# Patient Record
Sex: MALE | Race: WHITE | NOT HISPANIC OR LATINO | Employment: OTHER | URBAN - METROPOLITAN AREA
[De-identification: names, ages, dates, MRNs, and addresses within clinical notes are randomized per-mention and may not be internally consistent; named-entity substitution may affect disease eponyms.]

---

## 2017-10-06 PROBLEM — N18.30 STAGE 3 CHRONIC KIDNEY DISEASE: Status: ACTIVE | Noted: 2017-10-06

## 2017-10-06 PROBLEM — E87.5 HYPERKALEMIA: Status: ACTIVE | Noted: 2017-10-06

## 2017-10-06 PROBLEM — D50.9 IRON DEFICIENCY ANEMIA: Status: ACTIVE | Noted: 2017-10-06

## 2017-10-06 PROBLEM — D64.9 SYMPTOMATIC ANEMIA: Status: ACTIVE | Noted: 2017-10-06

## 2020-07-09 ENCOUNTER — CLINICAL SUPPORT (OUTPATIENT)
Dept: FAMILY MEDICINE | Facility: CLINIC | Age: 80
End: 2020-07-09
Payer: MEDICARE

## 2020-07-09 DIAGNOSIS — D50.9 IRON DEFICIENCY ANEMIA, UNSPECIFIED IRON DEFICIENCY ANEMIA TYPE: ICD-10-CM

## 2020-07-09 DIAGNOSIS — I10 ESSENTIAL HYPERTENSION: ICD-10-CM

## 2020-07-09 DIAGNOSIS — E78.00 HYPERCHOLESTEREMIA: Primary | ICD-10-CM

## 2020-07-09 DIAGNOSIS — R73.01 ELEVATED FASTING GLUCOSE: ICD-10-CM

## 2020-07-09 DIAGNOSIS — E78.00 HYPERCHOLESTEREMIA: ICD-10-CM

## 2020-07-09 LAB
ALBUMIN SERPL BCP-MCNC: 3.9 G/DL (ref 3.5–5.2)
ALP SERPL-CCNC: 51 U/L (ref 55–135)
ALT SERPL W/O P-5'-P-CCNC: 21 U/L (ref 10–44)
ANION GAP SERPL CALC-SCNC: 8 MMOL/L (ref 8–16)
AST SERPL-CCNC: 21 U/L (ref 10–40)
BASOPHILS # BLD AUTO: 0.04 K/UL (ref 0–0.2)
BASOPHILS NFR BLD: 0.8 % (ref 0–1.9)
BILIRUB SERPL-MCNC: 0.4 MG/DL (ref 0.1–1)
BUN SERPL-MCNC: 23 MG/DL (ref 8–23)
CALCIUM SERPL-MCNC: 9.2 MG/DL (ref 8.7–10.5)
CHLORIDE SERPL-SCNC: 105 MMOL/L (ref 95–110)
CHOLEST SERPL-MCNC: 137 MG/DL (ref 120–199)
CHOLEST/HDLC SERPL: 3.6 {RATIO} (ref 2–5)
CO2 SERPL-SCNC: 26 MMOL/L (ref 23–29)
CREAT SERPL-MCNC: 1.4 MG/DL (ref 0.5–1.4)
DIFFERENTIAL METHOD: ABNORMAL
EOSINOPHIL # BLD AUTO: 0.2 K/UL (ref 0–0.5)
EOSINOPHIL NFR BLD: 3.1 % (ref 0–8)
ERYTHROCYTE [DISTWIDTH] IN BLOOD BY AUTOMATED COUNT: 20.5 % (ref 11.5–14.5)
EST. GFR  (AFRICAN AMERICAN): 54.5 ML/MIN/1.73 M^2
EST. GFR  (NON AFRICAN AMERICAN): 47.1 ML/MIN/1.73 M^2
HCT VFR BLD AUTO: 38.3 % (ref 40–54)
HDLC SERPL-MCNC: 38 MG/DL (ref 40–75)
HDLC SERPL: 27.7 % (ref 20–50)
HGB BLD-MCNC: 11.1 G/DL (ref 14–18)
IMM GRANULOCYTES # BLD AUTO: 0.01 K/UL (ref 0–0.04)
IMM GRANULOCYTES NFR BLD AUTO: 0.2 % (ref 0–0.5)
LDLC SERPL CALC-MCNC: 68 MG/DL (ref 63–159)
LYMPHOCYTES # BLD AUTO: 0.9 K/UL (ref 1–4.8)
LYMPHOCYTES NFR BLD: 16.7 % (ref 18–48)
MCH RBC QN AUTO: 24.7 PG (ref 27–31)
MCHC RBC AUTO-ENTMCNC: 29 G/DL (ref 32–36)
MCV RBC AUTO: 85 FL (ref 82–98)
MONOCYTES # BLD AUTO: 0.7 K/UL (ref 0.3–1)
MONOCYTES NFR BLD: 14.2 % (ref 4–15)
NEUTROPHILS # BLD AUTO: 3.4 K/UL (ref 1.8–7.7)
NEUTROPHILS NFR BLD: 65 % (ref 38–73)
NONHDLC SERPL-MCNC: 99 MG/DL
NRBC BLD-RTO: 0 /100 WBC
PLATELET # BLD AUTO: 140 K/UL (ref 150–350)
PMV BLD AUTO: 12.9 FL (ref 9.2–12.9)
POTASSIUM SERPL-SCNC: 4.8 MMOL/L (ref 3.5–5.1)
PROT SERPL-MCNC: 7.3 G/DL (ref 6–8.4)
RBC # BLD AUTO: 4.5 M/UL (ref 4.6–6.2)
SODIUM SERPL-SCNC: 139 MMOL/L (ref 136–145)
TRIGL SERPL-MCNC: 155 MG/DL (ref 30–150)
WBC # BLD AUTO: 5.2 K/UL (ref 3.9–12.7)

## 2020-07-09 PROCEDURE — 83036 HEMOGLOBIN GLYCOSYLATED A1C: CPT

## 2020-07-09 PROCEDURE — 80061 LIPID PANEL: CPT

## 2020-07-09 PROCEDURE — 85025 COMPLETE CBC W/AUTO DIFF WBC: CPT

## 2020-07-09 PROCEDURE — 80053 COMPREHEN METABOLIC PANEL: CPT

## 2020-07-09 NOTE — PROGRESS NOTES
Venipuncture performed with 21 gauge butterfly, x's 1 attempt,  to R Antecubital vein.  Specimens collected per orders.      Pressure dressing applied to site, instructed patient to remove dressing in 10-15 minutes, OK to re-adjust dressing if pressure causing any discomfort, to observe closely for numbness and/or discoloration to hand or fingers, and to notify provider if bleeding persists after applying constant pressure lasting 30 minutes.

## 2020-07-10 LAB
ESTIMATED AVG GLUCOSE: 140 MG/DL (ref 68–131)
HBA1C MFR BLD HPLC: 6.5 % (ref 4–5.6)

## 2020-12-21 ENCOUNTER — PATIENT MESSAGE (OUTPATIENT)
Dept: OTHER | Facility: OTHER | Age: 80
End: 2020-12-21

## 2021-05-10 ENCOUNTER — PATIENT MESSAGE (OUTPATIENT)
Dept: RESEARCH | Facility: HOSPITAL | Age: 81
End: 2021-05-10

## 2021-05-29 PROBLEM — K92.2 UGIB (UPPER GASTROINTESTINAL BLEED): Status: ACTIVE | Noted: 2021-05-29

## 2021-05-29 PROBLEM — D64.9 SEVERE ANEMIA: Status: ACTIVE | Noted: 2021-05-29

## 2021-05-30 PROBLEM — K92.2 UGIB (UPPER GASTROINTESTINAL BLEED): Status: RESOLVED | Noted: 2021-05-29 | Resolved: 2021-05-30

## 2021-10-22 ENCOUNTER — HOSPITAL ENCOUNTER (OUTPATIENT)
Facility: HOSPITAL | Age: 81
Discharge: HOME | End: 2021-10-25
Attending: EMERGENCY MEDICINE | Admitting: INTERNAL MEDICINE
Payer: MEDICARE

## 2021-10-22 DIAGNOSIS — N18.31 STAGE 3A CHRONIC KIDNEY DISEASE: ICD-10-CM

## 2021-10-22 DIAGNOSIS — D50.9 IRON DEFICIENCY ANEMIA, UNSPECIFIED IRON DEFICIENCY ANEMIA TYPE: ICD-10-CM

## 2021-10-22 DIAGNOSIS — Z79.01 CHRONIC ANTICOAGULATION: ICD-10-CM

## 2021-10-22 DIAGNOSIS — R06.09 DOE (DYSPNEA ON EXERTION): ICD-10-CM

## 2021-10-22 DIAGNOSIS — D64.9 ACUTE ON CHRONIC ANEMIA: ICD-10-CM

## 2021-10-22 DIAGNOSIS — D64.9 ANEMIA: ICD-10-CM

## 2021-10-22 DIAGNOSIS — D61.818 PANCYTOPENIA: ICD-10-CM

## 2021-10-22 DIAGNOSIS — D64.9 SEVERE ANEMIA: ICD-10-CM

## 2021-10-22 DIAGNOSIS — D64.9 SYMPTOMATIC ANEMIA: Primary | ICD-10-CM

## 2021-10-22 LAB
ABO + RH BLD: NORMAL
ANISOCYTOSIS BLD QL SMEAR: SLIGHT
BASOPHILS # BLD AUTO: 0.01 K/UL (ref 0–0.2)
BASOPHILS NFR BLD: 0.3 % (ref 0–1.9)
BLD GP AB SCN CELLS X3 SERPL QL: NORMAL
DACRYOCYTES BLD QL SMEAR: ABNORMAL
DIFFERENTIAL METHOD: ABNORMAL
EOSINOPHIL # BLD AUTO: 0 K/UL (ref 0–0.5)
EOSINOPHIL NFR BLD: 0.3 % (ref 0–8)
ERYTHROCYTE [DISTWIDTH] IN BLOOD BY AUTOMATED COUNT: 17.8 % (ref 11.5–14.5)
HCT VFR BLD AUTO: 17.1 % (ref 40–54)
HGB BLD-MCNC: 4.2 G/DL (ref 14–18)
HYPOCHROMIA BLD QL SMEAR: ABNORMAL
IMM GRANULOCYTES # BLD AUTO: 0.06 K/UL (ref 0–0.04)
IMM GRANULOCYTES NFR BLD AUTO: 1.6 % (ref 0–0.5)
LYMPHOCYTES # BLD AUTO: 0.7 K/UL (ref 1–4.8)
LYMPHOCYTES NFR BLD: 18 % (ref 18–48)
MCH RBC QN AUTO: 20.2 PG (ref 27–31)
MCHC RBC AUTO-ENTMCNC: 24.6 G/DL (ref 32–36)
MCV RBC AUTO: 82 FL (ref 82–98)
MONOCYTES # BLD AUTO: 0.5 K/UL (ref 0.3–1)
MONOCYTES NFR BLD: 12.2 % (ref 4–15)
NEUTROPHILS # BLD AUTO: 2.6 K/UL (ref 1.8–7.7)
NEUTROPHILS NFR BLD: 67.6 % (ref 38–73)
NRBC BLD-RTO: 0 /100 WBC
OVALOCYTES BLD QL SMEAR: ABNORMAL
PLATELET # BLD AUTO: 125 K/UL (ref 150–450)
PLATELET BLD QL SMEAR: ABNORMAL
PMV BLD AUTO: 11.5 FL (ref 9.2–12.9)
POIKILOCYTOSIS BLD QL SMEAR: SLIGHT
RBC # BLD AUTO: 2.08 M/UL (ref 4.6–6.2)
TARGETS BLD QL SMEAR: ABNORMAL
WBC # BLD AUTO: 3.84 K/UL (ref 3.9–12.7)

## 2021-10-22 PROCEDURE — 99291 CRITICAL CARE FIRST HOUR: CPT | Mod: 25

## 2021-10-22 PROCEDURE — 93010 EKG 12-LEAD: ICD-10-PCS | Mod: ,,, | Performed by: INTERNAL MEDICINE

## 2021-10-22 PROCEDURE — 85025 COMPLETE CBC W/AUTO DIFF WBC: CPT | Performed by: EMERGENCY MEDICINE

## 2021-10-22 PROCEDURE — G0378 HOSPITAL OBSERVATION PER HR: HCPCS

## 2021-10-22 PROCEDURE — 93010 ELECTROCARDIOGRAM REPORT: CPT | Mod: ,,, | Performed by: INTERNAL MEDICINE

## 2021-10-22 PROCEDURE — 93005 ELECTROCARDIOGRAM TRACING: CPT

## 2021-10-22 PROCEDURE — A4216 STERILE WATER/SALINE, 10 ML: HCPCS | Performed by: INTERNAL MEDICINE

## 2021-10-22 PROCEDURE — 86920 COMPATIBILITY TEST SPIN: CPT | Mod: 59 | Performed by: EMERGENCY MEDICINE

## 2021-10-22 PROCEDURE — P9016 RBC LEUKOCYTES REDUCED: HCPCS | Performed by: EMERGENCY MEDICINE

## 2021-10-22 PROCEDURE — 86900 BLOOD TYPING SEROLOGIC ABO: CPT | Performed by: EMERGENCY MEDICINE

## 2021-10-22 PROCEDURE — 25000003 PHARM REV CODE 250: Performed by: INTERNAL MEDICINE

## 2021-10-22 PROCEDURE — 36430 TRANSFUSION BLD/BLD COMPNT: CPT

## 2021-10-22 RX ORDER — POLYETHYLENE GLYCOL 3350 17 G/17G
17 POWDER, FOR SOLUTION ORAL DAILY PRN
Status: DISCONTINUED | OUTPATIENT
Start: 2021-10-22 | End: 2021-10-25 | Stop reason: HOSPADM

## 2021-10-22 RX ORDER — HYDROCODONE BITARTRATE AND ACETAMINOPHEN 500; 5 MG/1; MG/1
TABLET ORAL
Status: DISCONTINUED | OUTPATIENT
Start: 2021-10-22 | End: 2021-10-25 | Stop reason: HOSPADM

## 2021-10-22 RX ORDER — AMIODARONE HYDROCHLORIDE 200 MG/1
200 TABLET ORAL DAILY
Status: DISCONTINUED | OUTPATIENT
Start: 2021-10-23 | End: 2021-10-25 | Stop reason: HOSPADM

## 2021-10-22 RX ORDER — SODIUM,POTASSIUM PHOSPHATES 280-250MG
2 POWDER IN PACKET (EA) ORAL
Status: DISCONTINUED | OUTPATIENT
Start: 2021-10-22 | End: 2021-10-25 | Stop reason: HOSPADM

## 2021-10-22 RX ORDER — HYDROCODONE BITARTRATE AND ACETAMINOPHEN 5; 325 MG/1; MG/1
1 TABLET ORAL EVERY 6 HOURS PRN
Status: DISCONTINUED | OUTPATIENT
Start: 2021-10-22 | End: 2021-10-25 | Stop reason: HOSPADM

## 2021-10-22 RX ORDER — TALC
6 POWDER (GRAM) TOPICAL NIGHTLY PRN
Status: DISCONTINUED | OUTPATIENT
Start: 2021-10-22 | End: 2021-10-25 | Stop reason: HOSPADM

## 2021-10-22 RX ORDER — IBUPROFEN 200 MG
24 TABLET ORAL
Status: DISCONTINUED | OUTPATIENT
Start: 2021-10-22 | End: 2021-10-25 | Stop reason: HOSPADM

## 2021-10-22 RX ORDER — GLUCAGON 1 MG
1 KIT INJECTION
Status: DISCONTINUED | OUTPATIENT
Start: 2021-10-22 | End: 2021-10-25 | Stop reason: HOSPADM

## 2021-10-22 RX ORDER — LANOLIN ALCOHOL/MO/W.PET/CERES
800 CREAM (GRAM) TOPICAL
Status: DISCONTINUED | OUTPATIENT
Start: 2021-10-22 | End: 2021-10-25 | Stop reason: HOSPADM

## 2021-10-22 RX ORDER — HYDRALAZINE HYDROCHLORIDE 20 MG/ML
10 INJECTION INTRAMUSCULAR; INTRAVENOUS EVERY 8 HOURS PRN
Status: DISCONTINUED | OUTPATIENT
Start: 2021-10-22 | End: 2021-10-25 | Stop reason: HOSPADM

## 2021-10-22 RX ORDER — IBUPROFEN 200 MG
16 TABLET ORAL
Status: DISCONTINUED | OUTPATIENT
Start: 2021-10-22 | End: 2021-10-25 | Stop reason: HOSPADM

## 2021-10-22 RX ORDER — NALOXONE HCL 0.4 MG/ML
0.02 VIAL (ML) INJECTION
Status: DISCONTINUED | OUTPATIENT
Start: 2021-10-22 | End: 2021-10-25 | Stop reason: HOSPADM

## 2021-10-22 RX ORDER — METOPROLOL SUCCINATE 50 MG/1
50 TABLET, EXTENDED RELEASE ORAL DAILY
Status: DISCONTINUED | OUTPATIENT
Start: 2021-10-23 | End: 2021-10-25 | Stop reason: HOSPADM

## 2021-10-22 RX ORDER — ACETAMINOPHEN 325 MG/1
650 TABLET ORAL EVERY 4 HOURS PRN
Status: DISCONTINUED | OUTPATIENT
Start: 2021-10-22 | End: 2021-10-25 | Stop reason: HOSPADM

## 2021-10-22 RX ORDER — SODIUM CHLORIDE 0.9 % (FLUSH) 0.9 %
10 SYRINGE (ML) INJECTION EVERY 8 HOURS
Status: DISCONTINUED | OUTPATIENT
Start: 2021-10-22 | End: 2021-10-25 | Stop reason: HOSPADM

## 2021-10-22 RX ADMIN — Medication 10 ML: at 10:10

## 2021-10-23 PROBLEM — D61.818 PANCYTOPENIA: Status: ACTIVE | Noted: 2021-10-23

## 2021-10-23 LAB
ABO + RH BLD: NORMAL
BACTERIA #/AREA URNS HPF: ABNORMAL /HPF
BASOPHILS # BLD AUTO: 0.04 K/UL (ref 0–0.2)
BASOPHILS NFR BLD: 0.8 % (ref 0–1.9)
BILIRUB UR QL STRIP: NEGATIVE
BLD GP AB SCN CELLS X3 SERPL QL: NORMAL
BLD PROD TYP BPU: NORMAL
BLOOD UNIT EXPIRATION DATE: NORMAL
BLOOD UNIT TYPE CODE: 9500
BLOOD UNIT TYPE: NORMAL
CLARITY UR: CLEAR
CODING SYSTEM: NORMAL
COLOR UR: YELLOW
CREAT SERPL-MCNC: 1.2 MG/DL (ref 0.5–1.4)
DAT IGG-SP REAG RBC-IMP: NORMAL
DIFFERENTIAL METHOD: ABNORMAL
DISPENSE STATUS: NORMAL
EOSINOPHIL # BLD AUTO: 0 K/UL (ref 0–0.5)
EOSINOPHIL NFR BLD: 0.2 % (ref 0–8)
ERYTHROCYTE [DISTWIDTH] IN BLOOD BY AUTOMATED COUNT: 17 % (ref 11.5–14.5)
EST. GFR  (AFRICAN AMERICAN): >60 ML/MIN/1.73 M^2
EST. GFR  (NON AFRICAN AMERICAN): 56 ML/MIN/1.73 M^2
FERRITIN SERPL-MCNC: 9 NG/ML (ref 20–300)
GLUCOSE UR QL STRIP: NEGATIVE
HCT VFR BLD AUTO: 24.5 % (ref 40–54)
HCT VFR BLD AUTO: 28.6 % (ref 40–54)
HCT VFR BLD AUTO: 30 % (ref 40–54)
HGB BLD-MCNC: 6.5 G/DL (ref 14–18)
HGB BLD-MCNC: 7.9 G/DL (ref 14–18)
HGB BLD-MCNC: 8.8 G/DL (ref 14–18)
HGB UR QL STRIP: ABNORMAL
IMM GRANULOCYTES # BLD AUTO: 0.06 K/UL (ref 0–0.04)
IMM GRANULOCYTES NFR BLD AUTO: 1.2 % (ref 0–0.5)
KETONES UR QL STRIP: NEGATIVE
LDH SERPL L TO P-CCNC: 174 U/L (ref 110–260)
LEUKOCYTE ESTERASE UR QL STRIP: NEGATIVE
LYMPHOCYTES # BLD AUTO: 0.6 K/UL (ref 1–4.8)
LYMPHOCYTES NFR BLD: 12.5 % (ref 18–48)
MCH RBC QN AUTO: 24.5 PG (ref 27–31)
MCHC RBC AUTO-ENTMCNC: 29.3 G/DL (ref 32–36)
MCV RBC AUTO: 84 FL (ref 82–98)
MICROSCOPIC COMMENT: ABNORMAL
MONOCYTES # BLD AUTO: 0.7 K/UL (ref 0.3–1)
MONOCYTES NFR BLD: 13.7 % (ref 4–15)
NEUTROPHILS # BLD AUTO: 3.6 K/UL (ref 1.8–7.7)
NEUTROPHILS NFR BLD: 71.6 % (ref 38–73)
NITRITE UR QL STRIP: NEGATIVE
NRBC BLD-RTO: 0 /100 WBC
NUM UNITS TRANS PACKED RBC: NORMAL
PATH REV BLD -IMP: NORMAL
PH UR STRIP: 6 [PH] (ref 5–8)
PLATELET # BLD AUTO: 159 K/UL (ref 150–450)
PMV BLD AUTO: 12 FL (ref 9.2–12.9)
PROT UR QL STRIP: NEGATIVE
RBC # BLD AUTO: 3.59 M/UL (ref 4.6–6.2)
RBC #/AREA URNS HPF: 30 /HPF (ref 0–4)
RETICS/RBC NFR AUTO: 4.1 % (ref 0.4–2)
SP GR UR STRIP: 1.02 (ref 1–1.03)
TSH SERPL DL<=0.005 MIU/L-ACNC: 3.59 UIU/ML (ref 0.4–4)
URN SPEC COLLECT METH UR: ABNORMAL
UROBILINOGEN UR STRIP-ACNC: NEGATIVE EU/DL
WBC # BLD AUTO: 5.02 K/UL (ref 3.9–12.7)
WBC #/AREA URNS HPF: 1 /HPF (ref 0–5)

## 2021-10-23 PROCEDURE — 99204 OFFICE O/P NEW MOD 45 MIN: CPT | Mod: ,,, | Performed by: INTERNAL MEDICINE

## 2021-10-23 PROCEDURE — P9016 RBC LEUKOCYTES REDUCED: HCPCS | Performed by: EMERGENCY MEDICINE

## 2021-10-23 PROCEDURE — 83615 LACTATE (LD) (LDH) ENZYME: CPT | Performed by: INTERNAL MEDICINE

## 2021-10-23 PROCEDURE — 25500020 PHARM REV CODE 255: Performed by: FAMILY MEDICINE

## 2021-10-23 PROCEDURE — 36415 COLL VENOUS BLD VENIPUNCTURE: CPT | Performed by: FAMILY MEDICINE

## 2021-10-23 PROCEDURE — 25000003 PHARM REV CODE 250: Performed by: INTERNAL MEDICINE

## 2021-10-23 PROCEDURE — 99204 PR OFFICE/OUTPT VISIT, NEW, LEVL IV, 45-59 MIN: ICD-10-PCS | Mod: ,,, | Performed by: INTERNAL MEDICINE

## 2021-10-23 PROCEDURE — 96375 TX/PRO/DX INJ NEW DRUG ADDON: CPT | Mod: 59

## 2021-10-23 PROCEDURE — 86920 COMPATIBILITY TEST SPIN: CPT | Mod: 59 | Performed by: FAMILY MEDICINE

## 2021-10-23 PROCEDURE — 86747 PARVOVIRUS ANTIBODY: CPT | Mod: 91 | Performed by: INTERNAL MEDICINE

## 2021-10-23 PROCEDURE — 85025 COMPLETE CBC W/AUTO DIFF WBC: CPT | Performed by: INTERNAL MEDICINE

## 2021-10-23 PROCEDURE — 86880 COOMBS TEST DIRECT: CPT | Performed by: INTERNAL MEDICINE

## 2021-10-23 PROCEDURE — 82565 ASSAY OF CREATININE: CPT | Performed by: FAMILY MEDICINE

## 2021-10-23 PROCEDURE — 82728 ASSAY OF FERRITIN: CPT | Performed by: INTERNAL MEDICINE

## 2021-10-23 PROCEDURE — 88271 CYTOGENETICS DNA PROBE: CPT | Mod: 59 | Performed by: INTERNAL MEDICINE

## 2021-10-23 PROCEDURE — 85014 HEMATOCRIT: CPT | Mod: 91 | Performed by: INTERNAL MEDICINE

## 2021-10-23 PROCEDURE — 85018 HEMOGLOBIN: CPT | Mod: 91 | Performed by: INTERNAL MEDICINE

## 2021-10-23 PROCEDURE — 99900038 HC OT GENERIC THERAPY SCREENING (STAT)

## 2021-10-23 PROCEDURE — G0378 HOSPITAL OBSERVATION PER HR: HCPCS

## 2021-10-23 PROCEDURE — 86900 BLOOD TYPING SEROLOGIC ABO: CPT | Performed by: FAMILY MEDICINE

## 2021-10-23 PROCEDURE — 63600175 PHARM REV CODE 636 W HCPCS: Performed by: INTERNAL MEDICINE

## 2021-10-23 PROCEDURE — 85045 AUTOMATED RETICULOCYTE COUNT: CPT | Performed by: INTERNAL MEDICINE

## 2021-10-23 PROCEDURE — 84443 ASSAY THYROID STIM HORMONE: CPT | Performed by: INTERNAL MEDICINE

## 2021-10-23 PROCEDURE — A4216 STERILE WATER/SALINE, 10 ML: HCPCS | Performed by: INTERNAL MEDICINE

## 2021-10-23 PROCEDURE — 96374 THER/PROPH/DIAG INJ IV PUSH: CPT

## 2021-10-23 PROCEDURE — 85060 BLOOD SMEAR INTERPRETATION: CPT | Mod: ,,, | Performed by: PATHOLOGY

## 2021-10-23 PROCEDURE — 84466 ASSAY OF TRANSFERRIN: CPT | Performed by: INTERNAL MEDICINE

## 2021-10-23 PROCEDURE — 82607 VITAMIN B-12: CPT | Performed by: INTERNAL MEDICINE

## 2021-10-23 PROCEDURE — 36415 COLL VENOUS BLD VENIPUNCTURE: CPT | Performed by: INTERNAL MEDICINE

## 2021-10-23 PROCEDURE — 88275 CYTOGENETICS 100-300: CPT | Performed by: INTERNAL MEDICINE

## 2021-10-23 PROCEDURE — 85060 PATHOLOGIST REVIEW: ICD-10-PCS | Mod: ,,, | Performed by: PATHOLOGY

## 2021-10-23 PROCEDURE — 82746 ASSAY OF FOLIC ACID SERUM: CPT | Performed by: INTERNAL MEDICINE

## 2021-10-23 PROCEDURE — P9016 RBC LEUKOCYTES REDUCED: HCPCS | Performed by: FAMILY MEDICINE

## 2021-10-23 PROCEDURE — 63600175 PHARM REV CODE 636 W HCPCS: Performed by: FAMILY MEDICINE

## 2021-10-23 PROCEDURE — 36430 TRANSFUSION BLD/BLD COMPNT: CPT

## 2021-10-23 PROCEDURE — 88271 CYTOGENETICS DNA PROBE: CPT | Performed by: INTERNAL MEDICINE

## 2021-10-23 PROCEDURE — 85018 HEMOGLOBIN: CPT | Performed by: INTERNAL MEDICINE

## 2021-10-23 PROCEDURE — 81000 URINALYSIS NONAUTO W/SCOPE: CPT | Performed by: INTERNAL MEDICINE

## 2021-10-23 RX ORDER — HYDROCODONE BITARTRATE AND ACETAMINOPHEN 500; 5 MG/1; MG/1
TABLET ORAL
Status: DISCONTINUED | OUTPATIENT
Start: 2021-10-23 | End: 2021-10-25 | Stop reason: HOSPADM

## 2021-10-23 RX ORDER — FUROSEMIDE 10 MG/ML
20 INJECTION INTRAMUSCULAR; INTRAVENOUS ONCE
Status: COMPLETED | OUTPATIENT
Start: 2021-10-23 | End: 2021-10-23

## 2021-10-23 RX ADMIN — HYDRALAZINE HYDROCHLORIDE 10 MG: 20 INJECTION, SOLUTION INTRAMUSCULAR; INTRAVENOUS at 09:10

## 2021-10-23 RX ADMIN — Medication 10 ML: at 02:10

## 2021-10-23 RX ADMIN — Medication 10 ML: at 06:10

## 2021-10-23 RX ADMIN — IOHEXOL 75 ML: 350 INJECTION, SOLUTION INTRAVENOUS at 06:10

## 2021-10-23 RX ADMIN — Medication 10 ML: at 10:10

## 2021-10-23 RX ADMIN — AMIODARONE HYDROCHLORIDE 200 MG: 200 TABLET ORAL at 10:10

## 2021-10-23 RX ADMIN — METOPROLOL SUCCINATE 50 MG: 50 TABLET, EXTENDED RELEASE ORAL at 10:10

## 2021-10-23 RX ADMIN — FUROSEMIDE 20 MG: 20 INJECTION, SOLUTION INTRAMUSCULAR; INTRAVENOUS at 03:10

## 2021-10-24 LAB
ANION GAP SERPL CALC-SCNC: 10 MMOL/L (ref 8–16)
BASOPHILS # BLD AUTO: 0.04 K/UL (ref 0–0.2)
BASOPHILS NFR BLD: 0.7 % (ref 0–1.9)
BUN SERPL-MCNC: 19 MG/DL (ref 8–23)
CALCIUM SERPL-MCNC: 9.4 MG/DL (ref 8.7–10.5)
CHLORIDE SERPL-SCNC: 108 MMOL/L (ref 95–110)
CO2 SERPL-SCNC: 24 MMOL/L (ref 23–29)
CREAT SERPL-MCNC: 1.2 MG/DL (ref 0.5–1.4)
DIFFERENTIAL METHOD: ABNORMAL
EOSINOPHIL # BLD AUTO: 0 K/UL (ref 0–0.5)
EOSINOPHIL NFR BLD: 0.7 % (ref 0–8)
ERYTHROCYTE [DISTWIDTH] IN BLOOD BY AUTOMATED COUNT: 17.3 % (ref 11.5–14.5)
EST. GFR  (AFRICAN AMERICAN): >60 ML/MIN/1.73 M^2
EST. GFR  (NON AFRICAN AMERICAN): 56 ML/MIN/1.73 M^2
FOLATE SERPL-MCNC: 16.8 NG/ML (ref 4–24)
GLUCOSE SERPL-MCNC: 99 MG/DL (ref 70–110)
HCT VFR BLD AUTO: 33.8 % (ref 40–54)
HGB BLD-MCNC: 9.5 G/DL (ref 14–18)
IMM GRANULOCYTES # BLD AUTO: 0.05 K/UL (ref 0–0.04)
IMM GRANULOCYTES NFR BLD AUTO: 0.9 % (ref 0–0.5)
IRON SERPL-MCNC: 11 UG/DL (ref 45–160)
LYMPHOCYTES # BLD AUTO: 0.5 K/UL (ref 1–4.8)
LYMPHOCYTES NFR BLD: 8.9 % (ref 18–48)
MCH RBC QN AUTO: 23.5 PG (ref 27–31)
MCHC RBC AUTO-ENTMCNC: 28.1 G/DL (ref 32–36)
MCV RBC AUTO: 84 FL (ref 82–98)
MONOCYTES # BLD AUTO: 0.9 K/UL (ref 0.3–1)
MONOCYTES NFR BLD: 15.9 % (ref 4–15)
NEUTROPHILS # BLD AUTO: 4.1 K/UL (ref 1.8–7.7)
NEUTROPHILS NFR BLD: 72.9 % (ref 38–73)
NRBC BLD-RTO: 0 /100 WBC
PLATELET # BLD AUTO: 146 K/UL (ref 150–450)
PMV BLD AUTO: 12 FL (ref 9.2–12.9)
POTASSIUM SERPL-SCNC: 3.9 MMOL/L (ref 3.5–5.1)
RBC # BLD AUTO: 4.04 M/UL (ref 4.6–6.2)
SATURATED IRON: 2 % (ref 20–50)
SODIUM SERPL-SCNC: 142 MMOL/L (ref 136–145)
TOTAL IRON BINDING CAPACITY: 530 UG/DL (ref 250–450)
TRANSFERRIN SERPL-MCNC: 358 MG/DL (ref 200–375)
VIT B12 SERPL-MCNC: 271 PG/ML (ref 210–950)
WBC # BLD AUTO: 5.59 K/UL (ref 3.9–12.7)

## 2021-10-24 PROCEDURE — 97161 PT EVAL LOW COMPLEX 20 MIN: CPT

## 2021-10-24 PROCEDURE — 99214 OFFICE O/P EST MOD 30 MIN: CPT | Mod: ,,, | Performed by: INTERNAL MEDICINE

## 2021-10-24 PROCEDURE — 97165 OT EVAL LOW COMPLEX 30 MIN: CPT

## 2021-10-24 PROCEDURE — A4216 STERILE WATER/SALINE, 10 ML: HCPCS | Performed by: INTERNAL MEDICINE

## 2021-10-24 PROCEDURE — 63600175 PHARM REV CODE 636 W HCPCS: Performed by: INTERNAL MEDICINE

## 2021-10-24 PROCEDURE — 96376 TX/PRO/DX INJ SAME DRUG ADON: CPT

## 2021-10-24 PROCEDURE — G0378 HOSPITAL OBSERVATION PER HR: HCPCS

## 2021-10-24 PROCEDURE — 63600175 PHARM REV CODE 636 W HCPCS: Performed by: FAMILY MEDICINE

## 2021-10-24 PROCEDURE — 99214 PR OFFICE/OUTPT VISIT, EST, LEVL IV, 30-39 MIN: ICD-10-PCS | Mod: ,,, | Performed by: INTERNAL MEDICINE

## 2021-10-24 PROCEDURE — 96375 TX/PRO/DX INJ NEW DRUG ADDON: CPT

## 2021-10-24 PROCEDURE — 85025 COMPLETE CBC W/AUTO DIFF WBC: CPT | Performed by: NURSE PRACTITIONER

## 2021-10-24 PROCEDURE — 36415 COLL VENOUS BLD VENIPUNCTURE: CPT | Performed by: NURSE PRACTITIONER

## 2021-10-24 PROCEDURE — 80048 BASIC METABOLIC PNL TOTAL CA: CPT | Performed by: NURSE PRACTITIONER

## 2021-10-24 PROCEDURE — 25000003 PHARM REV CODE 250: Performed by: INTERNAL MEDICINE

## 2021-10-24 RX ADMIN — AMIODARONE HYDROCHLORIDE 200 MG: 200 TABLET ORAL at 08:10

## 2021-10-24 RX ADMIN — Medication 10 ML: at 09:10

## 2021-10-24 RX ADMIN — Medication 10 ML: at 06:10

## 2021-10-24 RX ADMIN — Medication 10 ML: at 02:10

## 2021-10-24 RX ADMIN — METOPROLOL SUCCINATE 50 MG: 50 TABLET, EXTENDED RELEASE ORAL at 08:10

## 2021-10-24 RX ADMIN — HYDRALAZINE HYDROCHLORIDE 10 MG: 20 INJECTION, SOLUTION INTRAMUSCULAR; INTRAVENOUS at 08:10

## 2021-10-24 RX ADMIN — IRON SUCROSE 200 MG: 20 INJECTION, SOLUTION INTRAVENOUS at 12:10

## 2021-10-25 ENCOUNTER — ANESTHESIA EVENT (OUTPATIENT)
Dept: ENDOSCOPY | Facility: HOSPITAL | Age: 81
End: 2021-10-25
Payer: MEDICARE

## 2021-10-25 ENCOUNTER — ANESTHESIA (OUTPATIENT)
Dept: ENDOSCOPY | Facility: HOSPITAL | Age: 81
End: 2021-10-25
Payer: MEDICARE

## 2021-10-25 DIAGNOSIS — D50.8 OTHER IRON DEFICIENCY ANEMIA: Primary | ICD-10-CM

## 2021-10-25 DIAGNOSIS — D64.9 ANEMIA, UNSPECIFIED TYPE: ICD-10-CM

## 2021-10-25 DIAGNOSIS — D50.9 IRON DEFICIENCY ANEMIA, UNSPECIFIED IRON DEFICIENCY ANEMIA TYPE: Primary | ICD-10-CM

## 2021-10-25 PROBLEM — D61.818 PANCYTOPENIA: Status: RESOLVED | Noted: 2021-10-23 | Resolved: 2021-10-25

## 2021-10-25 LAB
ANION GAP SERPL CALC-SCNC: 8 MMOL/L (ref 8–16)
BASOPHILS # BLD AUTO: 0.04 K/UL (ref 0–0.2)
BASOPHILS NFR BLD: 1 % (ref 0–1.9)
BUN SERPL-MCNC: 17 MG/DL (ref 8–23)
CALCIUM SERPL-MCNC: 9 MG/DL (ref 8.7–10.5)
CHLORIDE SERPL-SCNC: 108 MMOL/L (ref 95–110)
CO2 SERPL-SCNC: 25 MMOL/L (ref 23–29)
CREAT SERPL-MCNC: 1.2 MG/DL (ref 0.5–1.4)
DIFFERENTIAL METHOD: ABNORMAL
EOSINOPHIL # BLD AUTO: 0.1 K/UL (ref 0–0.5)
EOSINOPHIL NFR BLD: 1.5 % (ref 0–8)
ERYTHROCYTE [DISTWIDTH] IN BLOOD BY AUTOMATED COUNT: 17.8 % (ref 11.5–14.5)
EST. GFR  (AFRICAN AMERICAN): >60 ML/MIN/1.73 M^2
EST. GFR  (NON AFRICAN AMERICAN): 56 ML/MIN/1.73 M^2
GLUCOSE SERPL-MCNC: 105 MG/DL (ref 70–110)
HCT VFR BLD AUTO: 32.2 % (ref 40–54)
HGB BLD-MCNC: 8.9 G/DL (ref 14–18)
IMM GRANULOCYTES # BLD AUTO: 0.03 K/UL (ref 0–0.04)
IMM GRANULOCYTES NFR BLD AUTO: 0.7 % (ref 0–0.5)
LYMPHOCYTES # BLD AUTO: 0.5 K/UL (ref 1–4.8)
LYMPHOCYTES NFR BLD: 11.4 % (ref 18–48)
MCH RBC QN AUTO: 23.5 PG (ref 27–31)
MCHC RBC AUTO-ENTMCNC: 27.6 G/DL (ref 32–36)
MCV RBC AUTO: 85 FL (ref 82–98)
MONOCYTES # BLD AUTO: 0.5 K/UL (ref 0.3–1)
MONOCYTES NFR BLD: 12.4 % (ref 4–15)
NEUTROPHILS # BLD AUTO: 3 K/UL (ref 1.8–7.7)
NEUTROPHILS NFR BLD: 73 % (ref 38–73)
NRBC BLD-RTO: 0 /100 WBC
PATH REV BLD -IMP: NORMAL
PLATELET # BLD AUTO: 117 K/UL (ref 150–450)
PMV BLD AUTO: 10.9 FL (ref 9.2–12.9)
POTASSIUM SERPL-SCNC: 4.1 MMOL/L (ref 3.5–5.1)
RBC # BLD AUTO: 3.79 M/UL (ref 4.6–6.2)
SODIUM SERPL-SCNC: 141 MMOL/L (ref 136–145)
WBC # BLD AUTO: 4.11 K/UL (ref 3.9–12.7)

## 2021-10-25 PROCEDURE — 99214 OFFICE O/P EST MOD 30 MIN: CPT | Mod: ,,, | Performed by: NURSE PRACTITIONER

## 2021-10-25 PROCEDURE — 25000003 PHARM REV CODE 250: Performed by: NURSE ANESTHETIST, CERTIFIED REGISTERED

## 2021-10-25 PROCEDURE — 37000009 HC ANESTHESIA EA ADD 15 MINS: Performed by: INTERNAL MEDICINE

## 2021-10-25 PROCEDURE — 43236 UPPR GI SCOPE W/SUBMUC INJ: CPT | Mod: ,,, | Performed by: INTERNAL MEDICINE

## 2021-10-25 PROCEDURE — 85025 COMPLETE CBC W/AUTO DIFF WBC: CPT | Performed by: NURSE PRACTITIONER

## 2021-10-25 PROCEDURE — 63600175 PHARM REV CODE 636 W HCPCS: Performed by: FAMILY MEDICINE

## 2021-10-25 PROCEDURE — A4216 STERILE WATER/SALINE, 10 ML: HCPCS | Performed by: INTERNAL MEDICINE

## 2021-10-25 PROCEDURE — 63600175 PHARM REV CODE 636 W HCPCS: Performed by: NURSE ANESTHETIST, CERTIFIED REGISTERED

## 2021-10-25 PROCEDURE — 25000003 PHARM REV CODE 250: Performed by: INTERNAL MEDICINE

## 2021-10-25 PROCEDURE — 99214 PR OFFICE/OUTPT VISIT, EST, LEVL IV, 30-39 MIN: ICD-10-PCS | Mod: ,,, | Performed by: NURSE PRACTITIONER

## 2021-10-25 PROCEDURE — 43236 UPPR GI SCOPE W/SUBMUC INJ: CPT | Performed by: INTERNAL MEDICINE

## 2021-10-25 PROCEDURE — 27201028 HC NEEDLE, SCLERO: Performed by: INTERNAL MEDICINE

## 2021-10-25 PROCEDURE — 80048 BASIC METABOLIC PNL TOTAL CA: CPT | Performed by: NURSE PRACTITIONER

## 2021-10-25 PROCEDURE — 25000003 PHARM REV CODE 250: Performed by: NURSE PRACTITIONER

## 2021-10-25 PROCEDURE — 43236 PR EGD, FLEX, W/SUBMUC INJECTION(S), ANY SUBSTANCE: ICD-10-PCS | Mod: ,,, | Performed by: INTERNAL MEDICINE

## 2021-10-25 PROCEDURE — 37000008 HC ANESTHESIA 1ST 15 MINUTES: Performed by: INTERNAL MEDICINE

## 2021-10-25 PROCEDURE — 27202363 HC INJECTION AGENT, SUBMUCOSAL, ANY: Performed by: INTERNAL MEDICINE

## 2021-10-25 PROCEDURE — 36415 COLL VENOUS BLD VENIPUNCTURE: CPT | Performed by: NURSE PRACTITIONER

## 2021-10-25 PROCEDURE — G0378 HOSPITAL OBSERVATION PER HR: HCPCS

## 2021-10-25 PROCEDURE — 96376 TX/PRO/DX INJ SAME DRUG ADON: CPT

## 2021-10-25 RX ORDER — SODIUM CHLORIDE, SODIUM LACTATE, POTASSIUM CHLORIDE, CALCIUM CHLORIDE 600; 310; 30; 20 MG/100ML; MG/100ML; MG/100ML; MG/100ML
INJECTION, SOLUTION INTRAVENOUS CONTINUOUS PRN
Status: DISCONTINUED | OUTPATIENT
Start: 2021-10-25 | End: 2021-10-25

## 2021-10-25 RX ORDER — LIDOCAINE HYDROCHLORIDE 10 MG/ML
INJECTION, SOLUTION EPIDURAL; INFILTRATION; INTRACAUDAL; PERINEURAL
Status: DISCONTINUED | OUTPATIENT
Start: 2021-10-25 | End: 2021-10-25

## 2021-10-25 RX ORDER — PROPOFOL 10 MG/ML
VIAL (ML) INTRAVENOUS
Status: DISCONTINUED | OUTPATIENT
Start: 2021-10-25 | End: 2021-10-25

## 2021-10-25 RX ORDER — SODIUM CHLORIDE 9 MG/ML
INJECTION, SOLUTION INTRAVENOUS CONTINUOUS
Status: DISCONTINUED | OUTPATIENT
Start: 2021-10-25 | End: 2021-10-25

## 2021-10-25 RX ADMIN — RIVAROXABAN 20 MG: 20 TABLET, FILM COATED ORAL at 03:10

## 2021-10-25 RX ADMIN — Medication 10 ML: at 03:10

## 2021-10-25 RX ADMIN — SODIUM CHLORIDE, SODIUM LACTATE, POTASSIUM CHLORIDE, AND CALCIUM CHLORIDE: 600; 310; 30; 20 INJECTION, SOLUTION INTRAVENOUS at 02:10

## 2021-10-25 RX ADMIN — PROPOFOL 70 MG: 10 INJECTION, EMULSION INTRAVENOUS at 02:10

## 2021-10-25 RX ADMIN — IRON SUCROSE 200 MG: 20 INJECTION, SOLUTION INTRAVENOUS at 09:10

## 2021-10-25 RX ADMIN — PROPOFOL 30 MG: 10 INJECTION, EMULSION INTRAVENOUS at 02:10

## 2021-10-25 RX ADMIN — Medication 10 ML: at 06:10

## 2021-10-25 RX ADMIN — METOPROLOL SUCCINATE 50 MG: 50 TABLET, EXTENDED RELEASE ORAL at 09:10

## 2021-10-25 RX ADMIN — AMIODARONE HYDROCHLORIDE 200 MG: 200 TABLET ORAL at 09:10

## 2021-10-25 RX ADMIN — LIDOCAINE HYDROCHLORIDE 150 MG: 10 INJECTION, SOLUTION EPIDURAL; INFILTRATION; INTRACAUDAL; PERINEURAL at 02:10

## 2021-10-26 ENCOUNTER — TELEPHONE (OUTPATIENT)
Dept: GASTROENTEROLOGY | Facility: CLINIC | Age: 81
End: 2021-10-26
Payer: OTHER GOVERNMENT

## 2021-10-26 VITALS
DIASTOLIC BLOOD PRESSURE: 70 MMHG | WEIGHT: 226.63 LBS | SYSTOLIC BLOOD PRESSURE: 150 MMHG | RESPIRATION RATE: 18 BRPM | HEIGHT: 72 IN | TEMPERATURE: 98 F | BODY MASS INDEX: 30.7 KG/M2 | HEART RATE: 55 BPM | OXYGEN SATURATION: 99 %

## 2021-10-27 ENCOUNTER — TELEPHONE (OUTPATIENT)
Dept: GASTROENTEROLOGY | Facility: CLINIC | Age: 81
End: 2021-10-27
Payer: OTHER GOVERNMENT

## 2021-10-27 LAB
BLD PROD TYP BPU: NORMAL
BLD PROD TYP BPU: NORMAL
BLOOD UNIT EXPIRATION DATE: NORMAL
BLOOD UNIT EXPIRATION DATE: NORMAL
BLOOD UNIT TYPE CODE: 9500
BLOOD UNIT TYPE CODE: 9500
BLOOD UNIT TYPE: NORMAL
BLOOD UNIT TYPE: NORMAL
CODING SYSTEM: NORMAL
CODING SYSTEM: NORMAL
DISPENSE STATUS: NORMAL
DISPENSE STATUS: NORMAL
NUM UNITS TRANS PACKED RBC: NORMAL
NUM UNITS TRANS PACKED RBC: NORMAL
PARVOVIRUS B19 ABS IGG & IGM: ABNORMAL
PARVOVIRUS B19 IGG ANTIBODY: POSITIVE
PARVOVIRUS B19 IGM ANTIBODY: NEGATIVE

## 2021-10-28 LAB
FMDSB INTERPRETATION: NORMAL
FMDSB METHOD: NORMAL
FMDSB SPECIMEN: NORMAL
MDS FISH ADDITIONAL INFORMATION (BL): NORMAL
MDS FISH DISCLAIMER (BL): NORMAL
MDS FISH REASON FOR REFERRAL (BLOOD): NORMAL
MDS FISH RELEASED BY (BL): NORMAL
MDS FISH RESULT (BLOOD): NORMAL
MDS FISH RESULT SUMMARY (BL): NORMAL
MDS FISH RESULT TABLE (BL): NORMAL
SPECIMEN SOURCE: NORMAL

## 2021-11-02 ENCOUNTER — OFFICE VISIT (OUTPATIENT)
Dept: GASTROENTEROLOGY | Facility: CLINIC | Age: 81
End: 2021-11-02
Payer: MEDICARE

## 2021-11-02 ENCOUNTER — LAB VISIT (OUTPATIENT)
Dept: LAB | Facility: HOSPITAL | Age: 81
End: 2021-11-02
Attending: PHYSICIAN ASSISTANT
Payer: MEDICARE

## 2021-11-02 VITALS
HEART RATE: 59 BPM | OXYGEN SATURATION: 99 % | DIASTOLIC BLOOD PRESSURE: 76 MMHG | SYSTOLIC BLOOD PRESSURE: 160 MMHG | WEIGHT: 236 LBS | BODY MASS INDEX: 31.97 KG/M2 | HEIGHT: 72 IN

## 2021-11-02 DIAGNOSIS — Z87.74 HISTORY OF ARTERIOVENOUS MALFORMATION (AVM): ICD-10-CM

## 2021-11-02 DIAGNOSIS — R31.9 HEMATURIA, UNSPECIFIED TYPE: Primary | ICD-10-CM

## 2021-11-02 DIAGNOSIS — D50.9 IRON DEFICIENCY ANEMIA, UNSPECIFIED IRON DEFICIENCY ANEMIA TYPE: ICD-10-CM

## 2021-11-02 DIAGNOSIS — Z85.51 HISTORY OF BLADDER CANCER: ICD-10-CM

## 2021-11-02 PROCEDURE — 99214 OFFICE O/P EST MOD 30 MIN: CPT | Mod: PBBFAC | Performed by: PHYSICIAN ASSISTANT

## 2021-11-02 PROCEDURE — 99999 PR PBB SHADOW E&M-EST. PATIENT-LVL IV: CPT | Mod: PBBFAC,,, | Performed by: PHYSICIAN ASSISTANT

## 2021-11-02 PROCEDURE — 85025 COMPLETE CBC W/AUTO DIFF WBC: CPT | Performed by: PHYSICIAN ASSISTANT

## 2021-11-02 PROCEDURE — 36415 COLL VENOUS BLD VENIPUNCTURE: CPT | Performed by: PHYSICIAN ASSISTANT

## 2021-11-02 PROCEDURE — 99999 PR PBB SHADOW E&M-EST. PATIENT-LVL IV: ICD-10-PCS | Mod: PBBFAC,,, | Performed by: PHYSICIAN ASSISTANT

## 2021-11-02 PROCEDURE — 99213 PR OFFICE/OUTPT VISIT, EST, LEVL III, 20-29 MIN: ICD-10-PCS | Mod: S$PBB,,, | Performed by: PHYSICIAN ASSISTANT

## 2021-11-02 PROCEDURE — 99213 OFFICE O/P EST LOW 20 MIN: CPT | Mod: S$PBB,,, | Performed by: PHYSICIAN ASSISTANT

## 2021-11-02 RX ORDER — NAPROXEN SODIUM 220 MG/1
TABLET, FILM COATED ORAL
COMMUNITY
End: 2021-11-10

## 2021-11-02 RX ORDER — FERROUS SULFATE 325(65) MG
325 TABLET ORAL
Status: ON HOLD | COMMUNITY

## 2021-11-02 RX ORDER — FAMOTIDINE 20 MG/1
20 TABLET, FILM COATED ORAL DAILY
Status: ON HOLD | COMMUNITY

## 2021-11-03 ENCOUNTER — TELEPHONE (OUTPATIENT)
Dept: FAMILY MEDICINE | Facility: CLINIC | Age: 81
End: 2021-11-03
Payer: OTHER GOVERNMENT

## 2021-11-03 LAB
BASOPHILS # BLD AUTO: 0.05 K/UL (ref 0–0.2)
BASOPHILS NFR BLD: 1.2 % (ref 0–1.9)
DIFFERENTIAL METHOD: ABNORMAL
EOSINOPHIL # BLD AUTO: 0 K/UL (ref 0–0.5)
EOSINOPHIL NFR BLD: 1 % (ref 0–8)
ERYTHROCYTE [DISTWIDTH] IN BLOOD BY AUTOMATED COUNT: 18 % (ref 11.5–14.5)
HCT VFR BLD AUTO: 34.8 % (ref 40–54)
HGB BLD-MCNC: 9.8 G/DL (ref 14–18)
IMM GRANULOCYTES # BLD AUTO: 0.01 K/UL (ref 0–0.04)
IMM GRANULOCYTES NFR BLD AUTO: 0.2 % (ref 0–0.5)
LYMPHOCYTES # BLD AUTO: 0.6 K/UL (ref 1–4.8)
LYMPHOCYTES NFR BLD: 14.7 % (ref 18–48)
MCH RBC QN AUTO: 24.9 PG (ref 27–31)
MCHC RBC AUTO-ENTMCNC: 28.2 G/DL (ref 32–36)
MCV RBC AUTO: 89 FL (ref 82–98)
MONOCYTES # BLD AUTO: 0.5 K/UL (ref 0.3–1)
MONOCYTES NFR BLD: 13.2 % (ref 4–15)
NEUTROPHILS # BLD AUTO: 2.9 K/UL (ref 1.8–7.7)
NEUTROPHILS NFR BLD: 69.7 % (ref 38–73)
NRBC BLD-RTO: 0 /100 WBC
PLATELET # BLD AUTO: 104 K/UL (ref 150–450)
PMV BLD AUTO: ABNORMAL FL (ref 9.2–12.9)
RBC # BLD AUTO: 3.93 M/UL (ref 4.6–6.2)
WBC # BLD AUTO: 4.09 K/UL (ref 3.9–12.7)

## 2021-11-05 ENCOUNTER — LAB VISIT (OUTPATIENT)
Dept: LAB | Facility: HOSPITAL | Age: 81
End: 2021-11-05
Attending: INTERNAL MEDICINE
Payer: MEDICARE

## 2021-11-05 DIAGNOSIS — D50.8 OTHER IRON DEFICIENCY ANEMIA: ICD-10-CM

## 2021-11-05 LAB
ALBUMIN SERPL BCP-MCNC: 3.5 G/DL (ref 3.5–5.2)
ALP SERPL-CCNC: 63 U/L (ref 55–135)
ALT SERPL W/O P-5'-P-CCNC: 28 U/L (ref 10–44)
ANION GAP SERPL CALC-SCNC: 10 MMOL/L (ref 8–16)
AST SERPL-CCNC: 27 U/L (ref 10–40)
BASOPHILS # BLD AUTO: 0.04 K/UL (ref 0–0.2)
BASOPHILS NFR BLD: 1.2 % (ref 0–1.9)
BILIRUB SERPL-MCNC: 0.4 MG/DL (ref 0.1–1)
BUN SERPL-MCNC: 19 MG/DL (ref 8–23)
CALCIUM SERPL-MCNC: 9.5 MG/DL (ref 8.7–10.5)
CHLORIDE SERPL-SCNC: 108 MMOL/L (ref 95–110)
CO2 SERPL-SCNC: 25 MMOL/L (ref 23–29)
CREAT SERPL-MCNC: 1.2 MG/DL (ref 0.5–1.4)
DIFFERENTIAL METHOD: ABNORMAL
EOSINOPHIL # BLD AUTO: 0 K/UL (ref 0–0.5)
EOSINOPHIL NFR BLD: 1.2 % (ref 0–8)
ERYTHROCYTE [DISTWIDTH] IN BLOOD BY AUTOMATED COUNT: 17.6 % (ref 11.5–14.5)
EST. GFR  (AFRICAN AMERICAN): >60 ML/MIN/1.73 M^2
EST. GFR  (NON AFRICAN AMERICAN): 56 ML/MIN/1.73 M^2
FERRITIN SERPL-MCNC: 57 NG/ML (ref 20–300)
GLUCOSE SERPL-MCNC: 138 MG/DL (ref 70–110)
HCT VFR BLD AUTO: 31 % (ref 40–54)
HGB BLD-MCNC: 8.7 G/DL (ref 14–18)
IMM GRANULOCYTES # BLD AUTO: 0.01 K/UL (ref 0–0.04)
IMM GRANULOCYTES NFR BLD AUTO: 0.3 % (ref 0–0.5)
IRON SERPL-MCNC: 74 UG/DL (ref 45–160)
LYMPHOCYTES # BLD AUTO: 0.7 K/UL (ref 1–4.8)
LYMPHOCYTES NFR BLD: 19.1 % (ref 18–48)
MCH RBC QN AUTO: 24.4 PG (ref 27–31)
MCHC RBC AUTO-ENTMCNC: 28.1 G/DL (ref 32–36)
MCV RBC AUTO: 87 FL (ref 82–98)
MONOCYTES # BLD AUTO: 0.5 K/UL (ref 0.3–1)
MONOCYTES NFR BLD: 13 % (ref 4–15)
NEUTROPHILS # BLD AUTO: 2.3 K/UL (ref 1.8–7.7)
NEUTROPHILS NFR BLD: 65.2 % (ref 38–73)
NRBC BLD-RTO: 0 /100 WBC
PLATELET # BLD AUTO: 109 K/UL (ref 150–450)
PMV BLD AUTO: 11.4 FL (ref 9.2–12.9)
POTASSIUM SERPL-SCNC: 4.6 MMOL/L (ref 3.5–5.1)
PROT SERPL-MCNC: 6.4 G/DL (ref 6–8.4)
RBC # BLD AUTO: 3.56 M/UL (ref 4.6–6.2)
SATURATED IRON: 16 % (ref 20–50)
SODIUM SERPL-SCNC: 143 MMOL/L (ref 136–145)
TOTAL IRON BINDING CAPACITY: 466 UG/DL (ref 250–450)
TRANSFERRIN SERPL-MCNC: 315 MG/DL (ref 200–375)
WBC # BLD AUTO: 3.46 K/UL (ref 3.9–12.7)

## 2021-11-05 PROCEDURE — 36415 COLL VENOUS BLD VENIPUNCTURE: CPT | Performed by: NURSE PRACTITIONER

## 2021-11-05 PROCEDURE — 82728 ASSAY OF FERRITIN: CPT | Performed by: NURSE PRACTITIONER

## 2021-11-05 PROCEDURE — 80053 COMPREHEN METABOLIC PANEL: CPT | Performed by: NURSE PRACTITIONER

## 2021-11-05 PROCEDURE — 85025 COMPLETE CBC W/AUTO DIFF WBC: CPT | Performed by: NURSE PRACTITIONER

## 2021-11-05 PROCEDURE — 84466 ASSAY OF TRANSFERRIN: CPT | Performed by: NURSE PRACTITIONER

## 2021-11-08 ENCOUNTER — OFFICE VISIT (OUTPATIENT)
Dept: FAMILY MEDICINE | Facility: CLINIC | Age: 81
End: 2021-11-08
Attending: FAMILY MEDICINE
Payer: MEDICARE

## 2021-11-08 VITALS
BODY MASS INDEX: 32.01 KG/M2 | OXYGEN SATURATION: 98 % | WEIGHT: 236.31 LBS | HEIGHT: 72 IN | SYSTOLIC BLOOD PRESSURE: 130 MMHG | TEMPERATURE: 98 F | HEART RATE: 54 BPM | DIASTOLIC BLOOD PRESSURE: 70 MMHG

## 2021-11-08 DIAGNOSIS — E11.69 DM TYPE 2 WITH DIABETIC DYSLIPIDEMIA: ICD-10-CM

## 2021-11-08 DIAGNOSIS — Z85.51 HISTORY OF BLADDER CANCER: ICD-10-CM

## 2021-11-08 DIAGNOSIS — N18.30 STAGE 3 CHRONIC KIDNEY DISEASE, UNSPECIFIED WHETHER STAGE 3A OR 3B CKD: ICD-10-CM

## 2021-11-08 DIAGNOSIS — I48.91 ATRIAL FIBRILLATION, UNSPECIFIED TYPE: Primary | ICD-10-CM

## 2021-11-08 DIAGNOSIS — E78.5 DM TYPE 2 WITH DIABETIC DYSLIPIDEMIA: ICD-10-CM

## 2021-11-08 PROBLEM — E87.5 HYPERKALEMIA: Status: RESOLVED | Noted: 2017-10-06 | Resolved: 2021-11-08

## 2021-11-08 PROCEDURE — 99215 PR OFFICE/OUTPT VISIT, EST, LEVL V, 40-54 MIN: ICD-10-PCS | Mod: S$PBB,,, | Performed by: FAMILY MEDICINE

## 2021-11-08 PROCEDURE — 99999 PR PBB SHADOW E&M-EST. PATIENT-LVL IV: ICD-10-PCS | Mod: PBBFAC,,, | Performed by: FAMILY MEDICINE

## 2021-11-08 PROCEDURE — 90694 VACC AIIV4 NO PRSRV 0.5ML IM: CPT | Mod: PBBFAC,PO

## 2021-11-08 PROCEDURE — 99999 PR PBB SHADOW E&M-EST. PATIENT-LVL IV: CPT | Mod: PBBFAC,,, | Performed by: FAMILY MEDICINE

## 2021-11-08 PROCEDURE — G0008 ADMIN INFLUENZA VIRUS VAC: HCPCS | Mod: PBBFAC,PO

## 2021-11-08 PROCEDURE — 99214 OFFICE O/P EST MOD 30 MIN: CPT | Mod: PBBFAC,PO | Performed by: FAMILY MEDICINE

## 2021-11-08 PROCEDURE — 99215 OFFICE O/P EST HI 40 MIN: CPT | Mod: S$PBB,,, | Performed by: FAMILY MEDICINE

## 2021-11-09 ENCOUNTER — OFFICE VISIT (OUTPATIENT)
Dept: HEMATOLOGY/ONCOLOGY | Facility: CLINIC | Age: 81
End: 2021-11-09
Payer: MEDICARE

## 2021-11-09 VITALS
HEART RATE: 95 BPM | WEIGHT: 235.88 LBS | TEMPERATURE: 98 F | HEIGHT: 72 IN | SYSTOLIC BLOOD PRESSURE: 173 MMHG | BODY MASS INDEX: 31.95 KG/M2 | DIASTOLIC BLOOD PRESSURE: 66 MMHG | OXYGEN SATURATION: 95 %

## 2021-11-09 DIAGNOSIS — D64.9 ANEMIA, UNSPECIFIED TYPE: ICD-10-CM

## 2021-11-09 DIAGNOSIS — C67.9 MALIGNANT NEOPLASM OF URINARY BLADDER, UNSPECIFIED SITE: ICD-10-CM

## 2021-11-09 DIAGNOSIS — I48.0 PAROXYSMAL ATRIAL FIBRILLATION: ICD-10-CM

## 2021-11-09 PROCEDURE — 99215 OFFICE O/P EST HI 40 MIN: CPT | Mod: 25,S$PBB,, | Performed by: INTERNAL MEDICINE

## 2021-11-09 PROCEDURE — 99999 PR PBB SHADOW E&M-EST. PATIENT-LVL III: ICD-10-PCS | Mod: PBBFAC,,, | Performed by: INTERNAL MEDICINE

## 2021-11-09 PROCEDURE — 99215 PR OFFICE/OUTPT VISIT, EST, LEVL V, 40-54 MIN: ICD-10-PCS | Mod: 25,S$PBB,, | Performed by: INTERNAL MEDICINE

## 2021-11-09 PROCEDURE — 99213 OFFICE O/P EST LOW 20 MIN: CPT | Mod: PBBFAC | Performed by: INTERNAL MEDICINE

## 2021-11-09 PROCEDURE — 99999 PR PBB SHADOW E&M-EST. PATIENT-LVL III: CPT | Mod: PBBFAC,,, | Performed by: INTERNAL MEDICINE

## 2021-11-09 RX ORDER — SODIUM CHLORIDE 0.9 % (FLUSH) 0.9 %
10 SYRINGE (ML) INJECTION
Status: CANCELLED | OUTPATIENT
Start: 2021-12-02

## 2021-11-09 RX ORDER — HEPARIN 100 UNIT/ML
500 SYRINGE INTRAVENOUS
Status: CANCELLED | OUTPATIENT
Start: 2021-11-16

## 2021-11-09 RX ORDER — HEPARIN 100 UNIT/ML
500 SYRINGE INTRAVENOUS
Status: CANCELLED | OUTPATIENT
Start: 2021-12-02

## 2021-11-09 RX ORDER — SODIUM CHLORIDE 0.9 % (FLUSH) 0.9 %
10 SYRINGE (ML) INJECTION
Status: CANCELLED | OUTPATIENT
Start: 2021-11-16

## 2021-11-10 ENCOUNTER — OFFICE VISIT (OUTPATIENT)
Dept: CARDIOLOGY | Facility: CLINIC | Age: 81
End: 2021-11-10
Attending: FAMILY MEDICINE
Payer: MEDICARE

## 2021-11-10 VITALS
BODY MASS INDEX: 31.75 KG/M2 | DIASTOLIC BLOOD PRESSURE: 70 MMHG | HEART RATE: 64 BPM | WEIGHT: 234.13 LBS | OXYGEN SATURATION: 98 % | SYSTOLIC BLOOD PRESSURE: 120 MMHG

## 2021-11-10 DIAGNOSIS — D64.9 ANEMIA, UNSPECIFIED TYPE: ICD-10-CM

## 2021-11-10 DIAGNOSIS — E78.00 HYPERCHOLESTEREMIA: ICD-10-CM

## 2021-11-10 DIAGNOSIS — D49.4 BLADDER TUMOR: ICD-10-CM

## 2021-11-10 DIAGNOSIS — I48.0 PAROXYSMAL ATRIAL FIBRILLATION: Primary | ICD-10-CM

## 2021-11-10 DIAGNOSIS — J44.9 CHRONIC OBSTRUCTIVE PULMONARY DISEASE, UNSPECIFIED COPD TYPE: ICD-10-CM

## 2021-11-10 DIAGNOSIS — I48.91 ATRIAL FIBRILLATION, UNSPECIFIED TYPE: ICD-10-CM

## 2021-11-10 DIAGNOSIS — I10 PRIMARY HYPERTENSION: ICD-10-CM

## 2021-11-10 PROCEDURE — 99213 OFFICE O/P EST LOW 20 MIN: CPT | Mod: PBBFAC,PO | Performed by: INTERNAL MEDICINE

## 2021-11-10 PROCEDURE — 99204 PR OFFICE/OUTPT VISIT, NEW, LEVL IV, 45-59 MIN: ICD-10-PCS | Mod: S$PBB,,, | Performed by: INTERNAL MEDICINE

## 2021-11-10 PROCEDURE — 99999 PR PBB SHADOW E&M-EST. PATIENT-LVL III: ICD-10-PCS | Mod: PBBFAC,,, | Performed by: INTERNAL MEDICINE

## 2021-11-10 PROCEDURE — 99999 PR PBB SHADOW E&M-EST. PATIENT-LVL III: CPT | Mod: PBBFAC,,, | Performed by: INTERNAL MEDICINE

## 2021-11-10 PROCEDURE — 99204 OFFICE O/P NEW MOD 45 MIN: CPT | Mod: S$PBB,,, | Performed by: INTERNAL MEDICINE

## 2021-12-01 ENCOUNTER — INFUSION (OUTPATIENT)
Dept: INFUSION THERAPY | Facility: HOSPITAL | Age: 81
End: 2021-12-01
Attending: INTERNAL MEDICINE
Payer: MEDICARE

## 2021-12-01 VITALS
OXYGEN SATURATION: 98 % | RESPIRATION RATE: 16 BRPM | SYSTOLIC BLOOD PRESSURE: 152 MMHG | HEART RATE: 60 BPM | DIASTOLIC BLOOD PRESSURE: 70 MMHG | TEMPERATURE: 98 F

## 2021-12-01 DIAGNOSIS — D50.8 OTHER IRON DEFICIENCY ANEMIA: Primary | ICD-10-CM

## 2021-12-01 PROCEDURE — 96365 THER/PROPH/DIAG IV INF INIT: CPT

## 2021-12-01 PROCEDURE — 25000003 PHARM REV CODE 250: Performed by: INTERNAL MEDICINE

## 2021-12-01 PROCEDURE — 63600175 PHARM REV CODE 636 W HCPCS: Mod: JG | Performed by: INTERNAL MEDICINE

## 2021-12-01 RX ADMIN — FERRIC CARBOXYMALTOSE INJECTION 750 MG: 50 INJECTION, SOLUTION INTRAVENOUS at 09:12

## 2021-12-01 RX ADMIN — SODIUM CHLORIDE: 9 INJECTION, SOLUTION INTRAVENOUS at 09:12

## 2021-12-06 ENCOUNTER — HOSPITAL ENCOUNTER (OUTPATIENT)
Facility: HOSPITAL | Age: 81
Discharge: HOME | End: 2021-12-06
Attending: INTERNAL MEDICINE | Admitting: INTERNAL MEDICINE
Payer: MEDICARE

## 2021-12-06 PROCEDURE — 91110 PR GI TRACT CAPSULE ENDOSCOPY: ICD-10-PCS | Mod: 26,,, | Performed by: INTERNAL MEDICINE

## 2021-12-06 PROCEDURE — 91110 GI TRC IMG INTRAL ESOPH-ILE: CPT | Mod: 26,,, | Performed by: INTERNAL MEDICINE

## 2021-12-06 PROCEDURE — 91110 GI TRC IMG INTRAL ESOPH-ILE: CPT

## 2021-12-07 DIAGNOSIS — K92.2 UGIB (UPPER GASTROINTESTINAL BLEED): Primary | ICD-10-CM

## 2021-12-08 ENCOUNTER — INFUSION (OUTPATIENT)
Dept: INFUSION THERAPY | Facility: HOSPITAL | Age: 81
End: 2021-12-08
Attending: INTERNAL MEDICINE
Payer: MEDICARE

## 2021-12-08 VITALS
TEMPERATURE: 98 F | HEART RATE: 53 BPM | SYSTOLIC BLOOD PRESSURE: 181 MMHG | DIASTOLIC BLOOD PRESSURE: 80 MMHG | RESPIRATION RATE: 16 BRPM | OXYGEN SATURATION: 98 %

## 2021-12-08 DIAGNOSIS — D50.8 OTHER IRON DEFICIENCY ANEMIA: Primary | ICD-10-CM

## 2021-12-08 DIAGNOSIS — K92.2 UGIB (UPPER GASTROINTESTINAL BLEED): Primary | ICD-10-CM

## 2021-12-08 PROCEDURE — 63600175 PHARM REV CODE 636 W HCPCS: Mod: JG | Performed by: INTERNAL MEDICINE

## 2021-12-08 PROCEDURE — 25000003 PHARM REV CODE 250: Performed by: INTERNAL MEDICINE

## 2021-12-08 PROCEDURE — 96365 THER/PROPH/DIAG IV INF INIT: CPT

## 2021-12-08 RX ORDER — SODIUM CHLORIDE 0.9 % (FLUSH) 0.9 %
10 SYRINGE (ML) INJECTION
Status: DISCONTINUED | OUTPATIENT
Start: 2021-12-08 | End: 2021-12-08 | Stop reason: HOSPADM

## 2021-12-08 RX ORDER — HYDRALAZINE HYDROCHLORIDE 25 MG/1
25 TABLET, FILM COATED ORAL
Status: COMPLETED | OUTPATIENT
Start: 2021-12-08 | End: 2021-12-08

## 2021-12-08 RX ADMIN — HYDRALAZINE HYDROCHLORIDE 25 MG: 25 TABLET, FILM COATED ORAL at 09:12

## 2021-12-08 RX ADMIN — FERRIC CARBOXYMALTOSE INJECTION 750 MG: 50 INJECTION, SOLUTION INTRAVENOUS at 10:12

## 2021-12-08 RX ADMIN — SODIUM CHLORIDE: 9 INJECTION, SOLUTION INTRAVENOUS at 10:12

## 2021-12-09 ENCOUNTER — TELEPHONE (OUTPATIENT)
Dept: GASTROENTEROLOGY | Facility: CLINIC | Age: 81
End: 2021-12-09
Payer: OTHER GOVERNMENT

## 2021-12-10 ENCOUNTER — TELEPHONE (OUTPATIENT)
Dept: GASTROENTEROLOGY | Facility: CLINIC | Age: 81
End: 2021-12-10
Payer: OTHER GOVERNMENT

## 2021-12-13 ENCOUNTER — HOSPITAL ENCOUNTER (OUTPATIENT)
Facility: HOSPITAL | Age: 81
Discharge: HOME | End: 2021-12-13
Attending: INTERNAL MEDICINE | Admitting: INTERNAL MEDICINE
Payer: MEDICARE

## 2021-12-13 ENCOUNTER — ANESTHESIA (OUTPATIENT)
Dept: ENDOSCOPY | Facility: HOSPITAL | Age: 81
End: 2021-12-13
Payer: MEDICARE

## 2021-12-13 ENCOUNTER — ANESTHESIA EVENT (OUTPATIENT)
Dept: ENDOSCOPY | Facility: HOSPITAL | Age: 81
End: 2021-12-13
Payer: MEDICARE

## 2021-12-13 DIAGNOSIS — K92.2 GASTROINTESTINAL HEMORRHAGE, UNSPECIFIED GASTROINTESTINAL HEMORRHAGE TYPE: Primary | ICD-10-CM

## 2021-12-13 DIAGNOSIS — K92.2 GI BLEED: ICD-10-CM

## 2021-12-13 DIAGNOSIS — D64.9 ANEMIA, UNSPECIFIED TYPE: ICD-10-CM

## 2021-12-13 PROCEDURE — 88305 TISSUE EXAM BY PATHOLOGIST: ICD-10-PCS | Mod: 26,,, | Performed by: PATHOLOGY

## 2021-12-13 PROCEDURE — 88305 TISSUE EXAM BY PATHOLOGIST: CPT | Performed by: PATHOLOGY

## 2021-12-13 PROCEDURE — 88305 TISSUE EXAM BY PATHOLOGIST: CPT | Mod: 26,,, | Performed by: PATHOLOGY

## 2021-12-13 PROCEDURE — 43251 PR EGD, FLEX, W/REMOVAL, TUMOR/POLYP/LESION(S), SNARE: ICD-10-PCS | Mod: ,,, | Performed by: INTERNAL MEDICINE

## 2021-12-13 PROCEDURE — 25000003 PHARM REV CODE 250: Performed by: NURSE ANESTHETIST, CERTIFIED REGISTERED

## 2021-12-13 PROCEDURE — 63600175 PHARM REV CODE 636 W HCPCS: Performed by: NURSE ANESTHETIST, CERTIFIED REGISTERED

## 2021-12-13 PROCEDURE — 43251 EGD REMOVE LESION SNARE: CPT | Performed by: INTERNAL MEDICINE

## 2021-12-13 PROCEDURE — 37000008 HC ANESTHESIA 1ST 15 MINUTES: Performed by: INTERNAL MEDICINE

## 2021-12-13 PROCEDURE — 43251 EGD REMOVE LESION SNARE: CPT | Mod: ,,, | Performed by: INTERNAL MEDICINE

## 2021-12-13 PROCEDURE — 37000009 HC ANESTHESIA EA ADD 15 MINS: Performed by: INTERNAL MEDICINE

## 2021-12-13 PROCEDURE — 27201089 HC SNARE, DISP (ANY): Performed by: INTERNAL MEDICINE

## 2021-12-13 RX ORDER — LIDOCAINE HYDROCHLORIDE 10 MG/ML
INJECTION, SOLUTION EPIDURAL; INFILTRATION; INTRACAUDAL; PERINEURAL
Status: DISCONTINUED | OUTPATIENT
Start: 2021-12-13 | End: 2021-12-13

## 2021-12-13 RX ORDER — SODIUM CHLORIDE 9 MG/ML
INJECTION, SOLUTION INTRAVENOUS CONTINUOUS
Status: DISCONTINUED | OUTPATIENT
Start: 2021-12-13 | End: 2021-12-13 | Stop reason: HOSPADM

## 2021-12-13 RX ORDER — PROPOFOL 10 MG/ML
VIAL (ML) INTRAVENOUS
Status: DISCONTINUED | OUTPATIENT
Start: 2021-12-13 | End: 2021-12-13

## 2021-12-13 RX ADMIN — SODIUM CHLORIDE, SODIUM LACTATE, POTASSIUM CHLORIDE, AND CALCIUM CHLORIDE: .6; .31; .03; .02 INJECTION, SOLUTION INTRAVENOUS at 12:12

## 2021-12-13 RX ADMIN — PROPOFOL 30 MG: 10 INJECTION, EMULSION INTRAVENOUS at 12:12

## 2021-12-13 RX ADMIN — PROPOFOL 80 MG: 10 INJECTION, EMULSION INTRAVENOUS at 12:12

## 2021-12-13 RX ADMIN — LIDOCAINE HYDROCHLORIDE 50 MG: 10 INJECTION, SOLUTION EPIDURAL; INFILTRATION; INTRACAUDAL; PERINEURAL at 12:12

## 2021-12-14 ENCOUNTER — HOSPITAL ENCOUNTER (OUTPATIENT)
Dept: RADIOLOGY | Facility: HOSPITAL | Age: 81
Discharge: HOME | End: 2021-12-14
Attending: NURSE PRACTITIONER
Payer: MEDICARE

## 2021-12-14 ENCOUNTER — OFFICE VISIT (OUTPATIENT)
Dept: UROLOGY | Facility: CLINIC | Age: 81
End: 2021-12-14
Payer: MEDICARE

## 2021-12-14 VITALS
BODY MASS INDEX: 31.19 KG/M2 | DIASTOLIC BLOOD PRESSURE: 68 MMHG | SYSTOLIC BLOOD PRESSURE: 128 MMHG | TEMPERATURE: 97 F | HEART RATE: 51 BPM | WEIGHT: 230 LBS | RESPIRATION RATE: 17 BRPM | OXYGEN SATURATION: 97 %

## 2021-12-14 VITALS — WEIGHT: 230 LBS | BODY MASS INDEX: 31.19 KG/M2

## 2021-12-14 DIAGNOSIS — Z85.51 HISTORY OF BLADDER CANCER: ICD-10-CM

## 2021-12-14 DIAGNOSIS — R31.9 HEMATURIA, UNSPECIFIED TYPE: ICD-10-CM

## 2021-12-14 LAB — POC RESIDUAL URINE VOLUME: 308 ML (ref 0–100)

## 2021-12-14 PROCEDURE — 99213 OFFICE O/P EST LOW 20 MIN: CPT | Mod: PBBFAC,25 | Performed by: NURSE PRACTITIONER

## 2021-12-14 PROCEDURE — 87086 URINE CULTURE/COLONY COUNT: CPT | Performed by: NURSE PRACTITIONER

## 2021-12-14 PROCEDURE — 25500020 PHARM REV CODE 255: Performed by: NURSE PRACTITIONER

## 2021-12-14 PROCEDURE — 87088 URINE BACTERIA CULTURE: CPT | Performed by: NURSE PRACTITIONER

## 2021-12-14 PROCEDURE — 99999 PR PBB SHADOW E&M-EST. PATIENT-LVL III: CPT | Mod: PBBFAC,,, | Performed by: NURSE PRACTITIONER

## 2021-12-14 PROCEDURE — 74178 CT ABD&PLV WO CNTR FLWD CNTR: CPT | Mod: TC

## 2021-12-14 PROCEDURE — 99999 PR PBB SHADOW E&M-EST. PATIENT-LVL III: ICD-10-PCS | Mod: PBBFAC,,, | Performed by: NURSE PRACTITIONER

## 2021-12-14 PROCEDURE — 51798 US URINE CAPACITY MEASURE: CPT | Mod: PBBFAC | Performed by: NURSE PRACTITIONER

## 2021-12-14 PROCEDURE — 87186 SC STD MICRODIL/AGAR DIL: CPT | Performed by: NURSE PRACTITIONER

## 2021-12-14 PROCEDURE — 99214 OFFICE O/P EST MOD 30 MIN: CPT | Mod: S$PBB,ICN,, | Performed by: NURSE PRACTITIONER

## 2021-12-14 PROCEDURE — 87077 CULTURE AEROBIC IDENTIFY: CPT | Performed by: NURSE PRACTITIONER

## 2021-12-14 PROCEDURE — 99214 PR OFFICE/OUTPT VISIT, EST, LEVL IV, 30-39 MIN: ICD-10-PCS | Mod: S$PBB,ICN,, | Performed by: NURSE PRACTITIONER

## 2021-12-14 RX ADMIN — IOHEXOL 100 ML: 350 INJECTION, SOLUTION INTRAVENOUS at 04:12

## 2021-12-15 ENCOUNTER — TELEPHONE (OUTPATIENT)
Dept: UROLOGY | Facility: CLINIC | Age: 81
End: 2021-12-15
Payer: OTHER GOVERNMENT

## 2021-12-15 RX ORDER — TAMSULOSIN HYDROCHLORIDE 0.4 MG/1
0.4 CAPSULE ORAL DAILY
Qty: 30 CAPSULE | Refills: 11 | Status: SHIPPED | OUTPATIENT
Start: 2021-12-15 | End: 2022-02-06

## 2021-12-16 ENCOUNTER — TELEPHONE (OUTPATIENT)
Dept: UROLOGY | Facility: CLINIC | Age: 81
End: 2021-12-16
Payer: OTHER GOVERNMENT

## 2021-12-16 RX ORDER — CEFDINIR 300 MG/1
300 CAPSULE ORAL 2 TIMES DAILY
Qty: 20 CAPSULE | Refills: 0 | Status: SHIPPED | OUTPATIENT
Start: 2021-12-16 | End: 2021-12-17

## 2021-12-17 LAB — BACTERIA UR CULT: ABNORMAL

## 2021-12-17 RX ORDER — NITROFURANTOIN 25; 75 MG/1; MG/1
100 CAPSULE ORAL 2 TIMES DAILY
Qty: 20 CAPSULE | Refills: 0 | Status: SHIPPED | OUTPATIENT
Start: 2021-12-17 | End: 2022-05-10

## 2021-12-20 ENCOUNTER — TELEPHONE (OUTPATIENT)
Dept: UROLOGY | Facility: CLINIC | Age: 81
End: 2021-12-20
Payer: OTHER GOVERNMENT

## 2021-12-20 LAB
FINAL PATHOLOGIC DIAGNOSIS: NORMAL
GROSS: NORMAL
Lab: NORMAL

## 2021-12-22 ENCOUNTER — OFFICE VISIT (OUTPATIENT)
Dept: UROLOGY | Facility: CLINIC | Age: 81
End: 2021-12-22
Payer: MEDICARE

## 2021-12-22 VITALS
DIASTOLIC BLOOD PRESSURE: 104 MMHG | SYSTOLIC BLOOD PRESSURE: 168 MMHG | BODY MASS INDEX: 32.04 KG/M2 | WEIGHT: 236.25 LBS

## 2021-12-22 DIAGNOSIS — Z85.51 HISTORY OF BLADDER CANCER: Primary | ICD-10-CM

## 2021-12-22 DIAGNOSIS — R35.0 BENIGN PROSTATIC HYPERPLASIA WITH URINARY FREQUENCY: ICD-10-CM

## 2021-12-22 DIAGNOSIS — R31.0 GROSS HEMATURIA: ICD-10-CM

## 2021-12-22 DIAGNOSIS — N40.1 BENIGN PROSTATIC HYPERPLASIA WITH URINARY FREQUENCY: ICD-10-CM

## 2021-12-22 DIAGNOSIS — N20.0 NEPHROLITHIASIS: ICD-10-CM

## 2021-12-22 PROCEDURE — 99999 PR PBB SHADOW E&M-EST. PATIENT-LVL III: CPT | Mod: PBBFAC,,, | Performed by: UROLOGY

## 2021-12-22 PROCEDURE — 99213 OFFICE O/P EST LOW 20 MIN: CPT | Mod: PBBFAC | Performed by: UROLOGY

## 2021-12-22 PROCEDURE — 99214 PR OFFICE/OUTPT VISIT, EST, LEVL IV, 30-39 MIN: ICD-10-PCS | Mod: S$PBB,,, | Performed by: UROLOGY

## 2021-12-22 PROCEDURE — 99999 PR PBB SHADOW E&M-EST. PATIENT-LVL III: ICD-10-PCS | Mod: PBBFAC,,, | Performed by: UROLOGY

## 2021-12-22 PROCEDURE — 99214 OFFICE O/P EST MOD 30 MIN: CPT | Mod: S$PBB,,, | Performed by: UROLOGY

## 2021-12-22 NOTE — H&P (VIEW-ONLY)
Chief Complaint:   Encounter Diagnoses   Name Primary?    History of bladder cancer Yes    Benign prostatic hyperplasia with urinary frequency     Nephrolithiasis     Gross hematuria          HPI:   12/22/21- patient comes in today to establish care my clinic, recent gross hematuria and reported to see Alexandra.  CT urogram demonstrates bladder stones, urinary retention and a distal right ureteral stone.  Patient has had a Pandey catheter would like this removed today, no more gross hematuria, he is also on antibiotics and tamsulosin.  Patient otherwise was voiding well with no complaints prior to this incident, history of TURBT with subsequent induction BCG, no recurrences.  12/14/21- LR- Patient is a 81-year-old male that is presenting with an acute onset of painless, gross hematuria.  Patient states that he has had gross hematuria for the last 24 hours.  Has a history of bladder cancer, was diagnosed 5 years ago.No abd/pelvic pain and no exac/rel factors. Has a remote h/o renal stones. Patient currently on no medication for BPH.    Allergies:  Patient has no known allergies.    Medications:  has a current medication list which includes the following prescription(s): amiodarone, atorvastatin, donepezil, ergocalciferol, famotidine, ferrous sulfate, metoprolol succinate, multivitamin, nitrofurantoin (macrocrystal-monohydrate), pantoprazole, rivaroxaban, and tamsulosin, and the following Facility-Administered Medications: lactated ringers.    Review of Systems:  General: No fever, chills, fatigability, or weight loss.  Skin: No rashes, itching, or changes in color or texture of skin.  Chest: Denies RECINOS, cyanosis, wheezing, cough, and sputum production.  Abdomen: Appetite fine. No weight loss. Denies diarrhea, abdominal pain, hematemesis, or blood in stool.  Musculoskeletal: No joint stiffness or swelling. Denies back pain.  : As above.  All other review of systems negative.    PMH:   has a past medical history of  Anemia, Anticoagulant long-term use, Atrial fibrillation, Bladder cancer, Bladder tumor, CKD (chronic kidney disease), stage III, COPD (chronic obstructive pulmonary disease), Encounter for blood transfusion, Hematuria, History of 2019 novel coronavirus disease (COVID-19), Hypercholesteremia, and Hypertension.    PSH:   has a past surgical history that includes Cholecystectomy; Cystoscopy; Cardioversion; bladder tumor removal; Eye surgery; cataract removal with IOL implants (Bilateral); Cardiac electrophysiology mapping and ablation; Transurethral resection of bladder tumor by bipolar cautery (N/A, 7/23/2019); Esophagogastroduodenoscopy (N/A, 10/25/2021); Intraluminal gastrointestinal tract imaging via capsule (N/A, 12/6/2021); and Esophagogastroduodenoscopy (N/A, 12/13/2021).    FamHx: family history includes Heart disease in his brother and father.    SocHx:  reports that he quit smoking about 34 years ago. His smoking use included cigarettes. He has a 50.00 pack-year smoking history. He has never used smokeless tobacco. He reports current alcohol use of about 8.0 standard drinks of alcohol per week. He reports that he does not use drugs.      Physical Exam:  General: A&Ox3, no apparent distress, no deformities  Neck: No masses, normal thyroid  Lungs: normal inspiration, no use of accessory muscles  Heart: normal pulse, no arrhythmias  Abdomen: Soft, NT, ND, no masses, no hernias, no hepatosplenomegaly  Lymphatic: Neck and groin nodes negative      Labs/Studies:   PVR is 310 mL  CT urogram 0.5 cm distal right ureteral stone, bladder stones, BPH 12/21  Creatinine 1.2 12/21    Impression/Plan:      1. History of bladder cancer- TURBT 5-6 years ago at the outside facility with induction BCG, no recurrence noted.    Will perform cystoscopy intraoperatively, CT urogram was otherwise unremarkable.    2. Nephrolithiasis- bladder stones and distal right ureteral stone, will take the patient back for cystoscopy, possible  bladder biopsy, right ureteroscopy laser lithotripsy, retrograde and stent.    Patient understands the risks, benefits and alternatives to the above-stated procedure.  These include but are not limited to damage to the surrounding structures including the urethra, ureter, bladder and kidney.  Risk of perforation requiring open procedure.  Risk of recurrent stones, need for stents, need for secondary or more definitive procedures for these stones.  Risk of infection, hematuria, persistent pain or stent discomfort.  Risk of heart attack, stroke, death, DVT and PE.  Patient understanding of all the above has elected to proceed.    3. BPH- tamsulosin was initiated when he was found to have an elevated PVR, stated that he was otherwise voiding well prior to this.  Will go ahead and remove Pandey today per his wishes, follow-up closely as an outpatient.  PSA once stable postoperatively.    4. Gross hematuria- please see above, will complete structural evaluation.

## 2021-12-22 NOTE — PRE ADMISSION SCREENING
Pre op instructions reviewed with pt per phone: Spoke about pre op process and surgery instructions, verbalized understanding.    To confirm, Your surgeon has instructed you:  Surgery is scheduled on 12/28/21      Please report to the main hospital (1st Floor) at [facility] off of [facility] (2nd building on the left, in front of the flag pole).  Please arrive at 9am. We will call you the afternoon prior to surgery to confirm arrival time, as it is subject to change due to cancellations & emergencies.        INSTRUCTIONS IMPORTANT!!!  Do Not Eat, Drink, or Smoke after 12 midnight! NO WATER after midnight! OK to brush teeth, no gum, candy or mints!      *Take only these medicines with a small swallow of water-morning of surgery.  Amiodarone, metoprolol, protonix      ____  Do Not wear makeup, mascara or nail polish.   ____  NO Acrylic/fake nails worn day of surgery (hand/arm surgeries)  ____  NO powder, lotions, deodorants or creams to surgical area.  ____  Please remove all jewelry, including piercings and leave at home.  ____  Dentures, Hearing Aids and Contact Lens will need to be removed prior to the start of surgery.  ____  Please bring photo ID and insurance information to hospital (Leave Valuables at Home)  ____  If going home the same day, arrange for a ride home. You will not be able to             drive if Anesthesia was used.   ____  Wear clean, loose fitting clothing. Allow for dressings, bandages.  ____  Stop Aspirin, Ibuprofen, Motrin and Aleve at least 5-7 days before surgery, unless otherwise instructed by your doctor, or the nurse. You MAY use Tylenol/acetaminophen until day of                surgery.  ____  If you take diabetic medication, do NOT take morning of surgery unless instructed by             Doctor. Metformin to be stopped 24 hrs prior to surgery time. DO NOT take long-acting insulin the evening before surgery. Blood sugars will be checked in pre-op morning of.  ____ Stop taking any Fish  Oil supplements or Vitamins at least 5 days prior to surgery, unless instructed otherwise by your Doctor.          Bathing Instructions: The night before surgery and the morning prior to coming to the hospital:   -Do not shave your face.  -Do not shave pubic hair 7 days prior to surgery (gyn pt's).  -Do not shave legs/underarms 3 days prior to surgery.   -Shower & Rinse your body as usual with anti-bacterial Soap (Dial, Lever 2000, or Hibiclens)   -Do not use hibiclens on your head, face, or genitals.   -Do not wash with anti-bacterial soap after you use the hibiclens.   -Rinse your body thoroughly.      [facility] Visitor/Ride Policy:  Only 1 adult allowed (over the age of 18) to accompany you into Pre-op/Recovery Surgery Dept. All other visitors will need to wait in waiting room. Must have a ride home from a responsible adult that you know and trust. Medical Transport, Uber or Lyft can only be used if patient has a responsible adult to accompany them during ride home.      Surgical Site Infection:    Prevention of surgical site infections:     -Keep incisions clean and dry.   -Do not soak/submerge incisions in water until completely healed.   -Do not apply lotions, powders, creams, or deodorants to site.   -Always make sure hands are cleaned with antibacterial soap/ alcohol-based   prior to touching the surgical site.      Signs and symptoms:   -Redness and pain around the area where you had surgery   -Drainage of cloudy fluid from your surgical wound   -Fever over 100.4 or chills  >>>Call Surgeon office/on-call Surgeon if you experience any of these signs & symptoms post-surgery.      *Please Call [facility] Pre-Admissions Department with surgery instruction questions at [phone] or [phone].    *Insurance Questions, please call [phone].

## 2021-12-23 ENCOUNTER — TELEPHONE (OUTPATIENT)
Dept: CARDIOLOGY | Facility: HOSPITAL | Age: 81
End: 2021-12-23
Payer: OTHER GOVERNMENT

## 2021-12-27 ENCOUNTER — TELEPHONE (OUTPATIENT)
Dept: UROLOGY | Facility: CLINIC | Age: 81
End: 2021-12-27
Payer: OTHER GOVERNMENT

## 2021-12-28 ENCOUNTER — TELEPHONE (OUTPATIENT)
Dept: UROLOGY | Facility: CLINIC | Age: 81
End: 2021-12-28
Payer: OTHER GOVERNMENT

## 2021-12-28 ENCOUNTER — HOSPITAL ENCOUNTER (OUTPATIENT)
Dept: RADIOLOGY | Facility: HOSPITAL | Age: 81
Discharge: HOME | End: 2021-12-28
Attending: UROLOGY
Payer: MEDICARE

## 2021-12-28 ENCOUNTER — ANESTHESIA (OUTPATIENT)
Dept: SURGERY | Facility: HOSPITAL | Age: 81
End: 2021-12-28
Payer: MEDICARE

## 2021-12-28 ENCOUNTER — ANESTHESIA EVENT (OUTPATIENT)
Dept: SURGERY | Facility: HOSPITAL | Age: 81
End: 2021-12-28
Payer: MEDICARE

## 2021-12-28 ENCOUNTER — HOSPITAL ENCOUNTER (OUTPATIENT)
Facility: HOSPITAL | Age: 81
Discharge: HOME | End: 2021-12-28
Attending: UROLOGY | Admitting: UROLOGY
Payer: MEDICARE

## 2021-12-28 DIAGNOSIS — R31.0 GROSS HEMATURIA: Primary | ICD-10-CM

## 2021-12-28 DIAGNOSIS — N20.0 NEPHROLITHIASIS: ICD-10-CM

## 2021-12-28 DIAGNOSIS — D49.4 BLADDER TUMOR: ICD-10-CM

## 2021-12-28 DIAGNOSIS — Z85.51 HISTORY OF BLADDER CANCER: ICD-10-CM

## 2021-12-28 PROCEDURE — 88305 TISSUE EXAM BY PATHOLOGIST: CPT | Mod: 26,,, | Performed by: PATHOLOGY

## 2021-12-28 PROCEDURE — 71000033 HC RECOVERY, INTIAL HOUR: Performed by: UROLOGY

## 2021-12-28 PROCEDURE — 52204 PR CYSTOURETHROSCOPY,BIOPSY: ICD-10-PCS | Mod: ,,, | Performed by: UROLOGY

## 2021-12-28 PROCEDURE — 37000008 HC ANESTHESIA 1ST 15 MINUTES: Performed by: UROLOGY

## 2021-12-28 PROCEDURE — 37000009 HC ANESTHESIA EA ADD 15 MINS: Performed by: UROLOGY

## 2021-12-28 PROCEDURE — 27201423 OPTIME MED/SURG SUP & DEVICES STERILE SUPPLY: Performed by: UROLOGY

## 2021-12-28 PROCEDURE — 63600175 PHARM REV CODE 636 W HCPCS: Performed by: ANESTHESIOLOGY

## 2021-12-28 PROCEDURE — C1769 GUIDE WIRE: HCPCS | Performed by: UROLOGY

## 2021-12-28 PROCEDURE — 52204 CYSTOSCOPY W/BIOPSY(S): CPT | Mod: ,,, | Performed by: UROLOGY

## 2021-12-28 PROCEDURE — 71000039 HC RECOVERY, EACH ADD'L HOUR: Performed by: UROLOGY

## 2021-12-28 PROCEDURE — 36000706: Performed by: UROLOGY

## 2021-12-28 PROCEDURE — 63600175 PHARM REV CODE 636 W HCPCS: Performed by: UROLOGY

## 2021-12-28 PROCEDURE — 25000003 PHARM REV CODE 250: Performed by: NURSE ANESTHETIST, CERTIFIED REGISTERED

## 2021-12-28 PROCEDURE — C1758 CATHETER, URETERAL: HCPCS | Performed by: UROLOGY

## 2021-12-28 PROCEDURE — 36000707: Performed by: UROLOGY

## 2021-12-28 PROCEDURE — 88305 TISSUE EXAM BY PATHOLOGIST: ICD-10-PCS | Mod: 26,,, | Performed by: PATHOLOGY

## 2021-12-28 PROCEDURE — 71000015 HC POSTOP RECOV 1ST HR: Performed by: UROLOGY

## 2021-12-28 PROCEDURE — 76000 FLUOROSCOPY <1 HR PHYS/QHP: CPT | Mod: TC

## 2021-12-28 PROCEDURE — 25500020 PHARM REV CODE 255: Performed by: UROLOGY

## 2021-12-28 PROCEDURE — 88305 TISSUE EXAM BY PATHOLOGIST: CPT | Performed by: PATHOLOGY

## 2021-12-28 PROCEDURE — 63600175 PHARM REV CODE 636 W HCPCS: Performed by: NURSE ANESTHETIST, CERTIFIED REGISTERED

## 2021-12-28 RX ORDER — CEFDINIR 300 MG/1
300 CAPSULE ORAL 2 TIMES DAILY
Qty: 10 CAPSULE | Refills: 0 | Status: SHIPPED | OUTPATIENT
Start: 2021-12-28 | End: 2022-01-02

## 2021-12-28 RX ORDER — OXYCODONE AND ACETAMINOPHEN 5; 325 MG/1; MG/1
1 TABLET ORAL
Status: DISCONTINUED | OUTPATIENT
Start: 2021-12-28 | End: 2021-12-28 | Stop reason: HOSPADM

## 2021-12-28 RX ORDER — CEFAZOLIN SODIUM 2 G/50ML
2 SOLUTION INTRAVENOUS
Status: COMPLETED | OUTPATIENT
Start: 2021-12-28 | End: 2021-12-28

## 2021-12-28 RX ORDER — PROPOFOL 10 MG/ML
VIAL (ML) INTRAVENOUS CONTINUOUS PRN
Status: DISCONTINUED | OUTPATIENT
Start: 2021-12-28 | End: 2021-12-28

## 2021-12-28 RX ORDER — HYDROMORPHONE HYDROCHLORIDE 2 MG/ML
0.2 INJECTION, SOLUTION INTRAMUSCULAR; INTRAVENOUS; SUBCUTANEOUS EVERY 5 MIN PRN
Status: DISCONTINUED | OUTPATIENT
Start: 2021-12-28 | End: 2021-12-28 | Stop reason: HOSPADM

## 2021-12-28 RX ORDER — MEPERIDINE HYDROCHLORIDE 25 MG/ML
12.5 INJECTION INTRAMUSCULAR; INTRAVENOUS; SUBCUTANEOUS 2 TIMES DAILY PRN
Status: DISCONTINUED | OUTPATIENT
Start: 2021-12-28 | End: 2021-12-28 | Stop reason: HOSPADM

## 2021-12-28 RX ORDER — DIPHENHYDRAMINE HYDROCHLORIDE 50 MG/ML
25 INJECTION INTRAMUSCULAR; INTRAVENOUS EVERY 6 HOURS PRN
Status: DISCONTINUED | OUTPATIENT
Start: 2021-12-28 | End: 2021-12-28 | Stop reason: HOSPADM

## 2021-12-28 RX ORDER — ONDANSETRON 2 MG/ML
INJECTION INTRAMUSCULAR; INTRAVENOUS
Status: DISCONTINUED | OUTPATIENT
Start: 2021-12-28 | End: 2021-12-28

## 2021-12-28 RX ORDER — FENTANYL CITRATE 50 UG/ML
25 INJECTION, SOLUTION INTRAMUSCULAR; INTRAVENOUS EVERY 5 MIN PRN
Status: DISCONTINUED | OUTPATIENT
Start: 2021-12-28 | End: 2021-12-28 | Stop reason: HOSPADM

## 2021-12-28 RX ORDER — FENTANYL CITRATE 50 UG/ML
INJECTION, SOLUTION INTRAMUSCULAR; INTRAVENOUS
Status: DISCONTINUED | OUTPATIENT
Start: 2021-12-28 | End: 2021-12-28

## 2021-12-28 RX ORDER — LIDOCAINE HYDROCHLORIDE 10 MG/ML
INJECTION, SOLUTION EPIDURAL; INFILTRATION; INTRACAUDAL; PERINEURAL
Status: DISCONTINUED | OUTPATIENT
Start: 2021-12-28 | End: 2021-12-28

## 2021-12-28 RX ORDER — PHENAZOPYRIDINE HYDROCHLORIDE 200 MG/1
200 TABLET, FILM COATED ORAL 3 TIMES DAILY PRN
Qty: 15 TABLET | Refills: 0 | Status: SHIPPED | OUTPATIENT
Start: 2021-12-28 | End: 2022-05-10

## 2021-12-28 RX ORDER — OXYCODONE AND ACETAMINOPHEN 5; 325 MG/1; MG/1
1 TABLET ORAL EVERY 4 HOURS PRN
Qty: 15 TABLET | Refills: 0 | Status: SHIPPED | OUTPATIENT
Start: 2021-12-28 | End: 2022-05-10

## 2021-12-28 RX ORDER — SODIUM CHLORIDE 0.9 % (FLUSH) 0.9 %
10 SYRINGE (ML) INJECTION
Status: DISCONTINUED | OUTPATIENT
Start: 2021-12-28 | End: 2021-12-28 | Stop reason: HOSPADM

## 2021-12-28 RX ADMIN — PROPOFOL 125 MCG/KG/MIN: 10 INJECTION, EMULSION INTRAVENOUS at 11:12

## 2021-12-28 RX ADMIN — ONDANSETRON 4 MG: 2 INJECTION, SOLUTION INTRAMUSCULAR; INTRAVENOUS at 11:12

## 2021-12-28 RX ADMIN — FENTANYL CITRATE 25 MCG: 50 INJECTION, SOLUTION INTRAMUSCULAR; INTRAVENOUS at 11:12

## 2021-12-28 RX ADMIN — SODIUM CHLORIDE, POTASSIUM CHLORIDE, SODIUM LACTATE AND CALCIUM CHLORIDE: 600; 310; 30; 20 INJECTION, SOLUTION INTRAVENOUS at 11:12

## 2021-12-28 RX ADMIN — CEFAZOLIN SODIUM 2 G: 2 SOLUTION INTRAVENOUS at 11:12

## 2021-12-28 RX ADMIN — LIDOCAINE HYDROCHLORIDE 30 MG: 10 INJECTION, SOLUTION EPIDURAL; INFILTRATION; INTRACAUDAL; PERINEURAL at 11:12

## 2021-12-28 NOTE — OP NOTE
Date of Procedure: 12/28/2021    PREOP DIAGNOSIS:  Gross hematuria, right ureteral stone, bladder stones.    POSTOP DIAGNOSIS:  Possible bladder cancer, bladder stones, right ureteral stone.    PROCEDURES:      1. Cystoscopy with bladder biopsy    2. Vesical litholapaxy    3. Tumor fulguration    SURGEON:  Medardo Garcia M.D.    Assistants: None    Specimen: Bladder tumor    ANESTHESIA:  Mac anesthesia    BLOOD LOSS:  None.    FINDINGS:  Suspicious mucosa lateral to the right ureteral orifice, biopsied and fulgurated to completion, multiple bladder stones, inability to access the right ureter.      PROCEDURE IN DETAIL:  Patient was brought to the operative suite and placed under general anesthesia and positioned into the dorsal lithotomy position.  After being sterilely prepped and draped a 21 Setswana sheath cystoscope was inserted into a normal urethra.  Prostatic urethra demonstrated lateral lobe coaptation with a small median lobe.  Immediately lateral to the right ureteral orifice we identify some suspicious mucosa possibly consistent with recurrent bladder cancer.  We then biopsy this area sufficiently.  Using the resectoscope after the cystoscope was removed we then fulgurated the entire area to completion.  The remainder the bladder was otherwise unremarkable, no evidence of residual tumors.  Were multiple small bladder stones at the bottom of the bladder which were irrigated clear and removed to completion.  We then reinserted the cystoscope and attempted to pass a wire up the right ureteral orifice, we could not gain position due to severe J hooking.  We even attempted with a rigid ureteral scope with inability to access.  This point the patient is asymptomatic in regards to the right ureteral stone, it may have passed.  Therefore we will bring him back in 2-3 weeks with CT scan, percutaneous nephrostomy may need to be placed or access the side due to the anatomical positioning of the right ureteral  orifice.  The scope was removed 18 English Pandey catheter was placed with 10 mL of sterile water within the balloon.  Sat nicely upon the bladder neck, draining clear urine.  Once he wakes up in the PACU if he is doing well we will do a voiding trial.  See him as stated above.    COMPLICATIONS: None

## 2022-01-03 LAB
FINAL PATHOLOGIC DIAGNOSIS: NORMAL
GROSS: NORMAL
Lab: NORMAL

## 2022-01-10 VITALS
WEIGHT: 230.38 LBS | HEART RATE: 49 BPM | OXYGEN SATURATION: 98 % | BODY MASS INDEX: 31.2 KG/M2 | TEMPERATURE: 98 F | SYSTOLIC BLOOD PRESSURE: 180 MMHG | DIASTOLIC BLOOD PRESSURE: 81 MMHG | HEIGHT: 72 IN | RESPIRATION RATE: 21 BRPM

## 2022-01-13 ENCOUNTER — HOSPITAL ENCOUNTER (OUTPATIENT)
Dept: RADIOLOGY | Facility: HOSPITAL | Age: 82
Discharge: HOME | End: 2022-01-13
Attending: UROLOGY
Payer: MEDICARE

## 2022-01-13 DIAGNOSIS — D49.4 BLADDER TUMOR: ICD-10-CM

## 2022-01-13 PROCEDURE — 74176 CT ABD & PELVIS W/O CONTRAST: CPT | Mod: TC

## 2022-01-19 ENCOUNTER — OFFICE VISIT (OUTPATIENT)
Dept: UROLOGY | Facility: CLINIC | Age: 82
End: 2022-01-19
Payer: MEDICARE

## 2022-01-19 VITALS
HEART RATE: 64 BPM | SYSTOLIC BLOOD PRESSURE: 164 MMHG | HEIGHT: 72 IN | WEIGHT: 232.81 LBS | DIASTOLIC BLOOD PRESSURE: 84 MMHG | BODY MASS INDEX: 31.53 KG/M2

## 2022-01-19 DIAGNOSIS — R35.0 BENIGN PROSTATIC HYPERPLASIA WITH URINARY FREQUENCY: ICD-10-CM

## 2022-01-19 DIAGNOSIS — R31.0 GROSS HEMATURIA: ICD-10-CM

## 2022-01-19 DIAGNOSIS — N40.1 BENIGN PROSTATIC HYPERPLASIA WITH URINARY FREQUENCY: ICD-10-CM

## 2022-01-19 DIAGNOSIS — Z85.51 HISTORY OF BLADDER CANCER: Primary | ICD-10-CM

## 2022-01-19 DIAGNOSIS — N20.0 NEPHROLITHIASIS: ICD-10-CM

## 2022-01-19 PROCEDURE — 99024 PR POST-OP FOLLOW-UP VISIT: ICD-10-PCS | Mod: POP,,, | Performed by: UROLOGY

## 2022-01-19 PROCEDURE — 99999 PR PBB SHADOW E&M-EST. PATIENT-LVL III: CPT | Mod: PBBFAC,,, | Performed by: UROLOGY

## 2022-01-19 PROCEDURE — 99213 OFFICE O/P EST LOW 20 MIN: CPT | Mod: PBBFAC | Performed by: UROLOGY

## 2022-01-19 PROCEDURE — 99024 POSTOP FOLLOW-UP VISIT: CPT | Mod: POP,,, | Performed by: UROLOGY

## 2022-01-19 PROCEDURE — 99999 PR PBB SHADOW E&M-EST. PATIENT-LVL III: ICD-10-PCS | Mod: PBBFAC,,, | Performed by: UROLOGY

## 2022-01-19 NOTE — PROGRESS NOTES
Chief Complaint:   Encounter Diagnoses   Name Primary?    History of bladder cancer Yes    Benign prostatic hyperplasia with urinary frequency     Nephrolithiasis     Gross hematuria          HPI:   1/19/22- patient is here today to discuss pathology, voiding well with no more hematuria and emptying his bladder on tamsulosin.  Stones have cleared per CT scan.  12/22/21- patient comes in today to establish care my clinic, recent gross hematuria and reported to see Alexandra.  CT urogram demonstrates bladder stones, urinary retention and a distal right ureteral stone.  Patient has had a Pandey catheter would like this removed today, no more gross hematuria, he is also on antibiotics and tamsulosin.  Patient otherwise was voiding well with no complaints prior to this incident, history of TURBT with subsequent induction BCG, no recurrences.  12/14/21- LR- Patient is a 81-year-old male that is presenting with an acute onset of painless, gross hematuria.  Patient states that he has had gross hematuria for the last 24 hours.  Has a history of bladder cancer, was diagnosed 5 years ago.No abd/pelvic pain and no exac/rel factors. Has a remote h/o renal stones. Patient currently on no medication for BPH.    Allergies:  Patient has no known allergies.    Medications:  has a current medication list which includes the following prescription(s): amiodarone, atorvastatin, donepezil, ergocalciferol, famotidine, ferrous sulfate, metoprolol succinate, multivitamin, nitrofurantoin (macrocrystal-monohydrate), oxycodone-acetaminophen, pantoprazole, phenazopyridine, rivaroxaban, and tamsulosin, and the following Facility-Administered Medications: lactated ringers.    Review of Systems:  General: No fever, chills, fatigability, or weight loss.  Skin: No rashes, itching, or changes in color or texture of skin.  Chest: Denies RECINOS, cyanosis, wheezing, cough, and sputum production.  Abdomen: Appetite fine. No weight loss. Denies diarrhea,  abdominal pain, hematemesis, or blood in stool.  Musculoskeletal: No joint stiffness or swelling. Denies back pain.  : As above.  All other review of systems negative.    PMH:   has a past medical history of Anemia, Anticoagulant long-term use, Atrial fibrillation, Bladder cancer, Bladder tumor, CKD (chronic kidney disease), stage III, COPD (chronic obstructive pulmonary disease), Encounter for blood transfusion, Hematuria, History of 2019 novel coronavirus disease (COVID-19), Hypercholesteremia, and Hypertension.    PSH:   has a past surgical history that includes Cholecystectomy; Cystoscopy; Cardioversion; bladder tumor removal; Eye surgery; cataract removal with IOL implants (Bilateral); Cardiac electrophysiology mapping and ablation; Transurethral resection of bladder tumor by bipolar cautery (N/A, 7/23/2019); Esophagogastroduodenoscopy (N/A, 10/25/2021); Intraluminal gastrointestinal tract imaging via capsule (N/A, 12/6/2021); Esophagogastroduodenoscopy (N/A, 12/13/2021); Cystoscopy with biopsy of bladder (Right, 12/28/2021); and Cystoscopic litholapaxy (N/A, 12/28/2021).    FamHx: family history includes Heart disease in his brother and father.    SocHx:  reports that he quit smoking about 35 years ago. His smoking use included cigarettes. He has a 50.00 pack-year smoking history. He has never used smokeless tobacco. He reports current alcohol use of about 8.0 standard drinks of alcohol per week. He reports that he does not use drugs.      Physical Exam:  General: A&Ox3, no apparent distress, no deformities  Neck: No masses, normal thyroid  Lungs: normal inspiration, no use of accessory muscles  Heart: normal pulse, no arrhythmias  Abdomen: Soft, NT, ND, no masses, no hernias, no hepatosplenomegaly  Lymphatic: Neck and groin nodes negative      Labs/Studies:   PVR is 310 mL  CT stone protocol no nephrolithiasis 1/22  CT urogram 0.5 cm distal right ureteral stone, bladder stones, BPH 12/21  Creatinine 1.2  12/21    Impression/Plan:      1. History of bladder cancer-  High pTa, but suspicious for T1 cystoscopy with bladder biopsy, vesical litholapaxy, could not assess the right ureter  12/28/21,  TURBT 5-6 years ago at the outside facility with induction BCG, no recurrence noted.    Recurrent cancer identified and treated intraoperatively.  Instead of re-biopsying he would like to go ahead and proceed with intravesical therapy.  It has been 5-6 years since he had BCG, will reinstitute induction BCG.  We discussed risks today in the office.  I will see him back in 3 months with cystoscopy and cytology per surveillance protocol.    2. Nephrolithiasis- stones extracted, could not get up the right ureter, but the patient has passed the stones per CT scan.    3. BPH- patient is voiding much better on tamsulosin, no complaints.  He will continue and proceed with PSA prior to the next appointment.    4. Gross hematuria- this has cleared, most likely from his cancer and stone passage.       None Pacemaker

## 2022-01-26 ENCOUNTER — CLINICAL SUPPORT (OUTPATIENT)
Dept: UROLOGY | Facility: CLINIC | Age: 82
End: 2022-01-26
Payer: MEDICARE

## 2022-01-26 VITALS — BODY MASS INDEX: 31.46 KG/M2 | WEIGHT: 232 LBS

## 2022-01-26 DIAGNOSIS — N30.01 ACUTE CYSTITIS WITH HEMATURIA: Primary | ICD-10-CM

## 2022-01-26 LAB
BILIRUB SERPL-MCNC: NORMAL MG/DL
BLOOD URINE, POC: 250
CLARITY, POC UA: NORMAL
COLOR, POC UA: YELLOW
GLUCOSE UR QL STRIP: 250
KETONES UR QL STRIP: NORMAL
LEUKOCYTE ESTERASE URINE, POC: NORMAL
NITRITE, POC UA: NORMAL
PH, POC UA: 5
PROTEIN, POC: NORMAL
SPECIFIC GRAVITY, POC UA: 1.02
UROBILINOGEN, POC UA: NORMAL

## 2022-01-26 PROCEDURE — 87086 URINE CULTURE/COLONY COUNT: CPT | Performed by: UROLOGY

## 2022-01-26 PROCEDURE — 87088 URINE BACTERIA CULTURE: CPT | Performed by: UROLOGY

## 2022-01-26 PROCEDURE — 87186 SC STD MICRODIL/AGAR DIL: CPT | Performed by: UROLOGY

## 2022-01-26 PROCEDURE — 99999 PR PBB SHADOW E&M-EST. PATIENT-LVL III: CPT | Mod: PBBFAC,,,

## 2022-01-26 PROCEDURE — 87077 CULTURE AEROBIC IDENTIFY: CPT | Performed by: UROLOGY

## 2022-01-26 PROCEDURE — 81002 URINALYSIS NONAUTO W/O SCOPE: CPT | Mod: PBBFAC

## 2022-01-26 PROCEDURE — 99213 OFFICE O/P EST LOW 20 MIN: CPT | Mod: PBBFAC

## 2022-01-26 PROCEDURE — 99999 PR PBB SHADOW E&M-EST. PATIENT-LVL III: ICD-10-PCS | Mod: PBBFAC,,,

## 2022-01-26 RX ORDER — CEFDINIR 300 MG/1
300 CAPSULE ORAL 2 TIMES DAILY
Qty: 20 CAPSULE | Refills: 0 | Status: SHIPPED | OUTPATIENT
Start: 2022-01-26 | End: 2022-02-05

## 2022-01-26 NOTE — PROGRESS NOTES
Pt into for BCG #1/6.  Urine dipstick positive for leukocytes, nitrites and 250 blood.  I spoke to Dr. Garcia and was told to skip today's treatment and try again next week.  Urine to be sent off for culture.

## 2022-01-28 LAB — BACTERIA UR CULT: ABNORMAL

## 2022-02-02 ENCOUNTER — CLINICAL SUPPORT (OUTPATIENT)
Dept: UROLOGY | Facility: CLINIC | Age: 82
End: 2022-02-02
Payer: MEDICARE

## 2022-02-02 VITALS — BODY MASS INDEX: 31.46 KG/M2 | WEIGHT: 232 LBS

## 2022-02-02 DIAGNOSIS — Z85.51 HISTORY OF BLADDER CANCER: Primary | ICD-10-CM

## 2022-02-02 DIAGNOSIS — N40.1 BENIGN PROSTATIC HYPERPLASIA WITH URINARY FREQUENCY: ICD-10-CM

## 2022-02-02 DIAGNOSIS — R35.0 BENIGN PROSTATIC HYPERPLASIA WITH URINARY FREQUENCY: ICD-10-CM

## 2022-02-02 DIAGNOSIS — N30.01 ACUTE CYSTITIS WITH HEMATURIA: ICD-10-CM

## 2022-02-02 DIAGNOSIS — R31.9 HEMATURIA, UNSPECIFIED TYPE: ICD-10-CM

## 2022-02-02 DIAGNOSIS — R31.0 GROSS HEMATURIA: ICD-10-CM

## 2022-02-02 LAB
BILIRUB SERPL-MCNC: NORMAL MG/DL
BLOOD URINE, POC: 250
CLARITY, POC UA: CLEAR
COLOR, POC UA: YELLOW
GLUCOSE UR QL STRIP: NORMAL
KETONES UR QL STRIP: NORMAL
LEUKOCYTE ESTERASE URINE, POC: NORMAL
NITRITE, POC UA: NORMAL
PH, POC UA: 6
PROTEIN, POC: NORMAL
SPECIFIC GRAVITY, POC UA: 1.02
UROBILINOGEN, POC UA: NORMAL

## 2022-02-02 PROCEDURE — 99999 PR PBB SHADOW E&M-EST. PATIENT-LVL II: CPT | Mod: PBBFAC,,,

## 2022-02-02 PROCEDURE — 81002 URINALYSIS NONAUTO W/O SCOPE: CPT | Mod: PBBFAC

## 2022-02-02 PROCEDURE — 99212 OFFICE O/P EST SF 10 MIN: CPT | Mod: PBBFAC

## 2022-02-02 PROCEDURE — 99999 PR PBB SHADOW E&M-EST. PATIENT-LVL II: ICD-10-PCS | Mod: PBBFAC,,,

## 2022-02-09 ENCOUNTER — NURSE TRIAGE (OUTPATIENT)
Dept: ADMINISTRATIVE | Facility: CLINIC | Age: 82
End: 2022-02-09
Payer: OTHER GOVERNMENT

## 2022-02-09 ENCOUNTER — TELEPHONE (OUTPATIENT)
Dept: UROLOGY | Facility: CLINIC | Age: 82
End: 2022-02-09
Payer: MEDICARE

## 2022-02-09 ENCOUNTER — CLINICAL SUPPORT (OUTPATIENT)
Dept: UROLOGY | Facility: CLINIC | Age: 82
End: 2022-02-09
Payer: MEDICARE

## 2022-02-09 VITALS — BODY MASS INDEX: 31.42 KG/M2 | TEMPERATURE: 98 F | HEART RATE: 72 BPM | WEIGHT: 232 LBS | HEIGHT: 72 IN

## 2022-02-09 DIAGNOSIS — Z85.51 HISTORY OF BLADDER CANCER: Primary | ICD-10-CM

## 2022-02-09 LAB
BILIRUB SERPL-MCNC: NORMAL MG/DL
BLOOD URINE, POC: NORMAL
CLARITY, POC UA: CLEAR
COLOR, POC UA: YELLOW
GLUCOSE UR QL STRIP: NORMAL
KETONES UR QL STRIP: NORMAL
LEUKOCYTE ESTERASE URINE, POC: NORMAL
NITRITE, POC UA: NORMAL
PH, POC UA: 6
PROTEIN, POC: NORMAL
SPECIFIC GRAVITY, POC UA: 1.01
UROBILINOGEN, POC UA: NORMAL

## 2022-02-09 PROCEDURE — 99213 OFFICE O/P EST LOW 20 MIN: CPT | Mod: PBBFAC

## 2022-02-09 PROCEDURE — 99999 PR PBB SHADOW E&M-EST. PATIENT-LVL III: ICD-10-PCS | Mod: PBBFAC,,,

## 2022-02-09 PROCEDURE — 99999 PR PBB SHADOW E&M-EST. PATIENT-LVL III: CPT | Mod: PBBFAC,,,

## 2022-02-09 PROCEDURE — 81002 URINALYSIS NONAUTO W/O SCOPE: CPT | Mod: PBBFAC

## 2022-02-09 RX ADMIN — BACILLUS CALMETTE-GUERIN 50 MG: 50 POWDER, FOR SUSPENSION INTRAVESICAL at 10:02

## 2022-02-09 NOTE — TELEPHONE ENCOUNTER
Pt calling with noticeable swelling to R side of face s/p BCG infusion at 9:30a today. Reports he has had this infision about 5 years ago but this is the first dose of this round. Denies any difficulty swallowing. States that the swelling extends all the way up to his ear and under the jaw. Per protocol advised of ED NOW. Verbalized understanding. Informed pt of call back instructions and care advice. Verbalized understanding and states he will go straight to ER. Will route message to provider.     Reason for Disposition   SEVERE swelling of entire face and < 2 hours since exposure to high-risk allergen (e.g., peanuts, tree nuts, fish, shellfish or 1st dose of drug) and no serious symptoms AND [4] no serious allergic reaction in the past    Additional Information   Negative: Life-threatening reaction in the past to similar substance (e.g., food, insect bite/sting, medication, etc.) and < 2 hours since exposure   Negative: Wheezing, stridor, hoarseness, or difficulty breathing   Negative: Tightness in the chest or throat and begins within 2 hours of exposure to allergic substance   Negative: Difficulty swallowing, drooling, or slurred speech   Negative: Difficult to awaken or acting confused (e.g., disoriented, slurred speech)   Negative: Unresponsive, passed out, or very weak   Negative: Other symptom of severe allergic reaction (Exception: Hives or facial swelling alone)   Negative: Sounds like a life-threatening emergency to the triager   Negative: Widespread hives, itching or facial swelling and onset < 2 hours of exposure to high-risk allergen (e.g., sting, nuts, 1st dose of antibiotic)   Negative: Vomiting or abdominal cramps and onset < 2 hours of exposure to high-risk allergen (e.g., sting, nuts, 1st dose of antibiotic)   Negative: Gave epinephrine shot and no symptoms now   Negative: Gave asthma inhaler or neb and no symptoms now   Negative: Life-threatening reaction (anaphylaxis) in the past  to similar substance (e.g., food, insect bite/sting, chemical, etc.) and < 2 hours since exposure   Negative: Unresponsive, passed out or very weak   Negative: Swollen tongue   Negative: Difficulty breathing or wheezing   Negative: Sounds like a life-threatening emergency to the triager    Protocols used: FACE SWELLING-A-OH, [id number]-[id number]-XB

## 2022-02-09 NOTE — PROGRESS NOTES
......Patient in today for BCG treatment.  #1/6. POCT revealed positive leukocytes, positive nitrates and 250 blood.  I spoke to Dr. Garcia and was told to proceed with treatment as pt was non symptomatic.  One vial of BCG and 50ml perservative free sodium chloride 0.9% mixed as per instructions.  Using aseptic technique, a sterile fenestrated drape was placed over perineum.  Pt was catheterized using a #14Fr red rubber catheter to allow bladder emptying.  Using closed system, one vial of BCG instilled via gravity directly into bladder.  Pt tolerated well.  Instructed pt to keep BCG solution in bladder for 2 hours.  Pt was given instructions to follow for the next 6 hours, including sitting on toilet at home and using 2 cups of undiluted chlorine bleach and closing the lid.  Pt was told to allow bleach to stand for 15 minutes before flushing toilet.  Patient was also told to drink plenty of water to flush any remaining BCG bacteria.  Patient verbalized understanding.

## 2022-02-10 NOTE — TELEPHONE ENCOUNTER
Spoke to pt. He states that he is better this morning. He does not have the swelling anymore but stated before he had the BCG infusion, his ear and tooth were kind of sore feeling. He wasn't sure if the swelling was from that or the infusion. He will talk to Dr. Garcia next week.

## 2022-02-16 ENCOUNTER — CLINICAL SUPPORT (OUTPATIENT)
Dept: UROLOGY | Facility: CLINIC | Age: 82
End: 2022-02-16
Payer: MEDICARE

## 2022-02-16 VITALS — WEIGHT: 232 LBS | TEMPERATURE: 99 F | HEART RATE: 68 BPM | BODY MASS INDEX: 31.42 KG/M2 | HEIGHT: 72 IN

## 2022-02-16 DIAGNOSIS — Z85.51 HISTORY OF BLADDER CANCER: Primary | ICD-10-CM

## 2022-02-16 LAB
BILIRUB SERPL-MCNC: NORMAL MG/DL
BLOOD URINE, POC: 250
CLARITY, POC UA: CLEAR
COLOR, POC UA: YELLOW
GLUCOSE UR QL STRIP: NORMAL
KETONES UR QL STRIP: NORMAL
LEUKOCYTE ESTERASE URINE, POC: NORMAL
NITRITE, POC UA: NORMAL
PH, POC UA: 5
PROTEIN, POC: NORMAL
SPECIFIC GRAVITY, POC UA: 1.02
UROBILINOGEN, POC UA: NORMAL

## 2022-02-16 PROCEDURE — 99999 PR PBB SHADOW E&M-EST. PATIENT-LVL III: CPT | Mod: PBBFAC,,,

## 2022-02-16 PROCEDURE — 99999 PR PBB SHADOW E&M-EST. PATIENT-LVL III: ICD-10-PCS | Mod: PBBFAC,,,

## 2022-02-16 PROCEDURE — 99213 OFFICE O/P EST LOW 20 MIN: CPT | Mod: PBBFAC

## 2022-02-16 PROCEDURE — 81002 URINALYSIS NONAUTO W/O SCOPE: CPT | Mod: PBBFAC

## 2022-02-16 RX ADMIN — BACILLUS CALMETTE-GUERIN 50 MG: 50 POWDER, FOR SUSPENSION INTRAVESICAL at 10:02

## 2022-02-16 NOTE — PROGRESS NOTES
......Patient in today for BCG treatment.  #2/6.  One vial of BCG and 50ml perservative free sodium chloride 0.9% mixed as per instructions.  Using aseptic technique, a sterile fenestrated drape was placed over perineum.  Pt was catheterized using a #14Fr red rubber catheter to allow bladder emptying.  Using closed system, one vial of BCG instilled via gravity directly into bladder.  Pt tolerated well.  Instructed pt to keep BCG solution in bladder for 2 hours.  Pt was given instructions to follow for the next 6 hours, including sitting on toilet at home and using 2 cups of undiluted chlorine bleach and closing the lid.  Pt was told to allow bleach to stand for 15 minutes before flushing toilet.  Patient was also told to drink plenty of water to flush any remaining BCG bacteria.  Patient verbalized understanding.

## 2022-02-23 ENCOUNTER — CLINICAL SUPPORT (OUTPATIENT)
Dept: UROLOGY | Facility: CLINIC | Age: 82
End: 2022-02-23
Payer: MEDICARE

## 2022-02-23 VITALS
SYSTOLIC BLOOD PRESSURE: 132 MMHG | HEART RATE: 72 BPM | BODY MASS INDEX: 31.46 KG/M2 | TEMPERATURE: 98 F | WEIGHT: 232 LBS | DIASTOLIC BLOOD PRESSURE: 72 MMHG

## 2022-02-23 DIAGNOSIS — Z85.51 HISTORY OF BLADDER CANCER: Primary | ICD-10-CM

## 2022-02-23 LAB
BILIRUB SERPL-MCNC: NORMAL MG/DL
BLOOD URINE, POC: 50
CLARITY, POC UA: NORMAL
COLOR, POC UA: YELLOW
KETONES UR QL STRIP: NORMAL
LEUKOCYTE ESTERASE URINE, POC: POSITIVE
NITRITE, POC UA: POSITIVE
PH, POC UA: 5
PROTEIN, POC: NORMAL
SPECIFIC GRAVITY, POC UA: 1.02
UROBILINOGEN, POC UA: NORMAL

## 2022-02-23 PROCEDURE — 81002 URINALYSIS NONAUTO W/O SCOPE: CPT | Mod: PBBFAC

## 2022-02-23 PROCEDURE — 99999 PR PBB SHADOW E&M-EST. PATIENT-LVL III: ICD-10-PCS | Mod: PBBFAC,,,

## 2022-02-23 PROCEDURE — 99213 OFFICE O/P EST LOW 20 MIN: CPT | Mod: PBBFAC

## 2022-02-23 PROCEDURE — 99999 PR PBB SHADOW E&M-EST. PATIENT-LVL III: CPT | Mod: PBBFAC,,,

## 2022-02-23 RX ADMIN — BACILLUS CALMETTE-GUERIN 50 MG: 50 POWDER, FOR SUSPENSION INTRAVESICAL at 09:02

## 2022-02-23 NOTE — PROGRESS NOTES
......Patient in today for BCG treatment.  #3/6. POCT indicated positive leukocytes, nitrites and blood.  I presented this to Dr. Garcia and was instructed to proceed with treatment.  One vial of BCG and 50ml perservative free sodium chloride 0.9% mixed as per instructions.  Using aseptic technique, a sterile fenestrated drape was placed over perineum.  Pt was catheterized using a #14Fr red rubber catheter to allow bladder emptying.  Using closed system, one vial of BCG instilled via gravity directly into bladder.  Pt tolerated well.  Instructed pt to keep BCG solution in bladder for 2 hours.  Pt was given instructions to follow for the next 6 hours, including sitting on toilet at home and using 2 cups of undiluted chlorine bleach and closing the lid.  Pt was told to allow bleach to stand for 15 minutes before flushing toilet.  Patient was also told to drink plenty of water to flush any remaining BCG bacteria.  Patient verbalized understanding.

## 2022-03-02 ENCOUNTER — CLINICAL SUPPORT (OUTPATIENT)
Dept: UROLOGY | Facility: CLINIC | Age: 82
End: 2022-03-02
Payer: MEDICARE

## 2022-03-02 VITALS
HEART RATE: 72 BPM | BODY MASS INDEX: 31.42 KG/M2 | WEIGHT: 232 LBS | SYSTOLIC BLOOD PRESSURE: 132 MMHG | HEIGHT: 72 IN | DIASTOLIC BLOOD PRESSURE: 76 MMHG | TEMPERATURE: 98 F

## 2022-03-02 DIAGNOSIS — Z85.51 HISTORY OF BLADDER CANCER: Primary | ICD-10-CM

## 2022-03-02 PROCEDURE — 99213 OFFICE O/P EST LOW 20 MIN: CPT | Mod: PBBFAC

## 2022-03-02 PROCEDURE — 99999 PR PBB SHADOW E&M-EST. PATIENT-LVL III: ICD-10-PCS | Mod: PBBFAC,,,

## 2022-03-02 PROCEDURE — 99999 PR PBB SHADOW E&M-EST. PATIENT-LVL III: CPT | Mod: PBBFAC,,,

## 2022-03-02 RX ADMIN — BACILLUS CALMETTE-GUERIN 50 MG: 50 POWDER, FOR SUSPENSION INTRAVESICAL at 09:03

## 2022-03-02 NOTE — PROGRESS NOTES
......Patient in today for BCG treatment.  #4/5.  One vial of BCG and 50ml perservative free sodium chloride 0.9% mixed as per instructions.  Using aseptic technique, a sterile fenestrated drape was placed over perineum.  Pt was catheterized using a #14Fr red rubber catheter to allow bladder emptying.  Using closed system, one vial of BCG instilled via gravity directly into bladder.  Pt tolerated well.  Instructed pt to keep BCG solution in bladder for 2 hours.  Pt was given instructions to follow for the next 6 hours, including sitting on toilet at home and using 2 cups of undiluted chlorine bleach and closing the lid.  Pt was told to allow bleach to stand for 15 minutes before flushing toilet.  Patient was also told to drink plenty of water to flush any remaining BCG bacteria.  Patient verbalized understanding.

## 2022-03-04 ENCOUNTER — PES CALL (OUTPATIENT)
Dept: ADMINISTRATIVE | Facility: CLINIC | Age: 82
End: 2022-03-04
Payer: MEDICARE

## 2022-03-10 ENCOUNTER — TELEPHONE (OUTPATIENT)
Dept: UROLOGY | Facility: CLINIC | Age: 82
End: 2022-03-10
Payer: MEDICARE

## 2022-03-10 NOTE — TELEPHONE ENCOUNTER
Contacted patient to see if he was available to come in to get his BCG treatment on tomorrow. Patient VU new appointment date and time

## 2022-03-11 ENCOUNTER — CLINICAL SUPPORT (OUTPATIENT)
Dept: UROLOGY | Facility: CLINIC | Age: 82
End: 2022-03-11
Payer: MEDICARE

## 2022-03-11 VITALS — BODY MASS INDEX: 30.75 KG/M2 | WEIGHT: 232 LBS | HEIGHT: 73 IN

## 2022-03-11 DIAGNOSIS — Z85.51 HISTORY OF BLADDER CANCER: Primary | ICD-10-CM

## 2022-03-11 LAB
BILIRUB SERPL-MCNC: NORMAL MG/DL
BLOOD URINE, POC: 50
CLARITY, POC UA: CLEAR
COLOR, POC UA: YELLOW
GLUCOSE UR QL STRIP: NORMAL
KETONES UR QL STRIP: NORMAL
LEUKOCYTE ESTERASE URINE, POC: NORMAL
NITRITE, POC UA: NORMAL
PH, POC UA: 6
PROTEIN, POC: NORMAL
SPECIFIC GRAVITY, POC UA: 1.01
UROBILINOGEN, POC UA: NORMAL

## 2022-03-11 PROCEDURE — 99213 OFFICE O/P EST LOW 20 MIN: CPT | Mod: PBBFAC

## 2022-03-11 PROCEDURE — 81002 URINALYSIS NONAUTO W/O SCOPE: CPT | Mod: PBBFAC

## 2022-03-11 PROCEDURE — 99999 PR PBB SHADOW E&M-EST. PATIENT-LVL III: CPT | Mod: PBBFAC,,,

## 2022-03-11 PROCEDURE — 99999 PR PBB SHADOW E&M-EST. PATIENT-LVL III: ICD-10-PCS | Mod: PBBFAC,,,

## 2022-03-11 RX ADMIN — BACILLUS CALMETTE-GUERIN 50 MG: 50 POWDER, FOR SUSPENSION INTRAVESICAL at 10:03

## 2022-03-11 NOTE — PROGRESS NOTES
......Patient in today for BCG treatment.  #5/6.  One vial of BCG and 50ml perservative free sodium chloride 0.9% mixed as per instructions.  Using aseptic technique, a sterile fenestrated drape was placed over perineum.  Pt was catheterized using a #14Fr red rubber catheter to allow bladder emptying.  Using closed system, one vial of BCG instilled via gravity directly into bladder.  Pt tolerated well.  Instructed pt to keep BCG solution in bladder for 2 hours.  Pt was given instructions to follow for the next 6 hours, including sitting on toilet at home and using 2 cups of undiluted chlorine bleach and closing the lid.  Pt was told to allow bleach to stand for 15 minutes before flushing toilet.  Patient was also told to drink plenty of water to flush any remaining BCG bacteria.  Patient verbalized understanding.

## 2022-03-15 ENCOUNTER — PES CALL (OUTPATIENT)
Dept: ADMINISTRATIVE | Facility: CLINIC | Age: 82
End: 2022-03-15
Payer: MEDICARE

## 2022-03-16 ENCOUNTER — OFFICE VISIT (OUTPATIENT)
Dept: CARDIOLOGY | Facility: CLINIC | Age: 82
End: 2022-03-16
Payer: MEDICARE

## 2022-03-16 VITALS
WEIGHT: 233.69 LBS | BODY MASS INDEX: 30.83 KG/M2 | SYSTOLIC BLOOD PRESSURE: 166 MMHG | OXYGEN SATURATION: 98 % | DIASTOLIC BLOOD PRESSURE: 90 MMHG | HEART RATE: 64 BPM

## 2022-03-16 DIAGNOSIS — I48.0 PAROXYSMAL ATRIAL FIBRILLATION: Primary | ICD-10-CM

## 2022-03-16 DIAGNOSIS — N18.30 STAGE 3 CHRONIC KIDNEY DISEASE, UNSPECIFIED WHETHER STAGE 3A OR 3B CKD: ICD-10-CM

## 2022-03-16 DIAGNOSIS — D49.4 BLADDER TUMOR: ICD-10-CM

## 2022-03-16 DIAGNOSIS — E78.00 HYPERCHOLESTEREMIA: ICD-10-CM

## 2022-03-16 DIAGNOSIS — I10 PRIMARY HYPERTENSION: ICD-10-CM

## 2022-03-16 PROCEDURE — 99214 OFFICE O/P EST MOD 30 MIN: CPT | Mod: PBBFAC,PO | Performed by: INTERNAL MEDICINE

## 2022-03-16 PROCEDURE — 99214 PR OFFICE/OUTPT VISIT, EST, LEVL IV, 30-39 MIN: ICD-10-PCS | Mod: S$PBB,,, | Performed by: INTERNAL MEDICINE

## 2022-03-16 PROCEDURE — 99999 PR PBB SHADOW E&M-EST. PATIENT-LVL IV: CPT | Mod: PBBFAC,,, | Performed by: INTERNAL MEDICINE

## 2022-03-16 PROCEDURE — 99999 PR PBB SHADOW E&M-EST. PATIENT-LVL IV: ICD-10-PCS | Mod: PBBFAC,,, | Performed by: INTERNAL MEDICINE

## 2022-03-16 PROCEDURE — 99214 OFFICE O/P EST MOD 30 MIN: CPT | Mod: S$PBB,,, | Performed by: INTERNAL MEDICINE

## 2022-03-16 RX ORDER — HYDROCHLOROTHIAZIDE 25 MG/1
25 TABLET ORAL DAILY
Qty: 30 TABLET | Refills: 11 | Status: SHIPPED | OUTPATIENT
Start: 2022-03-16 | End: 2022-04-26

## 2022-03-16 RX ORDER — AMLODIPINE BESYLATE 5 MG/1
5 TABLET ORAL DAILY
Qty: 30 TABLET | Refills: 11 | Status: SHIPPED | OUTPATIENT
Start: 2022-03-16 | End: 2022-04-26

## 2022-03-16 NOTE — PROGRESS NOTES
Subjective:   Patient ID:  Jose Miguel Alvarez Jr. is a 81 y.o. male who presents for follow up of No chief complaint on file.      82 yo male 4 months f/u  Prior cardiologist Dr. Tanesha Coffman at [facility], h/o COVID 19 in . Hospitalization over a month, anemia, h/o bladder cancer on chemo and XRT, no h./o DM and stroke  PMH PAF s/p DCCV few yrs ago and RFA at [facility]. HTN HLD COPD quit smoking  10/2021 admitted to severe anemia and HGB dropped to 4.2 and had 4 units of PRBC. Felt better  This was 3rd episode of severe anemia in the past two yrs  No chest pain, RECINOS dizziness palpitation  No active bleeding    Interval history  S/p cystoscopy in  bladder cancer clear  No BP check at home. Mod compliance. No active bleeding  No chest pain dyspnea headache, palpitation dizziness faint and leg swelling   CMP TSH and chest CT reviewed                    Past Medical History:   Diagnosis Date    Anemia     Anticoagulant long-term use     Atrial fibrillation     Bladder cancer     Bladder tumor     CKD (chronic kidney disease), stage III     COPD (chronic obstructive pulmonary disease)     pt denies    Encounter for blood transfusion     Hematuria     History of 2019 novel coronavirus disease (COVID-19)     Hypercholesteremia     Hypertension        Past Surgical History:   Procedure Laterality Date    bladder tumor removal      CARDIAC ELECTROPHYSIOLOGY MAPPING AND ABLATION      CARDIOVERSION      several    cataract removal with IOL implants Bilateral     CHOLECYSTECTOMY      CYSTOSCOPIC LITHOLAPAXY N/A 12/28/2021    Procedure: CYSTOLITHOLAPAXY;  Surgeon: Medardo Garcia MD;  Location: [facility] OR;  Service: Urology;  Laterality: N/A;    CYSTOSCOPY      CYSTOSCOPY WITH BIOPSY OF BLADDER Right 12/28/2021    Procedure: CYSTOSCOPY, WITH BLADDER BIOPSY, WITH FULGURATION IF INDICATED;  Surgeon: Medardo Garcia MD;  Location: [facility] OR;  Service: Urology;  Laterality: Right;     ESOPHAGOGASTRODUODENOSCOPY N/A 10/25/2021    Procedure: Small Jason Enteroscopy (SBE);  Surgeon: Isabelle Griffin MD;  Location: [facility];  Service: Endoscopy;  Laterality: N/A;    ESOPHAGOGASTRODUODENOSCOPY N/A 2021    Procedure: EGD (ESOPHAGOGASTRODUODENOSCOPY);  Surgeon: Troy Polanco MD;  Location: [facility];  Service: Endoscopy;  Laterality: N/A;    EYE SURGERY      INTRALUMINAL GASTROINTESTINAL TRACT IMAGING VIA CAPSULE N/A 2021    Procedure: IMAGING PROCEDURE, GI TRACT, INTRALUMINAL, VIA CAPSULE;  Surgeon: Larry Jones RN;  Location: [facility] ENDO;  Service: Endoscopy;  Laterality: N/A;    TRANSURETHRAL RESECTION OF BLADDER TUMOR BY BIPOLAR CAUTERY N/A 2019    Procedure: TURBT, USING BIPOLAR CAUTERY;  Surgeon: Ritesh Marques MD;  Location: [facility];  Service: Urology;  Laterality: N/A;       Social History     Tobacco Use    Smoking status: Former Smoker     Packs/day: 2.50     Years: 20.00     Pack years: 50.00     Types: Cigarettes     Quit date:      Years since quittin.2    Smokeless tobacco: Never Used   Substance Use Topics    Alcohol use: Yes     Alcohol/week: 8.0 standard drinks     Types: 1 Glasses of wine, 7 Cans of beer per week    Drug use: No       Family History   Problem Relation Age of Onset    Heart disease Father     Heart disease Brother          Review of Systems   Constitutional: Negative for decreased appetite, diaphoresis, fever, malaise/fatigue and night sweats.   HENT: Negative for nosebleeds.    Eyes: Negative for blurred vision and double vision.   Cardiovascular: Negative for chest pain, claudication, dyspnea on exertion, irregular heartbeat, leg swelling, near-syncope, orthopnea, palpitations, paroxysmal nocturnal dyspnea and syncope.   Respiratory: Negative for cough, shortness of breath, sleep disturbances due to breathing, snoring, sputum production and wheezing.    Endocrine: Negative for cold intolerance and polyuria.    Hematologic/Lymphatic: Does not bruise/bleed easily.   Skin: Negative for rash.   Musculoskeletal: Negative for back pain, falls, joint pain, joint swelling and neck pain.   Gastrointestinal: Negative for abdominal pain, heartburn, nausea and vomiting.   Genitourinary: Negative for dysuria, frequency and hematuria.   Neurological: Negative for difficulty with concentration, dizziness, focal weakness, headaches, light-headedness, numbness, seizures and weakness.   Psychiatric/Behavioral: Negative for depression, memory loss and substance abuse. The patient does not have insomnia.    Allergic/Immunologic: Negative for HIV exposure and hives.       Objective:   Physical Exam  HENT:      Head: Normocephalic.   Eyes:      Pupils: Pupils are equal, round, and reactive to light.   Neck:      Thyroid: No thyromegaly.      Vascular: Normal carotid pulses. No carotid bruit or JVD.   Cardiovascular:      Rate and Rhythm: Normal rate and regular rhythm.  No extrasystoles are present.     Chest Wall: PMI is not displaced.      Pulses: Normal pulses.      Heart sounds: Normal heart sounds. No murmur heard.    No gallop. No S3 sounds.   Pulmonary:      Effort: No respiratory distress.      Breath sounds: Normal breath sounds. No stridor.   Abdominal:      General: Bowel sounds are normal.      Palpations: Abdomen is soft.      Tenderness: There is no abdominal tenderness. There is no rebound.   Musculoskeletal:         General: Normal range of motion.   Skin:     Findings: No rash.   Neurological:      Mental Status: He is alert and oriented to person, place, and time.   Psychiatric:         Behavior: Behavior normal.         Lab Results   Component Value Date    CHOL 73 (L) 05/28/2021    CHOL 116 (L) 11/06/2020    CHOL 137 07/09/2020     Lab Results   Component Value Date    HDL 39 (L) 05/28/2021    HDL 58 11/06/2020    HDL 38 (L) 07/09/2020     Lab Results   Component Value Date    LDLCALC 22.4 (L) 05/28/2021    LDLCALC 45.6  (L) 11/06/2020    LDLCALC 68.0 07/09/2020     Lab Results   Component Value Date    TRIG 58 05/28/2021    TRIG 62 11/06/2020    TRIG 155 (H) 07/09/2020     Lab Results   Component Value Date    CHOLHDL 53.4 (H) 05/28/2021    CHOLHDL 50.0 11/06/2020    CHOLHDL 27.7 07/09/2020       Chemistry        Component Value Date/Time     12/08/2021 2007    K 4.8 12/08/2021 2007     12/08/2021 2007    CO2 24 12/08/2021 2007    BUN 15 12/08/2021 2007    CREATININE 1.2 12/08/2021 2007     (H) 12/08/2021 2007        Component Value Date/Time    CALCIUM 9.1 12/08/2021 2007    ALKPHOS 63 12/08/2021 2007    AST 50 (H) 12/08/2021 2007    AST 29 04/24/2016 1145    ALT 36 12/08/2021 2007    BILITOT 0.3 12/08/2021 2007    ESTGFRAFRICA >60 12/08/2021 2007    EGFRNONAA 56 (A) 12/08/2021 2007          Lab Results   Component Value Date    HGBA1C 6.5 (H) 07/09/2020     Lab Results   Component Value Date    TSH 3.588 10/23/2021     Lab Results   Component Value Date    INR 1.0 07/23/2019    INR 1.1 06/21/2016    INR 1.1 04/26/2016     Lab Results   Component Value Date    WBC 4.11 12/08/2021    HGB 10.2 (L) 12/08/2021    HCT 34.3 (L) 12/08/2021    MCV 85 12/08/2021     (L) 12/08/2021     BMP  Sodium   Date Value Ref Range Status   12/08/2021 141 136 - 145 mmol/L Final     Potassium   Date Value Ref Range Status   12/08/2021 4.8 3.5 - 5.1 mmol/L Final     Chloride   Date Value Ref Range Status   12/08/2021 108 95 - 110 mmol/L Final     CO2   Date Value Ref Range Status   12/08/2021 24 23 - 29 mmol/L Final     BUN   Date Value Ref Range Status   12/08/2021 15 8 - 23 mg/dL Final     Creatinine   Date Value Ref Range Status   12/08/2021 1.2 0.5 - 1.4 mg/dL Final     Calcium   Date Value Ref Range Status   12/08/2021 9.1 8.7 - 10.5 mg/dL Final     Anion Gap   Date Value Ref Range Status   12/08/2021 9 8 - 16 mmol/L Final     eGFR if    Date Value Ref Range Status   12/08/2021 >60 >60 mL/min/1.73 m^2 Final      eGFR if non    Date Value Ref Range Status   12/08/2021 56 (A) >60 mL/min/1.73 m^2 Final     Comment:     Calculation used to obtain the estimated glomerular filtration  rate (eGFR) is the CKD-EPI equation.        BNP  @LABRCNTIP(BNP,BNPTRIAGEBLO)@  @LABRCNTIP(troponini)@  CrCl cannot be calculated (Patient's most recent lab result is older than the maximum 7 days allowed.).  No results found in the last 24 hours.  No results found in the last 24 hours.  No results found in the last 24 hours.    Assessment:      1. Paroxysmal atrial fibrillation    2. Primary hypertension    3. Hypercholesteremia    4. Stage 3 chronic kidney disease, unspecified whether stage 3a or 3b CKD    5. Bladder tumor      uncontrolled BP  PAF on Sinus     Plan:   Add amloidpine 5 mg and HCTZ 25 mg daily for HTN  Continue metoprolol xarelto and Amiodarone    Counseled DASH  Check Lipid profile in 6 months  Recommend heart-healthy diet, weight control and regular exercise.  Fadia. Risk modification.   I have reviewed all pertinent labs and cardiac studies independently. Plans and recommendations have been formulated under my direct supervision. All questions answered and patient voiced understanding.   If symptoms persist go to the ED  RTC in 6 months

## 2022-03-17 ENCOUNTER — CLINICAL SUPPORT (OUTPATIENT)
Dept: UROLOGY | Facility: CLINIC | Age: 82
End: 2022-03-17
Payer: MEDICARE

## 2022-03-17 VITALS — WEIGHT: 233 LBS | HEIGHT: 73 IN | BODY MASS INDEX: 30.88 KG/M2

## 2022-03-17 DIAGNOSIS — Z85.51 HISTORY OF BLADDER CANCER: Primary | ICD-10-CM

## 2022-03-17 PROCEDURE — 99213 OFFICE O/P EST LOW 20 MIN: CPT | Mod: PBBFAC

## 2022-03-17 PROCEDURE — 99999 PR PBB SHADOW E&M-EST. PATIENT-LVL III: CPT | Mod: PBBFAC,,,

## 2022-03-17 PROCEDURE — 99999 PR PBB SHADOW E&M-EST. PATIENT-LVL III: ICD-10-PCS | Mod: PBBFAC,,,

## 2022-03-17 RX ADMIN — BACILLUS CALMETTE-GUERIN 50 MG: 50 POWDER, FOR SUSPENSION INTRAVESICAL at 02:03

## 2022-03-17 NOTE — PROGRESS NOTES
......Patient in today for BCG treatment.  #6/6.  One vial of BCG and 50ml perservative free sodium chloride 0.9% mixed as per instructions.  Using aseptic technique, a sterile fenestrated drape was placed over perineum.  Pt was catheterized using a #14Fr red rubber catheter to allow bladder emptying.  Using closed system, one vial of BCG instilled via gravity directly into bladder.  Pt tolerated well.  Instructed pt to keep BCG solution in bladder for 2 hours.  Pt was given instructions to follow for the next 6 hours, including sitting on toilet at home and using 2 cups of undiluted chlorine bleach and closing the lid.  Pt was told to allow bleach to stand for 15 minutes before flushing toilet.  Patient was also told to drink plenty of water to flush any remaining BCG bacteria.  Patient verbalized understanding.

## 2022-04-01 ENCOUNTER — PES CALL (OUTPATIENT)
Dept: ADMINISTRATIVE | Facility: CLINIC | Age: 82
End: 2022-04-01
Payer: MEDICARE

## 2022-04-19 ENCOUNTER — TELEPHONE (OUTPATIENT)
Dept: UROLOGY | Facility: CLINIC | Age: 82
End: 2022-04-19
Payer: MEDICARE

## 2022-04-19 NOTE — TELEPHONE ENCOUNTER
Called pt to inform him of what type of procedure he is having and that he did not need anyone to drive him for a Cysto.    ----- Message from Ayala Alford sent at 4/19/2022  9:14 AM CDT -----  Contact: Patient  Type: Patient Call Back         Who called: Patient         What is the request in detail: calling regarding in office procedure; wants to know if he needs someone to drive him; please advise          Can the clinic reply by [facility]? Call back         Would the patient rather a call back or a response via [facility]? Call back         Best call back number: [phone] - prefers a call back this morning         Additional Information:            Thank You

## 2022-04-19 NOTE — TELEPHONE ENCOUNTER
Called patient back to inform him of the procedure he is having done tomorrow and that he did not need anyone to drive him for a Cysto.    ----- Message from Ayala Alford sent at 4/19/2022  9:14 AM CDT -----  Contact: Patient  Type: Patient Call Back         Who called: Patient         What is the request in detail: calling regarding in office procedure; wants to know if he needs someone to drive him; please advise          Can the clinic reply by [facility]? Call back         Would the patient rather a call back or a response via [facility]? Call back         Best call back number: [phone] - prefers a call back this morning         Additional Information:            Thank You

## 2022-04-20 ENCOUNTER — PROCEDURE VISIT (OUTPATIENT)
Dept: UROLOGY | Facility: CLINIC | Age: 82
End: 2022-04-20
Payer: MEDICARE

## 2022-04-20 VITALS — BODY MASS INDEX: 30.74 KG/M2 | WEIGHT: 233 LBS

## 2022-04-20 DIAGNOSIS — N30.01 ACUTE CYSTITIS WITH HEMATURIA: ICD-10-CM

## 2022-04-20 DIAGNOSIS — N20.0 NEPHROLITHIASIS: ICD-10-CM

## 2022-04-20 DIAGNOSIS — R31.0 GROSS HEMATURIA: ICD-10-CM

## 2022-04-20 DIAGNOSIS — Z85.51 HISTORY OF BLADDER CANCER: Primary | ICD-10-CM

## 2022-04-20 DIAGNOSIS — R35.0 BENIGN PROSTATIC HYPERPLASIA WITH URINARY FREQUENCY: ICD-10-CM

## 2022-04-20 DIAGNOSIS — N40.1 BENIGN PROSTATIC HYPERPLASIA WITH URINARY FREQUENCY: ICD-10-CM

## 2022-04-20 PROCEDURE — 88112 CYTOPATH CELL ENHANCE TECH: CPT | Performed by: PATHOLOGY

## 2022-04-20 PROCEDURE — 52000 CYSTOURETHROSCOPY: CPT | Mod: PBBFAC | Performed by: UROLOGY

## 2022-04-20 PROCEDURE — 88112 CYTOPATH CELL ENHANCE TECH: CPT | Mod: 26,,, | Performed by: PATHOLOGY

## 2022-04-20 PROCEDURE — 52000 PR CYSTOURETHROSCOPY: ICD-10-PCS | Mod: S$PBB,,, | Performed by: UROLOGY

## 2022-04-20 PROCEDURE — 52000 CYSTOURETHROSCOPY: CPT | Mod: S$PBB,,, | Performed by: UROLOGY

## 2022-04-20 PROCEDURE — 88112 PR  CYTOPATH, CELL ENHANCE TECH: ICD-10-PCS | Mod: 26,,, | Performed by: PATHOLOGY

## 2022-04-20 RX ORDER — LIDOCAINE HYDROCHLORIDE 20 MG/ML
JELLY TOPICAL
Status: COMPLETED | OUTPATIENT
Start: 2022-04-20 | End: 2022-04-20

## 2022-04-20 RX ORDER — SILDENAFIL 100 MG/1
100 TABLET, FILM COATED ORAL DAILY PRN
Qty: 20 TABLET | Refills: 10 | Status: ON HOLD | OUTPATIENT
Start: 2022-04-20

## 2022-04-20 RX ORDER — CIPROFLOXACIN 500 MG/1
500 TABLET ORAL
Status: COMPLETED | OUTPATIENT
Start: 2022-04-20 | End: 2022-04-20

## 2022-04-20 RX ADMIN — CIPROFLOXACIN 500 MG: 500 TABLET ORAL at 11:04

## 2022-04-20 RX ADMIN — LIDOCAINE HYDROCHLORIDE: 20 JELLY TOPICAL at 11:04

## 2022-04-20 NOTE — PROCEDURES
Procedures   Chief Complaint:   Encounter Diagnoses   Name Primary?    History of bladder cancer Yes    Gross hematuria     Benign prostatic hyperplasia with urinary frequency     Nephrolithiasis     Acute cystitis with hematuria          HPI:   4/20/22- here today for surveillance cystoscopy.  Requesting some viagra, no cardiac issues at this point.  No blood in the urine and otherwise doing well.  12/22/21- patient comes in today to establish care my clinic, recent gross hematuria and reported to see Alexandra.  CT urogram demonstrates bladder stones, urinary retention and a distal right ureteral stone.  Patient has had a Pandey catheter would like this removed today, no more gross hematuria, he is also on antibiotics and tamsulosin.  Patient otherwise was voiding well with no complaints prior to this incident, history of TURBT with subsequent induction BCG, no recurrences.  .    Allergies:  Patient has no known allergies.    Medications:  has a current medication list which includes the following prescription(s): amiodarone, amlodipine, atorvastatin, donepezil, ergocalciferol, famotidine, ferrous sulfate, hydrochlorothiazide, metoprolol succinate, multivitamin, nitrofurantoin (macrocrystal-monohydrate), oxycodone-acetaminophen, pantoprazole, phenazopyridine, rivaroxaban, and tamsulosin, and the following Facility-Administered Medications: lactated ringers.    Review of Systems:  General: No fever, chills, fatigability, or weight loss.  Skin: No rashes, itching, or changes in color or texture of skin.  Chest: Denies RECINOS, cyanosis, wheezing, cough, and sputum production.  Abdomen: Appetite fine. No weight loss. Denies diarrhea, abdominal pain, hematemesis, or blood in stool.  Musculoskeletal: No joint stiffness or swelling. Denies back pain.  : As above.  All other review of systems negative.    PMH:   has a past medical history of Anemia, Anticoagulant long-term use, Atrial fibrillation, Bladder cancer, Bladder  tumor, CKD (chronic kidney disease), stage III, COPD (chronic obstructive pulmonary disease), Encounter for blood transfusion, Hematuria, History of 2019 novel coronavirus disease (COVID-19), Hypercholesteremia, and Hypertension.    PSH:   has a past surgical history that includes Cholecystectomy; Cystoscopy; Cardioversion; bladder tumor removal; Eye surgery; cataract removal with IOL implants (Bilateral); Cardiac electrophysiology mapping and ablation; Transurethral resection of bladder tumor by bipolar cautery (N/A, 7/23/2019); Esophagogastroduodenoscopy (N/A, 10/25/2021); Intraluminal gastrointestinal tract imaging via capsule (N/A, 12/6/2021); Esophagogastroduodenoscopy (N/A, 12/13/2021); Cystoscopy with biopsy of bladder (Right, 12/28/2021); and Cystoscopic litholapaxy (N/A, 12/28/2021).    FamHx: family history includes Heart disease in his brother and father.    SocHx:  reports that he quit smoking about 35 years ago. His smoking use included cigarettes. He has a 50.00 pack-year smoking history. He has never used smokeless tobacco. He reports current alcohol use of about 8.0 standard drinks of alcohol per week. He reports that he does not use drugs.      Physical Exam:  General: A&Ox3, no apparent distress, no deformities  Neck: No masses, normal thyroid  Lungs: normal inspiration, no use of accessory muscles  Heart: normal pulse, no arrhythmias  Abdomen: Soft, NT, ND, no masses, no hernias, no hepatosplenomegaly  Lymphatic: Neck and groin nodes negative  : 4/22- Test desc filipe, no abnormalities of epididymus. Normal penile and scrotal skin. Meatus normal.     Labs/Studies:   CT stone protocol no nephrolithiasis 1/22  CT urogram 0.5 cm distal right ureteral stone, bladder stones, BPH 12/21  Creatinine 1.2 12/21    Procedure: Diagnostic Cystoscopy    Procedure in Detail: After proper consents were obtained, the patient was prepped and draped in normal sterile fashion for diagnostic cystoscopy. 5 ml of  lidocaine jelly was instilled in the urethra. The flexible cystoscope was then introduced into the urethra, and advanced into the bladder under direct vision. The urethral mucosa appeared normal, and no strictures were noted. The sphincter appeared to be normal, and the veru montanum was unremarkable. The prostatic mucosa demonstrated lateral lobe coaptation. The bladder neck was normal. Inspection of the interior of the bladder was then carried out. The trigone was unremarkable, with no mucosal lesions. The ureteral orifices were normal in position and configuration. Systematic inspection of the mucosa of the bladder it was then carried out, rotating the cystoscope so that all areas of the left and right lateral walls, the dome of the bladder, and the posterior wall were all visualized. The cystoscope was then advanced further into the bladder, and maximum deflection of the scope was performed so that the bladder neck could be inspected. No mucosal lesions were noted there. The cystoscope was then removed, and the procedure terminated.     Findings:  Debris, otherwise stable, status post biopsy and BCG      Impression/Plan:      1. History of bladder cancer-  BCG 3/22, High pTa, but suspicious for T1 cystoscopy with bladder biopsy, vesical litholapaxy, could not assess the right ureter  12/28/21   TURBT 5-6 years ago at the outside facility with induction BCG    Cystoscopy clear, follow-up on cytology.  Patient has now completed induction BCG and will hold.  See him back in 3 months with cystoscopy and cytology if the cytology is clear and continue this course of action for now.    2. Nephrolithiasis- stones extracted, could not get up the right ureter, but the patient has passed the stones per CT scan.    3. BPH- patient is voiding much better on tamsulosin, no complaints.  He will continue and proceed with PSA prior to the next appointment.    4. Gross hematuria- this has cleared, call with any further  issues.    5. Erectile dysfunction- will go ahead and initiate Viagra 100 mg, he may take half a dose, but can medically titrate to 100 if he needs to.  No current cardiac issues, recently saw cardiology.  Patient will contact me with any complaints, please see below.    He knows that this may cause blue-green vision changes, reflux, flushing, headaches, and to not take this with nitro pills.  He understands this may cause chest pain and if so to report to the emergency department immediately.  Patient understands that this medication can cause death of taken with nitro pills for his heart.  He is understanding of all the side effects, and would still like to proceed.

## 2022-04-25 LAB
FINAL PATHOLOGIC DIAGNOSIS: NORMAL
Lab: NORMAL

## 2022-05-10 ENCOUNTER — OFFICE VISIT (OUTPATIENT)
Dept: HOME HEALTH SERVICES | Facility: CLINIC | Age: 82
End: 2022-05-10
Payer: MEDICARE

## 2022-05-10 VITALS
DIASTOLIC BLOOD PRESSURE: 83 MMHG | OXYGEN SATURATION: 95 % | SYSTOLIC BLOOD PRESSURE: 138 MMHG | WEIGHT: 233 LBS | HEIGHT: 73 IN | BODY MASS INDEX: 30.88 KG/M2 | TEMPERATURE: 98 F | HEART RATE: 55 BPM

## 2022-05-10 DIAGNOSIS — N40.1 BENIGN PROSTATIC HYPERPLASIA WITH URINARY FREQUENCY: ICD-10-CM

## 2022-05-10 DIAGNOSIS — H53.8 BLURRED VISION: ICD-10-CM

## 2022-05-10 DIAGNOSIS — I10 PRIMARY HYPERTENSION: ICD-10-CM

## 2022-05-10 DIAGNOSIS — J44.9 CHRONIC OBSTRUCTIVE PULMONARY DISEASE, UNSPECIFIED COPD TYPE: ICD-10-CM

## 2022-05-10 DIAGNOSIS — R35.0 BENIGN PROSTATIC HYPERPLASIA WITH URINARY FREQUENCY: ICD-10-CM

## 2022-05-10 DIAGNOSIS — E78.5 DM TYPE 2 WITH DIABETIC DYSLIPIDEMIA: ICD-10-CM

## 2022-05-10 DIAGNOSIS — N18.30 STAGE 3 CHRONIC KIDNEY DISEASE, UNSPECIFIED WHETHER STAGE 3A OR 3B CKD: ICD-10-CM

## 2022-05-10 DIAGNOSIS — E11.69 DM TYPE 2 WITH DIABETIC DYSLIPIDEMIA: ICD-10-CM

## 2022-05-10 DIAGNOSIS — Z00.00 ENCOUNTER FOR PREVENTIVE HEALTH EXAMINATION: Primary | ICD-10-CM

## 2022-05-10 DIAGNOSIS — D50.8 OTHER IRON DEFICIENCY ANEMIA: ICD-10-CM

## 2022-05-10 DIAGNOSIS — I48.0 PAROXYSMAL ATRIAL FIBRILLATION: ICD-10-CM

## 2022-05-10 DIAGNOSIS — E78.00 HYPERCHOLESTEREMIA: ICD-10-CM

## 2022-05-10 DIAGNOSIS — Z85.51 HISTORY OF BLADDER CANCER: ICD-10-CM

## 2022-05-10 PROBLEM — R31.0 GROSS HEMATURIA: Status: RESOLVED | Noted: 2021-12-22 | Resolved: 2022-05-10

## 2022-05-10 PROBLEM — N30.01 ACUTE CYSTITIS WITH HEMATURIA: Status: RESOLVED | Noted: 2022-04-20 | Resolved: 2022-05-10

## 2022-05-10 PROCEDURE — G0439 PPPS, SUBSEQ VISIT: HCPCS | Mod: S$GLB,,, | Performed by: NURSE PRACTITIONER

## 2022-05-10 PROCEDURE — G0439 PR MEDICARE ANNUAL WELLNESS SUBSEQUENT VISIT: ICD-10-PCS | Mod: S$GLB,,, | Performed by: NURSE PRACTITIONER

## 2022-05-10 NOTE — PROGRESS NOTES
"Jose Miguel Alvarez presented for Medicare AW today. The following components were reviewed and updated:    · Medical history  · Family History  · Social history  · Allergies and Current Medications  · Health Risk Assessment  · Health Maintenance  · Care Team    **See Completed Assessments for Annual Wellness visit with in the encounter summary    The following assessments were completed:  · Depression Screening  · Cognitive function Screening        · Timed Get Up Test  · Whisper Test    Vitals:    05/10/22 1235   BP: 138/83   Pulse: (!) 55   Temp: 97.5 °F (36.4 °C)   TempSrc: Temporal   SpO2: 95%   Weight: 105.7 kg (233 lb)   Height: 6' 1" (1.854 m)     Body mass index is 30.74 kg/m².   ]    Physical Exam  Vitals reviewed.   Constitutional:       Appearance: Normal appearance. He is obese.   HENT:      Head: Normocephalic and atraumatic.   Cardiovascular:      Rate and Rhythm: Normal rate and regular rhythm.      Pulses: Normal pulses.      Heart sounds: Normal heart sounds.   Pulmonary:      Effort: Pulmonary effort is normal.      Breath sounds: Normal breath sounds.   Abdominal:      Comments: rounded   Musculoskeletal:         General: Normal range of motion.      Cervical back: Normal range of motion and neck supple.      Right lower leg: Edema (2+) present.      Left lower leg: Edema (2+) present.   Skin:     General: Skin is warm and dry.   Neurological:      Mental Status: He is alert and oriented to person, place, and time.   Psychiatric:         Mood and Affect: Mood normal.         Behavior: Behavior normal.          Outpatient Medications Marked as Taking for the 5/10/22 encounter (Office Visit) with OZIEL Sultana   Medication Sig Dispense Refill    amiodarone (PACERONE) 200 MG Tab Take 200 mg by mouth once daily.       amLODIPine (NORVASC) 5 MG tablet Take 1 tablet (5 mg total) by mouth once daily. 90 tablet 2    atorvastatin (LIPITOR) 20 MG tablet Take 20 mg by mouth every evening.       " donepeziL (ARICEPT) 5 MG tablet Take 5 mg by mouth every evening.       ergocalciferol (ERGOCALCIFEROL) 50,000 unit Cap Take 50,000 Units by mouth every 7 days.      famotidine (PEPCID) 20 MG tablet Take 20 mg by mouth once daily.      ferrous sulfate (FEOSOL) 325 mg (65 mg iron) Tab tablet Take 325 mg by mouth.      hydroCHLOROthiazide (HYDRODIURIL) 25 MG tablet Take 1 tablet (25 mg total) by mouth once daily. 90 tablet 3    metoprolol succinate (TOPROL-XL) 50 MG 24 hr tablet Take 50 mg by mouth once daily.      multivitamin (THERAGRAN) per tablet Take 1 tablet by mouth once daily.      pantoprazole (PROTONIX) 40 MG tablet Take 1 tablet (40 mg total) by mouth 2 (two) times daily. 60 tablet 11    rivaroxaban (XARELTO) 15 mg Tab Take 1 tablet (15 mg total) by mouth 2 (two) times daily with meals. 60 tablet 9    tamsulosin (FLOMAX) 0.4 mg Cap Take 1 capsule (0.4 mg total) by mouth once daily. 90 capsule 4        Diagnoses and health risks identified today and associated recommendations/orders:  1. Encounter for preventive health examination  Medicare awv complete. Health maintenance:  Tetanus, shingles, pneumococcal, and 3rd covid 19 vaccine due-encouraged patient to obtain. HgbA1C due and previously ordered in nov 2021-f/u with pcp.     2. Paroxysmal atrial fibrillation  Chronic and stable. No acute issues. Continue current management. On amiodarone and xarelto. See med list above. Follow up with cardiology.      3. DM type 2 with diabetic dyslipidemia   Latest Reference Range & Units 07/09/20 08:55   Hemoglobin A1C External 4.0 - 5.6 % 6.5 (H) [1]   Estimated Avg Glucose 68 - 131 mg/dL 140 (H)   Controlled. Continue current management. See #1. Hgba1c due and ordered last nov. Not done yet. F/u with pcp. See med list.  Reviewed diabetic diet, diabetic foot care, preferred BS, and HgbA1C levels. Follow up with your PCP as instructed and ophthalmology yearly.  Follow up with PCP.      4. Stage 3 chronic kidney  disease, unspecified whether stage 3a or 3b CKD   Latest Reference Range & Units 10/25/21 09:25 11/05/21 09:59 12/08/21 20:07   EGFR if non African American >60 mL/min/1.73 m^2 56 ! [1] 56 ! [2] 56 ! [3]   !: Data is abnormal  Chronic and stable. No acute issues. Continue current management. Recommend avoid nsaids and other nephrotoxic medications. Follow up with PCP/nephrologist.      5. Chronic obstructive pulmonary disease, unspecified COPD type  Chronic and stable on current management. See med list above. Follow up with pulmonology.      6. Primary hypertension  Chronic and stable on current management. See med list above. Recommend low sodium diet. Follow up with PCP.       7. Hypercholesteremia  Lab Results   Component Value Date    CHOL 73 (L) 05/28/2021    CHOL 116 (L) 11/06/2020    CHOL 137 07/09/2020     Lab Results   Component Value Date    HDL 39 (L) 05/28/2021    HDL 58 11/06/2020    HDL 38 (L) 07/09/2020     Lab Results   Component Value Date    LDLCALC 22.4 (L) 05/28/2021    LDLCALC 45.6 (L) 11/06/2020    LDLCALC 68.0 07/09/2020     Lab Results   Component Value Date    TRIG 58 05/28/2021    TRIG 62 11/06/2020    TRIG 155 (H) 07/09/2020     Lab Results   Component Value Date    CHOLHDL 53.4 (H) 05/28/2021    CHOLHDL 50.0 11/06/2020    CHOLHDL 27.7 07/09/2020    Chronic and stable on current management. On lipitor,  See med list above. Follow up with PCP.      8. Benign prostatic hyperplasia with urinary frequency  Chronic and stable on current management. On flomax. See med list above. Follow up with PCP.      9. History of bladder cancer  S/p treatment. 12/28/21   TURBT 5-6 years ago at the outside facility with induction BCG. F/u with urology.      10. Other iron deficiency anemia   Latest Reference Range & Units 11/02/21 13:39 11/05/21 09:59 12/08/21 20:07   Hemoglobin 14.0 - 18.0 g/dL 9.8 (L) 8.7 (L) 10.2 (L)   Hematocrit 40.0 - 54.0 % 34.8 (L) 31.0 (L) 34.3 (L)   (L): Data is abnormally  low  Chronic and stable on current management. See med list above. Follow up with PCP.      11. Blurred vision  - Ambulatory referral/consult to Ophthalmology; Future        Provided Jose Miguel with a 5-10 year written screening schedule and personal prevention plan. Recommendations were developed using the USPSTF age appropriate recommendations. Education, counseling, and referrals were provided as needed.  After Visit Summary printed and given to patient which includes a list of additional screenings\tests needed.    Follow up in about 1 year (around 5/10/2023) for annual wellness visit.      OZIEL Sultana    I offered to discuss advanced care planning, including how to pick a person who would make decisions for you if you were unable to make them for yourself, called a health care power of , and what kind of decisions you might make such as use of life sustaining treatments such as ventilators and tube feeding when faced with a life limiting illness recorded on a living will that they will need to know. (How you want to be cared for as you near the end of your natural life)     X  Patient has advanced directives on file, which we reviewed, and they do not wish to make changes.

## 2022-05-10 NOTE — Clinical Note
Medicare awv complete. Health maintenance:  Tetanus, shingles, pneumococcal, and 3rd covid 19 vaccine due-encouraged patient to obtain. HgbA1C due and previously ordered in nov 2021-f/u with pcp.

## 2022-05-10 NOTE — PATIENT INSTRUCTIONS
Counseling and Referral of Other Preventative  (Italic type indicates deductible and co-insurance are waived)    Patient Name: Jose Miguel Alvarez  Today's Date: 5/10/2022    Health Maintenance       Date Due Completion Date    TETANUS VACCINE Never done ---    Shingles Vaccine (1 of 2) 11/26/2016 10/1/2016    Pneumococcal Vaccines (Age 65+) (2 - PPSV23 or PCV20) 11/05/2019 11/5/2018    COVID-19 Vaccine (3 - Booster for Moderna series) 09/18/2021 4/18/2021    Lipid Panel 05/28/2026 5/28/2021        No orders of the defined types were placed in this encounter.    The following information is provided to all patients.  This information is to help you find resources for any of the problems found today that may be affecting your health:                Living healthy guide: www.[facility].[place].gov      Understanding Diabetes: www.diabetes.org      Eating healthy: www.cdc.gov/healthyweight      [facility] home safety checklist: www.cdc.gov/steadi/patient.html      Agency on Aging: www.goea.[place].gov      Alcoholics anonymous (AA): www.aa.org      Physical Activity: www.ning.nih.gov/ys8qddr      Tobacco use: www.quitwithusla.org

## 2022-05-11 ENCOUNTER — PATIENT MESSAGE (OUTPATIENT)
Dept: ADMINISTRATIVE | Facility: HOSPITAL | Age: 82
End: 2022-05-11
Payer: MEDICARE

## 2022-07-07 ENCOUNTER — TELEPHONE (OUTPATIENT)
Dept: UROLOGY | Facility: CLINIC | Age: 82
End: 2022-07-07
Payer: MEDICARE

## 2022-07-07 NOTE — TELEPHONE ENCOUNTER
Appt cancelled per pt. He will call when he is ready to have cysto        ----- Message from Leslie Bob sent at 7/7/2022  3:03 PM CDT -----  Contact: FRANCINE HERNÁNDEZ JR. [[id number]]@ [phone]  Patient want to cancel his procedure on 7/20 and will call back to rescheduled another day.

## 2022-07-12 ENCOUNTER — TELEPHONE (OUTPATIENT)
Dept: UROLOGY | Facility: CLINIC | Age: 82
End: 2022-07-12
Payer: MEDICARE

## 2022-07-12 ENCOUNTER — OFFICE VISIT (OUTPATIENT)
Dept: OPHTHALMOLOGY | Facility: CLINIC | Age: 82
End: 2022-07-12
Payer: MEDICARE

## 2022-07-12 ENCOUNTER — PATIENT MESSAGE (OUTPATIENT)
Dept: OPHTHALMOLOGY | Facility: CLINIC | Age: 82
End: 2022-07-12

## 2022-07-12 DIAGNOSIS — Z96.1 PSEUDOPHAKIA OF BOTH EYES: Primary | ICD-10-CM

## 2022-07-12 DIAGNOSIS — H52.7 REFRACTIVE ERRORS: ICD-10-CM

## 2022-07-12 DIAGNOSIS — E78.5 DM TYPE 2 WITH DIABETIC DYSLIPIDEMIA: ICD-10-CM

## 2022-07-12 DIAGNOSIS — H04.123 DRY EYES, BILATERAL: ICD-10-CM

## 2022-07-12 DIAGNOSIS — H53.8 BLURRED VISION: ICD-10-CM

## 2022-07-12 DIAGNOSIS — H35.3131 NONEXUDATIVE AGE-RELATED MACULAR DEGENERATION, BILATERAL, EARLY DRY STAGE: ICD-10-CM

## 2022-07-12 DIAGNOSIS — E11.69 DM TYPE 2 WITH DIABETIC DYSLIPIDEMIA: ICD-10-CM

## 2022-07-12 PROCEDURE — 92015 PR REFRACTION: ICD-10-PCS | Mod: ,,, | Performed by: OPTOMETRIST

## 2022-07-12 PROCEDURE — 99999 PR PBB SHADOW E&M-EST. PATIENT-LVL III: CPT | Mod: PBBFAC,,, | Performed by: OPTOMETRIST

## 2022-07-12 PROCEDURE — 99999 PR PBB SHADOW E&M-EST. PATIENT-LVL III: ICD-10-PCS | Mod: PBBFAC,,, | Performed by: OPTOMETRIST

## 2022-07-12 PROCEDURE — 99213 OFFICE O/P EST LOW 20 MIN: CPT | Mod: PBBFAC | Performed by: OPTOMETRIST

## 2022-07-12 PROCEDURE — 92004 PR EYE EXAM, NEW PATIENT,COMPREHESV: ICD-10-PCS | Mod: S$PBB,,, | Performed by: OPTOMETRIST

## 2022-07-12 PROCEDURE — 92015 DETERMINE REFRACTIVE STATE: CPT | Mod: ,,, | Performed by: OPTOMETRIST

## 2022-07-12 PROCEDURE — 92004 COMPRE OPH EXAM NEW PT 1/>: CPT | Mod: S$PBB,,, | Performed by: OPTOMETRIST

## 2022-07-12 NOTE — PROGRESS NOTES
HPI     NIDDM exam  Decrease vision left eye at near and distance visual acuity.  Vision seems off when driving.  Seem like a film over vision since Covid 1 1/2 year.  New patient last eye exam 06/2021  Update glasses RX.  Lab Results       Component                Value               Date                       HGBA1C                   6.5 (H)             07/09/2020               Last edited by Noreen Paul MA on 7/12/2022  1:51 PM. (History)            Assessment /Plan     For exam results, see Encounter Report.    Pseudophakia of both eyes    Blurred vision  -     Ambulatory referral/consult to Ophthalmology    DM type 2 with diabetic dyslipidemia  -     Ambulatory referral/consult to Ophthalmology    Nonexudative age-related macular degeneration, bilateral, early dry stage    Dry eyes, bilateral    Refractive errors      Stable IOL OU.    No Background Diabetic Retinopathy    Mild AMD, discussed multivits    AT prn OU    Dispense Final Rx for glasses.  RTC 1 year  Discussed above and answered questions.

## 2022-07-12 NOTE — TELEPHONE ENCOUNTER
Called pt and reschedule appt.. pt understand.    ----- Message from Marilou Gomez sent at 7/12/2022  9:00 AM CDT -----  Contact: FRANCINE HERNÁNDEZ JR. [[id number]]  ..Type:  Needs Medical Advice    Who Called:FRANCINE HERNÁNDEZ JR. [[id number]]  Would the patient rather a call back or a response via [facility]? Call   Best Call Back Number: .[phone] (home) [phone] (work)   Additional Information: Pt is req a call back in regards to having his procedure rescheduled.. Thanks AW

## 2022-08-03 ENCOUNTER — PROCEDURE VISIT (OUTPATIENT)
Dept: UROLOGY | Facility: CLINIC | Age: 82
End: 2022-08-03
Payer: MEDICARE

## 2022-08-03 VITALS
TEMPERATURE: 98 F | WEIGHT: 233 LBS | DIASTOLIC BLOOD PRESSURE: 87 MMHG | SYSTOLIC BLOOD PRESSURE: 161 MMHG | HEART RATE: 55 BPM | BODY MASS INDEX: 30.74 KG/M2

## 2022-08-03 DIAGNOSIS — R31.0 GROSS HEMATURIA: ICD-10-CM

## 2022-08-03 DIAGNOSIS — Z85.51 HISTORY OF BLADDER CANCER: Primary | ICD-10-CM

## 2022-08-03 DIAGNOSIS — N30.01 ACUTE CYSTITIS WITH HEMATURIA: ICD-10-CM

## 2022-08-03 DIAGNOSIS — N40.1 BENIGN PROSTATIC HYPERPLASIA WITH URINARY FREQUENCY: ICD-10-CM

## 2022-08-03 DIAGNOSIS — R35.0 BENIGN PROSTATIC HYPERPLASIA WITH URINARY FREQUENCY: ICD-10-CM

## 2022-08-03 DIAGNOSIS — N20.0 NEPHROLITHIASIS: ICD-10-CM

## 2022-08-03 PROCEDURE — 88112 CYTOPATH CELL ENHANCE TECH: CPT | Performed by: PATHOLOGY

## 2022-08-03 PROCEDURE — 52000 CYSTOURETHROSCOPY: CPT | Mod: S$PBB,,, | Performed by: UROLOGY

## 2022-08-03 PROCEDURE — 52000 CYSTOURETHROSCOPY: CPT | Mod: PBBFAC | Performed by: UROLOGY

## 2022-08-03 PROCEDURE — 88112 CYTOPATH CELL ENHANCE TECH: CPT | Mod: 26,,, | Performed by: PATHOLOGY

## 2022-08-03 PROCEDURE — 88112 PR  CYTOPATH, CELL ENHANCE TECH: ICD-10-PCS | Mod: 26,,, | Performed by: PATHOLOGY

## 2022-08-03 PROCEDURE — 52000 PR CYSTOURETHROSCOPY: ICD-10-PCS | Mod: S$PBB,,, | Performed by: UROLOGY

## 2022-08-03 RX ORDER — CIPROFLOXACIN 500 MG/1
500 TABLET ORAL
Status: COMPLETED | OUTPATIENT
Start: 2022-08-03 | End: 2022-08-03

## 2022-08-03 RX ORDER — LIDOCAINE HYDROCHLORIDE 20 MG/ML
JELLY TOPICAL
Status: COMPLETED | OUTPATIENT
Start: 2022-08-03 | End: 2022-08-03

## 2022-08-03 RX ADMIN — LIDOCAINE HYDROCHLORIDE: 20 JELLY TOPICAL at 01:08

## 2022-08-03 RX ADMIN — CIPROFLOXACIN 500 MG: 500 TABLET ORAL at 01:08

## 2022-08-03 NOTE — PROCEDURES
Procedures   Chief Complaint:   Encounter Diagnoses   Name Primary?    History of bladder cancer Yes    Gross hematuria     Benign prostatic hyperplasia with urinary frequency     Nephrolithiasis     Acute cystitis with hematuria        HPI:   8/3/22- here today for surveillance cystoscopy.  Still voiding well with no issues, no gross hematuria, erections are not an issue.  12/22/21- patient comes in today to establish care my clinic, recent gross hematuria and reported to see Alexandra.  CT urogram demonstrates bladder stones, urinary retention and a distal right ureteral stone.  Patient has had a Pandey catheter would like this removed today, no more gross hematuria, he is also on antibiotics and tamsulosin.  Patient otherwise was voiding well with no complaints prior to this incident, history of TURBT with subsequent induction BCG, no recurrences.  .    Allergies:  Patient has no known allergies.    Medications:  has a current medication list which includes the following prescription(s): amiodarone, amlodipine, atorvastatin, donepezil, ergocalciferol, famotidine, ferrous sulfate, hydrochlorothiazide, metoprolol succinate, multivitamin, nitrofurantoin (macrocrystal-monohydrate), oxycodone-acetaminophen, pantoprazole, phenazopyridine, rivaroxaban, and tamsulosin, and the following Facility-Administered Medications: lactated ringers.    Review of Systems:  General: No fever, chills, fatigability, or weight loss.  Skin: No rashes, itching, or changes in color or texture of skin.  Chest: Denies RECINOS, cyanosis, wheezing, cough, and sputum production.  Abdomen: Appetite fine. No weight loss. Denies diarrhea, abdominal pain, hematemesis, or blood in stool.  Musculoskeletal: No joint stiffness or swelling. Denies back pain.  : As above.  All other review of systems negative.    PMH:   has a past medical history of Anemia, Anticoagulant long-term use, Atrial fibrillation, Bladder cancer, Bladder tumor, CKD (chronic kidney  disease), stage III, COPD (chronic obstructive pulmonary disease), Encounter for blood transfusion, Hematuria, History of 2019 novel coronavirus disease (COVID-19), Hypercholesteremia, and Hypertension.    PSH:   has a past surgical history that includes Cholecystectomy; Cystoscopy; Cardioversion; bladder tumor removal; Eye surgery; cataract removal with IOL implants (Bilateral); Cardiac electrophysiology mapping and ablation; Transurethral resection of bladder tumor by bipolar cautery (N/A, 7/23/2019); Esophagogastroduodenoscopy (N/A, 10/25/2021); Intraluminal gastrointestinal tract imaging via capsule (N/A, 12/6/2021); Esophagogastroduodenoscopy (N/A, 12/13/2021); Cystoscopy with biopsy of bladder (Right, 12/28/2021); and Cystoscopic litholapaxy (N/A, 12/28/2021).    FamHx: family history includes Heart disease in his brother and father.    SocHx:  reports that he quit smoking about 35 years ago. His smoking use included cigarettes. He has a 50.00 pack-year smoking history. He has never used smokeless tobacco. He reports current alcohol use of about 8.0 standard drinks of alcohol per week. He reports that he does not use drugs.      Physical Exam:  General: A&Ox3, no apparent distress, no deformities  Neck: No masses, normal thyroid  Lungs: normal inspiration, no use of accessory muscles  Heart: normal pulse, no arrhythmias  Abdomen: Soft, NT, ND, no masses, no hernias, no hepatosplenomegaly  Lymphatic: Neck and groin nodes negative  : 8/22- Test desc filipe, no abnormalities of epididymus. Normal penile and scrotal skin. Meatus normal.     Labs/Studies:   Cytology negative 4/22  CT stone protocol no nephrolithiasis 1/22  CT urogram 0.5 cm distal right ureteral stone, bladder stones, BPH 12/21  Creatinine 1.5 7/22    Procedure: Diagnostic Cystoscopy    Procedure in Detail: After proper consents were obtained, the patient was prepped and draped in normal sterile fashion for diagnostic cystoscopy. 5 ml of lidocaine  jelly was instilled in the urethra. The flexible cystoscope was then introduced into the urethra, and advanced into the bladder under direct vision. The urethral mucosa appeared normal, and no strictures were noted. The sphincter appeared to be normal, and the veru montanum was unremarkable. The prostatic mucosa demonstrated lateral lobe coaptation. The bladder neck was normal. Inspection of the interior of the bladder was then carried out. The trigone was unremarkable, with no mucosal lesions. The ureteral orifices were normal in position and configuration. Systematic inspection of the mucosa of the bladder it was then carried out, rotating the cystoscope so that all areas of the left and right lateral walls, the dome of the bladder, and the posterior wall were all visualized. The cystoscope was then advanced further into the bladder, and maximum deflection of the scope was performed so that the bladder neck could be inspected. No mucosal lesions were noted there.  Of note there was a lot of debris as before, making this a little difficult.  The cystoscope was then removed, and the procedure terminated.     Findings:  Debris, otherwise stable, status post biopsy and BCG      Impression/Plan:      1. History of bladder cancer-  BCG 3/22, High pTa, but suspicious for T1 cystoscopy with bladder biopsy, vesical litholapaxy, could not assess the right ureter  12/28/21   TURBT 5-6 years ago at the outside facility with induction BCG    Cystoscopy clear, follow-up on cytology.  Patient has now completed induction BCG and will hold.  See him back in 3 months with cystoscopy and cytology if the cytology is clear and continue this course of action for now.    2. Nephrolithiasis- no recurrent stones, of note we could not get up the right ureter, but the patient had passed the stones per CT scan.    3. BPH- voiding well on tamsulosin, does not want to pursue repeat PSA.    4. Gross hematuria- this has cleared, call with any  further issues.    5. Erectile dysfunction- started Viagra 100 mg at the last visit, but he states this is currently not an issue.

## 2022-08-04 LAB
FINAL PATHOLOGIC DIAGNOSIS: NORMAL
Lab: NORMAL

## 2022-08-10 ENCOUNTER — PATIENT MESSAGE (OUTPATIENT)
Dept: ADMINISTRATIVE | Facility: HOSPITAL | Age: 82
End: 2022-08-10
Payer: MEDICARE

## 2022-09-16 DIAGNOSIS — I48.0 PAROXYSMAL ATRIAL FIBRILLATION: Primary | ICD-10-CM

## 2022-10-03 DIAGNOSIS — Z71.89 COMPLEX CARE COORDINATION: ICD-10-CM

## 2022-10-12 ENCOUNTER — OFFICE VISIT (OUTPATIENT)
Dept: CARDIOLOGY | Facility: CLINIC | Age: 82
End: 2022-10-12
Payer: MEDICARE

## 2022-10-12 ENCOUNTER — CLINICAL SUPPORT (OUTPATIENT)
Dept: CARDIOLOGY | Facility: CLINIC | Age: 82
End: 2022-10-12
Payer: MEDICARE

## 2022-10-12 VITALS
HEART RATE: 61 BPM | BODY MASS INDEX: 30.21 KG/M2 | OXYGEN SATURATION: 96 % | DIASTOLIC BLOOD PRESSURE: 80 MMHG | WEIGHT: 228.94 LBS | SYSTOLIC BLOOD PRESSURE: 134 MMHG

## 2022-10-12 DIAGNOSIS — R94.31 EKG ABNORMALITIES: ICD-10-CM

## 2022-10-12 DIAGNOSIS — E78.00 HYPERCHOLESTEREMIA: ICD-10-CM

## 2022-10-12 DIAGNOSIS — E11.69 DM TYPE 2 WITH DIABETIC DYSLIPIDEMIA: ICD-10-CM

## 2022-10-12 DIAGNOSIS — I10 PRIMARY HYPERTENSION: ICD-10-CM

## 2022-10-12 DIAGNOSIS — R06.02 SOB (SHORTNESS OF BREATH): ICD-10-CM

## 2022-10-12 DIAGNOSIS — D49.4 BLADDER TUMOR: ICD-10-CM

## 2022-10-12 DIAGNOSIS — E78.5 DM TYPE 2 WITH DIABETIC DYSLIPIDEMIA: ICD-10-CM

## 2022-10-12 DIAGNOSIS — I48.0 PAROXYSMAL ATRIAL FIBRILLATION: ICD-10-CM

## 2022-10-12 DIAGNOSIS — I48.0 PAROXYSMAL ATRIAL FIBRILLATION: Primary | ICD-10-CM

## 2022-10-12 PROCEDURE — 99999 PR PBB SHADOW E&M-EST. PATIENT-LVL III: ICD-10-PCS | Mod: PBBFAC,,, | Performed by: INTERNAL MEDICINE

## 2022-10-12 PROCEDURE — 99215 OFFICE O/P EST HI 40 MIN: CPT | Mod: S$PBB,,, | Performed by: INTERNAL MEDICINE

## 2022-10-12 PROCEDURE — 99215 PR OFFICE/OUTPT VISIT, EST, LEVL V, 40-54 MIN: ICD-10-PCS | Mod: S$PBB,,, | Performed by: INTERNAL MEDICINE

## 2022-10-12 PROCEDURE — 99999 PR PBB SHADOW E&M-EST. PATIENT-LVL III: CPT | Mod: PBBFAC,,, | Performed by: INTERNAL MEDICINE

## 2022-10-12 PROCEDURE — 93010 ELECTROCARDIOGRAM REPORT: CPT | Mod: S$PBB,,, | Performed by: INTERNAL MEDICINE

## 2022-10-12 PROCEDURE — 93005 ELECTROCARDIOGRAM TRACING: CPT | Mod: PBBFAC,PO | Performed by: INTERNAL MEDICINE

## 2022-10-12 PROCEDURE — 99213 OFFICE O/P EST LOW 20 MIN: CPT | Mod: PBBFAC,PO | Performed by: INTERNAL MEDICINE

## 2022-10-12 PROCEDURE — 93010 EKG 12-LEAD: ICD-10-PCS | Mod: S$PBB,,, | Performed by: INTERNAL MEDICINE

## 2022-10-12 NOTE — PROGRESS NOTES
Subjective:   Patient ID:  Jose Miguel Alvarez Jr. is a 81 y.o. male who presents for follow up of No chief complaint on file.      82 yo male 6 months f/u  Prior cardiologist Dr. Tanesha Coffman at [facility], h/o COVID 19 in . Hospitalization over a month, anemia, h/o bladder cancer on chemo and XRT, no h./o DM and stroke  PMH PAF s/p DCCV few yrs ago and RFA at [facility] in 2018 HTN HLD COPD quit smoking  10/2021 admitted to severe anemia and HGB dropped to 4.2 and had 4 units of PRBC. Felt better  This was 3rd episode of severe anemia in the past two yrs  No chest pain, RECINOS dizziness palpitation  No active bleeding     visit  S/p cystoscopy in  bladder cancer clear  No BP check at home. Mod compliance. No active bleeding  No chest pain dyspnea headache, palpitation dizziness faint and leg swelling   CMP TSH and chest CT reviewed    Interval history  No h/o MI DM and CVA chronic SOB.   No recurrent palpitation. No active bleeding. No chest pain dizziness and faint  Plays golf daily. No smoking and drinking  EKG today NSR  CMP TSH and chest done wi one yr. Chest ct showed emphysema  PFT pending                  Past Medical History:   Diagnosis Date    Anemia     Anticoagulant long-term use     Atrial fibrillation     Bladder cancer     Bladder tumor     CKD (chronic kidney disease), stage III     COPD (chronic obstructive pulmonary disease)     pt denies    Encounter for blood transfusion     Hematuria     History of 2019 novel coronavirus disease (COVID-19)     Hypercholesteremia     Hypertension        Past Surgical History:   Procedure Laterality Date    bladder tumor removal      CARDIAC ELECTROPHYSIOLOGY MAPPING AND ABLATION      CARDIOVERSION      several    CATARACT EXTRACTION Bilateral     cataract removal with IOL implants Bilateral     CHOLECYSTECTOMY      CYSTOSCOPIC LITHOLAPAXY N/A 12/28/2021    Procedure: CYSTOLITHOLAPAXY;  Surgeon: Medardo Garcia MD;  Location: [facility]  OR;  Service: Urology;  Laterality: N/A;    CYSTOSCOPY      CYSTOSCOPY WITH BIOPSY OF BLADDER Right 2021    Procedure: CYSTOSCOPY, WITH BLADDER BIOPSY, WITH FULGURATION IF INDICATED;  Surgeon: Medardo Garcia MD;  Location: [facility] OR;  Service: Urology;  Laterality: Right;    ESOPHAGOGASTRODUODENOSCOPY N/A 10/25/2021    Procedure: Small Jason Enteroscopy (SBE);  Surgeon: Isabelle Griffin MD;  Location: [facility] ENDO;  Service: Endoscopy;  Laterality: N/A;    ESOPHAGOGASTRODUODENOSCOPY N/A 2021    Procedure: EGD (ESOPHAGOGASTRODUODENOSCOPY);  Surgeon: Troy Polanco MD;  Location: [facility] ENDO;  Service: Endoscopy;  Laterality: N/A;    EYE SURGERY      INTRALUMINAL GASTROINTESTINAL TRACT IMAGING VIA CAPSULE N/A 2021    Procedure: IMAGING PROCEDURE, GI TRACT, INTRALUMINAL, VIA CAPSULE;  Surgeon: Larry Jones RN;  Location: [facility] ENDO;  Service: Endoscopy;  Laterality: N/A;    TRANSURETHRAL RESECTION OF BLADDER TUMOR BY BIPOLAR CAUTERY N/A 2019    Procedure: TURBT, USING BIPOLAR CAUTERY;  Surgeon: Ritesh Marques MD;  Location: [facility] OR;  Service: Urology;  Laterality: N/A;       Social History     Tobacco Use    Smoking status: Former     Packs/day: 2.50     Years: 20.00     Pack years: 50.00     Types: Cigarettes     Quit date:      Years since quittin.8    Smokeless tobacco: Never   Substance Use Topics    Alcohol use: Yes     Alcohol/week: 7.0 standard drinks     Types: 7 Cans of beer per week    Drug use: No       Family History   Problem Relation Age of Onset    Heart disease Father     Hypertension Father     Heart disease Brother          Review of Systems   Constitutional: Negative for decreased appetite, diaphoresis, fever, malaise/fatigue and night sweats.   HENT:  Negative for nosebleeds.    Eyes:  Negative for blurred vision and double vision.   Cardiovascular:  Negative for chest pain, claudication, dyspnea on exertion, irregular heartbeat, leg swelling, near-syncope,  orthopnea, palpitations, paroxysmal nocturnal dyspnea and syncope.   Respiratory:  Negative for cough, shortness of breath, sleep disturbances due to breathing, snoring, sputum production and wheezing.    Endocrine: Negative for cold intolerance and polyuria.   Hematologic/Lymphatic: Does not bruise/bleed easily.   Skin:  Negative for rash.   Musculoskeletal:  Negative for back pain, falls, joint pain, joint swelling and neck pain.   Gastrointestinal:  Negative for abdominal pain, heartburn, nausea and vomiting.   Genitourinary:  Negative for dysuria, frequency and hematuria.   Neurological:  Negative for difficulty with concentration, dizziness, focal weakness, headaches, light-headedness, numbness, seizures and weakness.   Psychiatric/Behavioral:  Negative for depression, memory loss and substance abuse. The patient does not have insomnia.    Allergic/Immunologic: Negative for HIV exposure and hives.     Objective:   Physical Exam  HENT:      Head: Normocephalic.   Eyes:      Pupils: Pupils are equal, round, and reactive to light.   Neck:      Thyroid: No thyromegaly.      Vascular: Normal carotid pulses. No carotid bruit or JVD.   Cardiovascular:      Rate and Rhythm: Normal rate and regular rhythm. No extrasystoles are present.     Chest Wall: PMI is not displaced.      Pulses: Normal pulses.      Heart sounds: Normal heart sounds. No murmur heard.    No gallop. No S3 sounds.   Pulmonary:      Effort: No respiratory distress.      Breath sounds: Normal breath sounds. No stridor.   Abdominal:      General: Bowel sounds are normal.      Palpations: Abdomen is soft.      Tenderness: There is no abdominal tenderness. There is no rebound.   Musculoskeletal:         General: Normal range of motion.   Skin:     Findings: No rash.   Neurological:      Mental Status: He is alert and oriented to person, place, and time.   Psychiatric:         Behavior: Behavior normal.       Lab Results   Component Value Date    CHOL 73  (L) 05/28/2021    CHOL 116 (L) 11/06/2020    CHOL 137 07/09/2020     Lab Results   Component Value Date    HDL 39 (L) 05/28/2021    HDL 58 11/06/2020    HDL 38 (L) 07/09/2020     Lab Results   Component Value Date    LDLCALC 22.4 (L) 05/28/2021    LDLCALC 45.6 (L) 11/06/2020    LDLCALC 68.0 07/09/2020     Lab Results   Component Value Date    TRIG 58 05/28/2021    TRIG 62 11/06/2020    TRIG 155 (H) 07/09/2020     Lab Results   Component Value Date    CHOLHDL 53.4 (H) 05/28/2021    CHOLHDL 50.0 11/06/2020    CHOLHDL 27.7 07/09/2020       Chemistry        Component Value Date/Time     12/08/2021 2007    K 4.8 12/08/2021 2007     12/08/2021 2007    CO2 24 12/08/2021 2007    BUN 15 12/08/2021 2007    CREATININE 1.2 12/08/2021 2007     (H) 12/08/2021 2007        Component Value Date/Time    CALCIUM 9.1 12/08/2021 2007    ALKPHOS 63 12/08/2021 2007    AST 50 (H) 12/08/2021 2007    AST 29 04/24/2016 1145    ALT 36 12/08/2021 2007    BILITOT 0.3 12/08/2021 2007    ESTGFRAFRICA >60 12/08/2021 2007    EGFRNONAA 56 (A) 12/08/2021 2007          Lab Results   Component Value Date    HGBA1C 6.5 (H) 07/09/2020     Lab Results   Component Value Date    TSH 3.588 10/23/2021     Lab Results   Component Value Date    INR 1.0 07/23/2019    INR 1.1 06/21/2016    INR 1.1 04/26/2016     Lab Results   Component Value Date    WBC 4.11 12/08/2021    HGB 10.2 (L) 12/08/2021    HCT 34.3 (L) 12/08/2021    MCV 85 12/08/2021     (L) 12/08/2021     BMP  Sodium   Date Value Ref Range Status   12/08/2021 141 136 - 145 mmol/L Final     Potassium   Date Value Ref Range Status   12/08/2021 4.8 3.5 - 5.1 mmol/L Final     Chloride   Date Value Ref Range Status   12/08/2021 108 95 - 110 mmol/L Final     CO2   Date Value Ref Range Status   12/08/2021 24 23 - 29 mmol/L Final     BUN   Date Value Ref Range Status   12/08/2021 15 8 - 23 mg/dL Final     Creatinine   Date Value Ref Range Status   12/08/2021 1.2 0.5 - 1.4 mg/dL Final      Calcium   Date Value Ref Range Status   12/08/2021 9.1 8.7 - 10.5 mg/dL Final     Anion Gap   Date Value Ref Range Status   12/08/2021 9 8 - 16 mmol/L Final     eGFR if    Date Value Ref Range Status   12/08/2021 >60 >60 mL/min/1.73 m^2 Final     eGFR if non    Date Value Ref Range Status   12/08/2021 56 (A) >60 mL/min/1.73 m^2 Final     Comment:     Calculation used to obtain the estimated glomerular filtration  rate (eGFR) is the CKD-EPI equation.        BNP  @LABRCNTIP(BNP,BNPTRIAGEBLO)@  @LABRCNTIP(troponini)@  CrCl cannot be calculated (Patient's most recent lab result is older than the maximum 7 days allowed.).  No results found in the last 24 hours.  No results found in the last 24 hours.  No results found in the last 24 hours.    Assessment:      1. Paroxysmal atrial fibrillation    2. Primary hypertension    3. Hypercholesteremia    4. DM type 2 with diabetic dyslipidemia    5. Bladder tumor    6. EKG abnormalities    7. SOB (shortness of breath)        Plan:   PFT keene to amiodarone   Echo and Phar MPI for SOB and abnomral EKG  Continue amloidpine 5 mg and HCTZ 25 mg daily for HTN  Continue metoprolol xarelto and Amiodarone  DM Rx per PCP  Counseled DASH  Check Lipid profile in 6 months  Recommend heart-healthy diet, weight control and regular exercise.  Fadia. Risk modification.   I have reviewed all pertinent labs and cardiac studies independently. Plans and recommendations have been formulated under my direct supervision. All questions answered and patient voiced understanding.   If symptoms persist go to the ED  RTC in 6 months

## 2022-10-20 ENCOUNTER — PATIENT MESSAGE (OUTPATIENT)
Dept: ADMINISTRATIVE | Facility: HOSPITAL | Age: 82
End: 2022-10-20
Payer: MEDICARE

## 2022-11-04 ENCOUNTER — PROCEDURE VISIT (OUTPATIENT)
Dept: UROLOGY | Facility: CLINIC | Age: 82
End: 2022-11-04
Payer: MEDICARE

## 2022-11-04 VITALS
SYSTOLIC BLOOD PRESSURE: 155 MMHG | HEIGHT: 73 IN | HEART RATE: 60 BPM | RESPIRATION RATE: 18 BRPM | DIASTOLIC BLOOD PRESSURE: 76 MMHG | TEMPERATURE: 98 F | WEIGHT: 228 LBS | BODY MASS INDEX: 30.22 KG/M2

## 2022-11-04 DIAGNOSIS — R31.0 GROSS HEMATURIA: Primary | ICD-10-CM

## 2022-11-04 DIAGNOSIS — Z85.51 HISTORY OF BLADDER CANCER: ICD-10-CM

## 2022-11-04 DIAGNOSIS — N20.0 NEPHROLITHIASIS: ICD-10-CM

## 2022-11-04 DIAGNOSIS — N40.1 BENIGN PROSTATIC HYPERPLASIA WITH URINARY FREQUENCY: ICD-10-CM

## 2022-11-04 DIAGNOSIS — N30.01 ACUTE CYSTITIS WITH HEMATURIA: ICD-10-CM

## 2022-11-04 DIAGNOSIS — R35.0 BENIGN PROSTATIC HYPERPLASIA WITH URINARY FREQUENCY: ICD-10-CM

## 2022-11-04 PROCEDURE — 52000 PR CYSTOURETHROSCOPY: ICD-10-PCS | Mod: S$PBB,,, | Performed by: UROLOGY

## 2022-11-04 PROCEDURE — 52000 CYSTOURETHROSCOPY: CPT | Mod: S$PBB,,, | Performed by: UROLOGY

## 2022-11-04 PROCEDURE — 52000 CYSTOURETHROSCOPY: CPT | Mod: PBBFAC | Performed by: UROLOGY

## 2022-11-04 PROCEDURE — 88112 CYTOPATH CELL ENHANCE TECH: CPT | Performed by: PATHOLOGY

## 2022-11-04 PROCEDURE — 88112 CYTOPATH CELL ENHANCE TECH: CPT | Mod: 26,,, | Performed by: PATHOLOGY

## 2022-11-04 PROCEDURE — 88112 PR  CYTOPATH, CELL ENHANCE TECH: ICD-10-PCS | Mod: 26,,, | Performed by: PATHOLOGY

## 2022-11-04 RX ORDER — LIDOCAINE HYDROCHLORIDE 20 MG/ML
JELLY TOPICAL
Status: COMPLETED | OUTPATIENT
Start: 2022-11-04 | End: 2022-11-04

## 2022-11-04 RX ORDER — CIPROFLOXACIN 500 MG/1
500 TABLET ORAL
Status: COMPLETED | OUTPATIENT
Start: 2022-11-04 | End: 2022-11-04

## 2022-11-04 RX ADMIN — CIPROFLOXACIN 500 MG: 500 TABLET ORAL at 11:11

## 2022-11-04 RX ADMIN — LIDOCAINE HYDROCHLORIDE: 20 JELLY TOPICAL at 11:11

## 2022-11-04 NOTE — PROCEDURES
Procedures  Chief Complaint:   Encounter Diagnoses   Name Primary?    History of bladder cancer Yes    Gross hematuria     Benign prostatic hyperplasia with urinary frequency     Nephrolithiasis     Acute cystitis with hematuria        HPI:   11/4/22- here today for surveillance cystoscopy.  Still voiding well with no issues, no gross hematuria.  12/22/21- patient comes in today to establish care my clinic, recent gross hematuria and reported to see Alexandra.  CT urogram demonstrates bladder stones, urinary retention and a distal right ureteral stone.  Patient has had a Pandey catheter would like this removed today, no more gross hematuria, he is also on antibiotics and tamsulosin.  Patient otherwise was voiding well with no complaints prior to this incident, history of TURBT with subsequent induction BCG, no recurrences.      Allergies:  Patient has no known allergies.    Medications:  has a current medication list which includes the following prescription(s): amiodarone, amlodipine, atorvastatin, donepezil, ergocalciferol, famotidine, ferrous sulfate, hydrochlorothiazide, metoprolol succinate, multivitamin, nitrofurantoin (macrocrystal-monohydrate), oxycodone-acetaminophen, pantoprazole, phenazopyridine, rivaroxaban, and tamsulosin, and the following Facility-Administered Medications: lactated ringers.    Review of Systems:  General: No fever, chills, fatigability, or weight loss.  Skin: No rashes, itching, or changes in color or texture of skin.  Chest: Denies RECINOS, cyanosis, wheezing, cough, and sputum production.  Abdomen: Appetite fine. No weight loss. Denies diarrhea, abdominal pain, hematemesis, or blood in stool.  Musculoskeletal: No joint stiffness or swelling. Denies back pain.  : As above.  All other review of systems negative.    PMH:   has a past medical history of Anemia, Anticoagulant long-term use, Atrial fibrillation, Bladder cancer, Bladder tumor, CKD (chronic kidney disease), stage III, COPD (chronic  obstructive pulmonary disease), Encounter for blood transfusion, Hematuria, History of 2019 novel coronavirus disease (COVID-19), Hypercholesteremia, and Hypertension.    PSH:   has a past surgical history that includes Cholecystectomy; Cystoscopy; Cardioversion; bladder tumor removal; Eye surgery; cataract removal with IOL implants (Bilateral); Cardiac electrophysiology mapping and ablation; Transurethral resection of bladder tumor by bipolar cautery (N/A, 7/23/2019); Esophagogastroduodenoscopy (N/A, 10/25/2021); Intraluminal gastrointestinal tract imaging via capsule (N/A, 12/6/2021); Esophagogastroduodenoscopy (N/A, 12/13/2021); Cystoscopy with biopsy of bladder (Right, 12/28/2021); and Cystoscopic litholapaxy (N/A, 12/28/2021).    FamHx: family history includes Heart disease in his brother and father.    SocHx:  reports that he quit smoking about 35 years ago. His smoking use included cigarettes. He has a 50.00 pack-year smoking history. He has never used smokeless tobacco. He reports current alcohol use of about 8.0 standard drinks of alcohol per week. He reports that he does not use drugs.      Physical Exam:  General: A&Ox3, no apparent distress, no deformities  Neck: No masses, normal thyroid  Lungs: normal inspiration, no use of accessory muscles  Heart: normal pulse, no arrhythmias  Abdomen: Soft, NT, ND, no masses, no hernias, no hepatosplenomegaly  Lymphatic: Neck and groin nodes negative  : 11/22- Test desc filipe, no abnormalities of epididymus. Normal penile and scrotal skin. Meatus normal.     Labs/Studies:   Cytology negative 8/22  CT stone protocol no nephrolithiasis 1/22  CT urogram 0.5 cm distal right ureteral stone, bladder stones, BPH 12/21  Creatinine 1.5 7/22    Procedure: Diagnostic Cystoscopy    Procedure in Detail: After proper consents were obtained, the patient was prepped and draped in normal sterile fashion for diagnostic cystoscopy. 5 ml of lidocaine jelly was instilled in the urethra.  The flexible cystoscope was then introduced into the urethra, and advanced into the bladder under direct vision. The urethral mucosa appeared normal, and no strictures were noted. The sphincter appeared to be normal, and the veru montanum was unremarkable. The prostatic mucosa demonstrated lateral lobe coaptation. The bladder neck was normal. Inspection of the interior of the bladder was then carried out. The trigone was unremarkable, with no mucosal lesions. The ureteral orifices were normal in position and configuration. Systematic inspection of the mucosa of the bladder it was then carried out, rotating the cystoscope so that all areas of the left and right lateral walls, the dome of the bladder, and the posterior wall were all visualized. The cystoscope was then advanced further into the bladder, and maximum deflection of the scope was performed so that the bladder neck could be inspected. No mucosal lesions were noted there.  Of note there was a lot of debris as before, making this a little difficult.  The cystoscope was then removed, and the procedure terminated.     Findings:  Debris, otherwise stable, status post biopsy and BCG      Impression/Plan:      1. History of bladder cancer-  BCG 3/22, High pTa, but suspicious for T1 cystoscopy with bladder biopsy, vesical litholapaxy, could not assess the right ureter  12/28/21.  TURBT 5-6 years ago at the outside facility with induction BCG.    Cystoscopy clear, follow-up on cytology.  Patient has now completed induction BCG and will hold.  See him back in 3 months with cystoscopy and cytology if the cytology is clear and continue this course of action till at least 1 year from his last positive biopsy.    2. Nephrolithiasis- no recurrent stones, of note we could not get up the right ureter, but the patient had passed the stones per CT scan.    3. BPH- voiding well on tamsulosin, did not want to pursue repeat PSA at the last appointment.    4. Gross hematuria-  this has cleared, call with any further issues.    5. Erectile dysfunction- given Viagra 100 mg but currently not an issue.

## 2022-11-09 LAB
FINAL PATHOLOGIC DIAGNOSIS: NORMAL
Lab: NORMAL

## 2023-01-26 ENCOUNTER — LAB VISIT (OUTPATIENT)
Dept: LAB | Facility: HOSPITAL | Age: 83
End: 2023-01-26
Attending: FAMILY MEDICINE
Payer: MEDICARE

## 2023-01-26 ENCOUNTER — OFFICE VISIT (OUTPATIENT)
Dept: FAMILY MEDICINE | Facility: CLINIC | Age: 83
End: 2023-01-26
Attending: FAMILY MEDICINE
Payer: MEDICARE

## 2023-01-26 VITALS
HEIGHT: 73 IN | DIASTOLIC BLOOD PRESSURE: 70 MMHG | BODY MASS INDEX: 31.54 KG/M2 | HEART RATE: 54 BPM | WEIGHT: 238 LBS | TEMPERATURE: 97 F | SYSTOLIC BLOOD PRESSURE: 126 MMHG | OXYGEN SATURATION: 97 %

## 2023-01-26 DIAGNOSIS — L97.511 NON-PRESSURE CHRONIC ULCER OF OTHER PART OF RIGHT FOOT LIMITED TO BREAKDOWN OF SKIN: ICD-10-CM

## 2023-01-26 DIAGNOSIS — D69.2 OTHER NONTHROMBOCYTOPENIC PURPURA: ICD-10-CM

## 2023-01-26 DIAGNOSIS — C67.3 MALIGNANT NEOPLASM OF ANTERIOR WALL OF URINARY BLADDER: ICD-10-CM

## 2023-01-26 DIAGNOSIS — E78.5 DM TYPE 2 WITH DIABETIC DYSLIPIDEMIA: ICD-10-CM

## 2023-01-26 DIAGNOSIS — N18.30 STAGE 3 CHRONIC KIDNEY DISEASE, UNSPECIFIED WHETHER STAGE 3A OR 3B CKD: ICD-10-CM

## 2023-01-26 DIAGNOSIS — I70.543: ICD-10-CM

## 2023-01-26 DIAGNOSIS — E11.69 DM TYPE 2 WITH DIABETIC DYSLIPIDEMIA: ICD-10-CM

## 2023-01-26 DIAGNOSIS — E44.0 MODERATE PROTEIN-CALORIE MALNUTRITION: ICD-10-CM

## 2023-01-26 DIAGNOSIS — F02.80 DEMENTIA ASSOCIATED WITH OTHER UNDERLYING DISEASE, WITHOUT BEHAVIORAL DISTURBANCE, PSYCHOTIC DISTURBANCE, MOOD DISTURBANCE, OR ANXIETY, UNSPECIFIED DEMENTIA SEVERITY: ICD-10-CM

## 2023-01-26 DIAGNOSIS — I50.42 CHRONIC COMBINED SYSTOLIC (CONGESTIVE) AND DIASTOLIC (CONGESTIVE) HEART FAILURE: ICD-10-CM

## 2023-01-26 DIAGNOSIS — J44.0 CHRONIC OBSTRUCTIVE PULMONARY DISEASE WITH ACUTE LOWER RESPIRATORY INFECTION: Primary | ICD-10-CM

## 2023-01-26 DIAGNOSIS — I48.0 PAROXYSMAL ATRIAL FIBRILLATION: ICD-10-CM

## 2023-01-26 DIAGNOSIS — I70.533: ICD-10-CM

## 2023-01-26 LAB
ALBUMIN SERPL BCP-MCNC: 3.8 G/DL (ref 3.5–5.2)
ALP SERPL-CCNC: 62 U/L (ref 55–135)
ALT SERPL W/O P-5'-P-CCNC: 34 U/L (ref 10–44)
ANION GAP SERPL CALC-SCNC: 10 MMOL/L (ref 8–16)
AST SERPL-CCNC: 27 U/L (ref 10–40)
BASOPHILS # BLD AUTO: 0.04 K/UL (ref 0–0.2)
BASOPHILS NFR BLD: 0.8 % (ref 0–1.9)
BILIRUB SERPL-MCNC: 0.5 MG/DL (ref 0.1–1)
BUN SERPL-MCNC: 20 MG/DL (ref 8–23)
CALCIUM SERPL-MCNC: 9.4 MG/DL (ref 8.7–10.5)
CHLORIDE SERPL-SCNC: 106 MMOL/L (ref 95–110)
CHOLEST SERPL-MCNC: 120 MG/DL (ref 120–199)
CHOLEST/HDLC SERPL: 2.9 {RATIO} (ref 2–5)
CO2 SERPL-SCNC: 25 MMOL/L (ref 23–29)
CREAT SERPL-MCNC: 1.2 MG/DL (ref 0.5–1.4)
DIFFERENTIAL METHOD: ABNORMAL
EOSINOPHIL # BLD AUTO: 0.1 K/UL (ref 0–0.5)
EOSINOPHIL NFR BLD: 1.1 % (ref 0–8)
ERYTHROCYTE [DISTWIDTH] IN BLOOD BY AUTOMATED COUNT: 15.9 % (ref 11.5–14.5)
EST. GFR  (NO RACE VARIABLE): >60 ML/MIN/1.73 M^2
ESTIMATED AVG GLUCOSE: 131 MG/DL (ref 68–131)
GLUCOSE SERPL-MCNC: 98 MG/DL (ref 70–110)
HBA1C MFR BLD: 6.2 % (ref 4–5.6)
HCT VFR BLD AUTO: 37.7 % (ref 40–54)
HDLC SERPL-MCNC: 42 MG/DL (ref 40–75)
HDLC SERPL: 35 % (ref 20–50)
HGB BLD-MCNC: 11.2 G/DL (ref 14–18)
IMM GRANULOCYTES # BLD AUTO: 0.01 K/UL (ref 0–0.04)
IMM GRANULOCYTES NFR BLD AUTO: 0.2 % (ref 0–0.5)
LDLC SERPL CALC-MCNC: 44.4 MG/DL (ref 63–159)
LYMPHOCYTES # BLD AUTO: 0.8 K/UL (ref 1–4.8)
LYMPHOCYTES NFR BLD: 16.8 % (ref 18–48)
MCH RBC QN AUTO: 25.3 PG (ref 27–31)
MCHC RBC AUTO-ENTMCNC: 29.7 G/DL (ref 32–36)
MCV RBC AUTO: 85 FL (ref 82–98)
MONOCYTES # BLD AUTO: 0.7 K/UL (ref 0.3–1)
MONOCYTES NFR BLD: 14.2 % (ref 4–15)
NEUTROPHILS # BLD AUTO: 3.2 K/UL (ref 1.8–7.7)
NEUTROPHILS NFR BLD: 66.9 % (ref 38–73)
NONHDLC SERPL-MCNC: 78 MG/DL
NRBC BLD-RTO: 0 /100 WBC
PLATELET # BLD AUTO: 125 K/UL (ref 150–450)
PMV BLD AUTO: 12.8 FL (ref 9.2–12.9)
POTASSIUM SERPL-SCNC: 4 MMOL/L (ref 3.5–5.1)
PROT SERPL-MCNC: 6.9 G/DL (ref 6–8.4)
RBC # BLD AUTO: 4.43 M/UL (ref 4.6–6.2)
SODIUM SERPL-SCNC: 141 MMOL/L (ref 136–145)
TRIGL SERPL-MCNC: 168 MG/DL (ref 30–150)
WBC # BLD AUTO: 4.71 K/UL (ref 3.9–12.7)

## 2023-01-26 PROCEDURE — 36415 COLL VENOUS BLD VENIPUNCTURE: CPT | Mod: PO | Performed by: FAMILY MEDICINE

## 2023-01-26 PROCEDURE — 99214 PR OFFICE/OUTPT VISIT, EST, LEVL IV, 30-39 MIN: ICD-10-PCS | Mod: S$PBB,,, | Performed by: FAMILY MEDICINE

## 2023-01-26 PROCEDURE — 99215 OFFICE O/P EST HI 40 MIN: CPT | Mod: PBBFAC,PO | Performed by: FAMILY MEDICINE

## 2023-01-26 PROCEDURE — 99999 PR PBB SHADOW E&M-EST. PATIENT-LVL V: CPT | Mod: PBBFAC,,, | Performed by: FAMILY MEDICINE

## 2023-01-26 PROCEDURE — 80053 COMPREHEN METABOLIC PANEL: CPT | Performed by: FAMILY MEDICINE

## 2023-01-26 PROCEDURE — 85025 COMPLETE CBC W/AUTO DIFF WBC: CPT | Performed by: FAMILY MEDICINE

## 2023-01-26 PROCEDURE — 83036 HEMOGLOBIN GLYCOSYLATED A1C: CPT | Performed by: FAMILY MEDICINE

## 2023-01-26 PROCEDURE — 80061 LIPID PANEL: CPT | Performed by: FAMILY MEDICINE

## 2023-01-26 PROCEDURE — 99999 PR PBB SHADOW E&M-EST. PATIENT-LVL V: ICD-10-PCS | Mod: PBBFAC,,, | Performed by: FAMILY MEDICINE

## 2023-01-26 PROCEDURE — 99214 OFFICE O/P EST MOD 30 MIN: CPT | Mod: S$PBB,,, | Performed by: FAMILY MEDICINE

## 2023-01-26 NOTE — PROGRESS NOTES
Subjective:       Patient ID: Jose Miguel Alvarez Jr. is a 82 y.o. male.    Chief Complaint: Annual Exam    82 y old male here for f.u . Doing well. Not monitoring Glucose. Playing golf 3 times per week . No memory issues. No recent falls . Will like to see pulm . He quit smoking 35 years ago . No sob , cp . F.u with Dr Fletcher     Review of Systems   Constitutional: Negative.    HENT: Negative.     Eyes: Negative.    Respiratory: Negative.     Cardiovascular: Negative.    Gastrointestinal: Negative.    Genitourinary: Negative.    Musculoskeletal: Negative.    Skin: Negative.    Hematological: Negative.      Objective:      Physical Exam  Constitutional:       General: He is not in acute distress.     Appearance: He is well-developed. He is not diaphoretic.   HENT:      Head: Normocephalic and atraumatic.      Right Ear: External ear normal.      Left Ear: External ear normal.      Nose: Nose normal.      Mouth/Throat:      Pharynx: No oropharyngeal exudate.   Eyes:      General: No scleral icterus.        Right eye: No discharge.         Left eye: No discharge.      Conjunctiva/sclera: Conjunctivae normal.      Pupils: Pupils are equal, round, and reactive to light.   Neck:      Thyroid: No thyromegaly.      Vascular: No JVD.      Trachea: No tracheal deviation.   Cardiovascular:      Rate and Rhythm: Normal rate and regular rhythm.      Heart sounds: Normal heart sounds. No murmur heard.    No friction rub. No gallop.   Pulmonary:      Effort: Pulmonary effort is normal. No respiratory distress.      Breath sounds: Normal breath sounds. No stridor. No wheezing or rales.   Chest:      Chest wall: No tenderness.   Abdominal:      General: Bowel sounds are normal. There is no distension.      Palpations: Abdomen is soft.      Tenderness: There is no abdominal tenderness. There is no guarding or rebound.   Musculoskeletal:         General: No tenderness. Normal range of motion.      Cervical back: Normal range of  motion and neck supple.   Lymphadenopathy:      Cervical: No cervical adenopathy.   Skin:     General: Skin is warm and dry.      Coloration: Skin is not pale.      Findings: No erythema or rash.   Neurological:      Mental Status: He is alert and oriented to person, place, and time.      Cranial Nerves: No cranial nerve deficit.      Motor: No abnormal muscle tone.      Coordination: Coordination normal.      Deep Tendon Reflexes: Reflexes are normal and symmetric. Reflexes normal.   Psychiatric:         Behavior: Behavior normal.         Thought Content: Thought content normal.         Judgment: Judgment normal.       Assessment:           Jose Miguel was seen today for annual exam.    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    Diagnoses and all orders for this visit:    Chronic obstructive pulmonary disease with acute lower respiratory infection  -      Ambulatory referral/consult to Pulmonology; Future    DM type 2 with diabetic dyslipidemia  -     CBC Auto Differential; Future  -     Comprehensive Metabolic Panel; Future  -     Hemoglobin A1C; Future  -     Lipid Panel; Future     Pulm   Diet and exercise   Resolved   F,u with card   Resolved   Stable   F.u with card

## 2023-01-30 ENCOUNTER — TELEPHONE (OUTPATIENT)
Dept: CARDIOLOGY | Facility: CLINIC | Age: 83
End: 2023-01-30
Payer: MEDICARE

## 2023-01-30 RX ORDER — AMLODIPINE BESYLATE 5 MG/1
5 TABLET ORAL DAILY
Qty: 30 TABLET | Refills: 11 | Status: SHIPPED | OUTPATIENT
Start: 2023-01-30 | End: 2024-01-24

## 2023-01-30 NOTE — TELEPHONE ENCOUNTER
Pt contacted [facility] for pt to call back.              ----- Message from Juvenal Campbell sent at 1/30/2023  2:48 PM CST -----  Contact: [phone]  Patient would like to consult with a nurse in regards to a prescription request. Please call back at [phone]. Thanks navarro

## 2023-01-30 NOTE — TELEPHONE ENCOUNTER
----- Message from Nando Bai sent at 1/30/2023  9:47 AM CST -----  Contact: Patient  Jose Miguel Ziegler Antonio Santos. Would like a call back at [phone], in regards to his  (XARELTO) medication. Pt states he was notified by [facility] that the prescription was cancelled. Pt would like to verify if that's true or not.

## 2023-01-30 NOTE — TELEPHONE ENCOUNTER
----- Message from Juvenal Campbell sent at 1/30/2023  2:48 PM CST -----  Contact: [phone]  Patient would like to consult with a nurse in regards to a prescription request. Please call back at [phone]. Thanks navarro

## 2023-02-28 ENCOUNTER — OFFICE VISIT (OUTPATIENT)
Dept: PULMONOLOGY | Facility: CLINIC | Age: 83
End: 2023-02-28
Attending: FAMILY MEDICINE
Payer: MEDICARE

## 2023-02-28 ENCOUNTER — LAB VISIT (OUTPATIENT)
Dept: LAB | Facility: HOSPITAL | Age: 83
End: 2023-02-28
Attending: PHYSICIAN ASSISTANT
Payer: MEDICARE

## 2023-02-28 VITALS
SYSTOLIC BLOOD PRESSURE: 138 MMHG | DIASTOLIC BLOOD PRESSURE: 70 MMHG | RESPIRATION RATE: 18 BRPM | OXYGEN SATURATION: 97 % | HEART RATE: 59 BPM | HEIGHT: 72 IN | BODY MASS INDEX: 31.97 KG/M2 | WEIGHT: 236 LBS

## 2023-02-28 DIAGNOSIS — R93.89 ABNORMAL CHEST CT: ICD-10-CM

## 2023-02-28 DIAGNOSIS — C67.9 MALIGNANT NEOPLASM OF URINARY BLADDER, UNSPECIFIED SITE: ICD-10-CM

## 2023-02-28 DIAGNOSIS — I10 PRIMARY HYPERTENSION: ICD-10-CM

## 2023-02-28 DIAGNOSIS — J44.0 CHRONIC OBSTRUCTIVE PULMONARY DISEASE WITH ACUTE LOWER RESPIRATORY INFECTION: ICD-10-CM

## 2023-02-28 DIAGNOSIS — I48.0 PAROXYSMAL ATRIAL FIBRILLATION: ICD-10-CM

## 2023-02-28 DIAGNOSIS — J44.0 CHRONIC OBSTRUCTIVE PULMONARY DISEASE WITH ACUTE LOWER RESPIRATORY INFECTION: Primary | ICD-10-CM

## 2023-02-28 PROCEDURE — 82104 ALPHA-1-ANTITRYPSIN PHENO: CPT | Performed by: PHYSICIAN ASSISTANT

## 2023-02-28 PROCEDURE — 99215 OFFICE O/P EST HI 40 MIN: CPT | Mod: PBBFAC | Performed by: PHYSICIAN ASSISTANT

## 2023-02-28 PROCEDURE — 82103 ALPHA-1-ANTITRYPSIN TOTAL: CPT | Mod: 91 | Performed by: PHYSICIAN ASSISTANT

## 2023-02-28 PROCEDURE — 99999 PR PBB SHADOW E&M-EST. PATIENT-LVL V: ICD-10-PCS | Mod: PBBFAC,,, | Performed by: PHYSICIAN ASSISTANT

## 2023-02-28 PROCEDURE — 99214 PR OFFICE/OUTPT VISIT, EST, LEVL IV, 30-39 MIN: ICD-10-PCS | Mod: S$PBB,ICN,, | Performed by: PHYSICIAN ASSISTANT

## 2023-02-28 PROCEDURE — 82103 ALPHA-1-ANTITRYPSIN TOTAL: CPT | Performed by: PHYSICIAN ASSISTANT

## 2023-02-28 PROCEDURE — 99214 OFFICE O/P EST MOD 30 MIN: CPT | Mod: S$PBB,ICN,, | Performed by: PHYSICIAN ASSISTANT

## 2023-02-28 PROCEDURE — 99999 PR PBB SHADOW E&M-EST. PATIENT-LVL V: CPT | Mod: PBBFAC,,, | Performed by: PHYSICIAN ASSISTANT

## 2023-02-28 NOTE — ASSESSMENT & PLAN NOTE
Chest CT 2021 showed emphysema,Focal thickening of the right obliques fissure along the posterior pleural margin measuring up to 8 x 15 mm, and granulotomatous disease  Will get follow up Chest CT per radiology recommendations

## 2023-02-28 NOTE — PROGRESS NOTES
Subjective:       Patient ID: Jose Miguel Alvarez Jr. is a 82 y.o. male.    Chief Complaint: COPD    83yo male referred by Eduardo Valladares* for COPD  h/o COVID 19 in . Hospitalization over a month, anemia, h/o bladder cancer on chemo and XRT, no h./o DM and stroke  PMH PAF s/p DCCV few yrs ago and RFA at [facility] in 2018 HTN HLD COPD quit smoking  10/2021 admitted to severe anemia and HGB dropped to 4.2 and had 4 units of PRBC. Moran better  Chest CT 2021 showed emphysema,Focal thickening of the right obliques fissure along the posterior pleural margin measuring up to 8 x 15 mm, and granulotomatous disease  He quit smoking 34 years ago, smoked 2-3 packs a day since age 14  He denies SOB cough or chest pain  Other than COVID he has had no other hospitalizations for pulmonary concern  No other PNA or bronchitis  Feeling well today  No inhalers    COPD Questionnaire  How often do you cough?: Never  How often do you have phlegm (mucus) in your chest?: Never  How often does your chest feel tight?: Never  When you walk up a hill or one flight of stairs, how often are you breathless?: Never  How often are you limited doing any activities at home?: Never  How often are you confident leaving the house despite your lung condition?: All of the time  How often do you sleep soundly?: All of the time  How often do you have energy?: All of the time  Total score: 0    Immunization History   Administered Date(s) Administered    COVID-19, MRNA, LN-S, PF (MODERNA FULL 0.5 ML DOSE) 03/21/2021, 04/18/2021    Influenza 08/31/2016    Influenza (FLUAD) - Quadrivalent - Adjuvanted - PF *Preferred* (65+) 11/06/2020, 11/08/2021    Influenza (FLUAD) - Trivalent - Adjuvanted - PF (65+) 09/27/2018    Influenza - High Dose - PF (65 years and older) 09/21/2017    Influenza - Quadrivalent - High Dose - PF (65 years and older) 08/23/2022    Pneumococcal Conjugate - 13 Valent 11/05/2018    Zoster 10/01/2016      Tobacco Use: Medium Risk     Smoking Tobacco Use: Former    Smokeless Tobacco Use: Never    Passive Exposure: Not on file      Past Medical History:   Diagnosis Date    Anemia     Anticoagulant long-term use     Atrial fibrillation     Bladder cancer     Bladder tumor     CKD (chronic kidney disease), stage III     COPD (chronic obstructive pulmonary disease)     pt denies    Encounter for blood transfusion     Hematuria     History of 2019 novel coronavirus disease (COVID-19)     Hypercholesteremia     Hypertension       Current Outpatient Medications on File Prior to Visit   Medication Sig Dispense Refill    amiodarone (PACERONE) 200 MG Tab Take 200 mg by mouth once daily.       amLODIPine (NORVASC) 5 MG tablet Take 1 tablet (5 mg total) by mouth once daily. 30 tablet 11    atorvastatin (LIPITOR) 20 MG tablet Take 20 mg by mouth every evening.       donepeziL (ARICEPT) 5 MG tablet Take 5 mg by mouth every evening.       ergocalciferol (ERGOCALCIFEROL) 50,000 unit Cap Take 50,000 Units by mouth every 7 days.      famotidine (PEPCID) 20 MG tablet Take 20 mg by mouth once daily.      ferrous sulfate (FEOSOL) 325 mg (65 mg iron) Tab tablet Take 325 mg by mouth.      hydroCHLOROthiazide (HYDRODIURIL) 25 MG tablet Take 1 tablet (25 mg total) by mouth once daily. 90 tablet 3    metoprolol succinate (TOPROL-XL) 50 MG 24 hr tablet Take 50 mg by mouth once daily.      multivitamin (THERAGRAN) per tablet Take 1 tablet by mouth once daily.      rivaroxaban (XARELTO) 15 mg Tab Take 1 tablet (15 mg total) by mouth daily with dinner or evening meal. 90 tablet 2    sildenafiL (VIAGRA) 100 MG tablet Take 1 tablet (100 mg total) by mouth daily as needed for Erectile Dysfunction. (Patient not taking: Reported on 2/28/2023) 20 tablet 10    tamsulosin (FLOMAX) 0.4 mg Cap Take 1 capsule (0.4 mg total) by mouth once daily. 90 capsule 4     Current Facility-Administered Medications on File Prior to Visit   Medication Dose Route Frequency Provider Last Rate Last  Admin    lactated ringers infusion   Intravenous Continuous Denisse Conteh MD   Stopped at 12/28/21 1241        Review of Systems   Constitutional:  Negative for fever, weight loss, appetite change, fatigue and weakness.   HENT:  Negative for postnasal drip, rhinorrhea, sinus pressure, trouble swallowing and congestion.    Respiratory:  Negative for cough, sputum production, choking, chest tightness, shortness of breath, wheezing and dyspnea on extertion.    Cardiovascular:  Negative for chest pain and leg swelling.   Musculoskeletal:  Negative for joint swelling.   Gastrointestinal:  Negative for nausea, vomiting and abdominal pain.   Neurological:  Negative for dizziness, weakness and headaches.   All other systems reviewed and are negative.    Objective:       Vitals:    02/28/23 1325   BP: 138/70   Pulse: (!) 59   Resp: 18   SpO2: 97%   Weight: 107.1 kg (236 lb)   Height: 6' (1.829 m)       Physical Exam   Constitutional: He is oriented to person, place, and time. He appears well-developed and well-nourished. No distress. He is obese.   HENT:   Head: Normocephalic.   Nose: Nose normal.   Mouth/Throat: Oropharynx is clear and moist.   Cardiovascular: Normal rate and regular rhythm.   Pulmonary/Chest: Effort normal. No respiratory distress. He has no wheezes. He has no rhonchi. He has no rales.   Musculoskeletal:         General: No edema.      Cervical back: Normal range of motion and neck supple.   Lymphadenopathy: No supraclavicular adenopathy is present.     He has no cervical adenopathy.   Neurological: He is alert and oriented to person, place, and time. Gait normal.   Skin: Skin is warm and dry.   Psychiatric: He has a normal mood and affect.   Vitals reviewed.  Personal Diagnostic Review    CT Abdomen Pelvis  Without Contrast  Narrative: EXAMINATION:  CT ABDOMEN PELVIS WITHOUT CONTRAST    CLINICAL HISTORY:  Flank pain, kidney stone suspected; Neoplasm of unspecified behavior of  bladder    TECHNIQUE:  Low dose axial images, sagittal and coronal reformations were obtained from the lung bases to the pubic symphysis.  Oral contrast was not administered. All CT scans at this location are performed using dose modulation techniques as appropriate to a performed exam including the following: Automated exposure control; adjustment of the mA and/or kV according to patient size (this includes techniques or standardized protocols for targeted exams where dose is matched to indication/reason for exam; i. e. extremities or head); use of iterative reconstruction technique.    COMPARISON:  CT urogram from 12/14/2021.    FINDINGS:  Stable postoperative change at the hepatic dome.  Liver is otherwise unremarkable. Cholecystectomy status. Pancreas, spleen, and adrenal glands demonstrate no significant abnormality.  Noncontrast appearance of the kidneys is unremarkable. No hydronephrosis or nephrolithiasis.  Ureters demonstrate no significant abnormality.  Noncontrast appearance of the bladder is unremarkable. The prostate is mildly enlarged.    No bowel obstruction or inflammation is appreciated. No evidence of acute appendicitis. Diverticulosis coli in the sigmoid colon without evidence of acute/active diverticulitis. No evidence of pathologic adenopathy. Nonaneurysmal aorta with scattered atherosclerotic calcification.  Osseous structures demonstrate no acute fracture or suspicious osseous lesion.  Degenerative disc disease throughout the lumbar spine.  Impression: No acute abnormality in the abdomen or pelvis. Specifically, no nephrolithiasis or evidence of obstructive uropathy on this examination.  Additional stable findings, as above.    Electronically signed by: Guido Mena  Date:    01/13/2022  Time:    11:17          No flowsheet data found.      Assessment/Plan:       Problem List Items Addressed This Visit          Pulmonary    COPD (chronic obstructive pulmonary disease) - Primary     Will get  baseline eladia and walk         Relevant Orders    Spirometry with/without bronchodilator    Stress test, pulmonary    ALPHA 1 ANTITRYPSIN PHENOTYPE    Alpha-1-Antitrypsin       Cardiac/Vascular    Paroxysmal atrial fibrillation     F/u regularly with cardiology  xarelto         Hypertension     Controlled, F/u regularly with PCP              Oncology    Malignant neoplasm of urinary bladder       Other    Abnormal chest CT     Chest CT 2021 showed emphysema,Focal thickening of the right obliques fissure along the posterior pleural margin measuring up to 8 x 15 mm, and granulotomatous disease  Will get follow up Chest CT per radiology recommendations         Relevant Orders    CT Chest Without Contrast     Follow up in about 6 weeks (around 4/11/2023) for labs today; chest ct, eladia, walk next.    Discussed diagnosis, its evaluation, treatment and usual course. All questions answered.    Patient verbalized understanding of plan and left in no acute distress    Thank you for the courtesy of participating in the care of this patient    Ksenia Sampson PA-C  [facility]

## 2023-03-01 LAB — A1AT SERPL-MCNC: 104 MG/DL (ref 100–190)

## 2023-03-02 LAB
A1AT PHENOTYP SERPL-IMP: NORMAL BANDS
A1AT SERPL NEPH-MCNC: 138 MG/DL (ref 100–190)

## 2023-03-03 ENCOUNTER — OFFICE VISIT (OUTPATIENT)
Dept: UROLOGY | Facility: CLINIC | Age: 83
End: 2023-03-03
Payer: MEDICARE

## 2023-03-03 VITALS
TEMPERATURE: 97 F | HEART RATE: 55 BPM | HEIGHT: 72 IN | SYSTOLIC BLOOD PRESSURE: 154 MMHG | BODY MASS INDEX: 31.77 KG/M2 | WEIGHT: 234.56 LBS | RESPIRATION RATE: 18 BRPM | DIASTOLIC BLOOD PRESSURE: 65 MMHG

## 2023-03-03 DIAGNOSIS — N30.01 ACUTE CYSTITIS WITH HEMATURIA: ICD-10-CM

## 2023-03-03 DIAGNOSIS — N20.0 NEPHROLITHIASIS: ICD-10-CM

## 2023-03-03 DIAGNOSIS — Z85.51 HISTORY OF BLADDER CANCER: Primary | ICD-10-CM

## 2023-03-03 DIAGNOSIS — R35.0 BENIGN PROSTATIC HYPERPLASIA WITH URINARY FREQUENCY: ICD-10-CM

## 2023-03-03 DIAGNOSIS — N40.1 BENIGN PROSTATIC HYPERPLASIA WITH URINARY FREQUENCY: ICD-10-CM

## 2023-03-03 DIAGNOSIS — R31.0 GROSS HEMATURIA: ICD-10-CM

## 2023-03-03 PROCEDURE — 99499 NO LOS: ICD-10-PCS | Mod: S$PBB,,, | Performed by: UROLOGY

## 2023-03-03 PROCEDURE — 52000 PR CYSTOURETHROSCOPY: ICD-10-PCS | Mod: S$PBB,,, | Performed by: UROLOGY

## 2023-03-03 PROCEDURE — 88112 PR  CYTOPATH, CELL ENHANCE TECH: ICD-10-PCS | Mod: 26,,, | Performed by: PATHOLOGY

## 2023-03-03 PROCEDURE — 52000 CYSTOURETHROSCOPY: CPT | Mod: PBBFAC | Performed by: UROLOGY

## 2023-03-03 PROCEDURE — 99999 PR PBB SHADOW E&M-EST. PATIENT-LVL IV: ICD-10-PCS | Mod: PBBFAC,,, | Performed by: UROLOGY

## 2023-03-03 PROCEDURE — 88112 CYTOPATH CELL ENHANCE TECH: CPT | Performed by: PATHOLOGY

## 2023-03-03 PROCEDURE — 99214 OFFICE O/P EST MOD 30 MIN: CPT | Mod: PBBFAC | Performed by: UROLOGY

## 2023-03-03 PROCEDURE — 99999 PR PBB SHADOW E&M-EST. PATIENT-LVL IV: CPT | Mod: PBBFAC,,, | Performed by: UROLOGY

## 2023-03-03 PROCEDURE — 52000 CYSTOURETHROSCOPY: CPT | Mod: S$PBB,,, | Performed by: UROLOGY

## 2023-03-03 PROCEDURE — 88112 CYTOPATH CELL ENHANCE TECH: CPT | Mod: 26,,, | Performed by: PATHOLOGY

## 2023-03-03 PROCEDURE — 99499 UNLISTED E&M SERVICE: CPT | Mod: S$PBB,,, | Performed by: UROLOGY

## 2023-03-03 RX ORDER — CIPROFLOXACIN 500 MG/1
500 TABLET ORAL
Status: COMPLETED | OUTPATIENT
Start: 2023-03-03 | End: 2023-03-03

## 2023-03-03 RX ORDER — LIDOCAINE HYDROCHLORIDE 20 MG/ML
JELLY TOPICAL
Status: COMPLETED | OUTPATIENT
Start: 2023-03-03 | End: 2023-03-03

## 2023-03-03 RX ADMIN — CIPROFLOXACIN 500 MG: 500 TABLET ORAL at 09:03

## 2023-03-03 RX ADMIN — LIDOCAINE HYDROCHLORIDE: 20 JELLY TOPICAL at 09:03

## 2023-03-03 NOTE — PROGRESS NOTES
Chief Complaint:   Encounter Diagnoses   Name Primary?    History of bladder cancer Yes    Gross hematuria     Benign prostatic hyperplasia with urinary frequency     Nephrolithiasis     Acute cystitis with hematuria        HPI:   3/3/23- patient has been voiding well with no hematuria or issues, here today for surveillance screening cystoscopy.  12/22/21- patient comes in today to establish care my clinic, recent gross hematuria and reported to see Alexandra.  CT urogram demonstrates bladder stones, urinary retention and a distal right ureteral stone.  Patient has had a Pandey catheter would like this removed today, no more gross hematuria, he is also on antibiotics and tamsulosin.  Patient otherwise was voiding well with no complaints prior to this incident, history of TURBT with subsequent induction BCG, no recurrences.      Allergies:  Patient has no known allergies.    Medications:  has a current medication list which includes the following prescription(s): amiodarone, amlodipine, atorvastatin, donepezil, ergocalciferol, famotidine, ferrous sulfate, hydrochlorothiazide, metoprolol succinate, multivitamin, nitrofurantoin (macrocrystal-monohydrate), oxycodone-acetaminophen, pantoprazole, phenazopyridine, rivaroxaban, and tamsulosin, and the following Facility-Administered Medications: lactated ringers.    Review of Systems:  General: No fever, chills, fatigability, or weight loss.  Skin: No rashes, itching, or changes in color or texture of skin.  Chest: Denies RECINOS, cyanosis, wheezing, cough, and sputum production.  Abdomen: Appetite fine. No weight loss. Denies diarrhea, abdominal pain, hematemesis, or blood in stool.  Musculoskeletal: No joint stiffness or swelling. Denies back pain.  : As above.  All other review of systems negative.    PMH:   has a past medical history of Anemia, Anticoagulant long-term use, Atrial fibrillation, Bladder cancer, Bladder tumor, CKD (chronic kidney disease), stage III, COPD (chronic  obstructive pulmonary disease), Encounter for blood transfusion, Hematuria, History of 2019 novel coronavirus disease (COVID-19), Hypercholesteremia, and Hypertension.    PSH:   has a past surgical history that includes Cholecystectomy; Cystoscopy; Cardioversion; bladder tumor removal; Eye surgery; cataract removal with IOL implants (Bilateral); Cardiac electrophysiology mapping and ablation; Transurethral resection of bladder tumor by bipolar cautery (N/A, 7/23/2019); Esophagogastroduodenoscopy (N/A, 10/25/2021); Intraluminal gastrointestinal tract imaging via capsule (N/A, 12/6/2021); Esophagogastroduodenoscopy (N/A, 12/13/2021); Cystoscopy with biopsy of bladder (Right, 12/28/2021); and Cystoscopic litholapaxy (N/A, 12/28/2021).    FamHx: family history includes Heart disease in his brother and father.    SocHx:  reports that he quit smoking about 35 years ago. His smoking use included cigarettes. He has a 50.00 pack-year smoking history. He has never used smokeless tobacco. He reports current alcohol use of about 8.0 standard drinks of alcohol per week. He reports that he does not use drugs.      Physical Exam:  General: A&Ox3, no apparent distress, no deformities  Neck: No masses, normal thyroid  Lungs: normal inspiration, no use of accessory muscles  Heart: normal pulse, no arrhythmias  Abdomen: Soft, NT, ND, no masses, no hernias, no hepatosplenomegaly  Lymphatic: Neck and groin nodes negative  : 3/23- Test desc filipe, no abnormalities of epididymus. Normal penile and scrotal skin. Meatus normal.     Labs/Studies:   Cytology negative 8/22  CT stone protocol no nephrolithiasis 1/22  CT urogram 0.5 cm distal right ureteral stone, bladder stones, BPH 12/21  Creatinine 1.5 7/22    Procedure: Diagnostic Cystoscopy    Procedure in Detail: After proper consents were obtained, the patient was prepped and draped in normal sterile fashion for diagnostic cystoscopy. 5 ml of lidocaine jelly was instilled in the urethra.  The flexible cystoscope was then introduced into the urethra, and advanced into the bladder under direct vision. The urethral mucosa appeared normal, and no strictures were noted. The sphincter appeared to be normal, and the veru montanum was unremarkable. The prostatic mucosa demonstrated lateral lobe coaptation. The bladder neck was normal. Inspection of the interior of the bladder was then carried out. The trigone was unremarkable, with no mucosal lesions. The ureteral orifices were normal in position and configuration. Systematic inspection of the mucosa of the bladder it was then carried out, rotating the cystoscope so that all areas of the left and right lateral walls, the dome of the bladder, and the posterior wall were all visualized. The cystoscope was then advanced further into the bladder, and maximum deflection of the scope was performed so that the bladder neck could be inspected. No mucosal lesions were noted there.  Of note there was a lot of debris as before, making this a little difficult.  The cystoscope was then removed, and the procedure terminated.     Findings:  Debris, otherwise stable, status post biopsy and BCG      Impression/Plan:      1. History of bladder cancer-  BCG 3/22, High pTa, but suspicious for T1 cystoscopy with bladder biopsy, vesical litholapaxy, could not assess the right ureter  12/28/21.  TURBT 5-6 years ago at the outside facility with induction BCG.    Cystoscopy clear, except for significant debris.  Follow-up on cytology.  Patient completed induction BCG and will hold.  See him back in 6 months with cystoscopy and cytology, as he is now over a year out, as long as the cytology is clear and continue this course of action till at least 5 years from his last positive biopsy.    2. Nephrolithiasis- no recurrent stones, of note we could not get up the right ureter, but the patient had passed the stones per CT scan.    3. BPH- voiding well on tamsulosin, did not want to  pursue repeat PSA at the last appointment.    4. Gross hematuria- this has cleared, call with any further issues.    5. Erectile dysfunction- given Viagra 100 mg but currently not an issue.

## 2023-03-03 NOTE — PROGRESS NOTES
Reviewed allergies with patient, administered Ciprofloxacin orally. Pt tolerated administration well. Patient agreed to wait 15 minutes after administration in the clinic and report any adverse reactions.

## 2023-03-08 LAB
COMMENT: NORMAL
FINAL PATHOLOGIC DIAGNOSIS: NORMAL
Lab: NORMAL

## 2023-03-24 ENCOUNTER — OFFICE VISIT (OUTPATIENT)
Dept: OPHTHALMOLOGY | Facility: CLINIC | Age: 83
End: 2023-03-24
Payer: MEDICARE

## 2023-03-24 ENCOUNTER — PATIENT MESSAGE (OUTPATIENT)
Dept: OPHTHALMOLOGY | Facility: CLINIC | Age: 83
End: 2023-03-24

## 2023-03-24 DIAGNOSIS — E11.69 DM TYPE 2 WITH DIABETIC DYSLIPIDEMIA: Primary | ICD-10-CM

## 2023-03-24 DIAGNOSIS — H35.3131 NONEXUDATIVE AGE-RELATED MACULAR DEGENERATION, BILATERAL, EARLY DRY STAGE: ICD-10-CM

## 2023-03-24 DIAGNOSIS — Z96.1 PSEUDOPHAKIA OF BOTH EYES: ICD-10-CM

## 2023-03-24 DIAGNOSIS — H52.7 REFRACTIVE ERRORS: ICD-10-CM

## 2023-03-24 DIAGNOSIS — H18.003 CORNEA VERTICILLATA OF BOTH EYES: ICD-10-CM

## 2023-03-24 DIAGNOSIS — E78.5 DM TYPE 2 WITH DIABETIC DYSLIPIDEMIA: Primary | ICD-10-CM

## 2023-03-24 PROCEDURE — 99999 PR PBB SHADOW E&M-EST. PATIENT-LVL III: ICD-10-PCS | Mod: PBBFAC,,, | Performed by: OPTOMETRIST

## 2023-03-24 PROCEDURE — 92014 COMPRE OPH EXAM EST PT 1/>: CPT | Mod: S$PBB,,, | Performed by: OPTOMETRIST

## 2023-03-24 PROCEDURE — 92015 PR REFRACTION: ICD-10-PCS | Mod: ,,, | Performed by: OPTOMETRIST

## 2023-03-24 PROCEDURE — 92014 PR EYE EXAM, EST PATIENT,COMPREHESV: ICD-10-PCS | Mod: S$PBB,,, | Performed by: OPTOMETRIST

## 2023-03-24 PROCEDURE — 99999 PR PBB SHADOW E&M-EST. PATIENT-LVL III: CPT | Mod: PBBFAC,,, | Performed by: OPTOMETRIST

## 2023-03-24 PROCEDURE — 99213 OFFICE O/P EST LOW 20 MIN: CPT | Mod: PBBFAC | Performed by: OPTOMETRIST

## 2023-03-24 PROCEDURE — 92015 DETERMINE REFRACTIVE STATE: CPT | Mod: ,,, | Performed by: OPTOMETRIST

## 2023-04-04 RX ORDER — DONEPEZIL HYDROCHLORIDE 5 MG/1
5 TABLET, FILM COATED ORAL NIGHTLY
Qty: 90 TABLET | Refills: 2 | Status: SHIPPED | OUTPATIENT
Start: 2023-04-04 | End: 2023-12-18

## 2023-04-04 NOTE — TELEPHONE ENCOUNTER
----- Message from Angie Dillon sent at 4/4/2023  1:25 PM CDT -----  Regarding: pt call back  Name of Who is Calling: FRANCINE HERNÁNDEZ JR. [[id number]]      What is the request in detail: Pt is currently out of his donepeziL (ARICEPT) 5 MG tablet, previous prescribing dr is not refilling. Would like to know if Dr Barrera can refill it for him or give him a call.       [facility] [place] [address]  Phone: [phone] Fax: [phone]          Can the clinic reply by [facility]: no       What Number to [facility] Back [facility]: [phone]

## 2023-04-12 DIAGNOSIS — I48.0 PAROXYSMAL ATRIAL FIBRILLATION: Primary | ICD-10-CM

## 2023-04-12 DIAGNOSIS — I10 PRIMARY HYPERTENSION: ICD-10-CM

## 2023-04-19 ENCOUNTER — CLINICAL SUPPORT (OUTPATIENT)
Dept: CARDIOLOGY | Facility: CLINIC | Age: 83
End: 2023-04-19
Payer: MEDICARE

## 2023-04-19 ENCOUNTER — OFFICE VISIT (OUTPATIENT)
Dept: CARDIOLOGY | Facility: CLINIC | Age: 83
End: 2023-04-19
Payer: MEDICARE

## 2023-04-19 VITALS
DIASTOLIC BLOOD PRESSURE: 68 MMHG | HEART RATE: 63 BPM | BODY MASS INDEX: 31.81 KG/M2 | HEIGHT: 72 IN | SYSTOLIC BLOOD PRESSURE: 128 MMHG | OXYGEN SATURATION: 98 %

## 2023-04-19 DIAGNOSIS — I48.0 PAROXYSMAL ATRIAL FIBRILLATION: Primary | ICD-10-CM

## 2023-04-19 DIAGNOSIS — E78.5 DM TYPE 2 WITH DIABETIC DYSLIPIDEMIA: ICD-10-CM

## 2023-04-19 DIAGNOSIS — I77.89 OTHER SPECIFIED DISORDERS OF ARTERIES AND ARTERIOLES: ICD-10-CM

## 2023-04-19 DIAGNOSIS — E11.69 DM TYPE 2 WITH DIABETIC DYSLIPIDEMIA: ICD-10-CM

## 2023-04-19 DIAGNOSIS — I48.0 PAROXYSMAL ATRIAL FIBRILLATION: ICD-10-CM

## 2023-04-19 DIAGNOSIS — I10 PRIMARY HYPERTENSION: ICD-10-CM

## 2023-04-19 DIAGNOSIS — E78.00 HYPERCHOLESTEREMIA: ICD-10-CM

## 2023-04-19 PROCEDURE — 99214 PR OFFICE/OUTPT VISIT, EST, LEVL IV, 30-39 MIN: ICD-10-PCS | Mod: S$PBB,,, | Performed by: INTERNAL MEDICINE

## 2023-04-19 PROCEDURE — 99999 PR PBB SHADOW E&M-EST. PATIENT-LVL III: ICD-10-PCS | Mod: PBBFAC,,, | Performed by: INTERNAL MEDICINE

## 2023-04-19 PROCEDURE — 99999 PR PBB SHADOW E&M-EST. PATIENT-LVL III: CPT | Mod: PBBFAC,,, | Performed by: INTERNAL MEDICINE

## 2023-04-19 PROCEDURE — 93005 ELECTROCARDIOGRAM TRACING: CPT | Mod: PBBFAC,PO | Performed by: INTERNAL MEDICINE

## 2023-04-19 PROCEDURE — 93010 ELECTROCARDIOGRAM REPORT: CPT | Mod: S$PBB,,, | Performed by: INTERNAL MEDICINE

## 2023-04-19 PROCEDURE — 99214 OFFICE O/P EST MOD 30 MIN: CPT | Mod: S$PBB,,, | Performed by: INTERNAL MEDICINE

## 2023-04-19 PROCEDURE — 99213 OFFICE O/P EST LOW 20 MIN: CPT | Mod: PBBFAC,PO | Performed by: INTERNAL MEDICINE

## 2023-04-19 PROCEDURE — 93010 EKG 12-LEAD: ICD-10-PCS | Mod: S$PBB,,, | Performed by: INTERNAL MEDICINE

## 2023-04-19 NOTE — PROGRESS NOTES
Subjective:   Patient ID:  Jose Miguel Alvarez Jr. is a 82 y.o. male who presents for follow up of No chief complaint on file.      81 yo male 6 months f/u  PMH PAF s/p DCCV few yrs ago and RFA at [facility] in 2018 HTN HLD COPD quit smoking  Prior cardiologist Dr. Tanesha Coffman at [facility], h/o COVID 19 in . Hospitalization over a month, anemia, h/o bladder cancer on chemo and XRT, no h./o DM and stroke    10/2021 admitted to severe anemia and HGB dropped to 4.2 and had 4 units of PRBC. Felt better  This was 3rd episode of severe anemia in the past two yrs  No chest pain, RECINOS dizziness palpitation  No active bleeding     visit  S/p cystoscopy in  bladder cancer clear  No BP check at home. Mod compliance. No active bleeding  No chest pain dyspnea headache, palpitation dizziness faint and leg swelling   CMP TSH and chest CT reviewed     visit  No h/o MI DM and CVA chronic SOB.   No recurrent palpitation. No active bleeding. No chest pain dizziness and faint  Plays golf daily. No smoking and drinking  EKG today NSR  CMP TSH and chest done wi one yr. Chest ct showed emphysema  PFT pending    Interval history  Ekg NSR LVH   Mo chest pain palpitation dizziness  No active bleeding   Bladder cancer stable                        Past Medical History:   Diagnosis Date    Anemia     Anticoagulant long-term use     Atrial fibrillation     Bladder cancer     Bladder tumor     CKD (chronic kidney disease), stage III     COPD (chronic obstructive pulmonary disease)     pt denies    Encounter for blood transfusion     Hematuria     History of 2019 novel coronavirus disease (COVID-19)     Hypercholesteremia     Hypertension        Past Surgical History:   Procedure Laterality Date    bladder tumor removal      CARDIAC ELECTROPHYSIOLOGY MAPPING AND ABLATION      CARDIOVERSION      several    CATARACT EXTRACTION Bilateral     cataract removal with IOL implants Bilateral     CHOLECYSTECTOMY       CYSTOSCOPIC LITHOLAPAXY N/A 2021    Procedure: CYSTOLITHOLAPAXY;  Surgeon: Medardo Garcia MD;  Location: [facility] OR;  Service: Urology;  Laterality: N/A;    CYSTOSCOPY      CYSTOSCOPY WITH BIOPSY OF BLADDER Right 2021    Procedure: CYSTOSCOPY, WITH BLADDER BIOPSY, WITH FULGURATION IF INDICATED;  Surgeon: Medardo Garcia MD;  Location: [facility] OR;  Service: Urology;  Laterality: Right;    ESOPHAGOGASTRODUODENOSCOPY N/A 10/25/2021    Procedure: Small Jason Enteroscopy (SBE);  Surgeon: Isabelle Griffin MD;  Location: [facility] ENDO;  Service: Endoscopy;  Laterality: N/A;    ESOPHAGOGASTRODUODENOSCOPY N/A 2021    Procedure: EGD (ESOPHAGOGASTRODUODENOSCOPY);  Surgeon: Troy Polanco MD;  Location: [facility];  Service: Endoscopy;  Laterality: N/A;    EYE SURGERY      INTRALUMINAL GASTROINTESTINAL TRACT IMAGING VIA CAPSULE N/A 2021    Procedure: IMAGING PROCEDURE, GI TRACT, INTRALUMINAL, VIA CAPSULE;  Surgeon: Larry Jonse RN;  Location: [facility] ENDO;  Service: Endoscopy;  Laterality: N/A;    TRANSURETHRAL RESECTION OF BLADDER TUMOR BY BIPOLAR CAUTERY N/A 2019    Procedure: TURBT, USING BIPOLAR CAUTERY;  Surgeon: Ritesh Marques MD;  Location: [facility];  Service: Urology;  Laterality: N/A;       Social History     Tobacco Use    Smoking status: Former     Packs/day: 2.50     Years: 20.00     Pack years: 50.00     Types: Cigarettes     Quit date:      Years since quittin.3    Smokeless tobacco: Never   Substance Use Topics    Alcohol use: Yes     Alcohol/week: 7.0 standard drinks     Types: 7 Cans of beer per week    Drug use: No       Family History   Problem Relation Age of Onset    Heart disease Father     Hypertension Father     Heart disease Brother          Review of Systems   Constitutional: Negative for decreased appetite, diaphoresis, fever, malaise/fatigue and night sweats.   HENT:  Negative for nosebleeds.    Eyes:  Negative for blurred vision and double vision.    Cardiovascular:  Negative for chest pain, claudication, dyspnea on exertion, irregular heartbeat, leg swelling, near-syncope, orthopnea, palpitations, paroxysmal nocturnal dyspnea and syncope.   Respiratory:  Negative for cough, shortness of breath, sleep disturbances due to breathing, snoring, sputum production and wheezing.    Endocrine: Negative for cold intolerance and polyuria.   Hematologic/Lymphatic: Does not bruise/bleed easily.   Skin:  Negative for rash.   Musculoskeletal:  Positive for arthritis and back pain. Negative for falls, joint pain, joint swelling and neck pain.   Gastrointestinal:  Negative for abdominal pain, heartburn, nausea and vomiting.   Genitourinary:  Negative for dysuria, frequency and hematuria.   Neurological:  Negative for difficulty with concentration, dizziness, focal weakness, headaches, light-headedness, numbness, seizures and weakness.   Psychiatric/Behavioral:  Negative for depression, memory loss and substance abuse. The patient does not have insomnia.    Allergic/Immunologic: Negative for HIV exposure and hives.     Objective:   Physical Exam  HENT:      Head: Normocephalic.   Eyes:      Pupils: Pupils are equal, round, and reactive to light.   Neck:      Thyroid: No thyromegaly.      Vascular: Normal carotid pulses. No carotid bruit or JVD.   Cardiovascular:      Rate and Rhythm: Normal rate and regular rhythm. No extrasystoles are present.     Chest Wall: PMI is not displaced.      Pulses: Normal pulses.      Heart sounds: Normal heart sounds. No murmur heard.    No gallop. No S3 sounds.   Pulmonary:      Effort: No respiratory distress.      Breath sounds: Normal breath sounds. No stridor.   Abdominal:      General: Bowel sounds are normal.      Palpations: Abdomen is soft.      Tenderness: There is no abdominal tenderness. There is no rebound.   Musculoskeletal:         General: Normal range of motion.   Skin:     Findings: No rash.   Neurological:      Mental Status:  He is alert and oriented to person, place, and time.   Psychiatric:         Behavior: Behavior normal.       Lab Results   Component Value Date    CHOL 120 01/26/2023    CHOL 73 (L) 05/28/2021    CHOL 116 (L) 11/06/2020     Lab Results   Component Value Date    HDL 42 01/26/2023    HDL 39 (L) 05/28/2021    HDL 58 11/06/2020     Lab Results   Component Value Date    LDLCALC 44.4 (L) 01/26/2023    LDLCALC 22.4 (L) 05/28/2021    LDLCALC 45.6 (L) 11/06/2020     Lab Results   Component Value Date    TRIG 168 (H) 01/26/2023    TRIG 58 05/28/2021    TRIG 62 11/06/2020     Lab Results   Component Value Date    CHOLHDL 35.0 01/26/2023    CHOLHDL 53.4 (H) 05/28/2021    CHOLHDL 50.0 11/06/2020       Chemistry        Component Value Date/Time     01/26/2023 1026    K 4.0 01/26/2023 1026     01/26/2023 1026    CO2 25 01/26/2023 1026    BUN 20 01/26/2023 1026    CREATININE 1.2 01/26/2023 1026    GLU 98 01/26/2023 1026        Component Value Date/Time    CALCIUM 9.4 01/26/2023 1026    ALKPHOS 62 01/26/2023 1026    AST 27 01/26/2023 1026    AST 29 04/24/2016 1145    ALT 34 01/26/2023 1026    BILITOT 0.5 01/26/2023 1026    ESTGFRAFRICA >60 12/08/2021 2007    EGFRNONAA 56 (A) 12/08/2021 2007          Lab Results   Component Value Date    HGBA1C 6.2 (H) 01/26/2023     Lab Results   Component Value Date    TSH 3.588 10/23/2021     Lab Results   Component Value Date    INR 1.0 07/23/2019    INR 1.1 06/21/2016    INR 1.1 04/26/2016     Lab Results   Component Value Date    WBC 4.71 01/26/2023    HGB 11.2 (L) 01/26/2023    HCT 37.7 (L) 01/26/2023    MCV 85 01/26/2023     (L) 01/26/2023     BMP  Sodium   Date Value Ref Range Status   01/26/2023 141 136 - 145 mmol/L Final     Potassium   Date Value Ref Range Status   01/26/2023 4.0 3.5 - 5.1 mmol/L Final     Chloride   Date Value Ref Range Status   01/26/2023 106 95 - 110 mmol/L Final     CO2   Date Value Ref Range Status   01/26/2023 25 23 - 29 mmol/L Final     BUN    Date Value Ref Range Status   01/26/2023 20 8 - 23 mg/dL Final     Creatinine   Date Value Ref Range Status   01/26/2023 1.2 0.5 - 1.4 mg/dL Final     Calcium   Date Value Ref Range Status   01/26/2023 9.4 8.7 - 10.5 mg/dL Final     Anion Gap   Date Value Ref Range Status   01/26/2023 10 8 - 16 mmol/L Final     eGFR if    Date Value Ref Range Status   12/08/2021 >60 >60 mL/min/1.73 m^2 Final     eGFR if non    Date Value Ref Range Status   12/08/2021 56 (A) >60 mL/min/1.73 m^2 Final     Comment:     Calculation used to obtain the estimated glomerular filtration  rate (eGFR) is the CKD-EPI equation.        BNP  @LABRCNTIP(BNP,BNPTRIAGEBLO)@  @LABRCNTIP(troponini)@  CrCl cannot be calculated (Patient's most recent lab result is older than the maximum 7 days allowed.).  No results found in the last 24 hours.  No results found in the last 24 hours.  No results found in the last 24 hours.    Assessment:      1. Paroxysmal atrial fibrillation    2. Primary hypertension    3. Hypercholesteremia    4. DM type 2 with diabetic dyslipidemia    5. Other specified disorders of arteries and arterioles        Plan:   Carotid US ordered  Continue amloidpine 5 mg and HCTZ 25 mg daily for HTN  Continue metoprolol xarelto and Amiodarone  DM Rx per PCP  Counseled DASH  Check Lipid profile with PCP in 6 months  Recommend heart-healthy diet, weight control and regular exercise.  Fadia. Risk modification.   I have reviewed all pertinent labs and cardiac studies independently. Plans and recommendations have been formulated under my direct supervision. All questions answered and patient voiced understanding.   If symptoms persist go to the ED  RTC in 6 months

## 2023-04-20 ENCOUNTER — PES CALL (OUTPATIENT)
Dept: ADMINISTRATIVE | Facility: CLINIC | Age: 83
End: 2023-04-20
Payer: MEDICARE

## 2023-04-25 ENCOUNTER — HOSPITAL ENCOUNTER (OUTPATIENT)
Dept: CARDIOLOGY | Facility: HOSPITAL | Age: 83
Discharge: HOME | End: 2023-04-25
Attending: INTERNAL MEDICINE
Payer: MEDICARE

## 2023-04-25 VITALS
SYSTOLIC BLOOD PRESSURE: 158 MMHG | DIASTOLIC BLOOD PRESSURE: 71 MMHG | WEIGHT: 234 LBS | BODY MASS INDEX: 31.69 KG/M2 | HEIGHT: 72 IN

## 2023-04-25 DIAGNOSIS — I10 PRIMARY HYPERTENSION: ICD-10-CM

## 2023-04-25 DIAGNOSIS — E78.00 HYPERCHOLESTEREMIA: ICD-10-CM

## 2023-04-25 DIAGNOSIS — I77.89 OTHER SPECIFIED DISORDERS OF ARTERIES AND ARTERIOLES: ICD-10-CM

## 2023-04-25 LAB
LEFT ARM DIASTOLIC BLOOD PRESSURE: 77 MMHG
LEFT ARM SYSTOLIC BLOOD PRESSURE: 170 MMHG
LEFT CBA DIAS: 25 CM/S
LEFT CBA SYS: 96 CM/S
LEFT CCA DIST DIAS: 32 CM/S
LEFT CCA DIST SYS: 112 CM/S
LEFT CCA MID DIAS: 24 CM/S
LEFT CCA MID SYS: 128 CM/S
LEFT CCA PROX DIAS: 30 CM/S
LEFT CCA PROX SYS: 161 CM/S
LEFT ECA DIAS: 23 CM/S
LEFT ECA SYS: 124 CM/S
LEFT ICA DIST DIAS: 38 CM/S
LEFT ICA DIST SYS: 94 CM/S
LEFT ICA MID DIAS: 30 CM/S
LEFT ICA MID SYS: 85 CM/S
LEFT ICA PROX DIAS: 21 CM/S
LEFT ICA PROX SYS: 91 CM/S
LEFT VERTEBRAL DIAS: 19 CM/S
LEFT VERTEBRAL SYS: 66 CM/S
OHS CV CAROTID RIGHT ICA EDV HIGHEST: 41
OHS CV CAROTID ULTRASOUND LEFT ICA/CCA RATIO: 0.84
OHS CV CAROTID ULTRASOUND RIGHT ICA/CCA RATIO: 1.47
OHS CV PV CAROTID LEFT HIGHEST CCA: 161
OHS CV PV CAROTID LEFT HIGHEST ICA: 94
OHS CV PV CAROTID RIGHT HIGHEST CCA: 102
OHS CV PV CAROTID RIGHT HIGHEST ICA: 116
OHS CV US CAROTID LEFT HIGHEST EDV: 38
RIGHT ARM DIASTOLIC BLOOD PRESSURE: 71 MMHG
RIGHT ARM SYSTOLIC BLOOD PRESSURE: 158 MMHG
RIGHT CBA DIAS: 19 CM/S
RIGHT CBA SYS: 75 CM/S
RIGHT CCA DIST DIAS: 18 CM/S
RIGHT CCA DIST SYS: 79 CM/S
RIGHT CCA MID DIAS: 18 CM/S
RIGHT CCA MID SYS: 86 CM/S
RIGHT CCA PROX DIAS: 19 CM/S
RIGHT CCA PROX SYS: 102 CM/S
RIGHT ECA DIAS: 20 CM/S
RIGHT ECA SYS: 103 CM/S
RIGHT ICA DIST DIAS: 34 CM/S
RIGHT ICA DIST SYS: 116 CM/S
RIGHT ICA MID DIAS: 41 CM/S
RIGHT ICA MID SYS: 111 CM/S
RIGHT ICA PROX DIAS: 23 CM/S
RIGHT ICA PROX SYS: 84 CM/S
RIGHT VERTEBRAL DIAS: 9 CM/S
RIGHT VERTEBRAL SYS: 48 CM/S

## 2023-04-25 PROCEDURE — 93880 CV US DOPPLER CAROTID (CUPID ONLY): ICD-10-PCS | Mod: 26,,, | Performed by: INTERNAL MEDICINE

## 2023-04-25 PROCEDURE — 93880 EXTRACRANIAL BILAT STUDY: CPT

## 2023-04-25 PROCEDURE — 93880 EXTRACRANIAL BILAT STUDY: CPT | Mod: 26,,, | Performed by: INTERNAL MEDICINE

## 2023-04-26 ENCOUNTER — TELEPHONE (OUTPATIENT)
Dept: CARDIOLOGY | Facility: CLINIC | Age: 83
End: 2023-04-26
Payer: MEDICARE

## 2023-04-26 NOTE — TELEPHONE ENCOUNTER
Spoke with pt  in regards to US results. Pt verbalized he understood information.             ----- Message from Carlos Flower MD sent at 4/26/2023  1:05 PM CDT -----  Carotid US showed mild Dz  Continue current Rx  F/U as scheduled

## 2023-04-28 RX ORDER — TAMSULOSIN HYDROCHLORIDE 0.4 MG/1
CAPSULE ORAL
Qty: 90 CAPSULE | Refills: 3 | Status: ON HOLD | OUTPATIENT
Start: 2023-04-28

## 2023-05-03 DIAGNOSIS — Z71.89 COMPLEX CARE COORDINATION: ICD-10-CM

## 2023-05-08 NOTE — PROGRESS NOTES
HPI    Cloudy vision.decrease vision left eye.  Last eye exam 07/12/2022 TRF.  Patient left glasses in the car  Update glasses RX.  Lab Results       Component                Value               Date                       HGBA1C                   6.2 (H)             01/26/2023              Last edited by Noreen Paul MA on 3/24/2023  9:34 AM.            Assessment /Plan     For exam results, see Encounter Report.    DM type 2 with diabetic dyslipidemia    Nonexudative age-related macular degeneration, bilateral, early dry stage    Pseudophakia of both eyes    Cornea verticillata of both eyes    Refractive errors      No Background Diabetic Retinopathy  Strict BG control, f/u w/ PCP, and annual DFE  Stressed importance of DM control to preserve vision    Stable mottling of mac OU    Stable IOL OU.    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    Dispense Final Rx for glasses.  RTC 1 year  Discussed above and answered questions.                      syncope

## 2023-10-24 ENCOUNTER — PATIENT MESSAGE (OUTPATIENT)
Dept: ADMINISTRATIVE | Facility: HOSPITAL | Age: 83
End: 2023-10-24
Payer: MEDICARE

## 2023-10-24 DIAGNOSIS — I10 PRIMARY HYPERTENSION: ICD-10-CM

## 2023-10-24 DIAGNOSIS — I48.0 PAROXYSMAL ATRIAL FIBRILLATION: Primary | ICD-10-CM

## 2023-10-25 ENCOUNTER — OFFICE VISIT (OUTPATIENT)
Dept: CARDIOLOGY | Facility: CLINIC | Age: 83
End: 2023-10-25
Payer: MEDICARE

## 2023-10-25 ENCOUNTER — CLINICAL SUPPORT (OUTPATIENT)
Dept: CARDIOLOGY | Facility: CLINIC | Age: 83
End: 2023-10-25
Payer: MEDICARE

## 2023-10-25 VITALS
DIASTOLIC BLOOD PRESSURE: 68 MMHG | WEIGHT: 240.94 LBS | HEIGHT: 72 IN | HEART RATE: 62 BPM | BODY MASS INDEX: 32.63 KG/M2 | OXYGEN SATURATION: 97 % | SYSTOLIC BLOOD PRESSURE: 167 MMHG

## 2023-10-25 DIAGNOSIS — I70.533: ICD-10-CM

## 2023-10-25 DIAGNOSIS — I48.0 PAROXYSMAL ATRIAL FIBRILLATION: ICD-10-CM

## 2023-10-25 DIAGNOSIS — E78.5 DM TYPE 2 WITH DIABETIC DYSLIPIDEMIA: ICD-10-CM

## 2023-10-25 DIAGNOSIS — E78.00 HYPERCHOLESTEREMIA: ICD-10-CM

## 2023-10-25 DIAGNOSIS — I73.9 PVD (PERIPHERAL VASCULAR DISEASE): ICD-10-CM

## 2023-10-25 DIAGNOSIS — I50.42 CHRONIC COMBINED SYSTOLIC (CONGESTIVE) AND DIASTOLIC (CONGESTIVE) HEART FAILURE: ICD-10-CM

## 2023-10-25 DIAGNOSIS — I10 PRIMARY HYPERTENSION: ICD-10-CM

## 2023-10-25 DIAGNOSIS — I70.543: ICD-10-CM

## 2023-10-25 DIAGNOSIS — I48.0 PAROXYSMAL ATRIAL FIBRILLATION: Primary | ICD-10-CM

## 2023-10-25 DIAGNOSIS — E11.69 DM TYPE 2 WITH DIABETIC DYSLIPIDEMIA: ICD-10-CM

## 2023-10-25 PROCEDURE — 99999 PR PBB SHADOW E&M-EST. PATIENT-LVL IV: CPT | Mod: PBBFAC,,, | Performed by: INTERNAL MEDICINE

## 2023-10-25 PROCEDURE — 1157F ADVNC CARE PLAN IN RCRD: CPT | Mod: CPTII,S$GLB,, | Performed by: INTERNAL MEDICINE

## 2023-10-25 PROCEDURE — 1157F PR ADVANCE CARE PLAN OR EQUIV PRESENT IN MEDICAL RECORD: ICD-10-PCS | Mod: CPTII,S$GLB,, | Performed by: INTERNAL MEDICINE

## 2023-10-25 PROCEDURE — 1159F PR MEDICATION LIST DOCUMENTED IN MEDICAL RECORD: ICD-10-PCS | Mod: CPTII,S$GLB,, | Performed by: INTERNAL MEDICINE

## 2023-10-25 PROCEDURE — 93010 EKG 12-LEAD: ICD-10-PCS | Mod: S$GLB,,, | Performed by: INTERNAL MEDICINE

## 2023-10-25 PROCEDURE — 3078F PR MOST RECENT DIASTOLIC BLOOD PRESSURE < 80 MM HG: ICD-10-PCS | Mod: CPTII,S$GLB,, | Performed by: INTERNAL MEDICINE

## 2023-10-25 PROCEDURE — 1160F PR REVIEW ALL MEDS BY PRESCRIBER/CLIN PHARMACIST DOCUMENTED: ICD-10-PCS | Mod: CPTII,S$GLB,, | Performed by: INTERNAL MEDICINE

## 2023-10-25 PROCEDURE — 93005 EKG 12-LEAD: ICD-10-PCS | Mod: S$GLB,,, | Performed by: INTERNAL MEDICINE

## 2023-10-25 PROCEDURE — 3077F PR MOST RECENT SYSTOLIC BLOOD PRESSURE >= 140 MM HG: ICD-10-PCS | Mod: CPTII,S$GLB,, | Performed by: INTERNAL MEDICINE

## 2023-10-25 PROCEDURE — 1101F PR PT FALLS ASSESS DOC 0-1 FALLS W/OUT INJ PAST YR: ICD-10-PCS | Mod: CPTII,S$GLB,, | Performed by: INTERNAL MEDICINE

## 2023-10-25 PROCEDURE — 99214 PR OFFICE/OUTPT VISIT, EST, LEVL IV, 30-39 MIN: ICD-10-PCS | Mod: S$GLB,,, | Performed by: INTERNAL MEDICINE

## 2023-10-25 PROCEDURE — 99999 PR PBB SHADOW E&M-EST. PATIENT-LVL IV: ICD-10-PCS | Mod: PBBFAC,,, | Performed by: INTERNAL MEDICINE

## 2023-10-25 PROCEDURE — 1126F AMNT PAIN NOTED NONE PRSNT: CPT | Mod: CPTII,S$GLB,, | Performed by: INTERNAL MEDICINE

## 2023-10-25 PROCEDURE — 3288F FALL RISK ASSESSMENT DOCD: CPT | Mod: CPTII,S$GLB,, | Performed by: INTERNAL MEDICINE

## 2023-10-25 PROCEDURE — 93005 ELECTROCARDIOGRAM TRACING: CPT | Mod: PBBFAC,PO | Performed by: INTERNAL MEDICINE

## 2023-10-25 PROCEDURE — 1159F MED LIST DOCD IN RCRD: CPT | Mod: CPTII,S$GLB,, | Performed by: INTERNAL MEDICINE

## 2023-10-25 PROCEDURE — 3078F DIAST BP <80 MM HG: CPT | Mod: CPTII,S$GLB,, | Performed by: INTERNAL MEDICINE

## 2023-10-25 PROCEDURE — 1101F PT FALLS ASSESS-DOCD LE1/YR: CPT | Mod: CPTII,S$GLB,, | Performed by: INTERNAL MEDICINE

## 2023-10-25 PROCEDURE — 93010 ELECTROCARDIOGRAM REPORT: CPT | Mod: S$GLB,,, | Performed by: INTERNAL MEDICINE

## 2023-10-25 PROCEDURE — 1126F PR PAIN SEVERITY QUANTIFIED, NO PAIN PRESENT: ICD-10-PCS | Mod: CPTII,S$GLB,, | Performed by: INTERNAL MEDICINE

## 2023-10-25 PROCEDURE — 1160F RVW MEDS BY RX/DR IN RCRD: CPT | Mod: CPTII,S$GLB,, | Performed by: INTERNAL MEDICINE

## 2023-10-25 PROCEDURE — 99214 OFFICE O/P EST MOD 30 MIN: CPT | Mod: S$GLB,,, | Performed by: INTERNAL MEDICINE

## 2023-10-25 PROCEDURE — 3288F PR FALLS RISK ASSESSMENT DOCUMENTED: ICD-10-PCS | Mod: CPTII,S$GLB,, | Performed by: INTERNAL MEDICINE

## 2023-10-25 PROCEDURE — 93005 ELECTROCARDIOGRAM TRACING: CPT | Mod: S$GLB,,, | Performed by: INTERNAL MEDICINE

## 2023-10-25 PROCEDURE — 3077F SYST BP >= 140 MM HG: CPT | Mod: CPTII,S$GLB,, | Performed by: INTERNAL MEDICINE

## 2023-10-25 RX ORDER — FUROSEMIDE 40 MG/1
40 TABLET ORAL
Qty: 30 TABLET | Refills: 11 | Status: SHIPPED | OUTPATIENT
Start: 2023-10-26 | End: 2023-10-27

## 2023-10-25 RX ORDER — CARVEDILOL 6.25 MG/1
6.25 TABLET ORAL 2 TIMES DAILY WITH MEALS
Qty: 60 TABLET | Refills: 11 | Status: SHIPPED | OUTPATIENT
Start: 2023-10-25 | End: 2023-10-27

## 2023-10-25 NOTE — PROGRESS NOTES
Subjective:   Patient ID:  Jose Miguel Alvarez Jr. is a 82 y.o. male who presents for follow up of No chief complaint on file.      81 yo male 6 months f/u  PMH PAF s/p DCCV few yrs ago and RFA at [facility] in 2018 HTN HLD COPD quit smoking  Prior cardiologist Dr. Tanesha Coffman at [facility], h/o COVID 19 in . Hospitalization over a month, anemia, h/o bladder cancer on chemo and XRT, no h./o DM and stroke    10/2021 admitted to severe anemia and HGB dropped to 4.2 and had 4 units of PRBC. Felt better  This was 3rd episode of severe anemia in the past two yrs  No chest pain, RECINOS dizziness palpitation  No active bleeding     visit  S/p cystoscopy in  bladder cancer clear  No BP check at home. Mod compliance. No active bleeding  No chest pain dyspnea headache, palpitation dizziness faint and leg swelling   CMP TSH and chest CT reviewed     visit  No h/o MI DM and CVA chronic SOB.   No recurrent palpitation. No active bleeding. No chest pain dizziness and faint  Plays golf daily. No smoking and drinking  EKG today NSR  CMP TSH and chest done wi one yr. Chest ct showed emphysema  PFT pending    04/23 visit  Ekg NSR LVH   Mo chest pain palpitation dizziness  No active bleeding   Bladder cancer stable    Interval history  BP high today. No dizziness faint chest pain.  Plays golf twice a week.   Ekg NSR  LVH                          Past Medical History:   Diagnosis Date    Anemia     Anticoagulant long-term use     Atrial fibrillation     Bladder cancer     Bladder tumor     CKD (chronic kidney disease), stage III     COPD (chronic obstructive pulmonary disease)     pt denies    Encounter for blood transfusion     Hematuria     History of 2019 novel coronavirus disease (COVID-19)     Hypercholesteremia     Hypertension        Past Surgical History:   Procedure Laterality Date    bladder tumor removal      CARDIAC ELECTROPHYSIOLOGY MAPPING AND ABLATION      CARDIOVERSION      several     CATARACT EXTRACTION Bilateral     cataract removal with IOL implants Bilateral     CHOLECYSTECTOMY      CYSTOSCOPIC LITHOLAPAXY N/A 2021    Procedure: CYSTOLITHOLAPAXY;  Surgeon: Medardo Garcia MD;  Location: [facility];  Service: Urology;  Laterality: N/A;    CYSTOSCOPY      CYSTOSCOPY WITH BIOPSY OF BLADDER Right 2021    Procedure: CYSTOSCOPY, WITH BLADDER BIOPSY, WITH FULGURATION IF INDICATED;  Surgeon: Medardo Garcia MD;  Location: [facility];  Service: Urology;  Laterality: Right;    ESOPHAGOGASTRODUODENOSCOPY N/A 10/25/2021    Procedure: Small Jason Enteroscopy (SBE);  Surgeon: Isabelle Griffin MD;  Location: [facility];  Service: Endoscopy;  Laterality: N/A;    ESOPHAGOGASTRODUODENOSCOPY N/A 2021    Procedure: EGD (ESOPHAGOGASTRODUODENOSCOPY);  Surgeon: Troy Polanco MD;  Location: [facility];  Service: Endoscopy;  Laterality: N/A;    EYE SURGERY      INTRALUMINAL GASTROINTESTINAL TRACT IMAGING VIA CAPSULE N/A 2021    Procedure: IMAGING PROCEDURE, GI TRACT, INTRALUMINAL, VIA CAPSULE;  Surgeon: Larry Jones RN;  Location: [facility];  Service: Endoscopy;  Laterality: N/A;    TRANSURETHRAL RESECTION OF BLADDER TUMOR BY BIPOLAR CAUTERY N/A 2019    Procedure: TURBT, USING BIPOLAR CAUTERY;  Surgeon: Ritesh Marques MD;  Location: [facility];  Service: Urology;  Laterality: N/A;       Social History     Tobacco Use    Smoking status: Former     Current packs/day: 0.00     Average packs/day: 2.5 packs/day for 20.0 years (50.0 ttl pk-yrs)     Types: Cigarettes     Start date:      Quit date:      Years since quittin.8    Smokeless tobacco: Never   Substance Use Topics    Alcohol use: Yes     Alcohol/week: 7.0 standard drinks of alcohol     Types: 7 Cans of beer per week    Drug use: No       Family History   Problem Relation Age of Onset    Heart disease Father     Hypertension Father     Heart disease Brother          ROS    Objective:   Physical Exam  HENT:       Head: Normocephalic.   Eyes:      Pupils: Pupils are equal, round, and reactive to light.   Neck:      Thyroid: No thyromegaly.      Vascular: Normal carotid pulses. No carotid bruit or JVD.   Cardiovascular:      Rate and Rhythm: Normal rate and regular rhythm. No extrasystoles are present.     Chest Wall: PMI is not displaced.      Pulses: Normal pulses.      Heart sounds: Normal heart sounds. No murmur heard.     No gallop. No S3 sounds.   Pulmonary:      Effort: No respiratory distress.      Breath sounds: Normal breath sounds. No stridor.   Abdominal:      General: Bowel sounds are normal.      Palpations: Abdomen is soft.      Tenderness: There is no abdominal tenderness. There is no rebound.   Skin:     Findings: No rash.   Neurological:      Mental Status: He is alert and oriented to person, place, and time.   Psychiatric:         Behavior: Behavior normal.         Lab Results   Component Value Date    CHOL 120 01/26/2023    CHOL 73 (L) 05/28/2021    CHOL 116 (L) 11/06/2020     Lab Results   Component Value Date    HDL 42 01/26/2023    HDL 39 (L) 05/28/2021    HDL 58 11/06/2020     Lab Results   Component Value Date    LDLCALC 44.4 (L) 01/26/2023    LDLCALC 22.4 (L) 05/28/2021    LDLCALC 45.6 (L) 11/06/2020     Lab Results   Component Value Date    TRIG 168 (H) 01/26/2023    TRIG 58 05/28/2021    TRIG 62 11/06/2020     Lab Results   Component Value Date    CHOLHDL 35.0 01/26/2023    CHOLHDL 53.4 (H) 05/28/2021    CHOLHDL 50.0 11/06/2020       Chemistry        Component Value Date/Time     01/26/2023 1026    K 4.0 01/26/2023 1026     01/26/2023 1026    CO2 25 01/26/2023 1026    BUN 20 01/26/2023 1026    CREATININE 1.2 01/26/2023 1026    GLU 98 01/26/2023 1026        Component Value Date/Time    CALCIUM 9.4 01/26/2023 1026    ALKPHOS 62 01/26/2023 1026    AST 27 01/26/2023 1026    AST 29 04/24/2016 1145    ALT 34 01/26/2023 1026    BILITOT 0.5 01/26/2023 1026    ESTGFRAFRICA >60 12/08/2021 2007     EGFRNONAA 56 (A) 12/08/2021 2007          Lab Results   Component Value Date    HGBA1C 6.2 (H) 01/26/2023     Lab Results   Component Value Date    TSH 3.588 10/23/2021     Lab Results   Component Value Date    INR 1.9 10/22/2021    INR 1.0 07/23/2019    INR 1.1 06/21/2016     Lab Results   Component Value Date    WBC 4.71 01/26/2023    HGB 11.2 (L) 01/26/2023    HCT 37.7 (L) 01/26/2023    MCV 85 01/26/2023     (L) 01/26/2023     BMP  Sodium   Date Value Ref Range Status   01/26/2023 141 136 - 145 mmol/L Final     Potassium   Date Value Ref Range Status   01/26/2023 4.0 3.5 - 5.1 mmol/L Final     Chloride   Date Value Ref Range Status   01/26/2023 106 95 - 110 mmol/L Final     CO2   Date Value Ref Range Status   01/26/2023 25 23 - 29 mmol/L Final     BUN   Date Value Ref Range Status   01/26/2023 20 8 - 23 mg/dL Final     Creatinine   Date Value Ref Range Status   01/26/2023 1.2 0.5 - 1.4 mg/dL Final     Calcium   Date Value Ref Range Status   01/26/2023 9.4 8.7 - 10.5 mg/dL Final     Anion Gap   Date Value Ref Range Status   01/26/2023 10 8 - 16 mmol/L Final     eGFR if    Date Value Ref Range Status   12/08/2021 >60 >60 mL/min/1.73 m^2 Final     eGFR if non    Date Value Ref Range Status   12/08/2021 56 (A) >60 mL/min/1.73 m^2 Final     Comment:     Calculation used to obtain the estimated glomerular filtration  rate (eGFR) is the CKD-EPI equation.        BNP  @LABRCNTIP(BNP,BNPTRIAGEBLO)@  @LABRCNTIP(troponini)@  CrCl cannot be calculated (Patient's most recent lab result is older than the maximum 7 days allowed.).  No results found in the last 24 hours.  No results found in the last 24 hours.  No results found in the last 24 hours.    Assessment:      1. Paroxysmal atrial fibrillation    2. Primary hypertension    3. Hypercholesteremia    4. DM type 2 with diabetic dyslipidemia    5. Chronic combined systolic (congestive) and diastolic (congestive) heart failure    6.  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 PVD (peripheral vascular disease)      PAF on SR s/p RFA  High BP   Plan:   D/c amiodaone  D/c metoprolol and add Coreg 6.25 mg bid for HTN  Add Lasix 40 mg 4 times a week for pVD  BMP in 2 weeks   DASH    Continue xeralto amlodipine hctz and statin  RTC in 6M

## 2023-10-27 RX ORDER — CARVEDILOL 6.25 MG/1
6.25 TABLET ORAL 2 TIMES DAILY WITH MEALS
Qty: 180 TABLET | Refills: 3 | Status: ON HOLD | OUTPATIENT
Start: 2023-10-27

## 2023-10-27 RX ORDER — FUROSEMIDE 40 MG/1
40 TABLET ORAL DAILY
Qty: 90 TABLET | Refills: 3 | Status: SHIPPED | OUTPATIENT
Start: 2023-10-27 | End: 2023-11-13 | Stop reason: SDUPTHER

## 2023-11-08 ENCOUNTER — LAB VISIT (OUTPATIENT)
Dept: LAB | Facility: HOSPITAL | Age: 83
End: 2023-11-08
Attending: INTERNAL MEDICINE
Payer: MEDICARE

## 2023-11-08 DIAGNOSIS — I73.9 PVD (PERIPHERAL VASCULAR DISEASE): ICD-10-CM

## 2023-11-08 LAB
ANION GAP SERPL CALC-SCNC: 8 MMOL/L (ref 8–16)
BUN SERPL-MCNC: 26 MG/DL (ref 8–23)
CALCIUM SERPL-MCNC: 9.2 MG/DL (ref 8.7–10.5)
CHLORIDE SERPL-SCNC: 107 MMOL/L (ref 95–110)
CO2 SERPL-SCNC: 27 MMOL/L (ref 23–29)
CREAT SERPL-MCNC: 1.5 MG/DL (ref 0.5–1.4)
EST. GFR  (NO RACE VARIABLE): 45.9 ML/MIN/1.73 M^2
GLUCOSE SERPL-MCNC: 241 MG/DL (ref 70–110)
POTASSIUM SERPL-SCNC: 4.5 MMOL/L (ref 3.5–5.1)
SODIUM SERPL-SCNC: 142 MMOL/L (ref 136–145)

## 2023-11-08 PROCEDURE — 80048 BASIC METABOLIC PNL TOTAL CA: CPT | Performed by: INTERNAL MEDICINE

## 2023-11-08 PROCEDURE — 36415 COLL VENOUS BLD VENIPUNCTURE: CPT | Mod: PO | Performed by: INTERNAL MEDICINE

## 2023-11-13 ENCOUNTER — TELEPHONE (OUTPATIENT)
Dept: CARDIOLOGY | Facility: CLINIC | Age: 83
End: 2023-11-13
Payer: MEDICARE

## 2023-11-13 DIAGNOSIS — I50.42 CHRONIC COMBINED SYSTOLIC (CONGESTIVE) AND DIASTOLIC (CONGESTIVE) HEART FAILURE: Primary | ICD-10-CM

## 2023-11-13 RX ORDER — FUROSEMIDE 20 MG/1
20 TABLET ORAL
Qty: 60 TABLET | Refills: 3 | Status: ON HOLD | OUTPATIENT
Start: 2023-11-13

## 2023-11-14 ENCOUNTER — TELEPHONE (OUTPATIENT)
Dept: CARDIOLOGY | Facility: CLINIC | Age: 83
End: 2023-11-14
Payer: MEDICARE

## 2023-11-14 NOTE — TELEPHONE ENCOUNTER
Patient contacted and was advised that  The lab showed slight elevation of Cr level,  Will decrease lasix from 40 mg 4 times a week to 20 mg 4 times a week  Repeat BMP in 2 weeks.  Patient stated understanding, labs set up at the [facility].

## 2023-11-20 ENCOUNTER — PATIENT MESSAGE (OUTPATIENT)
Dept: FAMILY MEDICINE | Facility: CLINIC | Age: 83
End: 2023-11-20
Payer: MEDICARE

## 2023-12-03 DIAGNOSIS — Z71.89 COMPLEX CARE COORDINATION: ICD-10-CM

## 2023-12-18 RX ORDER — DONEPEZIL HYDROCHLORIDE 5 MG/1
5 TABLET, FILM COATED ORAL NIGHTLY
Qty: 90 TABLET | Refills: 3 | Status: ON HOLD | OUTPATIENT
Start: 2023-12-18

## 2024-01-09 ENCOUNTER — TELEPHONE (OUTPATIENT)
Dept: CARDIOLOGY | Facility: CLINIC | Age: 84
End: 2024-01-09
Payer: MEDICARE

## 2024-01-09 NOTE — TELEPHONE ENCOUNTER
Spoke with pt in regards pre op clearance. Informed pt office had fax paperwork to wrong number. Correct fax number was problem        ----- Message from Lyle Serra sent at 1/9/2024  3:36 PM CST -----  Contact: FRANCINE  Patient is requesting an urgent call back, reports dental office has been requesting approval for pt to get off of blood thinners to complete dental work but hasn't heard back from office. Please call the pt back at [phone]              [facility]   Address: [id number] [place] [id number]  Phone: [phone]

## 2024-01-11 DIAGNOSIS — Z00.00 ENCOUNTER FOR MEDICARE ANNUAL WELLNESS EXAM: ICD-10-CM

## 2024-01-23 ENCOUNTER — PATIENT MESSAGE (OUTPATIENT)
Dept: ADMINISTRATIVE | Facility: HOSPITAL | Age: 84
End: 2024-01-23
Payer: MEDICARE

## 2024-01-24 RX ORDER — AMLODIPINE BESYLATE 5 MG/1
5 TABLET ORAL
Qty: 30 TABLET | Refills: 11 | Status: ON HOLD | OUTPATIENT
Start: 2024-01-24 | End: 2024-04-06 | Stop reason: HOSPADM

## 2024-04-01 ENCOUNTER — HOSPITAL ENCOUNTER (INPATIENT)
Facility: HOSPITAL | Age: 84
LOS: 5 days | Discharge: HOME | DRG: 308 | End: 2024-04-07
Attending: EMERGENCY MEDICINE | Admitting: FAMILY MEDICINE
Payer: MEDICARE

## 2024-04-01 DIAGNOSIS — I48.91 A-FIB: ICD-10-CM

## 2024-04-01 DIAGNOSIS — D64.9 SYMPTOMATIC ANEMIA: ICD-10-CM

## 2024-04-01 DIAGNOSIS — R07.9 CHEST PAIN: ICD-10-CM

## 2024-04-01 DIAGNOSIS — R06.02 SOB (SHORTNESS OF BREATH): ICD-10-CM

## 2024-04-01 DIAGNOSIS — I48.91 ATRIAL FIBRILLATION WITH RVR: Primary | ICD-10-CM

## 2024-04-01 DIAGNOSIS — D50.0 IRON DEFICIENCY ANEMIA DUE TO CHRONIC BLOOD LOSS: ICD-10-CM

## 2024-04-01 PROBLEM — R79.89 ELEVATED TROPONIN: Status: ACTIVE | Noted: 2024-04-01

## 2024-04-01 PROBLEM — E11.9 TYPE 2 DIABETES MELLITUS: Status: ACTIVE | Noted: 2024-04-01

## 2024-04-01 LAB
ABO + RH BLD: NORMAL
ALBUMIN SERPL BCP-MCNC: 3.3 G/DL (ref 3.5–5.2)
ALP SERPL-CCNC: 68 U/L (ref 55–135)
ALT SERPL W/O P-5'-P-CCNC: 14 U/L (ref 10–44)
ANION GAP SERPL CALC-SCNC: 10 MMOL/L (ref 8–16)
AORTIC ROOT ANNULUS: 3.62 CM
ASCENDING AORTA: 3.57 CM
AST SERPL-CCNC: 16 U/L (ref 10–40)
AV INDEX (PROSTH): 0.85
AV MEAN GRADIENT: 3 MMHG
AV PEAK GRADIENT: 6 MMHG
AV VALVE AREA BY VELOCITY RATIO: 3.01 CM²
AV VALVE AREA: 3.19 CM²
AV VELOCITY RATIO: 0.8
BASOPHILS # BLD AUTO: 0.03 K/UL (ref 0–0.2)
BASOPHILS NFR BLD: 0.8 % (ref 0–1.9)
BILIRUB SERPL-MCNC: 0.4 MG/DL (ref 0.1–1)
BLD GP AB SCN CELLS X3 SERPL QL: NORMAL
BLD PROD TYP BPU: NORMAL
BLOOD UNIT EXPIRATION DATE: NORMAL
BLOOD UNIT TYPE CODE: 9500
BLOOD UNIT TYPE: NORMAL
BNP SERPL-MCNC: 70 PG/ML (ref 0–99)
BSA FOR ECHO PROCEDURE: 2.36 M2
BUN SERPL-MCNC: 19 MG/DL (ref 8–23)
CALCIUM SERPL-MCNC: 8.6 MG/DL (ref 8.7–10.5)
CHLORIDE SERPL-SCNC: 109 MMOL/L (ref 95–110)
CO2 SERPL-SCNC: 23 MMOL/L (ref 23–29)
CODING SYSTEM: NORMAL
CREAT SERPL-MCNC: 1.1 MG/DL (ref 0.5–1.4)
CROSSMATCH INTERPRETATION: NORMAL
CV ECHO LV RWT: 0.44 CM
DIFFERENTIAL METHOD BLD: ABNORMAL
DISPENSE STATUS: NORMAL
DOP CALC AO PEAK VEL: 1.2 M/S
DOP CALC AO VTI: 20.3 CM
DOP CALC LVOT AREA: 3.8 CM2
DOP CALC LVOT DIAMETER: 2.19 CM
DOP CALC LVOT PEAK VEL: 0.96 M/S
DOP CALC LVOT STROKE VOLUME: 64.76 CM3
DOP CALC RVOT PEAK VEL: 0.75 M/S
DOP CALC RVOT VTI: 19.1 CM
DOP CALCLVOT PEAK VEL VTI: 17.2 CM
ECHO LV POSTERIOR WALL: 1.09 CM (ref 0.6–1.1)
EOSINOPHIL # BLD AUTO: 0 K/UL (ref 0–0.5)
EOSINOPHIL NFR BLD: 1 % (ref 0–8)
ERYTHROCYTE [DISTWIDTH] IN BLOOD BY AUTOMATED COUNT: 18.4 % (ref 11.5–14.5)
EST. GFR  (NO RACE VARIABLE): >60 ML/MIN/1.73 M^2
FERRITIN SERPL-MCNC: 7 NG/ML (ref 20–300)
FOLATE SERPL-MCNC: 11.9 NG/ML (ref 4–24)
FRACTIONAL SHORTENING: 34 % (ref 28–44)
GLUCOSE SERPL-MCNC: 201 MG/DL (ref 70–110)
HCT VFR BLD AUTO: 26.2 % (ref 40–54)
HCT VFR BLD AUTO: 26.4 % (ref 40–54)
HCV AB SERPL QL IA: NEGATIVE
HEP C VIRUS HOLD SPECIMEN: NORMAL
HGB BLD-MCNC: 7.1 G/DL (ref 14–18)
HGB BLD-MCNC: 7.1 G/DL (ref 14–18)
HIV 1+2 AB+HIV1 P24 AG SERPL QL IA: NEGATIVE
IMM GRANULOCYTES # BLD AUTO: 0.02 K/UL (ref 0–0.04)
IMM GRANULOCYTES NFR BLD AUTO: 0.5 % (ref 0–0.5)
INTERVENTRICULAR SEPTUM: 1.32 CM (ref 0.6–1.1)
IRON SERPL-MCNC: 11 UG/DL (ref 45–160)
IVC DIAMETER: 1.79 CM
IVRT: 72.31 MSEC
LA MAJOR: 5.61 CM
LA MINOR: 6.2 CM
LA WIDTH: 4.6 CM
LEFT ATRIUM SIZE: 4.62 CM
LEFT ATRIUM VOLUME INDEX: 46.1 ML/M2
LEFT ATRIUM VOLUME: 106.4 CM3
LEFT INTERNAL DIMENSION IN SYSTOLE: 3.24 CM (ref 2.1–4)
LEFT VENTRICLE DIASTOLIC VOLUME INDEX: 48.97 ML/M2
LEFT VENTRICLE DIASTOLIC VOLUME: 113.12 ML
LEFT VENTRICLE MASS INDEX: 99 G/M2
LEFT VENTRICLE SYSTOLIC VOLUME INDEX: 18.3 ML/M2
LEFT VENTRICLE SYSTOLIC VOLUME: 42.24 ML
LEFT VENTRICULAR INTERNAL DIMENSION IN DIASTOLE: 4.91 CM (ref 3.5–6)
LEFT VENTRICULAR MASS: 228.45 G
LVOT MG: 1.78 MMHG
LVOT MV: 0.64 CM/S
LYMPHOCYTES # BLD AUTO: 0.7 K/UL (ref 1–4.8)
LYMPHOCYTES NFR BLD: 17.7 % (ref 18–48)
MCH RBC QN AUTO: 19.7 PG (ref 27–31)
MCHC RBC AUTO-ENTMCNC: 27.1 G/DL (ref 32–36)
MCV RBC AUTO: 73 FL (ref 82–98)
MONOCYTES # BLD AUTO: 0.6 K/UL (ref 0.3–1)
MONOCYTES NFR BLD: 14.8 % (ref 4–15)
NEUTROPHILS # BLD AUTO: 2.5 K/UL (ref 1.8–7.7)
NEUTROPHILS NFR BLD: 65.2 % (ref 38–73)
NRBC BLD-RTO: 0 /100 WBC
NUM UNITS TRANS PACKED RBC: NORMAL
OHS QRS DURATION: 74 MS
OHS QTC CALCULATION: 459 MS
PISA TR MAX VEL: 3.13 M/S
PLATELET # BLD AUTO: 130 K/UL (ref 150–450)
PMV BLD AUTO: 11.5 FL (ref 9.2–12.9)
POCT GLUCOSE: 153 MG/DL (ref 70–110)
POTASSIUM SERPL-SCNC: 3.7 MMOL/L (ref 3.5–5.1)
PROT SERPL-MCNC: 6.3 G/DL (ref 6–8.4)
PV MEAN GRADIENT: 1 MMHG
PV PEAK GRADIENT: 2 MMHG
PV PEAK VELOCITY: 0.79 M/S
RA MAJOR: 5.21 CM
RA PRESSURE ESTIMATED: 8 MMHG
RA WIDTH: 4.3 CM
RBC # BLD AUTO: 3.6 M/UL (ref 4.6–6.2)
RIGHT VENTRICULAR END-DIASTOLIC DIMENSION: 3.77 CM
RV TB RVSP: 11 MMHG
SATURATED IRON: 3 % (ref 20–50)
SODIUM SERPL-SCNC: 142 MMOL/L (ref 136–145)
SPECIMEN OUTDATE: NORMAL
STJ: 2.87 CM
TOTAL IRON BINDING CAPACITY: 416 UG/DL (ref 250–450)
TR MAX PG: 39 MMHG
TRANSFERRIN SERPL-MCNC: 281 MG/DL (ref 200–375)
TRICUSPID ANNULAR PLANE SYSTOLIC EXCURSION: 2.67 CM
TROPONIN I SERPL DL<=0.01 NG/ML-MCNC: 0.01 NG/ML (ref 0–0.03)
TROPONIN I SERPL DL<=0.01 NG/ML-MCNC: 0.04 NG/ML (ref 0–0.03)
TROPONIN I SERPL DL<=0.01 NG/ML-MCNC: 0.05 NG/ML (ref 0–0.03)
TROPONIN I SERPL DL<=0.01 NG/ML-MCNC: 0.05 NG/ML (ref 0–0.03)
TSH SERPL DL<=0.005 MIU/L-ACNC: 3.94 UIU/ML (ref 0.4–4)
TV REST PULMONARY ARTERY PRESSURE: 47 MMHG
VIT B12 SERPL-MCNC: 313 PG/ML (ref 210–950)
WBC # BLD AUTO: 3.85 K/UL (ref 3.9–12.7)
Z-SCORE OF LEFT VENTRICULAR DIMENSION IN END DIASTOLE: -6.15
Z-SCORE OF LEFT VENTRICULAR DIMENSION IN END SYSTOLE: -4.15

## 2024-04-01 PROCEDURE — 85014 HEMATOCRIT: CPT | Performed by: NURSE PRACTITIONER

## 2024-04-01 PROCEDURE — 96366 THER/PROPH/DIAG IV INF ADDON: CPT

## 2024-04-01 PROCEDURE — 82728 ASSAY OF FERRITIN: CPT | Performed by: NURSE PRACTITIONER

## 2024-04-01 PROCEDURE — 85018 HEMOGLOBIN: CPT | Performed by: NURSE PRACTITIONER

## 2024-04-01 PROCEDURE — G0378 HOSPITAL OBSERVATION PER HR: HCPCS

## 2024-04-01 PROCEDURE — 86920 COMPATIBILITY TEST SPIN: CPT | Performed by: EMERGENCY MEDICINE

## 2024-04-01 PROCEDURE — 82746 ASSAY OF FOLIC ACID SERUM: CPT | Performed by: NURSE PRACTITIONER

## 2024-04-01 PROCEDURE — 93010 ELECTROCARDIOGRAM REPORT: CPT | Mod: ,,, | Performed by: INTERNAL MEDICINE

## 2024-04-01 PROCEDURE — 36430 TRANSFUSION BLD/BLD COMPNT: CPT

## 2024-04-01 PROCEDURE — 85025 COMPLETE CBC W/AUTO DIFF WBC: CPT | Performed by: EMERGENCY MEDICINE

## 2024-04-01 PROCEDURE — 84484 ASSAY OF TROPONIN QUANT: CPT | Mod: 91 | Performed by: EMERGENCY MEDICINE

## 2024-04-01 PROCEDURE — 83540 ASSAY OF IRON: CPT | Performed by: NURSE PRACTITIONER

## 2024-04-01 PROCEDURE — 87389 HIV-1 AG W/HIV-1&-2 AB AG IA: CPT | Performed by: EMERGENCY MEDICINE

## 2024-04-01 PROCEDURE — 99291 CRITICAL CARE FIRST HOUR: CPT | Mod: 25

## 2024-04-01 PROCEDURE — 80053 COMPREHEN METABOLIC PANEL: CPT | Performed by: EMERGENCY MEDICINE

## 2024-04-01 PROCEDURE — 93005 ELECTROCARDIOGRAM TRACING: CPT

## 2024-04-01 PROCEDURE — 63600175 PHARM REV CODE 636 W HCPCS: Performed by: NURSE PRACTITIONER

## 2024-04-01 PROCEDURE — 25000003 PHARM REV CODE 250: Performed by: NURSE PRACTITIONER

## 2024-04-01 PROCEDURE — 30233N1 TRANSFUSION OF NONAUTOLOGOUS RED BLOOD CELLS INTO PERIPHERAL VEIN, PERCUTANEOUS APPROACH: ICD-10-PCS | Performed by: STUDENT IN AN ORGANIZED HEALTH CARE EDUCATION/TRAINING PROGRAM

## 2024-04-01 PROCEDURE — P9016 RBC LEUKOCYTES REDUCED: HCPCS | Performed by: EMERGENCY MEDICINE

## 2024-04-01 PROCEDURE — 63600175 PHARM REV CODE 636 W HCPCS: Performed by: EMERGENCY MEDICINE

## 2024-04-01 PROCEDURE — 84443 ASSAY THYROID STIM HORMONE: CPT | Performed by: NURSE PRACTITIONER

## 2024-04-01 PROCEDURE — 96375 TX/PRO/DX INJ NEW DRUG ADDON: CPT

## 2024-04-01 PROCEDURE — 86803 HEPATITIS C AB TEST: CPT | Performed by: EMERGENCY MEDICINE

## 2024-04-01 PROCEDURE — 86901 BLOOD TYPING SEROLOGIC RH(D): CPT | Performed by: EMERGENCY MEDICINE

## 2024-04-01 PROCEDURE — 84484 ASSAY OF TROPONIN QUANT: CPT | Mod: 91 | Performed by: NURSE PRACTITIONER

## 2024-04-01 PROCEDURE — 83880 ASSAY OF NATRIURETIC PEPTIDE: CPT | Performed by: EMERGENCY MEDICINE

## 2024-04-01 PROCEDURE — 96376 TX/PRO/DX INJ SAME DRUG ADON: CPT

## 2024-04-01 PROCEDURE — 25000003 PHARM REV CODE 250: Performed by: INTERNAL MEDICINE

## 2024-04-01 PROCEDURE — 99223 1ST HOSP IP/OBS HIGH 75: CPT | Mod: 25,,, | Performed by: INTERNAL MEDICINE

## 2024-04-01 PROCEDURE — 36415 COLL VENOUS BLD VENIPUNCTURE: CPT | Performed by: NURSE PRACTITIONER

## 2024-04-01 PROCEDURE — 96365 THER/PROPH/DIAG IV INF INIT: CPT

## 2024-04-01 PROCEDURE — 82962 GLUCOSE BLOOD TEST: CPT

## 2024-04-01 PROCEDURE — 96367 TX/PROPH/DG ADDL SEQ IV INF: CPT

## 2024-04-01 PROCEDURE — 25000003 PHARM REV CODE 250: Performed by: EMERGENCY MEDICINE

## 2024-04-01 PROCEDURE — 82607 VITAMIN B-12: CPT | Performed by: NURSE PRACTITIONER

## 2024-04-01 RX ORDER — SODIUM CHLORIDE 0.9 % (FLUSH) 0.9 %
10 SYRINGE (ML) INJECTION EVERY 12 HOURS PRN
Status: DISCONTINUED | OUTPATIENT
Start: 2024-04-01 | End: 2024-04-07 | Stop reason: HOSPADM

## 2024-04-01 RX ORDER — MORPHINE SULFATE 4 MG/ML
2 INJECTION, SOLUTION INTRAMUSCULAR; INTRAVENOUS EVERY 4 HOURS PRN
Status: DISCONTINUED | OUTPATIENT
Start: 2024-04-01 | End: 2024-04-07 | Stop reason: HOSPADM

## 2024-04-01 RX ORDER — GLUCAGON 1 MG
1 KIT INJECTION
Status: DISCONTINUED | OUTPATIENT
Start: 2024-04-01 | End: 2024-04-07 | Stop reason: HOSPADM

## 2024-04-01 RX ORDER — INSULIN ASPART 100 [IU]/ML
0-5 INJECTION, SOLUTION INTRAVENOUS; SUBCUTANEOUS
Status: DISCONTINUED | OUTPATIENT
Start: 2024-04-01 | End: 2024-04-07 | Stop reason: HOSPADM

## 2024-04-01 RX ORDER — ACETAMINOPHEN 325 MG/1
650 TABLET ORAL EVERY 4 HOURS PRN
Status: DISCONTINUED | OUTPATIENT
Start: 2024-04-01 | End: 2024-04-07 | Stop reason: HOSPADM

## 2024-04-01 RX ORDER — HYDROCODONE BITARTRATE AND ACETAMINOPHEN 500; 5 MG/1; MG/1
TABLET ORAL
Status: DISCONTINUED | OUTPATIENT
Start: 2024-04-01 | End: 2024-04-05

## 2024-04-01 RX ORDER — DILTIAZEM HYDROCHLORIDE 5 MG/ML
10 INJECTION INTRAVENOUS
Status: COMPLETED | OUTPATIENT
Start: 2024-04-01 | End: 2024-04-01

## 2024-04-01 RX ORDER — ONDANSETRON HYDROCHLORIDE 2 MG/ML
4 INJECTION, SOLUTION INTRAVENOUS EVERY 8 HOURS PRN
Status: DISCONTINUED | OUTPATIENT
Start: 2024-04-01 | End: 2024-04-07 | Stop reason: HOSPADM

## 2024-04-01 RX ORDER — BISACODYL 10 MG/1
10 SUPPOSITORY RECTAL DAILY PRN
Status: DISCONTINUED | OUTPATIENT
Start: 2024-04-01 | End: 2024-04-05

## 2024-04-01 RX ORDER — ALUMINUM HYDROXIDE, MAGNESIUM HYDROXIDE, AND SIMETHICONE 1200; 120; 1200 MG/30ML; MG/30ML; MG/30ML
30 SUSPENSION ORAL 4 TIMES DAILY PRN
Status: DISCONTINUED | OUTPATIENT
Start: 2024-04-01 | End: 2024-04-07 | Stop reason: HOSPADM

## 2024-04-01 RX ORDER — DILTIAZEM HYDROCHLORIDE 240 MG/1
240 CAPSULE, COATED, EXTENDED RELEASE ORAL DAILY
Status: DISCONTINUED | OUTPATIENT
Start: 2024-04-01 | End: 2024-04-05

## 2024-04-01 RX ORDER — MEPERIDINE HYDROCHLORIDE 25 MG/ML
12.5 INJECTION INTRAMUSCULAR; INTRAVENOUS; SUBCUTANEOUS
Status: COMPLETED | OUTPATIENT
Start: 2024-04-01 | End: 2024-04-01

## 2024-04-01 RX ORDER — DILTIAZEM HCL/D5W 125 MG/125
10 PLASTIC BAG, INJECTION (ML) INTRAVENOUS CONTINUOUS
Status: DISCONTINUED | OUTPATIENT
Start: 2024-04-01 | End: 2024-04-01

## 2024-04-01 RX ORDER — IBUPROFEN 200 MG
24 TABLET ORAL
Status: DISCONTINUED | OUTPATIENT
Start: 2024-04-01 | End: 2024-04-07 | Stop reason: HOSPADM

## 2024-04-01 RX ORDER — METOPROLOL TARTRATE 1 MG/ML
5 INJECTION, SOLUTION INTRAVENOUS ONCE
Status: COMPLETED | OUTPATIENT
Start: 2024-04-01 | End: 2024-04-01

## 2024-04-01 RX ORDER — IBUPROFEN 200 MG
16 TABLET ORAL
Status: DISCONTINUED | OUTPATIENT
Start: 2024-04-01 | End: 2024-04-07 | Stop reason: HOSPADM

## 2024-04-01 RX ORDER — NALOXONE HCL 0.4 MG/ML
0.02 VIAL (ML) INJECTION
Status: DISCONTINUED | OUTPATIENT
Start: 2024-04-01 | End: 2024-04-07 | Stop reason: HOSPADM

## 2024-04-01 RX ORDER — TALC
6 POWDER (GRAM) TOPICAL NIGHTLY PRN
Status: DISCONTINUED | OUTPATIENT
Start: 2024-04-01 | End: 2024-04-07 | Stop reason: HOSPADM

## 2024-04-01 RX ORDER — NAPROXEN SODIUM 220 MG/1
162 TABLET, FILM COATED ORAL
Status: COMPLETED | OUTPATIENT
Start: 2024-04-01 | End: 2024-04-01

## 2024-04-01 RX ADMIN — Medication 10 MG/HR: at 12:04

## 2024-04-01 RX ADMIN — IRON SUCROSE 200 MG: 20 INJECTION, SOLUTION INTRAVENOUS at 04:04

## 2024-04-01 RX ADMIN — METOROPROLOL TARTRATE 5 MG: 5 INJECTION, SOLUTION INTRAVENOUS at 04:04

## 2024-04-01 RX ADMIN — ASPIRIN 81 MG CHEWABLE TABLET 162 MG: 81 TABLET CHEWABLE at 08:04

## 2024-04-01 RX ADMIN — DILTIAZEM HYDROCHLORIDE 10 MG: 5 INJECTION INTRAVENOUS at 08:04

## 2024-04-01 RX ADMIN — MEPERIDINE HYDROCHLORIDE 12.5 MG: 25 INJECTION INTRAMUSCULAR; INTRAVENOUS; SUBCUTANEOUS at 04:04

## 2024-04-01 RX ADMIN — AMIODARONE HYDROCHLORIDE 1 MG/MIN: 1.8 INJECTION, SOLUTION INTRAVENOUS at 02:04

## 2024-04-01 RX ADMIN — DILTIAZEM HYDROCHLORIDE 240 MG: 240 CAPSULE, COATED, EXTENDED RELEASE ORAL at 01:04

## 2024-04-01 RX ADMIN — DILTIAZEM HYDROCHLORIDE 10 MG: 5 INJECTION INTRAVENOUS at 12:04

## 2024-04-01 RX ADMIN — AMIODARONE HYDROCHLORIDE 0.5 MG/MIN: 1.8 INJECTION, SOLUTION INTRAVENOUS at 09:04

## 2024-04-01 RX ADMIN — AMIODARONE HYDROCHLORIDE 150 MG: 1.5 INJECTION, SOLUTION INTRAVENOUS at 02:04

## 2024-04-01 NOTE — ASSESSMENT & PLAN NOTE
Remains in AFIB/RVR on exam today  Add Amiodarone infusion  Change Diltiazem IV gtt to Cardizem 240 mg daily  Continue Cardiac Monitoring  Hold Xarelto given anemia for now.   Last dose of Xarelto on 3/31/2024

## 2024-04-01 NOTE — ADMISSIONCARE
AdmissionCare    Guideline: Atrial Fibrillation - OBS, Observation    Based on the indications selected for the patient, the bed status of Observation was determined to be MET    The following indications were selected as present at the time of evaluation of the patient:      - Pharmacologic rate control needed (eg, symptomatic Tachycardia)    - Tachycardia, as indicated by 1 or more of the following:     - Heart rate greater than 100 beats per minute in adult or child age 6 years or older    AdmissionCare documentation entered by: Christina De Leon    [facility], 27th edition, Copyright © 2023 [facility], [facility] All Rights Reserved.  [id number]

## 2024-04-01 NOTE — ED PROVIDER NOTES
SCRIBE #1 NOTE: I, Juan Michaud, am scribing for, and in the presence of, Danny Daniels Jr., MD. I have scribed the entire note.      History      Chief Complaint   Patient presents with    Shortness of Breath     With mid-sternal, non radiating chest pain. Hx of Afib,  per [facility]. Received approx 1L NS PTA.        Review of patient's allergies indicates:  No Known Allergies     HPI   HPI    4/1/2024, 8:09 AM   History obtained from the patient      History of Present Illness: Jose Miguel Alvarez Jr. is a 83 y.o. male patient with a PMHx of COPD, CKD, HTN, Afib s/p DCCV who presents to the Emergency Department for chest pain, onset this morning. Symptoms are intermittent and moderate in severity. No mitigating or exacerbating factors reported. Associated sxs include intermittent SOB and intermittent palpitations; pt states that his current sxs are similar to when he was last in Afib. Patient denies any fever, chills, n/v/d, weakness, numbness, dizziness, headache, and all other sxs at this time. Pt is currently on Xarelto and Carvedilol. No further complaints or concerns at this time.     Arrival mode: [facility]    PCP: Toshia Valladares MD       Past Medical History:  Past Medical History:   Diagnosis Date    Anemia     Anticoagulant long-term use     Atrial fibrillation     Bladder cancer     Bladder tumor     CKD (chronic kidney disease), stage III     COPD (chronic obstructive pulmonary disease)     pt denies    Encounter for blood transfusion     Hematuria     History of 2019 novel coronavirus disease (COVID-19)     Hypercholesteremia     Hypertension        Past Surgical History:  Past Surgical History:   Procedure Laterality Date    bladder tumor removal      CARDIAC ELECTROPHYSIOLOGY MAPPING AND ABLATION      CARDIOVERSION      several    CATARACT EXTRACTION Bilateral     cataract removal with IOL implants Bilateral     CHOLECYSTECTOMY      CYSTOSCOPIC LITHOLAPAXY N/A 12/28/2021    Procedure:  CYSTOLITHOLAPAXY;  Surgeon: Medardo Garcia MD;  Location: [facility] OR;  Service: Urology;  Laterality: N/A;    CYSTOSCOPY      CYSTOSCOPY WITH BIOPSY OF BLADDER Right 2021    Procedure: CYSTOSCOPY, WITH BLADDER BIOPSY, WITH FULGURATION IF INDICATED;  Surgeon: Medardo Garcia MD;  Location: [facility] OR;  Service: Urology;  Laterality: Right;    ESOPHAGOGASTRODUODENOSCOPY N/A 10/25/2021    Procedure: Small Jason Enteroscopy (SBE);  Surgeon: Isabelle Griffin MD;  Location: [facility];  Service: Endoscopy;  Laterality: N/A;    ESOPHAGOGASTRODUODENOSCOPY N/A 2021    Procedure: EGD (ESOPHAGOGASTRODUODENOSCOPY);  Surgeon: Troy Polanco MD;  Location: [facility] ENDO;  Service: Endoscopy;  Laterality: N/A;    EYE SURGERY      INTRALUMINAL GASTROINTESTINAL TRACT IMAGING VIA CAPSULE N/A 2021    Procedure: IMAGING PROCEDURE, GI TRACT, INTRALUMINAL, VIA CAPSULE;  Surgeon: Larry Jones RN;  Location: [facility];  Service: Endoscopy;  Laterality: N/A;    TRANSURETHRAL RESECTION OF BLADDER TUMOR BY BIPOLAR CAUTERY N/A 2019    Procedure: TURBT, USING BIPOLAR CAUTERY;  Surgeon: Ritesh Marques MD;  Location: [facility];  Service: Urology;  Laterality: N/A;         Family History:  Family History   Problem Relation Age of Onset    Heart disease Father     Hypertension Father     Heart disease Brother        Social History:  Social History     Tobacco Use    Smoking status: Former     Current packs/day: 0.00     Average packs/day: 2.5 packs/day for 20.0 years (50.0 ttl pk-yrs)     Types: Cigarettes     Start date:      Quit date:      Years since quittin.2    Smokeless tobacco: Never   Substance and Sexual Activity    Alcohol use: Yes     Alcohol/week: 7.0 standard drinks of alcohol     Types: 7 Cans of beer per week    Drug use: No    Sexual activity: Not Currently       ROS   Review of Systems   Constitutional:  Negative for chills and fever.   HENT:  Negative for sore throat.    Respiratory:   Positive for shortness of breath (intermittent).    Cardiovascular:  Positive for chest pain (intermittent) and palpitations (intermittent).   Gastrointestinal:  Negative for diarrhea, nausea and vomiting.   Genitourinary:  Negative for dysuria.   Musculoskeletal:  Negative for back pain.   Skin:  Negative for rash.   Neurological:  Negative for dizziness, weakness, numbness and headaches.   Hematological:  Does not bruise/bleed easily.   All other systems reviewed and are negative.    Physical Exam      Initial Vitals [04/01/24 0726]   BP Pulse Resp Temp SpO2   (!) 146/75 (!) 120 (!) 22 97.8 °F (36.6 °C) 99 %      MAP       --          Physical Exam  Nursing Notes and Vital Signs Reviewed.  Constitutional: Patient is in no acute distress. Well-developed and well-nourished.  Head: Atraumatic. Normocephalic.  Eyes: PERRL. EOM intact. Conjunctivae are not pale. No scleral icterus.  ENT: Mucous membranes are moist. Oropharynx is clear and symmetric.    Neck: Supple. Full ROM. No lymphadenopathy.  Cardiovascular: Tachycardic. Irregularly irregular rhythm. No murmurs, rubs, or gallops. Distal pulses are 2+ and symmetric.  Pulmonary/Chest: No respiratory distress. Clear to auscultation bilaterally. No wheezing or rales.  Abdominal: Soft and non-distended.  There is no tenderness.  No rebound, guarding, or rigidity.   Musculoskeletal: Moves all extremities. No obvious deformities. No edema.  Skin: Warm and dry.  Neurological:  Alert, awake, and appropriate.  Normal speech. GCS 15. No acute focal neurological deficits are appreciated.  Psychiatric: Normal affect. Good eye contact. Appropriate in content.    ED Course    Critical Care    Date/Time: 4/1/2024 11:51 AM    Performed by: Danny Daniels Jr., MD  Authorized by: Danny Daniels Jr., MD  Direct patient critical care time: 45 minutes  Additional history critical care time: 15 minutes  Ordering / reviewing critical care time: 5 minutes  Documentation critical care time:  5 minutes  Consulting other physicians critical care time: 5 minutes  Total critical care time (exclusive of procedural time) : 75 minutes  Critical care time was exclusive of separately billable procedures and treating other patients and teaching time.  Critical care was necessary to treat or prevent imminent or life-threatening deterioration of the following conditions: A fib with RVR.  Critical care was time spent personally by me on the following activities: blood draw for specimens, development of treatment plan with patient or surrogate, discussions with consultants, interpretation of cardiac output measurements, evaluation of patient's response to treatment, examination of patient, obtaining history from patient or surrogate, ordering and performing treatments and interventions, ordering and review of laboratory studies, ordering and review of radiographic studies, pulse oximetry, re-evaluation of patient's condition and review of old charts.        ED Vital Signs:  Vitals:    04/01/24 1622 04/01/24 1629 04/01/24 1647 04/01/24 1652   BP:  (!) 90/53 (!) 127/96    Pulse: (!) 141 (!) 128 85 88   Resp:  (!) 22 18    Temp:  98.2 °F (36.8 °C) 98.4 °F (36.9 °C)    TempSrc:  Oral     SpO2:  98% (!) 94%    Weight:       Height:        04/01/24 1702 04/01/24 1730 04/01/24 1752 04/01/24 1800   BP: 128/64 139/60  (!) 123/59   Pulse: 85 90 89 89   Resp: 18      Temp: 98.4 °F (36.9 °C)      TempSrc: Oral      SpO2: (!) 93% (!) 93% (!) 92% (!) 93%   Weight:       Height:        04/01/24 1851 04/01/24 1913 04/01/24 1915 04/01/24 2000   BP: 122/60 (!) 112/56 (!) 112/56    Pulse: 88 88 86 76   Resp: 16 18 18    Temp: 99 °F (37.2 °C) 99.3 °F (37.4 °C) 99.3 °F (37.4 °C)    TempSrc: Oral Oral     SpO2: (!) 92% (!) 92% 95%    Weight:       Height:        04/02/24 0013 04/02/24 0400 04/02/24 0424   BP: (!) 122/58  (!) 100/50   Pulse: 84 76 79   Resp: 18  19   Temp: (!) 100.7 °F (38.2 °C)  98.6 °F (37 °C)   TempSrc: Oral  Oral    SpO2: 95%  (!) 94%   Weight: 105.2 kg (231 lb 14.8 oz)     Height:          Abnormal Lab Results:  Labs Reviewed   CBC W/ AUTO DIFFERENTIAL - Abnormal; Notable for the following components:       Result Value    WBC 3.85 (*)     RBC 3.60 (*)     Hemoglobin 7.1 (*)     Hematocrit 26.2 (*)     MCV 73 (*)     MCH 19.7 (*)     MCHC 27.1 (*)     RDW 18.4 (*)     Platelets 130 (*)     Lymph # 0.7 (*)     Lymph % 17.7 (*)     All other components within normal limits    Narrative:     Release to patient->Immediate   COMPREHENSIVE METABOLIC PANEL - Abnormal; Notable for the following components:    Glucose 201 (*)     Calcium 8.6 (*)     Albumin 3.3 (*)     All other components within normal limits    Narrative:     Release to patient->Immediate   TROPONIN I - Abnormal; Notable for the following components:    Troponin I 0.050 (*)     All other components within normal limits   HEMOGLOBIN - Abnormal; Notable for the following components:    Hemoglobin 7.1 (*)     All other components within normal limits   HEMATOCRIT - Abnormal; Notable for the following components:    Hematocrit 26.4 (*)     All other components within normal limits   FERRITIN - Abnormal; Notable for the following components:    Ferritin 7 (*)     All other components within normal limits   TROPONIN I - Abnormal; Notable for the following components:    Troponin I 0.037 (*)     All other components within normal limits    Narrative:     STAT, if not done in ED, then at 2nd and 6th hour from  initial draw.   TROPONIN I - Abnormal; Notable for the following components:    Troponin I 0.046 (*)     All other components within normal limits    Narrative:     STAT, if not done in ED, then at 2nd and 6th hour from  initial draw.   POCT GLUCOSE - Abnormal; Notable for the following components:    POCT Glucose 153 (*)     All other components within normal limits   HIV 1 / 2 ANTIBODY    Narrative:     Release to patient->Immediate   HEPATITIS C ANTIBODY     Narrative:     Release to patient->Immediate   HEP C VIRUS HOLD SPECIMEN    Narrative:     Release to patient->Immediate   TROPONIN I    Narrative:     Release to patient->Immediate   B-TYPE NATRIURETIC PEPTIDE    Narrative:     Release to patient->Immediate   TSH   TSH   POCT GLUCOSE, HAND-HELD DEVICE   TYPE & SCREEN   PREPARE RBC SOFT        All Lab Results:  Results for orders placed or performed during the hospital encounter of 04/01/24   HIV 1/2 Ag/Ab (4th Gen)   Result Value Ref Range    HIV 1/2 Ag/Ab Negative Negative   Hepatitis C Antibody   Result Value Ref Range    Hepatitis C Ab Negative Negative   HCV Virus Hold Specimen   Result Value Ref Range    HEP C Virus Hold Specimen Hold for HCV sendout    CBC auto differential   Result Value Ref Range    WBC 3.85 (L) 3.90 - 12.70 K/uL    RBC 3.60 (L) 4.60 - 6.20 M/uL    Hemoglobin 7.1 (L) 14.0 - 18.0 g/dL    Hematocrit 26.2 (L) 40.0 - 54.0 %    MCV 73 (L) 82 - 98 fL    MCH 19.7 (L) 27.0 - 31.0 pg    MCHC 27.1 (L) 32.0 - 36.0 g/dL    RDW 18.4 (H) 11.5 - 14.5 %    Platelets 130 (L) 150 - 450 K/uL    MPV 11.5 9.2 - 12.9 fL    Immature Granulocytes 0.5 0.0 - 0.5 %    Gran # (ANC) 2.5 1.8 - 7.7 K/uL    Immature Grans (Abs) 0.02 0.00 - 0.04 K/uL    Lymph # 0.7 (L) 1.0 - 4.8 K/uL    Mono # 0.6 0.3 - 1.0 K/uL    Eos # 0.0 0.0 - 0.5 K/uL    Baso # 0.03 0.00 - 0.20 K/uL    nRBC 0 0 /100 WBC    Gran % 65.2 38.0 - 73.0 %    Lymph % 17.7 (L) 18.0 - 48.0 %    Mono % 14.8 4.0 - 15.0 %    Eosinophil % 1.0 0.0 - 8.0 %    Basophil % 0.8 0.0 - 1.9 %    Differential Method Automated    Comprehensive metabolic panel   Result Value Ref Range    Sodium 142 136 - 145 mmol/L    Potassium 3.7 3.5 - 5.1 mmol/L    Chloride 109 95 - 110 mmol/L    CO2 23 23 - 29 mmol/L    Glucose 201 (H) 70 - 110 mg/dL    BUN 19 8 - 23 mg/dL    Creatinine 1.1 0.5 - 1.4 mg/dL    Calcium 8.6 (L) 8.7 - 10.5 mg/dL    Total Protein 6.3 6.0 - 8.4 g/dL    Albumin 3.3 (L) 3.5 - 5.2 g/dL    Total Bilirubin 0.4 0.1 -  1.0 mg/dL    Alkaline Phosphatase 68 55 - 135 U/L    AST 16 10 - 40 U/L    ALT 14 10 - 44 U/L    eGFR >60 >60 mL/min/1.73 m^2    Anion Gap 10 8 - 16 mmol/L   Troponin I #1   Result Value Ref Range    Troponin I 0.015 0.000 - 0.026 ng/mL   BNP   Result Value Ref Range    BNP 70 0 - 99 pg/mL   Troponin I #2   Result Value Ref Range    Troponin I 0.050 (H) 0.000 - 0.026 ng/mL   Hemoglobin   Result Value Ref Range    Hemoglobin 7.1 (L) 14.0 - 18.0 g/dL   Hematocrit   Result Value Ref Range    Hematocrit 26.4 (L) 40.0 - 54.0 %   Iron and TIBC   Result Value Ref Range    Iron 11 (L) 45 - 160 ug/dL    Transferrin 281 200 - 375 mg/dL    TIBC 416 250 - 450 ug/dL    Saturated Iron 3 (L) 20 - 50 %   Ferritin   Result Value Ref Range    Ferritin 7 (L) 20.0 - 300.0 ng/mL   Folate   Result Value Ref Range    Folate 11.9 4.0 - 24.0 ng/mL   Vitamin B12   Result Value Ref Range    Vitamin B-12 313 210 - 950 pg/mL   Troponin I   Result Value Ref Range    Troponin I 0.037 (H) 0.000 - 0.026 ng/mL   TSH   Result Value Ref Range    TSH 3.937 0.400 - 4.000 uIU/mL   Troponin I   Result Value Ref Range    Troponin I 0.046 (H) 0.000 - 0.026 ng/mL   CBC with Automated Differential   Result Value Ref Range    WBC 25.69 (H) 3.90 - 12.70 K/uL    RBC 3.63 (L) 4.60 - 6.20 M/uL    Hemoglobin 7.5 (L) 14.0 - 18.0 g/dL    Hematocrit 26.8 (L) 40.0 - 54.0 %    MCV 74 (L) 82 - 98 fL    MCH 20.7 (L) 27.0 - 31.0 pg    MCHC 28.0 (L) 32.0 - 36.0 g/dL    RDW 18.6 (H) 11.5 - 14.5 %    Platelets 114 (L) 150 - 450 K/uL    MPV SEE COMMENT 9.2 - 12.9 fL   EKG 12-lead   Result Value Ref Range    QRS Duration 74 ms    [facility] QTC Calculation 459 ms   Echo Saline Bubble? No   Result Value Ref Range    Left Atrium Major Axis 5.61 cm    Left Atrium Minor Axis 6.20 cm    RA Major Axis 5.21 cm    LV Diastolic Volume 113.12 mL    LV Systolic Volume 42.24 mL    TR Max Dave 3.13 m/s    PV mean gradient 1 mmHg    RVOT peak VTI 19.1 cm    RVOT peak dave 0.75 m/s    Ao VTI 20.30 cm     Ao peak ruth 1.20 m/s    LVOT peak VTI 17.20 cm    LVOT peak ruth 0.96 m/s    LVOT diameter 2.19 cm    IVRT 72.31 msec    PV peak gradient 2 mmHg    AV mean gradient 3 mmHg    TAPSE 2.67 cm    RVDD 3.77 cm    LA size 4.62 cm    Ascending aorta 3.57 cm    STJ 2.87 cm    LVIDs 3.24 2.1 - 4.0 cm    Ao root annulus 3.62 cm    Posterior Wall 1.09 0.6 - 1.1 cm    IVS 1.32 (A) 0.6 - 1.1 cm    LVIDd 4.91 3.5 - 6.0 cm    PV PEAK VELOCITY 0.79 m/s    Left Ventricular Outflow Tract Mean Gradient 1.78 mmHg    Left Ventricular Outflow Tract Mean Velocity 0.64 cm/s    IVC diameter 1.79 cm    FS 34 28 - 44 %    LV mass 228.45 g    Left Ventricle Relative Wall Thickness 0.44 cm    AV valve area 3.19 cm²    AV Velocity Ratio 0.80     AV index (prosthetic) 0.85     LVOT area 3.8 cm2    LVOT stroke volume 64.76 cm3    AV peak gradient 6 mmHg    Triscuspid Valve Regurgitation Peak Gradient 39 mmHg    FABIANA by Velocity Ratio 3.01 cm²    BSA 2.36 m2    LV Systolic Volume Index 18.3 mL/m2    LV Diastolic Volume Index 48.97 mL/m2    LV Mass Index 99 g/m2    ZLVIDS -4.15     ZLVIDD -6.15     LA Volume Index 46.1 mL/m2    LA volume 106.40 cm3    LA WIDTH 4.6 cm    RA Width 4.3 cm    TV resting pulmonary artery pressure 47 mmHg    RV TB RVSP 11 mmHg    Est. RA pres 8 mmHg   Type & Screen   Result Value Ref Range    Group & Rh O NEG     Indirect Marciano NEG     Specimen Outdate 04/04/2024 23:59    POCT glucose   Result Value Ref Range    POCT Glucose 153 (H) 70 - 110 mg/dL   POCT glucose   Result Value Ref Range    POCT Glucose 156 (H) 70 - 110 mg/dL   Prepare RBC   Result Value Ref Range    UNIT NUMBER [id number]     Product Code V6868X49     DISPENSE STATUS TRANSFUSED     CODING SYSTEM [id number]     Unit Blood Type Code 9500     Unit Blood Type O NEG     Unit Expiration [id number]     CROSSMATCH INTERPRETATION Compatible          Imaging Results:  Imaging Results              X-Ray Chest AP Portable (Final result)  Result time 04/01/24  08:45:18      Final result by Hector Arriaga MD (04/01/24 08:45:18)                   Impression:      No acute process seen.      Electronically signed by: Hector Arriaga MD  Date:    04/01/2024  Time:    08:45               Narrative:    EXAMINATION:  XR CHEST AP PORTABLE    CLINICAL HISTORY:  sob;    FINDINGS:  Single view of the chest.  Comparison 12/4/21    Cardiac silhouette is normal.  The lungs demonstrate no evidence of active disease.  No evidence of pleural effusion or pneumothorax.  Bones appear intact.  Moderate degenerative changes and moderate atherosclerotic disease.                                     The EKG was ordered, reviewed, and independently interpreted by the ED provider.  Interpretation time: 07:40  Rate: 145 BPM  Rhythm: Atrial fibrillation with rapid ventricular response  Interpretation: Nonspecific ST abnormality. No STEMI.         The Emergency Provider reviewed the vital signs and test results, which are outlined above.    ED Discussion     11:49 AM: Discussed pt's case with Dr. Grijalva (Cardiology) who recommends starting cardizem drip.    11:52 AM: Discussed case with Dr. Hancock (Hospital Medicine). Dr. Hancock agrees with current care and management of pt and accepts admission.   Admitting Service: Hospital medicine  Admitting Physician: Dr. Hancock  Admit to: Obs Tele    11:58 AM: Re-evaluated pt. I have discussed test results, shared treatment plan, and the need for admission with patient and family at bedside. Pt and family express understanding at this time and agree with all information. All questions answered. Pt and family have no further questions or concerns at this time. Pt is ready for admit.          ED Course as of 04/02/24 0618   Mon Apr 01, 2024   0746 Rhythm strip reviewed. Afib c rvr, no stemi.  145bpm [LV]      ED Course User Index  [LV] Danny Daniels Jr., MD       ED Medication(s):  Medications   sodium chloride 0.9% flush 10 mL (has no administration in  time range)   melatonin tablet 6 mg (has no administration in time range)   ondansetron injection 4 mg (has no administration in time range)   bisacodyL suppository 10 mg (has no administration in time range)   aluminum-magnesium hydroxide-simethicone 200-200-20 mg/5 mL suspension 30 mL (has no administration in time range)   acetaminophen tablet 650 mg (has no administration in time range)   morphine injection 2 mg (has no administration in time range)   naloxone 0.4 mg/mL injection 0.02 mg (has no administration in time range)   glucose chewable tablet 16 g (has no administration in time range)   glucose chewable tablet 24 g (has no administration in time range)   glucagon (human recombinant) injection 1 mg (has no administration in time range)   insulin aspart U-100 pen 0-5 Units (has no administration in time range)   dextrose 10% bolus 125 mL 125 mL (has no administration in time range)   dextrose 10% bolus 250 mL 250 mL (has no administration in time range)   0.9%  NaCl infusion (for blood administration) (has no administration in time range)   diltiaZEM 24 hr capsule 240 mg (240 mg Oral Given 4/1/24 1351)   amiodarone 360 mg/200 mL (1.8 mg/mL) infusion (1 mg/min Intravenous New Bag 4/1/24 1412)   amiodarone 360 mg/200 mL (1.8 mg/mL) infusion (0.5 mg/min Intravenous New Bag 4/1/24 2114)   iron sucrose injection 200 mg (200 mg Intravenous Given 4/1/24 1636)   diltiaZEM injection 10 mg (10 mg Intravenous Given 4/1/24 0810)   aspirin chewable tablet 162 mg (162 mg Oral Given 4/1/24 0809)   diltiaZEM injection 10 mg (10 mg Intravenous Given 4/1/24 1213)   amiodarone in dextrose 150 mg/100 mL (1.5 mg/mL) loading dose 150 mg (0 mg Intravenous Stopped 4/1/24 1410)   metoprolol injection 5 mg (5 mg Intravenous Given 4/1/24 1615)   meperidine (PF) injection 12.5 mg (12.5 mg Intravenous Given 4/1/24 1615)     Current Discharge Medication List        Medical Decision Making    Medical Decision Making  Amount and/or  Complexity of Data Reviewed  Labs: ordered. Decision-making details documented in ED Course.  Radiology: ordered and independent interpretation performed. Decision-making details documented in ED Course.  ECG/medicine tests: ordered and independent interpretation performed. Decision-making details documented in ED Course.    Risk  OTC drugs.  Prescription drug management.  Parenteral controlled substances.  Decision regarding hospitalization.  Risk Details: OTC drugs, prescription drugs and controlled substances considered.  Due to patient's symptoms improving and pain controlled pain medications ordered appropriately.  Differential diagnoses:  Pneumonia, Congestive heart failure, Acute Myocardial infarction, Pleural effusion, Pulmonary edema, Pulmonary embolism, Electrolyte imbalance, Infectious etiology, COPD exacerbation, Asthma exacerbation, Pneumothorax,  Cardiac tamponade, Anemia, Deconditioning, bronchospasm, upper airway obstruction such: Aspiration, Foreign body                  Scribe Attestation:   Scribe #1: I performed the above scribed service and the documentation accurately describes the services I performed. I attest to the accuracy of the note.    Attending:   Physician Attestation Statement for Scribe #1: I, Danny Daniels Jr., MD, personally performed the services described in this documentation, as scribed by Juan Michaud, in my presence, and it is both accurate and complete.          Clinical Impression       ICD-10-CM ICD-9-CM   1. Atrial fibrillation with RVR  I48.91 427.31   2. SOB (shortness of breath)  R06.02 786.05   3. Symptomatic anemia  D64.9 285.9   4. Chest pain  R07.9 786.50   5. A-fib  I48.91 427.31       Disposition:   Disposition: Placed in Observation  Condition: Danny Alfaro Jr., MD  04/02/24 0618

## 2024-04-01 NOTE — ASSESSMENT & PLAN NOTE
Patient with Persistent (7 days or more) atrial fibrillation which is uncontrolled currently with Calcium Channel Blocker. Patient is currently in atrial fibrillation.[id number] Score: 3. Anticoagulation indicated. Anticoagulation done with Xarelto .    Treated with IVP Dilt x2, rate improvement not sustained.     --Cardiology consulted  --Diltiazem IV continuous 10mg/hr initiated per recommendation, rate did not improve, changed to IV amiodarone  --Tele  --Vitals Q4H

## 2024-04-01 NOTE — ASSESSMENT & PLAN NOTE
"Patient's FSGs are controlled on current medication regimen.  Last A1c reviewed-   Lab Results   Component Value Date    HGBA1C 6.2 (H) 01/26/2023     Most recent fingerstick glucose reviewed- No results for input(s): "POCTGLUCOSE" in the last 24 hours.  Current correctional scale  Low  Maintain anti-hyperglycemic dose as follows-   Antihyperglycemics (From admission, onward)      Start     Stop Route Frequency Ordered    04/01/24 1340  insulin aspart U-100 pen 0-5 Units         -- SubQ Before meals & nightly PRN 04/01/24 1241          Hold Oral hypoglycemics while patient is in the hospital.  "

## 2024-04-01 NOTE — SUBJECTIVE & OBJECTIVE
Past Medical History:   Diagnosis Date    Anemia     Anticoagulant long-term use     Atrial fibrillation     Bladder cancer     Bladder tumor     CKD (chronic kidney disease), stage III     COPD (chronic obstructive pulmonary disease)     pt denies    Encounter for blood transfusion     Hematuria     History of 2019 novel coronavirus disease (COVID-19)     Hypercholesteremia     Hypertension        Past Surgical History:   Procedure Laterality Date    bladder tumor removal      CARDIAC ELECTROPHYSIOLOGY MAPPING AND ABLATION      CARDIOVERSION      several    CATARACT EXTRACTION Bilateral     cataract removal with IOL implants Bilateral     CHOLECYSTECTOMY      CYSTOSCOPIC LITHOLAPAXY N/A 12/28/2021    Procedure: CYSTOLITHOLAPAXY;  Surgeon: Medardo Garcia MD;  Location: [facility];  Service: Urology;  Laterality: N/A;    CYSTOSCOPY      CYSTOSCOPY WITH BIOPSY OF BLADDER Right 12/28/2021    Procedure: CYSTOSCOPY, WITH BLADDER BIOPSY, WITH FULGURATION IF INDICATED;  Surgeon: Medardo Garcia MD;  Location: [facility];  Service: Urology;  Laterality: Right;    ESOPHAGOGASTRODUODENOSCOPY N/A 10/25/2021    Procedure: Small Jason Enteroscopy (SBE);  Surgeon: Isabelle Griffin MD;  Location: [facility];  Service: Endoscopy;  Laterality: N/A;    ESOPHAGOGASTRODUODENOSCOPY N/A 12/13/2021    Procedure: EGD (ESOPHAGOGASTRODUODENOSCOPY);  Surgeon: Troy Polanco MD;  Location: [facility];  Service: Endoscopy;  Laterality: N/A;    EYE SURGERY      INTRALUMINAL GASTROINTESTINAL TRACT IMAGING VIA CAPSULE N/A 12/6/2021    Procedure: IMAGING PROCEDURE, GI TRACT, INTRALUMINAL, VIA CAPSULE;  Surgeon: Larry Jones RN;  Location: [facility];  Service: Endoscopy;  Laterality: N/A;    TRANSURETHRAL RESECTION OF BLADDER TUMOR BY BIPOLAR CAUTERY N/A 7/23/2019    Procedure: TURBT, USING BIPOLAR CAUTERY;  Surgeon: Ritesh Marques MD;  Location: [facility];  Service: Urology;  Laterality: N/A;       Review of patient's allergies  indicates:  No Known Allergies    Current Facility-Administered Medications on File Prior to Encounter   Medication    lactated ringers infusion     Current Outpatient Medications on File Prior to Encounter   Medication Sig    amLODIPine (NORVASC) 5 MG tablet TAKE 1 TABLET DAILY    atorvastatin (LIPITOR) 20 MG tablet Take 20 mg by mouth every evening.     carvediloL (COREG) 6.25 MG tablet Take 1 tablet (6.25 mg total) by mouth 2 (two) times daily with meals.    donepeziL (ARICEPT) 5 MG tablet TAKE 1 TABLET EVERY EVENING    ergocalciferol (ERGOCALCIFEROL) 50,000 unit Cap Take 50,000 Units by mouth every 7 days. ()    famotidine (PEPCID) 20 MG tablet Take 20 mg by mouth once daily.    ferrous sulfate (FEOSOL) 325 mg (65 mg iron) Tab tablet Take 325 mg by mouth 3 (three) times a week.    hydroCHLOROthiazide (HYDRODIURIL) 25 MG tablet TAKE 1 TABLET DAILY    multivitamin (THERAGRAN) per tablet Take 1 tablet by mouth once daily.    tamsulosin (FLOMAX) 0.4 mg Cap TAKE 1 CAPSULE DAILY    XARELTO 15 mg Tab TAKE 1 TABLET DAILY WITH DINNER OR EVENING MEAL    furosemide (LASIX) 20 MG tablet Take 1 tablet (20 mg total) by mouth every Mon, Wed, Fri, Sun. (Patient not taking: Reported on 2024)    sildenafiL (VIAGRA) 100 MG tablet Take 1 tablet (100 mg total) by mouth daily as needed for Erectile Dysfunction.     Family History       Problem Relation (Age of Onset)    Heart disease Father, Brother    Hypertension Father          Tobacco Use    Smoking status: Former     Current packs/day: 0.00     Average packs/day: 2.5 packs/day for 20.0 years (50.0 ttl pk-yrs)     Types: Cigarettes     Start date:      Quit date:      Years since quittin.2    Smokeless tobacco: Never   Substance and Sexual Activity    Alcohol use: Yes     Alcohol/week: 7.0 standard drinks of alcohol     Types: 7 Cans of beer per week    Drug use: No    Sexual activity: Not Currently     Review of Systems   Respiratory:  Positive for  chest tightness and shortness of breath.    Cardiovascular:  Positive for chest pain and palpitations.   Musculoskeletal:  Positive for arthralgias.   Skin:  Positive for color change.   Neurological:  Positive for weakness and headaches.   All other systems reviewed and are negative.    Objective:     Vital Signs (Most Recent):  Temp: 97.8 °F (36.6 °C) (04/01/24 0726)  Pulse: (!) 129 (04/01/24 1445)  Resp: 18 (04/01/24 1400)  BP: (!) 144/94 (04/01/24 1445)  SpO2: (!) 92 % (04/01/24 1445) Vital Signs (24h Range):  Temp:  [97.8 °F (36.6 °C)] 97.8 °F (36.6 °C)  Pulse:  [] 129  Resp:  [18-22] 18  SpO2:  [92 %-100 %] 92 %  BP: (113-161)/(65-94) 144/94     Weight: 109.8 kg (242 lb)  Body mass index is 32.82 kg/m².     Physical Exam  Vitals and nursing note reviewed.   Constitutional:       General: He is not in acute distress.     Appearance: He is well-developed. He is ill-appearing. He is not diaphoretic.   HENT:      Head: Normocephalic and atraumatic.      Right Ear: Hearing and external ear normal.      Left Ear: Hearing and external ear normal.      Nose: Nose normal. No mucosal edema or rhinorrhea.      Mouth/Throat:      Pharynx: Uvula midline.   Eyes:      General:         Right eye: No discharge.         Left eye: No discharge.      Conjunctiva/sclera:      Right eye: No chemosis.     Left eye: No chemosis.     Pupils: Pupils are equal, round, and reactive to light.      Comments: Pale conjunctiva   Neck:      Thyroid: No thyroid mass or thyromegaly.      Trachea: Trachea normal.   Cardiovascular:      Rate and Rhythm: Normal rate and regular rhythm.      Pulses:           Dorsalis pedis pulses are 2+ on the right side and 2+ on the left side.      Heart sounds: Normal heart sounds. No murmur heard.  Pulmonary:      Effort: Pulmonary effort is normal. No respiratory distress.      Breath sounds: Normal breath sounds. No decreased breath sounds or wheezing.   Abdominal:      General: Bowel sounds are  normal. There is no distension.      Palpations: Abdomen is soft.      Tenderness: There is no abdominal tenderness.   Musculoskeletal:         General: Normal range of motion.      Cervical back: Normal range of motion and neck supple.   Lymphadenopathy:      Cervical: No cervical adenopathy.      Upper Body:      Right upper body: No supraclavicular adenopathy.      Left upper body: No supraclavicular adenopathy.   Skin:     General: Skin is warm and dry.      Capillary Refill: Capillary refill takes less than 2 seconds.      Coloration: Skin is pale.      Findings: No rash.   Neurological:      Mental Status: He is alert and oriented to person, place, and time.   Psychiatric:         Mood and Affect: Mood is not anxious.         Speech: Speech normal.         Behavior: Behavior normal.         Thought Content: Thought content normal.         Judgment: Judgment normal.              CRANIAL NERVES     CN III, IV, VI   Pupils are equal, round, and reactive to light.       Significant Labs: All pertinent labs within the past 24 hours have been reviewed.  CBC:   Recent Labs   Lab 04/01/24  0804 04/01/24  1221   WBC 3.85*  --    HGB 7.1* 7.1*   HCT 26.2* 26.4*   *  --      CMP:   Recent Labs   Lab 04/01/24  0804      K 3.7      CO2 23   *   BUN 19   CREATININE 1.1   CALCIUM 8.6*   PROT 6.3   ALBUMIN 3.3*   BILITOT 0.4   ALKPHOS 68   AST 16   ALT 14   ANIONGAP 10     Cardiac Markers:   Recent Labs   Lab 04/01/24  0805   BNP 70     Troponin:   Recent Labs   Lab 04/01/24  0804 04/01/24  1043   TROPONINI 0.015 0.050*       Significant Imaging: I have reviewed all pertinent imaging results/findings within the past 24 hours.

## 2024-04-01 NOTE — ASSESSMENT & PLAN NOTE
Patient's anemia is currently uncontrolled. Has received 1 units of PRBCs on 4/1/24 . Etiology likely d/t Iron deficiency  Current CBC reviewed-   Lab Results   Component Value Date    HGB 7.1 (L) 04/01/2024    HCT 26.4 (L) 04/01/2024     Monitor serial CBC and transfuse if patient becomes hemodynamically unstable, symptomatic or H/H drops below 7/21 or hgb 8 if symptomatic

## 2024-04-01 NOTE — CONSULTS
[facility] - Emergency Dept.  Cardiology  Consult Note    Patient Name: Jose Miguel Alvarez Jr.  MRN: [id number]  Admission Date: 4/1/2024  Hospital Length of Stay: 0 days  Code Status: Full Code   Attending Provider: German Hancock MD   Consulting Provider: MOE Babb  Primary Care Physician: Toshia Valladares MD  Principal Problem:Paroxysmal atrial fibrillation    Patient information was obtained from patient, past medical records, and ER records.     Inpatient consult to Cardiology  Consult performed by: Vesta Mead FNP-C  Consult ordered by: Danny Daniels Jr., MD        Subjective:     Chief Complaint:  shortness of breath, chest pain     HPI:   83 y.o. male patient with a PMHx of COPD, CKD, HTN, Afib s/p DCCV who presents to the Emergency Department for chest pain, onset this morning. Symptoms are intermittent and moderate in severity. No mitigating or exacerbating factors reported. Associated sxs include intermittent SOB and intermittent palpitations; pt states that his current sxs are similar to when he was last in Afib. Patient denies any fever, chills, n/v/d, weakness, numbness, dizziness, headache, and all other sxs at this time. Pt is currently on Xarelto and Carvedilol. No further complaints or concerns at this time.     He has recurrent anemia issues previously in the past.  He had EGD, Video capsule and small bowel enteroscopy in 2021 with evidence of oozing in duodenum.   Plan for PRBC transfusion this AM. ECHO pending. Remains in AFIB/RVR at time of exam today.   Transition Cardizem to PO and add IV amiodarone infusion today. Further recs to follow.     Past Medical History:   Diagnosis Date    Anemia     Anticoagulant long-term use     Atrial fibrillation     Bladder cancer     Bladder tumor     CKD (chronic kidney disease), stage III     COPD (chronic obstructive pulmonary disease)     pt denies    Encounter for blood transfusion     Hematuria     History of 2019 novel  coronavirus disease (COVID-19)     Hypercholesteremia     Hypertension        Past Surgical History:   Procedure Laterality Date    bladder tumor removal      CARDIAC ELECTROPHYSIOLOGY MAPPING AND ABLATION      CARDIOVERSION      several    CATARACT EXTRACTION Bilateral     cataract removal with IOL implants Bilateral     CHOLECYSTECTOMY      CYSTOSCOPIC LITHOLAPAXY N/A 12/28/2021    Procedure: CYSTOLITHOLAPAXY;  Surgeon: Medardo Garcia MD;  Location: [facility];  Service: Urology;  Laterality: N/A;    CYSTOSCOPY      CYSTOSCOPY WITH BIOPSY OF BLADDER Right 12/28/2021    Procedure: CYSTOSCOPY, WITH BLADDER BIOPSY, WITH FULGURATION IF INDICATED;  Surgeon: Medardo Garcia MD;  Location: [facility];  Service: Urology;  Laterality: Right;    ESOPHAGOGASTRODUODENOSCOPY N/A 10/25/2021    Procedure: Small Jason Enteroscopy (SBE);  Surgeon: Isabelle Griffin MD;  Location: [facility];  Service: Endoscopy;  Laterality: N/A;    ESOPHAGOGASTRODUODENOSCOPY N/A 12/13/2021    Procedure: EGD (ESOPHAGOGASTRODUODENOSCOPY);  Surgeon: Troy Polanco MD;  Location: [facility];  Service: Endoscopy;  Laterality: N/A;    EYE SURGERY      INTRALUMINAL GASTROINTESTINAL TRACT IMAGING VIA CAPSULE N/A 12/6/2021    Procedure: IMAGING PROCEDURE, GI TRACT, INTRALUMINAL, VIA CAPSULE;  Surgeon: Larry Jones RN;  Location: [facility];  Service: Endoscopy;  Laterality: N/A;    TRANSURETHRAL RESECTION OF BLADDER TUMOR BY BIPOLAR CAUTERY N/A 7/23/2019    Procedure: TURBT, USING BIPOLAR CAUTERY;  Surgeon: Ritesh Marques MD;  Location: [facility];  Service: Urology;  Laterality: N/A;       Review of patient's allergies indicates:  No Known Allergies    Current Facility-Administered Medications on File Prior to Encounter   Medication    lactated ringers infusion     Current Outpatient Medications on File Prior to Encounter   Medication Sig    amLODIPine (NORVASC) 5 MG tablet TAKE 1 TABLET DAILY    atorvastatin (LIPITOR) 20 MG tablet Take 20 mg by  mouth every evening.     carvediloL (COREG) 6.25 MG tablet Take 1 tablet (6.25 mg total) by mouth 2 (two) times daily with meals.    donepeziL (ARICEPT) 5 MG tablet TAKE 1 TABLET EVERY EVENING    ergocalciferol (ERGOCALCIFEROL) 50,000 unit Cap Take 50,000 Units by mouth every 7 days. ()    famotidine (PEPCID) 20 MG tablet Take 20 mg by mouth once daily.    ferrous sulfate (FEOSOL) 325 mg (65 mg iron) Tab tablet Take 325 mg by mouth 3 (three) times a week.    hydroCHLOROthiazide (HYDRODIURIL) 25 MG tablet TAKE 1 TABLET DAILY    multivitamin (THERAGRAN) per tablet Take 1 tablet by mouth once daily.    tamsulosin (FLOMAX) 0.4 mg Cap TAKE 1 CAPSULE DAILY    XARELTO 15 mg Tab TAKE 1 TABLET DAILY WITH DINNER OR EVENING MEAL    furosemide (LASIX) 20 MG tablet Take 1 tablet (20 mg total) by mouth every Mon, Wed, Fri, Sun. (Patient not taking: Reported on 2024)    sildenafiL (VIAGRA) 100 MG tablet Take 1 tablet (100 mg total) by mouth daily as needed for Erectile Dysfunction.     Family History       Problem Relation (Age of Onset)    Heart disease Father, Brother    Hypertension Father          Tobacco Use    Smoking status: Former     Current packs/day: 0.00     Average packs/day: 2.5 packs/day for 20.0 years (50.0 ttl pk-yrs)     Types: Cigarettes     Start date:      Quit date:      Years since quittin.2    Smokeless tobacco: Never   Substance and Sexual Activity    Alcohol use: Yes     Alcohol/week: 7.0 standard drinks of alcohol     Types: 7 Cans of beer per week    Drug use: No    Sexual activity: Not Currently     Review of Systems   Constitutional: Positive for malaise/fatigue.   HENT:  Negative for hearing loss and hoarse voice.    Eyes:  Negative for blurred vision and visual disturbance.   Cardiovascular:  Positive for dyspnea on exertion, irregular heartbeat and palpitations. Negative for chest pain, claudication, leg swelling, near-syncope, orthopnea, paroxysmal nocturnal dyspnea and  syncope.   Respiratory:  Positive for shortness of breath. Negative for cough, hemoptysis, sleep disturbances due to breathing, snoring and wheezing.    Endocrine: Negative for cold intolerance and heat intolerance.   Hematologic/Lymphatic: Bruises/bleeds easily.   Skin:  Negative for color change, dry skin and nail changes.   Musculoskeletal:  Positive for arthritis and back pain. Negative for joint pain and myalgias.   Gastrointestinal:  Negative for bloating, abdominal pain, constipation, nausea and vomiting.   Genitourinary:  Negative for dysuria, flank pain, hematuria and hesitancy.   Neurological:  Positive for weakness. Negative for headaches, light-headedness, loss of balance, numbness and paresthesias.   Psychiatric/Behavioral:  Negative for altered mental status.    Allergic/Immunologic: Negative for environmental allergies.     Objective:     Vital Signs (Most Recent):  Temp: 97.8 °F (36.6 °C) (04/01/24 0726)  Pulse: (!) 140 (04/01/24 1345)  Resp: (!) 93 (04/01/24 1345)  BP: 113/78 (04/01/24 1200)  SpO2: 97 % (04/01/24 1345) Vital Signs (24h Range):  Temp:  [97.8 °F (36.6 °C)] 97.8 °F (36.6 °C)  Pulse:  [] 140  Resp:  [18-93] 93  SpO2:  [97 %-100 %] 97 %  BP: (113-161)/(65-86) 113/78     Weight: 109.8 kg (242 lb)  Body mass index is 32.82 kg/m².    SpO2: 97 %         Intake/Output Summary (Last 24 hours) at 4/1/2024 1346  Last data filed at 4/1/2024 0729  Gross per 24 hour   Intake 1000 ml   Output --   Net 1000 ml       Lines/Drains/Airways       Peripheral Intravenous Line  Duration                  Peripheral IV - Single Lumen 20 G Anterior;Distal;Right Upper Arm -- days         Peripheral IV - Single Lumen 04/01/24 1228 22 G Anterior;Left Forearm <1 day                     Physical Exam  Vitals and nursing note reviewed.   Constitutional:       General: He is not in acute distress.     Appearance: Normal appearance. He is well-developed. He is obese. He is not ill-appearing.   HENT:      Head:  Normocephalic and atraumatic.      Nose: Nose normal.      Mouth/Throat:      Mouth: Mucous membranes are moist.   Eyes:      Pupils: Pupils are equal, round, and reactive to light.   Neck:      Thyroid: No thyromegaly.      Vascular: No JVD.      Trachea: No tracheal deviation.   Cardiovascular:      Rate and Rhythm: Tachycardia present. Rhythm irregularly irregular.      Chest Wall: PMI is not displaced.      Pulses: Intact distal pulses.           Radial pulses are 2+ on the right side and 2+ on the left side.        Dorsalis pedis pulses are 2+ on the right side and 2+ on the left side.      Heart sounds: S1 normal and S2 normal. Heart sounds not distant. No murmur heard.     Comments: Remains in AFIB/RVR  Pulmonary:      Effort: Pulmonary effort is normal. No respiratory distress.      Breath sounds: Normal breath sounds. No wheezing.   Abdominal:      General: Bowel sounds are normal. There is no distension.      Palpations: Abdomen is soft.      Tenderness: There is no abdominal tenderness.   Musculoskeletal:         General: No swelling. Normal range of motion.      Cervical back: Full passive range of motion without pain, normal range of motion and neck supple.      Right lower leg: No edema.      Left lower leg: No edema.      Right ankle: No swelling.      Left ankle: No swelling.   Skin:     General: Skin is warm and dry.      Capillary Refill: Capillary refill takes less than 2 seconds.      Coloration: Skin is pale.      Nails: There is no clubbing.   Neurological:      General: No focal deficit present.      Mental Status: He is alert and oriented to person, place, and time.      Motor: Weakness present.   Psychiatric:         Speech: Speech normal.         Behavior: Behavior normal.         Thought Content: Thought content normal.         Judgment: Judgment normal.          Significant Labs: BMP:   Recent Labs   Lab 04/01/24  0804   *      K 3.7      CO2 23   BUN 19   CREATININE  1.1   CALCIUM 8.6*   , CBC   Recent Labs   Lab 04/01/24  0804 04/01/24  1221   WBC 3.85*  --    HGB 7.1* 7.1*   HCT 26.2* 26.4*   *  --    , Troponin   Recent Labs   Lab 04/01/24  0804 04/01/24  1043   TROPONINI 0.015 0.050*   , and All pertinent lab results from the last 24 hours have been reviewed.    Significant Imaging: Echocardiogram: Transthoracic echo (TTE) complete (Cupid Only):   Results for orders placed or performed during the hospital encounter of 04/01/24   Echo Saline Bubble? No   Result Value Ref Range    Left Atrium Major Axis 5.61 cm    Left Atrium Minor Axis 6.20 cm    RA Major Axis 5.21 cm    LV Diastolic Volume 113.12 mL    LV Systolic Volume 42.24 mL    TR Max Dave 3.13 m/s    PV mean gradient 1 mmHg    RVOT peak VTI 19.1 cm    RVOT peak dave 0.75 m/s    Ao VTI 20.30 cm    Ao peak dave 1.20 m/s    LVOT peak VTI 17.20 cm    LVOT peak dave 0.96 m/s    LVOT diameter 2.19 cm    IVRT 72.31 msec    PV peak gradient 2 mmHg    AV mean gradient 3 mmHg    TAPSE 2.67 cm    RVDD 3.77 cm    LA size 4.62 cm    Ascending aorta 3.57 cm    STJ 2.87 cm    LVIDs 3.24 2.1 - 4.0 cm    Ao root annulus 3.62 cm    Posterior Wall 1.09 0.6 - 1.1 cm    IVS 1.32 (A) 0.6 - 1.1 cm    LVIDd 4.91 3.5 - 6.0 cm    PV PEAK VELOCITY 0.79 m/s    Left Ventricular Outflow Tract Mean Gradient 1.78 mmHg    Left Ventricular Outflow Tract Mean Velocity 0.64 cm/s    IVC diameter 1.79 cm    FS 34 28 - 44 %    LV mass 228.45 g    Left Ventricle Relative Wall Thickness 0.44 cm    AV valve area 3.19 cm²    AV Velocity Ratio 0.80     AV index (prosthetic) 0.85     LVOT area 3.8 cm2    LVOT stroke volume 64.76 cm3    AV peak gradient 6 mmHg    Triscuspid Valve Regurgitation Peak Gradient 39 mmHg    FABIANA by Velocity Ratio 3.01 cm²    BSA 2.36 m2    LV Systolic Volume Index 18.3 mL/m2    LV Diastolic Volume Index 48.97 mL/m2    LV Mass Index 99 g/m2    ZLVIDS -4.15     ZLVIDD -6.15     LA Volume Index 46.1 mL/m2    LA volume 106.40 cm3    LA  WIDTH 4.6 cm    RA Width 4.3 cm     Assessment and Plan:     * Paroxysmal atrial fibrillation  Remains in AFIB/RVR on exam today  Add Amiodarone infusion  Change Diltiazem IV gtt to Cardizem 240 mg daily  Continue Cardiac Monitoring  Hold Xarelto given anemia for now.   Last dose of Xarelto on 3/31/2024    Type 2 diabetes mellitus  Mgmt per primary team    Elevated troponin  Troponin 0.015>>0.050  Elevation likely due to AFIB/RVR and symptomatic anemia  Chest pain free on exam  ECHO pending    Severe anemia  Plan for PRBC transfusion today.  Previously had EGD, Video capsule in 2021 without any definitive source of bleeding located.  Further labs to assess anemia pending.     Hypertension  Continue CCB   Monitor BP trend        VTE Risk Mitigation (From admission, onward)           Ordered     IP VTE HIGH RISK PATIENT  Once         04/01/24 1241     Place sequential compression device  Until discontinued         04/01/24 1241     Reason for No Pharmacological VTE Prophylaxis  Once        Question:  Reasons:  Answer:  Already adequately anticoagulated on oral Anticoagulants    04/01/24 1241                    Thank you for your consult. I will follow-up with patient. Please contact us if you have any additional questions.    Vesta Mead, EDITAP-C  Cardiology   [facility] - Emergency Dept.

## 2024-04-01 NOTE — HPI
83 y.o. male patient with a PMHx of COPD, CKD, HTN, Afib s/p DCCV who presents to the Emergency Department for chest pain, onset this morning. Symptoms are intermittent and moderate in severity. No mitigating or exacerbating factors reported. Associated sxs include intermittent SOB and intermittent palpitations; pt states that his current sxs are similar to when he was last in Afib. Patient denies any fever, chills, n/v/d, weakness, numbness, dizziness, headache, and all other sxs at this time. Pt is currently on Xarelto and Carvedilol. No further complaints or concerns at this time.     He has recurrent anemia issues previously in the past.  He had EGD, Video capsule and small bowel enteroscopy in 2021 with evidence of oozing in duodenum.   Plan for PRBC transfusion this AM. ECHO pending. Remains in AFIB/RVR at time of exam today.   Transition Cardizem to PO and add IV amiodarone infusion today. Further recs to follow.

## 2024-04-01 NOTE — ADMISSIONCARE
AdmissionCare    Guideline: Atrial Fibrillation - OBS, Observation    Based on the indications selected for the patient, the bed status of Observation was determined to be MET    The following indications were selected as present at the time of evaluation of the patient:      - Pharmacologic rate control needed (eg, symptomatic Tachycardia)    - Tachycardia, as indicated by 1 or more of the following:     - Heart rate greater than 100 beats per minute in adult or child age 6 years or older    AdmissionCare documentation entered by: Christina De Leon    [facility], 27th edition, Copyright © 2023 [facility], [facility] All Rights Reserved.  [id number]

## 2024-04-01 NOTE — H&P
[facility] - Emergency Dept.  [facility] Medicine  History & Physical    Patient Name: Jose Miguel Alvarez Jr.  MRN: [id number]  Patient Class: OP- Observation  Admission Date: 4/1/2024  Attending Physician: German Hancock MD   Primary Care Provider: Toshia Valladares MD         Patient information was obtained from patient, past medical records, and ER records.     Subjective:     Principal Problem:Paroxysmal atrial fibrillation    Chief Complaint:   Chief Complaint   Patient presents with    Shortness of Breath     With mid-sternal, non radiating chest pain. Hx of Afib,  per [facility]. Received approx 1L NS PTA.         HPI: 83 y.o. male patient with a PMHx of COPD, CKD, HTN, Afib s/p DCCV. Presented to the Emergency Department for chest pain, onset PTA at home. Symptoms are intermittent and moderate in severity. No mitigating or exacerbating factors reported. Associated sxs include intermittent SOB and intermittent palpitations; pt states that his current sxs are similar to when he was last in Afib. Patient denies any fever, chills, n/v/d, weakness, numbness, dizziness, headache, and all other sxs at this time. Pt is currently on Xarelto and Carvedilol. In the ED, vitals: 146/75, 120, 22, 97.8F, 99% RA. Significant Labs: WBC: 3.85, H/H: 7.1/26.2, Pl: 130K. Trop: 0.015, EKG: A. Fib RVR. CXR: no acute process. Treated with IVP diltiazem x2, no improvement in rate or rhythm. Placed on diltiazem drip. Cardiology consulted. Patient is a full code. Placed in observation under the care of Hospital Medicine for management of A. Fib RVR, Elevated Troponin, Pancytopenia.     Past Medical History:   Diagnosis Date    Anemia     Anticoagulant long-term use     Atrial fibrillation     Bladder cancer     Bladder tumor     CKD (chronic kidney disease), stage III     COPD (chronic obstructive pulmonary disease)     pt denies    Encounter for blood transfusion     Hematuria     History of 2019 novel coronavirus  disease (COVID-19)     Hypercholesteremia     Hypertension        Past Surgical History:   Procedure Laterality Date    bladder tumor removal      CARDIAC ELECTROPHYSIOLOGY MAPPING AND ABLATION      CARDIOVERSION      several    CATARACT EXTRACTION Bilateral     cataract removal with IOL implants Bilateral     CHOLECYSTECTOMY      CYSTOSCOPIC LITHOLAPAXY N/A 12/28/2021    Procedure: CYSTOLITHOLAPAXY;  Surgeon: Medardo Garcia MD;  Location: [facility];  Service: Urology;  Laterality: N/A;    CYSTOSCOPY      CYSTOSCOPY WITH BIOPSY OF BLADDER Right 12/28/2021    Procedure: CYSTOSCOPY, WITH BLADDER BIOPSY, WITH FULGURATION IF INDICATED;  Surgeon: Medardo Garcia MD;  Location: [facility] OR;  Service: Urology;  Laterality: Right;    ESOPHAGOGASTRODUODENOSCOPY N/A 10/25/2021    Procedure: Small Jason Enteroscopy (SBE);  Surgeon: Isabelle Griffin MD;  Location: [facility];  Service: Endoscopy;  Laterality: N/A;    ESOPHAGOGASTRODUODENOSCOPY N/A 12/13/2021    Procedure: EGD (ESOPHAGOGASTRODUODENOSCOPY);  Surgeon: Troy Polanco MD;  Location: [facility];  Service: Endoscopy;  Laterality: N/A;    EYE SURGERY      INTRALUMINAL GASTROINTESTINAL TRACT IMAGING VIA CAPSULE N/A 12/6/2021    Procedure: IMAGING PROCEDURE, GI TRACT, INTRALUMINAL, VIA CAPSULE;  Surgeon: Larry Jones RN;  Location: [facility];  Service: Endoscopy;  Laterality: N/A;    TRANSURETHRAL RESECTION OF BLADDER TUMOR BY BIPOLAR CAUTERY N/A 7/23/2019    Procedure: TURBT, USING BIPOLAR CAUTERY;  Surgeon: Ritesh Marques MD;  Location: [facility];  Service: Urology;  Laterality: N/A;       Review of patient's allergies indicates:  No Known Allergies    Current Facility-Administered Medications on File Prior to Encounter   Medication    lactated ringers infusion     Current Outpatient Medications on File Prior to Encounter   Medication Sig    amLODIPine (NORVASC) 5 MG tablet TAKE 1 TABLET DAILY    atorvastatin (LIPITOR) 20 MG tablet Take 20 mg by mouth every  evening.     carvediloL (COREG) 6.25 MG tablet Take 1 tablet (6.25 mg total) by mouth 2 (two) times daily with meals.    donepeziL (ARICEPT) 5 MG tablet TAKE 1 TABLET EVERY EVENING    ergocalciferol (ERGOCALCIFEROL) 50,000 unit Cap Take 50,000 Units by mouth every 7 days. ()    famotidine (PEPCID) 20 MG tablet Take 20 mg by mouth once daily.    ferrous sulfate (FEOSOL) 325 mg (65 mg iron) Tab tablet Take 325 mg by mouth 3 (three) times a week.    hydroCHLOROthiazide (HYDRODIURIL) 25 MG tablet TAKE 1 TABLET DAILY    multivitamin (THERAGRAN) per tablet Take 1 tablet by mouth once daily.    tamsulosin (FLOMAX) 0.4 mg Cap TAKE 1 CAPSULE DAILY    XARELTO 15 mg Tab TAKE 1 TABLET DAILY WITH DINNER OR EVENING MEAL    furosemide (LASIX) 20 MG tablet Take 1 tablet (20 mg total) by mouth every Mon, Wed, Fri, Sun. (Patient not taking: Reported on 2024)    sildenafiL (VIAGRA) 100 MG tablet Take 1 tablet (100 mg total) by mouth daily as needed for Erectile Dysfunction.     Family History       Problem Relation (Age of Onset)    Heart disease Father, Brother    Hypertension Father          Tobacco Use    Smoking status: Former     Current packs/day: 0.00     Average packs/day: 2.5 packs/day for 20.0 years (50.0 ttl pk-yrs)     Types: Cigarettes     Start date:      Quit date:      Years since quittin.2    Smokeless tobacco: Never   Substance and Sexual Activity    Alcohol use: Yes     Alcohol/week: 7.0 standard drinks of alcohol     Types: 7 Cans of beer per week    Drug use: No    Sexual activity: Not Currently     Review of Systems   Respiratory:  Positive for chest tightness and shortness of breath.    Cardiovascular:  Positive for chest pain and palpitations.   Musculoskeletal:  Positive for arthralgias.   Skin:  Positive for color change.   Neurological:  Positive for weakness and headaches.   All other systems reviewed and are negative.    Objective:     Vital Signs (Most Recent):  Temp: 97.8 °F  (36.6 °C) (04/01/24 0726)  Pulse: (!) 129 (04/01/24 1445)  Resp: 18 (04/01/24 1400)  BP: (!) 144/94 (04/01/24 1445)  SpO2: (!) 92 % (04/01/24 1445) Vital Signs (24h Range):  Temp:  [97.8 °F (36.6 °C)] 97.8 °F (36.6 °C)  Pulse:  [] 129  Resp:  [18-22] 18  SpO2:  [92 %-100 %] 92 %  BP: (113-161)/(65-94) 144/94     Weight: 109.8 kg (242 lb)  Body mass index is 32.82 kg/m².     Physical Exam  Vitals and nursing note reviewed.   Constitutional:       General: He is not in acute distress.     Appearance: He is well-developed. He is ill-appearing. He is not diaphoretic.   HENT:      Head: Normocephalic and atraumatic.      Right Ear: Hearing and external ear normal.      Left Ear: Hearing and external ear normal.      Nose: Nose normal. No mucosal edema or rhinorrhea.      Mouth/Throat:      Pharynx: Uvula midline.   Eyes:      General:         Right eye: No discharge.         Left eye: No discharge.      Conjunctiva/sclera:      Right eye: No chemosis.     Left eye: No chemosis.     Pupils: Pupils are equal, round, and reactive to light.      Comments: Pale conjunctiva   Neck:      Thyroid: No thyroid mass or thyromegaly.      Trachea: Trachea normal.   Cardiovascular:      Rate and Rhythm: Normal rate and regular rhythm.      Pulses:           Dorsalis pedis pulses are 2+ on the right side and 2+ on the left side.      Heart sounds: Normal heart sounds. No murmur heard.  Pulmonary:      Effort: Pulmonary effort is normal. No respiratory distress.      Breath sounds: Normal breath sounds. No decreased breath sounds or wheezing.   Abdominal:      General: Bowel sounds are normal. There is no distension.      Palpations: Abdomen is soft.      Tenderness: There is no abdominal tenderness.   Musculoskeletal:         General: Normal range of motion.      Cervical back: Normal range of motion and neck supple.   Lymphadenopathy:      Cervical: No cervical adenopathy.      Upper Body:      Right upper body: No  supraclavicular adenopathy.      Left upper body: No supraclavicular adenopathy.   Skin:     General: Skin is warm and dry.      Capillary Refill: Capillary refill takes less than 2 seconds.      Coloration: Skin is pale.      Findings: No rash.   Neurological:      Mental Status: He is alert and oriented to person, place, and time.   Psychiatric:         Mood and Affect: Mood is not anxious.         Speech: Speech normal.         Behavior: Behavior normal.         Thought Content: Thought content normal.         Judgment: Judgment normal.              CRANIAL NERVES     CN III, IV, VI   Pupils are equal, round, and reactive to light.       Significant Labs: All pertinent labs within the past 24 hours have been reviewed.  CBC:   Recent Labs   Lab 04/01/24  0804 04/01/24  1221   WBC 3.85*  --    HGB 7.1* 7.1*   HCT 26.2* 26.4*   *  --      CMP:   Recent Labs   Lab 04/01/24  0804      K 3.7      CO2 23   *   BUN 19   CREATININE 1.1   CALCIUM 8.6*   PROT 6.3   ALBUMIN 3.3*   BILITOT 0.4   ALKPHOS 68   AST 16   ALT 14   ANIONGAP 10     Cardiac Markers:   Recent Labs   Lab 04/01/24  0805   BNP 70     Troponin:   Recent Labs   Lab 04/01/24  0804 04/01/24  1043   TROPONINI 0.015 0.050*       Significant Imaging: I have reviewed all pertinent imaging results/findings within the past 24 hours.  Assessment/Plan:     * Paroxysmal atrial fibrillation  Patient with Persistent (7 days or more) atrial fibrillation which is uncontrolled currently with Calcium Channel Blocker. Patient is currently in atrial fibrillation.[id number] Score: 3. Anticoagulation indicated. Anticoagulation done with Xarelto .    Treated with IVP Dilt x2, rate improvement not sustained.     --Cardiology consulted  --Diltiazem IV continuous 10mg/hr initiated per recommendation, rate did not improve, changed to IV amiodarone  --Tele  --Vitals Q4H    Type 2 diabetes mellitus  Patient's FSGs are controlled on current medication  "regimen.  Last A1c reviewed-   Lab Results   Component Value Date    HGBA1C 6.2 (H) 01/26/2023     Most recent fingerstick glucose reviewed- No results for input(s): "POCTGLUCOSE" in the last 24 hours.  Current correctional scale  Low  Maintain anti-hyperglycemic dose as follows-   Antihyperglycemics (From admission, onward)      Start     Stop Route Frequency Ordered    04/01/24 1340  insulin aspart U-100 pen 0-5 Units         -- SubQ Before meals & nightly PRN 04/01/24 1241          Hold Oral hypoglycemics while patient is in the hospital.    Elevated troponin  Likely secondary to demand ischemia related to anemia, A. Fib RVR    --trend trop  --Tele  --Vitals Q4H  --Repeat labs in AM  --Transfuse with 1U PRBC        Pancytopenia  Chronic anemia, thrombocytopenia, intermittent neutropenia. Has been treated symptomatically, negative for hx of evaluation by Hem/Onc    This patient is found to have pancytopenia, the likely etiology is  Multifactorial , will monitor CBC Daily. Will transfuse red blood cells if the hemoglobin is <7g/dL (or <8 in the setting of ACS). Will transfuse platelets if platelet count is <50k (if undergoing surgical procedure or have active bleeding). Hold DVT prophylaxis if platelets are <50k. The patient's hemoglobin, white blood cell count, and platelet count results have been reviewed and are listed below.  Recent Labs   Lab 04/01/24  0804 04/01/24  1221   HGB 7.1* 7.1*   WBC 3.85*  --    *  --        --F/u with Hem/Onc as OP  --transfuse with 1U prbc now  --Venofer IV 200mg daily x3 doses      Severe anemia  Patient's anemia is currently uncontrolled. Has received 1 units of PRBCs on 4/1/24 . Etiology likely d/t Iron deficiency  Current CBC reviewed-   Lab Results   Component Value Date    HGB 7.1 (L) 04/01/2024    HCT 26.4 (L) 04/01/2024     Monitor serial CBC and transfuse if patient becomes hemodynamically unstable, symptomatic or H/H drops below 7/21 or hgb 8 if " symptomatic    Hypertension  Chronic, controlled. Latest blood pressure and vitals reviewed-     Temp:  [97.8 °F (36.6 °C)]   Pulse:  []   Resp:  [18-22]   BP: (113-161)/(65-86)   SpO2:  [98 %-100 %] .   Home meds for hypertension were reviewed and noted below.   Hypertension Medications               amLODIPine (NORVASC) 5 MG tablet TAKE 1 TABLET DAILY    carvediloL (COREG) 6.25 MG tablet Take 1 tablet (6.25 mg total) by mouth 2 (two) times daily with meals.    hydroCHLOROthiazide (HYDRODIURIL) 25 MG tablet TAKE 1 TABLET DAILY    furosemide (LASIX) 20 MG tablet Take 1 tablet (20 mg total) by mouth every Mon, Wed, Fri, Sun.            While in the hospital, will manage blood pressure as follows; Continue home antihypertensive regimen    Will utilize p.r.n. blood pressure medication only if patient's blood pressure greater than 160/100 and he develops symptoms such as worsening chest pain or shortness of breath.      VTE Risk Mitigation (From admission, onward)           Ordered     IP VTE HIGH RISK PATIENT  Once         04/01/24 1241     Place sequential compression device  Until discontinued         04/01/24 1241     Reason for No Pharmacological VTE Prophylaxis  Once        Question:  Reasons:  Answer:  Already adequately anticoagulated on oral Anticoagulants    04/01/24 1241                         On 04/01/2024, patient should be placed in hospital observation services under my care in collaboration with German Hancock MD.      AdmissionCare    Guideline: Atrial Fibrillation - OBS, Observation    Based on the indications selected for the patient, the bed status of Observation was determined to be MET    The following indications were selected as present at the time of evaluation of the patient:      - Pharmacologic rate control needed (eg, symptomatic Tachycardia)    - Tachycardia, as indicated by 1 or more of the following:     - Heart rate greater than 100 beats per minute in adult or child age 6 years  or older    AdmissionCare documentation entered by: Christina De Leon    [facility], 27th edition, Copyright © 2023 [facility] [place] All Rights Reserved.  [id number]     Christina De Leon NP  Department of Hospital Medicine  [facility] - Emergency Dept.

## 2024-04-01 NOTE — ASSESSMENT & PLAN NOTE
Troponin 0.015>>0.050  Elevation likely due to AFIB/RVR and symptomatic anemia  Chest pain free on exam  ECHO pending

## 2024-04-01 NOTE — ASSESSMENT & PLAN NOTE
Chronic, controlled. Latest blood pressure and vitals reviewed-     Temp:  [97.8 °F (36.6 °C)]   Pulse:  []   Resp:  [18-22]   BP: (113-161)/(65-86)   SpO2:  [98 %-100 %] .   Home meds for hypertension were reviewed and noted below.   Hypertension Medications               amLODIPine (NORVASC) 5 MG tablet TAKE 1 TABLET DAILY    carvediloL (COREG) 6.25 MG tablet Take 1 tablet (6.25 mg total) by mouth 2 (two) times daily with meals.    hydroCHLOROthiazide (HYDRODIURIL) 25 MG tablet TAKE 1 TABLET DAILY    furosemide (LASIX) 20 MG tablet Take 1 tablet (20 mg total) by mouth every Mon, Wed, Fri, Sun.            While in the hospital, will manage blood pressure as follows; Continue home antihypertensive regimen    Will utilize p.r.n. blood pressure medication only if patient's blood pressure greater than 160/100 and he develops symptoms such as worsening chest pain or shortness of breath.

## 2024-04-01 NOTE — ASSESSMENT & PLAN NOTE
Plan for PRBC transfusion today.  Previously had EGD, Video capsule in 2021 without any definitive source of bleeding located.  Further labs to assess anemia pending.

## 2024-04-01 NOTE — ASSESSMENT & PLAN NOTE
Likely secondary to demand ischemia related to anemia, A. Fib RVR    --trend trop  --Tele  --Vitals Q4H  --Repeat labs in AM  --Transfuse with 1U PRBC

## 2024-04-01 NOTE — SUBJECTIVE & OBJECTIVE
Past Medical History:   Diagnosis Date    Anemia     Anticoagulant long-term use     Atrial fibrillation     Bladder cancer     Bladder tumor     CKD (chronic kidney disease), stage III     COPD (chronic obstructive pulmonary disease)     pt denies    Encounter for blood transfusion     Hematuria     History of 2019 novel coronavirus disease (COVID-19)     Hypercholesteremia     Hypertension        Past Surgical History:   Procedure Laterality Date    bladder tumor removal      CARDIAC ELECTROPHYSIOLOGY MAPPING AND ABLATION      CARDIOVERSION      several    CATARACT EXTRACTION Bilateral     cataract removal with IOL implants Bilateral     CHOLECYSTECTOMY      CYSTOSCOPIC LITHOLAPAXY N/A 12/28/2021    Procedure: CYSTOLITHOLAPAXY;  Surgeon: Medardo Garcia MD;  Location: [facility];  Service: Urology;  Laterality: N/A;    CYSTOSCOPY      CYSTOSCOPY WITH BIOPSY OF BLADDER Right 12/28/2021    Procedure: CYSTOSCOPY, WITH BLADDER BIOPSY, WITH FULGURATION IF INDICATED;  Surgeon: Medardo Garcia MD;  Location: [facility];  Service: Urology;  Laterality: Right;    ESOPHAGOGASTRODUODENOSCOPY N/A 10/25/2021    Procedure: Small Jason Enteroscopy (SBE);  Surgeon: Isabelle Griffin MD;  Location: [facility];  Service: Endoscopy;  Laterality: N/A;    ESOPHAGOGASTRODUODENOSCOPY N/A 12/13/2021    Procedure: EGD (ESOPHAGOGASTRODUODENOSCOPY);  Surgeon: Troy Polanco MD;  Location: [facility];  Service: Endoscopy;  Laterality: N/A;    EYE SURGERY      INTRALUMINAL GASTROINTESTINAL TRACT IMAGING VIA CAPSULE N/A 12/6/2021    Procedure: IMAGING PROCEDURE, GI TRACT, INTRALUMINAL, VIA CAPSULE;  Surgeon: Larry Jones RN;  Location: [facility];  Service: Endoscopy;  Laterality: N/A;    TRANSURETHRAL RESECTION OF BLADDER TUMOR BY BIPOLAR CAUTERY N/A 7/23/2019    Procedure: TURBT, USING BIPOLAR CAUTERY;  Surgeon: Ritesh Marques MD;  Location: [facility];  Service: Urology;  Laterality: N/A;       Review of patient's allergies  indicates:  No Known Allergies    Current Facility-Administered Medications on File Prior to Encounter   Medication    lactated ringers infusion     Current Outpatient Medications on File Prior to Encounter   Medication Sig    amLODIPine (NORVASC) 5 MG tablet TAKE 1 TABLET DAILY    atorvastatin (LIPITOR) 20 MG tablet Take 20 mg by mouth every evening.     carvediloL (COREG) 6.25 MG tablet Take 1 tablet (6.25 mg total) by mouth 2 (two) times daily with meals.    donepeziL (ARICEPT) 5 MG tablet TAKE 1 TABLET EVERY EVENING    ergocalciferol (ERGOCALCIFEROL) 50,000 unit Cap Take 50,000 Units by mouth every 7 days. ()    famotidine (PEPCID) 20 MG tablet Take 20 mg by mouth once daily.    ferrous sulfate (FEOSOL) 325 mg (65 mg iron) Tab tablet Take 325 mg by mouth 3 (three) times a week.    hydroCHLOROthiazide (HYDRODIURIL) 25 MG tablet TAKE 1 TABLET DAILY    multivitamin (THERAGRAN) per tablet Take 1 tablet by mouth once daily.    tamsulosin (FLOMAX) 0.4 mg Cap TAKE 1 CAPSULE DAILY    XARELTO 15 mg Tab TAKE 1 TABLET DAILY WITH DINNER OR EVENING MEAL    furosemide (LASIX) 20 MG tablet Take 1 tablet (20 mg total) by mouth every Mon, Wed, Fri, Sun. (Patient not taking: Reported on 2024)    sildenafiL (VIAGRA) 100 MG tablet Take 1 tablet (100 mg total) by mouth daily as needed for Erectile Dysfunction.     Family History       Problem Relation (Age of Onset)    Heart disease Father, Brother    Hypertension Father          Tobacco Use    Smoking status: Former     Current packs/day: 0.00     Average packs/day: 2.5 packs/day for 20.0 years (50.0 ttl pk-yrs)     Types: Cigarettes     Start date:      Quit date:      Years since quittin.2    Smokeless tobacco: Never   Substance and Sexual Activity    Alcohol use: Yes     Alcohol/week: 7.0 standard drinks of alcohol     Types: 7 Cans of beer per week    Drug use: No    Sexual activity: Not Currently     Review of Systems   Constitutional: Positive for  malaise/fatigue.   HENT:  Negative for hearing loss and hoarse voice.    Eyes:  Negative for blurred vision and visual disturbance.   Cardiovascular:  Positive for dyspnea on exertion, irregular heartbeat and palpitations. Negative for chest pain, claudication, leg swelling, near-syncope, orthopnea, paroxysmal nocturnal dyspnea and syncope.   Respiratory:  Positive for shortness of breath. Negative for cough, hemoptysis, sleep disturbances due to breathing, snoring and wheezing.    Endocrine: Negative for cold intolerance and heat intolerance.   Hematologic/Lymphatic: Bruises/bleeds easily.   Skin:  Negative for color change, dry skin and nail changes.   Musculoskeletal:  Positive for arthritis and back pain. Negative for joint pain and myalgias.   Gastrointestinal:  Negative for bloating, abdominal pain, constipation, nausea and vomiting.   Genitourinary:  Negative for dysuria, flank pain, hematuria and hesitancy.   Neurological:  Positive for weakness. Negative for headaches, light-headedness, loss of balance, numbness and paresthesias.   Psychiatric/Behavioral:  Negative for altered mental status.    Allergic/Immunologic: Negative for environmental allergies.     Objective:     Vital Signs (Most Recent):  Temp: 97.8 °F (36.6 °C) (04/01/24 0726)  Pulse: (!) 140 (04/01/24 1345)  Resp: (!) 93 (04/01/24 1345)  BP: 113/78 (04/01/24 1200)  SpO2: 97 % (04/01/24 1345) Vital Signs (24h Range):  Temp:  [97.8 °F (36.6 °C)] 97.8 °F (36.6 °C)  Pulse:  [] 140  Resp:  [18-93] 93  SpO2:  [97 %-100 %] 97 %  BP: (113-161)/(65-86) 113/78     Weight: 109.8 kg (242 lb)  Body mass index is 32.82 kg/m².    SpO2: 97 %         Intake/Output Summary (Last 24 hours) at 4/1/2024 1346  Last data filed at 4/1/2024 0729  Gross per 24 hour   Intake 1000 ml   Output --   Net 1000 ml       Lines/Drains/Airways       Peripheral Intravenous Line  Duration                  Peripheral IV - Single Lumen 20 G Anterior;Distal;Right Upper Arm --  days         Peripheral IV - Single Lumen 04/01/24 1228 22 G Anterior;Left Forearm <1 day                     Physical Exam  Vitals and nursing note reviewed.   Constitutional:       General: He is not in acute distress.     Appearance: Normal appearance. He is well-developed. He is obese. He is not ill-appearing.   HENT:      Head: Normocephalic and atraumatic.      Nose: Nose normal.      Mouth/Throat:      Mouth: Mucous membranes are moist.   Eyes:      Pupils: Pupils are equal, round, and reactive to light.   Neck:      Thyroid: No thyromegaly.      Vascular: No JVD.      Trachea: No tracheal deviation.   Cardiovascular:      Rate and Rhythm: Tachycardia present. Rhythm irregularly irregular.      Chest Wall: PMI is not displaced.      Pulses: Intact distal pulses.           Radial pulses are 2+ on the right side and 2+ on the left side.        Dorsalis pedis pulses are 2+ on the right side and 2+ on the left side.      Heart sounds: S1 normal and S2 normal. Heart sounds not distant. No murmur heard.     Comments: Remains in AFIB/RVR  Pulmonary:      Effort: Pulmonary effort is normal. No respiratory distress.      Breath sounds: Normal breath sounds. No wheezing.   Abdominal:      General: Bowel sounds are normal. There is no distension.      Palpations: Abdomen is soft.      Tenderness: There is no abdominal tenderness.   Musculoskeletal:         General: No swelling. Normal range of motion.      Cervical back: Full passive range of motion without pain, normal range of motion and neck supple.      Right lower leg: No edema.      Left lower leg: No edema.      Right ankle: No swelling.      Left ankle: No swelling.   Skin:     General: Skin is warm and dry.      Capillary Refill: Capillary refill takes less than 2 seconds.      Coloration: Skin is pale.      Nails: There is no clubbing.   Neurological:      General: No focal deficit present.      Mental Status: He is alert and oriented to person, place, and  time.      Motor: Weakness present.   Psychiatric:         Speech: Speech normal.         Behavior: Behavior normal.         Thought Content: Thought content normal.         Judgment: Judgment normal.          Significant Labs: BMP:   Recent Labs   Lab 04/01/24  0804   *      K 3.7      CO2 23   BUN 19   CREATININE 1.1   CALCIUM 8.6*   , CBC   Recent Labs   Lab 04/01/24  0804 04/01/24  1221   WBC 3.85*  --    HGB 7.1* 7.1*   HCT 26.2* 26.4*   *  --    , Troponin   Recent Labs   Lab 04/01/24  0804 04/01/24  1043   TROPONINI 0.015 0.050*   , and All pertinent lab results from the last 24 hours have been reviewed.    Significant Imaging: Echocardiogram: Transthoracic echo (TTE) complete (Cupid Only):   Results for orders placed or performed during the hospital encounter of 04/01/24   Echo Saline Bubble? No   Result Value Ref Range    Left Atrium Major Axis 5.61 cm    Left Atrium Minor Axis 6.20 cm    RA Major Axis 5.21 cm    LV Diastolic Volume 113.12 mL    LV Systolic Volume 42.24 mL    TR Max Dave 3.13 m/s    PV mean gradient 1 mmHg    RVOT peak VTI 19.1 cm    RVOT peak dave 0.75 m/s    Ao VTI 20.30 cm    Ao peak dave 1.20 m/s    LVOT peak VTI 17.20 cm    LVOT peak dave 0.96 m/s    LVOT diameter 2.19 cm    IVRT 72.31 msec    PV peak gradient 2 mmHg    AV mean gradient 3 mmHg    TAPSE 2.67 cm    RVDD 3.77 cm    LA size 4.62 cm    Ascending aorta 3.57 cm    STJ 2.87 cm    LVIDs 3.24 2.1 - 4.0 cm    Ao root annulus 3.62 cm    Posterior Wall 1.09 0.6 - 1.1 cm    IVS 1.32 (A) 0.6 - 1.1 cm    LVIDd 4.91 3.5 - 6.0 cm    PV PEAK VELOCITY 0.79 m/s    Left Ventricular Outflow Tract Mean Gradient 1.78 mmHg    Left Ventricular Outflow Tract Mean Velocity 0.64 cm/s    IVC diameter 1.79 cm    FS 34 28 - 44 %    LV mass 228.45 g    Left Ventricle Relative Wall Thickness 0.44 cm    AV valve area 3.19 cm²    AV Velocity Ratio 0.80     AV index (prosthetic) 0.85     LVOT area 3.8 cm2    LVOT stroke volume 64.76  cm3    AV peak gradient 6 mmHg    Triscuspid Valve Regurgitation Peak Gradient 39 mmHg    FABIANA by Velocity Ratio 3.01 cm²    BSA 2.36 m2    LV Systolic Volume Index 18.3 mL/m2    LV Diastolic Volume Index 48.97 mL/m2    LV Mass Index 99 g/m2    ZLVIDS -4.15     ZLVIDD -6.15     LA Volume Index 46.1 mL/m2    LA volume 106.40 cm3    LA WIDTH 4.6 cm    RA Width 4.3 cm

## 2024-04-01 NOTE — PHARMACY MED REC
"Admission Medication History     The home medication history was taken by Dheeraj Paul.    You may go to "Admission" then "Reconcile Home Medications" tabs to review and/or act upon these items.     The home medication list has been updated by the Pharmacy department.   Please read ALL comments highlighted in yellow.   Please address this information as you see fit.    Feel free to contact us if you have any questions or require assistance.      Medications listed below were obtained from: Patient/family and Analytic software- [employer]  (Not in a hospital admission)        Dheeraj Paul  [id number]    Current Outpatient Medications on File Prior to Encounter   Medication Sig Dispense Refill Last Dose    amLODIPine (NORVASC) 5 MG tablet TAKE 1 TABLET DAILY 30 tablet 11 3/31/2024    atorvastatin (LIPITOR) 20 MG tablet Take 20 mg by mouth every evening.    3/31/2024    carvediloL (COREG) 6.25 MG tablet Take 1 tablet (6.25 mg total) by mouth 2 (two) times daily with meals. 180 tablet 3 3/31/2024    donepeziL (ARICEPT) 5 MG tablet TAKE 1 TABLET EVERY EVENING 90 tablet 3 3/31/2024    ergocalciferol (ERGOCALCIFEROL) 50,000 unit Cap Take 50,000 Units by mouth every 7 days. (Thursdays)   Past Week    famotidine (PEPCID) 20 MG tablet Take 20 mg by mouth once daily.   3/31/2024    ferrous sulfate (FEOSOL) 325 mg (65 mg iron) Tab tablet Take 325 mg by mouth 3 (three) times a week.   3/31/2024    hydroCHLOROthiazide (HYDRODIURIL) 25 MG tablet TAKE 1 TABLET DAILY 90 tablet 3 3/31/2024    multivitamin (THERAGRAN) per tablet Take 1 tablet by mouth once daily.   3/31/2024    tamsulosin (FLOMAX) 0.4 mg Cap TAKE 1 CAPSULE DAILY 90 capsule 3 3/31/2024    XARELTO 15 mg Tab TAKE 1 TABLET DAILY WITH DINNER OR EVENING MEAL 90 tablet 3 3/31/2024    furosemide (LASIX) 20 MG tablet Take 1 tablet (20 mg total) by mouth every Mon, Wed, Fri, Sun. (Patient not taking: Reported on 4/1/2024) 60 tablet 3 Not Taking    sildenafiL (VIAGRA) " 100 MG tablet Take 1 tablet (100 mg total) by mouth daily as needed for Erectile Dysfunction. 20 tablet 10 Unknown                           .

## 2024-04-01 NOTE — HPI
83 y.o. male patient with a PMHx of COPD, CKD, HTN, Afib s/p DCCV. Presented to the Emergency Department for chest pain, onset PTA at home. Symptoms are intermittent and moderate in severity. No mitigating or exacerbating factors reported. Associated sxs include intermittent SOB and intermittent palpitations; pt states that his current sxs are similar to when he was last in Afib. Patient denies any fever, chills, n/v/d, weakness, numbness, dizziness, headache, and all other sxs at this time. Pt is currently on Xarelto and Carvedilol. In the ED, vitals: 146/75, 120, 22, 97.8F, 99% RA. Significant Labs: WBC: 3.85, H/H: 7.1/26.2, Pl: 130K. Trop: 0.015, EKG: A. Fib RVR. CXR: no acute process. Treated with IVP diltiazem x2, no improvement in rate or rhythm. Placed on diltiazem drip. Cardiology consulted. Patient is a full code. Placed in observation under the care of Hospital Medicine for management of A. Fib RVR, Elevated Troponin, Pancytopenia.

## 2024-04-01 NOTE — ASSESSMENT & PLAN NOTE
Chronic anemia, thrombocytopenia, intermittent neutropenia. Has been treated symptomatically, negative for hx of evaluation by Hem/Onc    This patient is found to have pancytopenia, the likely etiology is  Multifactorial , will monitor CBC Daily. Will transfuse red blood cells if the hemoglobin is <7g/dL (or <8 in the setting of ACS). Will transfuse platelets if platelet count is <50k (if undergoing surgical procedure or have active bleeding). Hold DVT prophylaxis if platelets are <50k. The patient's hemoglobin, white blood cell count, and platelet count results have been reviewed and are listed below.  Recent Labs   Lab 04/01/24  0804 04/01/24  1221   HGB 7.1* 7.1*   WBC 3.85*  --    *  --        --F/u with Hem/Onc as OP  --transfuse with 1U prbc now  --Venofer IV 200mg daily x3 doses

## 2024-04-02 PROBLEM — D72.829 LEUKOCYTOSIS: Status: ACTIVE | Noted: 2024-04-02

## 2024-04-02 LAB
ALBUMIN SERPL BCP-MCNC: 3 G/DL (ref 3.5–5.2)
ALP SERPL-CCNC: 72 U/L (ref 55–135)
ALT SERPL W/O P-5'-P-CCNC: 19 U/L (ref 10–44)
ANION GAP SERPL CALC-SCNC: 17 MMOL/L (ref 8–16)
ANISOCYTOSIS BLD QL SMEAR: SLIGHT
AST SERPL-CCNC: 30 U/L (ref 10–40)
BACTERIA #/AREA URNS HPF: ABNORMAL /HPF
BASOPHILS # BLD AUTO: 0.03 K/UL (ref 0–0.2)
BASOPHILS NFR BLD: 0 % (ref 0–1.9)
BASOPHILS NFR BLD: 0.2 % (ref 0–1.9)
BILIRUB SERPL-MCNC: 1 MG/DL (ref 0.1–1)
BILIRUB UR QL STRIP: NEGATIVE
BLD PROD TYP BPU: NORMAL
BLOOD UNIT EXPIRATION DATE: NORMAL
BLOOD UNIT TYPE CODE: 9500
BLOOD UNIT TYPE: NORMAL
BUN SERPL-MCNC: 22 MG/DL (ref 8–23)
CALCIUM SERPL-MCNC: 8.3 MG/DL (ref 8.7–10.5)
CHLORIDE SERPL-SCNC: 106 MMOL/L (ref 95–110)
CLARITY UR: ABNORMAL
CO2 SERPL-SCNC: 18 MMOL/L (ref 23–29)
CODING SYSTEM: NORMAL
COLOR UR: YELLOW
CREAT SERPL-MCNC: 1.4 MG/DL (ref 0.5–1.4)
CROSSMATCH INTERPRETATION: NORMAL
DIFFERENTIAL METHOD BLD: ABNORMAL
DIFFERENTIAL METHOD BLD: ABNORMAL
DISPENSE STATUS: NORMAL
EOSINOPHIL # BLD AUTO: 0 K/UL (ref 0–0.5)
EOSINOPHIL NFR BLD: 0 % (ref 0–8)
EOSINOPHIL NFR BLD: 0 % (ref 0–8)
ERYTHROCYTE [DISTWIDTH] IN BLOOD BY AUTOMATED COUNT: 18.6 % (ref 11.5–14.5)
ERYTHROCYTE [DISTWIDTH] IN BLOOD BY AUTOMATED COUNT: 18.8 % (ref 11.5–14.5)
EST. GFR  (NO RACE VARIABLE): 50 ML/MIN/1.73 M^2
GLUCOSE SERPL-MCNC: 158 MG/DL (ref 70–110)
GLUCOSE UR QL STRIP: NEGATIVE
HCT VFR BLD AUTO: 25.5 % (ref 40–54)
HCT VFR BLD AUTO: 26.8 % (ref 40–54)
HGB BLD-MCNC: 7.1 G/DL (ref 14–18)
HGB BLD-MCNC: 7.5 G/DL (ref 14–18)
HGB UR QL STRIP: ABNORMAL
HYALINE CASTS #/AREA URNS LPF: 0 /LPF
HYPOCHROMIA BLD QL SMEAR: ABNORMAL
IMM GRANULOCYTES # BLD AUTO: 0.13 K/UL (ref 0–0.04)
IMM GRANULOCYTES # BLD AUTO: ABNORMAL K/UL (ref 0–0.04)
IMM GRANULOCYTES NFR BLD AUTO: 0.8 % (ref 0–0.5)
IMM GRANULOCYTES NFR BLD AUTO: ABNORMAL % (ref 0–0.5)
KETONES UR QL STRIP: NEGATIVE
LEUKOCYTE ESTERASE UR QL STRIP: ABNORMAL
LYMPHOCYTES # BLD AUTO: 0.2 K/UL (ref 1–4.8)
LYMPHOCYTES NFR BLD: 1.1 % (ref 18–48)
LYMPHOCYTES NFR BLD: 4 % (ref 18–48)
MAGNESIUM SERPL-MCNC: 1.7 MG/DL (ref 1.6–2.6)
MCH RBC QN AUTO: 20.5 PG (ref 27–31)
MCH RBC QN AUTO: 20.7 PG (ref 27–31)
MCHC RBC AUTO-ENTMCNC: 27.8 G/DL (ref 32–36)
MCHC RBC AUTO-ENTMCNC: 28 G/DL (ref 32–36)
MCV RBC AUTO: 74 FL (ref 82–98)
MCV RBC AUTO: 74 FL (ref 82–98)
METAMYELOCYTES NFR BLD MANUAL: 1 %
MICROSCOPIC COMMENT: ABNORMAL
MONOCYTES # BLD AUTO: 0.5 K/UL (ref 0.3–1)
MONOCYTES NFR BLD: 3.1 % (ref 4–15)
MONOCYTES NFR BLD: 4 % (ref 4–15)
NEUTROPHILS # BLD AUTO: 15.5 K/UL (ref 1.8–7.7)
NEUTROPHILS NFR BLD: 73 % (ref 38–73)
NEUTROPHILS NFR BLD: 94.8 % (ref 38–73)
NEUTS BAND NFR BLD MANUAL: 18 %
NITRITE UR QL STRIP: POSITIVE
NRBC BLD-RTO: 0 /100 WBC
NRBC BLD-RTO: 0 /100 WBC
NUM UNITS TRANS PACKED RBC: NORMAL
OHS QRS DURATION: 82 MS
OHS QRS DURATION: 86 MS
OHS QTC CALCULATION: 327 MS
OHS QTC CALCULATION: 433 MS
PH UR STRIP: 6 [PH] (ref 5–8)
PHOSPHATE SERPL-MCNC: 3.1 MG/DL (ref 2.7–4.5)
PLATELET # BLD AUTO: 101 K/UL (ref 150–450)
PLATELET # BLD AUTO: 114 K/UL (ref 150–450)
PLATELET BLD QL SMEAR: ABNORMAL
PMV BLD AUTO: ABNORMAL FL (ref 9.2–12.9)
PMV BLD AUTO: ABNORMAL FL (ref 9.2–12.9)
POCT GLUCOSE: 111 MG/DL (ref 70–110)
POCT GLUCOSE: 113 MG/DL (ref 70–110)
POCT GLUCOSE: 136 MG/DL (ref 70–110)
POCT GLUCOSE: 156 MG/DL (ref 70–110)
POIKILOCYTOSIS BLD QL SMEAR: SLIGHT
POLYCHROMASIA BLD QL SMEAR: ABNORMAL
POTASSIUM SERPL-SCNC: 3.5 MMOL/L (ref 3.5–5.1)
PROT SERPL-MCNC: 5.4 G/DL (ref 6–8.4)
PROT UR QL STRIP: ABNORMAL
RBC # BLD AUTO: 3.46 M/UL (ref 4.6–6.2)
RBC # BLD AUTO: 3.63 M/UL (ref 4.6–6.2)
RBC #/AREA URNS HPF: >100 /HPF (ref 0–4)
SODIUM SERPL-SCNC: 141 MMOL/L (ref 136–145)
SP GR UR STRIP: 1.02 (ref 1–1.03)
STOMATOCYTES BLD QL SMEAR: PRESENT
UNIDENT CRYS URNS QL MICRO: ABNORMAL
URN SPEC COLLECT METH UR: ABNORMAL
UROBILINOGEN UR STRIP-ACNC: NEGATIVE EU/DL
WBC # BLD AUTO: 16.29 K/UL (ref 3.9–12.7)
WBC # BLD AUTO: 25.69 K/UL (ref 3.9–12.7)
WBC #/AREA URNS HPF: >100 /HPF (ref 0–5)
WBC CLUMPS URNS QL MICRO: ABNORMAL

## 2024-04-02 PROCEDURE — 81000 URINALYSIS NONAUTO W/SCOPE: CPT | Performed by: FAMILY MEDICINE

## 2024-04-02 PROCEDURE — 21400001 HC TELEMETRY ROOM

## 2024-04-02 PROCEDURE — 87077 CULTURE AEROBIC IDENTIFY: CPT | Performed by: FAMILY MEDICINE

## 2024-04-02 PROCEDURE — 63600175 PHARM REV CODE 636 W HCPCS: Performed by: NURSE PRACTITIONER

## 2024-04-02 PROCEDURE — 93010 ELECTROCARDIOGRAM REPORT: CPT | Mod: 76,,, | Performed by: INTERNAL MEDICINE

## 2024-04-02 PROCEDURE — 36415 COLL VENOUS BLD VENIPUNCTURE: CPT | Mod: XB | Performed by: FAMILY MEDICINE

## 2024-04-02 PROCEDURE — 87086 URINE CULTURE/COLONY COUNT: CPT | Performed by: FAMILY MEDICINE

## 2024-04-02 PROCEDURE — 25000003 PHARM REV CODE 250: Performed by: NURSE PRACTITIONER

## 2024-04-02 PROCEDURE — 85007 BL SMEAR W/DIFF WBC COUNT: CPT | Performed by: NURSE PRACTITIONER

## 2024-04-02 PROCEDURE — 99233 SBSQ HOSP IP/OBS HIGH 50: CPT | Mod: 25,,, | Performed by: INTERNAL MEDICINE

## 2024-04-02 PROCEDURE — 84100 ASSAY OF PHOSPHORUS: CPT | Performed by: NURSE PRACTITIONER

## 2024-04-02 PROCEDURE — 36430 TRANSFUSION BLD/BLD COMPNT: CPT

## 2024-04-02 PROCEDURE — 93005 ELECTROCARDIOGRAM TRACING: CPT

## 2024-04-02 PROCEDURE — 87186 SC STD MICRODIL/AGAR DIL: CPT | Performed by: FAMILY MEDICINE

## 2024-04-02 PROCEDURE — 83735 ASSAY OF MAGNESIUM: CPT | Performed by: NURSE PRACTITIONER

## 2024-04-02 PROCEDURE — 25000003 PHARM REV CODE 250: Performed by: FAMILY MEDICINE

## 2024-04-02 PROCEDURE — 85025 COMPLETE CBC W/AUTO DIFF WBC: CPT | Performed by: FAMILY MEDICINE

## 2024-04-02 PROCEDURE — 93010 ELECTROCARDIOGRAM REPORT: CPT | Mod: ,,, | Performed by: INTERNAL MEDICINE

## 2024-04-02 PROCEDURE — 85027 COMPLETE CBC AUTOMATED: CPT | Performed by: NURSE PRACTITIONER

## 2024-04-02 PROCEDURE — 87088 URINE BACTERIA CULTURE: CPT | Performed by: FAMILY MEDICINE

## 2024-04-02 PROCEDURE — 86920 COMPATIBILITY TEST SPIN: CPT | Performed by: FAMILY MEDICINE

## 2024-04-02 PROCEDURE — P9016 RBC LEUKOCYTES REDUCED: HCPCS | Performed by: FAMILY MEDICINE

## 2024-04-02 PROCEDURE — 63600175 PHARM REV CODE 636 W HCPCS: Performed by: FAMILY MEDICINE

## 2024-04-02 PROCEDURE — 36415 COLL VENOUS BLD VENIPUNCTURE: CPT | Performed by: NURSE PRACTITIONER

## 2024-04-02 PROCEDURE — 80053 COMPREHEN METABOLIC PANEL: CPT | Performed by: NURSE PRACTITIONER

## 2024-04-02 RX ORDER — AMIODARONE HYDROCHLORIDE 200 MG/1
200 TABLET ORAL 2 TIMES DAILY
Status: DISCONTINUED | OUTPATIENT
Start: 2024-04-02 | End: 2024-04-03

## 2024-04-02 RX ORDER — HYDROCODONE BITARTRATE AND ACETAMINOPHEN 500; 5 MG/1; MG/1
TABLET ORAL
Status: DISCONTINUED | OUTPATIENT
Start: 2024-04-02 | End: 2024-04-05

## 2024-04-02 RX ADMIN — AMIODARONE HYDROCHLORIDE 0.5 MG/MIN: 1.8 INJECTION, SOLUTION INTRAVENOUS at 09:04

## 2024-04-02 RX ADMIN — IRON SUCROSE 200 MG: 20 INJECTION, SOLUTION INTRAVENOUS at 09:04

## 2024-04-02 RX ADMIN — AMIODARONE HYDROCHLORIDE 200 MG: 200 TABLET ORAL at 09:04

## 2024-04-02 RX ADMIN — CEFTRIAXONE 1 G: 1 INJECTION, POWDER, FOR SOLUTION INTRAMUSCULAR; INTRAVENOUS at 05:04

## 2024-04-02 RX ADMIN — AMIODARONE HYDROCHLORIDE 200 MG: 200 TABLET ORAL at 01:04

## 2024-04-02 RX ADMIN — DILTIAZEM HYDROCHLORIDE 240 MG: 240 CAPSULE, COATED, EXTENDED RELEASE ORAL at 09:04

## 2024-04-02 NOTE — ASSESSMENT & PLAN NOTE
Chronic, controlled. Latest blood pressure and vitals reviewed-     Temp:  [98.2 °F (36.8 °C)-100.7 °F (38.2 °C)]   Pulse:  [76-90]   Resp:  [16-20]   BP: (100-139)/(50-64)   SpO2:  [92 %-100 %] .   Home meds for hypertension were reviewed and noted below.   Hypertension Medications               amLODIPine (NORVASC) 5 MG tablet TAKE 1 TABLET DAILY    carvediloL (COREG) 6.25 MG tablet Take 1 tablet (6.25 mg total) by mouth 2 (two) times daily with meals.    hydroCHLOROthiazide (HYDRODIURIL) 25 MG tablet TAKE 1 TABLET DAILY    furosemide (LASIX) 20 MG tablet Take 1 tablet (20 mg total) by mouth every Mon, Wed, Fri, Sun.            While in the hospital, will manage blood pressure as follows; Continue home antihypertensive regimen    Will utilize p.r.n. blood pressure medication only if patient's blood pressure greater than 160/100 and he develops symptoms such as worsening chest pain or shortness of breath.

## 2024-04-02 NOTE — ASSESSMENT & PLAN NOTE
-unclear etiology as patient is white count has gone from 3.85 to 25.69 to16.29 within 12 hours  -UA ordered, chest x-ray reviewed   -we will start empiric Rocephin  -repeat CBC in a.m.  -resolved

## 2024-04-02 NOTE — ASSESSMENT & PLAN NOTE
Patient with Persistent (7 days or more) atrial fibrillation which is uncontrolled currently with Calcium Channel Blocker. Patient is currently in atrial fibrillation.[id number] Score: 3. Anticoagulation indicated. Anticoagulation done with Xarelto .    Treated with IVP Dilt x2, rate improvement not sustained.     --Cardiology consulted  --Diltiazem IV continuous 10mg/hr initiated per recommendation, rate did not improve, changed to IV amiodarone.  Cardiology transitioned to p.o. amiodarone on 04/02/2024  --Tele  --Vitals Q4H  --anticoagulation has been held due to symptomatic anemia

## 2024-04-02 NOTE — ASSESSMENT & PLAN NOTE
Patient's FSGs are controlled on current medication regimen.  Last A1c reviewed-   Lab Results   Component Value Date    HGBA1C 6.2 (H) 01/26/2023     Most recent fingerstick glucose reviewed-   Recent Labs   Lab 04/02/24  0613 04/02/24  1056 04/02/24  1645   POCTGLUCOSE 156* 136* 111*     Current correctional scale  Low  Maintain anti-hyperglycemic dose as follows-   Antihyperglycemics (From admission, onward)    Start     Stop Route Frequency Ordered    04/01/24 1340  insulin aspart U-100 pen 0-5 Units         -- SubQ Before meals & nightly PRN 04/01/24 1241        Hold Oral hypoglycemics while patient is in the hospital.

## 2024-04-02 NOTE — PLAN OF CARE
[facility] - Telemetry (Hospital)  Initial Discharge Assessment       Primary Care Provider: Toshia Valladares MD    Admission Diagnosis: SOB (shortness of breath) [R06.02]  Chest pain [R07.9]  Atrial fibrillation with RVR [I48.91]  Symptomatic anemia [D64.9]    Admission Date: 4/1/2024  Expected Discharge Date:     Transition of Care Barriers: None    Payor: [employer] [facility] / Plan: [employer]V / Product Type: Medicare Advantage /     Extended Emergency Contact Information  Primary Emergency Contact: Hector Alvarez   [place]  Mobile Phone: [phone]  Relation: Son    Discharge Plan A: Home  Discharge Plan B: Home      [facility] [place] - [address] [place], [place] [id number]  Phone: [phone] Fax: [phone]    [id number] - [facility] [address] [address] AT [facility] [address] [address] [address] [address]  Phone: [phone] Fax: [phone]    [facility] - [address] [address]  Phone: [phone] Fax: [phone]      Initial Assessment (most recent)       Adult Discharge Assessment - 04/02/24 1553          Discharge Assessment    Assessment Type Discharge Planning Assessment     Confirmed/corrected address, phone number and insurance Yes     Confirmed Demographics Correct on Facesheet     Source of Information patient     Communicated JAYRO with patient/caregiver Date not available/Unable to determine     Reason For Admission A fib     People in Home spouse     Facility Arrived From: Home     Do you expect to return to your current living situation? Yes     Do you have help at home or someone to help you manage your care at home? Yes     Who are your caregiver(s) and their phone number(s)? Spouse     Prior to hospitilization cognitive status: Alert/Oriented      Current cognitive status: Alert/Oriented     Walking or Climbing Stairs Difficulty no     Dressing/Bathing Difficulty no     Equipment Currently Used at Home none     Readmission within 30 days? No     Patient currently being followed by outpatient case management? No     Do you currently have service(s) that help you manage your care at home? No     Do you take prescription medications? Yes     Do you have prescription coverage? Yes     Do you have any problems affording any of your prescribed medications? No     Is the patient taking medications as prescribed? yes     Who is going to help you get home at discharge? Pt will arrange     How do you get to doctors appointments? car, drives self     Are you on dialysis? No     Do you take coumadin? No     Discharge Plan A Home     Discharge Plan B Home     DME Needed Upon Discharge  none     Discharge Plan discussed with: Patient     Transition of Care Barriers None                   SW met with patient at bedside to complete discharge assessment. Pt reports living at home with spouse. Pt reports independence and home and does not use/own DME. Pt will arrange transportation for discharge. No CM needs expressed.  Pt's whiteboard updated with CM contact and anticipated discharge disposition. SW to remain available as needed.

## 2024-04-02 NOTE — PROGRESS NOTES
[facility] Medicine  Progress Note    Patient Name: Jose Miguel Alvarez Jr.  MRN: [id number]  Patient Class: IP- Inpatient   Admission Date: 4/1/2024  Length of Stay: 0 days  Attending Physician: Sherry Malhotra MD  Primary Care Provider: Toshia Valladares MD        Subjective:     Principal Problem:Paroxysmal atrial fibrillation        HPI:  83 y.o. male patient with a PMHx of COPD, CKD, HTN, Afib s/p DCCV. Presented to the Emergency Department for chest pain, onset PTA at home. Symptoms are intermittent and moderate in severity. No mitigating or exacerbating factors reported. Associated sxs include intermittent SOB and intermittent palpitations; pt states that his current sxs are similar to when he was last in Afib. Patient denies any fever, chills, n/v/d, weakness, numbness, dizziness, headache, and all other sxs at this time. Pt is currently on Xarelto and Carvedilol. In the ED, vitals: 146/75, 120, 22, 97.8F, 99% RA. Significant Labs: WBC: 3.85, H/H: 7.1/26.2, Pl: 130K. Trop: 0.015, EKG: A. Fib RVR. CXR: no acute process. Treated with IVP diltiazem x2, no improvement in rate or rhythm. Placed on diltiazem drip. Cardiology consulted. Patient is a full code. Placed in observation under the care of Hospital Medicine for management of A. Fib RVR, Elevated Troponin, Pancytopenia.     Overview/Hospital Course:  Patient admitted with paroxysmal AFib and anemia.  He was started on a Cardizem drip with no improvement therefore amiodarone drip was added.  Patient was also transfused 1 unit of blood for symptomatic anemia.  Cardiology was consulted and recommended transition from IV to p.o. Cardizem and amiodarone.  Plan for monitoring overnight with further recommendations from Cardiology tomorrow.    Interval History:  Follow up for AFib and symptomatic anemia.  Patient was continued on IV amiodarone and Cardizem at the time of my visit as he was still in AFib with RVR.  Per  Cardiology note plan for transition to p.o. amiodarone and Cardizem with monitoring overnight.  Patient also noted to have an increase in his white count but unclear if this is lab error as his WBC has gone from 3>>25>>16 in less than 12 hours.  Patient did have a low-grade fever 100.7 last night.  We will check UA.  Chest x-ray negative.  We will repeat CBC in a.m. patient's hemoglobin went from 7.1>> 7.5>> 7.1.  Patient denies any chest pain or shortness a breath at this time.  Patient will need to be on anticoagulation for his AFib, but due to the anemia it has not yet been started.  GI has been consulted for evaluation as patient does have a history of duodenal bleed and is now having symptomatic anemia.    Review of Systems  Objective:     Vital Signs (Most Recent):  Temp: 98.5 °F (36.9 °C) (04/02/24 1155)  Pulse: 77 (04/02/24 1532)  Resp: 18 (04/02/24 1155)  BP: (!) 121/58 (04/02/24 1155)  SpO2: 100 % (04/02/24 1155) Vital Signs (24h Range):  Temp:  [98.2 °F (36.8 °C)-100.7 °F (38.2 °C)] 98.5 °F (36.9 °C)  Pulse:  [76-90] 77  Resp:  [16-20] 18  SpO2:  [92 %-100 %] 100 %  BP: (100-139)/(50-96) 121/58     Weight: 105.2 kg (231 lb 14.8 oz)  Body mass index is 31.45 kg/m².    Intake/Output Summary (Last 24 hours) at 4/2/2024 1629  Last data filed at 4/1/2024 1913  Gross per 24 hour   Intake 291.67 ml   Output --   Net 291.67 ml         Physical Exam  Vitals and nursing note reviewed.   Constitutional:       Appearance: Normal appearance. He is ill-appearing.   HENT:      Head: Normocephalic and atraumatic.      Nose: Nose normal.      Mouth/Throat:      Mouth: Mucous membranes are moist.   Eyes:      Extraocular Movements: Extraocular movements intact.      Comments: Pale conjunctiva   Cardiovascular:      Rate and Rhythm: Normal rate.      Pulses: Normal pulses.      Heart sounds: Normal heart sounds.   Pulmonary:      Effort: Pulmonary effort is normal.      Breath sounds: Normal breath sounds.   Abdominal:       General: Abdomen is flat. Bowel sounds are normal.      Palpations: Abdomen is soft.   Musculoskeletal:         General: Normal range of motion.      Cervical back: Normal range of motion and neck supple.   Skin:     General: Skin is warm.      Capillary Refill: Capillary refill takes less than 2 seconds.      Coloration: Skin is pale.   Neurological:      Mental Status: He is alert and oriented to person, place, and time. Mental status is at baseline.   Psychiatric:         Mood and Affect: Mood normal.         Behavior: Behavior normal.             Significant Labs: All pertinent labs within the past 24 hours have been reviewed.  Recent Lab Results  (Last 5 results in the past 24 hours)        04/02/24  1645   04/02/24  1056   04/02/24  0912   04/02/24  0821   04/02/24  0613        Albumin               ALP               ALT               Anion Gap               Aniso               AST               Bands               Baso #     0.03           Basophil %     0.2           BILIRUBIN TOTAL               BUN               Calcium               Chloride               CO2               Creatinine               Differential Method     Automated           eGFR               Eos #     0.0           Eos %     0.0           Glucose               Gran # (ANC)     15.5           Gran %     94.8           Hematocrit     25.5           Hemoglobin     7.1           Hypo               Immature Grans (Abs)     0.13  Comment: Mild elevation in immature granulocytes is non specific and   can be seen in a variety of conditions including stress response,   acute inflammation, trauma and pregnancy. Correlation with other   laboratory and clinical findings is essential.             Immature Granulocytes     0.8           Lymph #     0.2           Lymph %     1.1           Magnesium                MCH     20.5           MCHC     27.8           MCV     74           Metamyelocytes               Mono #     0.5           Mono %     3.1            MPV     SEE COMMENT  Comment: Result not available.           nRBC     0           QRS Duration       86         O[facility] QTC Calculation       327         Phosphorus Level               Platelet Estimate               Platelet Count     101           POCT Glucose 111   136       156       Poikilocytosis               Poly               Potassium               PROTEIN TOTAL               RBC     3.46           RDW     18.8           Sodium               Stomatocytes               WBC     16.29                                  Significant Imaging:   X-Ray Chest AP Portable   Final Result      No acute process seen.         Electronically signed by: Hector Arriaga MD   Date:    04/01/2024   Time:    08:45           Assessment/Plan:      * Paroxysmal atrial fibrillation  Patient with Persistent (7 days or more) atrial fibrillation which is uncontrolled currently with Calcium Channel Blocker. Patient is currently in atrial fibrillation.[id number] Score: 3. Anticoagulation indicated. Anticoagulation done with Xarelto .    Treated with IVP Dilt x2, rate improvement not sustained.     --Cardiology consulted  --Diltiazem IV continuous 10mg/hr initiated per recommendation, rate did not improve, changed to IV amiodarone.  Cardiology transitioned to p.o. amiodarone on 04/02/2024  --Tele  --Vitals Q4H  --anticoagulation has been held due to symptomatic anemia    Leukocytosis  -unclear etiology as patient is white count has gone from 3.85 to 25.69 to16.29 within 12 hours  -UA ordered, chest x-ray reviewed   -we will start empiric Rocephin  -repeat CBC in a.m.      Type 2 diabetes mellitus  Patient's FSGs are controlled on current medication regimen.  Last A1c reviewed-   Lab Results   Component Value Date    HGBA1C 6.2 (H) 01/26/2023     Most recent fingerstick glucose reviewed-   Recent Labs   Lab 04/02/24  0613 04/02/24  1056 04/02/24  1645   POCTGLUCOSE 156* 136* 111*     Current correctional scale  Low  Maintain  anti-hyperglycemic dose as follows-   Antihyperglycemics (From admission, onward)      Start     Stop Route Frequency Ordered    04/01/24 1340  insulin aspart U-100 pen 0-5 Units         -- SubQ Before meals & nightly PRN 04/01/24 1241          Hold Oral hypoglycemics while patient is in the hospital.    Elevated troponin  Likely secondary to demand ischemia related to anemia, A. Fib RVR    --trend trop  --Tele  --Vitals Q4H  --Repeat labs in AM  --Transfuse with 1U PRBC        Pancytopenia  Chronic anemia, thrombocytopenia, intermittent neutropenia. Has been treated symptomatically, negative for hx of evaluation by Hem/Onc    This patient is found to have pancytopenia, the likely etiology is  Multifactorial , will monitor CBC Daily. Will transfuse red blood cells if the hemoglobin is <7g/dL (or <8 in the setting of ACS). Will transfuse platelets if platelet count is <50k (if undergoing surgical procedure or have active bleeding). Hold DVT prophylaxis if platelets are <50k. The patient's hemoglobin, white blood cell count, and platelet count results have been reviewed and are listed below.  Recent Labs   Lab 04/02/24  0912   HGB 7.1*   WBC 16.29*   *         --F/u with Hem/Onc as OP  --transfuse with 1U prbc now  --Venofer IV 200mg daily x3 doses      Severe anemia  Patient's anemia is currently uncontrolled. Has received 1 units of PRBCs on 4/1/24 . Etiology likely d/t Iron deficiency  Current CBC reviewed-   Lab Results   Component Value Date    HGB 7.1 (L) 04/02/2024    HCT 25.5 (L) 04/02/2024     Monitor serial CBC and transfuse if patient becomes hemodynamically unstable, symptomatic or H/H drops below 7/21 or hgb 8 if symptomatic    -Patient did not make an appropriate response to 1 unit of blood his hemoglobin went from 7.1 to 7.5 back to 7.1  -patient was given 1 unit of blood on 04/01/2024, additional unit of blood ordered on 04/02/2024   -GI was consulted for evaluation   -of note patient has  history of duodenal bleed.    Hypertension  Chronic, controlled. Latest blood pressure and vitals reviewed-     Temp:  [98.2 °F (36.8 °C)-100.7 °F (38.2 °C)]   Pulse:  [76-90]   Resp:  [16-20]   BP: (100-139)/(50-64)   SpO2:  [92 %-100 %] .   Home meds for hypertension were reviewed and noted below.   Hypertension Medications               amLODIPine (NORVASC) 5 MG tablet TAKE 1 TABLET DAILY    carvediloL (COREG) 6.25 MG tablet Take 1 tablet (6.25 mg total) by mouth 2 (two) times daily with meals.    hydroCHLOROthiazide (HYDRODIURIL) 25 MG tablet TAKE 1 TABLET DAILY    furosemide (LASIX) 20 MG tablet Take 1 tablet (20 mg total) by mouth every Mon, Wed, Fri, Sun.            While in the hospital, will manage blood pressure as follows; Continue home antihypertensive regimen    Will utilize p.r.n. blood pressure medication only if patient's blood pressure greater than 160/100 and he develops symptoms such as worsening chest pain or shortness of breath.      VTE Risk Mitigation (From admission, onward)           Ordered     IP VTE HIGH RISK PATIENT  Once         04/01/24 1241     Place sequential compression device  Until discontinued         04/01/24 1241     Reason for No Pharmacological VTE Prophylaxis  Once        Question:  Reasons:  Answer:  Already adequately anticoagulated on oral Anticoagulants    04/01/24 1241                    Discharge Planning   JAYRO:      Code Status: Full Code   Is the patient medically ready for discharge?:     Reason for patient still in hospital (select all that apply): Patient trending condition, Laboratory test, Treatment, and Consult recommendations  Discharge Plan A: Home                  Sherry Malhotra MD  Department of Hospital Medicine   '[place] - Telemetry ([facility])

## 2024-04-02 NOTE — PLAN OF CARE
Updated patient on plan of care. 1 unit of PRBC ordered to be infused. Urine sent to lab. Bed alarm on. Pt confused intermittently. Instructed patient to use call light for assistance, call light in reach. Hourly rounding performed. Vitals q4 hours. Education provided, questions answered/encouraged. Chart check complete. NSR    Problem: Adult Inpatient Plan of Care  Goal: Plan of Care Review  Outcome: Ongoing, Progressing

## 2024-04-02 NOTE — SUBJECTIVE & OBJECTIVE
Interval History: has converted to SR overnight. Transition to PO Amiodarone 200 mg BID. Hold Xarelto until anemia has improved. Recommend GI input to resume AC safely.     Review of Systems   Constitutional: Positive for malaise/fatigue.   HENT:  Negative for hearing loss and hoarse voice.    Eyes:  Negative for blurred vision and visual disturbance.   Cardiovascular:  Positive for dyspnea on exertion, irregular heartbeat and palpitations. Negative for chest pain, claudication, leg swelling, near-syncope, orthopnea, paroxysmal nocturnal dyspnea and syncope.   Respiratory:  Positive for shortness of breath. Negative for cough, hemoptysis, sleep disturbances due to breathing, snoring and wheezing.    Endocrine: Negative for cold intolerance and heat intolerance.   Hematologic/Lymphatic: Bruises/bleeds easily.   Skin:  Negative for color change, dry skin and nail changes.   Musculoskeletal:  Positive for arthritis and back pain. Negative for joint pain and myalgias.   Gastrointestinal:  Negative for bloating, abdominal pain, constipation, nausea and vomiting.   Genitourinary:  Negative for dysuria, flank pain, hematuria and hesitancy.   Neurological:  Positive for weakness. Negative for headaches, light-headedness, loss of balance, numbness and paresthesias.   Psychiatric/Behavioral:  Negative for altered mental status.    Allergic/Immunologic: Negative for environmental allergies.     Objective:     Vital Signs (Most Recent):  Temp: 98.5 °F (36.9 °C) (04/02/24 1155)  Pulse: 85 (04/02/24 1155)  Resp: 18 (04/02/24 1155)  BP: (!) 121/58 (04/02/24 1155)  SpO2: 100 % (04/02/24 1155) Vital Signs (24h Range):  Temp:  [98.2 °F (36.8 °C)-100.7 °F (38.2 °C)] 98.5 °F (36.9 °C)  Pulse:  [] 85  Resp:  [16-22] 18  SpO2:  [77 %-100 %] 100 %  BP: ()/() 121/58     Weight: 105.2 kg (231 lb 14.8 oz)  Body mass index is 31.45 kg/m².     SpO2: 100 %         Intake/Output Summary (Last 24 hours) at 4/2/2024 1231  Last  data filed at 4/1/2024 1913  Gross per 24 hour   Intake 405.24 ml   Output --   Net 405.24 ml       Lines/Drains/Airways       Peripheral Intravenous Line  Duration                  Peripheral IV - Single Lumen 04/02/24 0300 22 G Anterior;Right Wrist <1 day                       Physical Exam  Vitals and nursing note reviewed.   Constitutional:       General: He is not in acute distress.     Appearance: Normal appearance. He is well-developed. He is obese. He is not ill-appearing.   HENT:      Head: Normocephalic and atraumatic.      Nose: Nose normal.      Mouth/Throat:      Mouth: Mucous membranes are moist.   Eyes:      Pupils: Pupils are equal, round, and reactive to light.   Neck:      Thyroid: No thyromegaly.      Vascular: No JVD.      Trachea: No tracheal deviation.   Cardiovascular:      Rate and Rhythm: Normal rate and regular rhythm.      Chest Wall: PMI is not displaced.      Pulses: Intact distal pulses.           Radial pulses are 2+ on the right side and 2+ on the left side.        Dorsalis pedis pulses are 2+ on the right side and 2+ on the left side.      Heart sounds: S1 normal and S2 normal. Heart sounds not distant. No murmur heard.     Comments: Converted to SR  Pulmonary:      Effort: Pulmonary effort is normal. No respiratory distress.      Breath sounds: Normal breath sounds. No wheezing.   Abdominal:      General: Bowel sounds are normal. There is no distension.      Palpations: Abdomen is soft.      Tenderness: There is no abdominal tenderness.   Musculoskeletal:         General: No swelling. Normal range of motion.      Cervical back: Full passive range of motion without pain, normal range of motion and neck supple.      Right lower leg: No edema.      Left lower leg: No edema.      Right ankle: No swelling.      Left ankle: No swelling.   Skin:     General: Skin is warm and dry.      Capillary Refill: Capillary refill takes less than 2 seconds.      Coloration: Skin is pale.      Nails:  There is no clubbing.   Neurological:      General: No focal deficit present.      Mental Status: He is alert and oriented to person, place, and time.      Motor: Weakness present.   Psychiatric:         Speech: Speech normal.         Behavior: Behavior normal.         Thought Content: Thought content normal.         Judgment: Judgment normal.            Significant Labs: BMP:   Recent Labs   Lab 04/01/24  0804 04/02/24  0430   * 158*    141   K 3.7 3.5    106   CO2 23 18*   BUN 19 22   CREATININE 1.1 1.4   CALCIUM 8.6* 8.3*   MG  --  1.7   , CBC   Recent Labs   Lab 04/01/24  0804 04/01/24  1221 04/02/24  0430 04/02/24  0912   WBC 3.85*  --  25.69* 16.29*   HGB 7.1* 7.1* 7.5* 7.1*   HCT 26.2* 26.4* 26.8* 25.5*   *  --  114* 101*   , and All pertinent lab results from the last 24 hours have been reviewed.    Significant Imaging: Echocardiogram: Transthoracic echo (TTE) complete (Cupid Only):   Results for orders placed or performed during the hospital encounter of 04/01/24   Echo Saline Bubble? No   Result Value Ref Range    Left Atrium Major Axis 5.61 cm    Left Atrium Minor Axis 6.20 cm    RA Major Axis 5.21 cm    LV Diastolic Volume 113.12 mL    LV Systolic Volume 42.24 mL    TR Max Dave 3.13 m/s    PV mean gradient 1 mmHg    RVOT peak VTI 19.1 cm    RVOT peak dave 0.75 m/s    Ao VTI 20.30 cm    Ao peak dave 1.20 m/s    LVOT peak VTI 17.20 cm    LVOT peak dave 0.96 m/s    LVOT diameter 2.19 cm    IVRT 72.31 msec    PV peak gradient 2 mmHg    AV mean gradient 3 mmHg    TAPSE 2.67 cm    RVDD 3.77 cm    LA size 4.62 cm    Ascending aorta 3.57 cm    STJ 2.87 cm    LVIDs 3.24 2.1 - 4.0 cm    Ao root annulus 3.62 cm    Posterior Wall 1.09 0.6 - 1.1 cm    IVS 1.32 (A) 0.6 - 1.1 cm    LVIDd 4.91 3.5 - 6.0 cm    PV PEAK VELOCITY 0.79 m/s    Left Ventricular Outflow Tract Mean Gradient 1.78 mmHg    Left Ventricular Outflow Tract Mean Velocity 0.64 cm/s    IVC diameter 1.79 cm    FS 34 28 - 44 %    LV  mass 228.45 g    Left Ventricle Relative Wall Thickness 0.44 cm    AV valve area 3.19 cm²    AV Velocity Ratio 0.80     AV index (prosthetic) 0.85     LVOT area 3.8 cm2    LVOT stroke volume 64.76 cm3    AV peak gradient 6 mmHg    Triscuspid Valve Regurgitation Peak Gradient 39 mmHg    FABIANA by Velocity Ratio 3.01 cm²    BSA 2.36 m2    LV Systolic Volume Index 18.3 mL/m2    LV Diastolic Volume Index 48.97 mL/m2    LV Mass Index 99 g/m2    ZLVIDS -4.15     ZLVIDD -6.15     LA Volume Index 46.1 mL/m2    LA volume 106.40 cm3    LA WIDTH 4.6 cm    RA Width 4.3 cm    TV resting pulmonary artery pressure 47 mmHg    RV TB RVSP 11 mmHg    Est. RA pres 8 mmHg    Narrative      Left Ventricle: The left ventricle is normal in size. Mildly increased   wall thickness. There is concentric remodeling. There is normal systolic   function with a visually estimated ejection fraction of 60 - 65%. Unable   to assess diastolic function due to atrial fibrillation.    Right Ventricle: Normal right ventricular cavity size. Wall thickness   is normal. Right ventricle wall motion  is normal. Systolic function is   normal.    Left Atrium: Left atrium is moderately dilated.    Tricuspid Valve: There is mild regurgitation.    IVC/SVC: Intermediate venous pressure at 8 mmHg.

## 2024-04-02 NOTE — ASSESSMENT & PLAN NOTE
Chronic anemia, thrombocytopenia, intermittent neutropenia. Has been treated symptomatically, negative for hx of evaluation by Hem/Onc    This patient is found to have pancytopenia, the likely etiology is  Multifactorial , will monitor CBC Daily. Will transfuse red blood cells if the hemoglobin is <7g/dL (or <8 in the setting of ACS). Will transfuse platelets if platelet count is <50k (if undergoing surgical procedure or have active bleeding). Hold DVT prophylaxis if platelets are <50k. The patient's hemoglobin, white blood cell count, and platelet count results have been reviewed and are listed below.  Recent Labs   Lab 04/02/24  0912   HGB 7.1*   WBC 16.29*   *         --F/u with Hem/Onc as OP  --transfuse with 1U prbc now  --Venofer IV 200mg daily x3 doses

## 2024-04-02 NOTE — ASSESSMENT & PLAN NOTE
Remains in AFIB/RVR on exam today  Add Amiodarone infusion  Change Diltiazem IV gtt to Cardizem 240 mg daily  Continue Cardiac Monitoring  Hold Xarelto given anemia for now.   Last dose of Xarelto on 3/31/2024    4/2/2024  -Converted to SR overnight  -Transition to PO Amiodarone 200 mg BID  -Continue CCB  -Hold Xarelto until cleared per GI to resume given continued issues with anemia

## 2024-04-02 NOTE — SUBJECTIVE & OBJECTIVE
Interval History:  Follow up for AFib and symptomatic anemia.  Patient was continued on IV amiodarone and Cardizem at the time of my visit as he was still in AFib with RVR.  Per Cardiology note plan for transition to p.o. amiodarone and Cardizem with monitoring overnight.  Patient also noted to have an increase in his white count but unclear if this is lab error as his WBC has gone from 3>>25>>16 in less than 12 hours.  Patient did have a low-grade fever 100.7 last night.  We will check UA.  Chest x-ray negative.  We will repeat CBC in a.m. patient's hemoglobin went from 7.1>> 7.5>> 7.1.  Patient denies any chest pain or shortness a breath at this time.  Patient will need to be on anticoagulation for his AFib, but due to the anemia it has not yet been started.  GI has been consulted for evaluation as patient does have a history of duodenal bleed and is now having symptomatic anemia.    Review of Systems  Objective:     Vital Signs (Most Recent):  Temp: 98.5 °F (36.9 °C) (04/02/24 1155)  Pulse: 77 (04/02/24 1532)  Resp: 18 (04/02/24 1155)  BP: (!) 121/58 (04/02/24 1155)  SpO2: 100 % (04/02/24 1155) Vital Signs (24h Range):  Temp:  [98.2 °F (36.8 °C)-100.7 °F (38.2 °C)] 98.5 °F (36.9 °C)  Pulse:  [76-90] 77  Resp:  [16-20] 18  SpO2:  [92 %-100 %] 100 %  BP: (100-139)/(50-96) 121/58     Weight: 105.2 kg (231 lb 14.8 oz)  Body mass index is 31.45 kg/m².    Intake/Output Summary (Last 24 hours) at 4/2/2024 1629  Last data filed at 4/1/2024 1913  Gross per 24 hour   Intake 291.67 ml   Output --   Net 291.67 ml         Physical Exam  Vitals and nursing note reviewed.   Constitutional:       Appearance: Normal appearance. He is ill-appearing.   HENT:      Head: Normocephalic and atraumatic.      Nose: Nose normal.      Mouth/Throat:      Mouth: Mucous membranes are moist.   Eyes:      Extraocular Movements: Extraocular movements intact.      Comments: Pale conjunctiva   Cardiovascular:      Rate and Rhythm: Normal rate.       Pulses: Normal pulses.      Heart sounds: Normal heart sounds.   Pulmonary:      Effort: Pulmonary effort is normal.      Breath sounds: Normal breath sounds.   Abdominal:      General: Abdomen is flat. Bowel sounds are normal.      Palpations: Abdomen is soft.   Musculoskeletal:         General: Normal range of motion.      Cervical back: Normal range of motion and neck supple.   Skin:     General: Skin is warm.      Capillary Refill: Capillary refill takes less than 2 seconds.      Coloration: Skin is pale.   Neurological:      Mental Status: He is alert and oriented to person, place, and time. Mental status is at baseline.   Psychiatric:         Mood and Affect: Mood normal.         Behavior: Behavior normal.             Significant Labs: All pertinent labs within the past 24 hours have been reviewed.  Recent Lab Results  (Last 5 results in the past 24 hours)        04/02/24  1645   04/02/24  1056   04/02/24  0912   04/02/24  0821   04/02/24  0613        Albumin               ALP               ALT               Anion Gap               Aniso               AST               Bands               Baso #     0.03           Basophil %     0.2           BILIRUBIN TOTAL               BUN               Calcium               Chloride               CO2               Creatinine               Differential Method     Automated           eGFR               Eos #     0.0           Eos %     0.0           Glucose               Gran # (ANC)     15.5           Gran %     94.8           Hematocrit     25.5           Hemoglobin     7.1           Hypo               Immature Grans (Abs)     0.13  Comment: Mild elevation in immature granulocytes is non specific and   can be seen in a variety of conditions including stress response,   acute inflammation, trauma and pregnancy. Correlation with other   laboratory and clinical findings is essential.             Immature Granulocytes     0.8           Lymph #     0.2           Lymph %      1.1           Magnesium                MCH     20.5           MCHC     27.8           MCV     74           Metamyelocytes               Mono #     0.5           Mono %     3.1           MPV     SEE COMMENT  Comment: Result not available.           nRBC     0           QRS Duration       86         [facility] QTC Calculation       327         Phosphorus Level               Platelet Estimate               Platelet Count     101           POCT Glucose 111   136       156       Poikilocytosis               Poly               Potassium               PROTEIN TOTAL               RBC     3.46           RDW     18.8           Sodium               Stomatocytes               WBC     16.29                                  Significant Imaging:   X-Ray Chest AP Portable   Final Result      No acute process seen.         Electronically signed by: Hector Arriaga MD   Date:    04/01/2024   Time:    08:45

## 2024-04-02 NOTE — HOSPITAL COURSE
4/2/2024- Patient seen and examined in room, has converted to SR overnight. Transition to PO Amiodarone 200 mg BID and continue Diltiazem. Continues to have anemia on repeat labs post PRBC transfusion. Continue to hold Xarelto until anemia has been sorted out.     4/3/24-Patient seen and examined today, back in afib with variable rates. Amiodarone drip resumed. Feels ok. Does admit to some mild fatigue/SOB. No CP. Labs reviewed, H/H 8.5/29.6. GI on board, EGD in AM.    4/4/24-Patient seen and examined today, sitting up in bed. Feels ok overall. Admits to fatigue. No overt bleeding. Reviewed urology note, OP f/u. Still in afib, HR low 100's, meds adjusted. EGD deferred to tmw given elevated HR this AM. H/H 9.1/31.8.    4/5/24-Patient seen and examined today, lying in bed. Feels ok, tired. Remains in afib, HR controlled. Labs reviewed. Creatinine bumped to 2.5, K 3.3. H/H 8.8/29.7. GI on board, plans for EGD today.    4.6.2024  Status post EGD yesterday.  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  Treated.    No known Carafate.    Still in AFib.  Rates are controlled however.    Sitting up in his chair.  He does have lower extremity swelling today and rales on exam.      4.7.2024  RATE CONTROLLED.    EUVOLEMIC ON EXAM TODAY.    CREATININE WORSENING HOWEVER.    Received Lasix yesterday.

## 2024-04-02 NOTE — HOSPITAL COURSE
On April 2, 2024, the patient, having converted to sinus rhythm (SR) overnight, was transitioned to oral Amiodarone 200 mg twice daily (BID) and continued on Diltiazem. Despite a post-packed red blood cell (PRBC) transfusion, the patient continued to exhibit anemia in repeat labs, prompting a hold on Xarelto until resolution. The following day, due to symptomatic anemia and a history of duodenal bleeding, a gastrointestinal (GI) consult was recommended. The patient, who had previously received an ineffective blood unit in the emergency room, showed an improved response to an additional unit of blood, elevating their levels from 7.1 to 8.5. A GI scope was planned contingent on heart rate control, with the patient being made nil per os (NPO) in anticipation.    On April 4, the esophagogastroduodenoscopy (EGD) was delayed to the next day due to a sustained heart rate over 130 beats per minute (bpm), and a trial was conducted following the removal of an indwelling Pandey catheter by Urology. Preparation for the EGD continued with the patient remaining NPO from midnight on April 5.    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 A low-dose Protonix (20 mg daily) regimen was recommended alongside setting up outpatient intravenous iron infusions. The GI team also suggested an outpatient video capsule endoscopy and approved the resumption of Xarelto on April 7. Although discharge was initially considered, Cardiology recommended extended IV diuresis due to the patient's condition.    By April 7, the patient had responded well to diuresis and was cleared for discharge by Cardiology. However, a steady rise in creatinine levels since the Pandey catheter's removal prompted an ultrasound of the kidneys. The results indicated a mild worsening compared to a prior CT urogram, suggesting postobstructive kidney disease. Voiding trial yielded 100 cc but when  mccrary was inserted, yielded 1200 cc. Mccrary left indwelling. Reached out to Urology, agreed with leaving Mccrary catheter in place upon discharge and for patient to follow-up with Dr. Fletcher's office for further management and repeat lab tests. Communicated all of the above to patient. Patient acknowledged understanding and agreed to the plan.     BP (!) 105/58 (BP Location: Right arm, Patient Position: Sitting)   Pulse 81   Temp 97.4 °F (36.3 °C) (Oral)   Resp 18   Ht 6' (1.829 m)   Wt 107.6 kg (237 lb 3.4 oz)   SpO2 (!) 94%   BMI 32.17 kg/m²     PHYSICAL EXAM  Vitals Reviewed  GEN: No acute distress, pleasant, body habitus normal  HEENT: atraumatic and normocephalic  CARDS: regular rate and rhythm, no m/g, pulses palpable in LE  PULM: breathing comfortably on room air, chest symmetric, nonlabored, no abnormal breath sounds on auscultation  ABD: nontender, nondistended, soft, no organomegaly, BS+  : mccrary in place, draining clear yellow urine  Neuro: Alert and oriented x3, CN's I-IX grossly intact, sensation and motor intact; follows directions and answers questions appropriately

## 2024-04-02 NOTE — PROGRESS NOTES
[facility] - Telemetry ([facility])  Cardiology  Progress Note    Patient Name: Jose Miguel Alvarez Jr.  MRN: [id number]  Admission Date: 4/1/2024  Hospital Length of Stay: 0 days  Code Status: Full Code   Attending Physician: Sherry Malhotra MD   Primary Care Physician: Toshia Valladares MD  Expected Discharge Date:   Principal Problem:Paroxysmal atrial fibrillation    Subjective:   HPI    83 y.o. male patient with a PMHx of COPD, CKD, HTN, Afib s/p DCCV who presents to the Emergency Department for chest pain, onset this morning. Symptoms are intermittent and moderate in severity. No mitigating or exacerbating factors reported. Associated sxs include intermittent SOB and intermittent palpitations; pt states that his current sxs are similar to when he was last in Afib. Patient denies any fever, chills, n/v/d, weakness, numbness, dizziness, headache, and all other sxs at this time. Pt is currently on Xarelto and Carvedilol. No further complaints or concerns at this time.     He has recurrent anemia issues previously in the past.  He had EGD, Video capsule and small bowel enteroscopy in 2021 with evidence of oozing in duodenum.   Plan for PRBC transfusion this AM. ECHO pending. Remains in AFIB/RVR at time of exam today.   Transition Cardizem to PO and add IV amiodarone infusion today. Further recs to follow.       Hospital Course:   4/2/2024- Patient seen and examined in room, has converted to SR overnight. Transition to PO Amiodarone 200 mg BID and continue Diltiazem. Continues to have anemia on repeat labs post PRBC transfusion. Continue to hold Xarelto until anemia has been sorted out.     Interval History: has converted to SR overnight. Transition to PO Amiodarone 200 mg BID. Hold Xarelto until anemia has improved. Recommend GI input to resume AC safely.     Review of Systems   Constitutional: Positive for malaise/fatigue.   HENT:  Negative for hearing loss and hoarse voice.    Eyes:  Negative for blurred  vision and visual disturbance.   Cardiovascular:  Positive for dyspnea on exertion, irregular heartbeat and palpitations. Negative for chest pain, claudication, leg swelling, near-syncope, orthopnea, paroxysmal nocturnal dyspnea and syncope.   Respiratory:  Positive for shortness of breath. Negative for cough, hemoptysis, sleep disturbances due to breathing, snoring and wheezing.    Endocrine: Negative for cold intolerance and heat intolerance.   Hematologic/Lymphatic: Bruises/bleeds easily.   Skin:  Negative for color change, dry skin and nail changes.   Musculoskeletal:  Positive for arthritis and back pain. Negative for joint pain and myalgias.   Gastrointestinal:  Negative for bloating, abdominal pain, constipation, nausea and vomiting.   Genitourinary:  Negative for dysuria, flank pain, hematuria and hesitancy.   Neurological:  Positive for weakness. Negative for headaches, light-headedness, loss of balance, numbness and paresthesias.   Psychiatric/Behavioral:  Negative for altered mental status.    Allergic/Immunologic: Negative for environmental allergies.     Objective:     Vital Signs (Most Recent):  Temp: 98.5 °F (36.9 °C) (04/02/24 1155)  Pulse: 85 (04/02/24 1155)  Resp: 18 (04/02/24 1155)  BP: (!) 121/58 (04/02/24 1155)  SpO2: 100 % (04/02/24 1155) Vital Signs (24h Range):  Temp:  [98.2 °F (36.8 °C)-100.7 °F (38.2 °C)] 98.5 °F (36.9 °C)  Pulse:  [] 85  Resp:  [16-22] 18  SpO2:  [77 %-100 %] 100 %  BP: ()/() 121/58     Weight: 105.2 kg (231 lb 14.8 oz)  Body mass index is 31.45 kg/m².     SpO2: 100 %         Intake/Output Summary (Last 24 hours) at 4/2/2024 1231  Last data filed at 4/1/2024 1913  Gross per 24 hour   Intake 405.24 ml   Output --   Net 405.24 ml       Lines/Drains/Airways       Peripheral Intravenous Line  Duration                  Peripheral IV - Single Lumen 04/02/24 0300 22 G Anterior;Right Wrist <1 day                       Physical Exam  Vitals and nursing note  reviewed.   Constitutional:       General: He is not in acute distress.     Appearance: Normal appearance. He is well-developed. He is obese. He is not ill-appearing.   HENT:      Head: Normocephalic and atraumatic.      Nose: Nose normal.      Mouth/Throat:      Mouth: Mucous membranes are moist.   Eyes:      Pupils: Pupils are equal, round, and reactive to light.   Neck:      Thyroid: No thyromegaly.      Vascular: No JVD.      Trachea: No tracheal deviation.   Cardiovascular:      Rate and Rhythm: Normal rate and regular rhythm.      Chest Wall: PMI is not displaced.      Pulses: Intact distal pulses.           Radial pulses are 2+ on the right side and 2+ on the left side.        Dorsalis pedis pulses are 2+ on the right side and 2+ on the left side.      Heart sounds: S1 normal and S2 normal. Heart sounds not distant. No murmur heard.     Comments: Converted to SR  Pulmonary:      Effort: Pulmonary effort is normal. No respiratory distress.      Breath sounds: Normal breath sounds. No wheezing.   Abdominal:      General: Bowel sounds are normal. There is no distension.      Palpations: Abdomen is soft.      Tenderness: There is no abdominal tenderness.   Musculoskeletal:         General: No swelling. Normal range of motion.      Cervical back: Full passive range of motion without pain, normal range of motion and neck supple.      Right lower leg: No edema.      Left lower leg: No edema.      Right ankle: No swelling.      Left ankle: No swelling.   Skin:     General: Skin is warm and dry.      Capillary Refill: Capillary refill takes less than 2 seconds.      Coloration: Skin is pale.      Nails: There is no clubbing.   Neurological:      General: No focal deficit present.      Mental Status: He is alert and oriented to person, place, and time.      Motor: Weakness present.   Psychiatric:         Speech: Speech normal.         Behavior: Behavior normal.         Thought Content: Thought content normal.          Judgment: Judgment normal.            Significant Labs: BMP:   Recent Labs   Lab 04/01/24  0804 04/02/24  0430   * 158*    141   K 3.7 3.5    106   CO2 23 18*   BUN 19 22   CREATININE 1.1 1.4   CALCIUM 8.6* 8.3*   MG  --  1.7   , CBC   Recent Labs   Lab 04/01/24  0804 04/01/24  1221 04/02/24  0430 04/02/24  0912   WBC 3.85*  --  25.69* 16.29*   HGB 7.1* 7.1* 7.5* 7.1*   HCT 26.2* 26.4* 26.8* 25.5*   *  --  114* 101*   , and All pertinent lab results from the last 24 hours have been reviewed.    Significant Imaging: Echocardiogram: Transthoracic echo (TTE) complete (Cupid Only):   Results for orders placed or performed during the hospital encounter of 04/01/24   Echo Saline Bubble? No   Result Value Ref Range    Left Atrium Major Axis 5.61 cm    Left Atrium Minor Axis 6.20 cm    RA Major Axis 5.21 cm    LV Diastolic Volume 113.12 mL    LV Systolic Volume 42.24 mL    TR Max Dave 3.13 m/s    PV mean gradient 1 mmHg    RVOT peak VTI 19.1 cm    RVOT peak dave 0.75 m/s    Ao VTI 20.30 cm    Ao peak dave 1.20 m/s    LVOT peak VTI 17.20 cm    LVOT peak dave 0.96 m/s    LVOT diameter 2.19 cm    IVRT 72.31 msec    PV peak gradient 2 mmHg    AV mean gradient 3 mmHg    TAPSE 2.67 cm    RVDD 3.77 cm    LA size 4.62 cm    Ascending aorta 3.57 cm    STJ 2.87 cm    LVIDs 3.24 2.1 - 4.0 cm    Ao root annulus 3.62 cm    Posterior Wall 1.09 0.6 - 1.1 cm    IVS 1.32 (A) 0.6 - 1.1 cm    LVIDd 4.91 3.5 - 6.0 cm    PV PEAK VELOCITY 0.79 m/s    Left Ventricular Outflow Tract Mean Gradient 1.78 mmHg    Left Ventricular Outflow Tract Mean Velocity 0.64 cm/s    IVC diameter 1.79 cm    FS 34 28 - 44 %    LV mass 228.45 g    Left Ventricle Relative Wall Thickness 0.44 cm    AV valve area 3.19 cm²    AV Velocity Ratio 0.80     AV index (prosthetic) 0.85     LVOT area 3.8 cm2    LVOT stroke volume 64.76 cm3    AV peak gradient 6 mmHg    Triscuspid Valve Regurgitation Peak Gradient 39 mmHg    FABIANA by Velocity Ratio 3.01 cm²     BSA 2.36 m2    LV Systolic Volume Index 18.3 mL/m2    LV Diastolic Volume Index 48.97 mL/m2    LV Mass Index 99 g/m2    ZLVIDS -4.15     ZLVIDD -6.15     LA Volume Index 46.1 mL/m2    LA volume 106.40 cm3    LA WIDTH 4.6 cm    RA Width 4.3 cm    TV resting pulmonary artery pressure 47 mmHg    RV TB RVSP 11 mmHg    Est. RA pres 8 mmHg    Narrative      Left Ventricle: The left ventricle is normal in size. Mildly increased   wall thickness. There is concentric remodeling. There is normal systolic   function with a visually estimated ejection fraction of 60 - 65%. Unable   to assess diastolic function due to atrial fibrillation.    Right Ventricle: Normal right ventricular cavity size. Wall thickness   is normal. Right ventricle wall motion  is normal. Systolic function is   normal.    Left Atrium: Left atrium is moderately dilated.    Tricuspid Valve: There is mild regurgitation.    IVC/SVC: Intermediate venous pressure at 8 mmHg.       Assessment and Plan:       * Paroxysmal atrial fibrillation  Remains in AFIB/RVR on exam today  Add Amiodarone infusion  Change Diltiazem IV gtt to Cardizem 240 mg daily  Continue Cardiac Monitoring  Hold Xarelto given anemia for now.   Last dose of Xarelto on 3/31/2024    4/2/2024  -Converted to SR overnight  -Transition to PO Amiodarone 200 mg BID  -Continue CCB  -Hold Xarelto until cleared per GI to resume given continued issues with anemia    Type 2 diabetes mellitus  Mgmt per primary team    Elevated troponin  Troponin 0.015>>0.050  Elevation likely due to AFIB/RVR and symptomatic anemia  Chest pain free on exam  ECHO pending    Severe anemia  Plan for PRBC transfusion today.  Previously had EGD, Video capsule in 2021 without any definitive source of bleeding located.  Further labs to assess anemia pending.     Hypertension  Continue CCB   Monitor BP trend        VTE Risk Mitigation (From admission, onward)           Ordered     IP VTE HIGH RISK PATIENT  Once         04/01/24 1241      Place sequential compression device  Until discontinued         04/01/24 1241     Reason for No Pharmacological VTE Prophylaxis  Once        Question:  Reasons:  Answer:  Already adequately anticoagulated on oral Anticoagulants    04/01/24 1241                    MOE Babb  Cardiology  [facility] - Telemetry ([facility])

## 2024-04-02 NOTE — ASSESSMENT & PLAN NOTE
Patient's anemia is currently uncontrolled. Has received 1 units of PRBCs on 4/1/24 . Etiology likely d/t Iron deficiency  Current CBC reviewed-   Lab Results   Component Value Date    HGB 7.1 (L) 04/02/2024    HCT 25.5 (L) 04/02/2024     Monitor serial CBC and transfuse if patient becomes hemodynamically unstable, symptomatic or H/H drops below 7/21 or hgb 8 if symptomatic    -Patient did not make an appropriate response to 1 unit of blood his hemoglobin went from 7.1 to 7.5 back to 7.1  -patient was given 1 unit of blood on 04/01/2024, additional unit of blood ordered on 04/02/2024   -GI was consulted for evaluation   -of note patient has history of duodenal bleed.

## 2024-04-03 PROBLEM — D50.0 IRON DEFICIENCY ANEMIA SECONDARY TO BLOOD LOSS (CHRONIC): Status: ACTIVE | Noted: 2021-05-29

## 2024-04-03 PROBLEM — N39.0 UTI (URINARY TRACT INFECTION): Status: ACTIVE | Noted: 2024-04-03

## 2024-04-03 LAB
ALBUMIN SERPL BCP-MCNC: 3 G/DL (ref 3.5–5.2)
ALP SERPL-CCNC: 80 U/L (ref 55–135)
ALT SERPL W/O P-5'-P-CCNC: 31 U/L (ref 10–44)
ANION GAP SERPL CALC-SCNC: 11 MMOL/L (ref 8–16)
AST SERPL-CCNC: 55 U/L (ref 10–40)
BASOPHILS # BLD AUTO: 0.04 K/UL (ref 0–0.2)
BASOPHILS NFR BLD: 0.3 % (ref 0–1.9)
BILIRUB SERPL-MCNC: 1 MG/DL (ref 0.1–1)
BUN SERPL-MCNC: 18 MG/DL (ref 8–23)
CALCIUM SERPL-MCNC: 8.5 MG/DL (ref 8.7–10.5)
CHLORIDE SERPL-SCNC: 107 MMOL/L (ref 95–110)
CO2 SERPL-SCNC: 22 MMOL/L (ref 23–29)
CREAT SERPL-MCNC: 1.1 MG/DL (ref 0.5–1.4)
DIFFERENTIAL METHOD BLD: ABNORMAL
EOSINOPHIL # BLD AUTO: 0 K/UL (ref 0–0.5)
EOSINOPHIL NFR BLD: 0.2 % (ref 0–8)
ERYTHROCYTE [DISTWIDTH] IN BLOOD BY AUTOMATED COUNT: 19.3 % (ref 11.5–14.5)
EST. GFR  (NO RACE VARIABLE): >60 ML/MIN/1.73 M^2
GLUCOSE SERPL-MCNC: 127 MG/DL (ref 70–110)
HCT VFR BLD AUTO: 29.6 % (ref 40–54)
HGB BLD-MCNC: 8.5 G/DL (ref 14–18)
IMM GRANULOCYTES # BLD AUTO: 0.1 K/UL (ref 0–0.04)
IMM GRANULOCYTES NFR BLD AUTO: 0.9 % (ref 0–0.5)
LYMPHOCYTES # BLD AUTO: 0.3 K/UL (ref 1–4.8)
LYMPHOCYTES NFR BLD: 2.8 % (ref 18–48)
MAGNESIUM SERPL-MCNC: 1.9 MG/DL (ref 1.6–2.6)
MCH RBC QN AUTO: 21.4 PG (ref 27–31)
MCHC RBC AUTO-ENTMCNC: 28.7 G/DL (ref 32–36)
MCV RBC AUTO: 75 FL (ref 82–98)
MONOCYTES # BLD AUTO: 1.4 K/UL (ref 0.3–1)
MONOCYTES NFR BLD: 11.8 % (ref 4–15)
NEUTROPHILS # BLD AUTO: 9.7 K/UL (ref 1.8–7.7)
NEUTROPHILS NFR BLD: 84 % (ref 38–73)
NRBC BLD-RTO: 0 /100 WBC
OVALOCYTES BLD QL SMEAR: ABNORMAL
PHOSPHATE SERPL-MCNC: 2.1 MG/DL (ref 2.7–4.5)
PLATELET # BLD AUTO: 103 K/UL (ref 150–450)
PLATELET BLD QL SMEAR: ABNORMAL
PMV BLD AUTO: ABNORMAL FL (ref 9.2–12.9)
POCT GLUCOSE: 144 MG/DL (ref 70–110)
POCT GLUCOSE: 181 MG/DL (ref 70–110)
POCT GLUCOSE: 234 MG/DL (ref 70–110)
POLYCHROMASIA BLD QL SMEAR: ABNORMAL
POTASSIUM SERPL-SCNC: 3.5 MMOL/L (ref 3.5–5.1)
PROT SERPL-MCNC: 5.5 G/DL (ref 6–8.4)
RBC # BLD AUTO: 3.97 M/UL (ref 4.6–6.2)
SODIUM SERPL-SCNC: 140 MMOL/L (ref 136–145)
STOMATOCYTES BLD QL SMEAR: PRESENT
TARGETS BLD QL SMEAR: ABNORMAL
WBC # BLD AUTO: 11.48 K/UL (ref 3.9–12.7)

## 2024-04-03 PROCEDURE — 99223 1ST HOSP IP/OBS HIGH 75: CPT | Mod: ,,, | Performed by: INTERNAL MEDICINE

## 2024-04-03 PROCEDURE — 63600175 PHARM REV CODE 636 W HCPCS: Performed by: FAMILY MEDICINE

## 2024-04-03 PROCEDURE — 83735 ASSAY OF MAGNESIUM: CPT | Performed by: NURSE PRACTITIONER

## 2024-04-03 PROCEDURE — 36415 COLL VENOUS BLD VENIPUNCTURE: CPT | Performed by: NURSE PRACTITIONER

## 2024-04-03 PROCEDURE — 25000003 PHARM REV CODE 250: Performed by: NURSE PRACTITIONER

## 2024-04-03 PROCEDURE — 80053 COMPREHEN METABOLIC PANEL: CPT | Performed by: NURSE PRACTITIONER

## 2024-04-03 PROCEDURE — 25500020 PHARM REV CODE 255: Performed by: FAMILY MEDICINE

## 2024-04-03 PROCEDURE — 21400001 HC TELEMETRY ROOM

## 2024-04-03 PROCEDURE — 25000003 PHARM REV CODE 250: Performed by: INTERNAL MEDICINE

## 2024-04-03 PROCEDURE — 99233 SBSQ HOSP IP/OBS HIGH 50: CPT | Mod: ,,, | Performed by: INTERNAL MEDICINE

## 2024-04-03 PROCEDURE — 25000003 PHARM REV CODE 250: Performed by: FAMILY MEDICINE

## 2024-04-03 PROCEDURE — 99232 SBSQ HOSP IP/OBS MODERATE 35: CPT | Mod: ,,, | Performed by: UROLOGY

## 2024-04-03 PROCEDURE — 63600175 PHARM REV CODE 636 W HCPCS: Performed by: PHYSICIAN ASSISTANT

## 2024-04-03 PROCEDURE — 84100 ASSAY OF PHOSPHORUS: CPT | Performed by: NURSE PRACTITIONER

## 2024-04-03 PROCEDURE — 63600175 PHARM REV CODE 636 W HCPCS: Performed by: NURSE PRACTITIONER

## 2024-04-03 PROCEDURE — 85025 COMPLETE CBC W/AUTO DIFF WBC: CPT | Performed by: NURSE PRACTITIONER

## 2024-04-03 RX ORDER — METOPROLOL TARTRATE 25 MG/1
25 TABLET, FILM COATED ORAL 3 TIMES DAILY
Status: DISCONTINUED | OUTPATIENT
Start: 2024-04-03 | End: 2024-04-04

## 2024-04-03 RX ORDER — METOPROLOL TARTRATE 25 MG/1
25 TABLET, FILM COATED ORAL 2 TIMES DAILY
Status: DISCONTINUED | OUTPATIENT
Start: 2024-04-03 | End: 2024-04-03

## 2024-04-03 RX ADMIN — DILTIAZEM HYDROCHLORIDE 240 MG: 240 CAPSULE, COATED, EXTENDED RELEASE ORAL at 07:04

## 2024-04-03 RX ADMIN — IRON SUCROSE 200 MG: 20 INJECTION, SOLUTION INTRAVENOUS at 07:04

## 2024-04-03 RX ADMIN — AMIODARONE HYDROCHLORIDE 200 MG: 200 TABLET ORAL at 07:04

## 2024-04-03 RX ADMIN — INSULIN ASPART 2 UNITS: 100 INJECTION, SOLUTION INTRAVENOUS; SUBCUTANEOUS at 04:04

## 2024-04-03 RX ADMIN — METOPROLOL TARTRATE 25 MG: 25 TABLET, FILM COATED ORAL at 10:04

## 2024-04-03 RX ADMIN — AMIODARONE HYDROCHLORIDE 0.5 MG/MIN: 1.8 INJECTION, SOLUTION INTRAVENOUS at 07:04

## 2024-04-03 RX ADMIN — AMIODARONE HYDROCHLORIDE 150 MG: 1.5 INJECTION, SOLUTION INTRAVENOUS at 01:04

## 2024-04-03 RX ADMIN — AMIODARONE HYDROCHLORIDE 1 MG/MIN: 1.8 INJECTION, SOLUTION INTRAVENOUS at 01:04

## 2024-04-03 RX ADMIN — CEFTRIAXONE 1 G: 1 INJECTION, POWDER, FOR SOLUTION INTRAMUSCULAR; INTRAVENOUS at 05:04

## 2024-04-03 RX ADMIN — IOHEXOL 100 ML: 350 INJECTION, SOLUTION INTRAVENOUS at 09:04

## 2024-04-03 RX ADMIN — METOPROLOL TARTRATE 25 MG: 25 TABLET, FILM COATED ORAL at 05:04

## 2024-04-03 NOTE — ASSESSMENT & PLAN NOTE
-patient is followed by Dr. Fletcher as an outpatient.  -UA noted to have greater than 100 RBCs  -urology consulted for evaluation given his history of bladder cancer and blood in the urine over the last few days.

## 2024-04-03 NOTE — SUBJECTIVE & OBJECTIVE
Interval History:  Follow up for AFib and symptomatic anemia.  Cardiology on board and transitioned patient to p.o. Cardizem and amiodarone on 04/02/2024.  Due to symptomatic anemia cardiology recommended GI consult as he had a previous duodenal bleed which may be the cause for an inappropriate response to wanted a unit of blood that was given by the ER.  He was given an additional unit last night that had an appropriate response from 7.1-8.5.  Case discussed with GI who plans to scope patient tomorrow if heart rate is controlled.  Patient has been made NPO at this time    Review of Systems  Objective:     Vital Signs (Most Recent):  Temp: 97.8 °F (36.6 °C) (04/03/24 1600)  Pulse: (!) 133 (04/03/24 1600)  Resp: 20 (04/03/24 1600)  BP: (!) 139/93 (04/03/24 1600)  SpO2: (!) 94 % (04/03/24 1600) Vital Signs (24h Range):  Temp:  [97.1 °F (36.2 °C)-99 °F (37.2 °C)] 97.8 °F (36.6 °C)  Pulse:  [] 133  Resp:  [16-20] 20  SpO2:  [92 %-98 %] 94 %  BP: (110-139)/(57-93) 139/93     Weight: 105.7 kg (233 lb 0.4 oz)  Body mass index is 31.6 kg/m².    Intake/Output Summary (Last 24 hours) at 4/3/2024 1633  Last data filed at 4/3/2024 1200  Gross per 24 hour   Intake 310 ml   Output 3 ml   Net 307 ml         Physical Exam      Vitals and nursing note reviewed.   Constitutional:       Appearance: Normal appearance.   HENT:      Head: Normocephalic and atraumatic.      Nose: Nose normal.      Mouth/Throat:      Mouth: Mucous membranes are moist.   Eyes:      Extraocular Movements: Extraocular movements intact.      Comments: Pale conjunctiva   Cardiovascular:      Rate and Rhythm: Normal rate.      Pulses: Normal pulses.      Heart sounds: Normal heart sounds.   Pulmonary:      Effort: Pulmonary effort is normal.      Breath sounds: Normal breath sounds.   Abdominal:      General: Abdomen is flat. Bowel sounds are normal.      Palpations: Abdomen is soft.   Musculoskeletal:         General: Normal range of motion.      Cervical  back: Normal range of motion and neck supple.   Skin:     General: Skin is warm.      Capillary Refill: Capillary refill takes less than 2 seconds.      Coloration: Skin is pale.   Neurological:      Mental Status: He is alert and oriented to person, place, and time. Mental status is at baseline.   Psychiatric:         Mood and Affect: Mood normal.         Behavior: Behavior normal.     Significant Labs: All pertinent labs within the past 24 hours have been reviewed.  Recent Lab Results  (Last 5 results in the past 24 hours)        04/03/24  1605   04/03/24  1132   04/03/24  0636   04/03/24  0444   04/02/24  2103        Albumin       3.0         ALP       80         ALT       31         Anion Gap       11         AST       55         Baso #       0.04         Basophil %       0.3         BILIRUBIN TOTAL       1.0  Comment: For infants and newborns, interpretation of results should be based  on gestational age, weight and in agreement with clinical  observations.    Premature Infant recommended reference ranges:  Up to 24 hours.............<8.0 mg/dL  Up to 48 hours............<12.0 mg/dL  3-5 days..................<15.0 mg/dL  6-29 days.................<15.0 mg/dL           BUN       18         Calcium       8.5         Chloride       107         CO2       22         Creatinine       1.1         Differential Method       Automated         eGFR       >60         Eos #       0.0         Eos %       0.2         Glucose       127         Gran # (ANC)       9.7         Gran %       84.0         Hematocrit       29.6         Hemoglobin       8.5         Immature Grans (Abs)       0.10  Comment: Mild elevation in immature granulocytes is non specific and   can be seen in a variety of conditions including stress response,   acute inflammation, trauma and pregnancy. Correlation with other   laboratory and clinical findings is essential.           Immature Granulocytes       0.9         Lymph #       0.3         Lymph %        2.8         Magnesium        1.9         MCH       21.4         MCHC       28.7         MCV       75         Mono #       1.4         Mono %       11.8         MPV       SEE COMMENT  Comment: Result not available.         nRBC       0         Ovalocytes       Occasional         Phosphorus Level       2.1         Platelet Estimate       Decreased         Platelet Count       103         POCT Glucose 234   181   144     113       Poly       Occasional         Potassium       3.5         PROTEIN TOTAL       5.5         RBC       3.97         RDW       19.3         Sodium       140         Stomatocytes       Present         Target Cells       Occasional         WBC       11.48                                Significant Imaging:   X-Ray Chest AP Portable   Final Result      No acute process seen.         Electronically signed by: Hector Arriaga MD   Date:    04/01/2024   Time:    08:45

## 2024-04-03 NOTE — ASSESSMENT & PLAN NOTE
Patient's anemia is currently uncontrolled. Has received 1 units of PRBCs on 4/1/24 . Etiology likely d/t Iron deficiency  Current CBC reviewed-   Lab Results   Component Value Date    HGB 8.5 (L) 04/03/2024    HCT 29.6 (L) 04/03/2024     Monitor serial CBC and transfuse if patient becomes hemodynamically unstable, symptomatic or H/H drops below 7/21 or hgb 8 if symptomatic    -Patient did not make an appropriate response to 1 unit of blood his hemoglobin went from 7.1 to 7.5 back to 7.1  -patient was given 1 unit of blood on 04/01/2024, additional unit of blood ordered on 04/02/2024   -GI was consulted for evaluation.   was planning to scope on 04/04/2024 if patient's heart rate is controlled.  -of note patient has history of duodenal bleed.  -NPO after midnight

## 2024-04-03 NOTE — ASSESSMENT & PLAN NOTE
Patient's FSGs are controlled on current medication regimen.  Last A1c reviewed-   Lab Results   Component Value Date    HGBA1C 6.2 (H) 01/26/2023     Most recent fingerstick glucose reviewed-   Recent Labs   Lab 04/02/24  2103 04/03/24  0636 04/03/24  1132 04/03/24  1605   POCTGLUCOSE 113* 144* 181* 234*       Current correctional scale  Low  Maintain anti-hyperglycemic dose as follows-   Antihyperglycemics (From admission, onward)    Start     Stop Route Frequency Ordered    04/01/24 1340  insulin aspart U-100 pen 0-5 Units         -- SubQ Before meals & nightly PRN 04/01/24 1241        Hold Oral hypoglycemics while patient is in the hospital.

## 2024-04-03 NOTE — PLAN OF CARE
Updated patient on plan of care. NPO p MN for EGD. Bed alarm on. A/o x4. Instructed patient to use call light for assistance, call light in reach. Hourly rounding performed. Vitals q4 hours. Education provided, questions answered/encouraged. Chart check complete. Amiodarone drip infusing, afib, hr 110s-130s    Problem: Adult Inpatient Plan of Care  Goal: Plan of Care Review  Outcome: Ongoing, Progressing

## 2024-04-03 NOTE — HPI
The patient presented to the ER for SOB and chest pain which began hours prior. He said it felt like prior Afib episodes. He was noted to be in Afib and started on Cardizem drip. Cardiology consulted and patient converted to SR so meds were being switched to po. We have been consulted for anemia. He has a long history of iron deficiency anemia with full GI workup in 2017 (EGD/Colon) and in 2021 (EGD x2, colon, VCE). 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 This admit, he had a Hgb of 7.1. He received two units of PRBC with Hgb today of 8.5. , BUN 18 and creatinine 1.1. He has low total iron, sat iron and ferritin. He received an iron infusion yesterday. He has stopped his oral iron due to constipation. He takes Xarelto for his Afib. Last dose was 03/31/24. He denies hematemesis, hematochezia or melena. He denies NSAID use. He has had intermittent gross hematuria. He was started on antibiotics due to UTI on UA.

## 2024-04-03 NOTE — ASSESSMENT & PLAN NOTE
Chronic, controlled. Latest blood pressure and vitals reviewed-     Temp:  [97.1 °F (36.2 °C)-99 °F (37.2 °C)]   Pulse:  []   Resp:  [16-20]   BP: (110-139)/(57-93)   SpO2:  [92 %-98 %] .   Home meds for hypertension were reviewed and noted below.   Hypertension Medications               amLODIPine (NORVASC) 5 MG tablet TAKE 1 TABLET DAILY    carvediloL (COREG) 6.25 MG tablet Take 1 tablet (6.25 mg total) by mouth 2 (two) times daily with meals.    hydroCHLOROthiazide (HYDRODIURIL) 25 MG tablet TAKE 1 TABLET DAILY    furosemide (LASIX) 20 MG tablet Take 1 tablet (20 mg total) by mouth every Mon, Wed, Fri, Sun.            While in the hospital, will manage blood pressure as follows; Continue home antihypertensive regimen    Will utilize p.r.n. blood pressure medication only if patient's blood pressure greater than 160/100 and he develops symptoms such as worsening chest pain or shortness of breath.

## 2024-04-03 NOTE — CONSULTS
Critical access hospital - [facility] ([facility])  Gastroenterology  Consult Note    Patient Name: Jose Miguel Alvarez Jr.  MRN: [id number]  Admission Date: 4/1/2024  Hospital Length of Stay: 1 days  Code Status: Full Code   Attending Provider: Sherry Malhotra MD   Consulting Provider: Josh De Paz PA-C  Primary Care Physician: Toshia Valladares MD  Principal Problem:Paroxysmal atrial fibrillation    Inpatient consult to Gastroenterology  Consult performed by: Josh De Paz PA-C  Consult ordered by: Sherry Malhotra MD  Reason for consult: Iron def anemia        Subjective:     HPI:  The patient presented to the ER for SOB and chest pain which began hours prior. He said it felt like prior Afib episodes. He was noted to be in Afib and started on Cardizem drip. Cardiology consulted and patient converted to SR so meds were being switched to po. We have been consulted for anemia. He has a long history of iron deficiency anemia with full GI workup in 2017 (EGD/Colon) and in 2021 (EGD x2, colon, VCE). 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 This admit, he had a Hgb of 7.1. He received two units of PRBC with Hgb today of 8.5. , BUN 18 and creatinine 1.1. He has low total iron, sat iron and ferritin. He received an iron infusion yesterday. He has stopped his oral iron due to constipation. He takes Xarelto for his Afib. Last dose was 03/31/24. He denies hematemesis, hematochezia or melena. He denies NSAID use. He has had intermittent gross hematuria. He was started on antibiotics due to UTI on UA.     Past Medical History:   Diagnosis Date    Anemia     Anticoagulant long-term use     Atrial fibrillation     Bladder cancer     Bladder tumor     CKD (chronic kidney disease), stage III     COPD (chronic obstructive pulmonary disease)     pt denies    Encounter for blood transfusion     Hematuria     History of 2019 novel coronavirus disease  (COVID-19)     Hypercholesteremia     Hypertension        Past Surgical History:   Procedure Laterality Date    bladder tumor removal      CARDIAC ELECTROPHYSIOLOGY MAPPING AND ABLATION      CARDIOVERSION      several    CATARACT EXTRACTION Bilateral     cataract removal with IOL implants Bilateral     CHOLECYSTECTOMY      CYSTOSCOPIC LITHOLAPAXY N/A 12/28/2021    Procedure: CYSTOLITHOLAPAXY;  Surgeon: Medardo Garcia MD;  Location: [facility];  Service: Urology;  Laterality: N/A;    CYSTOSCOPY      CYSTOSCOPY WITH BIOPSY OF BLADDER Right 12/28/2021    Procedure: CYSTOSCOPY, WITH BLADDER BIOPSY, WITH FULGURATION IF INDICATED;  Surgeon: Medardo Garcia MD;  Location: [facility] OR;  Service: Urology;  Laterality: Right;    ESOPHAGOGASTRODUODENOSCOPY N/A 10/25/2021    Procedure: Small Jason Enteroscopy (SBE);  Surgeon: Isabelle Griffin MD;  Location: [facility];  Service: Endoscopy;  Laterality: N/A;    ESOPHAGOGASTRODUODENOSCOPY N/A 12/13/2021    Procedure: EGD (ESOPHAGOGASTRODUODENOSCOPY);  Surgeon: Troy Polanco MD;  Location: [facility];  Service: Endoscopy;  Laterality: N/A;    EYE SURGERY      INTRALUMINAL GASTROINTESTINAL TRACT IMAGING VIA CAPSULE N/A 12/6/2021    Procedure: IMAGING PROCEDURE, GI TRACT, INTRALUMINAL, VIA CAPSULE;  Surgeon: Larry Jones RN;  Location: [facility];  Service: Endoscopy;  Laterality: N/A;    TRANSURETHRAL RESECTION OF BLADDER TUMOR BY BIPOLAR CAUTERY N/A 7/23/2019    Procedure: TURBT, USING BIPOLAR CAUTERY;  Surgeon: Ritesh Marques MD;  Location: [facility];  Service: Urology;  Laterality: N/A;       Review of patient's allergies indicates:  No Known Allergies  Family History       Problem Relation (Age of Onset)    Heart disease Father, Brother    Hypertension Father          Tobacco Use    Smoking status: Former     Current packs/day: 0.00     Average packs/day: 2.5 packs/day for 20.0 years (50.0 ttl pk-yrs)     Types: Cigarettes     Start date: 1967     Quit date: 1987      Years since quittin.2    Smokeless tobacco: Never   Substance and Sexual Activity    Alcohol use: Yes     Alcohol/week: 7.0 standard drinks of alcohol     Types: 7 Cans of beer per week    Drug use: No    Sexual activity: Not Currently     Review of Systems   Constitutional:  Negative for fever.   HENT:  Negative for hearing loss.    Eyes:  Negative for visual disturbance.   Respiratory:  Negative for cough and shortness of breath.    Cardiovascular:  Negative for chest pain and palpitations.   Gastrointestinal:  Negative for abdominal pain, blood in stool, constipation, diarrhea, nausea and vomiting.        As per HPI.   Genitourinary:  Negative for difficulty urinating, dysuria, frequency and hematuria.   Musculoskeletal:  Negative for arthralgias and back pain.   Skin:  Negative for color change and rash.   Neurological:  Negative for seizures, syncope, weakness, numbness and headaches.   Hematological:  Does not bruise/bleed easily.   Psychiatric/Behavioral:  The patient is not nervous/anxious.      Objective:     Vital Signs (Most Recent):  Temp: 97.1 °F (36.2 °C) (24 07)  Pulse: (!) 115 (24 07)  Resp: 16 (24)  BP: 127/68 (24)  SpO2: (!) 94 % (24) Vital Signs (24h Range):  Temp:  [97.1 °F (36.2 °C)-99 °F (37.2 °C)] 97.1 °F (36.2 °C)  Pulse:  [] 115  Resp:  [16-20] 16  SpO2:  [92 %-100 %] 94 %  BP: (121-130)/(57-69) 127/68     Weight: 105.7 kg (233 lb 0.4 oz) (24 2355)  Body mass index is 31.6 kg/m².      Intake/Output Summary (Last 24 hours) at 4/3/2024 0930  Last data filed at 2024 2135  Gross per 24 hour   Intake 310 ml   Output --   Net 310 ml       Lines/Drains/Airways       Peripheral Intravenous Line  Duration                  Peripheral IV - Single Lumen 24 1758 20 G Anterior;Proximal;Right Forearm <1 day                     Physical Exam  Constitutional:       General: He is not in acute distress.     Appearance: Normal  "appearance. He is well-developed.   HENT:      Head: Normocephalic and atraumatic.   Eyes:      Extraocular Movements: Extraocular movements intact.   Cardiovascular:      Rate and Rhythm: Normal rate and regular rhythm.      Heart sounds: Normal heart sounds. No murmur heard.  Pulmonary:      Effort: Pulmonary effort is normal. No respiratory distress.      Breath sounds: Normal breath sounds. No wheezing.   Abdominal:      General: Bowel sounds are normal. There is no distension.      Palpations: Abdomen is soft. There is no mass.      Tenderness: There is no abdominal tenderness.   Musculoskeletal:      Cervical back: Normal range of motion and neck supple.      Right lower leg: Edema present.      Left lower leg: Edema present.   Skin:     General: Skin is warm and dry.      Findings: No rash.   Neurological:      Mental Status: He is alert and oriented to person, place, and time.      Cranial Nerves: No cranial nerve deficit.   Psychiatric:         Behavior: Behavior normal.          Significant Labs:  CBC:   Recent Labs   Lab 04/02/24  0430 04/02/24  0912 04/03/24  0444   WBC 25.69* 16.29* 11.48   HGB 7.5* 7.1* 8.5*   HCT 26.8* 25.5* 29.6*   * 101* 103*     CMP:   Recent Labs   Lab 04/03/24  0444   *   CALCIUM 8.5*   ALBUMIN 3.0*   PROT 5.5*      K 3.5   CO2 22*      BUN 18   CREATININE 1.1   ALKPHOS 80   ALT 31   AST 55*   BILITOT 1.0     Coagulation: No results for input(s): "PT", "INR", "APTT" in the last 48 hours.    Significant Imaging:  Imaging results within the past 24 hours have been reviewed.  Assessment/Plan:     Cardiac/Vascular  * Paroxysmal atrial fibrillation  Improved. Cardiology following and managing.     Oncology  Leukocytosis  On antibiotic for UTI.     Iron deficiency anemia secondary to blood loss (chronic)  Patient with acute on chronic anemia in the setting of intermittent gross hematuria and established history of AVMs.   Offered inpatient EGD to clear the UGI " tract prior to restarting Xarelto.   Keep NPO after midnight.   Monitor H/H and transfuse additional units as needed.   He would likely benefit from outpatient referral to benign hematology clinic for additional iron infusion due to poor tolerance of oral iron.       Thank you for your consult. I will follow-up with patient. Please contact us if you have any additional questions.    Josh De Paz PA-C  Gastroenterology  [facility] ([facility])

## 2024-04-03 NOTE — SUBJECTIVE & OBJECTIVE
Review of Systems   Constitutional: Positive for malaise/fatigue.   HENT: Negative.     Eyes: Negative.    Cardiovascular:  Positive for dyspnea on exertion, irregular heartbeat and palpitations.   Respiratory:  Positive for shortness of breath.    Endocrine: Negative.    Hematologic/Lymphatic: Negative.    Skin: Negative.    Musculoskeletal:  Positive for arthritis and joint pain.   Gastrointestinal: Negative.    Genitourinary: Negative.    Neurological: Negative.    Psychiatric/Behavioral: Negative.     Allergic/Immunologic: Negative.      Objective:     Vital Signs (Most Recent):  Temp: 97.3 °F (36.3 °C) (04/03/24 1212)  Pulse: 99 (04/03/24 1212)  Resp: 20 (04/03/24 1212)  BP: 110/69 (04/03/24 1212)  SpO2: (!) 94 % (04/03/24 1212) Vital Signs (24h Range):  Temp:  [97.1 °F (36.2 °C)-99 °F (37.2 °C)] 97.3 °F (36.3 °C)  Pulse:  [] 99  Resp:  [16-20] 20  SpO2:  [92 %-98 %] 94 %  BP: (110-130)/(57-69) 110/69     Weight: 105.7 kg (233 lb 0.4 oz)  Body mass index is 31.6 kg/m².     SpO2: (!) 94 %         Intake/Output Summary (Last 24 hours) at 4/3/2024 1314  Last data filed at 4/3/2024 1200  Gross per 24 hour   Intake 310 ml   Output 3 ml   Net 307 ml       Lines/Drains/Airways       Peripheral Intravenous Line  Duration                  Peripheral IV - Single Lumen 04/02/24 1758 20 G Anterior;Proximal;Right Forearm <1 day                       Physical Exam  Vitals and nursing note reviewed.   Constitutional:       General: He is not in acute distress.     Appearance: He is well-developed. He is not diaphoretic.      Comments: On supplemental O2   HENT:      Head: Normocephalic and atraumatic.   Eyes:      General:         Right eye: No discharge.         Left eye: No discharge.      Pupils: Pupils are equal, round, and reactive to light.   Cardiovascular:      Rate and Rhythm: Normal rate. Rhythm irregularly irregular.      Heart sounds: S1 normal and S2 normal. No murmur heard.  Pulmonary:      Effort:  Pulmonary effort is normal. No respiratory distress.      Breath sounds: Normal breath sounds.   Abdominal:      General: There is no distension.   Musculoskeletal:      Right lower leg: No edema.      Left lower leg: No edema.   Skin:     General: Skin is warm and dry.      Coloration: Skin is pale.      Findings: No erythema.   Neurological:      General: No focal deficit present.      Mental Status: He is alert and oriented to person, place, and time.   Psychiatric:         Mood and Affect: Mood normal.         Behavior: Behavior normal.            Significant Labs: CMP   Recent Labs   Lab 04/02/24  0430 04/03/24  0444    140   K 3.5 3.5    107   CO2 18* 22*   * 127*   BUN 22 18   CREATININE 1.4 1.1   CALCIUM 8.3* 8.5*   PROT 5.4* 5.5*   ALBUMIN 3.0* 3.0*   BILITOT 1.0 1.0   ALKPHOS 72 80   AST 30 55*   ALT 19 31   ANIONGAP 17* 11   , CBC   Recent Labs   Lab 04/02/24  0430 04/02/24  0912 04/03/24  0444   WBC 25.69* 16.29* 11.48   HGB 7.5* 7.1* 8.5*   HCT 26.8* 25.5* 29.6*   * 101* 103*   , Troponin   Recent Labs   Lab 04/01/24  1520 04/01/24  1818   TROPONINI 0.037* 0.046*   , and All pertinent lab results from the last 24 hours have been reviewed.    Significant Imaging: Echocardiogram: Transthoracic echo (TTE) complete (Cupid Only):   Results for orders placed or performed during the hospital encounter of 04/01/24   Echo Saline Bubble? No   Result Value Ref Range    Left Atrium Major Axis 5.61 cm    Left Atrium Minor Axis 6.20 cm    RA Major Axis 5.21 cm    LV Diastolic Volume 113.12 mL    LV Systolic Volume 42.24 mL    TR Max Dave 3.13 m/s    PV mean gradient 1 mmHg    RVOT peak VTI 19.1 cm    RVOT peak dave 0.75 m/s    Ao VTI 20.30 cm    Ao peak dave 1.20 m/s    LVOT peak VTI 17.20 cm    LVOT peak dave 0.96 m/s    LVOT diameter 2.19 cm    IVRT 72.31 msec    PV peak gradient 2 mmHg    AV mean gradient 3 mmHg    TAPSE 2.67 cm    RVDD 3.77 cm    LA size 4.62 cm    Ascending aorta 3.57 cm     STJ 2.87 cm    LVIDs 3.24 2.1 - 4.0 cm    Ao root annulus 3.62 cm    Posterior Wall 1.09 0.6 - 1.1 cm    IVS 1.32 (A) 0.6 - 1.1 cm    LVIDd 4.91 3.5 - 6.0 cm    PV PEAK VELOCITY 0.79 m/s    Left Ventricular Outflow Tract Mean Gradient 1.78 mmHg    Left Ventricular Outflow Tract Mean Velocity 0.64 cm/s    IVC diameter 1.79 cm    FS 34 28 - 44 %    LV mass 228.45 g    Left Ventricle Relative Wall Thickness 0.44 cm    AV valve area 3.19 cm²    AV Velocity Ratio 0.80     AV index (prosthetic) 0.85     LVOT area 3.8 cm2    LVOT stroke volume 64.76 cm3    AV peak gradient 6 mmHg    Triscuspid Valve Regurgitation Peak Gradient 39 mmHg    FABIANA by Velocity Ratio 3.01 cm²    BSA 2.36 m2    LV Systolic Volume Index 18.3 mL/m2    LV Diastolic Volume Index 48.97 mL/m2    LV Mass Index 99 g/m2    ZLVIDS -4.15     ZLVIDD -6.15     LA Volume Index 46.1 mL/m2    LA volume 106.40 cm3    LA WIDTH 4.6 cm    RA Width 4.3 cm    TV resting pulmonary artery pressure 47 mmHg    RV TB RVSP 11 mmHg    Est. RA pres 8 mmHg    Narrative      Left Ventricle: The left ventricle is normal in size. Mildly increased   wall thickness. There is concentric remodeling. There is normal systolic   function with a visually estimated ejection fraction of 60 - 65%. Unable   to assess diastolic function due to atrial fibrillation.    Right Ventricle: Normal right ventricular cavity size. Wall thickness   is normal. Right ventricle wall motion  is normal. Systolic function is   normal.    Left Atrium: Left atrium is moderately dilated.    Tricuspid Valve: There is mild regurgitation.    IVC/SVC: Intermediate venous pressure at 8 mmHg.      and EKG: Reviewed

## 2024-04-03 NOTE — ASSESSMENT & PLAN NOTE
Patient with Persistent (7 days or more) atrial fibrillation which is uncontrolled currently with Calcium Channel Blocker. Patient is currently in atrial fibrillation.[id number] Score: 3. Anticoagulation indicated. Anticoagulation done with Xarelto .    Treated with IVP Dilt x2, rate improvement not sustained.     --Cardiology consulted  --Diltiazem IV continuous 10mg/hr initiated per recommendation, rate did not improve, changed to IV amiodarone.  Cardiology transitioned to p.o. amiodarone on 04/02/2024  --patient continues to go into intermittent AFib with heart rates in the 130s.  This is not sustained.  --Tele  --Vitals Q4H  --anticoagulation has been held due to symptomatic anemia

## 2024-04-03 NOTE — ASSESSMENT & PLAN NOTE
Patient with acute on chronic anemia in the setting of intermittent gross hematuria and established history of AVMs.   Offered inpatient EGD to clear the UGI tract prior to restarting Xarelto.   Keep NPO after midnight.   Monitor H/H and transfuse additional units as needed.   He would likely benefit from outpatient referral to benign hematology clinic for additional iron infusion due to poor tolerance of oral iron.

## 2024-04-03 NOTE — ASSESSMENT & PLAN NOTE
Likely secondary to demand ischemia related to anemia, A. Fib RVR    --trend trop  --Tele  --Vitals Q4H  --Repeat labs in AM  --Transfuse with 1U PRBC on 04/01/2024, 2nd unit transfused on 04/02/2024

## 2024-04-03 NOTE — PROGRESS NOTES
[facility] Medicine  Progress Note    Patient Name: Jose Miguel Alvarez Jr.  MRN: [id number]  Patient Class: IP- Inpatient   Admission Date: 4/1/2024  Length of Stay: 1 days  Attending Physician: Sherry Malhotra MD  Primary Care Provider: Toshia Valladares MD        Subjective:     Principal Problem:Paroxysmal atrial fibrillation        HPI:  83 y.o. male patient with a PMHx of COPD, CKD, HTN, Afib s/p DCCV. Presented to the Emergency Department for chest pain, onset PTA at home. Symptoms are intermittent and moderate in severity. No mitigating or exacerbating factors reported. Associated sxs include intermittent SOB and intermittent palpitations; pt states that his current sxs are similar to when he was last in Afib. Patient denies any fever, chills, n/v/d, weakness, numbness, dizziness, headache, and all other sxs at this time. Pt is currently on Xarelto and Carvedilol. In the ED, vitals: 146/75, 120, 22, 97.8F, 99% RA. Significant Labs: WBC: 3.85, H/H: 7.1/26.2, Pl: 130K. Trop: 0.015, EKG: A. Fib RVR. CXR: no acute process. Treated with IVP diltiazem x2, no improvement in rate or rhythm. Placed on diltiazem drip. Cardiology consulted. Patient is a full code. Placed in observation under the care of Hospital Medicine for management of A. Fib RVR, Elevated Troponin, Pancytopenia.     Overview/Hospital Course:  Patient admitted with paroxysmal AFib and anemia.  He was started on a Cardizem drip with no improvement therefore amiodarone drip was added.  Patient was also transfused 1 unit of blood for symptomatic anemia.  Cardiology was consulted and recommended transition from IV to p.o. Cardizem and amiodarone.  Plan for monitoring overnight with further recommendations from Cardiology tomorrow.  Due to patient's anemia he was transfused an additional unit of blood on for 04/02/2024 for which she had an appropriate response with a hemoglobin that improved from 7.1-8.5.  Given his  history in 2021 of a duodenal bleed, GI was consulted and possibly scoping on 04/04/2024 if patient's heart rate is controlled.  At this time patient is still in AFib while on amiodarone and Cardizem p.o..  Cardiology managing patient's AFib.    Interval History:  Follow up for AFib and symptomatic anemia.  Cardiology on board and transitioned patient to p.o. Cardizem and amiodarone on 04/02/2024.  Due to symptomatic anemia cardiology recommended GI consult as he had a previous duodenal bleed which may be the cause for an inappropriate response to wanted a unit of blood that was given by the ER.  He was given an additional unit last night that had an appropriate response from 7.1-8.5.  Case discussed with GI who plans to scope patient tomorrow if heart rate is controlled.  Patient has been made NPO at this time    Review of Systems  Objective:     Vital Signs (Most Recent):  Temp: 97.8 °F (36.6 °C) (04/03/24 1600)  Pulse: (!) 133 (04/03/24 1600)  Resp: 20 (04/03/24 1600)  BP: (!) 139/93 (04/03/24 1600)  SpO2: (!) 94 % (04/03/24 1600) Vital Signs (24h Range):  Temp:  [97.1 °F (36.2 °C)-99 °F (37.2 °C)] 97.8 °F (36.6 °C)  Pulse:  [] 133  Resp:  [16-20] 20  SpO2:  [92 %-98 %] 94 %  BP: (110-139)/(57-93) 139/93     Weight: 105.7 kg (233 lb 0.4 oz)  Body mass index is 31.6 kg/m².    Intake/Output Summary (Last 24 hours) at 4/3/2024 1633  Last data filed at 4/3/2024 1200  Gross per 24 hour   Intake 310 ml   Output 3 ml   Net 307 ml         Physical Exam      Vitals and nursing note reviewed.   Constitutional:       Appearance: Normal appearance.   HENT:      Head: Normocephalic and atraumatic.      Nose: Nose normal.      Mouth/Throat:      Mouth: Mucous membranes are moist.   Eyes:      Extraocular Movements: Extraocular movements intact.      Comments: Pale conjunctiva   Cardiovascular:      Rate and Rhythm: Normal rate.      Pulses: Normal pulses.      Heart sounds: Normal heart sounds.   Pulmonary:      Effort:  Pulmonary effort is normal.      Breath sounds: Normal breath sounds.   Abdominal:      General: Abdomen is flat. Bowel sounds are normal.      Palpations: Abdomen is soft.   Musculoskeletal:         General: Normal range of motion.      Cervical back: Normal range of motion and neck supple.   Skin:     General: Skin is warm.      Capillary Refill: Capillary refill takes less than 2 seconds.      Coloration: Skin is pale.   Neurological:      Mental Status: He is alert and oriented to person, place, and time. Mental status is at baseline.   Psychiatric:         Mood and Affect: Mood normal.         Behavior: Behavior normal.     Significant Labs: All pertinent labs within the past 24 hours have been reviewed.  Recent Lab Results  (Last 5 results in the past 24 hours)        04/03/24  1605   04/03/24  1132   04/03/24  0636   04/03/24  0444   04/02/24  2103        Albumin       3.0         ALP       80         ALT       31         Anion Gap       11         AST       55         Baso #       0.04         Basophil %       0.3         BILIRUBIN TOTAL       1.0  Comment: For infants and newborns, interpretation of results should be based  on gestational age, weight and in agreement with clinical  observations.    Premature Infant recommended reference ranges:  Up to 24 hours.............<8.0 mg/dL  Up to 48 hours............<12.0 mg/dL  3-5 days..................<15.0 mg/dL  6-29 days.................<15.0 mg/dL           BUN       18         Calcium       8.5         Chloride       107         CO2       22         Creatinine       1.1         Differential Method       Automated         eGFR       >60         Eos #       0.0         Eos %       0.2         Glucose       127         Gran # (ANC)       9.7         Gran %       84.0         Hematocrit       29.6         Hemoglobin       8.5         Immature Grans (Abs)       0.10  Comment: Mild elevation in immature granulocytes is non specific and   can be seen in a variety  of conditions including stress response,   acute inflammation, trauma and pregnancy. Correlation with other   laboratory and clinical findings is essential.           Immature Granulocytes       0.9         Lymph #       0.3         Lymph %       2.8         Magnesium        1.9         MCH       21.4         MCHC       28.7         MCV       75         Mono #       1.4         Mono %       11.8         MPV       SEE COMMENT  Comment: Result not available.         nRBC       0         Ovalocytes       Occasional         Phosphorus Level       2.1         Platelet Estimate       Decreased         Platelet Count       103         POCT Glucose 234   181   144     113       Poly       Occasional         Potassium       3.5         PROTEIN TOTAL       5.5         RBC       3.97         RDW       19.3         Sodium       140         Stomatocytes       Present         Target Cells       Occasional         WBC       11.48                                Significant Imaging:   X-Ray Chest AP Portable   Final Result      No acute process seen.         Electronically signed by: Hector Arriaga MD   Date:    04/01/2024   Time:    08:45           Assessment/Plan:      * Paroxysmal atrial fibrillation  Patient with Persistent (7 days or more) atrial fibrillation which is uncontrolled currently with Calcium Channel Blocker. Patient is currently in atrial fibrillation.[id number] Score: 3. Anticoagulation indicated. Anticoagulation done with Xarelto .    Treated with IVP Dilt x2, rate improvement not sustained.     --Cardiology consulted  --Diltiazem IV continuous 10mg/hr initiated per recommendation, rate did not improve, changed to IV amiodarone.  Cardiology transitioned to p.o. amiodarone on 04/02/2024  --patient continues to go into intermittent AFib with heart rates in the 130s.  This is not sustained.  --Tele  --Vitals Q4H  --anticoagulation has been held due to symptomatic anemia    UTI (urinary tract infection)  -UA ordered,  chest x-ray reviewed   -we will start empiric Rocephin  -repeat CBC in a.m.  -resolved  -urine cultures pending      Leukocytosis  -unclear etiology as patient is white count has gone from 3.85 to 25.69 to16.29 within 12 hours  -UA ordered, chest x-ray reviewed   -we will start empiric Rocephin  -repeat CBC in a.m.  -resolved    Type 2 diabetes mellitus  Patient's FSGs are controlled on current medication regimen.  Last A1c reviewed-   Lab Results   Component Value Date    HGBA1C 6.2 (H) 01/26/2023     Most recent fingerstick glucose reviewed-   Recent Labs   Lab 04/02/24  2103 04/03/24  0636 04/03/24  1132 04/03/24  1605   POCTGLUCOSE 113* 144* 181* 234*       Current correctional scale  Low  Maintain anti-hyperglycemic dose as follows-   Antihyperglycemics (From admission, onward)      Start     Stop Route Frequency Ordered    04/01/24 1340  insulin aspart U-100 pen 0-5 Units         -- SubQ Before meals & nightly PRN 04/01/24 1241          Hold Oral hypoglycemics while patient is in the hospital.    Elevated troponin  Likely secondary to demand ischemia related to anemia, A. Fib RVR    --trend trop  --Tele  --Vitals Q4H  --Repeat labs in AM  --Transfuse with 1U PRBC on 04/01/2024, 2nd unit transfused on 04/02/2024        Pancytopenia  Chronic anemia, thrombocytopenia, intermittent neutropenia. Has been treated symptomatically, negative for hx of evaluation by Hem/Onc    This patient is found to have pancytopenia, the likely etiology is  Multifactorial , will monitor CBC Daily. Will transfuse red blood cells if the hemoglobin is <7g/dL (or <8 in the setting of ACS). Will transfuse platelets if platelet count is <50k (if undergoing surgical procedure or have active bleeding). Hold DVT prophylaxis if platelets are <50k. The patient's hemoglobin, white blood cell count, and platelet count results have been reviewed and are listed below.  Recent Labs   Lab 04/02/24  0912   HGB 7.1*   WBC 16.29*   *          --F/u with Hem/Onc as OP  --transfuse with 1U prbc now  --Venofer IV 200mg daily x3 doses      Iron deficiency anemia secondary to blood loss (chronic)  Patient's anemia is currently uncontrolled. Has received 1 units of PRBCs on 4/1/24 . Etiology likely d/t Iron deficiency  Current CBC reviewed-   Lab Results   Component Value Date    HGB 8.5 (L) 04/03/2024    HCT 29.6 (L) 04/03/2024     Monitor serial CBC and transfuse if patient becomes hemodynamically unstable, symptomatic or H/H drops below 7/21 or hgb 8 if symptomatic    -Patient did not make an appropriate response to 1 unit of blood his hemoglobin went from 7.1 to 7.5 back to 7.1  -patient was given 1 unit of blood on 04/01/2024, additional unit of blood ordered on 04/02/2024   -GI was consulted for evaluation.   was planning to scope on 04/04/2024 if patient's heart rate is controlled.  -of note patient has history of duodenal bleed.  -NPO after midnight    Hypertension  Chronic, controlled. Latest blood pressure and vitals reviewed-     Temp:  [97.1 °F (36.2 °C)-99 °F (37.2 °C)]   Pulse:  []   Resp:  [16-20]   BP: (110-139)/(57-93)   SpO2:  [92 %-98 %] .   Home meds for hypertension were reviewed and noted below.   Hypertension Medications               amLODIPine (NORVASC) 5 MG tablet TAKE 1 TABLET DAILY    carvediloL (COREG) 6.25 MG tablet Take 1 tablet (6.25 mg total) by mouth 2 (two) times daily with meals.    hydroCHLOROthiazide (HYDRODIURIL) 25 MG tablet TAKE 1 TABLET DAILY    furosemide (LASIX) 20 MG tablet Take 1 tablet (20 mg total) by mouth every Mon, Wed, Fri, Sun.            While in the hospital, will manage blood pressure as follows; Continue home antihypertensive regimen    Will utilize p.r.n. blood pressure medication only if patient's blood pressure greater than 160/100 and he develops symptoms such as worsening chest pain or shortness of breath.    Bladder tumor  -patient is followed by Dr. Fletcher as an  outpatient.  -UA noted to have greater than 100 RBCs  -urology consulted for evaluation given his history of bladder cancer and blood in the urine over the last few days.        VTE Risk Mitigation (From admission, onward)           Ordered     IP VTE HIGH RISK PATIENT  Once         04/01/24 1241     Place sequential compression device  Until discontinued         04/01/24 1241     Reason for No Pharmacological VTE Prophylaxis  Once        Question:  Reasons:  Answer:  Already adequately anticoagulated on oral Anticoagulants    04/01/24 1241                    Discharge Planning   JAYRO:      Code Status: Full Code   Is the patient medically ready for discharge?:     Reason for patient still in hospital (select all that apply): Patient trending condition, Laboratory test, Treatment, and Consult recommendations  Discharge Plan A: Home                  Sherry Malhotra MD  Department of Hospital Medicine   [facility] Telemetry ([facility])

## 2024-04-03 NOTE — SUBJECTIVE & OBJECTIVE
Past Medical History:   Diagnosis Date    Anemia     Anticoagulant long-term use     Atrial fibrillation     Bladder cancer     Bladder tumor     CKD (chronic kidney disease), stage III     COPD (chronic obstructive pulmonary disease)     pt denies    Encounter for blood transfusion     Hematuria     History of 2019 novel coronavirus disease (COVID-19)     Hypercholesteremia     Hypertension        Past Surgical History:   Procedure Laterality Date    bladder tumor removal      CARDIAC ELECTROPHYSIOLOGY MAPPING AND ABLATION      CARDIOVERSION      several    CATARACT EXTRACTION Bilateral     cataract removal with IOL implants Bilateral     CHOLECYSTECTOMY      CYSTOSCOPIC LITHOLAPAXY N/A 12/28/2021    Procedure: CYSTOLITHOLAPAXY;  Surgeon: Medardo Garcia MD;  Location: [facility];  Service: Urology;  Laterality: N/A;    CYSTOSCOPY      CYSTOSCOPY WITH BIOPSY OF BLADDER Right 12/28/2021    Procedure: CYSTOSCOPY, WITH BLADDER BIOPSY, WITH FULGURATION IF INDICATED;  Surgeon: Medardo Garcia MD;  Location: [facility];  Service: Urology;  Laterality: Right;    ESOPHAGOGASTRODUODENOSCOPY N/A 10/25/2021    Procedure: Small Jason Enteroscopy (SBE);  Surgeon: Isabelle Griffin MD;  Location: [facility];  Service: Endoscopy;  Laterality: N/A;    ESOPHAGOGASTRODUODENOSCOPY N/A 12/13/2021    Procedure: EGD (ESOPHAGOGASTRODUODENOSCOPY);  Surgeon: Troy Polanco MD;  Location: [facility];  Service: Endoscopy;  Laterality: N/A;    EYE SURGERY      INTRALUMINAL GASTROINTESTINAL TRACT IMAGING VIA CAPSULE N/A 12/6/2021    Procedure: IMAGING PROCEDURE, GI TRACT, INTRALUMINAL, VIA CAPSULE;  Surgeon: Larry Jones RN;  Location: [facility] [place], [place];  Service: Endoscopy;  Laterality: N/A;    TRANSURETHRAL RESECTION OF BLADDER TUMOR BY BIPOLAR CAUTERY N/A 7/23/2019    Procedure: TURBT, USING BIPOLAR CAUTERY;  Surgeon: Ritesh Marques MD;  Location: [facility];  Service: Urology;  Laterality: N/A;       Review of patient's allergies  indicates:  No Known Allergies  Family History       Problem Relation (Age of Onset)    Heart disease Father, Brother    Hypertension Father          Tobacco Use    Smoking status: Former     Current packs/day: 0.00     Average packs/day: 2.5 packs/day for 20.0 years (50.0 ttl pk-yrs)     Types: Cigarettes     Start date:      Quit date:      Years since quittin.2    Smokeless tobacco: Never   Substance and Sexual Activity    Alcohol use: Yes     Alcohol/week: 7.0 standard drinks of alcohol     Types: 7 Cans of beer per week    Drug use: No    Sexual activity: Not Currently     Review of Systems   Constitutional:  Negative for fever.   HENT:  Negative for hearing loss.    Eyes:  Negative for visual disturbance.   Respiratory:  Negative for cough and shortness of breath.    Cardiovascular:  Negative for chest pain and palpitations.   Gastrointestinal:  Negative for abdominal pain, blood in stool, constipation, diarrhea, nausea and vomiting.        As per HPI.   Genitourinary:  Negative for difficulty urinating, dysuria, frequency and hematuria.   Musculoskeletal:  Negative for arthralgias and back pain.   Skin:  Negative for color change and rash.   Neurological:  Negative for seizures, syncope, weakness, numbness and headaches.   Hematological:  Does not bruise/bleed easily.   Psychiatric/Behavioral:  The patient is not nervous/anxious.      Objective:     Vital Signs (Most Recent):  Temp: 97.1 °F (36.2 °C) (24 0709)  Pulse: (!) 115 (24 0709)  Resp: 16 (24 07)  BP: 127/68 (24 0709)  SpO2: (!) 94 % (24 07) Vital Signs (24h Range):  Temp:  [97.1 °F (36.2 °C)-99 °F (37.2 °C)] 97.1 °F (36.2 °C)  Pulse:  [] 115  Resp:  [16-20] 16  SpO2:  [92 %-100 %] 94 %  BP: (121-130)/(57-69) 127/68     Weight: 105.7 kg (233 lb 0.4 oz) (24 2355)  Body mass index is 31.6 kg/m².      Intake/Output Summary (Last 24 hours) at 4/3/2024 0930  Last data filed at 2024 9566  Gross  "per 24 hour   Intake 310 ml   Output --   Net 310 ml       Lines/Drains/Airways       Peripheral Intravenous Line  Duration                  Peripheral IV - Single Lumen 04/02/24 1758 20 G Anterior;Proximal;Right Forearm <1 day                     Physical Exam  Constitutional:       General: He is not in acute distress.     Appearance: Normal appearance. He is well-developed.   HENT:      Head: Normocephalic and atraumatic.   Eyes:      Extraocular Movements: Extraocular movements intact.   Cardiovascular:      Rate and Rhythm: Normal rate and regular rhythm.      Heart sounds: Normal heart sounds. No murmur heard.  Pulmonary:      Effort: Pulmonary effort is normal. No respiratory distress.      Breath sounds: Normal breath sounds. No wheezing.   Abdominal:      General: Bowel sounds are normal. There is no distension.      Palpations: Abdomen is soft. There is no mass.      Tenderness: There is no abdominal tenderness.   Musculoskeletal:      Cervical back: Normal range of motion and neck supple.      Right lower leg: Edema present.      Left lower leg: Edema present.   Skin:     General: Skin is warm and dry.      Findings: No rash.   Neurological:      Mental Status: He is alert and oriented to person, place, and time.      Cranial Nerves: No cranial nerve deficit.   Psychiatric:         Behavior: Behavior normal.          Significant Labs:  CBC:   Recent Labs   Lab 04/02/24  0430 04/02/24  0912 04/03/24  0444   WBC 25.69* 16.29* 11.48   HGB 7.5* 7.1* 8.5*   HCT 26.8* 25.5* 29.6*   * 101* 103*     CMP:   Recent Labs   Lab 04/03/24  0444   *   CALCIUM 8.5*   ALBUMIN 3.0*   PROT 5.5*      K 3.5   CO2 22*      BUN 18   CREATININE 1.1   ALKPHOS 80   ALT 31   AST 55*   BILITOT 1.0     Coagulation: No results for input(s): "PT", "INR", "APTT" in the last 48 hours.    Significant Imaging:  Imaging results within the past 24 hours have been reviewed.  "

## 2024-04-03 NOTE — PROGRESS NOTES
[facility] ([facility])  Cardiology  Progress Note    Patient Name: Jose Miguel Alvarez Jr.  MRN: [id number]  Admission Date: 4/1/2024  Hospital Length of Stay: 1 days  Code Status: Full Code   Attending Physician: Sherry Malhotra MD   Primary Care Physician: Toshia Valladares MD  Expected Discharge Date:   Principal Problem:Paroxysmal atrial fibrillation    Subjective:   HPI:  83 y.o. male patient with a PMHx of COPD, CKD, HTN, Afib s/p DCCV who presents to the Emergency Department for chest pain, onset this morning. Symptoms are intermittent and moderate in severity. No mitigating or exacerbating factors reported. Associated sxs include intermittent SOB and intermittent palpitations; pt states that his current sxs are similar to when he was last in Afib. Patient denies any fever, chills, n/v/d, weakness, numbness, dizziness, headache, and all other sxs at this time. Pt is currently on Xarelto and Carvedilol. No further complaints or concerns at this time.      He has recurrent anemia issues previously in the past.  He had EGD, Video capsule and small bowel enteroscopy in 2021 with evidence of oozing in duodenum.   Plan for PRBC transfusion this AM. ECHO pending. Remains in AFIB/RVR at time of exam today.   Transition Cardizem to PO and add IV amiodarone infusion today. Further recs to follow.     Hospital Course:   4/2/2024- Patient seen and examined in room, has converted to SR overnight. Transition to PO Amiodarone 200 mg BID and continue Diltiazem. Continues to have anemia on repeat labs post PRBC transfusion. Continue to hold Xarelto until anemia has been sorted out.     4/3/24-Patient seen and examined today, back in afib with variable rates. Amiodarone drip resumed. Feels ok. Does admit to some mild fatigue/SOB. No CP. Labs reviewed, H/H 8.5/29.6. GI on board, EGD in AM.      Review of Systems   Constitutional: Positive for malaise/fatigue.   HENT: Negative.     Eyes: Negative.     Cardiovascular:  Positive for dyspnea on exertion, irregular heartbeat and palpitations.   Respiratory:  Positive for shortness of breath.    Endocrine: Negative.    Hematologic/Lymphatic: Negative.    Skin: Negative.    Musculoskeletal:  Positive for arthritis and joint pain.   Gastrointestinal: Negative.    Genitourinary: Negative.    Neurological: Negative.    Psychiatric/Behavioral: Negative.     Allergic/Immunologic: Negative.      Objective:     Vital Signs (Most Recent):  Temp: 97.3 °F (36.3 °C) (04/03/24 1212)  Pulse: 99 (04/03/24 1212)  Resp: 20 (04/03/24 1212)  BP: 110/69 (04/03/24 1212)  SpO2: (!) 94 % (04/03/24 1212) Vital Signs (24h Range):  Temp:  [97.1 °F (36.2 °C)-99 °F (37.2 °C)] 97.3 °F (36.3 °C)  Pulse:  [] 99  Resp:  [16-20] 20  SpO2:  [92 %-98 %] 94 %  BP: (110-130)/(57-69) 110/69     Weight: 105.7 kg (233 lb 0.4 oz)  Body mass index is 31.6 kg/m².     SpO2: (!) 94 %         Intake/Output Summary (Last 24 hours) at 4/3/2024 1314  Last data filed at 4/3/2024 1200  Gross per 24 hour   Intake 310 ml   Output 3 ml   Net 307 ml       Lines/Drains/Airways       Peripheral Intravenous Line  Duration                  Peripheral IV - Single Lumen 04/02/24 1758 20 G Anterior;Proximal;Right Forearm <1 day                       Physical Exam  Vitals and nursing note reviewed.   Constitutional:       General: He is not in acute distress.     Appearance: He is well-developed. He is not diaphoretic.      Comments: On supplemental O2   HENT:      Head: Normocephalic and atraumatic.   Eyes:      General:         Right eye: No discharge.         Left eye: No discharge.      Pupils: Pupils are equal, round, and reactive to light.   Cardiovascular:      Rate and Rhythm: Normal rate. Rhythm irregularly irregular.      Heart sounds: S1 normal and S2 normal. No murmur heard.  Pulmonary:      Effort: Pulmonary effort is normal. No respiratory distress.      Breath sounds: Normal breath sounds.   Abdominal:       General: There is no distension.   Musculoskeletal:      Right lower leg: No edema.      Left lower leg: No edema.   Skin:     General: Skin is warm and dry.      Coloration: Skin is pale.      Findings: No erythema.   Neurological:      General: No focal deficit present.      Mental Status: He is alert and oriented to person, place, and time.   Psychiatric:         Mood and Affect: Mood normal.         Behavior: Behavior normal.            Significant Labs: CMP   Recent Labs   Lab 04/02/24  0430 04/03/24  0444    140   K 3.5 3.5    107   CO2 18* 22*   * 127*   BUN 22 18   CREATININE 1.4 1.1   CALCIUM 8.3* 8.5*   PROT 5.4* 5.5*   ALBUMIN 3.0* 3.0*   BILITOT 1.0 1.0   ALKPHOS 72 80   AST 30 55*   ALT 19 31   ANIONGAP 17* 11   , CBC   Recent Labs   Lab 04/02/24  0430 04/02/24  0912 04/03/24  0444   WBC 25.69* 16.29* 11.48   HGB 7.5* 7.1* 8.5*   HCT 26.8* 25.5* 29.6*   * 101* 103*   , Troponin   Recent Labs   Lab 04/01/24  1520 04/01/24  1818   TROPONINI 0.037* 0.046*   , and All pertinent lab results from the last 24 hours have been reviewed.    Significant Imaging: Echocardiogram: Transthoracic echo (TTE) complete (Cupid Only):   Results for orders placed or performed during the hospital encounter of 04/01/24   Echo Saline Bubble? No   Result Value Ref Range    Left Atrium Major Axis 5.61 cm    Left Atrium Minor Axis 6.20 cm    RA Major Axis 5.21 cm    LV Diastolic Volume 113.12 mL    LV Systolic Volume 42.24 mL    TR Max Dave 3.13 m/s    PV mean gradient 1 mmHg    RVOT peak VTI 19.1 cm    RVOT peak dave 0.75 m/s    Ao VTI 20.30 cm    Ao peak dave 1.20 m/s    LVOT peak VTI 17.20 cm    LVOT peak dave 0.96 m/s    LVOT diameter 2.19 cm    IVRT 72.31 msec    PV peak gradient 2 mmHg    AV mean gradient 3 mmHg    TAPSE 2.67 cm    RVDD 3.77 cm    LA size 4.62 cm    Ascending aorta 3.57 cm    STJ 2.87 cm    LVIDs 3.24 2.1 - 4.0 cm    Ao root annulus 3.62 cm    Posterior Wall 1.09 0.6 - 1.1 cm    IVS  1.32 (A) 0.6 - 1.1 cm    LVIDd 4.91 3.5 - 6.0 cm    PV PEAK VELOCITY 0.79 m/s    Left Ventricular Outflow Tract Mean Gradient 1.78 mmHg    Left Ventricular Outflow Tract Mean Velocity 0.64 cm/s    IVC diameter 1.79 cm    FS 34 28 - 44 %    LV mass 228.45 g    Left Ventricle Relative Wall Thickness 0.44 cm    AV valve area 3.19 cm²    AV Velocity Ratio 0.80     AV index (prosthetic) 0.85     LVOT area 3.8 cm2    LVOT stroke volume 64.76 cm3    AV peak gradient 6 mmHg    Triscuspid Valve Regurgitation Peak Gradient 39 mmHg    FABIANA by Velocity Ratio 3.01 cm²    BSA 2.36 m2    LV Systolic Volume Index 18.3 mL/m2    LV Diastolic Volume Index 48.97 mL/m2    LV Mass Index 99 g/m2    ZLVIDS -4.15     ZLVIDD -6.15     LA Volume Index 46.1 mL/m2    LA volume 106.40 cm3    LA WIDTH 4.6 cm    RA Width 4.3 cm    TV resting pulmonary artery pressure 47 mmHg    RV TB RVSP 11 mmHg    Est. RA pres 8 mmHg    Narrative      Left Ventricle: The left ventricle is normal in size. Mildly increased   wall thickness. There is concentric remodeling. There is normal systolic   function with a visually estimated ejection fraction of 60 - 65%. Unable   to assess diastolic function due to atrial fibrillation.    Right Ventricle: Normal right ventricular cavity size. Wall thickness   is normal. Right ventricle wall motion  is normal. Systolic function is   normal.    Left Atrium: Left atrium is moderately dilated.    Tricuspid Valve: There is mild regurgitation.    IVC/SVC: Intermediate venous pressure at 8 mmHg.      and EKG: Reviewed  Assessment and Plan:   Patient who presents with anemia and recurrent afib. Reload with amio today. AC on hold pending GI workup. H/H improved post transfusion.     * Paroxysmal atrial fibrillation  Remains in AFIB/RVR on exam today  Add Amiodarone infusion  Change Diltiazem IV gtt to Cardizem 240 mg daily  Continue Cardiac Monitoring  Hold Xarelto given anemia for now.   Last dose of Xarelto on  3/31/2024    4/2/2024  -Converted to SR overnight  -Transition to PO Amiodarone 200 mg BID  -Continue CCB  -Hold Xarelto until cleared per GI to resume given continued issues with anemia    4/3/24  -Back in afib with variable rates this AM  -Re-load with amio per MD  -Continue CCB as BP permits  -Xarelto on hold for now, GI on board, EGD tmw    Type 2 diabetes mellitus  Mgmt per primary team    Elevated troponin  Troponin 0.015>>0.050  Elevation likely due to AFIB/RVR and symptomatic anemia  Chest pain free on exam  ECHO pending    4/3/24  -TTE with normal EF    Iron deficiency anemia secondary to blood loss (chronic)  Plan for PRBC transfusion today.  Previously had EGD, Video capsule in 2021 without any definitive source of bleeding located.  Further labs to assess anemia pending.     4/3/24  -Improved post transfusion  -GI on board, EGD planned    Hypertension  Continue CCB   Monitor BP trend        VTE Risk Mitigation (From admission, onward)           Ordered     IP VTE HIGH RISK PATIENT  Once         04/01/24 1241     Place sequential compression device  Until discontinued         04/01/24 1241     Reason for No Pharmacological VTE Prophylaxis  Once        Question:  Reasons:  Answer:  Already adequately anticoagulated on oral Anticoagulants    04/01/24 1241                    Alayna Douglas PA-C  Cardiology  [facility] - Tele[facility] ([facility])

## 2024-04-03 NOTE — ASSESSMENT & PLAN NOTE
Plan for PRBC transfusion today.  Previously had EGD, Video capsule in 2021 without any definitive source of bleeding located.  Further labs to assess anemia pending.     4/3/24  -Improved post transfusion  -GI on board, EGD planned

## 2024-04-03 NOTE — ASSESSMENT & PLAN NOTE
-UA ordered, chest x-ray reviewed   -we will start empiric Rocephin  -repeat CBC in a.m.  -resolved  -urine cultures pending

## 2024-04-03 NOTE — H&P (VIEW-ONLY)
[facility] ([facility])  Gastroenterology  Consult Note    Patient Name: Jose Miguel Alvarez Jr.  MRN: [id number]  Admission Date: 4/1/2024  Hospital Length of Stay: 1 days  Code Status: Full Code   Attending Provider: Sherry Malhotra MD   Consulting Provider: Josh De Paz PA-C  Primary Care Physician: Toshia Valladares MD  Principal Problem:Paroxysmal atrial fibrillation    Inpatient consult to Gastroenterology  Consult performed by: Josh De Paz PA-C  Consult ordered by: Sherry Malhotra MD  Reason for consult: Iron def anemia        Subjective:     HPI:  The patient presented to the ER for SOB and chest pain which began hours prior. He said it felt like prior Afib episodes. He was noted to be in Afib and started on Cardizem drip. Cardiology consulted and patient converted to SR so meds were being switched to po. We have been consulted for anemia. He has a long history of iron deficiency anemia with full GI workup in 2017 (EGD/Colon) and in 2021 (EGD x2, colon, VCE). 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 This admit, he had a Hgb of 7.1. He received two units of PRBC with Hgb today of 8.5. , BUN 18 and creatinine 1.1. He has low total iron, sat iron and ferritin. He received an iron infusion yesterday. He has stopped his oral iron due to constipation. He takes Xarelto for his Afib. Last dose was 03/31/24. He denies hematemesis, hematochezia or melena. He denies NSAID use. He has had intermittent gross hematuria. He was started on antibiotics due to UTI on UA.     Past Medical History:   Diagnosis Date    Anemia     Anticoagulant long-term use     Atrial fibrillation     Bladder cancer     Bladder tumor     CKD (chronic kidney disease), stage III     COPD (chronic obstructive pulmonary disease)     pt denies    Encounter for blood transfusion     Hematuria     History of 2019 novel coronavirus disease  (COVID-19)     Hypercholesteremia     Hypertension        Past Surgical History:   Procedure Laterality Date    bladder tumor removal      CARDIAC ELECTROPHYSIOLOGY MAPPING AND ABLATION      CARDIOVERSION      several    CATARACT EXTRACTION Bilateral     cataract removal with IOL implants Bilateral     CHOLECYSTECTOMY      CYSTOSCOPIC LITHOLAPAXY N/A 12/28/2021    Procedure: CYSTOLITHOLAPAXY;  Surgeon: Medardo Garcia MD;  Location: [facility];  Service: Urology;  Laterality: N/A;    CYSTOSCOPY      CYSTOSCOPY WITH BIOPSY OF BLADDER Right 12/28/2021    Procedure: CYSTOSCOPY, WITH BLADDER BIOPSY, WITH FULGURATION IF INDICATED;  Surgeon: Medardo Garcia MD;  Location: [facility] OR;  Service: Urology;  Laterality: Right;    ESOPHAGOGASTRODUODENOSCOPY N/A 10/25/2021    Procedure: Small Jason Enteroscopy (SBE);  Surgeon: Isabelle Griffin MD;  Location: [facility];  Service: Endoscopy;  Laterality: N/A;    ESOPHAGOGASTRODUODENOSCOPY N/A 12/13/2021    Procedure: EGD (ESOPHAGOGASTRODUODENOSCOPY);  Surgeon: Troy Polanco MD;  Location: [facility];  Service: Endoscopy;  Laterality: N/A;    EYE SURGERY      INTRALUMINAL GASTROINTESTINAL TRACT IMAGING VIA CAPSULE N/A 12/6/2021    Procedure: IMAGING PROCEDURE, GI TRACT, INTRALUMINAL, VIA CAPSULE;  Surgeon: Larry Jones RN;  Location: [facility];  Service: Endoscopy;  Laterality: N/A;    TRANSURETHRAL RESECTION OF BLADDER TUMOR BY BIPOLAR CAUTERY N/A 7/23/2019    Procedure: TURBT, USING BIPOLAR CAUTERY;  Surgeon: Ritesh Marques MD;  Location: [facility];  Service: Urology;  Laterality: N/A;       Review of patient's allergies indicates:  No Known Allergies  Family History       Problem Relation (Age of Onset)    Heart disease Father, Brother    Hypertension Father          Tobacco Use    Smoking status: Former     Current packs/day: 0.00     Average packs/day: 2.5 packs/day for 20.0 years (50.0 ttl pk-yrs)     Types: Cigarettes     Start date: 1967     Quit date: 1987      Years since quittin.2    Smokeless tobacco: Never   Substance and Sexual Activity    Alcohol use: Yes     Alcohol/week: 7.0 standard drinks of alcohol     Types: 7 Cans of beer per week    Drug use: No    Sexual activity: Not Currently     Review of Systems   Constitutional:  Negative for fever.   HENT:  Negative for hearing loss.    Eyes:  Negative for visual disturbance.   Respiratory:  Negative for cough and shortness of breath.    Cardiovascular:  Negative for chest pain and palpitations.   Gastrointestinal:  Negative for abdominal pain, blood in stool, constipation, diarrhea, nausea and vomiting.        As per HPI.   Genitourinary:  Negative for difficulty urinating, dysuria, frequency and hematuria.   Musculoskeletal:  Negative for arthralgias and back pain.   Skin:  Negative for color change and rash.   Neurological:  Negative for seizures, syncope, weakness, numbness and headaches.   Hematological:  Does not bruise/bleed easily.   Psychiatric/Behavioral:  The patient is not nervous/anxious.      Objective:     Vital Signs (Most Recent):  Temp: 97.1 °F (36.2 °C) (24 07)  Pulse: (!) 115 (24 07)  Resp: 16 (24)  BP: 127/68 (24)  SpO2: (!) 94 % (24) Vital Signs (24h Range):  Temp:  [97.1 °F (36.2 °C)-99 °F (37.2 °C)] 97.1 °F (36.2 °C)  Pulse:  [] 115  Resp:  [16-20] 16  SpO2:  [92 %-100 %] 94 %  BP: (121-130)/(57-69) 127/68     Weight: 105.7 kg (233 lb 0.4 oz) (24 2355)  Body mass index is 31.6 kg/m².      Intake/Output Summary (Last 24 hours) at 4/3/2024 0930  Last data filed at 2024 2135  Gross per 24 hour   Intake 310 ml   Output --   Net 310 ml       Lines/Drains/Airways       Peripheral Intravenous Line  Duration                  Peripheral IV - Single Lumen 24 1758 20 G Anterior;Proximal;Right Forearm <1 day                     Physical Exam  Constitutional:       General: He is not in acute distress.     Appearance: Normal  "appearance. He is well-developed.   HENT:      Head: Normocephalic and atraumatic.   Eyes:      Extraocular Movements: Extraocular movements intact.   Cardiovascular:      Rate and Rhythm: Normal rate and regular rhythm.      Heart sounds: Normal heart sounds. No murmur heard.  Pulmonary:      Effort: Pulmonary effort is normal. No respiratory distress.      Breath sounds: Normal breath sounds. No wheezing.   Abdominal:      General: Bowel sounds are normal. There is no distension.      Palpations: Abdomen is soft. There is no mass.      Tenderness: There is no abdominal tenderness.   Musculoskeletal:      Cervical back: Normal range of motion and neck supple.      Right lower leg: Edema present.      Left lower leg: Edema present.   Skin:     General: Skin is warm and dry.      Findings: No rash.   Neurological:      Mental Status: He is alert and oriented to person, place, and time.      Cranial Nerves: No cranial nerve deficit.   Psychiatric:         Behavior: Behavior normal.          Significant Labs:  CBC:   Recent Labs   Lab 04/02/24  0430 04/02/24  0912 04/03/24  0444   WBC 25.69* 16.29* 11.48   HGB 7.5* 7.1* 8.5*   HCT 26.8* 25.5* 29.6*   * 101* 103*     CMP:   Recent Labs   Lab 04/03/24  0444   *   CALCIUM 8.5*   ALBUMIN 3.0*   PROT 5.5*      K 3.5   CO2 22*      BUN 18   CREATININE 1.1   ALKPHOS 80   ALT 31   AST 55*   BILITOT 1.0     Coagulation: No results for input(s): "PT", "INR", "APTT" in the last 48 hours.    Significant Imaging:  Imaging results within the past 24 hours have been reviewed.  Assessment/Plan:     Cardiac/Vascular  * Paroxysmal atrial fibrillation  Improved. Cardiology following and managing.     Oncology  Leukocytosis  On antibiotic for UTI.     Iron deficiency anemia secondary to blood loss (chronic)  Patient with acute on chronic anemia in the setting of intermittent gross hematuria and established history of AVMs.   Offered inpatient EGD to clear the UGI " tract prior to restarting Xarelto.   Keep NPO after midnight.   Monitor H/H and transfuse additional units as needed.   He would likely benefit from outpatient referral to benign hematology clinic for additional iron infusion due to poor tolerance of oral iron.       Thank you for your consult. I will follow-up with patient. Please contact us if you have any additional questions.    Josh De Paz PA-C  Gastroenterology  [facility] ([facility])

## 2024-04-03 NOTE — ASSESSMENT & PLAN NOTE
Remains in AFIB/RVR on exam today  Add Amiodarone infusion  Change Diltiazem IV gtt to Cardizem 240 mg daily  Continue Cardiac Monitoring  Hold Xarelto given anemia for now.   Last dose of Xarelto on 3/31/2024    4/2/2024  -Converted to SR overnight  -Transition to PO Amiodarone 200 mg BID  -Continue CCB  -Hold Xarelto until cleared per GI to resume given continued issues with anemia    4/3/24  -Back in afib with variable rates this AM  -Re-load with amio per MD  -Continue CCB as BP permits  -Xarelto on hold for now, GI on board, EGD tmw

## 2024-04-04 ENCOUNTER — TELEPHONE (OUTPATIENT)
Dept: UROLOGY | Facility: CLINIC | Age: 84
End: 2024-04-04
Payer: MEDICARE

## 2024-04-04 LAB
ALBUMIN SERPL BCP-MCNC: 2.9 G/DL (ref 3.5–5.2)
ALP SERPL-CCNC: 86 U/L (ref 55–135)
ALT SERPL W/O P-5'-P-CCNC: 26 U/L (ref 10–44)
ANION GAP SERPL CALC-SCNC: 11 MMOL/L (ref 8–16)
AST SERPL-CCNC: 36 U/L (ref 10–40)
BASOPHILS # BLD AUTO: 0.04 K/UL (ref 0–0.2)
BASOPHILS NFR BLD: 0.4 % (ref 0–1.9)
BILIRUB SERPL-MCNC: 0.7 MG/DL (ref 0.1–1)
BUN SERPL-MCNC: 23 MG/DL (ref 8–23)
CALCIUM SERPL-MCNC: 8.4 MG/DL (ref 8.7–10.5)
CHLORIDE SERPL-SCNC: 103 MMOL/L (ref 95–110)
CO2 SERPL-SCNC: 23 MMOL/L (ref 23–29)
CREAT SERPL-MCNC: 1.6 MG/DL (ref 0.5–1.4)
DIFFERENTIAL METHOD BLD: ABNORMAL
EOSINOPHIL # BLD AUTO: 0.1 K/UL (ref 0–0.5)
EOSINOPHIL NFR BLD: 0.8 % (ref 0–8)
ERYTHROCYTE [DISTWIDTH] IN BLOOD BY AUTOMATED COUNT: 19.9 % (ref 11.5–14.5)
EST. GFR  (NO RACE VARIABLE): 42 ML/MIN/1.73 M^2
GLUCOSE SERPL-MCNC: 177 MG/DL (ref 70–110)
HCT VFR BLD AUTO: 31.8 % (ref 40–54)
HGB BLD-MCNC: 9.1 G/DL (ref 14–18)
IMM GRANULOCYTES # BLD AUTO: 0.1 K/UL (ref 0–0.04)
IMM GRANULOCYTES NFR BLD AUTO: 1 % (ref 0–0.5)
LYMPHOCYTES # BLD AUTO: 0.5 K/UL (ref 1–4.8)
LYMPHOCYTES NFR BLD: 4.5 % (ref 18–48)
MAGNESIUM SERPL-MCNC: 2.1 MG/DL (ref 1.6–2.6)
MCH RBC QN AUTO: 21.3 PG (ref 27–31)
MCHC RBC AUTO-ENTMCNC: 28.6 G/DL (ref 32–36)
MCV RBC AUTO: 75 FL (ref 82–98)
MONOCYTES # BLD AUTO: 1.3 K/UL (ref 0.3–1)
MONOCYTES NFR BLD: 13.4 % (ref 4–15)
NEUTROPHILS # BLD AUTO: 8 K/UL (ref 1.8–7.7)
NEUTROPHILS NFR BLD: 79.9 % (ref 38–73)
NRBC BLD-RTO: 0 /100 WBC
PHOSPHATE SERPL-MCNC: 2.3 MG/DL (ref 2.7–4.5)
PLATELET # BLD AUTO: 109 K/UL (ref 150–450)
PMV BLD AUTO: ABNORMAL FL (ref 9.2–12.9)
POCT GLUCOSE: 159 MG/DL (ref 70–110)
POCT GLUCOSE: 171 MG/DL (ref 70–110)
POCT GLUCOSE: 196 MG/DL (ref 70–110)
POTASSIUM SERPL-SCNC: 3.3 MMOL/L (ref 3.5–5.1)
PROT SERPL-MCNC: 5.7 G/DL (ref 6–8.4)
RBC # BLD AUTO: 4.27 M/UL (ref 4.6–6.2)
SODIUM SERPL-SCNC: 137 MMOL/L (ref 136–145)
WBC # BLD AUTO: 9.96 K/UL (ref 3.9–12.7)

## 2024-04-04 PROCEDURE — 63600175 PHARM REV CODE 636 W HCPCS: Performed by: PHYSICIAN ASSISTANT

## 2024-04-04 PROCEDURE — 99232 SBSQ HOSP IP/OBS MODERATE 35: CPT | Mod: ,,, | Performed by: PHYSICIAN ASSISTANT

## 2024-04-04 PROCEDURE — 21400001 HC TELEMETRY ROOM

## 2024-04-04 PROCEDURE — 80053 COMPREHEN METABOLIC PANEL: CPT | Performed by: NURSE PRACTITIONER

## 2024-04-04 PROCEDURE — 63600175 PHARM REV CODE 636 W HCPCS: Performed by: FAMILY MEDICINE

## 2024-04-04 PROCEDURE — 25000003 PHARM REV CODE 250: Performed by: INTERNAL MEDICINE

## 2024-04-04 PROCEDURE — 36415 COLL VENOUS BLD VENIPUNCTURE: CPT | Performed by: NURSE PRACTITIONER

## 2024-04-04 PROCEDURE — 25000003 PHARM REV CODE 250: Performed by: NURSE PRACTITIONER

## 2024-04-04 PROCEDURE — 25000003 PHARM REV CODE 250: Performed by: STUDENT IN AN ORGANIZED HEALTH CARE EDUCATION/TRAINING PROGRAM

## 2024-04-04 PROCEDURE — 99233 SBSQ HOSP IP/OBS HIGH 50: CPT | Mod: ,,, | Performed by: INTERNAL MEDICINE

## 2024-04-04 PROCEDURE — 25000003 PHARM REV CODE 250: Performed by: UROLOGY

## 2024-04-04 PROCEDURE — 83735 ASSAY OF MAGNESIUM: CPT | Performed by: NURSE PRACTITIONER

## 2024-04-04 PROCEDURE — 25000003 PHARM REV CODE 250: Performed by: FAMILY MEDICINE

## 2024-04-04 PROCEDURE — 85025 COMPLETE CBC W/AUTO DIFF WBC: CPT | Performed by: NURSE PRACTITIONER

## 2024-04-04 PROCEDURE — 84100 ASSAY OF PHOSPHORUS: CPT | Performed by: NURSE PRACTITIONER

## 2024-04-04 PROCEDURE — 94761 N-INVAS EAR/PLS OXIMETRY MLT: CPT

## 2024-04-04 RX ORDER — LANOLIN ALCOHOL/MO/W.PET/CERES
1 CREAM (GRAM) TOPICAL
Status: DISCONTINUED | OUTPATIENT
Start: 2024-04-05 | End: 2024-04-07 | Stop reason: HOSPADM

## 2024-04-04 RX ORDER — TAMSULOSIN HYDROCHLORIDE 0.4 MG/1
0.4 CAPSULE ORAL DAILY
Status: DISCONTINUED | OUTPATIENT
Start: 2024-04-04 | End: 2024-04-07 | Stop reason: HOSPADM

## 2024-04-04 RX ORDER — METOPROLOL TARTRATE 50 MG/1
50 TABLET ORAL 3 TIMES DAILY
Status: DISCONTINUED | OUTPATIENT
Start: 2024-04-04 | End: 2024-04-07 | Stop reason: HOSPADM

## 2024-04-04 RX ORDER — DONEPEZIL HYDROCHLORIDE 5 MG/1
5 TABLET, FILM COATED ORAL NIGHTLY
Status: DISCONTINUED | OUTPATIENT
Start: 2024-04-04 | End: 2024-04-07 | Stop reason: HOSPADM

## 2024-04-04 RX ORDER — DILTIAZEM HCL/D5W 125 MG/125
10 PLASTIC BAG, INJECTION (ML) INTRAVENOUS CONTINUOUS
Status: DISCONTINUED | OUTPATIENT
Start: 2024-04-04 | End: 2024-04-05

## 2024-04-04 RX ORDER — AMIODARONE HYDROCHLORIDE 200 MG/1
200 TABLET ORAL 2 TIMES DAILY
Status: DISCONTINUED | OUTPATIENT
Start: 2024-04-04 | End: 2024-04-07 | Stop reason: HOSPADM

## 2024-04-04 RX ADMIN — Medication 10 MG/HR: at 09:04

## 2024-04-04 RX ADMIN — METOPROLOL TARTRATE 50 MG: 50 TABLET, FILM COATED ORAL at 09:04

## 2024-04-04 RX ADMIN — TAMSULOSIN HYDROCHLORIDE 0.4 MG: 0.4 CAPSULE ORAL at 09:04

## 2024-04-04 RX ADMIN — DILTIAZEM HYDROCHLORIDE 240 MG: 240 CAPSULE, COATED, EXTENDED RELEASE ORAL at 09:04

## 2024-04-04 RX ADMIN — Medication 10 MG/HR: at 10:04

## 2024-04-04 RX ADMIN — METOPROLOL TARTRATE 50 MG: 50 TABLET, FILM COATED ORAL at 02:04

## 2024-04-04 RX ADMIN — CEFTRIAXONE 1 G: 1 INJECTION, POWDER, FOR SOLUTION INTRAMUSCULAR; INTRAVENOUS at 05:04

## 2024-04-04 RX ADMIN — AMIODARONE HYDROCHLORIDE 200 MG: 200 TABLET ORAL at 10:04

## 2024-04-04 RX ADMIN — AMIODARONE HYDROCHLORIDE 200 MG: 200 TABLET ORAL at 09:04

## 2024-04-04 RX ADMIN — DONEPEZIL HYDROCHLORIDE 5 MG: 5 TABLET, FILM COATED ORAL at 09:04

## 2024-04-04 RX ADMIN — AMIODARONE HYDROCHLORIDE 0.5 MG/MIN: 1.8 INJECTION, SOLUTION INTRAVENOUS at 06:04

## 2024-04-04 NOTE — SUBJECTIVE & OBJECTIVE
Review of Systems   Constitutional: Positive for malaise/fatigue.   HENT: Negative.     Eyes: Negative.    Cardiovascular: Negative.    Respiratory: Negative.     Hematologic/Lymphatic: Bruises/bleeds easily.   Skin: Negative.    Musculoskeletal:  Positive for arthritis.   Gastrointestinal: Negative.    Genitourinary: Negative.    Neurological: Negative.    Psychiatric/Behavioral: Negative.     Allergic/Immunologic: Negative.      Objective:     Vital Signs (Most Recent):  Temp: 98.7 °F (37.1 °C) (04/04/24 1126)  Pulse: 100 (04/04/24 1126)  Resp: 16 (04/04/24 1126)  BP: 120/68 (04/04/24 1126)  SpO2: 97 % (04/04/24 1126) Vital Signs (24h Range):  Temp:  [97.3 °F (36.3 °C)-98.7 °F (37.1 °C)] 98.7 °F (37.1 °C)  Pulse:  [] 100  Resp:  [16-20] 16  SpO2:  [90 %-97 %] 97 %  BP: (114-139)/(60-93) 120/68     Weight: 105 kg (231 lb 7.7 oz)  Body mass index is 31.39 kg/m².     SpO2: 97 %         Intake/Output Summary (Last 24 hours) at 4/4/2024 1302  Last data filed at 4/4/2024 1100  Gross per 24 hour   Intake --   Output 2 ml   Net -2 ml       Lines/Drains/Airways       Peripheral Intravenous Line  Duration                  Peripheral IV - Single Lumen 04/02/24 1758 20 G Anterior;Proximal;Right Forearm 1 day         Peripheral IV - Single Lumen 04/03/24 1420 20 G Anterior;Right Forearm <1 day                       Physical Exam  Vitals and nursing note reviewed.   Constitutional:       General: He is not in acute distress.     Appearance: Normal appearance. He is well-developed. He is not diaphoretic.   HENT:      Head: Normocephalic and atraumatic.   Eyes:      General:         Right eye: No discharge.         Left eye: No discharge.      Pupils: Pupils are equal, round, and reactive to light.   Neck:      Thyroid: No thyromegaly.      Vascular: No JVD.      Trachea: No tracheal deviation.   Cardiovascular:      Rate and Rhythm: Normal rate. Rhythm irregularly irregular.      Heart sounds: Normal heart sounds, S1  "normal and S2 normal. No murmur heard.  Pulmonary:      Effort: Pulmonary effort is normal. No respiratory distress.      Breath sounds: Rhonchi present.   Abdominal:      General: There is no distension.   Musculoskeletal:      Cervical back: Neck supple.      Right lower leg: No edema.      Left lower leg: No edema.   Skin:     General: Skin is warm and dry.      Findings: No erythema.   Neurological:      Mental Status: He is alert and oriented to person, place, and time.   Psychiatric:         Mood and Affect: Mood normal.         Behavior: Behavior normal.            Significant Labs: CMP   Recent Labs   Lab 04/03/24  0444 04/04/24  0544    137   K 3.5 3.3*    103   CO2 22* 23   * 177*   BUN 18 23   CREATININE 1.1 1.6*   CALCIUM 8.5* 8.4*   PROT 5.5* 5.7*   ALBUMIN 3.0* 2.9*   BILITOT 1.0 0.7   ALKPHOS 80 86   AST 55* 36   ALT 31 26   ANIONGAP 11 11   , CBC   Recent Labs   Lab 04/03/24 0444 04/04/24  0544   WBC 11.48 9.96   HGB 8.5* 9.1*   HCT 29.6* 31.8*   * 109*   , Troponin No results for input(s): "TROPONINI" in the last 48 hours., and All pertinent lab results from the last 24 hours have been reviewed.    Significant Imaging: Echocardiogram: Transthoracic echo (TTE) complete (Cupid Only):   Results for orders placed or performed during the hospital encounter of 04/01/24   Echo Saline Bubble? No   Result Value Ref Range    Left Atrium Major Axis 5.61 cm    Left Atrium Minor Axis 6.20 cm    RA Major Axis 5.21 cm    LV Diastolic Volume 113.12 mL    LV Systolic Volume 42.24 mL    TR Max Dave 3.13 m/s    PV mean gradient 1 mmHg    RVOT peak VTI 19.1 cm    RVOT peak dave 0.75 m/s    Ao VTI 20.30 cm    Ao peak dave 1.20 m/s    LVOT peak VTI 17.20 cm    LVOT peak dave 0.96 m/s    LVOT diameter 2.19 cm    IVRT 72.31 msec    PV peak gradient 2 mmHg    AV mean gradient 3 mmHg    TAPSE 2.67 cm    RVDD 3.77 cm    LA size 4.62 cm    Ascending aorta 3.57 cm    STJ 2.87 cm    LVIDs 3.24 2.1 - 4.0 cm "    Ao root annulus 3.62 cm    Posterior Wall 1.09 0.6 - 1.1 cm    IVS 1.32 (A) 0.6 - 1.1 cm    LVIDd 4.91 3.5 - 6.0 cm    PV PEAK VELOCITY 0.79 m/s    Left Ventricular Outflow Tract Mean Gradient 1.78 mmHg    Left Ventricular Outflow Tract Mean Velocity 0.64 cm/s    IVC diameter 1.79 cm    FS 34 28 - 44 %    LV mass 228.45 g    Left Ventricle Relative Wall Thickness 0.44 cm    AV valve area 3.19 cm²    AV Velocity Ratio 0.80     AV index (prosthetic) 0.85     LVOT area 3.8 cm2    LVOT stroke volume 64.76 cm3    AV peak gradient 6 mmHg    Triscuspid Valve Regurgitation Peak Gradient 39 mmHg    FABIANA by Velocity Ratio 3.01 cm²    BSA 2.36 m2    LV Systolic Volume Index 18.3 mL/m2    LV Diastolic Volume Index 48.97 mL/m2    LV Mass Index 99 g/m2    ZLVIDS -4.15     ZLVIDD -6.15     LA Volume Index 46.1 mL/m2    LA volume 106.40 cm3    LA WIDTH 4.6 cm    RA Width 4.3 cm    TV resting pulmonary artery pressure 47 mmHg    RV TB RVSP 11 mmHg    Est. RA pres 8 mmHg    Narrative      Left Ventricle: The left ventricle is normal in size. Mildly increased   wall thickness. There is concentric remodeling. There is normal systolic   function with a visually estimated ejection fraction of 60 - 65%. Unable   to assess diastolic function due to atrial fibrillation.    Right Ventricle: Normal right ventricular cavity size. Wall thickness   is normal. Right ventricle wall motion  is normal. Systolic function is   normal.    Left Atrium: Left atrium is moderately dilated.    Tricuspid Valve: There is mild regurgitation.    IVC/SVC: Intermediate venous pressure at 8 mmHg.      and EKG: Reviewed

## 2024-04-04 NOTE — ASSESSMENT & PLAN NOTE
Troponin 0.015>>0.050  Elevation likely due to AFIB/RVR and symptomatic anemia  Chest pain free on exam  ECHO pending    4/3/24  -TTE with normal EF

## 2024-04-04 NOTE — ASSESSMENT & PLAN NOTE
Plan for PRBC transfusion today.  Previously had EGD, Video capsule in 2021 without any definitive source of bleeding located.  Further labs to assess anemia pending.     4/3/24  -Improved post transfusion  -GI on board, EGD planned    4/4/24  -Stable  -GI on board, EGD deferred until tomorrow given elevated HR

## 2024-04-04 NOTE — CARE UPDATE
CT scan personally reviewed, noting distended bladder with mild bilateral hydro, felt to be related to reflux. PVR completed by nursing only 38mL. Will leave pt without a mccrary. Urine culture positive, so would continue   Rocephin and change to culture specific antibiotic when available. Will schedule outpatient follow up for cysto once UTI is treated. Will sign off. Please call with any questions.

## 2024-04-04 NOTE — ASSESSMENT & PLAN NOTE
Patient with acute on chronic anemia in the setting of intermittent gross hematuria and established history of AVMs.   Offered inpatient EGD to clear the UGI tract prior to restarting Xarelto. However, he continues to have issues with rate control. Will push EGD back to tomorrow. HM and Cards team aware.   Ok to have diet today and NPO after midnight.   Monitor H/H and transfuse as needed.   He would likely benefit from outpatient referral to benign hematology clinic for additional iron infusion due to poor tolerance of oral iron.

## 2024-04-04 NOTE — ASSESSMENT & PLAN NOTE
Remains in AFIB/RVR on exam today  Add Amiodarone infusion  Change Diltiazem IV gtt to Cardizem 240 mg daily  Continue Cardiac Monitoring  Hold Xarelto given anemia for now.   Last dose of Xarelto on 3/31/2024    4/2/2024  -Converted to SR overnight  -Transition to PO Amiodarone 200 mg BID  -Continue CCB  -Hold Xarelto until cleared per GI to resume given continued issues with anemia    4/3/24  -Back in afib with variable rates this AM  -Re-load with amio per MD  -Continue CCB as BP permits  -Xarelto on hold for now, GI on board, EGD tmw    4/4/24  -Still in afib, HR variable  -BB increased  -Amio switched to po  -Cardizem drip initiated  -Xarelto on hold pending GI workup  -Referral for [facility]go as OP

## 2024-04-04 NOTE — TELEPHONE ENCOUNTER
----- Message from Haley Eid MD sent at 4/3/2024  7:19 PM CDT -----  Please schedule pt for a cysto with Dr. Garcia within a couple of weeks.

## 2024-04-04 NOTE — CONSULTS
Reason for consultation: history of bladder cancer, hematuria, anemia    Provider requesting consultation: Sherry Malhotra MD     History of Present Illness:   Jose Miguel Alvarez Jr. is a 83 y.o. male, who presented with dyspnea and chest pain. He was found to be in A-fib with RVR and with symptomatic anemia. He has been on anticoagulation for his a-fib for quite some time now. Upon workup, he was noted to have >100 RBCs in the urine. Pt reports that he sees slight gross hematuria every few weeks. This has been ongoing for a couple of months now. He does have a h/o High Grade pTa urothelial cell carcinoma, but suspicious for pT1 in 12/2021. He has received BCG in the distant past. Last cysto was 1 year ago. He does have some urgency at baseline. No dysuria or stranguria. He does have a h/o UTIs in the past.         Review of Systems   Respiratory:  Positive for shortness of breath.    Cardiovascular:  Positive for chest pain and palpitations.   Gastrointestinal:  Positive for abdominal pain.   Genitourinary:  Positive for hematuria. Negative for dysuria.   All other systems reviewed and are negative.      Past Medical History:   Diagnosis Date    Anemia     Anticoagulant long-term use     Atrial fibrillation     Bladder cancer     Bladder tumor     CKD (chronic kidney disease), stage III     COPD (chronic obstructive pulmonary disease)     pt denies    Encounter for blood transfusion     Hematuria     History of 2019 novel coronavirus disease (COVID-19)     Hypercholesteremia     Hypertension        Past Surgical History:   Procedure Laterality Date    bladder tumor removal      CARDIAC ELECTROPHYSIOLOGY MAPPING AND ABLATION      CARDIOVERSION      several    CATARACT EXTRACTION Bilateral     cataract removal with IOL implants Bilateral     CHOLECYSTECTOMY      CYSTOSCOPIC LITHOLAPAXY N/A 12/28/2021    Procedure: CYSTOLITHOLAPAXY;  Surgeon: Medardo Garcia MD;  Location: [facility];  Service: Urology;   Laterality: N/A;    CYSTOSCOPY      CYSTOSCOPY WITH BIOPSY OF BLADDER Right 2021    Procedure: CYSTOSCOPY, WITH BLADDER BIOPSY, WITH FULGURATION IF INDICATED;  Surgeon: Medardo Garcia MD;  Location: [facility] OR;  Service: Urology;  Laterality: Right;    ESOPHAGOGASTRODUODENOSCOPY N/A 10/25/2021    Procedure: Small Jason Enteroscopy (SBE);  Surgeon: Isabelle Griffin MD;  Location: [facility] ENDO;  Service: Endoscopy;  Laterality: N/A;    ESOPHAGOGASTRODUODENOSCOPY N/A 2021    Procedure: EGD (ESOPHAGOGASTRODUODENOSCOPY);  Surgeon: Troy Polanco MD;  Location: [facility] ENDO;  Service: Endoscopy;  Laterality: N/A;    EYE SURGERY      INTRALUMINAL GASTROINTESTINAL TRACT IMAGING VIA CAPSULE N/A 2021    Procedure: IMAGING PROCEDURE, GI TRACT, INTRALUMINAL, VIA CAPSULE;  Surgeon: Larry Jones RN;  Location: [facility] ENDO;  Service: Endoscopy;  Laterality: N/A;    TRANSURETHRAL RESECTION OF BLADDER TUMOR BY BIPOLAR CAUTERY N/A 2019    Procedure: TURBT, USING BIPOLAR CAUTERY;  Surgeon: Ritesh Marques MD;  Location: [facility] OR;  Service: Urology;  Laterality: N/A;       Family History   Problem Relation Age of Onset    Heart disease Father     Hypertension Father     Heart disease Brother        Social History     Tobacco Use    Smoking status: Former     Current packs/day: 0.00     Average packs/day: 2.5 packs/day for 20.0 years (50.0 ttl pk-yrs)     Types: Cigarettes     Start date:      Quit date:      Years since quittin.2    Smokeless tobacco: Never   Substance Use Topics    Alcohol use: Yes     Alcohol/week: 7.0 standard drinks of alcohol     Types: 7 Cans of beer per week    Drug use: No       Current Facility-Administered Medications   Medication Dose Route Frequency Provider Last Rate Last Admin    0.9%  NaCl infusion (for blood administration)   Intravenous Q24H PRN German Hancock MD        0.9%  NaCl infusion (for blood administration)   Intravenous Q24H PRN Sherry Malhotra MD         acetaminophen tablet 650 mg  650 mg Oral Q4H PRN Christina De Leon NP        aluminum-magnesium hydroxide-simethicone 200-200-20 mg/5 mL suspension 30 mL  30 mL Oral QID PRN Christina De Leon NP        amiodarone 360 mg/200 mL (1.8 mg/mL) infusion  1 mg/min Intravenous Continuous Alayna Douglas PA-C 33.3 mL/hr at 04/03/24 1342 1 mg/min at 04/03/24 1342    amiodarone 360 mg/200 mL (1.8 mg/mL) infusion  0.5 mg/min Intravenous Continuous Alayna Douglas PA-C 16.7 mL/hr at 04/03/24 1908 0.5 mg/min at 04/03/24 1908    bisacodyL suppository 10 mg  10 mg Rectal Daily PRN Christina De Leon NP        cefTRIAXone (Rocephin) 1 g in dextrose 5 % in water (D5W) 100 mL IVPB (MB+)  1 g Intravenous Q24H Sherry Malhotra MD   Stopped at 04/03/24 1741    dextrose 10% bolus 125 mL 125 mL  12.5 g Intravenous PRN Christina De Leon NP        dextrose 10% bolus 250 mL 250 mL  25 g Intravenous PRN Christina De Leon, NP        diltiaZEM 24 hr capsule 240 mg  240 mg Oral Daily MayVesta FNP-C   240 mg at 04/03/24 0740    glucagon (human recombinant) injection 1 mg  1 mg Intramuscular PRN Christina De Leon NP        glucose chewable tablet 16 g  16 g Oral PRN Christina De Leon NP        glucose chewable tablet 24 g  24 g Oral PRN Christina De Leon, NP        insulin aspart U-100 pen 0-5 Units  0-5 Units Subcutaneous QID (AC + HS) PRN Christina De Leon NP   2 Units at 04/03/24 1615    melatonin tablet 6 mg  6 mg Oral Nightly PRN Christina De Leon NP        metoprolol tartrate (LOPRESSOR) tablet 25 mg  25 mg Oral BID Don Grijalva MD   25 mg at 04/03/24 1732    morphine injection 2 mg  2 mg Intravenous Q4H PRN Christina De Leon, NP        naloxone 0.4 mg/mL injection 0.02 mg  0.02 mg Intravenous PRN Christina De Leon, GENARO        ondansetron injection 4 mg  4 mg Intravenous Q8H PRN Christina De Leon, NP        sodium chloride 0.9% flush 10 mL  10 mL Intravenous Q12H PRN Christina De Leon, NP          Facility-Administered Medications Ordered in Other Encounters   Medication Dose Route Frequency Provider Last Rate Last Admin    lactated ringers infusion   Intravenous Continuous Denisse Conteh MD   Stopped at 12/28/21 1241       Review of patient's allergies indicates:  No Known Allergies    Physical Exam  Vitals:    04/03/24 1908   BP: 119/80   Pulse: 104   Resp: 18   Temp: 97.3 °F (36.3 °C)       General: Well-developed, well-nourished in no acute distress  HEENT: Normocephalic, atraumatic, Extraocular movements intact  Neck: supple, trachea midline, no cervical or supraclavicular lymphadenopathy  Respirations: even and unlabored  Back: midline spine, no CVA tenderness  Abdomen: soft, mildly diffusely tender to palpation, mildly distended, no organomegaly or palpable masses, no rebound or guarding  Extremities: atraumatic, moves all equally, no clubbing, cyanosis or edema  Psych: normal affect  Skin: warm and dry, no lesions  Neuro: Alert and oriented, Cranial nerves II-XII grossly intact    Urinalysis  pH, UA   Date Value Ref Range Status   04/02/2024 6.0 5.0 - 8.0 Final     Glucose, UA   Date Value Ref Range Status   04/02/2024 Negative Negative Final     Occult Blood UA   Date Value Ref Range Status   04/02/2024 3+ (A) Negative Final     Nitrite, UA   Date Value Ref Range Status   04/02/2024 Positive (A) Negative Final     Leukocytes, UA   Date Value Ref Range Status   04/02/2024 3+ (A) Negative Final         Lab Results   Component Value Date    PSA 2.1 11/06/2020    PSA 3.6 05/03/2018    PSA 3.8 04/18/2017     Lab Results   Component Value Date    WBC 11.48 04/03/2024    HGB 8.5 (L) 04/03/2024    HCT 29.6 (L) 04/03/2024    MCV 75 (L) 04/03/2024     (L) 04/03/2024       BMP  Lab Results   Component Value Date     04/03/2024    K 3.5 04/03/2024     04/03/2024    CO2 22 (L) 04/03/2024    BUN 18 04/03/2024    CREATININE 1.1 04/03/2024    CALCIUM 8.5 (L) 04/03/2024    ANIONGAP 11  04/03/2024    EGFRNORACEVR >60 04/03/2024       Assessment:     Gross hematuria  Symptomatic anemia  Acute cystitis  Bladder cancer    Plan:    Urine currently appears infected, so would continue Rocephin and follow up urine culture.   I do not believe the patient's symptomatic anemia to be related to his mild intermittent gross hematuria alone.   Pt is due for bladder cancer follow up, so will get surveillance CT urogram and pt will need outpatient follow up with Dr. Garcia for surveillance cysto.

## 2024-04-04 NOTE — SUBJECTIVE & OBJECTIVE
Subjective:     Interval History: Patient feeling ok this morning. No SOB. Supplemental O2 came off overnight and sats in the low 90s. HR this morning between 100 and 130s. No overt bleeding or GI complaints.     Review of Systems   Constitutional:         See Interval History for daily ROS.      Objective:     Vital Signs (Most Recent):  Temp: 97.4 °F (36.3 °C) (04/04/24 0703)  Pulse: 99 (04/04/24 0703)  Resp: 16 (04/04/24 0703)  BP: 124/79 (04/04/24 0703)  SpO2: (!) 93 % (04/04/24 0703) Vital Signs (24h Range):  Temp:  [97.3 °F (36.3 °C)-98.4 °F (36.9 °C)] 97.4 °F (36.3 °C)  Pulse:  [] 99  Resp:  [16-20] 16  SpO2:  [90 %-95 %] 93 %  BP: (110-139)/(60-93) 124/79     Weight: 105 kg (231 lb 7.7 oz) (04/03/24 2350)  Body mass index is 31.39 kg/m².      Intake/Output Summary (Last 24 hours) at 4/4/2024 0904  Last data filed at 4/4/2024 0813  Gross per 24 hour   Intake --   Output 2 ml   Net -2 ml       Lines/Drains/Airways       Peripheral Intravenous Line  Duration                  Peripheral IV - Single Lumen 04/02/24 1758 20 G Anterior;Proximal;Right Forearm 1 day         Peripheral IV - Single Lumen 04/03/24 1420 20 G Anterior;Right Forearm <1 day                     Physical Exam  Constitutional:       General: He is not in acute distress.     Appearance: Normal appearance. He is well-developed.   HENT:      Head: Normocephalic and atraumatic.   Eyes:      Extraocular Movements: Extraocular movements intact.   Cardiovascular:      Rate and Rhythm: Normal rate and regular rhythm.   Pulmonary:      Effort: Pulmonary effort is normal. No respiratory distress.      Breath sounds: Normal breath sounds. No wheezing.   Abdominal:      General: Bowel sounds are normal. There is no distension.      Palpations: Abdomen is soft.      Tenderness: There is no abdominal tenderness.   Neurological:      Mental Status: He is alert and oriented to person, place, and time.      Cranial Nerves: No cranial nerve deficit.  "  Psychiatric:         Behavior: Behavior normal.          Significant Labs:  CBC:   Recent Labs   Lab 04/02/24  0912 04/03/24  0444 04/04/24  0544   WBC 16.29* 11.48 9.96   HGB 7.1* 8.5* 9.1*   HCT 25.5* 29.6* 31.8*   * 103* 109*     CMP:   Recent Labs   Lab 04/04/24  0544   *   CALCIUM 8.4*   ALBUMIN 2.9*   PROT 5.7*      K 3.3*   CO2 23      BUN 23   CREATININE 1.6*   ALKPHOS 86   ALT 26   AST 36   BILITOT 0.7     Coagulation: No results for input(s): "PT", "INR", "APTT" in the last 48 hours.      Significant Imaging:  Imaging results within the past 24 hours have been reviewed.  "

## 2024-04-04 NOTE — PROGRESS NOTES
[facility] Medicine  Progress Note     Patient Name:  Jose Miguel Alvarez Jr.  MRN:  [id number]  Patient Class: IP-Inpatient  Admission Date:  4/1/2024   Length of Stay:  2  Attending Physician: Samuel White MD  Primary Care Provider: Toshia Valladares MD    Subjective:      Subsequent Care: Follow up Paroxysmal atrial fibrillation        HPI:  83 y.o. male patient with a PMHx of COPD, CKD, HTN, Afib s/p DCCV. Presented to the Emergency Department for chest pain, onset PTA at home. Symptoms are intermittent and moderate in severity. No mitigating or exacerbating factors reported. Associated sxs include intermittent SOB and intermittent palpitations; pt states that his current sxs are similar to when he was last in Afib. Patient denies any fever, chills, n/v/d, weakness, numbness, dizziness, headache, and all other sxs at this time. Pt is currently on Xarelto and Carvedilol. In the ED, vitals: 146/75, 120, 22, 97.8F, 99% RA. Significant Labs: WBC: 3.85, H/H: 7.1/26.2, Pl: 130K. Trop: 0.015, EKG: A. Fib RVR. CXR: no acute process. Treated with IVP diltiazem x2, no improvement in rate or rhythm. Placed on diltiazem drip. Cardiology consulted. Patient is a full code. Placed in observation under the care of Hospital Medicine for management of A. Fib RVR, Elevated Troponin, Pancytopenia.      Overview/Hospital Course:  Patient admitted with paroxysmal AFib and anemia.  He was started on a Cardizem drip with no improvement therefore amiodarone drip was added.  Patient was also transfused 1 unit of blood for symptomatic anemia.  Cardiology was consulted and recommended transition from IV to p.o. Cardizem and amiodarone.  Plan for monitoring overnight with further recommendations from Cardiology tomorrow.  Due to patient's anemia he was transfused an additional unit of blood on for 04/02/2024 for which she had an appropriate response with a hemoglobin that improved from 7.1-8.5.   Given his history in 2021 of a duodenal bleed, GI was consulted and possibly scoping on 04/04/2024 if patient's heart rate is controlled.  At this time patient is still in AFib while on amiodarone and Cardizem p.o..  Cardiology managing patient's AFib.     4/3/2024  transitioned to p.o. Cardizem and amiodarone on 04/02/2024.  Due to symptomatic anemia cardiology recommended GI consult as he had a previous duodenal bleed which may be the cause for an inappropriate response to wanted a unit of blood that was given by the ER.  He was given an additional unit last night that had an appropriate response from 7.1-8.5.  Case discussed with GI who plans to scope patient tomorrow if heart rate is controlled.  Patient has been made NPO at this time    04/04/2024  EGD pushed to tomorrow due to HR sustained above 130 bpm.      Interval Hx  NAEON. Pleasant. Family and Cardiology at bedside at the time of my encounter    Objective  /79   Pulse 99   Temp 97.4 °F (36.3 °C)   Resp 16   Ht 6' (1.829 m)   Wt 105 kg (231 lb 7.7 oz)   SpO2 (!) 93%   BMI 31.39 kg/m²     Intake/Output Summary (Last 24 hours) at 4/4/2024 0902  Last data filed at 4/4/2024 0813  Gross per 24 hour   Intake --   Output 2 ml   Net -2 ml       PHYSICAL EXAM  Vitals reviewed  Constitutional:       Appearance: Normal appearance.   HENT:      Head: Normocephalic and atraumatic.      Nose: Nose normal.      Mouth/Throat:      Mouth: Mucous membranes are moist.   Eyes:      Extraocular Movements: Extraocular movements intact.      Comments: Pale conjunctiva   Cardiovascular:      Rate and Rhythm: normal rate but irregular rhythm      Pulses: Normal pulses.      Heart sounds: Normal heart sounds.   Pulmonary:      Effort: Pulmonary effort is normal.      Breath sounds: Normal breath sounds.   Abdominal:      General: Abdomen is flat. Bowel sounds are normal.      Palpations: Abdomen is soft.   Musculoskeletal:         General: Normal range of motion.       "Cervical back: Normal range of motion and neck supple.   Skin:     General: Skin is warm.      Capillary Refill: Capillary refill takes less than 2 seconds.      Coloration: Skin is pale.   Neurological:      Mental Status: He is alert and oriented to person, place, and time. Mental status is at baseline.   Psychiatric:         Mood and Affect: Mood normal.         Behavior: Behavior normal.     LABS  All labs from the past 24 hours were reviewed.     BMP:   Recent Labs   Lab 04/04/24  0544   *      K 3.3*      CO2 23   BUN 23   CREATININE 1.6*   CALCIUM 8.4*   MG 2.1     CBC:   Recent Labs   Lab 04/02/24  0912 04/03/24  0444 04/04/24  0544   WBC 16.29* 11.48 9.96   HGB 7.1* 8.5* 9.1*   HCT 25.5* 29.6* 31.8*   * 103* 109*     CMP:   Recent Labs   Lab 04/03/24  0444 04/04/24  0544    137   K 3.5 3.3*    103   CO2 22* 23   * 177*   BUN 18 23   CREATININE 1.1 1.6*   CALCIUM 8.5* 8.4*   PROT 5.5* 5.7*   ALBUMIN 3.0* 2.9*   BILITOT 1.0 0.7   ALKPHOS 80 86   AST 55* 36   ALT 31 26   ANIONGAP 11 11     Cardiac Markers: No results for input(s): "CKMB", "MYOGLOBIN", "BNP", "TROPISTAT" in the last 48 hours.  Coagulation: No results for input(s): "PT", "INR", "APTT" in the last 48 hours.  Lactic Acid: No results for input(s): "LACTATE" in the last 48 hours.  Magnesium:   Recent Labs   Lab 04/03/24  0444 04/04/24  0544   MG 1.9 2.1     Troponin: No results for input(s): "TROPONINI", "TROPONINIHS" in the last 48 hours.  TSH:   Recent Labs   Lab 04/01/24  1221   TSH 3.937     Urine Studies:   Recent Labs   Lab 04/02/24  1742   COLORU Yellow   APPEARANCEUA Cloudy*   PHUR 6.0   SPECGRAV 1.025   PROTEINUA 2+*   GLUCUA Negative   KETONESU Negative   BILIRUBINUA Negative   OCCULTUA 3+*   NITRITE Positive*   UROBILINOGEN Negative   LEUKOCYTESUR 3+*   RBCUA >100*   WBCUA >100*   BACTERIA Many*   HYALINECASTS 0       IMAGING  All imaging from the past 24 hours were reviewed.     Imaging " Results              X-Ray Chest AP Portable (Final result)  Result time 04/01/24 08:45:18      Final result by Hector Arriaga MD (04/01/24 08:45:18)                   Impression:      No acute process seen.      Electronically signed by: Hector Arriaga MD  Date:    04/01/2024  Time:    08:45               Narrative:    EXAMINATION:  XR CHEST AP PORTABLE    CLINICAL HISTORY:  sob;    FINDINGS:  Single view of the chest.  Comparison 12/4/21    Cardiac silhouette is normal.  The lungs demonstrate no evidence of active disease.  No evidence of pleural effusion or pneumothorax.  Bones appear intact.  Moderate degenerative changes and moderate atherosclerotic disease.                                      Assessment/Plan:      * Paroxysmal atrial fibrillation  Patient with Persistent (7 days or more) atrial fibrillation which is uncontrolled currently with Calcium Channel Blocker. Patient is currently in atrial fibrillation.DSCAN2ILEn Score: 3. Anticoagulation indicated. Anticoagulation done with Xarelto .     Treated with IVP Dilt x2, rate improvement not sustained.      --Cardiology consulted  --Diltiazem IV continuous 10mg/hr initiated per recommendation, rate did not improve, changed to IV amiodarone.  Cardiology transitioned to p.o. amiodarone on 04/02/2024  --patient continues to go into intermittent AFib with heart rates in the 130s.  This is not sustained.  --Tele  --Vitals Q4H  --anticoagulation has been held due to symptomatic anemia    04/04/2024  Intermittently controlled  Cardiology following, defer management to their team     UTI (urinary tract infection)  -UA ordered, chest x-ray reviewed   -we will start empiric Rocephin  -repeat CBC in a.m.  -resolved  -urine cultures pending    04/04/2024  Urine culture growing GNR  Prior cultures reviewed- pansensitive organism in 2022  Continue rocephin until cultures finalize     Leukocytosis  -unclear etiology as patient is white count has gone from 3.85 to 25.69  to16.29 within 12 hours  -UA ordered, chest x-ray reviewed   -we will start empiric Rocephin  -repeat CBC in a.m.  -resolved     Type 2 diabetes mellitus  Patient's FSGs are controlled on current medication regimen.  Last A1c reviewed-         Lab Results   Component Value Date     HGBA1C 6.2 (H) 01/26/2023      Most recent fingerstick glucose reviewed-          Recent Labs   Lab 04/02/24  2103 04/03/24  0636 04/03/24  1132 04/03/24  1605   POCTGLUCOSE 113* 144* 181* 234*         Current correctional scale  Low  Maintain anti-hyperglycemic dose as follows-   Antihyperglycemics (From admission, onward)        Start     Stop Route Frequency Ordered     04/01/24 1340   insulin aspart U-100 pen 0-5 Units         -- SubQ Before meals & nightly PRN 04/01/24 1241             Hold Oral hypoglycemics while patient is in the hospital.     Elevated troponin  Likely secondary to demand ischemia related to anemia, A. Fib RVR     --trend trop  --Tele  --Vitals Q4H  --Repeat labs in AM  --Transfuse with 1U PRBC on 04/01/2024, 2nd unit transfused on 04/02/2024           Pancytopenia  Chronic anemia, thrombocytopenia, intermittent neutropenia. Has been treated symptomatically, negative for hx of evaluation by Hem/Onc     This patient is found to have pancytopenia, the likely etiology is  Multifactorial , will monitor CBC Daily. Will transfuse red blood cells if the hemoglobin is <7g/dL (or <8 in the setting of ACS). Will transfuse platelets if platelet count is <50k (if undergoing surgical procedure or have active bleeding). Hold DVT prophylaxis if platelets are <50k. The patient's hemoglobin, white blood cell count, and platelet count results have been reviewed and are listed below.      Recent Labs   Lab 04/02/24  0912   HGB 7.1*   WBC 16.29*   *            --F/u with Hem/Onc as OP  --transfuse with 1U prbc now  --Venofer IV 200mg daily x3 doses        Iron deficiency anemia secondary to blood loss (chronic)  -Patient did  not make an appropriate response to 1 unit of blood his hemoglobin went from 7.1 to 7.5 back to 7.1  -patient was given 1 unit of blood on 04/01/2024, additional unit of blood ordered on 04/02/2024   -GI was consulted for evaluation.   was planning to scope on 04/04/2024 if patient's heart rate is controlled.  -of note patient has history of duodenal bleed.  -NPO after midnight    04/04/2024  EGD postponed until tomorrow d/t tachyarrhythmia  Continue to trend H&H  Stable on AM Labs     Hypertension  Chronic, controlled. Latest blood pressure and vitals reviewed-      Temp:  [97.1 °F (36.2 °C)-99 °F (37.2 °C)]   Pulse:  []   Resp:  [16-20]   BP: (110-139)/(57-93)   SpO2:  [92 %-98 %] .   Home meds for hypertension were reviewed and noted below.   Hypertension Medications                    amLODIPine (NORVASC) 5 MG tablet TAKE 1 TABLET DAILY     carvediloL (COREG) 6.25 MG tablet Take 1 tablet (6.25 mg total) by mouth 2 (two) times daily with meals.     hydroCHLOROthiazide (HYDRODIURIL) 25 MG tablet TAKE 1 TABLET DAILY     furosemide (LASIX) 20 MG tablet Take 1 tablet (20 mg total) by mouth every Mon, Wed, Fri, Sun.                While in the hospital, will manage blood pressure as follows; Continue home antihypertensive regimen     Will utilize p.r.n. blood pressure medication only if patient's blood pressure greater than 160/100 and he develops symptoms such as worsening chest pain or shortness of breath.     Bladder tumor  -patient is followed by Dr. Fletcher as an outpatient.  -UA noted to have greater than 100 RBCs  -urology consulted for evaluation given his history of bladder cancer and blood in the urine over the last few days.    04/04/2024  -Urology not convinced hematuria is cause of anemia  -Patient due for bladder cancer follow up, rec surveillance CT urogram + OP follow up            CORE MEASURES:  VTE Risk Mitigation (From admission, onward)           Ordered     IP VTE HIGH RISK  PATIENT  Once         04/01/24 1241     Place sequential compression device  Until discontinued         04/01/24 1241     Reason for No Pharmacological VTE Prophylaxis  Once        Question:  Reasons:  Answer:  Already adequately anticoagulated on oral Anticoagulants    04/01/24 1241                    Code Status: FULL    Diet: Diet Clear Liquid  Diet NPO    Disposition: EGD pending HR control, urine culture finalized, +/- CT urogram while IP       Samuel White MD  Department of Hospital Medicine   [facility] ([facility])

## 2024-04-04 NOTE — PROGRESS NOTES
[facility] ([facility])  Cardiology  Progress Note    Patient Name: Jose Miguel Alvarez Jr.  MRN: [id number]  Admission Date: 4/1/2024  Hospital Length of Stay: 2 days  Code Status: Full Code   Attending Physician: Samuel White MD   Primary Care Physician: Toshia Valladares MD  Expected Discharge Date:   Principal Problem:Paroxysmal atrial fibrillation    Subjective:   HPI:  83 y.o. male patient with a PMHx of COPD, CKD, HTN, Afib s/p DCCV who presents to the Emergency Department for chest pain, onset this morning. Symptoms are intermittent and moderate in severity. No mitigating or exacerbating factors reported. Associated sxs include intermittent SOB and intermittent palpitations; pt states that his current sxs are similar to when he was last in Afib. Patient denies any fever, chills, n/v/d, weakness, numbness, dizziness, headache, and all other sxs at this time. Pt is currently on Xarelto and Carvedilol. No further complaints or concerns at this time.      He has recurrent anemia issues previously in the past.  He had EGD, Video capsule and small bowel enteroscopy in 2021 with evidence of oozing in duodenum.   Plan for PRBC transfusion this AM. ECHO pending. Remains in AFIB/RVR at time of exam today.   Transition Cardizem to PO and add IV amiodarone infusion today. Further recs to follow.     Hospital Course:   4/2/2024- Patient seen and examined in room, has converted to SR overnight. Transition to PO Amiodarone 200 mg BID and continue Diltiazem. Continues to have anemia on repeat labs post PRBC transfusion. Continue to hold Xarelto until anemia has been sorted out.     4/3/24-Patient seen and examined today, back in afib with variable rates. Amiodarone drip resumed. Feels ok. Does admit to some mild fatigue/SOB. No CP. Labs reviewed, H/H 8.5/29.6. GI on board, EGD in AM.    4/4/24-Patient seen and examined today, sitting up in bed. Feels ok overall. Admits to fatigue. No overt bleeding.  Reviewed urology note, OP f/u. Still in afib, HR low 100's, meds adjusted. EGD deferred to tmw given elevated HR this AM. H/H 9.1/31.8.      Review of Systems   Constitutional: Positive for malaise/fatigue.   HENT: Negative.     Eyes: Negative.    Cardiovascular: Negative.    Respiratory: Negative.     Hematologic/Lymphatic: Bruises/bleeds easily.   Skin: Negative.    Musculoskeletal:  Positive for arthritis.   Gastrointestinal: Negative.    Genitourinary: Negative.    Neurological: Negative.    Psychiatric/Behavioral: Negative.     Allergic/Immunologic: Negative.      Objective:     Vital Signs (Most Recent):  Temp: 98.7 °F (37.1 °C) (04/04/24 1126)  Pulse: 100 (04/04/24 1126)  Resp: 16 (04/04/24 1126)  BP: 120/68 (04/04/24 1126)  SpO2: 97 % (04/04/24 1126) Vital Signs (24h Range):  Temp:  [97.3 °F (36.3 °C)-98.7 °F (37.1 °C)] 98.7 °F (37.1 °C)  Pulse:  [] 100  Resp:  [16-20] 16  SpO2:  [90 %-97 %] 97 %  BP: (114-139)/(60-93) 120/68     Weight: 105 kg (231 lb 7.7 oz)  Body mass index is 31.39 kg/m².     SpO2: 97 %         Intake/Output Summary (Last 24 hours) at 4/4/2024 1302  Last data filed at 4/4/2024 1100  Gross per 24 hour   Intake --   Output 2 ml   Net -2 ml       Lines/Drains/Airways       Peripheral Intravenous Line  Duration                  Peripheral IV - Single Lumen 04/02/24 1758 20 G Anterior;Proximal;Right Forearm 1 day         Peripheral IV - Single Lumen 04/03/24 1420 20 G Anterior;Right Forearm <1 day                       Physical Exam  Vitals and nursing note reviewed.   Constitutional:       General: He is not in acute distress.     Appearance: Normal appearance. He is well-developed. He is not diaphoretic.   HENT:      Head: Normocephalic and atraumatic.   Eyes:      General:         Right eye: No discharge.         Left eye: No discharge.      Pupils: Pupils are equal, round, and reactive to light.   Neck:      Thyroid: No thyromegaly.      Vascular: No JVD.      Trachea: No tracheal  "deviation.   Cardiovascular:      Rate and Rhythm: Normal rate. Rhythm irregularly irregular.      Heart sounds: Normal heart sounds, S1 normal and S2 normal. No murmur heard.  Pulmonary:      Effort: Pulmonary effort is normal. No respiratory distress.      Breath sounds: Rhonchi present.   Abdominal:      General: There is no distension.   Musculoskeletal:      Cervical back: Neck supple.      Right lower leg: No edema.      Left lower leg: No edema.   Skin:     General: Skin is warm and dry.      Findings: No erythema.   Neurological:      Mental Status: He is alert and oriented to person, place, and time.   Psychiatric:         Mood and Affect: Mood normal.         Behavior: Behavior normal.            Significant Labs: CMP   Recent Labs   Lab 04/03/24  0444 04/04/24  0544    137   K 3.5 3.3*    103   CO2 22* 23   * 177*   BUN 18 23   CREATININE 1.1 1.6*   CALCIUM 8.5* 8.4*   PROT 5.5* 5.7*   ALBUMIN 3.0* 2.9*   BILITOT 1.0 0.7   ALKPHOS 80 86   AST 55* 36   ALT 31 26   ANIONGAP 11 11   , CBC   Recent Labs   Lab 04/03/24  0444 04/04/24  0544   WBC 11.48 9.96   HGB 8.5* 9.1*   HCT 29.6* 31.8*   * 109*   , Troponin No results for input(s): "TROPONINI" in the last 48 hours., and All pertinent lab results from the last 24 hours have been reviewed.    Significant Imaging: Echocardiogram: Transthoracic echo (TTE) complete (Cupid Only):   Results for orders placed or performed during the hospital encounter of 04/01/24   Echo Saline Bubble? No   Result Value Ref Range    Left Atrium Major Axis 5.61 cm    Left Atrium Minor Axis 6.20 cm    RA Major Axis 5.21 cm    LV Diastolic Volume 113.12 mL    LV Systolic Volume 42.24 mL    TR Max Dave 3.13 m/s    PV mean gradient 1 mmHg    RVOT peak VTI 19.1 cm    RVOT peak dave 0.75 m/s    Ao VTI 20.30 cm    Ao peak dave 1.20 m/s    LVOT peak VTI 17.20 cm    LVOT peak dave 0.96 m/s    LVOT diameter 2.19 cm    IVRT 72.31 msec    PV peak gradient 2 mmHg    AV mean " gradient 3 mmHg    TAPSE 2.67 cm    RVDD 3.77 cm    LA size 4.62 cm    Ascending aorta 3.57 cm    STJ 2.87 cm    LVIDs 3.24 2.1 - 4.0 cm    Ao root annulus 3.62 cm    Posterior Wall 1.09 0.6 - 1.1 cm    IVS 1.32 (A) 0.6 - 1.1 cm    LVIDd 4.91 3.5 - 6.0 cm    PV PEAK VELOCITY 0.79 m/s    Left Ventricular Outflow Tract Mean Gradient 1.78 mmHg    Left Ventricular Outflow Tract Mean Velocity 0.64 cm/s    IVC diameter 1.79 cm    FS 34 28 - 44 %    LV mass 228.45 g    Left Ventricle Relative Wall Thickness 0.44 cm    AV valve area 3.19 cm²    AV Velocity Ratio 0.80     AV index (prosthetic) 0.85     LVOT area 3.8 cm2    LVOT stroke volume 64.76 cm3    AV peak gradient 6 mmHg    Triscuspid Valve Regurgitation Peak Gradient 39 mmHg    FABIANA by Velocity Ratio 3.01 cm²    BSA 2.36 m2    LV Systolic Volume Index 18.3 mL/m2    LV Diastolic Volume Index 48.97 mL/m2    LV Mass Index 99 g/m2    ZLVIDS -4.15     ZLVIDD -6.15     LA Volume Index 46.1 mL/m2    LA volume 106.40 cm3    LA WIDTH 4.6 cm    RA Width 4.3 cm    TV resting pulmonary artery pressure 47 mmHg    RV TB RVSP 11 mmHg    Est. RA pres 8 mmHg    Narrative      Left Ventricle: The left ventricle is normal in size. Mildly increased   wall thickness. There is concentric remodeling. There is normal systolic   function with a visually estimated ejection fraction of 60 - 65%. Unable   to assess diastolic function due to atrial fibrillation.    Right Ventricle: Normal right ventricular cavity size. Wall thickness   is normal. Right ventricle wall motion  is normal. Systolic function is   normal.    Left Atrium: Left atrium is moderately dilated.    Tricuspid Valve: There is mild regurgitation.    IVC/SVC: Intermediate venous pressure at 8 mmHg.      and EKG: Reviewed  Assessment and Plan:   Patient who presents with anemia and PAF. Meds adjusted for HR control. H/H stable. Await GI workup. Referral for [facility] as OP.    * Paroxysmal atrial fibrillation  Remains in AFIB/RVR  on exam today  Add Amiodarone infusion  Change Diltiazem IV gtt to Cardizem 240 mg daily  Continue Cardiac Monitoring  Hold Xarelto given anemia for now.   Last dose of Xarelto on 3/31/2024    4/2/2024  -Converted to SR overnight  -Transition to PO Amiodarone 200 mg BID  -Continue CCB  -Hold Xarelto until cleared per GI to resume given continued issues with anemia    4/3/24  -Back in afib with variable rates this AM  -Re-load with amio per MD  -Continue CCB as BP permits  -Xarelto on hold for now, GI on board, EGD tmw    4/4/24  -Still in afib, HR variable  -BB increased  -Amio switched to po  -Cardizem drip initiated  -Xarelto on hold pending GI workup  -Referral for [facility] as OP    Type 2 diabetes mellitus  Mgmt per primary team    Elevated troponin  Troponin 0.015>>0.050  Elevation likely due to AFIB/RVR and symptomatic anemia  Chest pain free on exam  ECHO pending    4/3/24  -TTE with normal EF    Iron deficiency anemia secondary to blood loss (chronic)  Plan for PRBC transfusion today.  Previously had EGD, Video capsule in 2021 without any definitive source of bleeding located.  Further labs to assess anemia pending.     4/3/24  -Improved post transfusion  -GI on board, EGD planned    4/4/24  -Stable  -GI on board, EGD deferred until tomorrow given elevated HR    Hypertension  Continue CCB   Monitor BP trend        VTE Risk Mitigation (From admission, onward)           Ordered     IP VTE HIGH RISK PATIENT  Once         04/01/24 1241     Place sequential compression device  Until discontinued         04/01/24 1241     Reason for No Pharmacological VTE Prophylaxis  Once        Question:  Reasons:  Answer:  Already adequately anticoagulated on oral Anticoagulants    04/01/24 1241                    Alayna Douglas PA-C  Cardiology  [facility] ([facility])

## 2024-04-04 NOTE — PLAN OF CARE
Updated patient on plan of care. NPO after MN for EGD in AM. Instructed patient to use call light for assistance, call light in reach. Hourly rounding performed. Vitals q4 hours. Education provided, questions answered/encouraged. Chart check complete. Iv amio Dc'ed and IV cardizem initiated. Afib 100s-120s    Problem: Adult Inpatient Plan of Care  Goal: Plan of Care Review  Outcome: Ongoing, Progressing

## 2024-04-04 NOTE — PROGRESS NOTES
[facility] ([facility])  Gastroenterology  Progress Note    Patient Name: Jose Miguel Alvarez Jr.  MRN: [id number]  Admission Date: 4/1/2024  Hospital Length of Stay: 2 days  Code Status: Full Code   Attending Provider: Samuel White MD  Consulting Provider: Josh De Paz PA-C  Primary Care Physician: Toshia Valladares MD  Principal Problem: Paroxysmal atrial fibrillation        Subjective:     Interval History: Patient feeling ok this morning. No SOB. Supplemental O2 came off overnight and sats in the low 90s. HR this morning between 100 and 130s. No overt bleeding or GI complaints.     Review of Systems   Constitutional:         See Interval History for daily ROS.      Objective:     Vital Signs (Most Recent):  Temp: 97.4 °F (36.3 °C) (04/04/24 0703)  Pulse: 99 (04/04/24 0703)  Resp: 16 (04/04/24 0703)  BP: 124/79 (04/04/24 0703)  SpO2: (!) 93 % (04/04/24 0703) Vital Signs (24h Range):  Temp:  [97.3 °F (36.3 °C)-98.4 °F (36.9 °C)] 97.4 °F (36.3 °C)  Pulse:  [] 99  Resp:  [16-20] 16  SpO2:  [90 %-95 %] 93 %  BP: (110-139)/(60-93) 124/79     Weight: 105 kg (231 lb 7.7 oz) (04/03/24 2350)  Body mass index is 31.39 kg/m².      Intake/Output Summary (Last 24 hours) at 4/4/2024 0904  Last data filed at 4/4/2024 0813  Gross per 24 hour   Intake --   Output 2 ml   Net -2 ml       Lines/Drains/Airways       Peripheral Intravenous Line  Duration                  Peripheral IV - Single Lumen 04/02/24 1758 20 G Anterior;Proximal;Right Forearm 1 day         Peripheral IV - Single Lumen 04/03/24 1420 20 G Anterior;Right Forearm <1 day                     Physical Exam  Constitutional:       General: He is not in acute distress.     Appearance: Normal appearance. He is well-developed.   HENT:      Head: Normocephalic and atraumatic.   Eyes:      Extraocular Movements: Extraocular movements intact.   Cardiovascular:      Rate and Rhythm: Normal rate and regular rhythm.   Pulmonary:      Effort:  "Pulmonary effort is normal. No respiratory distress.      Breath sounds: Normal breath sounds. No wheezing.   Abdominal:      General: Bowel sounds are normal. There is no distension.      Palpations: Abdomen is soft.      Tenderness: There is no abdominal tenderness.   Neurological:      Mental Status: He is alert and oriented to person, place, and time.      Cranial Nerves: No cranial nerve deficit.   Psychiatric:         Behavior: Behavior normal.          Significant Labs:  CBC:   Recent Labs   Lab 04/02/24  0912 04/03/24  0444 04/04/24  0544   WBC 16.29* 11.48 9.96   HGB 7.1* 8.5* 9.1*   HCT 25.5* 29.6* 31.8*   * 103* 109*     CMP:   Recent Labs   Lab 04/04/24  0544   *   CALCIUM 8.4*   ALBUMIN 2.9*   PROT 5.7*      K 3.3*   CO2 23      BUN 23   CREATININE 1.6*   ALKPHOS 86   ALT 26   AST 36   BILITOT 0.7     Coagulation: No results for input(s): "PT", "INR", "APTT" in the last 48 hours.      Significant Imaging:  Imaging results within the past 24 hours have been reviewed.  Assessment/Plan:     Cardiac/Vascular  * Paroxysmal atrial fibrillation  Cardiology following and managing.     Oncology  Iron deficiency anemia secondary to blood loss (chronic)  Patient with acute on chronic anemia in the setting of intermittent gross hematuria and established history of AVMs.   Offered inpatient EGD to clear the UGI tract prior to restarting Xarelto. However, he continues to have issues with rate control. Will push EGD back to tomorrow. HM and Cards team aware.   Ok to have diet today and NPO after midnight.   Monitor H/H and transfuse as needed.   He would likely benefit from outpatient referral to benign hematology clinic for additional iron infusion due to poor tolerance of oral iron.       Thank you for your consult. I will follow-up with patient. Please contact us if you have any additional questions.    Josh De Paz PA-C  Gastroenterology  [facility] ([facility])  "

## 2024-04-05 ENCOUNTER — ANESTHESIA EVENT (OUTPATIENT)
Dept: ENDOSCOPY | Facility: HOSPITAL | Age: 84
DRG: 308 | End: 2024-04-05
Payer: MEDICARE

## 2024-04-05 ENCOUNTER — ANESTHESIA (OUTPATIENT)
Dept: ENDOSCOPY | Facility: HOSPITAL | Age: 84
DRG: 308 | End: 2024-04-05
Payer: MEDICARE

## 2024-04-05 DIAGNOSIS — D50.0 IRON DEFICIENCY ANEMIA SECONDARY TO BLOOD LOSS (CHRONIC): Primary | ICD-10-CM

## 2024-04-05 PROBLEM — K31.811 AVM (ARTERIOVENOUS MALFORMATION) OF STOMACH, ACQUIRED WITH HEMORRHAGE: Status: ACTIVE | Noted: 2024-04-05

## 2024-04-05 LAB
ANION GAP SERPL CALC-SCNC: 10 MMOL/L (ref 8–16)
BACTERIA UR CULT: ABNORMAL
BASOPHILS # BLD AUTO: 0.03 K/UL (ref 0–0.2)
BASOPHILS NFR BLD: 0.3 % (ref 0–1.9)
BUN SERPL-MCNC: 33 MG/DL (ref 8–23)
CALCIUM SERPL-MCNC: 8.1 MG/DL (ref 8.7–10.5)
CHLORIDE SERPL-SCNC: 103 MMOL/L (ref 95–110)
CO2 SERPL-SCNC: 22 MMOL/L (ref 23–29)
CREAT SERPL-MCNC: 2.5 MG/DL (ref 0.5–1.4)
DIFFERENTIAL METHOD BLD: ABNORMAL
EOSINOPHIL # BLD AUTO: 0.3 K/UL (ref 0–0.5)
EOSINOPHIL NFR BLD: 2.8 % (ref 0–8)
ERYTHROCYTE [DISTWIDTH] IN BLOOD BY AUTOMATED COUNT: 20.7 % (ref 11.5–14.5)
EST. GFR  (NO RACE VARIABLE): 25 ML/MIN/1.73 M^2
GLUCOSE SERPL-MCNC: 162 MG/DL (ref 70–110)
HCT VFR BLD AUTO: 29.7 % (ref 40–54)
HGB BLD-MCNC: 8.8 G/DL (ref 14–18)
IMM GRANULOCYTES # BLD AUTO: 0.08 K/UL (ref 0–0.04)
IMM GRANULOCYTES NFR BLD AUTO: 0.9 % (ref 0–0.5)
LYMPHOCYTES # BLD AUTO: 0.4 K/UL (ref 1–4.8)
LYMPHOCYTES NFR BLD: 4.5 % (ref 18–48)
MAGNESIUM SERPL-MCNC: 2.1 MG/DL (ref 1.6–2.6)
MCH RBC QN AUTO: 21.5 PG (ref 27–31)
MCHC RBC AUTO-ENTMCNC: 29.6 G/DL (ref 32–36)
MCV RBC AUTO: 72 FL (ref 82–98)
MONOCYTES # BLD AUTO: 1.1 K/UL (ref 0.3–1)
MONOCYTES NFR BLD: 12.1 % (ref 4–15)
NEUTROPHILS # BLD AUTO: 7.4 K/UL (ref 1.8–7.7)
NEUTROPHILS NFR BLD: 79.4 % (ref 38–73)
NRBC BLD-RTO: 0 /100 WBC
PLATELET # BLD AUTO: 108 K/UL (ref 150–450)
PMV BLD AUTO: 10.8 FL (ref 9.2–12.9)
POCT GLUCOSE: 155 MG/DL (ref 70–110)
POCT GLUCOSE: 156 MG/DL (ref 70–110)
POCT GLUCOSE: 159 MG/DL (ref 70–110)
POCT GLUCOSE: 200 MG/DL (ref 70–110)
POTASSIUM SERPL-SCNC: 3.3 MMOL/L (ref 3.5–5.1)
RBC # BLD AUTO: 4.1 M/UL (ref 4.6–6.2)
SODIUM SERPL-SCNC: 135 MMOL/L (ref 136–145)
WBC # BLD AUTO: 9.32 K/UL (ref 3.9–12.7)

## 2024-04-05 PROCEDURE — 27000221 HC OXYGEN, UP TO 24 HOURS

## 2024-04-05 PROCEDURE — 80048 BASIC METABOLIC PNL TOTAL CA: CPT | Performed by: STUDENT IN AN ORGANIZED HEALTH CARE EDUCATION/TRAINING PROGRAM

## 2024-04-05 PROCEDURE — 63600175 PHARM REV CODE 636 W HCPCS: Performed by: FAMILY MEDICINE

## 2024-04-05 PROCEDURE — 0W3P8ZZ CONTROL BLEEDING IN GASTROINTESTINAL TRACT, VIA NATURAL OR ARTIFICIAL OPENING ENDOSCOPIC: ICD-10-PCS | Performed by: INTERNAL MEDICINE

## 2024-04-05 PROCEDURE — 25000003 PHARM REV CODE 250: Performed by: INTERNAL MEDICINE

## 2024-04-05 PROCEDURE — 99900035 HC TECH TIME PER 15 MIN (STAT)

## 2024-04-05 PROCEDURE — 25000003 PHARM REV CODE 250: Performed by: NURSE PRACTITIONER

## 2024-04-05 PROCEDURE — 63600175 PHARM REV CODE 636 W HCPCS: Performed by: INTERNAL MEDICINE

## 2024-04-05 PROCEDURE — 85025 COMPLETE CBC W/AUTO DIFF WBC: CPT | Performed by: STUDENT IN AN ORGANIZED HEALTH CARE EDUCATION/TRAINING PROGRAM

## 2024-04-05 PROCEDURE — 25000003 PHARM REV CODE 250: Performed by: STUDENT IN AN ORGANIZED HEALTH CARE EDUCATION/TRAINING PROGRAM

## 2024-04-05 PROCEDURE — 25000003 PHARM REV CODE 250: Performed by: UROLOGY

## 2024-04-05 PROCEDURE — 25000003 PHARM REV CODE 250: Performed by: FAMILY MEDICINE

## 2024-04-05 PROCEDURE — 43270 EGD LESION ABLATION: CPT | Performed by: INTERNAL MEDICINE

## 2024-04-05 PROCEDURE — 37000008 HC ANESTHESIA 1ST 15 MINUTES: Performed by: INTERNAL MEDICINE

## 2024-04-05 PROCEDURE — 99233 SBSQ HOSP IP/OBS HIGH 50: CPT | Mod: ,,, | Performed by: INTERNAL MEDICINE

## 2024-04-05 PROCEDURE — 21400001 HC TELEMETRY ROOM

## 2024-04-05 PROCEDURE — 83735 ASSAY OF MAGNESIUM: CPT | Performed by: STUDENT IN AN ORGANIZED HEALTH CARE EDUCATION/TRAINING PROGRAM

## 2024-04-05 PROCEDURE — 27202087 HC PROBE, APC: Performed by: INTERNAL MEDICINE

## 2024-04-05 PROCEDURE — 37000009 HC ANESTHESIA EA ADD 15 MINS: Performed by: INTERNAL MEDICINE

## 2024-04-05 PROCEDURE — 43270 EGD LESION ABLATION: CPT | Mod: ,,, | Performed by: INTERNAL MEDICINE

## 2024-04-05 RX ORDER — DILTIAZEM HYDROCHLORIDE 180 MG/1
360 CAPSULE, COATED, EXTENDED RELEASE ORAL DAILY
Status: DISCONTINUED | OUTPATIENT
Start: 2024-04-06 | End: 2024-04-07 | Stop reason: HOSPADM

## 2024-04-05 RX ORDER — LIDOCAINE HYDROCHLORIDE 20 MG/ML
INJECTION, SOLUTION EPIDURAL; INFILTRATION; INTRACAUDAL; PERINEURAL
Status: DISCONTINUED | OUTPATIENT
Start: 2024-04-05 | End: 2024-04-05

## 2024-04-05 RX ORDER — PHENYLEPHRINE HYDROCHLORIDE 10 MG/ML
INJECTION INTRAVENOUS
Status: DISCONTINUED | OUTPATIENT
Start: 2024-04-05 | End: 2024-04-05

## 2024-04-05 RX ORDER — PROPOFOL 10 MG/ML
VIAL (ML) INTRAVENOUS
Status: DISCONTINUED | OUTPATIENT
Start: 2024-04-05 | End: 2024-04-05

## 2024-04-05 RX ORDER — SUCRALFATE 1 G/10ML
1 SUSPENSION ORAL 2 TIMES DAILY
Status: DISCONTINUED | OUTPATIENT
Start: 2024-04-05 | End: 2024-04-07 | Stop reason: HOSPADM

## 2024-04-05 RX ADMIN — Medication 10 MG/HR: at 09:04

## 2024-04-05 RX ADMIN — TAMSULOSIN HYDROCHLORIDE 0.4 MG: 0.4 CAPSULE ORAL at 08:04

## 2024-04-05 RX ADMIN — METOPROLOL TARTRATE 50 MG: 50 TABLET, FILM COATED ORAL at 02:04

## 2024-04-05 RX ADMIN — DILTIAZEM HYDROCHLORIDE 240 MG: 240 CAPSULE, COATED, EXTENDED RELEASE ORAL at 08:04

## 2024-04-05 RX ADMIN — METOPROLOL TARTRATE 50 MG: 50 TABLET, FILM COATED ORAL at 09:04

## 2024-04-05 RX ADMIN — LIDOCAINE HYDROCHLORIDE 50 MG: 20 INJECTION, SOLUTION EPIDURAL; INFILTRATION; INTRACAUDAL; PERINEURAL at 11:04

## 2024-04-05 RX ADMIN — METOPROLOL TARTRATE 50 MG: 50 TABLET, FILM COATED ORAL at 08:04

## 2024-04-05 RX ADMIN — FERROUS SULFATE TAB 325 MG (65 MG ELEMENTAL FE) 1 EACH: 325 (65 FE) TAB at 08:04

## 2024-04-05 RX ADMIN — SODIUM CHLORIDE, SODIUM LACTATE, POTASSIUM CHLORIDE, AND CALCIUM CHLORIDE: .6; .31; .03; .02 INJECTION, SOLUTION INTRAVENOUS at 11:04

## 2024-04-05 RX ADMIN — SUCRALFATE 1 G: 1 SUSPENSION ORAL at 04:04

## 2024-04-05 RX ADMIN — PROPOFOL 30 MG: 10 INJECTION, EMULSION INTRAVENOUS at 11:04

## 2024-04-05 RX ADMIN — PHENYLEPHRINE HYDROCHLORIDE 100 MCG: 10 INJECTION INTRAVENOUS at 11:04

## 2024-04-05 RX ADMIN — DONEPEZIL HYDROCHLORIDE 5 MG: 5 TABLET, FILM COATED ORAL at 09:04

## 2024-04-05 RX ADMIN — CEFTRIAXONE 1 G: 1 INJECTION, POWDER, FOR SOLUTION INTRAMUSCULAR; INTRAVENOUS at 05:04

## 2024-04-05 RX ADMIN — AMIODARONE HYDROCHLORIDE 200 MG: 200 TABLET ORAL at 08:04

## 2024-04-05 RX ADMIN — AMIODARONE HYDROCHLORIDE 200 MG: 200 TABLET ORAL at 09:04

## 2024-04-05 RX ADMIN — PROPOFOL 50 MG: 10 INJECTION, EMULSION INTRAVENOUS at 11:04

## 2024-04-05 NOTE — PROGRESS NOTES
[facility] ([facility])  Cardiology  Progress Note    Patient Name: Jose Miguel Alvarez Jr.  MRN: [id number]  Admission Date: 4/1/2024  Hospital Length of Stay: 3 days  Code Status: Full Code   Attending Physician: Samuel White MD   Primary Care Physician: Toshia Valladares MD  Expected Discharge Date:   Principal Problem:Paroxysmal atrial fibrillation    Subjective:   HPI:  83 y.o. male patient with a PMHx of COPD, CKD, HTN, Afib s/p DCCV who presents to the Emergency Department for chest pain, onset this morning. Symptoms are intermittent and moderate in severity. No mitigating or exacerbating factors reported. Associated sxs include intermittent SOB and intermittent palpitations; pt states that his current sxs are similar to when he was last in Afib. Patient denies any fever, chills, n/v/d, weakness, numbness, dizziness, headache, and all other sxs at this time. Pt is currently on Xarelto and Carvedilol. No further complaints or concerns at this time.      He has recurrent anemia issues previously in the past.  He had EGD, Video capsule and small bowel enteroscopy in 2021 with evidence of oozing in duodenum.   Plan for PRBC transfusion this AM. ECHO pending. Remains in AFIB/RVR at time of exam today.   Transition Cardizem to PO and add IV amiodarone infusion today. Further recs to follow.    Hospital Course:   4/2/2024- Patient seen and examined in room, has converted to SR overnight. Transition to PO Amiodarone 200 mg BID and continue Diltiazem. Continues to have anemia on repeat labs post PRBC transfusion. Continue to hold Xarelto until anemia has been sorted out.     4/3/24-Patient seen and examined today, back in afib with variable rates. Amiodarone drip resumed. Feels ok. Does admit to some mild fatigue/SOB. No CP. Labs reviewed, H/H 8.5/29.6. GI on board, EGD in AM.    4/4/24-Patient seen and examined today, sitting up in bed. Feels ok overall. Admits to fatigue. No overt bleeding.  Reviewed urology note, OP f/u. Still in afib, HR low 100's, meds adjusted. EGD deferred to tmw given elevated HR this AM. H/H 9.1/31.8.    4/5/24-Patient seen and examined today, lying in bed. Feels ok, tired. Remains in afib, HR controlled. Labs reviewed. Creatinine bumped to 2.5, K 3.3. H/H 8.8/29.7. GI on board, plans for EGD today.        Review of Systems   Constitutional: Positive for malaise/fatigue.   HENT: Negative.     Eyes: Negative.    Cardiovascular: Negative.    Respiratory: Negative.     Endocrine: Negative.    Hematologic/Lymphatic: Bruises/bleeds easily.   Skin: Negative.    Musculoskeletal: Negative.    Gastrointestinal: Negative.    Genitourinary: Negative.    Neurological:  Positive for weakness.   Psychiatric/Behavioral: Negative.     Allergic/Immunologic: Negative.      Objective:     Vital Signs (Most Recent):  Temp: 98 °F (36.7 °C) (04/05/24 0726)  Pulse: 92 (04/05/24 0726)  Resp: 18 (04/05/24 0726)  BP: 118/65 (04/05/24 0726)  SpO2: (!) 91 % (04/05/24 0726) Vital Signs (24h Range):  Temp:  [97.5 °F (36.4 °C)-98.9 °F (37.2 °C)] 98 °F (36.7 °C)  Pulse:  [] 92  Resp:  [16-20] 18  SpO2:  [91 %-97 %] 91 %  BP: ()/(52-68) 118/65     Weight: 105 kg (231 lb 7.7 oz)  Body mass index is 31.39 kg/m².     SpO2: (!) 91 %         Intake/Output Summary (Last 24 hours) at 4/5/2024 0921  Last data filed at 4/4/2024 1837  Gross per 24 hour   Intake --   Output 5 ml   Net -5 ml       Lines/Drains/Airways       Peripheral Intravenous Line  Duration                  Peripheral IV - Single Lumen 04/02/24 1758 20 G Anterior;Proximal;Right Forearm 2 days         Peripheral IV - Single Lumen 04/03/24 1420 20 G Anterior;Right Forearm 1 day                       Physical Exam  Vitals and nursing note reviewed.   Constitutional:       General: He is not in acute distress.     Appearance: He is well-developed. He is not diaphoretic.   HENT:      Head: Normocephalic and atraumatic.   Eyes:      General:        "  Right eye: No discharge.         Left eye: No discharge.      Pupils: Pupils are equal, round, and reactive to light.   Cardiovascular:      Rate and Rhythm: Normal rate. Rhythm irregularly irregular.      Heart sounds: Normal heart sounds, S1 normal and S2 normal. No murmur heard.  Pulmonary:      Effort: Pulmonary effort is normal.      Breath sounds: Normal breath sounds.   Abdominal:      General: There is no distension.   Musculoskeletal:      Right lower leg: No edema.      Left lower leg: No edema.   Skin:     General: Skin is warm and dry.      Coloration: Skin is pale.      Findings: No erythema.   Neurological:      General: No focal deficit present.      Mental Status: He is alert and oriented to person, place, and time.   Psychiatric:         Mood and Affect: Mood normal.         Behavior: Behavior normal.            Significant Labs: CMP   Recent Labs   Lab 04/04/24  0544 04/05/24  0750    135*   K 3.3* 3.3*    103   CO2 23 22*   * 162*   BUN 23 33*   CREATININE 1.6* 2.5*   CALCIUM 8.4* 8.1*   PROT 5.7*  --    ALBUMIN 2.9*  --    BILITOT 0.7  --    ALKPHOS 86  --    AST 36  --    ALT 26  --    ANIONGAP 11 10   , CBC   Recent Labs   Lab 04/04/24  0544 04/05/24  0750   WBC 9.96 9.32   HGB 9.1* 8.8*   HCT 31.8* 29.7*   * 108*   , Troponin No results for input(s): "TROPONINI" in the last 48 hours., and All pertinent lab results from the last 24 hours have been reviewed.    Significant Imaging: Echocardiogram: Transthoracic echo (TTE) complete (Cupid Only):   Results for orders placed or performed during the hospital encounter of 04/01/24   Echo Saline Bubble? No   Result Value Ref Range    Left Atrium Major Axis 5.61 cm    Left Atrium Minor Axis 6.20 cm    RA Major Axis 5.21 cm    LV Diastolic Volume 113.12 mL    LV Systolic Volume 42.24 mL    TR Max Dave 3.13 m/s    PV mean gradient 1 mmHg    RVOT peak VTI 19.1 cm    RVOT peak dave 0.75 m/s    Ao VTI 20.30 cm    Ao peak dave 1.20 " m/s    LVOT peak VTI 17.20 cm    LVOT peak ruth 0.96 m/s    LVOT diameter 2.19 cm    IVRT 72.31 msec    PV peak gradient 2 mmHg    AV mean gradient 3 mmHg    TAPSE 2.67 cm    RVDD 3.77 cm    LA size 4.62 cm    Ascending aorta 3.57 cm    STJ 2.87 cm    LVIDs 3.24 2.1 - 4.0 cm    Ao root annulus 3.62 cm    Posterior Wall 1.09 0.6 - 1.1 cm    IVS 1.32 (A) 0.6 - 1.1 cm    LVIDd 4.91 3.5 - 6.0 cm    PV PEAK VELOCITY 0.79 m/s    Left Ventricular Outflow Tract Mean Gradient 1.78 mmHg    Left Ventricular Outflow Tract Mean Velocity 0.64 cm/s    IVC diameter 1.79 cm    FS 34 28 - 44 %    LV mass 228.45 g    Left Ventricle Relative Wall Thickness 0.44 cm    AV valve area 3.19 cm²    AV Velocity Ratio 0.80     AV index (prosthetic) 0.85     LVOT area 3.8 cm2    LVOT stroke volume 64.76 cm3    AV peak gradient 6 mmHg    Triscuspid Valve Regurgitation Peak Gradient 39 mmHg    FABIANA by Velocity Ratio 3.01 cm²    BSA 2.36 m2    LV Systolic Volume Index 18.3 mL/m2    LV Diastolic Volume Index 48.97 mL/m2    LV Mass Index 99 g/m2    ZLVIDS -4.15     ZLVIDD -6.15     LA Volume Index 46.1 mL/m2    LA volume 106.40 cm3    LA WIDTH 4.6 cm    RA Width 4.3 cm    TV resting pulmonary artery pressure 47 mmHg    RV TB RVSP 11 mmHg    Est. RA pres 8 mmHg    Narrative      Left Ventricle: The left ventricle is normal in size. Mildly increased   wall thickness. There is concentric remodeling. There is normal systolic   function with a visually estimated ejection fraction of 60 - 65%. Unable   to assess diastolic function due to atrial fibrillation.    Right Ventricle: Normal right ventricular cavity size. Wall thickness   is normal. Right ventricle wall motion  is normal. Systolic function is   normal.    Left Atrium: Left atrium is moderately dilated.    Tricuspid Valve: There is mild regurgitation.    IVC/SVC: Intermediate venous pressure at 8 mmHg.      and EKG: Reviewed  Assessment and Plan:   Patient stable CV wise. HR controlled. Awaiting GI  workup.     * Paroxysmal atrial fibrillation  Remains in AFIB/RVR on exam today  Add Amiodarone infusion  Change Diltiazem IV gtt to Cardizem 240 mg daily  Continue Cardiac Monitoring  Hold Xarelto given anemia for now.   Last dose of Xarelto on 3/31/2024    4/2/2024  -Converted to SR overnight  -Transition to PO Amiodarone 200 mg BID  -Continue CCB  -Hold Xarelto until cleared per GI to resume given continued issues with anemia    4/3/24  -Back in afib with variable rates this AM  -Re-load with amio per MD  -Continue CCB as BP permits  -Xarelto on hold for now, GI on board, EGD tmw    4/4/24  -Still in afib, HR variable  -BB increased  -Amio switched to po  -Cardizem drip initiated  -Xarelto on hold pending GI workup  -Referral for [facility] as OP    4/5/24  -HR controlled  -Continue BB, po amio, cardizem drip  -AC on hold pending GI workup today    Type 2 diabetes mellitus  Mgmt per primary team    Elevated troponin  Troponin 0.015>>0.050  Elevation likely due to AFIB/RVR and symptomatic anemia  Chest pain free on exam  ECHO pending    4/3/24  -TTE with normal EF    Iron deficiency anemia secondary to blood loss (chronic)  Plan for PRBC transfusion today.  Previously had EGD, Video capsule in 2021 without any definitive source of bleeding located.  Further labs to assess anemia pending.     4/3/24  -Improved post transfusion  -GI on board, EGD planned    4/4/24  -Stable  -GI on board, EGD deferred until tomorrow given elevated HR    4/5/24  -EGD today    Hypertension  Continue CCB   Monitor BP trend        VTE Risk Mitigation (From admission, onward)           Ordered     IP VTE HIGH RISK PATIENT  Once         04/01/24 1241     Place sequential compression device  Until discontinued         04/01/24 1241     Reason for No Pharmacological VTE Prophylaxis  Once        Question:  Reasons:  Answer:  Already adequately anticoagulated on oral Anticoagulants    04/01/24 1241                    Alayna Douglas,  KATE  Cardiology  [facility] ([facility])

## 2024-04-05 NOTE — ANESTHESIA PREPROCEDURE EVALUATION
04/05/2024  Jose Miguel Alvarez Jr. is a 83 y.o., male.      Pre-op Assessment    I have reviewed the Patient Summary Reports.     I have reviewed the Nursing Notes. I have reviewed the NPO Status.   I have reviewed the Medications.     Review of Systems  Social:  Alcohol Use       Cardiovascular:     Hypertension    Dysrhythmias atrial fibrillation  CHF     RECINOS                            Pulmonary:   COPD   Shortness of breath                  Renal/:  Chronic Renal Disease                Endocrine:  Diabetes, type 2           Psych:  Psychiatric History                  Physical Exam  General: Well nourished, Cooperative, Alert and Oriented    Airway:  Mallampati: II   Mouth Opening: Normal  TM Distance: Normal  Tongue: Normal  Neck ROM: Normal ROM    Chest/Lungs:  Clear to auscultation    Heart:  Rhythm: Regular Rhythm  Sounds: Normal    Abdomen:  Normal        Anesthesia Plan  Type of Anesthesia, risks & benefits discussed:    Anesthesia Type: MAC  Intra-op Monitoring Plan: Standard ASA Monitors  Induction:  IV  Informed Consent: Informed consent signed with the Patient and all parties understand the risks and agree with anesthesia plan.  All questions answered.   ASA Score: 3  Day of Surgery Review of History & Physical: I have interviewed and examined the patient. I have reviewed the patient's H&P dated:     Ready For Surgery From Anesthesia Perspective.     .

## 2024-04-05 NOTE — TRANSFER OF CARE
Anesthesia Transfer of Care Note    Patient: Jose Miguel Alvarez JrNorma    Procedure(s) Performed: Procedure(s) (LRB):  EGD (ESOPHAGOGASTRODUODENOSCOPY) (N/A)    Patient location: GI    Anesthesia Type: MAC    Transport from OR: Transported from OR on room air with adequate spontaneous ventilation    Post pain: adequate analgesia    Post assessment: no apparent anesthetic complications    Post vital signs: stable    Level of consciousness: awake and responds to stimulation    Nausea/Vomiting: no nausea/vomiting    Complications: none    Transfer of care protocol was followed      Last vitals: Visit Vitals  BP 96/61 (BP Location: Right arm, Patient Position: Lying)   Pulse 72   Temp 37 °C (98.6 °F) (Temporal)   Resp 18   Ht 6' (1.829 m)   Wt 105 kg (231 lb 7.7 oz)   SpO2 99%   BMI 31.39 kg/m²

## 2024-04-05 NOTE — PLAN OF CARE
Pt awake alert and alert . Pt denies any pain at the moment . Pt heart monitor is patent and showing NSR . No SOB noted . IV intact .  Family at bedside .  Cardizem drip infusing . POC discussed . Safety parameters in use . Chart check completed . Will continue to monitor .

## 2024-04-05 NOTE — SUBJECTIVE & OBJECTIVE
Review of Systems   Constitutional: Positive for malaise/fatigue.   HENT: Negative.     Eyes: Negative.    Cardiovascular: Negative.    Respiratory: Negative.     Endocrine: Negative.    Hematologic/Lymphatic: Bruises/bleeds easily.   Skin: Negative.    Musculoskeletal: Negative.    Gastrointestinal: Negative.    Genitourinary: Negative.    Neurological:  Positive for weakness.   Psychiatric/Behavioral: Negative.     Allergic/Immunologic: Negative.      Objective:     Vital Signs (Most Recent):  Temp: 98 °F (36.7 °C) (04/05/24 0726)  Pulse: 92 (04/05/24 0726)  Resp: 18 (04/05/24 0726)  BP: 118/65 (04/05/24 0726)  SpO2: (!) 91 % (04/05/24 0726) Vital Signs (24h Range):  Temp:  [97.5 °F (36.4 °C)-98.9 °F (37.2 °C)] 98 °F (36.7 °C)  Pulse:  [] 92  Resp:  [16-20] 18  SpO2:  [91 %-97 %] 91 %  BP: ()/(52-68) 118/65     Weight: 105 kg (231 lb 7.7 oz)  Body mass index is 31.39 kg/m².     SpO2: (!) 91 %         Intake/Output Summary (Last 24 hours) at 4/5/2024 0921  Last data filed at 4/4/2024 1837  Gross per 24 hour   Intake --   Output 5 ml   Net -5 ml       Lines/Drains/Airways       Peripheral Intravenous Line  Duration                  Peripheral IV - Single Lumen 04/02/24 1758 20 G Anterior;Proximal;Right Forearm 2 days         Peripheral IV - Single Lumen 04/03/24 1420 20 G Anterior;Right Forearm 1 day                       Physical Exam  Vitals and nursing note reviewed.   Constitutional:       General: He is not in acute distress.     Appearance: He is well-developed. He is not diaphoretic.   HENT:      Head: Normocephalic and atraumatic.   Eyes:      General:         Right eye: No discharge.         Left eye: No discharge.      Pupils: Pupils are equal, round, and reactive to light.   Cardiovascular:      Rate and Rhythm: Normal rate. Rhythm irregularly irregular.      Heart sounds: Normal heart sounds, S1 normal and S2 normal. No murmur heard.  Pulmonary:      Effort: Pulmonary effort is normal.     "  Breath sounds: Normal breath sounds.   Abdominal:      General: There is no distension.   Musculoskeletal:      Right lower leg: No edema.      Left lower leg: No edema.   Skin:     General: Skin is warm and dry.      Coloration: Skin is pale.      Findings: No erythema.   Neurological:      General: No focal deficit present.      Mental Status: He is alert and oriented to person, place, and time.   Psychiatric:         Mood and Affect: Mood normal.         Behavior: Behavior normal.            Significant Labs: CMP   Recent Labs   Lab 04/04/24  0544 04/05/24  0750    135*   K 3.3* 3.3*    103   CO2 23 22*   * 162*   BUN 23 33*   CREATININE 1.6* 2.5*   CALCIUM 8.4* 8.1*   PROT 5.7*  --    ALBUMIN 2.9*  --    BILITOT 0.7  --    ALKPHOS 86  --    AST 36  --    ALT 26  --    ANIONGAP 11 10   , CBC   Recent Labs   Lab 04/04/24  0544 04/05/24  0750   WBC 9.96 9.32   HGB 9.1* 8.8*   HCT 31.8* 29.7*   * 108*   , Troponin No results for input(s): "TROPONINI" in the last 48 hours., and All pertinent lab results from the last 24 hours have been reviewed.    Significant Imaging: Echocardiogram: Transthoracic echo (TTE) complete (Cupid Only):   Results for orders placed or performed during the hospital encounter of 04/01/24   Echo Saline Bubble? No   Result Value Ref Range    Left Atrium Major Axis 5.61 cm    Left Atrium Minor Axis 6.20 cm    RA Major Axis 5.21 cm    LV Diastolic Volume 113.12 mL    LV Systolic Volume 42.24 mL    TR Max Dave 3.13 m/s    PV mean gradient 1 mmHg    RVOT peak VTI 19.1 cm    RVOT peak dave 0.75 m/s    Ao VTI 20.30 cm    Ao peak dave 1.20 m/s    LVOT peak VTI 17.20 cm    LVOT peak dave 0.96 m/s    LVOT diameter 2.19 cm    IVRT 72.31 msec    PV peak gradient 2 mmHg    AV mean gradient 3 mmHg    TAPSE 2.67 cm    RVDD 3.77 cm    LA size 4.62 cm    Ascending aorta 3.57 cm    STJ 2.87 cm    LVIDs 3.24 2.1 - 4.0 cm    Ao root annulus 3.62 cm    Posterior Wall 1.09 0.6 - 1.1 cm    " IVS 1.32 (A) 0.6 - 1.1 cm    LVIDd 4.91 3.5 - 6.0 cm    PV PEAK VELOCITY 0.79 m/s    Left Ventricular Outflow Tract Mean Gradient 1.78 mmHg    Left Ventricular Outflow Tract Mean Velocity 0.64 cm/s    IVC diameter 1.79 cm    FS 34 28 - 44 %    LV mass 228.45 g    Left Ventricle Relative Wall Thickness 0.44 cm    AV valve area 3.19 cm²    AV Velocity Ratio 0.80     AV index (prosthetic) 0.85     LVOT area 3.8 cm2    LVOT stroke volume 64.76 cm3    AV peak gradient 6 mmHg    Triscuspid Valve Regurgitation Peak Gradient 39 mmHg    FABIANA by Velocity Ratio 3.01 cm²    BSA 2.36 m2    LV Systolic Volume Index 18.3 mL/m2    LV Diastolic Volume Index 48.97 mL/m2    LV Mass Index 99 g/m2    ZLVIDS -4.15     ZLVIDD -6.15     LA Volume Index 46.1 mL/m2    LA volume 106.40 cm3    LA WIDTH 4.6 cm    RA Width 4.3 cm    TV resting pulmonary artery pressure 47 mmHg    RV TB RVSP 11 mmHg    Est. RA pres 8 mmHg    Narrative      Left Ventricle: The left ventricle is normal in size. Mildly increased   wall thickness. There is concentric remodeling. There is normal systolic   function with a visually estimated ejection fraction of 60 - 65%. Unable   to assess diastolic function due to atrial fibrillation.    Right Ventricle: Normal right ventricular cavity size. Wall thickness   is normal. Right ventricle wall motion  is normal. Systolic function is   normal.    Left Atrium: Left atrium is moderately dilated.    Tricuspid Valve: There is mild regurgitation.    IVC/SVC: Intermediate venous pressure at 8 mmHg.      and EKG: Reviewed

## 2024-04-05 NOTE — BRIEF OP NOTE
Inpatient Endoscopy Brief Operative Note      Admit Date: 4/1/2024    Procedure Date and Time:  4/5/2024 11:30 AM    Attending Physician: Samuel White MD     Principal Admitting Diagnoses: Paroxysmal atrial fibrillation         Discharge Diagnosis: The primary encounter diagnosis was Atrial fibrillation with RVR. Diagnoses of SOB (shortness of breath), Symptomatic anemia, Chest pain, A-fib, and Iron deficiency anemia due to chronic blood loss were also pertinent to this visit.     Discharged Condition: Stable    Indication for Admission: Paroxysmal atrial fibrillation     Procedure Performed: EGD with APC    SEE PROVATIONS REPORTS FOR DETAILS.    Pathology (if any):  Specimen (24h ago, onward)      None            Estimated Blood Loss: 1 ml.    Discussed with: patient and family.    Disposition: Return to hospital irizarry.    Recommendations:   Cardiac diet  Restart Xarelto in 2 days  Carafate suspension 1g PO BID x 1 week  Protonix 20mg daily at discharge  Will arrange outpatient video capsule endoscopy      Communicated with hospital medicine and cardiology services regarding the above findings.       Myrtle Salcedo MD  Inpatient Gastroenterology  [facility]

## 2024-04-05 NOTE — PROVATION PATIENT INSTRUCTIONS
Discharge Summary/Instructions after an Endoscopic Procedure  Patient Name: Jose Miguel Alvarez  Patient MRN: [id number]  Patient YOB: 1940 Friday, April 5, 2024 Myrtle Salcedo MD  Dear patient,  As a result of recent federal legislation (The Federal Cures Act), you may   receive lab or pathology results from your procedure in your [facility]   account before your physician is able to contact you. Your physician or   their representative will relay the results to you with their   recommendations at their soonest availability.  Thank you,  RESTRICTIONS:  During your procedure today, you received medications for sedation.  These   medications may affect your judgment, balance and coordination.  Therefore,   for 24 hours, you have the following restrictions:   - DO NOT drive a car, operate machinery, make legal/financial decisions,   sign important papers or drink alcohol.    ACTIVITY:  Today: no heavy lifting, straining or running due to procedural   sedation/anesthesia.  The following day: return to full activity including work.  DIET:  Eat and drink normally unless instructed otherwise.     TREATMENT FOR COMMON SIDE EFFECTS:  - Mild abdominal pain, nausea, belching, bloating or excessive gas:  rest,   eat lightly and use a heating pad.  - Sore Throat: treat with throat lozenges and/or gargle with warm salt   water.  - Because air was used during the procedure, expelling large amounts of air   from your rectum or belching is normal.  - If a bowel prep was taken, you may not have a bowel movement for 1-3 days.    This is normal.  SYMPTOMS TO WATCH FOR AND REPORT TO YOUR PHYSICIAN:  1. Abdominal pain or bloating, other than gas cramps.  2. Chest pain.  3. Back pain.  4. Signs of infection such as: chills or fever occurring within 24 hours   after the procedure.  5. Rectal bleeding, which would show as bright red, maroon, or black stools.   (A tablespoon of blood from the rectum is not serious, especially if    hemorrhoids are present.)  6. Vomiting.  7. Weakness or dizziness.  GO DIRECTLY TO THE NEAREST EMERGENCY ROOM IF YOU HAVE ANY OF THE FOLLOWING:      Difficulty breathing              Chills and/or fever over 101 F   Persistent vomiting and/or vomiting blood   Severe abdominal pain   Severe chest pain   Black, tarry stools   Bleeding- more than one tablespoon   Any other symptom or condition that you feel may need urgent attention  Your doctor recommends these additional instructions:  If any biopsies were taken, your doctors clinic will contact you in 1 to 2   weeks with any results.  - Return patient to hospital irizarry for ongoing care.   - Cardiac diet.   - Continue present medications.   - Resume Xarelto (rivaroxaban) at prior dose in 2 days.  Refer to managing   physician for further adjustment of therapy.   - To visualize the small bowel, perform video capsule endoscopy. You will be   contacted to schedule this study by our GI department.   - Observe patient's clinical course.   - Agree with iron supplementation.  - Use sucralfate suspension 1 gram PO BID for 1 week.   - Use Protonix (pantoprazole) 20 mg PO daily.   - The findings and recommendations were discussed with the patient.   - The findings and recommendations were discussed with the patient's family.     - Communicated with hospital medicine and cardiology services regarding   findings.  For questions, problems or results please call your physician Myrtle Salcedo MD at Work:  [phone]  If you have any questions about the above instructions, call the GI   department at [phone] or call the endoscopy unit at [phone]   from 7am until 3 pm.  [facility] [place], EMERGENCY ROOM PHONE NUMBER:   [phone]  IF A COMPLICATION OR EMERGENCY SITUATION ARISES AND YOU ARE UNABLE TO REACH   YOUR PHYSICIAN - GO DIRECTLY TO THE EMERGENCY ROOM.  I have read or have had read to me these discharge instructions for my   procedure and  have received a written copy.  I understand these   instructions and will follow-up with my physician if I have any questions.     __________________________________       _____________________________________  Nurse Signature                                          Patient/Designated   Responsible Party Signature  MD Myrtle Martines MD  4/5/2024 11:37:51 AM  This report has been verified and signed electronically.  Dear patient,  As a result of recent federal legislation (The Federal Cures Act), you may   receive lab or pathology results from your procedure in your [facility]   account before your physician is able to contact you. Your physician or   their representative will relay the results to you with their   recommendations at their soonest availability.  Thank you,  PROVATION

## 2024-04-05 NOTE — PROGRESS NOTES
[facility] Medicine  Progress Note     Patient Name:  Jose Miguel Alvarez Jr.  MRN:  [id number]  Patient Class: IP-Inpatient  Admission Date:  4/1/2024   Length of Stay:  3  Attending Physician: Samuel White MD  Primary Care Provider: Toshia Valladares MD    Subjective:      Subsequent Care: Follow up Paroxysmal atrial fibrillation        HPI:  83 y.o. male patient with a PMHx of COPD, CKD, HTN, Afib s/p DCCV. Presented to the Emergency Department for chest pain, onset PTA at home. Symptoms are intermittent and moderate in severity. No mitigating or exacerbating factors reported. Associated sxs include intermittent SOB and intermittent palpitations; pt states that his current sxs are similar to when he was last in Afib. Patient denies any fever, chills, n/v/d, weakness, numbness, dizziness, headache, and all other sxs at this time. Pt is currently on Xarelto and Carvedilol. In the ED, vitals: 146/75, 120, 22, 97.8F, 99% RA. Significant Labs: WBC: 3.85, H/H: 7.1/26.2, Pl: 130K. Trop: 0.015, EKG: A. Fib RVR. CXR: no acute process. Treated with IVP diltiazem x2, no improvement in rate or rhythm. Placed on diltiazem drip. Cardiology consulted. Patient is a full code. Placed in observation under the care of Hospital Medicine for management of A. Fib RVR, Elevated Troponin, Pancytopenia.      Overview/Hospital Course:  Patient admitted with paroxysmal AFib and anemia.  He was started on a Cardizem drip with no improvement therefore amiodarone drip was added.  Patient was also transfused 1 unit of blood for symptomatic anemia.  Cardiology was consulted and recommended transition from IV to p.o. Cardizem and amiodarone.  Plan for monitoring overnight with further recommendations from Cardiology tomorrow.  Due to patient's anemia he was transfused an additional unit of blood on for 04/02/2024 for which she had an appropriate response with a hemoglobin that improved from 7.1-8.5.   Given his history in 2021 of a duodenal bleed, GI was consulted and possibly scoping on 04/04/2024 if patient's heart rate is controlled.  At this time patient is still in AFib while on amiodarone and Cardizem p.o..  Cardiology managing patient's AFib.     4/3/2024  transitioned to p.o. Cardizem and amiodarone on 04/02/2024.  Due to symptomatic anemia cardiology recommended GI consult as he had a previous duodenal bleed which may be the cause for an inappropriate response to wanted a unit of blood that was given by the ER.  He was given an additional unit last night that had an appropriate response from 7.1-8.5.  Case discussed with GI who plans to scope patient tomorrow if heart rate is controlled.  Patient has been made NPO at this time     04/04/2024  EGD pushed to tomorrow due to HR sustained above 130 bpm.     04/05/2024  NPO at midnight in preparation for EGD today.      Interval Hx  No acute events overnight. Evaluated prior to rolling down to endoscopy. Doing well this AM with no complaints at our encounter. Denies CP, SOB, Abdominal pain, fevers/chills.      Objective  BP (!) 109/58 (BP Location: Left arm, Patient Position: Lying)   Pulse 95   Temp 97.5 °F (36.4 °C) (Oral)   Resp 17   Ht 6' (1.829 m)   Wt 105 kg (231 lb 7.7 oz)   SpO2 (!) 92%   BMI 31.39 kg/m²     Intake/Output Summary (Last 24 hours) at 4/5/2024 0719  Last data filed at 4/4/2024 1837  Gross per 24 hour   Intake --   Output 6 ml   Net -6 ml       PHYSICAL EXAM  Vitals reviewed  Constitutional:       Appearance: Normal appearance.   HENT:      Head: Normocephalic and atraumatic.      Nose: Nose normal.      Mouth/Throat:      Mouth: Mucous membranes are moist.   Eyes:      Extraocular Movements: Extraocular movements intact.      Comments: Pale conjunctiva   Cardiovascular:      Rate and Rhythm: normal rate but irregular rhythm      Pulses: Normal pulses.      Heart sounds: Normal heart sounds.   Pulmonary:      Effort: Pulmonary  "effort is normal.      Breath sounds: Normal breath sounds.   Abdominal:      General: Abdomen is flat. Bowel sounds are normal.      Palpations: Abdomen is soft.   Musculoskeletal:         General: Normal range of motion.      Cervical back: Normal range of motion and neck supple.   Skin:     General: Skin is warm.      Capillary Refill: Capillary refill takes less than 2 seconds.      Coloration: Skin is pale.   Neurological:      Mental Status: He is alert and oriented to person, place, and time. Mental status is at baseline.   Psychiatric:         Mood and Affect: Mood normal.         Behavior: Behavior normal.     LABS  All labs from the past 24 hours were reviewed.     BMP:   Recent Labs   Lab 04/04/24  0544   *      K 3.3*      CO2 23   BUN 23   CREATININE 1.6*   CALCIUM 8.4*   MG 2.1     CBC:   Recent Labs   Lab 04/04/24  0544   WBC 9.96   HGB 9.1*   HCT 31.8*   *     CMP:   Recent Labs   Lab 04/04/24  0544      K 3.3*      CO2 23   *   BUN 23   CREATININE 1.6*   CALCIUM 8.4*   PROT 5.7*   ALBUMIN 2.9*   BILITOT 0.7   ALKPHOS 86   AST 36   ALT 26   ANIONGAP 11     Cardiac Markers: No results for input(s): "CKMB", "MYOGLOBIN", "BNP", "TROPISTAT" in the last 48 hours.  Coagulation: No results for input(s): "PT", "INR", "APTT" in the last 48 hours.  Lactic Acid: No results for input(s): "LACTATE" in the last 48 hours.  Magnesium:   Recent Labs   Lab 04/04/24  0544   MG 2.1     Troponin: No results for input(s): "TROPONINI", "TROPONINIHS" in the last 48 hours.  TSH:   Recent Labs   Lab 04/01/24  1221   TSH 3.937     Urine Studies:   No results for input(s): "COLORU", "APPEARANCEUA", "PHUR", "SPECGRAV", "PROTEINUA", "GLUCUA", "KETONESU", "BILIRUBINUA", "OCCULTUA", "NITRITE", "UROBILINOGEN", "LEUKOCYTESUR", "RBCUA", "WBCUA", "BACTERIA", "SQUAMEPITHEL", "HYALINECASTS" in the last 48 hours.    Invalid input(s): "WRIGHTSUR"    IMAGING  All imaging from the past 24 hours " were reviewed.     Imaging Results              X-Ray Chest AP Portable (Final result)  Result time 04/01/24 08:45:18      Final result by Hector Arriaga MD (04/01/24 08:45:18)                   Impression:      No acute process seen.      Electronically signed by: Hector Arriaga MD  Date:    04/01/2024  Time:    08:45               Narrative:    EXAMINATION:  XR CHEST AP PORTABLE    CLINICAL HISTORY:  sob;    FINDINGS:  Single view of the chest.  Comparison 12/4/21    Cardiac silhouette is normal.  The lungs demonstrate no evidence of active disease.  No evidence of pleural effusion or pneumothorax.  Bones appear intact.  Moderate degenerative changes and moderate atherosclerotic disease.                                      Assessment/Plan:      * Paroxysmal atrial fibrillation  Patient with Persistent (7 days or more) atrial fibrillation which is uncontrolled currently with Calcium Channel Blocker. Patient is currently in atrial fibrillation.[id number] Score: 3. Anticoagulation indicated. Anticoagulation done with Xarelto .     Treated with IVP Dilt x2, rate improvement not sustained.      --Cardiology consulted  --Diltiazem IV continuous 10mg/hr initiated per recommendation, rate did not improve, changed to IV amiodarone.  Cardiology transitioned to p.o. amiodarone on 04/02/2024  --patient continues to go into intermittent AFib with heart rates in the 130s.  This is not sustained.  --Tele  --Vitals Q4H  --anticoagulation has been held due to symptomatic anemia     04/04/2024  Intermittently controlled  Cardiology following, defer management to their team    04/05/2024  More controlled this AM while on the drip  Cardiology following, defer management to their team     UTI (urinary tract infection)  -UA ordered, chest x-ray reviewed   -we will start empiric Rocephin  -repeat CBC in a.m.  -resolved  -urine cultures pending     04/04/2024  Urine culture growing GNR  Prior cultures reviewed- pansensitive organism  in 2022  Continue rocephin until cultures finalize     Leukocytosis  -unclear etiology as patient is white count has gone from 3.85 to 25.69 to16.29 within 12 hours  -UA ordered, chest x-ray reviewed   -we will start empiric Rocephin  -repeat CBC in a.m.  -resolved     Type 2 diabetes mellitus  Patient's FSGs are controlled on current medication regimen.  Current correctional scale  Low  Maintain anti-hyperglycemic dose as follows-   Hold Oral hypoglycemics while patient is in the hospital.     Elevated troponin  Likely secondary to demand ischemia related to anemia, A. Fib RVR     --trend trop  --Tele  --Vitals Q4H  --Repeat labs in AM  --Transfuse with 1U PRBC on 04/01/2024, 2nd unit transfused on 04/02/2024           Pancytopenia  Chronic anemia, thrombocytopenia, intermittent neutropenia. Has been treated symptomatically, negative for hx of evaluation by Hem/Onc     This patient is found to have pancytopenia, the likely etiology is  Multifactorial , will monitor CBC Daily. Will transfuse red blood cells if the hemoglobin is <7g/dL (or <8 in the setting of ACS). Will transfuse platelets if platelet count is <50k (if undergoing surgical procedure or have active bleeding). Hold DVT prophylaxis if platelets are <50k. The patient's hemoglobin, white blood cell count, and platelet count results have been reviewed and are listed below.        Recent Labs   Lab 04/02/24  0912   HGB 7.1*   WBC 16.29*   *            --F/u with Hem/Onc as OP  --transfuse with 1U prbc now  --Venofer IV 200mg daily x3 doses        Iron deficiency anemia secondary to blood loss (chronic)  -Patient did not make an appropriate response to 1 unit of blood his hemoglobin went from 7.1 to 7.5 back to 7.1  -patient was given 1 unit of blood on 04/01/2024, additional unit of blood ordered on 04/02/2024   -GI was consulted for evaluation.   was planning to scope on 04/04/2024 if patient's heart rate is controlled.  -of note patient  has history of duodenal bleed.  -NPO after midnight     04/04/2024  EGD postponed until tomorrow d/t tachyarrhythmia  Continue to trend H&H  Stable on AM Labs     Hypertension  Chronic, controlled. Latest blood pressure and vitals reviewed-      Temp:  [97.1 °F (36.2 °C)-99 °F (37.2 °C)]   Pulse:  []   Resp:  [16-20]   BP: (110-139)/(57-93)   SpO2:  [92 %-98 %] .   Home meds for hypertension were reviewed and noted below.   Hypertension Medications                    amLODIPine (NORVASC) 5 MG tablet TAKE 1 TABLET DAILY     carvediloL (COREG) 6.25 MG tablet Take 1 tablet (6.25 mg total) by mouth 2 (two) times daily with meals.     hydroCHLOROthiazide (HYDRODIURIL) 25 MG tablet TAKE 1 TABLET DAILY     furosemide (LASIX) 20 MG tablet Take 1 tablet (20 mg total) by mouth every Mon, Wed, Fri, Sun.                While in the hospital, will manage blood pressure as follows; Continue home antihypertensive regimen     Will utilize p.r.n. blood pressure medication only if patient's blood pressure greater than 160/100 and he develops symptoms such as worsening chest pain or shortness of breath.     Bladder tumor  -patient is followed by Dr. Fletcher as an outpatient.  -UA noted to have greater than 100 RBCs  -urology consulted for evaluation given his history of bladder cancer and blood in the urine over the last few days.     04/04/2024  -Urology not convinced hematuria is cause of anemia  -Patient due for bladder cancer follow up, rec surveillance CT urogram + OP follow up      CORE MEASURES:  VTE Risk Mitigation (From admission, onward)           Ordered     IP VTE HIGH RISK PATIENT  Once         04/01/24 1241     Place sequential compression device  Until discontinued         04/01/24 1241     Reason for No Pharmacological VTE Prophylaxis  Once        Question:  Reasons:  Answer:  Already adequately anticoagulated on oral Anticoagulants    04/01/24 1241                    Code Status: FULL    Diet: Diet  NPO    Disposition: EGD tentatively scheduled for today       Samuel White MD  Department of Hospital Medicine   [facility] - Telemetry ([facility])

## 2024-04-05 NOTE — INTERVAL H&P NOTE
The patient has been examined and the H&P has been reviewed:    I concur with the findings and changes have been noted since the H&P was written: Afib now rated controlled with variety of pharmaco agents. Patient denies overt GI bleeding. Discussed plan to proceed with endoscopic evaluation of the UGI tract before restarting anticoagulation for afib. If negative, will repeat video capsule endoscopy. Daughter and son at bedside. We discussed previous evaluation in the past and repeating VCE wouls be outpatient study. They are all agreeable.     Anesthesia/Surgery risks, benefits and alternative options discussed and understood by patient/family.    The risks, benefits and alternatives of the procedure were discussed with the patient in detail. This discussion was had in the presence of his daughter and son. The risks include, risks of adverse reaction to sedation requiring the use of reversal agents, bleeding requiring blood transfusion, perforation requiring surgical intervention and technical failure. Other risks include aspiration leading to respiratory distress and respiratory failure resulting in endotracheal intubation and mechanical ventilation including death. If anesthesia is being utilized for this procedure, it is up to the anesthesiologist to determine airway safety including elective endotracheal intubation. Questions were answered, they agree to proceed. There was no language barriers.        Active Hospital Problems    Diagnosis  POA    *Paroxysmal atrial fibrillation [I48.0]  Yes     Priority: 2     UTI (urinary tract infection) [N39.0]  Yes    Elevated troponin [R79.89]  Yes    Type 2 diabetes mellitus [E11.9]  Yes    Pancytopenia [D61.818]  Yes    Iron deficiency anemia secondary to blood loss (chronic) [D50.0]  Yes    Hypertension [I10]  Yes    Bladder tumor [D49.4]  Yes      Resolved Hospital Problems   No resolved problems to display.

## 2024-04-05 NOTE — ANESTHESIA POSTPROCEDURE EVALUATION
Anesthesia Post Evaluation    Patient: Jose Miguel Alvarez     Procedure(s) Performed: Procedure(s) (LRB):  EGD (ESOPHAGOGASTRODUODENOSCOPY) (N/A)    Final Anesthesia Type: MAC      Patient location during evaluation: GI PACU  Patient participation: Yes- Able to Participate  Level of consciousness: awake and alert  Post-procedure vital signs: reviewed and stable  Pain management: adequate  Airway patency: patent    PONV status at discharge: No PONV  Anesthetic complications: no      Cardiovascular status: stable  Respiratory status: unassisted and spontaneous ventilation  Hydration status: euvolemic  Follow-up not needed.              Vitals Value Taken Time   BP  04/05/24 1132   Temp  04/05/24 1132   Pulse  04/05/24 1132   Resp  04/05/24 1132   SpO2  04/05/24 1132         No case tracking events are documented in the log.      Pain/Johanna Score: No data recorded

## 2024-04-05 NOTE — ASSESSMENT & PLAN NOTE
Remains in AFIB/RVR on exam today  Add Amiodarone infusion  Change Diltiazem IV gtt to Cardizem 240 mg daily  Continue Cardiac Monitoring  Hold Xarelto given anemia for now.   Last dose of Xarelto on 3/31/2024    4/2/2024  -Converted to SR overnight  -Transition to PO Amiodarone 200 mg BID  -Continue CCB  -Hold Xarelto until cleared per GI to resume given continued issues with anemia    4/3/24  -Back in afib with variable rates this AM  -Re-load with amio per MD  -Continue CCB as BP permits  -Xarelto on hold for now, GI on board, EGD tmw    4/4/24  -Still in afib, HR variable  -BB increased  -Amio switched to po  -Cardizem drip initiated  -Xarelto on hold pending GI workup  -Referral for [facility] as OP    4/5/24  -HR controlled  -Continue BB, po amio, cardizem drip  -AC on hold pending GI workup today

## 2024-04-05 NOTE — ASSESSMENT & PLAN NOTE
Plan for PRBC transfusion today.  Previously had EGD, Video capsule in 2021 without any definitive source of bleeding located.  Further labs to assess anemia pending.     4/3/24  -Improved post transfusion  -GI on board, EGD planned    4/4/24  -Stable  -GI on board, EGD deferred until tomorrow given elevated HR    4/5/24  -EGD today

## 2024-04-06 PROBLEM — I50.33 ACUTE ON CHRONIC DIASTOLIC HEART FAILURE: Status: ACTIVE | Noted: 2024-04-06

## 2024-04-06 LAB
ANION GAP SERPL CALC-SCNC: 14 MMOL/L (ref 8–16)
BASOPHILS # BLD AUTO: 0.02 K/UL (ref 0–0.2)
BASOPHILS NFR BLD: 0.2 % (ref 0–1.9)
BUN SERPL-MCNC: 36 MG/DL (ref 8–23)
CALCIUM SERPL-MCNC: 8 MG/DL (ref 8.7–10.5)
CHLORIDE SERPL-SCNC: 102 MMOL/L (ref 95–110)
CO2 SERPL-SCNC: 19 MMOL/L (ref 23–29)
CREAT SERPL-MCNC: 2.5 MG/DL (ref 0.5–1.4)
DIFFERENTIAL METHOD BLD: ABNORMAL
EOSINOPHIL # BLD AUTO: 0.4 K/UL (ref 0–0.5)
EOSINOPHIL NFR BLD: 3.7 % (ref 0–8)
ERYTHROCYTE [DISTWIDTH] IN BLOOD BY AUTOMATED COUNT: 21.7 % (ref 11.5–14.5)
EST. GFR  (NO RACE VARIABLE): 25 ML/MIN/1.73 M^2
GLUCOSE SERPL-MCNC: 141 MG/DL (ref 70–110)
HCT VFR BLD AUTO: 29.1 % (ref 40–54)
HGB BLD-MCNC: 8.8 G/DL (ref 14–18)
IMM GRANULOCYTES # BLD AUTO: 0.11 K/UL (ref 0–0.04)
IMM GRANULOCYTES NFR BLD AUTO: 1 % (ref 0–0.5)
LYMPHOCYTES # BLD AUTO: 0.4 K/UL (ref 1–4.8)
LYMPHOCYTES NFR BLD: 3.7 % (ref 18–48)
MAGNESIUM SERPL-MCNC: 2.2 MG/DL (ref 1.6–2.6)
MCH RBC QN AUTO: 22 PG (ref 27–31)
MCHC RBC AUTO-ENTMCNC: 30.2 G/DL (ref 32–36)
MCV RBC AUTO: 73 FL (ref 82–98)
MONOCYTES # BLD AUTO: 1 K/UL (ref 0.3–1)
MONOCYTES NFR BLD: 8.7 % (ref 4–15)
NEUTROPHILS # BLD AUTO: 9.3 K/UL (ref 1.8–7.7)
NEUTROPHILS NFR BLD: 82.7 % (ref 38–73)
NRBC BLD-RTO: 0 /100 WBC
PLATELET # BLD AUTO: 106 K/UL (ref 150–450)
PMV BLD AUTO: ABNORMAL FL (ref 9.2–12.9)
POCT GLUCOSE: 145 MG/DL (ref 70–110)
POCT GLUCOSE: 152 MG/DL (ref 70–110)
POCT GLUCOSE: 158 MG/DL (ref 70–110)
POCT GLUCOSE: 222 MG/DL (ref 70–110)
POTASSIUM SERPL-SCNC: 3.2 MMOL/L (ref 3.5–5.1)
RBC # BLD AUTO: 4 M/UL (ref 4.6–6.2)
SODIUM SERPL-SCNC: 135 MMOL/L (ref 136–145)
WBC # BLD AUTO: 11.23 K/UL (ref 3.9–12.7)

## 2024-04-06 PROCEDURE — 80048 BASIC METABOLIC PNL TOTAL CA: CPT | Performed by: INTERNAL MEDICINE

## 2024-04-06 PROCEDURE — 25000003 PHARM REV CODE 250: Performed by: INTERNAL MEDICINE

## 2024-04-06 PROCEDURE — 99232 SBSQ HOSP IP/OBS MODERATE 35: CPT | Mod: ,,, | Performed by: INTERNAL MEDICINE

## 2024-04-06 PROCEDURE — 21400001 HC TELEMETRY ROOM

## 2024-04-06 PROCEDURE — 99900035 HC TECH TIME PER 15 MIN (STAT)

## 2024-04-06 PROCEDURE — 36415 COLL VENOUS BLD VENIPUNCTURE: CPT | Performed by: INTERNAL MEDICINE

## 2024-04-06 PROCEDURE — 83735 ASSAY OF MAGNESIUM: CPT | Performed by: INTERNAL MEDICINE

## 2024-04-06 PROCEDURE — 85025 COMPLETE CBC W/AUTO DIFF WBC: CPT | Performed by: INTERNAL MEDICINE

## 2024-04-06 PROCEDURE — 27000221 HC OXYGEN, UP TO 24 HOURS

## 2024-04-06 PROCEDURE — 99233 SBSQ HOSP IP/OBS HIGH 50: CPT | Mod: ,,, | Performed by: INTERNAL MEDICINE

## 2024-04-06 PROCEDURE — 63600175 PHARM REV CODE 636 W HCPCS: Performed by: INTERNAL MEDICINE

## 2024-04-06 RX ORDER — FUROSEMIDE 10 MG/ML
40 INJECTION INTRAMUSCULAR; INTRAVENOUS ONCE
Status: COMPLETED | OUTPATIENT
Start: 2024-04-06 | End: 2024-04-06

## 2024-04-06 RX ORDER — METOPROLOL TARTRATE 50 MG/1
50 TABLET ORAL 3 TIMES DAILY
Qty: 270 TABLET | Refills: 3 | Status: SHIPPED | OUTPATIENT
Start: 2024-04-06 | End: 2024-05-01

## 2024-04-06 RX ORDER — AMIODARONE HYDROCHLORIDE 200 MG/1
200 TABLET ORAL 2 TIMES DAILY
Qty: 60 TABLET | Refills: 11 | Status: SHIPPED | OUTPATIENT
Start: 2024-04-06 | End: 2024-05-01 | Stop reason: SDUPTHER

## 2024-04-06 RX ORDER — DILTIAZEM HYDROCHLORIDE 360 MG/1
360 CAPSULE, EXTENDED RELEASE ORAL DAILY
Qty: 30 CAPSULE | Refills: 11 | Status: SHIPPED | OUTPATIENT
Start: 2024-04-06 | End: 2024-05-01

## 2024-04-06 RX ORDER — PANTOPRAZOLE SODIUM 20 MG/1
20 TABLET, DELAYED RELEASE ORAL DAILY
Qty: 90 TABLET | Refills: 3 | Status: SHIPPED | OUTPATIENT
Start: 2024-04-06 | End: 2025-04-06

## 2024-04-06 RX ADMIN — METOPROLOL TARTRATE 50 MG: 50 TABLET, FILM COATED ORAL at 09:04

## 2024-04-06 RX ADMIN — DILTIAZEM HYDROCHLORIDE 360 MG: 180 CAPSULE, COATED, EXTENDED RELEASE ORAL at 09:04

## 2024-04-06 RX ADMIN — ACETAMINOPHEN 650 MG: 325 TABLET ORAL at 05:04

## 2024-04-06 RX ADMIN — SUCRALFATE 1 G: 1 SUSPENSION ORAL at 09:04

## 2024-04-06 RX ADMIN — DONEPEZIL HYDROCHLORIDE 5 MG: 5 TABLET, FILM COATED ORAL at 09:04

## 2024-04-06 RX ADMIN — TAMSULOSIN HYDROCHLORIDE 0.4 MG: 0.4 CAPSULE ORAL at 09:04

## 2024-04-06 RX ADMIN — AMIODARONE HYDROCHLORIDE 200 MG: 200 TABLET ORAL at 09:04

## 2024-04-06 RX ADMIN — CEFTRIAXONE 1 G: 1 INJECTION, POWDER, FOR SOLUTION INTRAMUSCULAR; INTRAVENOUS at 05:04

## 2024-04-06 RX ADMIN — FUROSEMIDE 40 MG: 10 INJECTION, SOLUTION INTRAMUSCULAR; INTRAVENOUS at 02:04

## 2024-04-06 RX ADMIN — METOPROLOL TARTRATE 50 MG: 50 TABLET, FILM COATED ORAL at 02:04

## 2024-04-06 NOTE — PROGRESS NOTES
[facility] Medicine  Progress Note     Patient Name:  Jose Miguel Alvarez Jr.  MRN:  [id number]  Patient Class: IP-Inpatient  Admission Date:  4/1/2024   Length of Stay:  4  Attending Physician: Samuel White MD  Primary Care Provider: Toshia Valladares MD    Subjective:      Subsequent Care: Follow up Paroxysmal atrial fibrillation        HPI:  83 y.o. male patient with a PMHx of COPD, CKD, HTN, Afib s/p DCCV. Presented to the Emergency Department for chest pain, onset PTA at home. Symptoms are intermittent and moderate in severity. No mitigating or exacerbating factors reported. Associated sxs include intermittent SOB and intermittent palpitations; pt states that his current sxs are similar to when he was last in Afib. Patient denies any fever, chills, n/v/d, weakness, numbness, dizziness, headache, and all other sxs at this time. Pt is currently on Xarelto and Carvedilol. In the ED, vitals: 146/75, 120, 22, 97.8F, 99% RA. Significant Labs: WBC: 3.85, H/H: 7.1/26.2, Pl: 130K. Trop: 0.015, EKG: A. Fib RVR. CXR: no acute process. Treated with IVP diltiazem x2, no improvement in rate or rhythm. Placed on diltiazem drip. Cardiology consulted. Patient is a full code. Placed in observation under the care of Hospital Medicine for management of A. Fib RVR, Elevated Troponin, Pancytopenia.      Overview/Hospital Course:  Patient admitted with paroxysmal AFib and anemia.  He was started on a Cardizem drip with no improvement therefore amiodarone drip was added.  Patient was also transfused 1 unit of blood for symptomatic anemia.  Cardiology was consulted and recommended transition from IV to p.o. Cardizem and amiodarone.  Plan for monitoring overnight with further recommendations from Cardiology tomorrow.  Due to patient's anemia he was transfused an additional unit of blood on for 04/02/2024 for which she had an appropriate response with a hemoglobin that improved from 7.1-8.5.   Given his history in 2021 of a duodenal bleed, GI was consulted and possibly scoping on 04/04/2024 if patient's heart rate is controlled.  At this time patient is still in AFib while on amiodarone and Cardizem p.o..  Cardiology managing patient's AFib.     4/3/2024  transitioned to p.o. Cardizem and amiodarone on 04/02/2024.  Due to symptomatic anemia cardiology recommended GI consult as he had a previous duodenal bleed which may be the cause for an inappropriate response to wanted a unit of blood that was given by the ER.  He was given an additional unit last night that had an appropriate response from 7.1-8.5.  Case discussed with GI who plans to scope patient tomorrow if heart rate is controlled.  Patient has been made NPO at this time     04/04/2024  EGD pushed to tomorrow due to HR sustained above 130 bpm.      04/05/2024  NPO at midnight in preparation for EGD today.     04/06/2024  EGD confirmed gastric AVMs status post APC.  Recommend low-dose Protonix 20 mg daily.  IV iron infusions to be set up in the outpatient setting.  GI also recommended outpatient video capsule endoscopy.  Gave the okay for patient to restart Xarelto on 04/07/2024.  Initially planning for discharge today, however on re-evaluation, Cardiology recommended further IV diuresis.  Discharge order removed at this time.     Interval Hx  No acute events overnight. Doing well this AM with no complaints at our encounter. Denies CP, SOB, Abdominal pain, fevers/chills.    Objective  BP (!) 122/58   Pulse 89   Temp 98.8 °F (37.1 °C)   Resp 16   Ht 6' (1.829 m)   Wt 107.6 kg (237 lb 3.4 oz)   SpO2 (!) 91%   BMI 32.17 kg/m²     Intake/Output Summary (Last 24 hours) at 4/6/2024 1151  Last data filed at 4/5/2024 1752  Gross per 24 hour   Intake 1702.24 ml   Output --   Net 1702.24 ml       PHYSICAL EXAM  Vitals reviewed  Constitutional:       Appearance: Normal appearance.   HENT:      Head: Normocephalic and atraumatic.      Nose: Nose normal.  "     Mouth/Throat:      Mouth: Mucous membranes are moist.   Eyes:      Extraocular Movements: Extraocular movements intact.      Comments: Pale conjunctiva   Cardiovascular:      Rate and Rhythm: normal rate but irregular rhythm      Pulses: Normal pulses.      Heart sounds: Normal heart sounds.   Pulmonary:      Effort: Pulmonary effort is normal.      Breath sounds: Normal breath sounds.   Abdominal:      General: Abdomen is flat. Bowel sounds are normal.      Palpations: Abdomen is soft.   Musculoskeletal:         General: Normal range of motion.      Cervical back: Normal range of motion and neck supple.   Skin:     General: Skin is warm.      Capillary Refill: Capillary refill takes less than 2 seconds.      Coloration: Skin is pale.   Neurological:      Mental Status: He is alert and oriented to person, place, and time. Mental status is at baseline.   Psychiatric:         Mood and Affect: Mood normal.         Behavior: Behavior normal.        LABS  All labs from the past 24 hours were reviewed.     BMP:   Recent Labs   Lab 04/06/24  0536   *   *   K 3.2*      CO2 19*   BUN 36*   CREATININE 2.5*   CALCIUM 8.0*   MG 2.2     CBC:   Recent Labs   Lab 04/05/24 0750 04/06/24  0536   WBC 9.32 11.23   HGB 8.8* 8.8*   HCT 29.7* 29.1*   * 106*     CMP:   Recent Labs   Lab 04/05/24 0750 04/06/24  0536   * 135*   K 3.3* 3.2*    102   CO2 22* 19*   * 141*   BUN 33* 36*   CREATININE 2.5* 2.5*   CALCIUM 8.1* 8.0*   ANIONGAP 10 14     Cardiac Markers: No results for input(s): "CKMB", "MYOGLOBIN", "BNP", "TROPISTAT" in the last 48 hours.  Coagulation: No results for input(s): "PT", "INR", "APTT" in the last 48 hours.  Lactic Acid: No results for input(s): "LACTATE" in the last 48 hours.  Magnesium:   Recent Labs   Lab 04/05/24 0750 04/06/24  0536   MG 2.1 2.2     Troponin: No results for input(s): "TROPONINI", "TROPONINIHS" in the last 48 hours.  TSH:   Recent Labs   Lab " "04/01/24  1221   TSH 3.937     Urine Studies:   No results for input(s): "COLORU", "APPEARANCEUA", "PHUR", "SPECGRAV", "PROTEINUA", "GLUCUA", "KETONESU", "BILIRUBINUA", "OCCULTUA", "NITRITE", "UROBILINOGEN", "LEUKOCYTESUR", "RBCUA", "WBCUA", "BACTERIA", "SQUAMEPITHEL", "HYALINECASTS" in the last 48 hours.    Invalid input(s): "WRIGHTSUR"    IMAGING  All imaging from the past 24 hours were reviewed.     Imaging Results              X-Ray Chest AP Portable (Final result)  Result time 04/01/24 08:45:18      Final result by Hector Arriaga MD (04/01/24 08:45:18)                   Impression:      No acute process seen.      Electronically signed by: Hector Arriaga MD  Date:    04/01/2024  Time:    08:45               Narrative:    EXAMINATION:  XR CHEST AP PORTABLE    CLINICAL HISTORY:  sob;    FINDINGS:  Single view of the chest.  Comparison 12/4/21    Cardiac silhouette is normal.  The lungs demonstrate no evidence of active disease.  No evidence of pleural effusion or pneumothorax.  Bones appear intact.  Moderate degenerative changes and moderate atherosclerotic disease.                                      Assessment/Plan:      * Paroxysmal atrial fibrillation  Patient with Persistent (7 days or more) atrial fibrillation which is uncontrolled currently with Calcium Channel Blocker. Patient is currently in atrial fibrillation.[id number] Score: 3. Anticoagulation indicated. Anticoagulation done with Xarelto .     Treated with IVP Dilt x2, rate improvement not sustained.      --Cardiology consulted  --Diltiazem IV continuous 10mg/hr initiated per recommendation, rate did not improve, changed to IV amiodarone.  Cardiology transitioned to p.o. amiodarone on 04/02/2024  --patient continues to go into intermittent AFib with heart rates in the 130s.  This is not sustained.  --Tele  --Vitals Q4H  --anticoagulation has been held due to symptomatic anemia     04/04/2024  Intermittently controlled  Cardiology following, defer " management to their team     04/05/2024  More controlled this AM while on the drip  Cardiology following, defer management to their team     04/06/2024  Controlled on current regimen  Appreciate Cards input     UTI (urinary tract infection)  -UA ordered, chest x-ray reviewed   -we will start empiric Rocephin  -repeat CBC in a.m.  -resolved  -urine cultures pending     04/04/2024  Urine culture growing GNR  Prior cultures reviewed- pansensitive organism in 2022  Continue rocephin until cultures finalize    04/06/2024  Last dose of Rocephin is today  No antibiotics needed at discharge.     Type 2 diabetes mellitus  Patient's FSGs are controlled on current medication regimen.  Current correctional scale  Low  Maintain anti-hyperglycemic dose as follows-   Hold Oral hypoglycemics while patient is in the hospital.    04/06/2024  controlled     Elevated troponin  Likely secondary to demand ischemia related to anemia, A. Fib RVR     --trend trop  --Tele  --Vitals Q4H  --Repeat labs in AM  --Transfuse with 1U PRBC on 04/01/2024, 2nd unit transfused on 04/02/2024           Pancytopenia  Chronic anemia, thrombocytopenia, intermittent neutropenia. Has been treated symptomatically, negative for hx of evaluation by Hem/Onc     This patient is found to have pancytopenia, the likely etiology is  Multifactorial , will monitor CBC Daily. Will transfuse red blood cells if the hemoglobin is <7g/dL (or <8 in the setting of ACS). Will transfuse platelets if platelet count is <50k (if undergoing surgical procedure or have active bleeding). Hold DVT prophylaxis if platelets are <50k. The patient's hemoglobin, white blood cell count, and platelet count results have been reviewed and are listed below.        Recent Labs   Lab 04/02/24  0912   HGB 7.1*   WBC 16.29*   *            --F/u with Hem/Onc as OP  --transfuse with 1U prbc now  --Venofer IV 200mg daily x3 doses        Iron deficiency anemia secondary to blood loss  (chronic)  -Patient did not make an appropriate response to 1 unit of blood his hemoglobin went from 7.1 to 7.5 back to 7.1  -patient was given 1 unit of blood on 04/01/2024, additional unit of blood ordered on 04/02/2024   -GI was consulted for evaluation.   was planning to scope on 04/04/2024 if patient's heart rate is controlled.  -of note patient has history of duodenal bleed.  -NPO after midnight     04/04/2024  EGD postponed until tomorrow d/t tachyarrhythmia  Continue to trend H&H  Stable on AM Labs     Hypertension  Chronic, controlled. Latest blood pressure and vitals reviewed-      Temp:  [97.1 °F (36.2 °C)-99 °F (37.2 °C)]   Pulse:  []   Resp:  [16-20]   BP: (110-139)/(57-93)   SpO2:  [92 %-98 %] .   Home meds for hypertension were reviewed and noted below.   Hypertension Medications                    amLODIPine (NORVASC) 5 MG tablet TAKE 1 TABLET DAILY     carvediloL (COREG) 6.25 MG tablet Take 1 tablet (6.25 mg total) by mouth 2 (two) times daily with meals.     hydroCHLOROthiazide (HYDRODIURIL) 25 MG tablet TAKE 1 TABLET DAILY     furosemide (LASIX) 20 MG tablet Take 1 tablet (20 mg total) by mouth every Mon, Wed, Fri, Sun.                While in the hospital, will manage blood pressure as follows; Continue home antihypertensive regimen     Will utilize p.r.n. blood pressure medication only if patient's blood pressure greater than 160/100 and he develops symptoms such as worsening chest pain or shortness of breath.     Bladder tumor  -patient is followed by Dr. Fletcher as an outpatient.  -UA noted to have greater than 100 RBCs  -urology consulted for evaluation given his history of bladder cancer and blood in the urine over the last few days.     04/04/2024  -Urology not convinced hematuria is cause of anemia  -Patient due for bladder cancer follow up, rec surveillance CT urogram + OP follow up      CORE MEASURES:  VTE Risk Mitigation (From admission, onward)           Ordered     IP  VTE HIGH RISK PATIENT  Once         04/01/24 1241     Place sequential compression device  Until discontinued         04/01/24 1241     Reason for No Pharmacological VTE Prophylaxis  Once        Question:  Reasons:  Answer:  Already adequately anticoagulated on oral Anticoagulants    04/01/24 1241                    Code Status: FULL    Diet: Diet Cardiac Standard Tray    Disposition: IV Diuresis, Cards clearance       Samuel White MD  Department of Hospital Medicine   [facility] Tele[facility] ([facility])

## 2024-04-06 NOTE — PLAN OF CARE
[facility] - Tele[facility] (Hospital)  Discharge Final Note    Primary Care Provider: Toshia Valladares MD    Expected Discharge Date: 4/6/2024    Final Discharge Note (most recent)       Final Note - 04/06/24 0855          Final Note    Assessment Type Final Discharge Note     Anticipated Discharge Disposition Home or Self Care        Post-Acute Status    Discharge Delays None known at this time                   No needs at time of chart review. KM,MSW    Important Message from Medicare             Contact Info       Toshia Valladares MD   Specialty: Family Medicine   Relationship: PCP - [address] [address]   Phone: [phone]       Next Steps: Schedule an appointment as soon as possible for a visit    Instructions: Will need outpatient IV iron infusions. This will need to be set through patient's PCP office.

## 2024-04-06 NOTE — PROGRESS NOTES
[facility] ([facility])  Cardiology  Progress Note    Patient Name: Jose Miguel Alvarez Jr.  MRN: [id number]  Admission Date: 4/1/2024  Hospital Length of Stay: 4 days  Code Status: Full Code   Attending Physician: Samuel White MD   Primary Care Physician: Toshia Valladares MD  Expected Discharge Date: 4/6/2024  Principal Problem:Paroxysmal atrial fibrillation    Subjective:     Hospital Course:   4/2/2024- Patient seen and examined in room, has converted to SR overnight. Transition to PO Amiodarone 200 mg BID and continue Diltiazem. Continues to have anemia on repeat labs post PRBC transfusion. Continue to hold Xarelto until anemia has been sorted out.     4/3/24-Patient seen and examined today, back in afib with variable rates. Amiodarone drip resumed. Feels ok. Does admit to some mild fatigue/SOB. No CP. Labs reviewed, H/H 8.5/29.6. GI on board, EGD in AM.    4/4/24-Patient seen and examined today, sitting up in bed. Feels ok overall. Admits to fatigue. No overt bleeding. Reviewed urology note, OP f/u. Still in afib, HR low 100's, meds adjusted. EGD deferred to tmw given elevated HR this AM. H/H 9.1/31.8.    4/5/24-Patient seen and examined today, lying in bed. Feels ok, tired. Remains in afib, HR controlled. Labs reviewed. Creatinine bumped to 2.5, K 3.3. H/H 8.8/29.7. GI on board, plans for EGD today.    4.6.2024  Status post EGD yesterday.  AVM found.  Treated.    No known Carafate.    Still in AFib.  Rates are controlled however.    Sitting up in his chair.  He does have lower extremity swelling today and rales on exam.            Review of Systems   Cardiovascular:  Negative for chest pain, dyspnea on exertion, palpitations and syncope.   Genitourinary: Negative.    Neurological: Negative.      Objective:     Vital Signs (Most Recent):  Temp: 97.7 °F (36.5 °C) (04/06/24 1212)  Pulse: 77 (04/06/24 1212)  Resp: 18 (04/06/24 1212)  BP: (!) 149/62 (04/06/24 1212)  SpO2: 95 % (04/06/24 1212)  Vital Signs (24h Range):  Temp:  [97.7 °F (36.5 °C)-99.1 °F (37.3 °C)] 97.7 °F (36.5 °C)  Pulse:  [] 77  Resp:  [16-19] 18  SpO2:  [91 %-96 %] 95 %  BP: (106-149)/(54-78) 149/62     Weight: 107.6 kg (237 lb 3.4 oz)  Body mass index is 32.17 kg/m².     SpO2: 95 %         Intake/Output Summary (Last 24 hours) at 4/6/2024 1349  Last data filed at 4/5/2024 1752  Gross per 24 hour   Intake 1702.24 ml   Output --   Net 1702.24 ml       Lines/Drains/Airways       Peripheral Intravenous Line  Duration                  Peripheral IV - Single Lumen 04/02/24 1758 20 G Anterior;Proximal;Right Forearm 3 days         Peripheral IV - Single Lumen 04/05/24 1029 20 G Anterior;Left Forearm 1 day         Peripheral IV - Single Lumen 04/05/24 1031 20 G Anterior;Distal;Left Forearm 1 day                       Physical Exam  Vitals reviewed.   Constitutional:       Appearance: He is well-developed.   Neck:      Vascular: No carotid bruit.   Cardiovascular:      Rate and Rhythm: Tachycardia present. Rhythm irregular.      Pulses: Intact distal pulses.      Heart sounds: Normal heart sounds. No murmur heard.  Pulmonary:      Breath sounds: Rales present.   Neurological:      Mental Status: He is oriented to person, place, and time.            Significant Labs: All pertinent lab results from the last 24 hours have been reviewed. and   Recent Lab Results  (Last 5 results in the past 24 hours)        04/06/24  1219   04/06/24  0611   04/06/24  0536   04/05/24  2104   04/05/24  1711        Anion Gap     14           Baso #     0.02           Basophil %     0.2           BUN     36           Calcium     8.0           Chloride     102           CO2     19           Creatinine     2.5           Differential Method     Automated           eGFR     25           Eos #     0.4           Eos %     3.7           Glucose     141           Gran # (ANC)     9.3           Gran %     82.7           Hematocrit     29.1           Hemoglobin     8.8            Immature Grans (Abs)     0.11  Comment: Mild elevation in immature granulocytes is non specific and   can be seen in a variety of conditions including stress response,   acute inflammation, trauma and pregnancy. Correlation with other   laboratory and clinical findings is essential.             Immature Granulocytes     1.0           Lymph #     0.4           Lymph %     3.7           Magnesium      2.2           MCH     22.0           MCHC     30.2           MCV     73           Mono #     1.0           Mono %     8.7           MPV     SEE COMMENT  Comment: Result not available.           nRBC     0           Platelet Count     106           POCT Glucose 222   158     159   155       Potassium     3.2           RBC     4.00           RDW     21.7           Sodium     135           WBC     11.23                                  Significant Imaging:  AFib with rates between 90s and 100s.  Assessment and Plan:     Brief HPI:     * Paroxysmal atrial fibrillation  Remains in AFIB/RVR on exam today  Add Amiodarone infusion  Change Diltiazem IV gtt to Cardizem 240 mg daily  Continue Cardiac Monitoring  Hold Xarelto given anemia for now.   Last dose of Xarelto on 3/31/2024    4/2/2024  -Converted to SR overnight  -Transition to PO Amiodarone 200 mg BID  -Continue CCB  -Hold Xarelto until cleared per GI to resume given continued issues with anemia    4/3/24  -Back in afib with variable rates this AM  -Re-load with amio per MD  -Continue CCB as BP permits  -Xarelto on hold for now, GI on board, EGD tmw    4/4/24  -Still in afib, HR variable  -BB increased  -Amio switched to po  -Cardizem drip initiated  -Xarelto on hold pending GI workup  -Referral for [facility] as OP    4/5/24  -HR controlled  -Continue BB, po amio, cardizem drip  -AC on hold pending GI workup today    4.6.2024  Okay to resume Xarelto tomorrow as per GI.    Continue with amiodarone p.o., Cardizem and metoprolol for rate control.        Acute on chronic  diastolic heart failure  Crackles on exam today mild lower extremity swelling.    Possibly exacerbation secondary to his AFib.    Start IV Lasix.        AVM (arteriovenous malformation) of stomach, acquired with hemorrhage  Treated with APC by GI yesterday.        Type 2 diabetes mellitus  Mgmt per primary team    Elevated troponin  Troponin 0.015>>0.050  Elevation likely due to AFIB/RVR and symptomatic anemia  Chest pain free on exam  ECHO pending    4/3/24  -TTE with normal EF    Iron deficiency anemia secondary to blood loss (chronic)  Plan for PRBC transfusion today.  Previously had EGD, Video capsule in 2021 without any definitive source of bleeding located.  Further labs to assess anemia pending.     4/3/24  -Improved post transfusion  -GI on board, EGD planned    4/4/24  -Stable  -GI on board, EGD deferred until tomorrow given elevated HR    4/5/24  -EGD today    Hypertension  Continue CCB   Monitor BP trend        VTE Risk Mitigation (From admission, onward)           Ordered     IP VTE HIGH RISK PATIENT  Once         04/01/24 1241     Place sequential compression device  Until discontinued         04/01/24 1241     Reason for No Pharmacological VTE Prophylaxis  Once        Question:  Reasons:  Answer:  Already adequately anticoagulated on oral Anticoagulants    04/01/24 1241                    Don Grijalva MD  Cardiology  [facility] Tele[facility] ([facility])

## 2024-04-06 NOTE — ASSESSMENT & PLAN NOTE
Now rate controlled with Amiodarone, Diltiazem and Metoprolol  CV following  Restarting Xarelto tomorrow 4/7/24

## 2024-04-06 NOTE — SUBJECTIVE & OBJECTIVE
Subjective:     Interval History: underwent EGD yesterday that revealed gastric AVMs. These were treated with APC and was placed on Carafate suspension BID    Seen this morning. Tolerating breakfast. Denies overt GI bleeding. No melena, hematemesis or hematochezia. Asking for discharge. He hasn't seen his wife in a week.        Review of Systems  Objective:     Vital Signs (Most Recent):  Temp: 98.8 °F (37.1 °C) (04/06/24 0751)  Pulse: 88 (04/06/24 0751)  Resp: 16 (04/06/24 0751)  BP: (!) 122/58 (04/06/24 0751)  SpO2: (!) 91 % (04/06/24 0751) Vital Signs (24h Range):  Temp:  [97.7 °F (36.5 °C)-99.1 °F (37.3 °C)] 98.8 °F (37.1 °C)  Pulse:  [] 88  Resp:  [16-20] 16  SpO2:  [91 %-99 %] 91 %  BP: ()/(50-78) 122/58     Weight: 107.6 kg (237 lb 3.4 oz) (04/06/24 0751)  Body mass index is 32.17 kg/m².      Intake/Output Summary (Last 24 hours) at 4/6/2024 0839  Last data filed at 4/5/2024 1752  Gross per 24 hour   Intake 1702.24 ml   Output --   Net 1702.24 ml       Lines/Drains/Airways       Peripheral Intravenous Line  Duration                  Peripheral IV - Single Lumen 04/02/24 1758 20 G Anterior;Proximal;Right Forearm 3 days         Peripheral IV - Single Lumen 04/05/24 1029 20 G Anterior;Left Forearm <1 day         Peripheral IV - Single Lumen 04/05/24 1031 20 G Anterior;Distal;Left Forearm <1 day                     Physical Exam  Constitutional:       Appearance: He is obese. He is ill-appearing.   Neurological:      General: No focal deficit present.      Mental Status: He is alert and oriented to person, place, and time. Mental status is at baseline.   Psychiatric:         Mood and Affect: Mood normal.         Behavior: Behavior normal.         Thought Content: Thought content normal.         Judgment: Judgment normal.          Significant Labs:  CBC:   Recent Labs   Lab 04/05/24  0750 04/06/24  0536   WBC 9.32 11.23   HGB 8.8* 8.8*   HCT 29.7* 29.1*   * 106*     CMP:   Recent Labs   Lab  "04/06/24  0536   *   CALCIUM 8.0*   *   K 3.2*   CO2 19*      BUN 36*   CREATININE 2.5*     Coagulation: No results for input(s): "PT", "INR", "APTT" in the last 48 hours.      Significant Imaging:  Imaging results within the past 24 hours have been reviewed.  "

## 2024-04-06 NOTE — SUBJECTIVE & OBJECTIVE
Review of Systems   Cardiovascular:  Negative for chest pain, dyspnea on exertion, palpitations and syncope.   Genitourinary: Negative.    Neurological: Negative.      Objective:     Vital Signs (Most Recent):  Temp: 97.7 °F (36.5 °C) (04/06/24 1212)  Pulse: 77 (04/06/24 1212)  Resp: 18 (04/06/24 1212)  BP: (!) 149/62 (04/06/24 1212)  SpO2: 95 % (04/06/24 1212) Vital Signs (24h Range):  Temp:  [97.7 °F (36.5 °C)-99.1 °F (37.3 °C)] 97.7 °F (36.5 °C)  Pulse:  [] 77  Resp:  [16-19] 18  SpO2:  [91 %-96 %] 95 %  BP: (106-149)/(54-78) 149/62     Weight: 107.6 kg (237 lb 3.4 oz)  Body mass index is 32.17 kg/m².     SpO2: 95 %         Intake/Output Summary (Last 24 hours) at 4/6/2024 1349  Last data filed at 4/5/2024 1752  Gross per 24 hour   Intake 1702.24 ml   Output --   Net 1702.24 ml       Lines/Drains/Airways       Peripheral Intravenous Line  Duration                  Peripheral IV - Single Lumen 04/02/24 1758 20 G Anterior;Proximal;Right Forearm 3 days         Peripheral IV - Single Lumen 04/05/24 1029 20 G Anterior;Left Forearm 1 day         Peripheral IV - Single Lumen 04/05/24 1031 20 G Anterior;Distal;Left Forearm 1 day                       Physical Exam  Vitals reviewed.   Constitutional:       Appearance: He is well-developed.   Neck:      Vascular: No carotid bruit.   Cardiovascular:      Rate and Rhythm: Tachycardia present. Rhythm irregular.      Pulses: Intact distal pulses.      Heart sounds: Normal heart sounds. No murmur heard.  Pulmonary:      Breath sounds: Rales present.   Neurological:      Mental Status: He is oriented to person, place, and time.            Significant Labs: All pertinent lab results from the last 24 hours have been reviewed. and   Recent Lab Results  (Last 5 results in the past 24 hours)        04/06/24  1219   04/06/24  0611   04/06/24  0536   04/05/24  2104   04/05/24  1711        Anion Gap     14           Baso #     0.02           Basophil %     0.2           BUN      36           Calcium     8.0           Chloride     102           CO2     19           Creatinine     2.5           Differential Method     Automated           eGFR     25           Eos #     0.4           Eos %     3.7           Glucose     141           Gran # (ANC)     9.3           Gran %     82.7           Hematocrit     29.1           Hemoglobin     8.8           Immature Grans (Abs)     0.11  Comment: Mild elevation in immature granulocytes is non specific and   can be seen in a variety of conditions including stress response,   acute inflammation, trauma and pregnancy. Correlation with other   laboratory and clinical findings is essential.             Immature Granulocytes     1.0           Lymph #     0.4           Lymph %     3.7           Magnesium      2.2           MCH     22.0           MCHC     30.2           MCV     73           Mono #     1.0           Mono %     8.7           MPV     SEE COMMENT  Comment: Result not available.           nRBC     0           Platelet Count     106           POCT Glucose 222   158     159   155       Potassium     3.2           RBC     4.00           RDW     21.7           Sodium     135           WBC     11.23                                  Significant Imaging:  AFib with rates between 90s and 100s.

## 2024-04-06 NOTE — ASSESSMENT & PLAN NOTE
Crackles on exam today mild lower extremity swelling.    Possibly exacerbation secondary to his AFib.    Start IV Lasix.

## 2024-04-06 NOTE — ASSESSMENT & PLAN NOTE
Acute on chronic anemia is multifactorial (bladder tumor causing intermittent gross hematuria and history of AVMs).   Poor tolerance of oral iron  Consider IV iron infusions

## 2024-04-06 NOTE — ASSESSMENT & PLAN NOTE
Known hx of AVMs in the past  GI evaluations in 2017 and 2021 for anemia  EGD 4/6/24: gastric AVMs s/p APC  H&H stable

## 2024-04-06 NOTE — ASSESSMENT & PLAN NOTE
Remains in AFIB/RVR on exam today  Add Amiodarone infusion  Change Diltiazem IV gtt to Cardizem 240 mg daily  Continue Cardiac Monitoring  Hold Xarelto given anemia for now.   Last dose of Xarelto on 3/31/2024    4/2/2024  -Converted to SR overnight  -Transition to PO Amiodarone 200 mg BID  -Continue CCB  -Hold Xarelto until cleared per GI to resume given continued issues with anemia    4/3/24  -Back in afib with variable rates this AM  -Re-load with amio per MD  -Continue CCB as BP permits  -Xarelto on hold for now, GI on board, EGD tmw    4/4/24  -Still in afib, HR variable  -BB increased  -Amio switched to po  -Cardizem drip initiated  -Xarelto on hold pending GI workup  -Referral for [facility] as OP    4/5/24  -HR controlled  -Continue BB, po amio, cardizem drip  -AC on hold pending GI workup today    4.6.2024  Okay to resume Xarelto tomorrow as per GI.    Continue with amiodarone p.o., Cardizem and metoprolol for rate control.

## 2024-04-06 NOTE — DISCHARGE INSTRUCTIONS
-Can restart Xarelto APRIL 7th 2024.   -Follow up with PCP to arrange outpatient IV iron infusions. NP at home service consulted to assist.

## 2024-04-06 NOTE — PLAN OF CARE
POC reviewed with pt. Pt verbalizes understanding of POC. No questions at this time.  AAOx4. NADN.  Atrial fib on cardiac monitor, rate controlled 70's - 110's at this time.  Received IV Lasix.   Pt remains free of falls.  No complaints at this time.  Safety measures in place. Will continue to monitor.  Informed pt to call for assistance if any dizziness, lightheadedness etc. Pt verbalizes understanding.  Hourly rounding and chart check complete.

## 2024-04-06 NOTE — PROGRESS NOTES
[facility] - Telemetry ([facility])  Gastroenterology  Progress Note    Patient Name: Jose Miguel Alvarez Jr.  MRN: [id number]  Admission Date: 4/1/2024  Hospital Length of Stay: 4 days  Code Status: Full Code   Attending Provider: Samuel White MD  Consulting Provider: Myrtle Salcedo MD  Primary Care Physician: Toshia Valladares MD  Principal Problem: Paroxysmal atrial fibrillation        Subjective:     Interval History: underwent EGD yesterday that revealed gastric AVMs. These were treated with APC and was placed on Carafate suspension BID    Seen this morning. Tolerating breakfast. Denies overt GI bleeding. No melena, hematemesis or hematochezia. Asking for discharge. He hasn't seen his wife in a week.        Review of Systems  Objective:     Vital Signs (Most Recent):  Temp: 98.8 °F (37.1 °C) (04/06/24 0751)  Pulse: 88 (04/06/24 0751)  Resp: 16 (04/06/24 0751)  BP: (!) 122/58 (04/06/24 0751)  SpO2: (!) 91 % (04/06/24 0751) Vital Signs (24h Range):  Temp:  [97.7 °F (36.5 °C)-99.1 °F (37.3 °C)] 98.8 °F (37.1 °C)  Pulse:  [] 88  Resp:  [16-20] 16  SpO2:  [91 %-99 %] 91 %  BP: ()/(50-78) 122/58     Weight: 107.6 kg (237 lb 3.4 oz) (04/06/24 0751)  Body mass index is 32.17 kg/m².      Intake/Output Summary (Last 24 hours) at 4/6/2024 0839  Last data filed at 4/5/2024 1752  Gross per 24 hour   Intake 1702.24 ml   Output --   Net 1702.24 ml       Lines/Drains/Airways       Peripheral Intravenous Line  Duration                  Peripheral IV - Single Lumen 04/02/24 1758 20 G Anterior;Proximal;Right Forearm 3 days         Peripheral IV - Single Lumen 04/05/24 1029 20 G Anterior;Left Forearm <1 day         Peripheral IV - Single Lumen 04/05/24 1031 20 G Anterior;Distal;Left Forearm <1 day                     Physical Exam  Constitutional:       Appearance: He is obese. He is ill-appearing.   Neurological:      General: No focal deficit present.      Mental Status: He is alert and oriented to  "person, place, and time. Mental status is at baseline.   Psychiatric:         Mood and Affect: Mood normal.         Behavior: Behavior normal.         Thought Content: Thought content normal.         Judgment: Judgment normal.          Significant Labs:  CBC:   Recent Labs   Lab 04/05/24  0750 04/06/24  0536   WBC 9.32 11.23   HGB 8.8* 8.8*   HCT 29.7* 29.1*   * 106*     CMP:   Recent Labs   Lab 04/06/24  0536   *   CALCIUM 8.0*   *   K 3.2*   CO2 19*      BUN 36*   CREATININE 2.5*     Coagulation: No results for input(s): "PT", "INR", "APTT" in the last 48 hours.      Significant Imaging:  Imaging results within the past 24 hours have been reviewed.  Assessment/Plan:     Cardiac/Vascular  * Paroxysmal atrial fibrillation  Now rate controlled with Amiodarone, Diltiazem and Metoprolol  CV following  Restarting Xarelto tomorrow 4/7/24    Oncology  Iron deficiency anemia secondary to blood loss (chronic)  Acute on chronic anemia is multifactorial (bladder tumor causing intermittent gross hematuria and history of AVMs).   Poor tolerance of oral iron  Consider IV iron infusions    GI  AVM (arteriovenous malformation) of stomach, acquired with hemorrhage  Known hx of AVMs in the past  GI evaluations in 2017 and 2021 for anemia  EGD 4/6/24: gastric AVMs s/p APC  H&H stable      Recommendations:  Low dose Protonix 20mg daily  Outpatient IV iron infusions - this can be arranged with PCP  Outpatient video capsule endoscopy - arranged by GI  May restart xarelto tomorrow 4/7/24      Communicated with HM and CV services. I will sign off. Please contact us if you have any additional questions.    Myrtle Salcedo MD  Gastroenterology  [facility] - Telemetry ([facility])  "

## 2024-04-07 LAB
ANION GAP SERPL CALC-SCNC: 13 MMOL/L (ref 8–16)
ANION GAP SERPL CALC-SCNC: 13 MMOL/L (ref 8–16)
ANISOCYTOSIS BLD QL SMEAR: SLIGHT
BASOPHILS # BLD AUTO: 0.03 K/UL (ref 0–0.2)
BASOPHILS NFR BLD: 0.3 % (ref 0–1.9)
BNP SERPL-MCNC: 420 PG/ML (ref 0–99)
BUN SERPL-MCNC: 39 MG/DL (ref 8–23)
BUN SERPL-MCNC: 39 MG/DL (ref 8–23)
CALCIUM SERPL-MCNC: 8.3 MG/DL (ref 8.7–10.5)
CALCIUM SERPL-MCNC: 8.3 MG/DL (ref 8.7–10.5)
CHLORIDE SERPL-SCNC: 101 MMOL/L (ref 95–110)
CHLORIDE SERPL-SCNC: 101 MMOL/L (ref 95–110)
CO2 SERPL-SCNC: 21 MMOL/L (ref 23–29)
CO2 SERPL-SCNC: 21 MMOL/L (ref 23–29)
CREAT SERPL-MCNC: 2.7 MG/DL (ref 0.5–1.4)
CREAT SERPL-MCNC: 2.7 MG/DL (ref 0.5–1.4)
DIFFERENTIAL METHOD BLD: ABNORMAL
EOSINOPHIL # BLD AUTO: 0.5 K/UL (ref 0–0.5)
EOSINOPHIL NFR BLD: 4.5 % (ref 0–8)
ERYTHROCYTE [DISTWIDTH] IN BLOOD BY AUTOMATED COUNT: 22.6 % (ref 11.5–14.5)
EST. GFR  (NO RACE VARIABLE): 23 ML/MIN/1.73 M^2
EST. GFR  (NO RACE VARIABLE): 23 ML/MIN/1.73 M^2
GLUCOSE SERPL-MCNC: 142 MG/DL (ref 70–110)
GLUCOSE SERPL-MCNC: 142 MG/DL (ref 70–110)
HCT VFR BLD AUTO: 30.9 % (ref 40–54)
HGB BLD-MCNC: 9.2 G/DL (ref 14–18)
HYPOCHROMIA BLD QL SMEAR: ABNORMAL
IMM GRANULOCYTES # BLD AUTO: 0.15 K/UL (ref 0–0.04)
IMM GRANULOCYTES NFR BLD AUTO: 1.3 % (ref 0–0.5)
LYMPHOCYTES # BLD AUTO: 0.5 K/UL (ref 1–4.8)
LYMPHOCYTES NFR BLD: 4.1 % (ref 18–48)
MAGNESIUM SERPL-MCNC: 2.2 MG/DL (ref 1.6–2.6)
MCH RBC QN AUTO: 22 PG (ref 27–31)
MCHC RBC AUTO-ENTMCNC: 29.8 G/DL (ref 32–36)
MCV RBC AUTO: 74 FL (ref 82–98)
MONOCYTES # BLD AUTO: 1 K/UL (ref 0.3–1)
MONOCYTES NFR BLD: 8.5 % (ref 4–15)
NEUTROPHILS # BLD AUTO: 9.1 K/UL (ref 1.8–7.7)
NEUTROPHILS NFR BLD: 81.3 % (ref 38–73)
NRBC BLD-RTO: 0 /100 WBC
OVALOCYTES BLD QL SMEAR: ABNORMAL
PLATELET # BLD AUTO: 99 K/UL (ref 150–450)
PLATELET BLD QL SMEAR: ABNORMAL
PMV BLD AUTO: ABNORMAL FL (ref 9.2–12.9)
POCT GLUCOSE: 165 MG/DL (ref 70–110)
POIKILOCYTOSIS BLD QL SMEAR: SLIGHT
POLYCHROMASIA BLD QL SMEAR: ABNORMAL
POTASSIUM SERPL-SCNC: 3.4 MMOL/L (ref 3.5–5.1)
POTASSIUM SERPL-SCNC: 3.4 MMOL/L (ref 3.5–5.1)
RBC # BLD AUTO: 4.19 M/UL (ref 4.6–6.2)
SODIUM SERPL-SCNC: 135 MMOL/L (ref 136–145)
SODIUM SERPL-SCNC: 135 MMOL/L (ref 136–145)
TARGETS BLD QL SMEAR: ABNORMAL
WBC # BLD AUTO: 11.25 K/UL (ref 3.9–12.7)

## 2024-04-07 PROCEDURE — 25000003 PHARM REV CODE 250: Performed by: INTERNAL MEDICINE

## 2024-04-07 PROCEDURE — 83880 ASSAY OF NATRIURETIC PEPTIDE: CPT | Performed by: INTERNAL MEDICINE

## 2024-04-07 PROCEDURE — 51702 INSERT TEMP BLADDER CATH: CPT

## 2024-04-07 PROCEDURE — 94761 N-INVAS EAR/PLS OXIMETRY MLT: CPT

## 2024-04-07 PROCEDURE — 99232 SBSQ HOSP IP/OBS MODERATE 35: CPT | Mod: ,,, | Performed by: INTERNAL MEDICINE

## 2024-04-07 PROCEDURE — 83735 ASSAY OF MAGNESIUM: CPT | Performed by: INTERNAL MEDICINE

## 2024-04-07 PROCEDURE — 36415 COLL VENOUS BLD VENIPUNCTURE: CPT | Performed by: INTERNAL MEDICINE

## 2024-04-07 PROCEDURE — 80048 BASIC METABOLIC PNL TOTAL CA: CPT | Performed by: INTERNAL MEDICINE

## 2024-04-07 PROCEDURE — 85025 COMPLETE CBC W/AUTO DIFF WBC: CPT | Performed by: INTERNAL MEDICINE

## 2024-04-07 RX ADMIN — AMIODARONE HYDROCHLORIDE 200 MG: 200 TABLET ORAL at 09:04

## 2024-04-07 RX ADMIN — DILTIAZEM HYDROCHLORIDE 360 MG: 180 CAPSULE, COATED, EXTENDED RELEASE ORAL at 09:04

## 2024-04-07 RX ADMIN — SUCRALFATE 1 G: 1 SUSPENSION ORAL at 09:04

## 2024-04-07 RX ADMIN — TAMSULOSIN HYDROCHLORIDE 0.4 MG: 0.4 CAPSULE ORAL at 09:04

## 2024-04-07 RX ADMIN — METOPROLOL TARTRATE 50 MG: 50 TABLET, FILM COATED ORAL at 09:04

## 2024-04-07 NOTE — ASSESSMENT & PLAN NOTE
Crackles on exam today mild lower extremity swelling.    Possibly exacerbation secondary to his AFib.    Start IV Lasix.      4.7.2024  EUVOLEMIC ON EXAM TODAY.    HOWEVER CREATININE WORSENING.  RECOMMEND BLADDER SCAN FOR FURTHER ADJUSTMENT.    CAN RESUME P.O. LASIX ON DISCHARGE IF CREATININE WORSENING IS POSTOBSTRUCTIVE.

## 2024-04-07 NOTE — PROGRESS NOTES
[facility] - Telemetry ([facility])  Cardiology  Progress Note    Patient Name: Jose Miguel Alvarez Jr.  MRN: [id number]  Admission Date: 4/1/2024  Hospital Length of Stay: 5 days  Code Status: Full Code   Attending Physician: Samuel White MD   Primary Care Physician: Toshia Valladares MD  Expected Discharge Date: 4/6/2024  Principal Problem:Paroxysmal atrial fibrillation    Subjective:     Hospital Course:   4/2/2024- Patient seen and examined in room, has converted to SR overnight. Transition to PO Amiodarone 200 mg BID and continue Diltiazem. Continues to have anemia on repeat labs post PRBC transfusion. Continue to hold Xarelto until anemia has been sorted out.     4/3/24-Patient seen and examined today, back in afib with variable rates. Amiodarone drip resumed. Feels ok. Does admit to some mild fatigue/SOB. No CP. Labs reviewed, H/H 8.5/29.6. GI on board, EGD in AM.    4/4/24-Patient seen and examined today, sitting up in bed. Feels ok overall. Admits to fatigue. No overt bleeding. Reviewed urology note, OP f/u. Still in afib, HR low 100's, meds adjusted. EGD deferred to tmw given elevated HR this AM. H/H 9.1/31.8.    4/5/24-Patient seen and examined today, lying in bed. Feels ok, tired. Remains in afib, HR controlled. Labs reviewed. Creatinine bumped to 2.5, K 3.3. H/H 8.8/29.7. GI on board, plans for EGD today.    4.6.2024  Status post EGD yesterday.  AVM found.  Treated.    No known Carafate.    Still in AFib.  Rates are controlled however.    Sitting up in his chair.  He does have lower extremity swelling today and rales on exam.      4.7.2024  RATE CONTROLLED.    EUVOLEMIC ON EXAM TODAY.    CREATININE WORSENING HOWEVER.    Received Lasix yesterday.        No new subjective & objective note has been filed under this hospital service since the last note was generated.    Assessment and Plan:     Brief HPI:     * Paroxysmal atrial fibrillation  Remains in AFIB/RVR on exam today  Add Amiodarone  infusion  Change Diltiazem IV gtt to Cardizem 240 mg daily  Continue Cardiac Monitoring  Hold Xarelto given anemia for now.   Last dose of Xarelto on 3/31/2024    4/2/2024  -Converted to SR overnight  -Transition to PO Amiodarone 200 mg BID  -Continue CCB  -Hold Xarelto until cleared per GI to resume given continued issues with anemia    4/3/24  -Back in afib with variable rates this AM  -Re-load with amio per MD  -Continue CCB as BP permits  -Xarelto on hold for now, GI on board, EGD tmw    4/4/24  -Still in afib, HR variable  -BB increased  -Amio switched to po  -Cardizem drip initiated  -Xarelto on hold pending GI workup  -Referral for [facility] as OP    4/5/24  -HR controlled  -Continue BB, po amio, cardizem drip  -AC on hold pending GI workup today    4.6.2024  Okay to resume Xarelto tomorrow as per GI.    Continue with amiodarone p.o., Cardizem and metoprolol for rate control.        Acute on chronic diastolic heart failure  Crackles on exam today mild lower extremity swelling.    Possibly exacerbation secondary to his AFib.    Start IV Lasix.      4.7.2024  EUVOLEMIC ON EXAM TODAY.    HOWEVER CREATININE WORSENING.  RECOMMEND BLADDER SCAN FOR FURTHER ADJUSTMENT.    CAN RESUME P.O. LASIX ON DISCHARGE IF CREATININE WORSENING IS POSTOBSTRUCTIVE.          AVM (arteriovenous malformation) of stomach, acquired with hemorrhage  Treated with APC by GI yesterday.        Type 2 diabetes mellitus  Mgmt per primary team    Elevated troponin  Troponin 0.015>>0.050  Elevation likely due to AFIB/RVR and symptomatic anemia  Chest pain free on exam  ECHO pending    4/3/24  -TTE with normal EF    Iron deficiency anemia secondary to blood loss (chronic)  Plan for PRBC transfusion today.  Previously had EGD, Video capsule in 2021 without any definitive source of bleeding located.  Further labs to assess anemia pending.     4/3/24  -Improved post transfusion  -GI on board, EGD planned    4/4/24  -Stable  -GI on board, EGD deferred  until tomorrow given elevated HR    4/5/24  -EGD today    Hypertension  Continue CCB   Monitor BP trend        VTE Risk Mitigation (From admission, onward)           Ordered     IP VTE HIGH RISK PATIENT  Once         04/01/24 1241     Place sequential compression device  Until discontinued         04/01/24 1241     Reason for No Pharmacological VTE Prophylaxis  Once        Question:  Reasons:  Answer:  Already adequately anticoagulated on oral Anticoagulants    04/01/24 1241                    Don Grijalva MD  Cardiology  [facility] - Telemetry ([facility])

## 2024-04-07 NOTE — PLAN OF CARE
Telemetry monitor removed and returned to monitor tech. PIV removed. Discharge instructions reviewed with patient including discharge medications, follow-up appointments, diet, and activity restrictions. Patient verbalizes understanding and voices no concerns. All personal belongings to leave with patient. Pt waiting on ride.

## 2024-04-07 NOTE — DISCHARGE SUMMARY
[facility] Telemetry ([facility] Medicine  Discharge Summary      Patient Name: Jose Miguel Alvarez Jr.  MRN: [id number]  TONE: [id number]  Patient Class: IP- Inpatient  Admission Date: 4/1/2024  Hospital Length of Stay: 5 days  Discharge Date and Time:  04/07/2024 1:57 PM  Attending Physician: Samuel White MD   Discharging Provider: Samuel White MD  Primary Care Provider: Toshia Valladares MD    Primary Care Team: [facility] MEDICINE C    HPI:   83 y.o. male patient with a PMHx of COPD, CKD, HTN, Afib s/p DCCV. Presented to the Emergency Department for chest pain, onset PTA at home. Symptoms are intermittent and moderate in severity. No mitigating or exacerbating factors reported. Associated sxs include intermittent SOB and intermittent palpitations; pt states that his current sxs are similar to when he was last in Afib. Patient denies any fever, chills, n/v/d, weakness, numbness, dizziness, headache, and all other sxs at this time. Pt is currently on Xarelto and Carvedilol. In the ED, vitals: 146/75, 120, 22, 97.8F, 99% RA. Significant Labs: WBC: 3.85, H/H: 7.1/26.2, Pl: 130K. Trop: 0.015, EKG: A. Fib RVR. CXR: no acute process. Treated with IVP diltiazem x2, no improvement in rate or rhythm. Placed on diltiazem drip. Cardiology consulted. Patient is a full code. Placed in observation under the care of [facility] Medicine for management of A. Fib RVR, Elevated Troponin, Pancytopenia.     Procedure(s) (LRB):  EGD (ESOPHAGOGASTRODUODENOSCOPY) (N/A)      Hospital Course:   On April 2, 2024, the patient, having converted to sinus rhythm (SR) overnight, was transitioned to oral Amiodarone 200 mg twice daily (BID) and continued on Diltiazem. Despite a post-packed red blood cell (PRBC) transfusion, the patient continued to exhibit anemia in repeat labs, prompting a hold on Xarelto until resolution. The following day, due to symptomatic anemia and a history of duodenal bleeding, a  gastrointestinal (GI) consult was recommended. The patient, who had previously received an ineffective blood unit in the emergency room, showed an improved response to an additional unit of blood, elevating their levels from 7.1 to 8.5. A GI scope was planned contingent on heart rate control, with the patient being made nil per os (NPO) in anticipation.    On April 4, the esophagogastroduodenoscopy (EGD) was delayed to the next day due to a sustained heart rate over 130 beats per minute (bpm), and a trial was conducted following the removal of an indwelling Mccrary catheter by Urology. Preparation for the EGD continued with the patient remaining NPO from midnight on April 5.    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 A low-dose Protonix (20 mg daily) regimen was recommended alongside setting up outpatient intravenous iron infusions. The GI team also suggested an outpatient video capsule endoscopy and approved the resumption of Xarelto on April 7. Although discharge was initially considered, Cardiology recommended extended IV diuresis due to the patient's condition.    By April 7, the patient had responded well to diuresis and was cleared for discharge by Cardiology. However, a steady rise in creatinine levels since the Mccrary catheter's removal prompted an ultrasound of the kidneys. The results indicated a mild worsening compared to a prior CT urogram, suggesting postobstructive kidney disease. Voiding trial yielded 100 cc but when mccrary was inserted, yielded 1200 cc. Mccrary left indwelling. Reached out to Urology, agreed with leaving Mccrary catheter in place upon discharge and for patient to follow-up with Dr. Fletcher's office for further management and repeat lab tests. Communicated all of the above to patient. Patient acknowledged understanding and agreed to the plan.     BP (!) 105/58 (BP Location: Right arm, Patient Position:  Sitting)   Pulse 81   Temp 97.4 °F (36.3 °C) (Oral)   Resp 18   Ht 6' (1.829 m)   Wt 107.6 kg (237 lb 3.4 oz)   SpO2 (!) 94%   BMI 32.17 kg/m²     PHYSICAL EXAM  Vitals Reviewed  GEN: No acute distress, pleasant, body habitus normal  HEENT: atraumatic and normocephalic  CARDS: regular rate and rhythm, no m/g, pulses palpable in LE  PULM: breathing comfortably on room air, chest symmetric, nonlabored, no abnormal breath sounds on auscultation  ABD: nontender, nondistended, soft, no organomegaly, BS+  : mccrary in place, draining clear yellow urine  Neuro: Alert and oriented x3, CN's I-IX grossly intact, sensation and motor intact; follows directions and answers questions appropriately       Goals of Care Treatment Preferences:  Code Status: Full Code    Living Will: Yes              Consults:   Consults (From admission, onward)          Status Ordering Provider     Inpatient consult to Urology  Once        Provider:  Haley Eid MD    Completed MARGARITA ELMORE     Inpatient consult to Gastroenterology  Once        Provider:  Myrtle Salcedo MD    Completed MARGARITA ELMORE     Inpatient consult to Cardiology  Once        Provider:  Don Grijalva MD    Completed JESSICA BARKER JR            No new Assessment & Plan notes have been filed under this hospital service since the last note was generated.  Service: Hospital Medicine    Final Active Diagnoses:    Diagnosis Date Noted POA    PRINCIPAL PROBLEM:  Paroxysmal atrial fibrillation [I48.0] 04/24/2016 Yes    Acute on chronic diastolic heart failure [I50.33] 04/06/2024 No    AVM (arteriovenous malformation) of stomach, acquired with hemorrhage [K31.811] 04/05/2024 Yes    UTI (urinary tract infection) [N39.0] 04/03/2024 Yes    Elevated troponin [R79.89] 04/01/2024 Yes    Type 2 diabetes mellitus [E11.9] 04/01/2024 Yes    Pancytopenia [D61.818] 10/23/2021 Yes    Iron deficiency anemia secondary to blood loss (chronic) [D50.0] 05/29/2021 Yes     Hypertension [I10] 04/24/2016 Yes    Bladder tumor [D49.4] 04/24/2016 Yes      Problems Resolved During this Admission:       Discharged Condition: good    Disposition: Home or Self Care    Follow Up:   Follow-up Information       Toshia Valladares MD. Schedule an appointment as soon as possible for a visit.    Specialty: Family Medicine  Why: Will need outpatient IV iron infusions. This will need to be set through patient's PCP office.  Contact information:  [address]  [place] [address] [phone]               Medardo Garcia MD Follow up.    Specialty: Urology  Why: Follow up indwelling mccrary.  Contact information:  [id number]  [place] [address] [phone]                           Patient Instructions:      Ambulatory referral/consult to [facility]   Standing Status: Future   Referral Priority: Routine Referral Type: Consultation   Referral Reason: Specialty Services Required   Number of Visits Requested: 1     Prepare RBC   Standing Status: Future Standing Exp. Date: 05/02/25     Order Specific Question Answer Comments   Number of Units 1 Unit    Transfusion Indications Symptomatic Anemia        Significant Diagnostic Studies: Labs: All labs within the past 24 hours have been reviewed    BMP:   Recent Labs   Lab 04/07/24  0555   *  142*   *  135*   K 3.4*  3.4*     101   CO2 21*  21*   BUN 39*  39*   CREATININE 2.7*  2.7*   CALCIUM 8.3*  8.3*   MG 2.2     CBC:   Recent Labs   Lab 04/06/24  0536 04/07/24  0555   WBC 11.23 11.25   HGB 8.8* 9.2*   HCT 29.1* 30.9*   * 99*     CMP:   Recent Labs   Lab 04/06/24  0536 04/07/24  0555   * 135*  135*   K 3.2* 3.4*  3.4*    101  101   CO2 19* 21*  21*   * 142*  142*   BUN 36* 39*  39*   CREATININE 2.5* 2.7*  2.7*   CALCIUM 8.0* 8.3*  8.3*   ANIONGAP 14 13  13     Cardiac Markers:   Recent Labs   Lab 04/07/24  0555   *     Coagulation: No  "results for input(s): "PT", "INR", "APTT" in the last 48 hours.  Lactic Acid: No results for input(s): "LACTATE" in the last 48 hours.  Magnesium:   Recent Labs   Lab 04/06/24  0536 04/07/24  0555   MG 2.2 2.2     Troponin: No results for input(s): "TROPONINI", "TROPONINIHS" in the last 48 hours.  TSH:   Recent Labs   Lab 04/01/24  1221   TSH 3.937     Urine Studies:   No results for input(s): "COLORU", "APPEARANCEUA", "PHUR", "SPECGRAV", "PROTEINUA", "GLUCUA", "KETONESU", "BILIRUBINUA", "OCCULTUA", "NITRITE", "UROBILINOGEN", "LEUKOCYTESUR", "RBCUA", "WBCUA", "BACTERIA", "SQUAMEPITHEL", "HYALINECASTS" in the last 48 hours.    Invalid input(s): "WRIGHTSUR"    Pending Diagnostic Studies:       None           Medications:  Reconciled Home Medications:      Medication List        START taking these medications      amiodarone 200 MG Tab  Commonly known as: PACERONE  Take 1 tablet (200 mg total) by mouth 2 (two) times daily.     diltiaZEM 360 MG 24 hr capsule  Commonly known as: CARDIZEM CD  Take 1 capsule (360 mg total) by mouth once daily.     metoprolol tartrate 50 MG tablet  Commonly known as: LOPRESSOR  Take 1 tablet (50 mg total) by mouth 3 (three) times daily.     pantoprazole 20 MG tablet  Commonly known as: PROTONIX  Take 1 tablet (20 mg total) by mouth once daily.            CONTINUE taking these medications      atorvastatin 20 MG tablet  Commonly known as: LIPITOR  Take 20 mg by mouth every evening.     donepeziL 5 MG tablet  Commonly known as: ARICEPT  TAKE 1 TABLET EVERY EVENING     ergocalciferol 50,000 unit Cap  Commonly known as: ERGOCALCIFEROL  Take 50,000 Units by mouth every 7 days. (Thursdays)     multivitamin per tablet  Commonly known as: THERAGRAN  Take 1 tablet by mouth once daily.     tamsulosin 0.4 mg Cap  Commonly known as: FLOMAX  TAKE 1 CAPSULE DAILY     XARELTO 15 mg Tab  Generic drug: rivaroxaban  TAKE 1 TABLET DAILY WITH DINNER OR EVENING MEAL            STOP taking these medications  "     amLODIPine 5 MG tablet  Commonly known as: NORVASC     carvediloL 6.25 MG tablet  Commonly known as: COREG     famotidine 20 MG tablet  Commonly known as: PEPCID     ferrous sulfate 325 mg (65 mg iron) Tab tablet  Commonly known as: FEOSOL     furosemide 20 MG tablet  Commonly known as: LASIX     hydroCHLOROthiazide 25 MG tablet  Commonly known as: HYDRODIURIL     sildenafiL 100 MG tablet  Commonly known as: VIAGRA              Indwelling Lines/Drains at time of discharge:   Lines/Drains/Airways       Drain  Duration                  Urethral Catheter 04/07/24 1354 16 Fr. <1 day                    Time spent on the discharge of patient: 25 minutes  of time spent on discharge including examining patient, providing discharge instructions, arranging follow-up and documentation.           Samuel White MD  Department of Hospital Medicine  [facility] Tele[facility] ([facility])

## 2024-04-08 ENCOUNTER — TELEPHONE (OUTPATIENT)
Dept: FAMILY MEDICINE | Facility: CLINIC | Age: 84
End: 2024-04-08
Payer: MEDICARE

## 2024-04-08 ENCOUNTER — TELEPHONE (OUTPATIENT)
Dept: UROLOGY | Facility: CLINIC | Age: 84
End: 2024-04-08
Payer: MEDICARE

## 2024-04-08 ENCOUNTER — PATIENT MESSAGE (OUTPATIENT)
Dept: ENDOSCOPY | Facility: HOSPITAL | Age: 84
End: 2024-04-08
Payer: MEDICARE

## 2024-04-08 VITALS
BODY MASS INDEX: 32.13 KG/M2 | OXYGEN SATURATION: 94 % | RESPIRATION RATE: 18 BRPM | DIASTOLIC BLOOD PRESSURE: 58 MMHG | SYSTOLIC BLOOD PRESSURE: 105 MMHG | WEIGHT: 237.19 LBS | HEIGHT: 72 IN | HEART RATE: 81 BPM | TEMPERATURE: 97 F

## 2024-04-08 NOTE — TELEPHONE ENCOUNTER
Spoke to pt's daughter, scheduled pt an appointment for hosp f/u at 10 am on 4/9/24. She verbalized understanding.

## 2024-04-08 NOTE — TELEPHONE ENCOUNTER
"Called and spoke to pt's daughter. She said she was calling to get an appt but someone already scheduled it for her. I asked if there was anything else I can help her with and she said "no".       ----- Message from Mary Holguin sent at 4/8/2024 11:46 AM CDT -----  Contact: Brynn/Daughter  Patient is calling to speak with someone regarding visit. Patient's daughter reports patient is schedule for upcoming visit and request to discuss reason for visit. Please give Ms. Swain a call back at [phone] to assist.  Thank you,  GH    "

## 2024-04-08 NOTE — TELEPHONE ENCOUNTER
----- Message from Satya Carrasquillo sent at 4/8/2024 12:20 PM CDT -----  Contact: [phone]  Brynn is calling in regards to scheduling an hospital follow up for patient. Pt is also needing to schedule iv therapy. Please call daughter back at [phone]. Thanks KB

## 2024-04-09 ENCOUNTER — OFFICE VISIT (OUTPATIENT)
Dept: UROLOGY | Facility: CLINIC | Age: 84
End: 2024-04-09
Payer: MEDICARE

## 2024-04-09 ENCOUNTER — LAB VISIT (OUTPATIENT)
Dept: LAB | Facility: HOSPITAL | Age: 84
End: 2024-04-09
Attending: FAMILY MEDICINE
Payer: MEDICARE

## 2024-04-09 ENCOUNTER — OFFICE VISIT (OUTPATIENT)
Dept: FAMILY MEDICINE | Facility: CLINIC | Age: 84
End: 2024-04-09
Attending: FAMILY MEDICINE
Payer: MEDICARE

## 2024-04-09 VITALS
DIASTOLIC BLOOD PRESSURE: 70 MMHG | SYSTOLIC BLOOD PRESSURE: 110 MMHG | OXYGEN SATURATION: 95 % | TEMPERATURE: 98 F | BODY MASS INDEX: 31.4 KG/M2 | WEIGHT: 231.81 LBS | HEART RATE: 58 BPM | HEIGHT: 72 IN

## 2024-04-09 DIAGNOSIS — Z85.51 HISTORY OF BLADDER CANCER: ICD-10-CM

## 2024-04-09 DIAGNOSIS — C67.9 UROTHELIAL CARCINOMA OF BLADDER: ICD-10-CM

## 2024-04-09 DIAGNOSIS — E11.69 DM TYPE 2 WITH DIABETIC DYSLIPIDEMIA: ICD-10-CM

## 2024-04-09 DIAGNOSIS — C67.9 MALIGNANT NEOPLASM OF URINARY BLADDER, UNSPECIFIED SITE: ICD-10-CM

## 2024-04-09 DIAGNOSIS — D50.0 IRON DEFICIENCY ANEMIA DUE TO CHRONIC BLOOD LOSS: ICD-10-CM

## 2024-04-09 DIAGNOSIS — E78.5 DM TYPE 2 WITH DIABETIC DYSLIPIDEMIA: ICD-10-CM

## 2024-04-09 DIAGNOSIS — R33.9 URINARY RETENTION: Primary | ICD-10-CM

## 2024-04-09 DIAGNOSIS — R31.0 GROSS HEMATURIA: ICD-10-CM

## 2024-04-09 DIAGNOSIS — I48.0 PAROXYSMAL ATRIAL FIBRILLATION: Primary | ICD-10-CM

## 2024-04-09 DIAGNOSIS — I50.33 ACUTE ON CHRONIC DIASTOLIC HEART FAILURE: ICD-10-CM

## 2024-04-09 DIAGNOSIS — K31.811 AVM (ARTERIOVENOUS MALFORMATION) OF STOMACH, ACQUIRED WITH HEMORRHAGE: ICD-10-CM

## 2024-04-09 PROBLEM — N39.0 UTI (URINARY TRACT INFECTION): Status: RESOLVED | Noted: 2024-04-03 | Resolved: 2024-04-09

## 2024-04-09 PROBLEM — N30.01 ACUTE CYSTITIS WITH HEMATURIA: Status: RESOLVED | Noted: 2022-04-20 | Resolved: 2024-04-09

## 2024-04-09 PROBLEM — R79.89 ELEVATED TROPONIN: Status: RESOLVED | Noted: 2024-04-01 | Resolved: 2024-04-09

## 2024-04-09 PROBLEM — R93.89 ABNORMAL CHEST CT: Status: RESOLVED | Noted: 2023-02-28 | Resolved: 2024-04-09

## 2024-04-09 PROBLEM — R94.31 EKG ABNORMALITIES: Status: RESOLVED | Noted: 2022-10-12 | Resolved: 2024-04-09

## 2024-04-09 LAB
ALBUMIN SERPL BCP-MCNC: 2.9 G/DL (ref 3.5–5.2)
ALP SERPL-CCNC: 79 U/L (ref 55–135)
ALT SERPL W/O P-5'-P-CCNC: 19 U/L (ref 10–44)
ANION GAP SERPL CALC-SCNC: 7 MMOL/L (ref 8–16)
ANISOCYTOSIS BLD QL SMEAR: SLIGHT
AST SERPL-CCNC: 22 U/L (ref 10–40)
BASOPHILS # BLD AUTO: 0.05 K/UL (ref 0–0.2)
BASOPHILS NFR BLD: 0.7 % (ref 0–1.9)
BILIRUB SERPL-MCNC: 0.5 MG/DL (ref 0.1–1)
BNP SERPL-MCNC: 82 PG/ML (ref 0–99)
BUN SERPL-MCNC: 19 MG/DL (ref 8–23)
CALCIUM SERPL-MCNC: 8.9 MG/DL (ref 8.7–10.5)
CHLORIDE SERPL-SCNC: 106 MMOL/L (ref 95–110)
CO2 SERPL-SCNC: 27 MMOL/L (ref 23–29)
CREAT SERPL-MCNC: 1.3 MG/DL (ref 0.5–1.4)
DIFFERENTIAL METHOD BLD: ABNORMAL
EOSINOPHIL # BLD AUTO: 0.3 K/UL (ref 0–0.5)
EOSINOPHIL NFR BLD: 3.5 % (ref 0–8)
ERYTHROCYTE [DISTWIDTH] IN BLOOD BY AUTOMATED COUNT: 23.5 % (ref 11.5–14.5)
EST. GFR  (NO RACE VARIABLE): 54.2 ML/MIN/1.73 M^2
ESTIMATED AVG GLUCOSE: 131 MG/DL (ref 68–131)
GLUCOSE SERPL-MCNC: 130 MG/DL (ref 70–110)
HBA1C MFR BLD: 6.2 % (ref 4–5.6)
HCT VFR BLD AUTO: 34.3 % (ref 40–54)
HGB BLD-MCNC: 9.9 G/DL (ref 14–18)
IMM GRANULOCYTES # BLD AUTO: 0.14 K/UL (ref 0–0.04)
IMM GRANULOCYTES NFR BLD AUTO: 2 % (ref 0–0.5)
LYMPHOCYTES # BLD AUTO: 1.3 K/UL (ref 1–4.8)
LYMPHOCYTES NFR BLD: 17.8 % (ref 18–48)
MCH RBC QN AUTO: 22.4 PG (ref 27–31)
MCHC RBC AUTO-ENTMCNC: 28.9 G/DL (ref 32–36)
MCV RBC AUTO: 78 FL (ref 82–98)
MONOCYTES # BLD AUTO: 1.1 K/UL (ref 0.3–1)
MONOCYTES NFR BLD: 15.1 % (ref 4–15)
NEUTROPHILS # BLD AUTO: 4.3 K/UL (ref 1.8–7.7)
NEUTROPHILS NFR BLD: 60.9 % (ref 38–73)
NRBC BLD-RTO: 0 /100 WBC
OVALOCYTES BLD QL SMEAR: ABNORMAL
PLATELET # BLD AUTO: 125 K/UL (ref 150–450)
PLATELET BLD QL SMEAR: ABNORMAL
PMV BLD AUTO: ABNORMAL FL (ref 9.2–12.9)
POIKILOCYTOSIS BLD QL SMEAR: SLIGHT
POLYCHROMASIA BLD QL SMEAR: ABNORMAL
POTASSIUM SERPL-SCNC: 3.7 MMOL/L (ref 3.5–5.1)
PROT SERPL-MCNC: 6.8 G/DL (ref 6–8.4)
RBC # BLD AUTO: 4.42 M/UL (ref 4.6–6.2)
SODIUM SERPL-SCNC: 140 MMOL/L (ref 136–145)
SPHEROCYTES BLD QL SMEAR: ABNORMAL
WBC # BLD AUTO: 7.07 K/UL (ref 3.9–12.7)

## 2024-04-09 PROCEDURE — 1157F ADVNC CARE PLAN IN RCRD: CPT | Mod: CPTII,S$GLB,, | Performed by: NURSE PRACTITIONER

## 2024-04-09 PROCEDURE — 1111F DSCHRG MED/CURRENT MED MERGE: CPT | Mod: CPTII,S$GLB,, | Performed by: FAMILY MEDICINE

## 2024-04-09 PROCEDURE — 80053 COMPREHEN METABOLIC PANEL: CPT | Performed by: FAMILY MEDICINE

## 2024-04-09 PROCEDURE — 1101F PT FALLS ASSESS-DOCD LE1/YR: CPT | Mod: CPTII,S$GLB,, | Performed by: FAMILY MEDICINE

## 2024-04-09 PROCEDURE — 87086 URINE CULTURE/COLONY COUNT: CPT | Performed by: NURSE PRACTITIONER

## 2024-04-09 PROCEDURE — 99999 PR PBB SHADOW E&M-EST. PATIENT-LVL V: CPT | Mod: PBBFAC,,, | Performed by: FAMILY MEDICINE

## 2024-04-09 PROCEDURE — 85025 COMPLETE CBC W/AUTO DIFF WBC: CPT | Performed by: FAMILY MEDICINE

## 2024-04-09 PROCEDURE — 83880 ASSAY OF NATRIURETIC PEPTIDE: CPT | Performed by: FAMILY MEDICINE

## 2024-04-09 PROCEDURE — 3074F SYST BP LT 130 MM HG: CPT | Mod: CPTII,S$GLB,, | Performed by: FAMILY MEDICINE

## 2024-04-09 PROCEDURE — 1111F DSCHRG MED/CURRENT MED MERGE: CPT | Mod: CPTII,S$GLB,, | Performed by: NURSE PRACTITIONER

## 2024-04-09 PROCEDURE — 3072F LOW RISK FOR RETINOPATHY: CPT | Mod: CPTII,S$GLB,, | Performed by: NURSE PRACTITIONER

## 2024-04-09 PROCEDURE — 99215 OFFICE O/P EST HI 40 MIN: CPT | Mod: S$GLB,,, | Performed by: NURSE PRACTITIONER

## 2024-04-09 PROCEDURE — 36415 COLL VENOUS BLD VENIPUNCTURE: CPT | Mod: PO | Performed by: FAMILY MEDICINE

## 2024-04-09 PROCEDURE — 1157F ADVNC CARE PLAN IN RCRD: CPT | Mod: CPTII,S$GLB,, | Performed by: FAMILY MEDICINE

## 2024-04-09 PROCEDURE — 3288F FALL RISK ASSESSMENT DOCD: CPT | Mod: CPTII,S$GLB,, | Performed by: NURSE PRACTITIONER

## 2024-04-09 PROCEDURE — 1159F MED LIST DOCD IN RCRD: CPT | Mod: CPTII,S$GLB,, | Performed by: NURSE PRACTITIONER

## 2024-04-09 PROCEDURE — 1159F MED LIST DOCD IN RCRD: CPT | Mod: CPTII,S$GLB,, | Performed by: FAMILY MEDICINE

## 2024-04-09 PROCEDURE — 90677 PCV20 VACCINE IM: CPT | Mod: S$GLB,,, | Performed by: FAMILY MEDICINE

## 2024-04-09 PROCEDURE — 99496 TRANSJ CARE MGMT HIGH F2F 7D: CPT | Mod: S$GLB,,, | Performed by: FAMILY MEDICINE

## 2024-04-09 PROCEDURE — 83036 HEMOGLOBIN GLYCOSYLATED A1C: CPT | Performed by: FAMILY MEDICINE

## 2024-04-09 PROCEDURE — 3072F LOW RISK FOR RETINOPATHY: CPT | Mod: CPTII,S$GLB,, | Performed by: FAMILY MEDICINE

## 2024-04-09 PROCEDURE — 1101F PT FALLS ASSESS-DOCD LE1/YR: CPT | Mod: CPTII,S$GLB,, | Performed by: NURSE PRACTITIONER

## 2024-04-09 PROCEDURE — G0009 ADMIN PNEUMOCOCCAL VACCINE: HCPCS | Mod: S$GLB,,, | Performed by: FAMILY MEDICINE

## 2024-04-09 PROCEDURE — 99999 PR PBB SHADOW E&M-EST. PATIENT-LVL III: CPT | Mod: PBBFAC,,, | Performed by: NURSE PRACTITIONER

## 2024-04-09 PROCEDURE — 3078F DIAST BP <80 MM HG: CPT | Mod: CPTII,S$GLB,, | Performed by: FAMILY MEDICINE

## 2024-04-09 PROCEDURE — 1126F AMNT PAIN NOTED NONE PRSNT: CPT | Mod: CPTII,S$GLB,, | Performed by: FAMILY MEDICINE

## 2024-04-09 PROCEDURE — 3288F FALL RISK ASSESSMENT DOCD: CPT | Mod: CPTII,S$GLB,, | Performed by: FAMILY MEDICINE

## 2024-04-09 PROCEDURE — 1160F RVW MEDS BY RX/DR IN RCRD: CPT | Mod: CPTII,S$GLB,, | Performed by: FAMILY MEDICINE

## 2024-04-09 PROCEDURE — 1160F RVW MEDS BY RX/DR IN RCRD: CPT | Mod: CPTII,S$GLB,, | Performed by: NURSE PRACTITIONER

## 2024-04-09 RX ORDER — CEFDINIR 300 MG/1
300 CAPSULE ORAL 2 TIMES DAILY
Qty: 20 CAPSULE | Refills: 0 | Status: SHIPPED | OUTPATIENT
Start: 2024-04-09 | End: 2024-04-19

## 2024-04-09 NOTE — PROGRESS NOTES
"Subjective:       Patient ID: Jose Miguel Alvarez Jr. is a 83 y.o. male.    Chief Complaint: Hospital Follow Up    Transitional Care Note    Family and/or Caretaker present at visit?  Yes.  Diagnostic tests reviewed/disposition: I have reviewed all completed as well as pending diagnostic tests at the time of discharge.  Disease/illness education: yes   Home health/community services discussion/referrals: Patient has home health established at  Unsure .   Establishment or re-establishment of referral orders for community resources: No other necessary community resources.   Discussion with other health care providers: No discussion with other health care providers necessary.                [facility] Tele[facility] ([facility])  [facility] Medicine  Discharge Summary        Patient Name: JoseM iguel Alvarez Jr.  MRN: [id number]  TONE: [id number]  Patient Class: IP- Inpatient  Admission Date: 4/1/2024  Hospital Length of Stay: 5 days  Discharge Date and Time:  04/07/2024 1:57 PM  Attending Physician: Samuel White MD   Discharging Provider: Samuel White MD  Primary Care Provider: Toshia Valladares MD      :   83 y.o. male patient with a PMHx of COPD, CKD, HTN, Afib s/p DCCV, bladder ca . F.u on hospitalization on 4/1 due to CP  "In the ED, vitals: 146/75, 120, 22, 97.8F, 99% RA. Significant Labs: WBC: 3.85, H/H: 7.1/26.2, Pl: 130K. Trop: 0.015, EKG: A. Fib RVR. CXR: no acute process. Treated with IVP diltiazem x2, no improvement in rate or rhythm. Placed on diltiazem drip. Cardiology consulted"   Procedure(s) (LRB):  EGD    He converted to sinus rhythm on 4/2 and was started on amiodarone . He continued to have anemia  after being transfused 1 prbc. Xarelto was held . GI was consult . EGD on 4/6 demonstrated gastrics AVM which were treated with APC . Started on Pantoprazole+ IV iron infusion . GI also recommended outpatient video capsule . Xarelto  was resumed . He was administered  extended diuersis per card " recs on 4/7 / Creatinine worsen . Mccrary was removed . Kidney u/s was suggestive of postobstructive kidney disease. Voiding trial only yielded 100 cc . Mccrary was re inserted and yielded 1200 cc of urine . Urology recommended leaving Mccrary catheter   and short outpatient f.u koko . He has seen urology today , mccrary was left in place . He was treated empirically for UTI with Omnicef and is scheduled for Cystoscopy next week since he was  lost for f.u after being diagnosed with Bladder ca . Denies sob, cp  . + LYLE.              Review of Systems   Constitutional: Negative.    HENT: Negative.     Eyes: Negative.    Respiratory: Negative.     Cardiovascular: Negative.    Gastrointestinal: Negative.    Genitourinary: Negative.    Musculoskeletal: Negative.    Skin: Negative.    Hematological: Negative.        Objective:      Physical Exam  Constitutional:       General: He is not in acute distress.     Appearance: He is well-developed. He is not diaphoretic.   HENT:      Head: Normocephalic and atraumatic.      Right Ear: External ear normal.      Left Ear: External ear normal.      Nose: Nose normal.      Mouth/Throat:      Pharynx: No oropharyngeal exudate.   Eyes:      General: No scleral icterus.        Right eye: No discharge.         Left eye: No discharge.      Conjunctiva/sclera: Conjunctivae normal.      Pupils: Pupils are equal, round, and reactive to light.   Neck:      Thyroid: No thyromegaly.      Vascular: No JVD.      Trachea: No tracheal deviation.   Cardiovascular:      Rate and Rhythm: Normal rate and regular rhythm.      Heart sounds: Normal heart sounds. No murmur heard.     No friction rub. No gallop.   Pulmonary:      Effort: Pulmonary effort is normal. No respiratory distress.      Breath sounds: Normal breath sounds. No stridor. No wheezing or rales.   Chest:      Chest wall: No tenderness.   Abdominal:      General: Bowel sounds are normal. There is no distension.      Palpations: Abdomen is soft.       Tenderness: There is no abdominal tenderness. There is no guarding or rebound.   Musculoskeletal:         General: No tenderness. Normal range of motion.      Cervical back: Normal range of motion and neck supple.      Right lower le+ Edema present.      Left lower le+ Edema present.   Lymphadenopathy:      Cervical: No cervical adenopathy.   Skin:     General: Skin is warm and dry.      Coloration: Skin is not pale.      Findings: No erythema or rash.   Neurological:      Mental Status: He is alert and oriented to person, place, and time.      Cranial Nerves: No cranial nerve deficit.      Motor: No abnormal muscle tone.      Coordination: Coordination normal.      Deep Tendon Reflexes: Reflexes are normal and symmetric. Reflexes normal.   Psychiatric:         Behavior: Behavior normal.         Thought Content: Thought content normal.         Judgment: Judgment normal.         Assessment:     Jose Miguel was seen today for hospital follow up.    Diagnoses and all orders for this visit:    Paroxysmal atrial fibrillation    Acute on chronic diastolic heart failure  -     BNP; Future    AVM (arteriovenous malformation) of stomach, acquired with hemorrhage    Iron deficiency anemia due to chronic blood loss  -     Ambulatory referral/consult to Hematology / Oncology; Future    Malignant neoplasm of urinary bladder, unspecified site  -     Ambulatory referral/consult to Hematology / Oncology; Future    Urothelial carcinoma of bladder    DM type 2 with diabetic dyslipidemia  -     CBC Auto Differential; Future  -     Comprehensive Metabolic Panel; Future  -     Hemoglobin A1C; Future    Other orders  -     (In Office Administered) Pneumococcal Conjugate Vaccine (20 Valent) (IM) (Preferred)           Plan:     Jose Miguel was seen today for hospital follow up.    Diagnoses and all orders for this visit:    Paroxysmal atrial fibrillation    Acute on chronic diastolic heart failure    AVM (arteriovenous malformation) of  stomach, acquired with hemorrhage    Iron deficiency anemia due to chronic blood loss     Continue Amiodarone . F.u with card   Limit Na and Fluid intake   Continue PPI , labs , f.u with GI     Hematology   F.u with Urology   Labs

## 2024-04-09 NOTE — PROGRESS NOTES
Chief Complaint:   History of bladder cancer  Gross hematuria  Urinary retention    HPI:   Patient is an 83-year-old male that is presenting with his daughter.  Daughter states that patient had acute onset of atrial fibrillation and presented to ED.  Was found to be in urinary retention with gross hematuria, Pandey catheter was placed.  Patient has been on tamsulosin for over 2 years.  Also on Xarelto, not a new medication.  Has a history of bladder cancer and has been followed by Dr. Garcia, unfortunately, daughter states that patient did not return for follow-up.  Radha BUCKLEY  3/3/23- patient has been voiding well with no hematuria or issues, here today for surveillance screening cystoscopy.  12/22/21- patient comes in today to establish care my clinic, recent gross hematuria and reported to see Alexandra.  CT urogram demonstrates bladder stones, urinary retention and a distal right ureteral stone.  Patient has had a Pandey catheter would like this removed today, no more gross hematuria, he is also on antibiotics and tamsulosin.  Patient otherwise was voiding well with no complaints prior to this incident, history of TURBT with subsequent induction BCG, no recurrences.  Allergies:  Patient has no known allergies.    Medications:  has a current medication list which includes the following prescription(s): amiodarone, atorvastatin, diltiazem, donepezil, ergocalciferol, metoprolol tartrate, multivitamin, pantoprazole, tamsulosin, and xarelto, and the following Facility-Administered Medications: lactated ringers.    Review of Systems:  General: No fever, chills, fatigability, or weight loss.  Skin: No rashes, itching, or changes in color or texture of skin.  Chest: Denies RECINOS, cyanosis, wheezing, cough, and sputum production.  Abdomen: Appetite fine. No weight loss. Denies diarrhea, abdominal pain, hematemesis, or blood in stool.  Musculoskeletal: No joint stiffness or swelling. Denies back pain.  : As above.  All other  review of systems negative.    PMH:   has a past medical history of Anemia, Anticoagulant long-term use, Atrial fibrillation, Bladder cancer, Bladder tumor, CKD (chronic kidney disease), stage III, COPD (chronic obstructive pulmonary disease), Encounter for blood transfusion, Hematuria, History of 2019 novel coronavirus disease (COVID-19), Hypercholesteremia, and Hypertension.    PSH:   has a past surgical history that includes Cholecystectomy; Cystoscopy; Cardioversion; bladder tumor removal; Eye surgery; cataract removal with IOL implants (Bilateral); Cardiac electrophysiology mapping and ablation; Transurethral resection of bladder tumor by bipolar cautery (N/A, 7/23/2019); Esophagogastroduodenoscopy (N/A, 10/25/2021); Intraluminal gastrointestinal tract imaging via capsule (N/A, 12/6/2021); Esophagogastroduodenoscopy (N/A, 12/13/2021); Cystoscopy with biopsy of bladder (Right, 12/28/2021); Cystoscopic litholapaxy (N/A, 12/28/2021); Cataract extraction (Bilateral); and Esophagogastroduodenoscopy (N/A, 4/5/2024).    FamHx: family history includes Heart disease in his brother and father; Hypertension in his father.    SocHx:  reports that he quit smoking about 37 years ago. His smoking use included cigarettes. He started smoking about 57 years ago. He has a 50.0 pack-year smoking history. He has never used smokeless tobacco. He reports current alcohol use of about 7.0 standard drinks of alcohol per week. He reports that he does not use drugs.      Physical Exam:  General: A&Ox3, no apparent distress, no deformities  Neck: No masses, normal thyroid  Lungs: normal inspiration, no use of accessory muscles  Heart: normal pulse, no arrhythmias  Abdomen: Soft, NT, ND, no masses, no hernias, no hepatosplenomegaly  Lymphatic: Neck and groin nodes negative    Impression/Plan:   Urinary retention with a history of bladder cancer  Patient was scheduled with Dr. Garcia for cystoscopy.  Pandey catheter will remain, urine  culture sent, and patient was empirically treated with Omnicef.

## 2024-04-11 LAB — BACTERIA UR CULT: NO GROWTH

## 2024-04-15 ENCOUNTER — OFFICE VISIT (OUTPATIENT)
Dept: PODIATRY | Facility: CLINIC | Age: 84
End: 2024-04-15
Payer: MEDICARE

## 2024-04-15 DIAGNOSIS — E11.42 TYPE 2 DIABETES MELLITUS WITH PERIPHERAL NEUROPATHY: ICD-10-CM

## 2024-04-15 DIAGNOSIS — L60.3 ONYCHODYSTROPHY: ICD-10-CM

## 2024-04-15 DIAGNOSIS — N18.30 STAGE 3 CHRONIC KIDNEY DISEASE, UNSPECIFIED WHETHER STAGE 3A OR 3B CKD: ICD-10-CM

## 2024-04-15 DIAGNOSIS — L03.031 CELLULITIS OF GREAT TOE, RIGHT: Primary | ICD-10-CM

## 2024-04-15 DIAGNOSIS — L84 CALLUS OF TOE: ICD-10-CM

## 2024-04-15 DIAGNOSIS — Z79.01 ANTICOAGULATED: ICD-10-CM

## 2024-04-15 DIAGNOSIS — S91.209A TRAUMATIC AVULSION OF NAIL PLATE OF TOE, INITIAL ENCOUNTER: ICD-10-CM

## 2024-04-15 DIAGNOSIS — I48.0 PAROXYSMAL ATRIAL FIBRILLATION: ICD-10-CM

## 2024-04-15 PROCEDURE — 3288F FALL RISK ASSESSMENT DOCD: CPT | Mod: CPTII,S$GLB,, | Performed by: PODIATRIST

## 2024-04-15 PROCEDURE — 1159F MED LIST DOCD IN RCRD: CPT | Mod: CPTII,S$GLB,, | Performed by: PODIATRIST

## 2024-04-15 PROCEDURE — 1160F RVW MEDS BY RX/DR IN RCRD: CPT | Mod: CPTII,S$GLB,, | Performed by: PODIATRIST

## 2024-04-15 PROCEDURE — 1111F DSCHRG MED/CURRENT MED MERGE: CPT | Mod: CPTII,S$GLB,, | Performed by: PODIATRIST

## 2024-04-15 PROCEDURE — 11721 DEBRIDE NAIL 6 OR MORE: CPT | Mod: 59,Q9,S$GLB, | Performed by: PODIATRIST

## 2024-04-15 PROCEDURE — 1101F PT FALLS ASSESS-DOCD LE1/YR: CPT | Mod: CPTII,S$GLB,, | Performed by: PODIATRIST

## 2024-04-15 PROCEDURE — 3072F LOW RISK FOR RETINOPATHY: CPT | Mod: CPTII,S$GLB,, | Performed by: PODIATRIST

## 2024-04-15 PROCEDURE — 1157F ADVNC CARE PLAN IN RCRD: CPT | Mod: CPTII,S$GLB,, | Performed by: PODIATRIST

## 2024-04-15 PROCEDURE — 11055 PARING/CUTG B9 HYPRKER LES 1: CPT | Mod: Q9,S$GLB,, | Performed by: PODIATRIST

## 2024-04-15 PROCEDURE — 99999 PR PBB SHADOW E&M-EST. PATIENT-LVL III: CPT | Mod: PBBFAC,,, | Performed by: PODIATRIST

## 2024-04-15 PROCEDURE — 99204 OFFICE O/P NEW MOD 45 MIN: CPT | Mod: 25,S$GLB,, | Performed by: PODIATRIST

## 2024-04-15 RX ORDER — CLINDAMYCIN HYDROCHLORIDE 300 MG/1
300 CAPSULE ORAL 3 TIMES DAILY
Qty: 30 CAPSULE | Refills: 0 | Status: SHIPPED | OUTPATIENT
Start: 2024-04-15 | End: 2024-04-25

## 2024-04-15 NOTE — PROGRESS NOTES
Subjective:       Patient ID: Jose Miguel Alvarez Jr. is a 83 y.o. male.    Chief Complaint: Wound Check (Patient reports hitting right hallux with metal pipe. Right hallux nail noted detached during dressing removal. Denies pain at present and is non diabetic. Patient continues on xarelto. )    HPI: Jose Miguel Alvarez Jr. presents to the office today, with history of nail avulsion to the right great toe.  Patient states that he hit his right great toe against a metal pipe and ultimately resulted in the nail to become elevated and detached.  Reports that redness has been ongoing for several days.  He has not currently on antibiotics.  He also does have history of longstanding callus/wound to the distal aspect of the right 2nd toe.  He reports that he was seeing another provider in [place] who recommended shortening the digit versus amputation.  Patient states he did not like these options.  Also is concerned of elongated, thickened, dystrophic toenails to the right foot and left foot.  He does have history of type 2 diabetes mellitus and on long-term anticoagulation therapy secondary to history of atrial fibrillation.  Continues to follow with his primary care provider, Dr. Valladares with last appointment on 04/09/2024.    Review of patient's allergies indicates:  No Known Allergies    Past Medical History:   Diagnosis Date    Anemia     Anticoagulant long-term use     Atrial fibrillation     Bladder cancer     Bladder tumor     CKD (chronic kidney disease), stage III     COPD (chronic obstructive pulmonary disease)     pt denies    Encounter for blood transfusion     Hematuria     History of 2019 novel coronavirus disease (COVID-19)     Hypercholesteremia     Hypertension        Family History   Problem Relation Name Age of Onset    Heart disease Father      Hypertension Father      Heart disease Brother         Social History     Socioeconomic History    Marital status:    Tobacco Use    Smoking  status: Former     Current packs/day: 0.00     Average packs/day: 2.5 packs/day for 20.0 years (50.0 ttl pk-yrs)     Types: Cigarettes     Start date:      Quit date:      Years since quittin.3    Smokeless tobacco: Never   Substance and Sexual Activity    Alcohol use: Yes     Alcohol/week: 7.0 standard drinks of alcohol     Types: 7 Cans of beer per week    Drug use: No    Sexual activity: Not Currently     Social Determinants of Health     Financial Resource Strain: Low Risk  (5/10/2022)    Overall Financial Resource Strain (CARDIA)     Difficulty of Paying Living Expenses: Not hard at all   Food Insecurity: No Food Insecurity (5/10/2022)    Hunger Vital Sign     Worried About Running Out of Food in the Last Year: Never true     Ran Out of Food in the Last Year: Never true   Transportation Needs: No Transportation Needs (5/10/2022)    PRAPARE - Transportation     Lack of Transportation (Medical): No     Lack of Transportation (Non-Medical): No   Physical Activity: Sufficiently Active (5/10/2022)    Exercise Vital Sign     Days of Exercise per Week: 7 days     Minutes of Exercise per Session: 30 min   Stress: No Stress Concern Present (5/10/2022)    [employer] [place] [employer] - Occupational Stress Questionnaire     Feeling of Stress : Not at all   Social Connections: Socially Integrated (5/10/2022)    Social Connection and Isolation Panel [NHANES]     Frequency of Communication with Friends and Family: Twice a week     Frequency of Social Gatherings with Friends and Family: Once a week     Attends Hindu Services: More than 4 times per year     Active Member of Clubs or Organizations: Yes     Attends Club or Organization Meetings: More than 4 times per year     Marital Status:    Housing Stability: Low Risk  (5/10/2022)    Housing Stability Vital Sign     Unable to Pay for Housing in the Last Year: No     Number of Places Lived in the Last Year: 1     Unstable Housing in the  Last Year: No       Past Surgical History:   Procedure Laterality Date    bladder tumor removal      CARDIAC ELECTROPHYSIOLOGY MAPPING AND ABLATION      CARDIOVERSION      several    CATARACT EXTRACTION Bilateral     cataract removal with IOL implants Bilateral     CHOLECYSTECTOMY      CYSTOSCOPIC LITHOLAPAXY N/A 12/28/2021    Procedure: CYSTOLITHOLAPAXY;  Surgeon: Medardo Garcia MD;  Location: [facility];  Service: Urology;  Laterality: N/A;    CYSTOSCOPY      CYSTOSCOPY WITH BIOPSY OF BLADDER Right 12/28/2021    Procedure: CYSTOSCOPY, WITH BLADDER BIOPSY, WITH FULGURATION IF INDICATED;  Surgeon: Medardo Garcia MD;  Location: [facility];  Service: Urology;  Laterality: Right;    ESOPHAGOGASTRODUODENOSCOPY N/A 10/25/2021    Procedure: Small Jason Enteroscopy (SBE);  Surgeon: Isabelle Griffin MD;  Location: [facility];  Service: Endoscopy;  Laterality: N/A;    ESOPHAGOGASTRODUODENOSCOPY N/A 12/13/2021    Procedure: EGD (ESOPHAGOGASTRODUODENOSCOPY);  Surgeon: Troy Polanco MD;  Location: [facility];  Service: Endoscopy;  Laterality: N/A;    ESOPHAGOGASTRODUODENOSCOPY N/A 4/5/2024    Procedure: EGD (ESOPHAGOGASTRODUODENOSCOPY);  Surgeon: Myrtle Salcedo MD;  Location: [facility];  Service: Endoscopy;  Laterality: N/A;    EYE SURGERY      INTRALUMINAL GASTROINTESTINAL TRACT IMAGING VIA CAPSULE N/A 12/6/2021    Procedure: IMAGING PROCEDURE, GI TRACT, INTRALUMINAL, VIA CAPSULE;  Surgeon: Larry Jones RN;  Location: [facility];  Service: Endoscopy;  Laterality: N/A;    TRANSURETHRAL RESECTION OF BLADDER TUMOR BY BIPOLAR CAUTERY N/A 7/23/2019    Procedure: TURBT, USING BIPOLAR CAUTERY;  Surgeon: Ritesh Marques MD;  Location: [facility];  Service: Urology;  Laterality: N/A;       Review of Systems       Objective:   There were no vitals taken for this visit.    US Retroperitoneal Complete  Narrative: EXAMINATION:  US RETROPERITONEAL COMPLETE    CLINICAL HISTORY:  DOMINIQUE;    TECHNIQUE:  Ultrasound of the kidneys and  urinary bladder was performed including color flow and Doppler evaluation of the kidneys.    COMPARISON:  CT urogram 04/04/2024    FINDINGS:  Right kidney: The right kidney measures 11.8 cm. Cortical thinning.  No loss of corticomedullary distinction. Resistive index measures 0.68.  No mass. No renal stone. Moderate hydronephrosis.    Left kidney: The left kidney measures 13.1 cm. Cortical thinning.  No loss of corticomedullary distinction. Resistive index measures 0.81.  No mass. No renal stone. No hydronephrosis.    Urinary bladder is significantly distended, but demonstrates no significant abnormality otherwise..  Impression: Moderate right-sided hydronephrosis.    Chronic medical renal disease.    Electronically signed by: Frederick Love  Date:    04/07/2024  Time:    13:03       Physical Exam   LOWER EXTREMITY PHYSICAL EXAMINATION    Vascular:  The right dorsalis pedis pulse 2/4 and the right posterior tibial pulse 1/4.  The left dorsalis pedis pulse 2/4 and posterior tibial pulse on the left is 1/4.  Capillary refill is intact.  Pedal hair growth decreased.  Edema noted to the bilateral lower extremities      Neurological:  Protective sensation is intact with [place] Amanda monofilament. Proprioception is intact. Intact sensation to light touch.  Vibratory sensation is diminished to the 1st metatarsal phalangeal joint.     Musculoskeletal: Manual Muscle Testing is 5/5 with dorsiflexion, plantar flexion, abduction, and adduction.   There is normal range of motion in the forefoot, hindfoot, and Ankle joint.    Dermatological:  Skin is thin, shiny, and atrophic.  Right hallux toenail has been avulsed.  There is some fibrotic tissue present to the nail bed which was easily removed down to healthy nail bed thereafter.  No signs of acute infection to the nail bed, however there is circumferential erythema to the proximal eponychium and the great toe.  No evidence of abscess formation or drainage  appreciated.    Hyperkeratotic callus with underlying discoloration present to the 2nd toe.  Upon removal, no signs of underlying ulceration or acute signs of infection.  No evidence of bleeding.       The R 2,3, 5 and left L1,2, 5 are thickened, discolored dystrophic.  There is subungual debris.  Nail plates have area of dark discoloration.  The remaining nails on the right foot and the left foot are elongated but of normal color, thickness, and texture.   There is no signs of ingrowing into the medial or lateral borders.  There is no evidence of wounds or skin breakdown.  No edema or erythema.  No obvious lacerations or fissuring.  Interdigital spaces are clean, dry, intact.  No rashes or scars appreciated.    Assessment:     1. Cellulitis of great toe, right    2. Traumatic avulsion of nail plate of toe, initial encounter    3. Type 2 diabetes mellitus with peripheral neuropathy    4. Stage 3 chronic kidney disease, unspecified whether stage 3a or 3b CKD    5. Anticoagulated    6. Paroxysmal atrial fibrillation    7. Callus of toe    8. Onychodystrophy        Plan:     Cellulitis of great toe, right    Traumatic avulsion of nail plate of toe, initial encounter    Type 2 diabetes mellitus with peripheral neuropathy    Stage 3 chronic kidney disease, unspecified whether stage 3a or 3b CKD    Anticoagulated    Paroxysmal atrial fibrillation    Callus of toe    Onychodystrophy    Other orders  -     clindamycin (CLEOCIN) 300 MG capsule; Take 1 capsule (300 mg total) by mouth 3 (three) times daily. for 10 days  Dispense: 30 capsule; Refill: 0      Thorough discussion is had with the patient today, concerning the diagnosis, its etiology, and the treatment algorithm at present.  Will recommend oral antibiotics due to cellulitis of the right great toe.  Recent kidney studies were evaluated show GFR has improved to 59.  Continue on long-term anticoagulation therapy due to atrial fibrillation.  Monitored per  PCP/cardiology    Hypertrophic skin formation, as outlined within the examination portion of this note, is surgically debrided with sharp #10/#15 blade, to alleviate discomfort with weight bearing and ambulation, and to lessen the possibility of skin complications, e.g., ulceration due to pressure. No ulceration(s) is are noted with/post debridement. The lesion is completed healed and resolved. No evidence of infection.    Dystrophic nail plates, as outlined above (R#2,3,5  ; L#1,2,5 ), are sharply debrided with double action nail nipper, and/or with the assistance of a mechanical rotary hedy, with removal of all offending nail and nail border(s), for reduction of pains. Nails are reduced in terms of length, width and girth with removal of subungual debris to facilitate pain free weight bearing and ambulation. The elongated nails as outlined in the objective portion of this note, were trimmed to appropriate length, with a double action nail nipper, for alleviation/reduction of pains as well. Follow up in approx. 3-4 months.          Future Appointments   Date Time Provider Department Center   4/26/2024  2:15 PM Medardo Garcia MD ON UROLOGY [facility]   4/29/2024  9:30 AM Xiao Bowman DPM ONLC POD [facility]   4/29/2024  3:00 PM Josh De Paz PA-C ON GASTRO [facility]   5/1/2024  8:20 AM Carlos Folwer MD [facility]

## 2024-04-18 ENCOUNTER — HOSPITAL ENCOUNTER (OUTPATIENT)
Facility: HOSPITAL | Age: 84
Discharge: HOME | End: 2024-04-18
Attending: INTERNAL MEDICINE | Admitting: INTERNAL MEDICINE
Payer: MEDICARE

## 2024-04-18 PROCEDURE — 91110 GI TRC IMG INTRAL ESOPH-ILE: CPT | Mod: TC | Performed by: INTERNAL MEDICINE

## 2024-04-18 PROCEDURE — 27201489 HC PILLCAM CAPSULE

## 2024-04-19 ENCOUNTER — TELEPHONE (OUTPATIENT)
Dept: PREADMISSION TESTING | Facility: HOSPITAL | Age: 84
End: 2024-04-19
Payer: MEDICARE

## 2024-04-19 ENCOUNTER — TELEPHONE (OUTPATIENT)
Dept: GASTROENTEROLOGY | Facility: CLINIC | Age: 84
End: 2024-04-19
Payer: MEDICARE

## 2024-04-19 DIAGNOSIS — K31.811 AVM (ARTERIOVENOUS MALFORMATION) OF STOMACH, ACQUIRED WITH HEMORRHAGE: Primary | ICD-10-CM

## 2024-04-19 PROCEDURE — 91110 GI TRC IMG INTRAL ESOPH-ILE: CPT | Mod: 26,,, | Performed by: INTERNAL MEDICINE

## 2024-04-19 NOTE — TELEPHONE ENCOUNTER
----- Message from Cody Faustin sent at 4/19/2024  2:31 PM CDT -----  Regarding: call back  patient had procedure, daughter is returning a call that was missed today about a Bleeding AVM, she is asking for you to give her a call - [phone] - Brynn

## 2024-04-19 NOTE — TELEPHONE ENCOUNTER
Returned call to the pts daughter, Brynn. She missed a call from Endo schedulers and was calling them back.   Reminded her to hold pts Xarelto until after the procedure, then he will be advised before he leaves the hospital. She agreed to plan and verbalized understanding .

## 2024-04-19 NOTE — TELEPHONE ENCOUNTER
Called and spoke to pts son, Hector, advised him of the results and recommendations.   He verbalized understanding. Said please have the schedulers call him because his dad almost never answers his phones.   Endo schedulers notified of this.

## 2024-04-19 NOTE — TELEPHONE ENCOUNTER
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 He went back on Xarelto. Capsule today is positive for active bleeding. We are bringing him on on Monday for Push enteroscopy per Dr Salcedo. Hold Xarelto per Dr Grijalva.

## 2024-04-19 NOTE — [employer] PATIENT INSTRUCTIONS
Discharge Summary/Instructions for after Video Capsule Endoscopy  Patient Name: Jose Miguel Alvarez  Patient MRN: [id number]  Patient YOB: 1940 Thursday, April 18, 2024  Troy Polanco MD  This is an 83 year old male.  1.  Do Not eat or drink anything for 1 hour.  Try sips of water first.  If   tolerated, resume your regular diet or one recommended by your physician.  2.  Do not drive, operate machinery, make critical decisions, or do   activities that require coordination or balance for 24 hours.  3.   You may experience a sore throat for 24 to 48 hours.  You may use   throat lozenges or gargle with warm salt water to relieve the discomfort.  4.  Because air was put into your stomach during the procedure, you may   experience some belching.  5.  Do not use any medication containing aspirin for 10 days.  6.  Go directly to the emergency room if you notice any of the following:   Chills and/or fever over 101 F   Persistent vomiting or vomiting with blood/nasal regurgitation   Severe abdominal pain, other than gas cramps   Severe chest pain   Black, tarry stools     Your doctor recommends these additional instructions:  - Discharge patient to home.   - Resume previous diet.   - Patient will need small enteroscopy for management of active bleed in   small bowel.  For questions, problems or results please call your physician Troy Polanco MD at Work:  [phone]  If you have any questions about the above instructions, call the GI   department at [phone] or call the endoscopy unit at [phone]   from 7am until 3 pm.  [facility], EMERGENCY ROOM PHONE NUMBER:   [phone]  IF A COMPLICATION OR EMERGENCY SITUATION ARISES AND YOU ARE UNABLE TO REACH   YOUR PHYSICIAN - GO DIRECTLY TO THE EMERGENCY ROOM.  I have read or have had read to me these discharge instructions for my   procedure and have received a written copy.  I understand these   instructions and will  follow-up with my physician if I have any questions.     __________________________________       _____________________________________  Nurse Signature                                          Patient/Designated   Responsible Party Signature  Troy Polanco MD  4/19/2024 1:13:42 PM  This report has been verified and signed electronically.  Dear patient,  As a result of recent federal legislation (The Federal Cures Act), you may   receive lab or pathology results from your procedure in your [facility]   account before your physician is able to contact you. Your physician or   their representative will relay the results to you with their   recommendations at their soonest availability.  Thank you,  PROVATION

## 2024-04-21 PROBLEM — K55.21 AVM (ARTERIOVENOUS MALFORMATION) OF SMALL BOWEL, ACQUIRED WITH HEMORRHAGE: Status: ACTIVE | Noted: 2024-04-05

## 2024-04-22 ENCOUNTER — HOSPITAL ENCOUNTER (OUTPATIENT)
Facility: HOSPITAL | Age: 84
Discharge: HOME | End: 2024-04-22
Attending: INTERNAL MEDICINE | Admitting: INTERNAL MEDICINE
Payer: MEDICARE

## 2024-04-22 ENCOUNTER — ANESTHESIA EVENT (OUTPATIENT)
Dept: ENDOSCOPY | Facility: HOSPITAL | Age: 84
End: 2024-04-22
Payer: MEDICARE

## 2024-04-22 ENCOUNTER — ANESTHESIA (OUTPATIENT)
Dept: ENDOSCOPY | Facility: HOSPITAL | Age: 84
End: 2024-04-22
Payer: MEDICARE

## 2024-04-22 DIAGNOSIS — K55.21 AVM (ARTERIOVENOUS MALFORMATION) OF SMALL BOWEL, ACQUIRED WITH HEMORRHAGE: ICD-10-CM

## 2024-04-22 LAB
BASOPHILS # BLD AUTO: 0.08 K/UL (ref 0–0.2)
BASOPHILS NFR BLD: 2.1 % (ref 0–1.9)
DIFFERENTIAL METHOD BLD: ABNORMAL
EOSINOPHIL # BLD AUTO: 0 K/UL (ref 0–0.5)
EOSINOPHIL NFR BLD: 1 % (ref 0–8)
ERYTHROCYTE [DISTWIDTH] IN BLOOD BY AUTOMATED COUNT: 23.4 % (ref 11.5–14.5)
HCT VFR BLD AUTO: 36.9 % (ref 40–54)
HGB BLD-MCNC: 10.7 G/DL (ref 14–18)
IMM GRANULOCYTES # BLD AUTO: 0 K/UL (ref 0–0.04)
IMM GRANULOCYTES NFR BLD AUTO: 0 % (ref 0–0.5)
LYMPHOCYTES # BLD AUTO: 1 K/UL (ref 1–4.8)
LYMPHOCYTES NFR BLD: 26.2 % (ref 18–48)
MCH RBC QN AUTO: 22.8 PG (ref 27–31)
MCHC RBC AUTO-ENTMCNC: 29 G/DL (ref 32–36)
MCV RBC AUTO: 79 FL (ref 82–98)
MONOCYTES # BLD AUTO: 0.5 K/UL (ref 0.3–1)
MONOCYTES NFR BLD: 13.3 % (ref 4–15)
NEUTROPHILS # BLD AUTO: 2.2 K/UL (ref 1.8–7.7)
NEUTROPHILS NFR BLD: 57.4 % (ref 38–73)
NRBC BLD-RTO: 0 /100 WBC
PLATELET # BLD AUTO: 285 K/UL (ref 150–450)
PMV BLD AUTO: 10.3 FL (ref 9.2–12.9)
RBC # BLD AUTO: 4.7 M/UL (ref 4.6–6.2)
WBC # BLD AUTO: 3.9 K/UL (ref 3.9–12.7)

## 2024-04-22 PROCEDURE — 63600175 PHARM REV CODE 636 W HCPCS: Performed by: NURSE ANESTHETIST, CERTIFIED REGISTERED

## 2024-04-22 PROCEDURE — 88305 TISSUE EXAM BY PATHOLOGIST: CPT | Performed by: STUDENT IN AN ORGANIZED HEALTH CARE EDUCATION/TRAINING PROGRAM

## 2024-04-22 PROCEDURE — 25000003 PHARM REV CODE 250: Performed by: NURSE ANESTHETIST, CERTIFIED REGISTERED

## 2024-04-22 PROCEDURE — 37000009 HC ANESTHESIA EA ADD 15 MINS: Performed by: INTERNAL MEDICINE

## 2024-04-22 PROCEDURE — 44369 SMALL BOWEL ENDOSCOPY: CPT | Mod: 59,,, | Performed by: INTERNAL MEDICINE

## 2024-04-22 PROCEDURE — 88342 IMHCHEM/IMCYTCHM 1ST ANTB: CPT | Mod: 26,,, | Performed by: STUDENT IN AN ORGANIZED HEALTH CARE EDUCATION/TRAINING PROGRAM

## 2024-04-22 PROCEDURE — 44799 UNLISTED PX SMALL INTESTINE: CPT | Performed by: INTERNAL MEDICINE

## 2024-04-22 PROCEDURE — 44799 UNLISTED PX SMALL INTESTINE: CPT | Mod: ,,, | Performed by: INTERNAL MEDICINE

## 2024-04-22 PROCEDURE — 44361 SMALL BOWEL ENDOSCOPY/BIOPSY: CPT | Mod: ,,, | Performed by: INTERNAL MEDICINE

## 2024-04-22 PROCEDURE — 88312 SPECIAL STAINS GROUP 1: CPT | Performed by: STUDENT IN AN ORGANIZED HEALTH CARE EDUCATION/TRAINING PROGRAM

## 2024-04-22 PROCEDURE — 44369 SMALL BOWEL ENDOSCOPY: CPT | Performed by: INTERNAL MEDICINE

## 2024-04-22 PROCEDURE — 27201028 HC NEEDLE, SCLERO: Performed by: INTERNAL MEDICINE

## 2024-04-22 PROCEDURE — 88305 TISSUE EXAM BY PATHOLOGIST: CPT | Mod: 26,,, | Performed by: STUDENT IN AN ORGANIZED HEALTH CARE EDUCATION/TRAINING PROGRAM

## 2024-04-22 PROCEDURE — 88342 IMHCHEM/IMCYTCHM 1ST ANTB: CPT | Performed by: STUDENT IN AN ORGANIZED HEALTH CARE EDUCATION/TRAINING PROGRAM

## 2024-04-22 PROCEDURE — 37000008 HC ANESTHESIA 1ST 15 MINUTES: Performed by: INTERNAL MEDICINE

## 2024-04-22 PROCEDURE — 85025 COMPLETE CBC W/AUTO DIFF WBC: CPT | Performed by: INTERNAL MEDICINE

## 2024-04-22 PROCEDURE — 27202363 HC INJECTION AGENT, SUBMUCOSAL, ANY: Performed by: INTERNAL MEDICINE

## 2024-04-22 PROCEDURE — 27202087 HC PROBE, APC: Performed by: INTERNAL MEDICINE

## 2024-04-22 PROCEDURE — 44361 SMALL BOWEL ENDOSCOPY/BIOPSY: CPT | Performed by: INTERNAL MEDICINE

## 2024-04-22 PROCEDURE — 88312 SPECIAL STAINS GROUP 1: CPT | Mod: 26,,, | Performed by: STUDENT IN AN ORGANIZED HEALTH CARE EDUCATION/TRAINING PROGRAM

## 2024-04-22 RX ORDER — SUCRALFATE 1 G/10ML
1 SUSPENSION ORAL 2 TIMES DAILY
Qty: 280 ML | Refills: 0 | Status: SHIPPED | OUTPATIENT
Start: 2024-04-22 | End: 2024-05-06

## 2024-04-22 RX ORDER — LIDOCAINE HYDROCHLORIDE 10 MG/ML
INJECTION, SOLUTION EPIDURAL; INFILTRATION; INTRACAUDAL; PERINEURAL
Status: DISCONTINUED | OUTPATIENT
Start: 2024-04-22 | End: 2024-04-22

## 2024-04-22 RX ORDER — PROPOFOL 10 MG/ML
VIAL (ML) INTRAVENOUS
Status: DISCONTINUED | OUTPATIENT
Start: 2024-04-22 | End: 2024-04-22

## 2024-04-22 RX ADMIN — PROPOFOL 20 MG: 10 INJECTION, EMULSION INTRAVENOUS at 09:04

## 2024-04-22 RX ADMIN — LIDOCAINE HYDROCHLORIDE 50 MG: 10 SOLUTION INTRAVENOUS at 09:04

## 2024-04-22 RX ADMIN — SODIUM CHLORIDE, POTASSIUM CHLORIDE, SODIUM LACTATE AND CALCIUM CHLORIDE: 600; 310; 30; 20 INJECTION, SOLUTION INTRAVENOUS at 09:04

## 2024-04-22 RX ADMIN — PROPOFOL 100 MG: 10 INJECTION, EMULSION INTRAVENOUS at 09:04

## 2024-04-22 NOTE — TRANSFER OF CARE
Anesthesia Transfer of Care Note    Patient: Jose Miguel Alvarez JrNorma    Procedure(s) Performed: Procedure(s) (LRB):  EGD (ESOPHAGOGASTRODUODENOSCOPY) (N/A)    Patient location: PACU    Anesthesia Type: MAC    Transport from OR: Transported from OR on room air with adequate spontaneous ventilation    Post pain: adequate analgesia    Post assessment: no apparent anesthetic complications and tolerated procedure well    Post vital signs: stable    Level of consciousness: sedated    Nausea/Vomiting: no nausea/vomiting    Complications: none    Transfer of care protocol was followed    Last vitals: Visit Vitals  BP (!) 172/72 (BP Location: Left arm, Patient Position: Lying)   Pulse (!) 45   Temp 36.6 °C (97.9 °F) (Temporal)   Resp 18   Ht 6' (1.829 m)   Wt 104.3 kg (230 lb)   SpO2 96%   BMI 31.19 kg/m²

## 2024-04-22 NOTE — ANESTHESIA POSTPROCEDURE EVALUATION
Anesthesia Post Evaluation    Patient: Jose Miguel Alvarez     Procedure(s) Performed: Procedure(s) (LRB):  EGD (ESOPHAGOGASTRODUODENOSCOPY) (N/A)    Final Anesthesia Type: MAC      Patient location during evaluation: PACU  Patient participation: Yes- Able to Participate  Level of consciousness: awake  Post-procedure vital signs: reviewed and stable  Pain management: adequate  Airway patency: patent    PONV status at discharge: No PONV  Anesthetic complications: no      Cardiovascular status: blood pressure returned to baseline and hemodynamically stable  Respiratory status: unassisted and spontaneous ventilation  Hydration status: euvolemic  Follow-up not needed.          Vitals Value Taken Time   /80 04/22/24 1025   Temp 36.5 °C (97.7 °F) 04/22/24 1005   Pulse 42 04/22/24 1025   Resp 17 04/22/24 1025   SpO2 97 % 04/22/24 1025         Event Time   Out of Recovery 10:30:00         Pain/Johanna Score: Johanna Score: 10 (4/22/2024 10:25 AM)

## 2024-04-22 NOTE — PROVATION PATIENT INSTRUCTIONS
Discharge Summary/Instructions after an Endoscopic Procedure  Patient Name: Jose Miguel Alvarez  Patient MRN: [id number]  Patient YOB: 1940 Monday, April 22, 2024 Myrtle Salcedo MD  Dear patient,  As a result of recent federal legislation (The Federal Cures Act), you may   receive lab or pathology results from your procedure in your [facility]   account before your physician is able to contact you. Your physician or   their representative will relay the results to you with their   recommendations at their soonest availability.  Thank you,  RESTRICTIONS:  During your procedure today, you received medications for sedation.  These   medications may affect your judgment, balance and coordination.  Therefore,   for 24 hours, you have the following restrictions:   - DO NOT drive a car, operate machinery, make legal/financial decisions,   sign important papers or drink alcohol.    ACTIVITY:  Today: no heavy lifting, straining or running due to procedural   sedation/anesthesia.  The following day: return to full activity including work.  DIET:  Eat and drink normally unless instructed otherwise.     TREATMENT FOR COMMON SIDE EFFECTS:  - Mild abdominal pain, nausea, belching, bloating or excessive gas:  rest,   eat lightly and use a heating pad.  - Sore Throat: treat with throat lozenges and/or gargle with warm salt   water.  - Because air was used during the procedure, expelling large amounts of air   from your rectum or belching is normal.  - If a bowel prep was taken, you may not have a bowel movement for 1-3 days.    This is normal.  SYMPTOMS TO WATCH FOR AND REPORT TO YOUR PHYSICIAN:  1. Abdominal pain or bloating, other than gas cramps.  2. Chest pain.  3. Back pain.  4. Signs of infection such as: chills or fever occurring within 24 hours   after the procedure.  5. Rectal bleeding, which would show as bright red, maroon, or black stools.   (A tablespoon of blood from the rectum is not serious, especially if    hemorrhoids are present.)  6. Vomiting.  7. Weakness or dizziness.  GO DIRECTLY TO THE NEAREST EMERGENCY ROOM IF YOU HAVE ANY OF THE FOLLOWING:      Difficulty breathing              Chills and/or fever over 101 F   Persistent vomiting and/or vomiting blood   Severe abdominal pain   Severe chest pain   Black, tarry stools   Bleeding- more than one tablespoon   Any other symptom or condition that you feel may need urgent attention  Your doctor recommends these additional instructions:  If any biopsies were taken, your doctors clinic will contact you in 1 to 2   weeks with any results.  - Discharge patient to home (with escort).   - Resume previous diet.   - Continue present medications.   - Resume Xarelto (rivaroxaban) at prior dose in 3 days.  Refer to referring   physician for further adjustment of therapy.   - Await pathology results.   - Continue Pantoprazole at current dose.  - Use sucralfate suspension 1 gram PO BID for 2 weeks.   - Return to GI clinic with Ms. Moreno Baldev LOVE. Keep appointment 4/29/24  For questions, problems or results please call your physician Myrtle Salcedo MD at Work:  [phone]  If you have any questions about the above instructions, call the GI   department at [phone] or call the endoscopy unit at [phone]   from 7am until 3 pm.  [facility], EMERGENCY ROOM PHONE NUMBER:   [phone]  IF A COMPLICATION OR EMERGENCY SITUATION ARISES AND YOU ARE UNABLE TO REACH   YOUR PHYSICIAN - GO DIRECTLY TO THE EMERGENCY ROOM.  I have read or have had read to me these discharge instructions for my   procedure and have received a written copy.  I understand these   instructions and will follow-up with my physician if I have any questions.     __________________________________       _____________________________________  Nurse Signature                                          Patient/Designated   Responsible Party Signature  MD Myrtle Martines  MD Denisse  4/22/2024 10:13:15 AM  This report has been verified and signed electronically.  Dear patient,  As a result of recent federal legislation (The Federal Cures Act), you may   receive lab or pathology results from your procedure in your [facility]   account before your physician is able to contact you. Your physician or   their representative will relay the results to you with their   recommendations at their soonest availability.  Thank you,  PROVATION

## 2024-04-22 NOTE — ANESTHESIA PREPROCEDURE EVALUATION
04/22/2024  Jose Miguel Alvarez Jr. is a 83 y.o., male.      Pre-op Assessment    I have reviewed the Patient Summary Reports.     I have reviewed the Nursing Notes. I have reviewed the NPO Status.   I have reviewed the Medications.     Review of Systems  Anesthesia Hx:  No problems with previous Anesthesia             Denies Family Hx of Anesthesia complications.    Denies Personal Hx of Anesthesia complications.                    Social:  Former Smoker, Alcohol Use       Hematology/Oncology:       -- Anemia:                              Oncology Comments: bladder     Cardiovascular:     Hypertension   Denies MI.     Denies CABG/stent. Dysrhythmias atrial fibrillation  CHF   PVD hyperlipidemia   ECG has been reviewed. EKG 4/2024:  Normal sinus rhythm 82  Minimal voltage criteria for LVH, may be normal variant   Nonspecific T wave abnormality   Abnormal ECG   When compared with ECG of 02-APR-2024 04:19,   Nonspecific T wave abnormality now evident in Inferior leads   QT has shortened     Echo 4/2024:  ·  Left Ventricle: The left ventricle is normal in size. Mildly increased wall thickness. There is concentric remodeling. There is normal systolic function with a visually estimated ejection fraction of 60 - 65%. Unable to assess diastolic function due to atrial fibrillation.  ·  Right Ventricle: Normal right ventricular cavity size. Wall thickness is normal. Right ventricle wall motion  is normal. Systolic function is normal.  ·  Left Atrium: Left atrium is moderately dilated.  ·  Tricuspid Valve: There is mild regurgitation.  ·  IVC/SVC: Intermediate venous pressure at 8 mmHg.                             Pulmonary:   COPD  Denies Asthma.     Denies Sleep Apnea. covid               Renal/:  Chronic Renal Disease, CKD renal calculi BPH              Hepatic/GI:      Denies GERD. Denies Liver Disease.  Denies  Hepatitis.           Neurological:    Denies CVA.    Denies Seizures.         Dementia                         Endocrine:  Diabetes Denies Hypothyroidism.  Denies Hyperthyroidism.       Obesity / BMI > 30    Physical Exam    Airway:  Mallampati: II   Mouth Opening: Normal    Dental:  Dentures    Chest/Lungs:  Clear to auscultation, Normal Respiratory Rate    Heart:  Rate: Normal  Rhythm: Irregularly Irregular    Anesthesia Plan  Type of Anesthesia, risks & benefits discussed:    Anesthesia Type: MAC  Intra-op Monitoring Plan: Standard ASA Monitors  Induction:  IV  Informed Consent: Informed consent signed with the Patient and all parties understand the risks and agree with anesthesia plan.  All questions answered.   ASA Score: 2  Day of Surgery Review of History & Physical: H&P Update referred to the surgeon/provider.    Ready For Surgery From Anesthesia Perspective.   .

## 2024-04-22 NOTE — H&P
PRE PROCEDURE H&P    Patient Name: Jose Miguel Alvarez Jr.  MRN: [id number]  : 1940  Date of Procedure:  2024  Referring Physician: Myrtle Salcedo MD  Primary Physician: Toshia Valladares MD  Procedure Physician: Myrtle Salcedo MD       Planned Procedure: Small Bowel Enteroscopy  Diagnosis:   positive video capsule endoscopy    Chief Complaint: Same as above    HPI: Patient is an 83 y.o. male is here for the above. Hx afib on Xarelto. Hospitalized earlier this month for anemia. 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 These were treated with APC. Xarelto restarted a few days after discharge. He underwent VCE and on 24 study was read and found to be positive. Last took Xarelto 24. No overt GI bleeding.     Last colonoscopy: 2021 AVMs in ascending colon s/p thermal therapy  Family history: none  Anticoagulation: Xarelto, last taken 24    Past Medical History:   Past Medical History:   Diagnosis Date    Anemia     Anticoagulant long-term use     Atrial fibrillation     Bladder cancer     Bladder tumor     CKD (chronic kidney disease), stage III     COPD (chronic obstructive pulmonary disease)     pt denies    Encounter for blood transfusion     Hematuria     History of 2019 novel coronavirus disease (COVID-19)     Hypercholesteremia     Hypertension         Past Surgical History:  Past Surgical History:   Procedure Laterality Date    bladder tumor removal      CARDIAC ELECTROPHYSIOLOGY MAPPING AND ABLATION      CARDIOVERSION      several    CATARACT EXTRACTION Bilateral     cataract removal with IOL implants Bilateral     CHOLECYSTECTOMY      CYSTOSCOPIC LITHOLAPAXY N/A 2021    Procedure: CYSTOLITHOLAPAXY;  Surgeon: Medardo Garcia MD;  Location: [facility];  Service: Urology;  Laterality: N/A;    CYSTOSCOPY      CYSTOSCOPY WITH BIOPSY OF BLADDER Right 2021    Procedure: CYSTOSCOPY, WITH BLADDER BIOPSY, WITH FULGURATION IF  INDICATED;  Surgeon: Medardo Garcia MD;  Location: [facility] OR;  Service: Urology;  Laterality: Right;    ESOPHAGOGASTRODUODENOSCOPY N/A 10/25/2021    Procedure: Small Jason Enteroscopy (SBE);  Surgeon: Isabelle Griffin MD;  Location: [facility] ENDO;  Service: Endoscopy;  Laterality: N/A;    ESOPHAGOGASTRODUODENOSCOPY N/A 12/13/2021    Procedure: EGD (ESOPHAGOGASTRODUODENOSCOPY);  Surgeon: Troy Polanco MD;  Location: [facility] ENDO;  Service: Endoscopy;  Laterality: N/A;    ESOPHAGOGASTRODUODENOSCOPY N/A 4/5/2024    Procedure: EGD (ESOPHAGOGASTRODUODENOSCOPY);  Surgeon: Myrtle Salcedo MD;  Location: [facility] ENDO;  Service: Endoscopy;  Laterality: N/A;    EYE SURGERY      INTRALUMINAL GASTROINTESTINAL TRACT IMAGING VIA CAPSULE N/A 12/6/2021    Procedure: IMAGING PROCEDURE, GI TRACT, INTRALUMINAL, VIA CAPSULE;  Surgeon: Larry Jones RN;  Location: [facility] ENDO;  Service: Endoscopy;  Laterality: N/A;    INTRALUMINAL GASTROINTESTINAL TRACT IMAGING VIA CAPSULE N/A 4/18/2024    Procedure: IMAGING PROCEDURE, GI TRACT, INTRALUMINAL, VIA CAPSULE;  Surgeon: Larry Jones RN;  Location: [facility] ENDO;  Service: Endoscopy;  Laterality: N/A;    TRANSURETHRAL RESECTION OF BLADDER TUMOR BY BIPOLAR CAUTERY N/A 7/23/2019    Procedure: TURBT, USING BIPOLAR CAUTERY;  Surgeon: Ritesh Marques MD;  Location: [facility] OR;  Service: Urology;  Laterality: N/A;        Home Medications:  Prior to Admission medications    Medication Sig Start Date End Date Taking? Authorizing Provider   amiodarone (PACERONE) 200 MG Tab Take 1 tablet (200 mg total) by mouth 2 (two) times daily. 4/6/24 4/6/25 Yes Samuel White MD   atorvastatin (LIPITOR) 20 MG tablet Take 20 mg by mouth every evening.  11/2/20  Yes Provider, Historical   clindamycin (CLEOCIN) 300 MG capsule Take 1 capsule (300 mg total) by mouth 3 (three) times daily. for 10 days 4/15/24 4/25/24 Yes Xiao Bowman, DPM   diltiaZEM (CARDIZEM CD) 360 MG 24 hr capsule Take 1 capsule (360 mg total)  by mouth once daily. 24 Yes Samuel White MD   donepeziL (ARICEPT) 5 MG tablet TAKE 1 TABLET EVERY EVENING 23  Yes Toshia Valladares MD   ergocalciferol (ERGOCALCIFEROL) 50,000 unit Cap Take 50,000 Units by mouth every 7 days. ()   Yes Provider, Historical   metoprolol tartrate (LOPRESSOR) 50 MG tablet Take 1 tablet (50 mg total) by mouth 3 (three) times daily. 24 Yes Samuel White MD   multivitamin (THERAGRAN) per tablet Take 1 tablet by mouth once daily.   Yes Provider, Historical   pantoprazole (PROTONIX) 20 MG tablet Take 1 tablet (20 mg total) by mouth once daily. 24 Yes Samuel White MD   tamsulosin (FLOMAX) 0.4 mg Cap TAKE 1 CAPSULE DAILY 23  Yes Alexandra Butler NP   XARELTO 15 mg Tab TAKE 1 TABLET DAILY WITH DINNER OR EVENING MEAL 10/13/23   Carlos Flower MD        Allergies:  Review of patient's allergies indicates:  No Known Allergies     Social History:   Social History     Socioeconomic History    Marital status:    Tobacco Use    Smoking status: Former     Current packs/day: 0.00     Average packs/day: 2.5 packs/day for 20.0 years (50.0 ttl pk-yrs)     Types: Cigarettes     Start date:      Quit date:      Years since quittin.3    Smokeless tobacco: Never   Substance and Sexual Activity    Alcohol use: Yes     Alcohol/week: 7.0 standard drinks of alcohol     Types: 7 Cans of beer per week    Drug use: No    Sexual activity: Not Currently     Social Determinants of Health     Financial Resource Strain: Low Risk  (5/10/2022)    Overall Financial Resource Strain (CARDIA)     Difficulty of Paying Living Expenses: Not hard at all   Food Insecurity: No Food Insecurity (5/10/2022)    Hunger Vital Sign     Worried About Running Out of Food in the Last Year: Never true     Ran Out of Food in the Last Year: Never true   Transportation Needs: No Transportation Needs (5/10/2022)    PRAPARE - Transportation     Lack of  Transportation (Medical): No     Lack of Transportation (Non-Medical): No   Physical Activity: Sufficiently Active (5/10/2022)    Exercise Vital Sign     Days of Exercise per Week: 7 days     Minutes of Exercise per Session: 30 min   Stress: No Stress Concern Present (5/10/2022)    [employer] [facility] [place] [facility] [employer] - Occupational Stress Questionnaire     Feeling of Stress : Not at all   Social Connections: Socially Integrated (5/10/2022)    Social Connection and Isolation Panel [NHANES]     Frequency of Communication with Friends and Family: Twice a week     Frequency of Social Gatherings with Friends and Family: Once a week     Attends Zoroastrianism Services: More than 4 times per year     Active Member of Clubs or Organizations: Yes     Attends Club or Organization Meetings: More than 4 times per year     Marital Status:    Housing Stability: Low Risk  (5/10/2022)    Housing Stability Vital Sign     Unable to Pay for Housing in the Last Year: No     Number of Places Lived in the Last Year: 1     Unstable Housing in the Last Year: No       Family History:  Family History   Problem Relation Name Age of Onset    Heart disease Father      Hypertension Father      Heart disease Brother         ROS: No acute cardiac events, no acute respiratory complaints.     Physical Exam (all patients):    BP (!) 172/72 (BP Location: Left arm, Patient Position: Lying)   Pulse (!) 45   Temp 97.9 °F (36.6 °C) (Temporal)   Resp 18   Ht 6' (1.829 m)   Wt 104.3 kg (230 lb)   SpO2 96%   BMI 31.19 kg/m²   Lungs: Clear to auscultation bilaterally, respirations unlabored  Heart: Regular rate and rhythm, S1 and S2 normal, no obvious murmurs  Abdomen:         Soft, non-tender, bowel sounds normal, no masses, no organomegaly    Lab Results   Component Value Date    WBC 7.07 04/09/2024    MCV 78 (L) 04/09/2024    RDW 23.5 (H) 04/09/2024     (L) 04/09/2024    INR 1.9 10/22/2021     (H) 04/09/2024    HGBA1C 6.2 (H)  04/09/2024    BUN 19 04/09/2024     04/09/2024    K 3.7 04/09/2024     04/09/2024        SEDATION PLAN: per anesthesia      History reviewed, vital signs satisfactory, cardiopulmonary status satisfactory, sedation options, risks and plans have been discussed with the patient  All their questions were answered and the patient agrees to the sedation procedures as planned and the patient is deemed an appropriate candidate for the sedation as planned.    The risks, benefits and alternatives of the procedure were discussed with the patient in detail. This discussion was had in the presence of endoscopy staff. The risks include, risks of adverse reaction to sedation requiring the use of reversal agents, bleeding requiring blood transfusion, perforation requiring surgical intervention and technical failure. Other risks include aspiration leading to respiratory distress and respiratory failure resulting in endotracheal intubation and mechanical ventilation including death. If anesthesia is being utilized for this procedure, it is up to the anesthesiologist to determine airway safety including elective endotracheal intubation. Questions were answered, they agree to proceed. There was no language barriers.      Procedure explained to patient, informed consent obtained and placed in chart.    Myrtle Salcedo  4/22/2024  8:46 AM

## 2024-04-23 VITALS
RESPIRATION RATE: 17 BRPM | HEIGHT: 72 IN | DIASTOLIC BLOOD PRESSURE: 80 MMHG | BODY MASS INDEX: 31.15 KG/M2 | HEART RATE: 42 BPM | OXYGEN SATURATION: 97 % | WEIGHT: 230 LBS | TEMPERATURE: 98 F | SYSTOLIC BLOOD PRESSURE: 122 MMHG

## 2024-04-26 ENCOUNTER — PROCEDURE VISIT (OUTPATIENT)
Dept: UROLOGY | Facility: CLINIC | Age: 84
End: 2024-04-26
Payer: MEDICARE

## 2024-04-26 DIAGNOSIS — N40.1 BENIGN PROSTATIC HYPERPLASIA WITH URINARY FREQUENCY: ICD-10-CM

## 2024-04-26 DIAGNOSIS — Z85.51 HISTORY OF BLADDER CANCER: Primary | ICD-10-CM

## 2024-04-26 DIAGNOSIS — N30.01 ACUTE CYSTITIS WITH HEMATURIA: ICD-10-CM

## 2024-04-26 DIAGNOSIS — N20.0 NEPHROLITHIASIS: ICD-10-CM

## 2024-04-26 DIAGNOSIS — R31.0 GROSS HEMATURIA: ICD-10-CM

## 2024-04-26 DIAGNOSIS — R35.0 BENIGN PROSTATIC HYPERPLASIA WITH URINARY FREQUENCY: ICD-10-CM

## 2024-04-26 PROCEDURE — 88112 CYTOPATH CELL ENHANCE TECH: CPT | Mod: 26,,, | Performed by: PATHOLOGY

## 2024-04-26 PROCEDURE — 51798 US URINE CAPACITY MEASURE: CPT | Mod: S$GLB,,, | Performed by: UROLOGY

## 2024-04-26 PROCEDURE — 52000 CYSTOURETHROSCOPY: CPT | Mod: S$GLB,,, | Performed by: UROLOGY

## 2024-04-26 PROCEDURE — 88112 CYTOPATH CELL ENHANCE TECH: CPT | Performed by: PATHOLOGY

## 2024-04-26 RX ORDER — CIPROFLOXACIN 500 MG/1
500 TABLET ORAL
Status: COMPLETED | OUTPATIENT
Start: 2024-04-26 | End: 2024-04-26

## 2024-04-26 RX ORDER — CEFAZOLIN SODIUM 2 G/50ML
2 SOLUTION INTRAVENOUS
Status: CANCELLED | OUTPATIENT
Start: 2024-04-26

## 2024-04-26 RX ORDER — LIDOCAINE HYDROCHLORIDE 20 MG/ML
JELLY TOPICAL
Status: COMPLETED | OUTPATIENT
Start: 2024-04-26 | End: 2024-04-26

## 2024-04-26 RX ADMIN — CIPROFLOXACIN 500 MG: 500 TABLET ORAL at 12:04

## 2024-04-26 RX ADMIN — LIDOCAINE HYDROCHLORIDE 10 ML: 20 JELLY TOPICAL at 12:04

## 2024-04-26 NOTE — PROCEDURES
Procedures  Chief Complaint:   Encounter Diagnoses   Name Primary?    History of bladder cancer Yes    Gross hematuria     Benign prostatic hyperplasia with urinary frequency     Nephrolithiasis     Acute cystitis with hematuria        HPI:   4/26/24- here today for cystoscopy.  Patient had recurrent gross hematuria, reported to the emergency department where Pandey catheter was placed due to possible retention.  Here today also for a voiding trial.    12/22/21- patient comes in today to establish care my clinic, recent gross hematuria and reported to see Alexandra.  CT urogram demonstrates bladder stones, urinary retention and a distal right ureteral stone.  Patient has had a Pandey catheter would like this removed today, no more gross hematuria, he is also on antibiotics and tamsulosin.  Patient otherwise was voiding well with no complaints prior to this incident, history of TURBT with subsequent induction BCG, no recurrences.      Allergies:  Patient has no known allergies.    Medications:  has a current medication list which includes the following prescription(s): amiodarone, amlodipine, atorvastatin, donepezil, ergocalciferol, famotidine, ferrous sulfate, hydrochlorothiazide, metoprolol succinate, multivitamin, nitrofurantoin (macrocrystal-monohydrate), oxycodone-acetaminophen, pantoprazole, phenazopyridine, rivaroxaban, and tamsulosin, and the following Facility-Administered Medications: lactated ringers.    Review of Systems:  General: No fever, chills, fatigability, or weight loss.  Skin: No rashes, itching, or changes in color or texture of skin.  Chest: Denies RECINOS, cyanosis, wheezing, cough, and sputum production.  Abdomen: Appetite fine. No weight loss. Denies diarrhea, abdominal pain, hematemesis, or blood in stool.  Musculoskeletal: No joint stiffness or swelling. Denies back pain.  : As above.  All other review of systems negative.    PMH:   has a past medical history of Anemia, Anticoagulant long-term use,  Atrial fibrillation, Bladder cancer, Bladder tumor, CKD (chronic kidney disease), stage III, COPD (chronic obstructive pulmonary disease), Encounter for blood transfusion, Hematuria, History of 2019 novel coronavirus disease (COVID-19), Hypercholesteremia, and Hypertension.    PSH:   has a past surgical history that includes Cholecystectomy; Cystoscopy; Cardioversion; bladder tumor removal; Eye surgery; cataract removal with IOL implants (Bilateral); Cardiac electrophysiology mapping and ablation; Transurethral resection of bladder tumor by bipolar cautery (N/A, 7/23/2019); Esophagogastroduodenoscopy (N/A, 10/25/2021); Intraluminal gastrointestinal tract imaging via capsule (N/A, 12/6/2021); Esophagogastroduodenoscopy (N/A, 12/13/2021); Cystoscopy with biopsy of bladder (Right, 12/28/2021); and Cystoscopic litholapaxy (N/A, 12/28/2021).    FamHx: family history includes Heart disease in his brother and father.    SocHx:  reports that he quit smoking about 35 years ago. His smoking use included cigarettes. He has a 50.00 pack-year smoking history. He has never used smokeless tobacco. He reports current alcohol use of about 8.0 standard drinks of alcohol per week. He reports that he does not use drugs.      Physical Exam:  General: A&Ox3, no apparent distress, no deformities  Neck: No masses, normal thyroid  Lungs: normal inspiration, no use of accessory muscles  Heart: normal pulse, no arrhythmias  Abdomen: Soft, NT, ND, no masses, no hernias, no hepatosplenomegaly  Lymphatic: Neck and groin nodes negative  : 4/24- Test desc filipe, no abnormalities of epididymus. Normal penile and scrotal skin. Meatus normal.     Labs/Studies:    ml 4/24  Cystoscopy lateral lobe coaptation, bladder stone, Pandey catheter changes 4/24  Cytology negative 3/23  CORDELL moderate right-sided hydronephrosis 4/24  CT urogram bladder distention with bilateral hydronephrosis, bladder stone 4/24  CT stone protocol no nephrolithiasis 1/22  CT  urogram 0.5 cm distal right ureteral stone, bladder stones, BPH 12/21  Creatinine 1.3 4/24    Procedure: Diagnostic Cystoscopy    Procedure in Detail: After proper consents were obtained, the patient was prepped and draped in normal sterile fashion for diagnostic cystoscopy. 5 ml of lidocaine jelly was instilled in the urethra. The flexible cystoscope was then introduced into the urethra, and advanced into the bladder under direct vision. The urethral mucosa appeared normal, and no strictures were noted. The sphincter appeared to be normal, and the veru montanum was unremarkable. The prostatic mucosa demonstrated lateral lobe coaptation. The bladder neck was normal. Inspection of the interior of the bladder was then carried out. The trigone was unremarkable, with no mucosal lesions. The ureteral orifices were normal in position and configuration. Systematic inspection of the mucosa of the bladder it was then carried out, rotating the cystoscope so that all areas of the left and right lateral walls, the dome of the bladder, and the posterior wall were all visualized. The cystoscope was then advanced further into the bladder, and maximum deflection of the scope was performed so that the bladder neck could be inspected.  Mucosal changes consistent with chronic catheter, bladder stone present.  The cystoscope was then removed, and the procedure terminated.     Findings:  Bladder stone, Pandey catheter changes, lateral lobe coaptation      Impression/Plan:      1. History of bladder cancer-  BCG 3/22, High pTa, but suspicious for T1 cystoscopy with bladder biopsy, vesical litholapaxy, could not assess the right ureter  12/28/21.  TURBT 5-6 years ago at the outside facility with induction BCG.    Cystoscopy demonstrates signs of outflow obstruction with a bladder stone, mucosal changes most likely consistent with the previous Pandey catheter.  Previous cytology was negative, follow-up on cytology today and if clear then  clear from a malignancy standpoint.  Would still need every 6 month cystoscopies once we get him clear from his outflow obstruction, pending a normal cytology.    2. Nephrolithiasis- no recurrent stones, of note we could not get up the right ureter.    3. BPH- patient states that he has been voiding well on tamsulosin, unfortunately due to CT findings with possible bilateral hydronephrosis and bladder stone, this is contradictory to sufficient voiding.  Therefore recommend cystoscopy with vesical litholapaxy and TURP.  Please see below in regards to our discussion today.  Patient is aware that he can have recurrent stones or persistent issues following surgery.  Patient has not emptied as well as I would like but he does not want the Pandey catheter replaced.  He knows to contact us if he has issues prior to his procedure.    4. Gross hematuria- this has cleared, call with any further issues.    5. Erectile dysfunction- given Viagra 100 mg but currently not an issue.    The patient understands the risks, benefits and alternatives.  These include but are not limited to damage to the surrounding structures including the urethra, prostate, ureteral orifices, bladder and rectum.  Risk of hematuria, regrowth, recurrent obstruction requiring further procedures or medical therapy, infection, further bleeding requiring other procedures, persistent burning.  Risk of perforation requiring an open procedure.  Risk of the possible need for stents or longer term catheterization.  Patient understands a biopsy will be taken and may diagnose him with prostate cancer during the procedure.  Understanding that retrograde ejaculation will occur following the procedure.  Risk of heart attack, stroke, death, DVT and PE.  Patient understanding of all of the above has elected to pursue the procedure.

## 2024-04-26 NOTE — PROGRESS NOTES
Patient came into clinic for Cysto and Flow PVR. After catheter was removed, cysto procedure was done by Dr. Garcia. Patient then does UroFlow per Dr. Garcia and was able to successfully urinate, w/ a PVR of 275 mL remaining in bladder. Patient spoke with Dr. Garcia and was discharged after receiving follow up information.

## 2024-04-26 NOTE — PROGRESS NOTES
Cipro 500 mg given orally per Dr. Garcia prior to procedure for prophylaxis.  Lidocaine Jelly 2% given prophylactically via penis/urethra to aid in comfort during procedure. Patient tolerated well.    Sofia Sen LPN

## 2024-04-29 ENCOUNTER — OFFICE VISIT (OUTPATIENT)
Dept: PODIATRY | Facility: CLINIC | Age: 84
End: 2024-04-29
Payer: MEDICARE

## 2024-04-29 ENCOUNTER — OFFICE VISIT (OUTPATIENT)
Dept: GASTROENTEROLOGY | Facility: CLINIC | Age: 84
End: 2024-04-29
Payer: MEDICARE

## 2024-04-29 ENCOUNTER — LAB VISIT (OUTPATIENT)
Dept: LAB | Facility: HOSPITAL | Age: 84
End: 2024-04-29
Attending: PHYSICIAN ASSISTANT
Payer: MEDICARE

## 2024-04-29 VITALS
WEIGHT: 232.5 LBS | HEART RATE: 48 BPM | BODY MASS INDEX: 31.49 KG/M2 | HEIGHT: 72 IN | SYSTOLIC BLOOD PRESSURE: 165 MMHG | DIASTOLIC BLOOD PRESSURE: 90 MMHG

## 2024-04-29 DIAGNOSIS — E11.42 TYPE 2 DIABETES MELLITUS WITH PERIPHERAL NEUROPATHY: ICD-10-CM

## 2024-04-29 DIAGNOSIS — D50.0 IRON DEFICIENCY ANEMIA SECONDARY TO BLOOD LOSS (CHRONIC): Primary | ICD-10-CM

## 2024-04-29 DIAGNOSIS — K31.811 AVM (ARTERIOVENOUS MALFORMATION) OF STOMACH, ACQUIRED WITH HEMORRHAGE: ICD-10-CM

## 2024-04-29 DIAGNOSIS — Z87.2 HISTORY OF CELLULITIS: Primary | ICD-10-CM

## 2024-04-29 DIAGNOSIS — D50.0 IRON DEFICIENCY ANEMIA SECONDARY TO BLOOD LOSS (CHRONIC): ICD-10-CM

## 2024-04-29 DIAGNOSIS — S91.209S TRAUMATIC AVULSION OF NAIL PLATE OF TOE, SEQUELA: ICD-10-CM

## 2024-04-29 DIAGNOSIS — N18.30 STAGE 3 CHRONIC KIDNEY DISEASE, UNSPECIFIED WHETHER STAGE 3A OR 3B CKD: ICD-10-CM

## 2024-04-29 DIAGNOSIS — Z79.01 ANTICOAGULATED: ICD-10-CM

## 2024-04-29 LAB
BASOPHILS # BLD AUTO: 0.07 K/UL (ref 0–0.2)
BASOPHILS NFR BLD: 1.6 % (ref 0–1.9)
DIFFERENTIAL METHOD BLD: ABNORMAL
EOSINOPHIL # BLD AUTO: 0.1 K/UL (ref 0–0.5)
EOSINOPHIL NFR BLD: 1.6 % (ref 0–8)
ERYTHROCYTE [DISTWIDTH] IN BLOOD BY AUTOMATED COUNT: 23.1 % (ref 11.5–14.5)
FINAL PATHOLOGIC DIAGNOSIS: NORMAL
GROSS: NORMAL
HCT VFR BLD AUTO: 36.5 % (ref 40–54)
HGB BLD-MCNC: 10.1 G/DL (ref 14–18)
IMM GRANULOCYTES # BLD AUTO: 0.01 K/UL (ref 0–0.04)
IMM GRANULOCYTES NFR BLD AUTO: 0.2 % (ref 0–0.5)
LYMPHOCYTES # BLD AUTO: 1.1 K/UL (ref 1–4.8)
LYMPHOCYTES NFR BLD: 25.6 % (ref 18–48)
Lab: NORMAL
MCH RBC QN AUTO: 22.5 PG (ref 27–31)
MCHC RBC AUTO-ENTMCNC: 27.7 G/DL (ref 32–36)
MCV RBC AUTO: 82 FL (ref 82–98)
MICROSCOPIC EXAM: NORMAL
MONOCYTES # BLD AUTO: 0.5 K/UL (ref 0.3–1)
MONOCYTES NFR BLD: 11.2 % (ref 4–15)
NEUTROPHILS # BLD AUTO: 2.6 K/UL (ref 1.8–7.7)
NEUTROPHILS NFR BLD: 59.8 % (ref 38–73)
NRBC BLD-RTO: 0 /100 WBC
PLATELET # BLD AUTO: 199 K/UL (ref 150–450)
PMV BLD AUTO: 11.7 FL (ref 9.2–12.9)
RBC # BLD AUTO: 4.48 M/UL (ref 4.6–6.2)
WBC # BLD AUTO: 4.38 K/UL (ref 3.9–12.7)

## 2024-04-29 PROCEDURE — 1157F ADVNC CARE PLAN IN RCRD: CPT | Mod: CPTII,S$GLB,, | Performed by: PHYSICIAN ASSISTANT

## 2024-04-29 PROCEDURE — 99999 PR PBB SHADOW E&M-EST. PATIENT-LVL III: CPT | Mod: PBBFAC,,, | Performed by: PODIATRIST

## 2024-04-29 PROCEDURE — 3072F LOW RISK FOR RETINOPATHY: CPT | Mod: CPTII,S$GLB,, | Performed by: PHYSICIAN ASSISTANT

## 2024-04-29 PROCEDURE — 1159F MED LIST DOCD IN RCRD: CPT | Mod: CPTII,S$GLB,, | Performed by: PHYSICIAN ASSISTANT

## 2024-04-29 PROCEDURE — 3080F DIAST BP >= 90 MM HG: CPT | Mod: CPTII,S$GLB,, | Performed by: PHYSICIAN ASSISTANT

## 2024-04-29 PROCEDURE — 3077F SYST BP >= 140 MM HG: CPT | Mod: CPTII,S$GLB,, | Performed by: PHYSICIAN ASSISTANT

## 2024-04-29 PROCEDURE — 1160F RVW MEDS BY RX/DR IN RCRD: CPT | Mod: CPTII,S$GLB,, | Performed by: PODIATRIST

## 2024-04-29 PROCEDURE — 1111F DSCHRG MED/CURRENT MED MERGE: CPT | Mod: CPTII,S$GLB,, | Performed by: PHYSICIAN ASSISTANT

## 2024-04-29 PROCEDURE — 1126F AMNT PAIN NOTED NONE PRSNT: CPT | Mod: CPTII,S$GLB,, | Performed by: PHYSICIAN ASSISTANT

## 2024-04-29 PROCEDURE — 99999 PR PBB SHADOW E&M-EST. PATIENT-LVL IV: CPT | Mod: PBBFAC,,, | Performed by: PHYSICIAN ASSISTANT

## 2024-04-29 PROCEDURE — 1111F DSCHRG MED/CURRENT MED MERGE: CPT | Mod: CPTII,S$GLB,, | Performed by: PODIATRIST

## 2024-04-29 PROCEDURE — 1101F PT FALLS ASSESS-DOCD LE1/YR: CPT | Mod: CPTII,S$GLB,, | Performed by: PHYSICIAN ASSISTANT

## 2024-04-29 PROCEDURE — 99213 OFFICE O/P EST LOW 20 MIN: CPT | Mod: S$GLB,,, | Performed by: PODIATRIST

## 2024-04-29 PROCEDURE — 36415 COLL VENOUS BLD VENIPUNCTURE: CPT | Performed by: PHYSICIAN ASSISTANT

## 2024-04-29 PROCEDURE — 1101F PT FALLS ASSESS-DOCD LE1/YR: CPT | Mod: CPTII,S$GLB,, | Performed by: PODIATRIST

## 2024-04-29 PROCEDURE — 85025 COMPLETE CBC W/AUTO DIFF WBC: CPT | Performed by: PHYSICIAN ASSISTANT

## 2024-04-29 PROCEDURE — 1157F ADVNC CARE PLAN IN RCRD: CPT | Mod: CPTII,S$GLB,, | Performed by: PODIATRIST

## 2024-04-29 PROCEDURE — 3072F LOW RISK FOR RETINOPATHY: CPT | Mod: CPTII,S$GLB,, | Performed by: PODIATRIST

## 2024-04-29 PROCEDURE — 3288F FALL RISK ASSESSMENT DOCD: CPT | Mod: CPTII,S$GLB,, | Performed by: PHYSICIAN ASSISTANT

## 2024-04-29 PROCEDURE — 1159F MED LIST DOCD IN RCRD: CPT | Mod: CPTII,S$GLB,, | Performed by: PODIATRIST

## 2024-04-29 PROCEDURE — 3288F FALL RISK ASSESSMENT DOCD: CPT | Mod: CPTII,S$GLB,, | Performed by: PODIATRIST

## 2024-04-29 PROCEDURE — 99213 OFFICE O/P EST LOW 20 MIN: CPT | Mod: S$GLB,,, | Performed by: PHYSICIAN ASSISTANT

## 2024-04-29 NOTE — PROGRESS NOTES
Subjective:       Patient ID: Jose Miguel Alvarez Jr. is a 83 y.o. male.    Chief Complaint: Wound Check (Patient denies pain at present of right second toe)    HPI: Jose Miguel Alvarez Jr. presents to the office today to follow up on cellulitis and previous ulcer to the right 2nd toe. Performing dressing changes daily. Wearing wider shoes. Noticeable improvement to the ulceration and 2nd digit.    Review of patient's allergies indicates:  No Known Allergies    Past Medical History:   Diagnosis Date    Anemia     Anticoagulant long-term use     Atrial fibrillation     Bladder cancer     Bladder tumor     CKD (chronic kidney disease), stage III     COPD (chronic obstructive pulmonary disease)     pt denies    Encounter for blood transfusion     Hematuria     History of 2019 novel coronavirus disease (COVID-19)     Hypercholesteremia     Hypertension        Family History   Problem Relation Name Age of Onset    Heart disease Father      Hypertension Father      Heart disease Brother         Social History     Socioeconomic History    Marital status:    Tobacco Use    Smoking status: Former     Current packs/day: 0.00     Average packs/day: 2.5 packs/day for 20.0 years (50.0 ttl pk-yrs)     Types: Cigarettes     Start date:      Quit date:      Years since quittin.3    Smokeless tobacco: Never   Substance and Sexual Activity    Alcohol use: Yes     Alcohol/week: 7.0 standard drinks of alcohol     Types: 7 Cans of beer per week    Drug use: No    Sexual activity: Not Currently     Social Determinants of Health     Financial Resource Strain: Low Risk  (5/10/2022)    Overall Financial Resource Strain (CARDIA)     Difficulty of Paying Living Expenses: Not hard at all   Food Insecurity: No Food Insecurity (5/10/2022)    Hunger Vital Sign     Worried About Running Out of Food in the Last Year: Never true     Ran Out of Food in the Last Year: Never true   Transportation Needs: No Transportation Needs  (5/10/2022)    PRAPARE - Transportation     Lack of Transportation (Medical): No     Lack of Transportation (Non-Medical): No   Physical Activity: Sufficiently Active (5/10/2022)    Exercise Vital Sign     Days of Exercise per Week: 7 days     Minutes of Exercise per Session: 30 min   Stress: No Stress Concern Present (5/10/2022)    [place] [employer] - Occupational Stress Questionnaire     Feeling of Stress : Not at all   Social Connections: Socially Integrated (5/10/2022)    Social Connection and Isolation Panel [NHANES]     Frequency of Communication with Friends and Family: Twice a week     Frequency of Social Gatherings with Friends and Family: Once a week     Attends Zoroastrianism Services: More than 4 times per year     Active Member of Clubs or Organizations: Yes     Attends Club or Organization Meetings: More than 4 times per year     Marital Status:    Housing Stability: Low Risk  (5/10/2022)    Housing Stability Vital Sign     Unable to Pay for Housing in the Last Year: No     Number of Places Lived in the Last Year: 1     Unstable Housing in the Last Year: No       Past Surgical History:   Procedure Laterality Date    bladder tumor removal      CARDIAC ELECTROPHYSIOLOGY MAPPING AND ABLATION      CARDIOVERSION      several    CATARACT EXTRACTION Bilateral     cataract removal with IOL implants Bilateral     CHOLECYSTECTOMY      CYSTOSCOPIC LITHOLAPAXY N/A 12/28/2021    Procedure: CYSTOLITHOLAPAXY;  Surgeon: Medardo Garcia MD;  Location: [facility] OR;  Service: Urology;  Laterality: N/A;    CYSTOSCOPY      CYSTOSCOPY WITH BIOPSY OF BLADDER Right 12/28/2021    Procedure: CYSTOSCOPY, WITH BLADDER BIOPSY, WITH FULGURATION IF INDICATED;  Surgeon: Medardo Garcia MD;  Location: [facility] OR;  Service: Urology;  Laterality: Right;    ESOPHAGOGASTRODUODENOSCOPY N/A 10/25/2021    Procedure: Small Jason Enteroscopy (SBE);  Surgeon: Isabelle Griffin MD;  Location: [facility] ENDO;  Service:  Endoscopy;  Laterality: N/A;    ESOPHAGOGASTRODUODENOSCOPY N/A 12/13/2021    Procedure: EGD (ESOPHAGOGASTRODUODENOSCOPY);  Surgeon: Troy Polanco MD;  Location: [facility];  Service: Endoscopy;  Laterality: N/A;    ESOPHAGOGASTRODUODENOSCOPY N/A 4/5/2024    Procedure: EGD (ESOPHAGOGASTRODUODENOSCOPY);  Surgeon: Myrtle Salcedo MD;  Location: [facility];  Service: Endoscopy;  Laterality: N/A;    ESOPHAGOGASTRODUODENOSCOPY N/A 4/22/2024    Procedure: EGD (ESOPHAGOGASTRODUODENOSCOPY);  Surgeon: Myrtle Salcedo MD;  Location: [facility];  Service: Endoscopy;  Laterality: N/A;  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    EYE SURGERY      INTRALUMINAL GASTROINTESTINAL TRACT IMAGING VIA CAPSULE N/A 12/6/2021    Procedure: IMAGING PROCEDURE, GI TRACT, INTRALUMINAL, VIA CAPSULE;  Surgeon: Larry Jones RN;  Location: [facility];  Service: Endoscopy;  Laterality: N/A;    INTRALUMINAL GASTROINTESTINAL TRACT IMAGING VIA CAPSULE N/A 4/18/2024    Procedure: IMAGING PROCEDURE, GI TRACT, INTRALUMINAL, VIA CAPSULE;  Surgeon: Larry Jones RN;  Location: [facility];  Service: Endoscopy;  Laterality: N/A;    TRANSURETHRAL RESECTION OF BLADDER TUMOR BY BIPOLAR CAUTERY N/A 7/23/2019    Procedure: TURBT, USING BIPOLAR CAUTERY;  Surgeon: Ritesh Marques MD;  Location: [facility];  Service: Urology;  Laterality: N/A;     Review of Systems     Objective:   There were no vitals taken for this visit.    US Retroperitoneal Complete  Narrative: EXAMINATION:  US RETROPERITONEAL COMPLETE    CLINICAL HISTORY:  DOMINIQUE;    TECHNIQUE:  Ultrasound of the kidneys and urinary bladder was performed including color flow and Doppler evaluation of the kidneys.    COMPARISON:  CT urogram 04/04/2024    FINDINGS:  Right kidney: The right kidney measures 11.8 cm. Cortical thinning.  No loss of corticomedullary distinction. Resistive index measures 0.68.  No mass. No renal stone. Moderate hydronephrosis.    Left kidney: The left kidney measures  13.1 cm. Cortical thinning.  No loss of corticomedullary distinction. Resistive index measures 0.81.  No mass. No renal stone. No hydronephrosis.    Urinary bladder is significantly distended, but demonstrates no significant abnormality otherwise..  Impression: Moderate right-sided hydronephrosis.    Chronic medical renal disease.    Electronically signed by: Frederick Love  Date:    04/07/2024  Time:    13:03     Physical Exam   LOWER EXTREMITY PHYSICAL EXAMINATION    Vascular:  The right dorsalis pedis pulse 2/4 and the right posterior tibial pulse 2/4.   Capillary refill is intact.  Pedal hair growth intact    Musculoskeletal: Manual Muscle Testing is 5/5 with dorsiflexion, plantar flexion, abduction, and adduction.   There is normal range of motion in the forefoot, hindfoot, and Ankle joint.      Dermatological:  Skin is supple and moist.  Previous ulceration to the distal aspect of the right 2nd toe has ultimately healed.  There is evidence of immature skin formation to the plantar aspect of the previous ulceration, however there is no evidence of granulation tissue.  Healthy epidermal tissue overlying.  No signs of acute infection.  No redness.    Assessment:     1. History of cellulitis    2. Traumatic avulsion of nail plate of toe, sequela    3. Type 2 diabetes mellitus with peripheral neuropathy    4. Stage 3 chronic kidney disease, unspecified whether stage 3a or 3b CKD    5. Anticoagulated        Plan:     History of cellulitis    Traumatic avulsion of nail plate of toe, sequela    Type 2 diabetes mellitus with peripheral neuropathy    Stage 3 chronic kidney disease, unspecified whether stage 3a or 3b CKD    Anticoagulated        Thorough discussion is had with the patient today, concerning the diagnosis, its etiology, and the treatment algorithm at present.    No evidence of ulceration presently.    No acute signs of infection.    Continue to wear wide toe box shoe gear to prevent pressure and friction  to the distal aspect of the toes causing recurrent ulcerations    Foot counseling and education is provided at this visit. Patient is advised to wear socks and shoes at all times.  Do not walk barefoot, or with just socks, even when indoors.  Be sure to check and inspect the inside of the shoe before putting them on her feet.  Protect your feet at all times.  Walking shoes and/or athletic shoes are the best types of shoe gear. Do not wear vinyl or plastic type shoe gear, as they do not stretch and/or breathe.  Protect your feet from hot and/or cold. Elevate the extremities when sitting.  Do not wear excessively tight socks and/or shoe gear. Wiggle your toes for a few minutes throughout the day. Move your ankles up and down, in and out, to help blood flow in your lower extremity.     Future Appointments   Date Time Provider Department Center   4/29/2024  3:00 PM Josh De Paz PA-C [facility]   5/1/2024  8:20 AM Carlos Flower MD [facility] CARDIO [facility]

## 2024-04-29 NOTE — PROGRESS NOTES
Subjective:      Patient ID: Jose Miguel Alvarez Jr. is a 83 y.o. male.    Chief Complaint: Follow-up (Had VCE/rev results/GI bleed/anemia)    HPI  He has a long history of iron deficiency anemia with full GI workup in 2017 (EGD/Colon) and in 2021 (EGD x2, colon, VCE). 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 He was admitted earlier thismonth with Afibe and Hgb of 7.1. EGD was done which showed which showed multiple non-bleeding angiectasias of the stomach treated with APC. Outpatient VCE was recommended which showed active bleeding in the small bowel with clotted blood. SBE two days later (04/22/24) showed a single non-bleeding AVM tx'd with APC. Ulcerated mucosa in the esophagus noted. The patient was placed on Protonix and Carafate.     Today he reports feeling better. He is accompanied by his son-in-law. The patient denies melena, hematochezia, nausea, vomiting or abdominal pain since d/c. He got back on Xarelto about two days ago. His daughter does his medications so he isn't sure what he takes. Reviewed his scope results with him. His questions were answered. I will check a CBC today to trend H/H. Patient to reach out if any questions or concerns.      Review of Systems  As per HPI.     Objective:     Physical Exam  Constitutional:       General: He is not in acute distress.     Appearance: He is well-developed.   HENT:      Head: Normocephalic and atraumatic.   Eyes:      Extraocular Movements: Extraocular movements intact.   Cardiovascular:      Rate and Rhythm: Normal rate and regular rhythm.   Pulmonary:      Effort: Pulmonary effort is normal. No respiratory distress.      Breath sounds: Normal breath sounds.   Abdominal:      General: Bowel sounds are normal. There is no distension.      Palpations: Abdomen is soft.      Tenderness: There is no abdominal tenderness.   Neurological:      Mental Status: He is alert and  oriented to person, place, and time.      Cranial Nerves: No cranial nerve deficit.      Gait: Gait normal.   Psychiatric:         Behavior: Behavior normal.         Assessment:     1. Iron deficiency anemia secondary to blood loss (chronic)    2. AVM (arteriovenous malformation) of stomach, acquired with hemorrhage        Plan:     See above.   Not sure if Dr. Salcedo wanted any additional scopes. Will reach out to her for recs.   Orders Placed This Encounter   Procedures    CBC Auto Differential       Follow up if symptoms worsen or fail to improve.    Thank you for the opportunity to participate in the care of this patient.   Josh De Paz PA-C.      Per Dr. Salcedo - No we found the bleeding AVM and used APC. I recommended restarting Xarelto and contine PPI indefinitely and the carafate for a short time.

## 2024-04-30 LAB
FINAL PATHOLOGIC DIAGNOSIS: NORMAL
Lab: NORMAL

## 2024-05-01 ENCOUNTER — OFFICE VISIT (OUTPATIENT)
Dept: CARDIOLOGY | Facility: CLINIC | Age: 84
End: 2024-05-01
Payer: MEDICARE

## 2024-05-01 ENCOUNTER — PATIENT MESSAGE (OUTPATIENT)
Dept: GASTROENTEROLOGY | Facility: CLINIC | Age: 84
End: 2024-05-01
Payer: MEDICARE

## 2024-05-01 VITALS
SYSTOLIC BLOOD PRESSURE: 138 MMHG | WEIGHT: 231.13 LBS | HEIGHT: 72 IN | BODY MASS INDEX: 31.31 KG/M2 | DIASTOLIC BLOOD PRESSURE: 76 MMHG

## 2024-05-01 DIAGNOSIS — I70.533: ICD-10-CM

## 2024-05-01 DIAGNOSIS — E11.69 DM TYPE 2 WITH DIABETIC DYSLIPIDEMIA: ICD-10-CM

## 2024-05-01 DIAGNOSIS — E78.5 DM TYPE 2 WITH DIABETIC DYSLIPIDEMIA: ICD-10-CM

## 2024-05-01 DIAGNOSIS — I50.32 CHRONIC DIASTOLIC CONGESTIVE HEART FAILURE: ICD-10-CM

## 2024-05-01 DIAGNOSIS — Z01.810 PREOP CARDIOVASCULAR EXAM: ICD-10-CM

## 2024-05-01 DIAGNOSIS — I48.0 PAROXYSMAL ATRIAL FIBRILLATION: ICD-10-CM

## 2024-05-01 DIAGNOSIS — I73.9 PVD (PERIPHERAL VASCULAR DISEASE): Primary | ICD-10-CM

## 2024-05-01 DIAGNOSIS — I70.543: ICD-10-CM

## 2024-05-01 DIAGNOSIS — E78.00 HYPERCHOLESTEREMIA: ICD-10-CM

## 2024-05-01 DIAGNOSIS — I10 PRIMARY HYPERTENSION: ICD-10-CM

## 2024-05-01 PROCEDURE — 1159F MED LIST DOCD IN RCRD: CPT | Mod: CPTII,S$GLB,, | Performed by: INTERNAL MEDICINE

## 2024-05-01 PROCEDURE — 1157F ADVNC CARE PLAN IN RCRD: CPT | Mod: CPTII,S$GLB,, | Performed by: INTERNAL MEDICINE

## 2024-05-01 PROCEDURE — 1101F PT FALLS ASSESS-DOCD LE1/YR: CPT | Mod: CPTII,S$GLB,, | Performed by: INTERNAL MEDICINE

## 2024-05-01 PROCEDURE — 99999 PR PBB SHADOW E&M-EST. PATIENT-LVL IV: CPT | Mod: PBBFAC,,, | Performed by: INTERNAL MEDICINE

## 2024-05-01 PROCEDURE — 3288F FALL RISK ASSESSMENT DOCD: CPT | Mod: CPTII,S$GLB,, | Performed by: INTERNAL MEDICINE

## 2024-05-01 PROCEDURE — 1160F RVW MEDS BY RX/DR IN RCRD: CPT | Mod: CPTII,S$GLB,, | Performed by: INTERNAL MEDICINE

## 2024-05-01 PROCEDURE — 1111F DSCHRG MED/CURRENT MED MERGE: CPT | Mod: CPTII,S$GLB,, | Performed by: INTERNAL MEDICINE

## 2024-05-01 PROCEDURE — 3072F LOW RISK FOR RETINOPATHY: CPT | Mod: CPTII,S$GLB,, | Performed by: INTERNAL MEDICINE

## 2024-05-01 PROCEDURE — 3075F SYST BP GE 130 - 139MM HG: CPT | Mod: CPTII,S$GLB,, | Performed by: INTERNAL MEDICINE

## 2024-05-01 PROCEDURE — 99214 OFFICE O/P EST MOD 30 MIN: CPT | Mod: S$GLB,,, | Performed by: INTERNAL MEDICINE

## 2024-05-01 PROCEDURE — 3078F DIAST BP <80 MM HG: CPT | Mod: CPTII,S$GLB,, | Performed by: INTERNAL MEDICINE

## 2024-05-01 RX ORDER — AMIODARONE HYDROCHLORIDE 200 MG/1
200 TABLET ORAL DAILY
Qty: 30 TABLET | Refills: 11 | Status: SHIPPED | OUTPATIENT
Start: 2024-05-01 | End: 2025-05-01

## 2024-05-01 RX ORDER — HYDRALAZINE HYDROCHLORIDE 50 MG/1
50 TABLET, FILM COATED ORAL 3 TIMES DAILY
Qty: 90 TABLET | Refills: 11 | Status: SHIPPED | OUTPATIENT
Start: 2024-05-01 | End: 2024-05-06

## 2024-05-01 RX ORDER — CARVEDILOL 6.25 MG/1
6.25 TABLET ORAL 2 TIMES DAILY WITH MEALS
Qty: 180 TABLET | Refills: 3 | Status: SHIPPED | OUTPATIENT
Start: 2024-05-01 | End: 2024-05-27

## 2024-05-01 NOTE — PATIENT INSTRUCTIONS
D/c Cardizem and metoprolol due to leg swelling bradycardia  Add coreg 6.25 mg bid for HTN and afib  Add Hydralazine 50 mg tid for HTN  Check BP at home keep < 140/90 mmHG    Continue amiodarone 200 mg bid x1 week and then decrease to 200 mg daily

## 2024-05-01 NOTE — PROGRESS NOTES
Subjective:   Patient ID:  Jose Miguel Alvarez Jr. is a 83 y.o. male who presents for follow up of Hospital Follow Up (Was in A-fib- lost blood)      82 yo male 6 months f/u. Preop   PMH PAF s/p DCCV few yrs ago and RFA at [facility] in 2018 HTN HLD COPD quit smoking  Prior cardiologist Dr. Tanesha Coffman at [facility], h/o COVID 19 in . Hospitalization over a month, anemia, h/o bladder cancer on chemo and XRT, no h./o DM and stroke    10/2021 admitted to severe anemia and HGB dropped to 4.2 and had 4 units of PRBC. Felt better  This was 3rd episode of severe anemia in the past two yrs  No chest pain, RECINOS dizziness palpitation  No active bleeding     visit  S/p cystoscopy in  bladder cancer clear  No BP check at home. Mod compliance. No active bleeding  No chest pain dyspnea headache, palpitation dizziness faint and leg swelling   CMP TSH and chest CT reviewed     visit  No h/o MI DM and CVA chronic SOB.   No recurrent palpitation. No active bleeding. No chest pain dizziness and faint  Plays golf daily. No smoking and drinking  EKG today NSR  CMP TSH and chest done wi one yr. Chest ct showed emphysema  PFT pending    04/23 visit  Ekg NSR LVH   Mo chest pain palpitation dizziness  No active bleeding   Bladder cancer stable    10/23 visit  BP high today. No dizziness faint chest pain.  Plays golf twice a week.   Ekg NSR  LVH    Interval history  04/24 admitted for afib with RVR and severe anemia. EGD showed gastric AVM rx with APC. BNP up to 500's and Rx with diuresis and Cr elevated and had kidney stone.   Now BNP back to 88 and Cr back to 1.3  Plan to have TURP by Dr. Garcia  On SR and remains leg swelling. No orthopnea   EKG NSR                          Past Medical History:   Diagnosis Date    Anemia     Anticoagulant long-term use     Atrial fibrillation     Bladder cancer     Bladder tumor     CKD (chronic kidney disease), stage III     COPD (chronic obstructive pulmonary  [FreeTextEntry1] : followup evaluation disease)     pt denies    Encounter for blood transfusion     Hematuria     History of 2019 novel coronavirus disease (COVID-19)     Hypercholesteremia     Hypertension        Past Surgical History:   Procedure Laterality Date    bladder tumor removal      CARDIAC ELECTROPHYSIOLOGY MAPPING AND ABLATION      CARDIOVERSION      several    CATARACT EXTRACTION Bilateral     cataract removal with IOL implants Bilateral     CHOLECYSTECTOMY      CYSTOSCOPIC LITHOLAPAXY N/A 12/28/2021    Procedure: CYSTOLITHOLAPAXY;  Surgeon: Medardo Garcia MD;  Location: [facility] OR;  Service: Urology;  Laterality: N/A;    CYSTOSCOPY      CYSTOSCOPY WITH BIOPSY OF BLADDER Right 12/28/2021    Procedure: CYSTOSCOPY, WITH BLADDER BIOPSY, WITH FULGURATION IF INDICATED;  Surgeon: Medardo Garcia MD;  Location: [facility] OR;  Service: Urology;  Laterality: Right;    ESOPHAGOGASTRODUODENOSCOPY N/A 10/25/2021    Procedure: Small Jason Enteroscopy (SBE);  Surgeon: Isabelle Griffin MD;  Location: [facility];  Service: Endoscopy;  Laterality: N/A;    ESOPHAGOGASTRODUODENOSCOPY N/A 12/13/2021    Procedure: EGD (ESOPHAGOGASTRODUODENOSCOPY);  Surgeon: Troy Polanco MD;  Location: [facility];  Service: Endoscopy;  Laterality: N/A;    ESOPHAGOGASTRODUODENOSCOPY N/A 4/5/2024    Procedure: EGD (ESOPHAGOGASTRODUODENOSCOPY);  Surgeon: Myrtle Salcedo MD;  Location: [facility];  Service: Endoscopy;  Laterality: N/A;    ESOPHAGOGASTRODUODENOSCOPY N/A 4/22/2024    Procedure: EGD (ESOPHAGOGASTRODUODENOSCOPY);  Surgeon: Myrtle Salcedo MD;  Location: [facility];  Service: Endoscopy;  Laterality: N/A;  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    EYE SURGERY      INTRALUMINAL GASTROINTESTINAL TRACT IMAGING VIA CAPSULE N/A 12/6/2021    Procedure: IMAGING PROCEDURE, GI TRACT, INTRALUMINAL, VIA CAPSULE;  Surgeon: Larry Jones RN;  Location: [facility] ENDO;  Service: Endoscopy;  Laterality: N/A;    INTRALUMINAL GASTROINTESTINAL TRACT IMAGING VIA  CAPSULE N/A 2024    Procedure: IMAGING PROCEDURE, GI TRACT, INTRALUMINAL, VIA CAPSULE;  Surgeon: Larry Jones RN;  Location: [facility] ENDO;  Service: Endoscopy;  Laterality: N/A;    TRANSURETHRAL RESECTION OF BLADDER TUMOR BY BIPOLAR CAUTERY N/A 2019    Procedure: TURBT, USING BIPOLAR CAUTERY;  Surgeon: Ritesh Marques MD;  [facility];  Service: Urology;  Laterality: N/A;       Social History     Tobacco Use    Smoking status: Former     Current packs/day: 0.00     Average packs/day: 2.5 packs/day for 20.0 years (50.0 ttl pk-yrs)     Types: Cigarettes     Start date:      Quit date:      Years since quittin.3    Smokeless tobacco: Never   Substance Use Topics    Alcohol use: Yes     Alcohol/week: 7.0 standard drinks of alcohol     Types: 7 Cans of beer per week    Drug use: No       Family History   Problem Relation Name Age of Onset    Heart disease Father      Hypertension Father      Heart disease Brother           ROS    Objective:   Physical Exam  HENT:      Head: Normocephalic.   Eyes:      Pupils: Pupils are equal, round, and reactive to light.   Neck:      Thyroid: No thyromegaly.      Vascular: Normal carotid pulses. No carotid bruit or JVD.   Cardiovascular:      Rate and Rhythm: Normal rate and regular rhythm. No extrasystoles are present.     Chest Wall: PMI is not displaced.      Pulses: Normal pulses.      Heart sounds: Normal heart sounds. No murmur heard.     No gallop. No S3 sounds.   Pulmonary:      Effort: No respiratory distress.      Breath sounds: Normal breath sounds. No stridor.   Abdominal:      General: Bowel sounds are normal.      Palpations: Abdomen is soft.      Tenderness: There is no abdominal tenderness. There is no rebound.   Skin:     Findings: No rash.   Neurological:      Mental Status: He is alert and oriented to person, place, and time.   Psychiatric:         Behavior: Behavior normal.         Lab Results   Component Value Date    CHOL 120  [de-identified] : Mr. Tyler presents for followup evaluation. Feeling well. Does get some shortness of breath with exertion. Patient denies any chest pain or palpitations. He has no nocturnal symptoms of cough or dyspnea. Mr. Tyler denies any hematuria dysuria. 01/26/2023    CHOL 73 (L) 05/28/2021    CHOL 116 (L) 11/06/2020     Lab Results   Component Value Date    HDL 42 01/26/2023    HDL 39 (L) 05/28/2021    HDL 58 11/06/2020     Lab Results   Component Value Date    LDLCALC 44.4 (L) 01/26/2023    LDLCALC 22.4 (L) 05/28/2021    LDLCALC 45.6 (L) 11/06/2020     Lab Results   Component Value Date    TRIG 168 (H) 01/26/2023    TRIG 58 05/28/2021    TRIG 62 11/06/2020     Lab Results   Component Value Date    CHOLHDL 35.0 01/26/2023    CHOLHDL 53.4 (H) 05/28/2021    CHOLHDL 50.0 11/06/2020       Chemistry        Component Value Date/Time     04/09/2024 1115    K 3.7 04/09/2024 1115     04/09/2024 1115    CO2 27 04/09/2024 1115    BUN 19 04/09/2024 1115    CREATININE 1.3 04/09/2024 1115     (H) 04/09/2024 1115        Component Value Date/Time    CALCIUM 8.9 04/09/2024 1115    ALKPHOS 79 04/09/2024 1115    AST 22 04/09/2024 1115    AST 29 04/24/2016 1145    ALT 19 04/09/2024 1115    BILITOT 0.5 04/09/2024 1115    ESTGFRAFRICA >60 12/08/2021 2007    EGFRNONAA 56 (A) 12/08/2021 2007          Lab Results   Component Value Date    HGBA1C 6.2 (H) 04/09/2024     Lab Results   Component Value Date    TSH 3.937 04/01/2024     Lab Results   Component Value Date    INR 1.9 10/22/2021    INR 1.0 07/23/2019    INR 1.1 06/21/2016     Lab Results   Component Value Date    WBC 4.38 04/29/2024    HGB 10.1 (L) 04/29/2024    HCT 36.5 (L) 04/29/2024    MCV 82 04/29/2024     04/29/2024     BMP  Sodium   Date Value Ref Range Status   04/09/2024 140 136 - 145 mmol/L Final     Potassium   Date Value Ref Range Status   04/09/2024 3.7 3.5 - 5.1 mmol/L Final     Chloride   Date Value Ref Range Status   04/09/2024 106 95 - 110 mmol/L Final     CO2   Date Value Ref Range Status   04/09/2024 27 23 - 29 mmol/L Final     BUN   Date Value Ref Range Status   04/09/2024 19 8 - 23 mg/dL Final     Creatinine   Date Value Ref Range Status   04/09/2024 1.3 0.5 - 1.4 mg/dL Final     Calcium    Date Value Ref Range Status   04/09/2024 8.9 8.7 - 10.5 mg/dL Final     Anion Gap   Date Value Ref Range Status   04/09/2024 7 (L) 8 - 16 mmol/L Final     eGFR if    Date Value Ref Range Status   12/08/2021 >60 >60 mL/min/1.73 m^2 Final     eGFR if non    Date Value Ref Range Status   12/08/2021 56 (A) >60 mL/min/1.73 m^2 Final     Comment:     Calculation used to obtain the estimated glomerular filtration  rate (eGFR) is the CKD-EPI equation.        BNP  @LABRCNTIP(BNP,BNPTRIAGEBLO)@  @LABRCNTIP(troponini)@  CrCl cannot be calculated (Patient's most recent lab result is older than the maximum 7 days allowed.).  No results found in the last 24 hours.  No results found in the last 24 hours.  No results found in the last 24 hours.    Assessment:      1. PVD (peripheral vascular disease)    2. 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 DM type 2 with diabetic dyslipidemia    4. Hypercholesteremia    5. Primary hypertension    6. Paroxysmal atrial fibrillation    7. Chronic diastolic congestive heart failure    8. Preop cardiovascular exam      Gfr 52  Plan:   D/c Cardizem and metoprolol due to leg swelling bradycardia  Add coreg 6.25 mg bid for HTN and afib  Add Hydralazine 50 mg tid for HTN  Check BP at home  Continue amiodarone 200 mg bid x1 week and then decrease to 200 mg daily  Continue xaRELTO STATIN  Rtc in 4 weeks    Elevated periop risk of CV events for non-high risk procedure.  Ok to proceed the scheduled procedure without further cardiac study.  OK to hold XARELTO 2 DAYS before the procedure and resume postop once hemodynamically stable

## 2024-05-01 NOTE — PROGRESS NOTES
Path reviewed by Ms. Josh De Paz PA-C on 4/29/24 during clinic visit. Patient on Carafate and Protonix.

## 2024-05-06 ENCOUNTER — TELEPHONE (OUTPATIENT)
Dept: UROLOGY | Facility: CLINIC | Age: 84
End: 2024-05-06
Payer: MEDICARE

## 2024-05-06 RX ORDER — HYDRALAZINE HYDROCHLORIDE 50 MG/1
50 TABLET, FILM COATED ORAL EVERY 8 HOURS
Qty: 270 TABLET | Refills: 2 | Status: SHIPPED | OUTPATIENT
Start: 2024-05-06

## 2024-05-06 NOTE — TELEPHONE ENCOUNTER
Received msg that pt was still passing blood; Returned call to pt but no answer.  Detailed message left on pts vm informing him that Dr. Garcia said that pt just needs to get the surgery done.  Bleeding is most likely from the prostate and bladder stones.  If condition worsens, pt may need a mccrary.  Pt should push fluids and try to clear out the blood.

## 2024-05-06 NOTE — PRE-PROCEDURE INSTRUCTIONS
Pre op instructions reviewed with patient over telephone, verbalized understanding.    To confirm, Surgery is scheduled on 5/14/24. We will call you late afternoon the business day prior to surgery with your arrival time.    *Please report to the [facility] Lobby (1st Floor) located off of [facility] (2nd Entrance/Building on the left, in front of the flag pole).  Address: [address] [place] [address] [place]. [address]      INSTRUCTIONS IMPORTANT!!!  DO NOT Eat, Drink, or Smoke after 12 midnight unless instructed otherwise by your Surgeon. OK to brush teeth, no gum, candy or mints!    >>>MEDICATION INSTRUCTIONS<<<: Morning of Surgery, take small sip of water with ONLY these medications:  -Amiodarone  -Carvedilol  -Hydralazine  -Flomax  -Pantoprazole    PLEASE HOLD THE FOLLOWING MEDICATIONS 7 DAYS BEFORE SURGERY:  - VITAMIN D  - MULTI-VITAMIN    *Blood Thinners: Stop taking XARELTO 2 days prior to surgery per Physician Instructions! Call your Surgeon office to inquire about any questions regarding your blood thinner medication.    Ozempic/ Mounjaro/ Wegovy/ Trulicity/ Semaglutide injections or weight loss medication to be stopped 7 days prior to surgery.    !!!STOP ALL Aspirins, NSAIDS, WEIGHT LOSS INJECTIONS/PILLS, Herbal supplements, & Vitamins 7 DAYS BEFORE SURGERY!!!    ____  Avoid Alcoholic beverages 3 days prior to surgery, as it can thin the blood.  ____  NO Acrylic/fake nails or nail polish worn day of surgery (specifically hand/arm & foot surgeries).  ____  NO powder, lotions, deodorants, oils or cream on body.  ____  Remove all jewelry & piercings & foreign objects before arrival & leave at home.  ____  Remove Dentures, Hearing Aids & Contact Lens prior to surgery.  ____  Bring photo ID and insurance information to hospital (Leave Valuables at Home).  ____  If going home the same day, arrange for a ride home. You will not be able to drive for 24 hrs if Anesthesia was used.   ____  Females (ages  11-60): may need to give a urine sample the morning of surgery; please see Pre op Nurse prior to using the restroom.  ____  Males: Stop ED medications (Viagra, Cialis) 24 hrs prior to surgery.  ____  Wear clean, loose fitting clothing to allow for dressings/ bandages.      Bathing Instructions:    -Shower with anti-bacterial Soap (Hibiclens or Dial) the night before surgery and the morning of.   -Do not use Hibiclens on your face or genitals.   -Apply clean clothes after shower.  -Do not shave your face or body 2 days prior to surgery unless instructed otherwise by your Surgeon.    [facility]/Ride Policy:  Only 2 adults allowed in pre op/recovery area during your procedure. You MUST HAVE A RIDE HOME from a responsible adult that you know and trust. Medical Transport, Uber or Lyft can ONLY be used if patient has a responsible adult to accompany them during ride home.       *Signs and symptoms of Infection Before or After Surgery:               !!!If you experience any fever, chills, nausea/ vomiting, foul odor/ excessive drainage from surgical site, flu-like symptoms, new wounds or cuts, PLEASE CALL THE SURGEON OFFICE at [phone] or SEND MESSAGE THROUGH [employer]!!!     *If you are running late the morning of surgery, please call the Hospital Surgery Dept @ [phone].     *Billing questions:  [phone]     Thank you,  -[facility] Pre Admit Dept.  [phone] or [phone]  M-F 7:30 am-4:00 pm (Closed Major Holidays)

## 2024-05-13 ENCOUNTER — TELEPHONE (OUTPATIENT)
Dept: PREADMISSION TESTING | Facility: HOSPITAL | Age: 84
End: 2024-05-13
Payer: MEDICARE

## 2024-05-13 NOTE — TELEPHONE ENCOUNTER
Called and spoke with Pt about the following:     Please arrive to [facility] ([facility] at 10:30 am on 5/14/24 for your scheduled procedure.  Address: [address] [place] [address] [place]. [address] (2nd Building on left, 1st Floor Lobby)  >>>NO eating or drinking after midnight unless instructed otherwise by your Surgeon<<<

## 2024-05-14 ENCOUNTER — ANESTHESIA (OUTPATIENT)
Dept: SURGERY | Facility: HOSPITAL | Age: 84
End: 2024-05-14
Payer: MEDICARE

## 2024-05-14 ENCOUNTER — TELEPHONE (OUTPATIENT)
Dept: UROLOGY | Facility: CLINIC | Age: 84
End: 2024-05-14
Payer: MEDICARE

## 2024-05-14 ENCOUNTER — ANESTHESIA EVENT (OUTPATIENT)
Dept: SURGERY | Facility: HOSPITAL | Age: 84
End: 2024-05-14
Payer: MEDICARE

## 2024-05-14 ENCOUNTER — HOSPITAL ENCOUNTER (OUTPATIENT)
Facility: HOSPITAL | Age: 84
Discharge: HOME | End: 2024-05-15
Attending: UROLOGY | Admitting: UROLOGY
Payer: MEDICARE

## 2024-05-14 DIAGNOSIS — N40.1 BENIGN PROSTATIC HYPERPLASIA WITH URINARY FREQUENCY: Primary | ICD-10-CM

## 2024-05-14 DIAGNOSIS — R35.0 BENIGN PROSTATIC HYPERPLASIA WITH URINARY FREQUENCY: Primary | ICD-10-CM

## 2024-05-14 LAB
ANION GAP SERPL CALC-SCNC: 6 MMOL/L (ref 8–16)
BASOPHILS # BLD AUTO: 0.01 K/UL (ref 0–0.2)
BASOPHILS NFR BLD: 0.3 % (ref 0–1.9)
BUN SERPL-MCNC: 14 MG/DL (ref 8–23)
CALCIUM SERPL-MCNC: 9.1 MG/DL (ref 8.7–10.5)
CHLORIDE SERPL-SCNC: 107 MMOL/L (ref 95–110)
CO2 SERPL-SCNC: 27 MMOL/L (ref 23–29)
CREAT SERPL-MCNC: 1.1 MG/DL (ref 0.5–1.4)
DIFFERENTIAL METHOD BLD: ABNORMAL
EOSINOPHIL # BLD AUTO: 0 K/UL (ref 0–0.5)
EOSINOPHIL NFR BLD: 0 % (ref 0–8)
ERYTHROCYTE [DISTWIDTH] IN BLOOD BY AUTOMATED COUNT: 21.9 % (ref 11.5–14.5)
EST. GFR  (NO RACE VARIABLE): >60 ML/MIN/1.73 M^2
GLUCOSE SERPL-MCNC: 179 MG/DL (ref 70–110)
HCT VFR BLD AUTO: 34.5 % (ref 40–54)
HGB BLD-MCNC: 10.4 G/DL (ref 14–18)
IMM GRANULOCYTES # BLD AUTO: 0.13 K/UL (ref 0–0.04)
IMM GRANULOCYTES NFR BLD AUTO: 3.3 % (ref 0–0.5)
LYMPHOCYTES # BLD AUTO: 0.5 K/UL (ref 1–4.8)
LYMPHOCYTES NFR BLD: 12.7 % (ref 18–48)
MCH RBC QN AUTO: 23.6 PG (ref 27–31)
MCHC RBC AUTO-ENTMCNC: 30.1 G/DL (ref 32–36)
MCV RBC AUTO: 78 FL (ref 82–98)
MONOCYTES # BLD AUTO: 0.1 K/UL (ref 0.3–1)
MONOCYTES NFR BLD: 2.3 % (ref 4–15)
NEUTROPHILS # BLD AUTO: 3.2 K/UL (ref 1.8–7.7)
NEUTROPHILS NFR BLD: 81.4 % (ref 38–73)
NRBC BLD-RTO: 0 /100 WBC
PLATELET # BLD AUTO: 115 K/UL (ref 150–450)
PMV BLD AUTO: ABNORMAL FL (ref 9.2–12.9)
POCT GLUCOSE: 161 MG/DL (ref 70–110)
POTASSIUM SERPL-SCNC: 4.4 MMOL/L (ref 3.5–5.1)
RBC # BLD AUTO: 4.4 M/UL (ref 4.6–6.2)
SODIUM SERPL-SCNC: 140 MMOL/L (ref 136–145)
WBC # BLD AUTO: 3.94 K/UL (ref 3.9–12.7)

## 2024-05-14 PROCEDURE — 71000033 HC RECOVERY, INTIAL HOUR: Performed by: UROLOGY

## 2024-05-14 PROCEDURE — 27201423 OPTIME MED/SURG SUP & DEVICES STERILE SUPPLY: Performed by: UROLOGY

## 2024-05-14 PROCEDURE — 88300 SURGICAL PATH GROSS: CPT | Performed by: PATHOLOGY

## 2024-05-14 PROCEDURE — 94761 N-INVAS EAR/PLS OXIMETRY MLT: CPT

## 2024-05-14 PROCEDURE — 63600175 PHARM REV CODE 636 W HCPCS

## 2024-05-14 PROCEDURE — 63600175 PHARM REV CODE 636 W HCPCS: Performed by: UROLOGY

## 2024-05-14 PROCEDURE — 94799 UNLISTED PULMONARY SVC/PX: CPT

## 2024-05-14 PROCEDURE — 85025 COMPLETE CBC W/AUTO DIFF WBC: CPT | Performed by: UROLOGY

## 2024-05-14 PROCEDURE — 71000039 HC RECOVERY, EACH ADD'L HOUR: Performed by: UROLOGY

## 2024-05-14 PROCEDURE — 52601 PROSTATECTOMY (TURP): CPT | Mod: ,,, | Performed by: UROLOGY

## 2024-05-14 PROCEDURE — 36415 COLL VENOUS BLD VENIPUNCTURE: CPT | Performed by: UROLOGY

## 2024-05-14 PROCEDURE — 88300 SURGICAL PATH GROSS: CPT | Mod: 26,,, | Performed by: PATHOLOGY

## 2024-05-14 PROCEDURE — 25000003 PHARM REV CODE 250

## 2024-05-14 PROCEDURE — 52317 REMOVE BLADDER STONE: CPT | Mod: 51,,, | Performed by: UROLOGY

## 2024-05-14 PROCEDURE — 36000706: Performed by: UROLOGY

## 2024-05-14 PROCEDURE — 99900035 HC TECH TIME PER 15 MIN (STAT)

## 2024-05-14 PROCEDURE — 36000707: Performed by: UROLOGY

## 2024-05-14 PROCEDURE — 88305 TISSUE EXAM BY PATHOLOGIST: CPT | Mod: 26,,, | Performed by: PATHOLOGY

## 2024-05-14 PROCEDURE — 88305 TISSUE EXAM BY PATHOLOGIST: CPT | Performed by: PATHOLOGY

## 2024-05-14 PROCEDURE — 25000003 PHARM REV CODE 250: Performed by: UROLOGY

## 2024-05-14 PROCEDURE — 37000008 HC ANESTHESIA 1ST 15 MINUTES: Performed by: UROLOGY

## 2024-05-14 PROCEDURE — 37000009 HC ANESTHESIA EA ADD 15 MINS: Performed by: UROLOGY

## 2024-05-14 PROCEDURE — 80048 BASIC METABOLIC PNL TOTAL CA: CPT | Performed by: UROLOGY

## 2024-05-14 RX ORDER — EPHEDRINE SULFATE 50 MG/ML
INJECTION, SOLUTION INTRAVENOUS
Status: DISCONTINUED | OUTPATIENT
Start: 2024-05-14 | End: 2024-05-14

## 2024-05-14 RX ORDER — HYDROMORPHONE HYDROCHLORIDE 2 MG/ML
0.2 INJECTION, SOLUTION INTRAMUSCULAR; INTRAVENOUS; SUBCUTANEOUS EVERY 5 MIN PRN
Status: DISCONTINUED | OUTPATIENT
Start: 2024-05-14 | End: 2024-05-14 | Stop reason: HOSPADM

## 2024-05-14 RX ORDER — MEPERIDINE HYDROCHLORIDE 25 MG/ML
12.5 INJECTION INTRAMUSCULAR; INTRAVENOUS; SUBCUTANEOUS ONCE AS NEEDED
Status: DISCONTINUED | OUTPATIENT
Start: 2024-05-14 | End: 2024-05-14 | Stop reason: HOSPADM

## 2024-05-14 RX ORDER — SODIUM CHLORIDE, SODIUM LACTATE, POTASSIUM CHLORIDE, CALCIUM CHLORIDE 600; 310; 30; 20 MG/100ML; MG/100ML; MG/100ML; MG/100ML
INJECTION, SOLUTION INTRAVENOUS CONTINUOUS
Status: DISCONTINUED | OUTPATIENT
Start: 2024-05-14 | End: 2024-05-15 | Stop reason: HOSPADM

## 2024-05-14 RX ORDER — SODIUM CHLORIDE, SODIUM LACTATE, POTASSIUM CHLORIDE, CALCIUM CHLORIDE 600; 310; 30; 20 MG/100ML; MG/100ML; MG/100ML; MG/100ML
INJECTION, SOLUTION INTRAVENOUS CONTINUOUS PRN
Status: DISCONTINUED | OUTPATIENT
Start: 2024-05-14 | End: 2024-05-14

## 2024-05-14 RX ORDER — OXYBUTYNIN CHLORIDE 5 MG/1
5 TABLET ORAL 3 TIMES DAILY
Status: DISCONTINUED | OUTPATIENT
Start: 2024-05-14 | End: 2024-05-15 | Stop reason: HOSPADM

## 2024-05-14 RX ORDER — ONDANSETRON HYDROCHLORIDE 2 MG/ML
4 INJECTION, SOLUTION INTRAVENOUS DAILY PRN
Status: DISCONTINUED | OUTPATIENT
Start: 2024-05-14 | End: 2024-05-14 | Stop reason: HOSPADM

## 2024-05-14 RX ORDER — PHENAZOPYRIDINE HYDROCHLORIDE 100 MG/1
200 TABLET, FILM COATED ORAL
Status: DISCONTINUED | OUTPATIENT
Start: 2024-05-14 | End: 2024-05-15 | Stop reason: HOSPADM

## 2024-05-14 RX ORDER — HYDROMORPHONE HYDROCHLORIDE 2 MG/ML
1 INJECTION, SOLUTION INTRAMUSCULAR; INTRAVENOUS; SUBCUTANEOUS EVERY 4 HOURS PRN
Status: DISCONTINUED | OUTPATIENT
Start: 2024-05-14 | End: 2024-05-15 | Stop reason: HOSPADM

## 2024-05-14 RX ORDER — ONDANSETRON HYDROCHLORIDE 2 MG/ML
4 INJECTION, SOLUTION INTRAVENOUS ONCE AS NEEDED
Status: DISCONTINUED | OUTPATIENT
Start: 2024-05-14 | End: 2024-05-14 | Stop reason: HOSPADM

## 2024-05-14 RX ORDER — FENTANYL CITRATE 50 UG/ML
INJECTION, SOLUTION INTRAMUSCULAR; INTRAVENOUS
Status: DISCONTINUED | OUTPATIENT
Start: 2024-05-14 | End: 2024-05-14

## 2024-05-14 RX ORDER — HYDROCODONE BITARTRATE AND ACETAMINOPHEN 5; 325 MG/1; MG/1
1 TABLET ORAL EVERY 4 HOURS PRN
Status: DISCONTINUED | OUTPATIENT
Start: 2024-05-14 | End: 2024-05-15 | Stop reason: HOSPADM

## 2024-05-14 RX ORDER — PANTOPRAZOLE SODIUM 40 MG/1
40 TABLET, DELAYED RELEASE ORAL DAILY
Status: DISCONTINUED | OUTPATIENT
Start: 2024-05-14 | End: 2024-05-15 | Stop reason: HOSPADM

## 2024-05-14 RX ORDER — ONDANSETRON HYDROCHLORIDE 2 MG/ML
INJECTION, SOLUTION INTRAVENOUS
Status: DISCONTINUED | OUTPATIENT
Start: 2024-05-14 | End: 2024-05-14

## 2024-05-14 RX ORDER — FENTANYL CITRATE 50 UG/ML
25 INJECTION, SOLUTION INTRAMUSCULAR; INTRAVENOUS EVERY 5 MIN PRN
Status: DISCONTINUED | OUTPATIENT
Start: 2024-05-14 | End: 2024-05-14 | Stop reason: HOSPADM

## 2024-05-14 RX ORDER — OXYCODONE AND ACETAMINOPHEN 5; 325 MG/1; MG/1
1 TABLET ORAL
Status: DISCONTINUED | OUTPATIENT
Start: 2024-05-14 | End: 2024-05-14 | Stop reason: HOSPADM

## 2024-05-14 RX ORDER — LIDOCAINE HYDROCHLORIDE 20 MG/ML
INJECTION INTRAVENOUS
Status: DISCONTINUED | OUTPATIENT
Start: 2024-05-14 | End: 2024-05-14

## 2024-05-14 RX ORDER — CARVEDILOL 6.25 MG/1
6.25 TABLET ORAL 2 TIMES DAILY WITH MEALS
Status: DISCONTINUED | OUTPATIENT
Start: 2024-05-14 | End: 2024-05-15 | Stop reason: HOSPADM

## 2024-05-14 RX ORDER — HYDRALAZINE HYDROCHLORIDE 50 MG/1
50 TABLET, FILM COATED ORAL EVERY 8 HOURS
Status: DISCONTINUED | OUTPATIENT
Start: 2024-05-14 | End: 2024-05-15 | Stop reason: HOSPADM

## 2024-05-14 RX ORDER — PROPOFOL 10 MG/ML
VIAL (ML) INTRAVENOUS
Status: DISCONTINUED | OUTPATIENT
Start: 2024-05-14 | End: 2024-05-14

## 2024-05-14 RX ORDER — CEFAZOLIN SODIUM 2 G/50ML
2 SOLUTION INTRAVENOUS
Status: COMPLETED | OUTPATIENT
Start: 2024-05-14 | End: 2024-05-14

## 2024-05-14 RX ORDER — DEXAMETHASONE SODIUM PHOSPHATE 4 MG/ML
INJECTION, SOLUTION INTRA-ARTICULAR; INTRALESIONAL; INTRAMUSCULAR; INTRAVENOUS; SOFT TISSUE
Status: DISCONTINUED | OUTPATIENT
Start: 2024-05-14 | End: 2024-05-14

## 2024-05-14 RX ORDER — ONDANSETRON HYDROCHLORIDE 2 MG/ML
4 INJECTION, SOLUTION INTRAVENOUS EVERY 12 HOURS PRN
Status: DISCONTINUED | OUTPATIENT
Start: 2024-05-14 | End: 2024-05-15 | Stop reason: HOSPADM

## 2024-05-14 RX ORDER — DONEPEZIL HYDROCHLORIDE 5 MG/1
5 TABLET, FILM COATED ORAL NIGHTLY
Status: DISCONTINUED | OUTPATIENT
Start: 2024-05-14 | End: 2024-05-15 | Stop reason: HOSPADM

## 2024-05-14 RX ORDER — AMIODARONE HYDROCHLORIDE 200 MG/1
200 TABLET ORAL DAILY
Status: DISCONTINUED | OUTPATIENT
Start: 2024-05-14 | End: 2024-05-15 | Stop reason: HOSPADM

## 2024-05-14 RX ADMIN — OXYBUTYNIN CHLORIDE 5 MG: 5 TABLET ORAL at 04:05

## 2024-05-14 RX ADMIN — FENTANYL CITRATE 25 MCG: 50 INJECTION, SOLUTION INTRAMUSCULAR; INTRAVENOUS at 12:05

## 2024-05-14 RX ADMIN — EPHEDRINE SULFATE 10 MG: 50 INJECTION INTRAVENOUS at 12:05

## 2024-05-14 RX ADMIN — CARVEDILOL 6.25 MG: 6.25 TABLET, FILM COATED ORAL at 04:05

## 2024-05-14 RX ADMIN — DEXAMETHASONE SODIUM PHOSPHATE 4 MG: 4 INJECTION, SOLUTION INTRA-ARTICULAR; INTRALESIONAL; INTRAMUSCULAR; INTRAVENOUS; SOFT TISSUE at 12:05

## 2024-05-14 RX ADMIN — FENTANYL CITRATE 25 MCG: 50 INJECTION, SOLUTION INTRAMUSCULAR; INTRAVENOUS at 01:05

## 2024-05-14 RX ADMIN — OXYBUTYNIN CHLORIDE 5 MG: 5 TABLET ORAL at 09:05

## 2024-05-14 RX ADMIN — SODIUM CHLORIDE, SODIUM LACTATE, POTASSIUM CHLORIDE, AND CALCIUM CHLORIDE: 600; 310; 30; 20 INJECTION, SOLUTION INTRAVENOUS at 12:05

## 2024-05-14 RX ADMIN — EPHEDRINE SULFATE 10 MG: 50 INJECTION INTRAVENOUS at 01:05

## 2024-05-14 RX ADMIN — PROPOFOL 140 MG: 10 INJECTION, EMULSION INTRAVENOUS at 12:05

## 2024-05-14 RX ADMIN — PHENAZOPYRIDINE 200 MG: 100 TABLET ORAL at 04:05

## 2024-05-14 RX ADMIN — CEFAZOLIN 2 G: 2 INJECTION, POWDER, FOR SOLUTION INTRAMUSCULAR; INTRAVENOUS at 09:05

## 2024-05-14 RX ADMIN — SODIUM CHLORIDE, POTASSIUM CHLORIDE, SODIUM LACTATE AND CALCIUM CHLORIDE: 600; 310; 30; 20 INJECTION, SOLUTION INTRAVENOUS at 04:05

## 2024-05-14 RX ADMIN — HYDRALAZINE HYDROCHLORIDE 50 MG: 50 TABLET ORAL at 09:05

## 2024-05-14 RX ADMIN — PANTOPRAZOLE SODIUM 40 MG: 40 TABLET, DELAYED RELEASE ORAL at 04:05

## 2024-05-14 RX ADMIN — CEFAZOLIN SODIUM 2 G: 2 SOLUTION INTRAVENOUS at 12:05

## 2024-05-14 RX ADMIN — AMIODARONE HYDROCHLORIDE 200 MG: 200 TABLET ORAL at 04:05

## 2024-05-14 RX ADMIN — LIDOCAINE HYDROCHLORIDE 100 MG: 20 INJECTION INTRAVENOUS at 12:05

## 2024-05-14 RX ADMIN — DONEPEZIL HYDROCHLORIDE 5 MG: 5 TABLET, FILM COATED ORAL at 09:05

## 2024-05-14 RX ADMIN — ONDANSETRON 4 MG: 2 INJECTION INTRAMUSCULAR; INTRAVENOUS at 12:05

## 2024-05-14 NOTE — TELEPHONE ENCOUNTER
----- Message from Medardo Garcia MD sent at 5/14/2024  1:21 PM CDT -----  Also cystoscopy with me in 3 months

## 2024-05-14 NOTE — H&P
Chief Complaint:        Encounter Diagnoses   Name Primary?    History of bladder cancer Yes    Gross hematuria      Benign prostatic hyperplasia with urinary frequency      Nephrolithiasis      Acute cystitis with hematuria           HPI:   4/26/24- here today for cystoscopy.  Patient had recurrent gross hematuria, reported to the emergency department where Pandey catheter was placed due to possible retention.  Here today also for a voiding trial.     12/22/21- patient comes in today to establish care my clinic, recent gross hematuria and reported to see Alexandra.  CT urogram demonstrates bladder stones, urinary retention and a distal right ureteral stone.  Patient has had a Pandey catheter would like this removed today, no more gross hematuria, he is also on antibiotics and tamsulosin.  Patient otherwise was voiding well with no complaints prior to this incident, history of TURBT with subsequent induction BCG, no recurrences.        Allergies:  Patient has no known allergies.     Medications:  has a current medication list which includes the following prescription(s): amiodarone, amlodipine, atorvastatin, donepezil, ergocalciferol, famotidine, ferrous sulfate, hydrochlorothiazide, metoprolol succinate, multivitamin, nitrofurantoin (macrocrystal-monohydrate), oxycodone-acetaminophen, pantoprazole, phenazopyridine, rivaroxaban, and tamsulosin, and the following Facility-Administered Medications: lactated ringers.     Review of Systems:  General: No fever, chills, fatigability, or weight loss.  Skin: No rashes, itching, or changes in color or texture of skin.  Chest: Denies RECINOS, cyanosis, wheezing, cough, and sputum production.  Abdomen: Appetite fine. No weight loss. Denies diarrhea, abdominal pain, hematemesis, or blood in stool.  Musculoskeletal: No joint stiffness or swelling. Denies back pain.  : As above.  All other review of systems negative.     PMH:   has a past medical history of Anemia, Anticoagulant long-term  use, Atrial fibrillation, Bladder cancer, Bladder tumor, CKD (chronic kidney disease), stage III, COPD (chronic obstructive pulmonary disease), Encounter for blood transfusion, Hematuria, History of 2019 novel coronavirus disease (COVID-19), Hypercholesteremia, and Hypertension.     PSH:   has a past surgical history that includes Cholecystectomy; Cystoscopy; Cardioversion; bladder tumor removal; Eye surgery; cataract removal with IOL implants (Bilateral); Cardiac electrophysiology mapping and ablation; Transurethral resection of bladder tumor by bipolar cautery (N/A, 7/23/2019); Esophagogastroduodenoscopy (N/A, 10/25/2021); Intraluminal gastrointestinal tract imaging via capsule (N/A, 12/6/2021); Esophagogastroduodenoscopy (N/A, 12/13/2021); Cystoscopy with biopsy of bladder (Right, 12/28/2021); and Cystoscopic litholapaxy (N/A, 12/28/2021).     FamHx: family history includes Heart disease in his brother and father.     SocHx:  reports that he quit smoking about 35 years ago. His smoking use included cigarettes. He has a 50.00 pack-year smoking history. He has never used smokeless tobacco. He reports current alcohol use of about 8.0 standard drinks of alcohol per week. He reports that he does not use drugs.       Physical Exam:  General: A&Ox3, no apparent distress, no deformities  Neck: No masses, normal thyroid  Lungs: normal inspiration, no use of accessory muscles  Heart: normal pulse, no arrhythmias  Abdomen: Soft, NT, ND, no masses, no hernias, no hepatosplenomegaly  Lymphatic: Neck and groin nodes negative  : 4/24- Test desc filipe, no abnormalities of epididymus. Normal penile and scrotal skin. Meatus normal.      Labs/Studies:    ml 4/24  Cystoscopy lateral lobe coaptation, bladder stone, Pandey catheter changes 4/24  Cytology negative 3/23  CORDELL moderate right-sided hydronephrosis 4/24  CT urogram bladder distention with bilateral hydronephrosis, bladder stone 4/24  CT stone protocol no  nephrolithiasis 1/22  CT urogram 0.5 cm distal right ureteral stone, bladder stones, BPH 12/21  Creatinine 1.3 4/24     Procedure: Diagnostic Cystoscopy     Procedure in Detail: After proper consents were obtained, the patient was prepped and draped in normal sterile fashion for diagnostic cystoscopy. 5 ml of lidocaine jelly was instilled in the urethra. The flexible cystoscope was then introduced into the urethra, and advanced into the bladder under direct vision. The urethral mucosa appeared normal, and no strictures were noted. The sphincter appeared to be normal, and the veru montanum was unremarkable. The prostatic mucosa demonstrated lateral lobe coaptation. The bladder neck was normal. Inspection of the interior of the bladder was then carried out. The trigone was unremarkable, with no mucosal lesions. The ureteral orifices were normal in position and configuration. Systematic inspection of the mucosa of the bladder it was then carried out, rotating the cystoscope so that all areas of the left and right lateral walls, the dome of the bladder, and the posterior wall were all visualized. The cystoscope was then advanced further into the bladder, and maximum deflection of the scope was performed so that the bladder neck could be inspected.  Mucosal changes consistent with chronic catheter, bladder stone present.  The cystoscope was then removed, and the procedure terminated.      Findings:  Bladder stone, Pandey catheter changes, lateral lobe coaptation        Impression/Plan:       1. History of bladder cancer-  BCG 3/22, High pTa, but suspicious for T1 cystoscopy with bladder biopsy, vesical litholapaxy, could not assess the right ureter  12/28/21.  TURBT 5-6 years ago at the outside facility with induction BCG.     Cystoscopy demonstrates signs of outflow obstruction with a bladder stone, mucosal changes most likely consistent with the previous Pandey catheter.  Previous cytology was negative, follow-up on  cytology today and if clear then clear from a malignancy standpoint.  Would still need every 6 month cystoscopies once we get him clear from his outflow obstruction, pending a normal cytology.     2. Nephrolithiasis- no recurrent stones, of note we could not get up the right ureter.     3. BPH- patient states that he has been voiding well on tamsulosin, unfortunately due to CT findings with possible bilateral hydronephrosis and bladder stone, this is contradictory to sufficient voiding.  Therefore recommend cystoscopy with vesical litholapaxy and TURP.  Please see below in regards to our discussion today.  Patient is aware that he can have recurrent stones or persistent issues following surgery.  Patient has not emptied as well as I would like but he does not want the Pandey catheter replaced.  He knows to contact us if he has issues prior to his procedure.     4. Gross hematuria- this has cleared, call with any further issues.     5. Erectile dysfunction- given Viagra 100 mg but currently not an issue.     The patient understands the risks, benefits and alternatives.  These include but are not limited to damage to the surrounding structures including the urethra, prostate, ureteral orifices, bladder and rectum.  Risk of hematuria, regrowth, recurrent obstruction requiring further procedures or medical therapy, infection, further bleeding requiring other procedures, persistent burning.  Risk of perforation requiring an open procedure.  Risk of the possible need for stents or longer term catheterization.  Patient understands a biopsy will be taken and may diagnose him with prostate cancer during the procedure.  Understanding that retrograde ejaculation will occur following the procedure.  Risk of heart attack, stroke, death, DVT and PE.  Patient understanding of all of the above has elected to pursue the procedure.

## 2024-05-14 NOTE — OP NOTE
Date of Procedure: 05/14/2024    PREOP DIAGNOSIS:  Benign prostatic hypertrophy.    POSTOP DIAGNOSIS:  Benign prostatic hypertrophy.    PROCEDURES:      1. Transurethral resection of the prostate  2. Cystoscopy with vesical litholapaxy- 2 cm    SURGEON:  Medardo Garcia M.D.    Assistants: None    Specimen:  Prostate chips    ANESTHESIA:  General endotracheal.    BLOOD LOSS:  Minimal.    FINDINGS:  Clear channel at the conclusion.      PROCEDURE IN DETAIL:  Patient was brought to the operative suite and placed under general anesthesia and positioned into the dorsal lithotomy position.  After being sterilely prepped and draped a 24 Bahamian resectoscope was inserted into a normal urethra.  Prostatic urethra demonstrated lateral lobe coaptation with a moderate median lobe.  Bladder neck was otherwise unremarkable.  Bilateral ureteral orifices were in normal size, shape, caliber and location.  The remainder of the bladder was otherwise unremarkable, no evidence of intravesical lesions or other abnormalities.  Moderate 2 cm bladder stone.  Using the cystoscope we are able to fragment the laser and evacuated with the Ellik  to completion.  We then reinserted the resectoscope, median lobe was taken down appropriately to the level of the capsule.  Lateral lobes were then taken sufficiently back towards capsule circumferentially.  Care was taken around the sphincter, additional apical tissue was resected though.  We confirmed that capsule was identified circumferentially, making sure not to go proximal to the bladder neck or distal to the verumontanum.  Ureteral orifices were well away from resection plane.  The scope was removed and a coudé maneuver demonstrated a good flow.  A 24 Bahamian 3 Pandey catheter was placed without difficulty, 16 mL sterile water was placed in the balloon and sat nicely upon the bladder neck.  Patient was taken to the PACU in stable condition and will be admitted to the floor postprocedure  on continuous bladder irrigation.  Possible voiding trial in the morning.  I will have him return in 6 weeks with a uroflow and pvr.    COMPLICATIONS:  None

## 2024-05-14 NOTE — ANESTHESIA PREPROCEDURE EVALUATION
05/14/2024  Jose Miguel Alvarez Jr. is a 83 y.o., male.      Pre-op Assessment    I have reviewed the Patient Summary Reports.     I have reviewed the Nursing Notes. I have reviewed the NPO Status.      Review of Systems  Anesthesia Hx:  No problems with previous Anesthesia                Hematology/Oncology:  Hematology Normal   Oncology Normal                                   Cardiovascular:     Hypertension       CHF                                 Pulmonary:   COPD                     Renal/:  Chronic Renal Disease                Hepatic/GI:  Hepatic/GI Normal                 Neurological:  Neurology Normal                                      Endocrine:  Diabetes           Dermatological:  Skin Normal    Psych:  Psychiatric History                Patient Active Problem List   Diagnosis    Paroxysmal atrial fibrillation    Bladder tumor    Hypertension    Hypercholesteremia    COPD (chronic obstructive pulmonary disease)    Anemia    Urothelial carcinoma of bladder    Stage 3 chronic kidney disease    Iron deficiency anemia    Iron deficiency anemia secondary to blood loss (chronic)    Pancytopenia    History of bladder cancer    Benign prostatic hyperplasia with urinary frequency    Nephrolithiasis    Gross hematuria    DM type 2 with diabetic dyslipidemia    Moderate protein-calorie malnutrition    Other nonthrombocytopenic purpura    Chronic combined systolic (congestive) and diastolic (congestive) heart failure    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 No    Interpretation Summary    Left Ventricle: The left ventricle is normal in size. Mildly increased wall thickness. There is concentric remodeling. There is normal systolic function with a visually estimated ejection fraction of 60 - 65%. Unable to assess diastolic function due to atrial fibrillation.    Right Ventricle: Normal right ventricular cavity size. Wall thickness is normal. Right ventricle wall motion  is normal. Systolic function is normal.    Left Atrium: Left atrium is moderately dilated.    Tricuspid Valve: There is mild regurgitation.    IVC/SVC: Intermediate venous pressure at 8 mmHg.      Physical Exam  General: Well nourished, Cooperative, Alert and Oriented    Airway:  Mallampati: II / II  Mouth Opening: Normal  TM Distance: Normal  Tongue: Normal  Neck ROM: Normal ROM    Dental:  Intact        Anesthesia Plan  Type of Anesthesia, risks & benefits discussed:    Anesthesia Type: Gen ETT, Gen Supraglottic Airway  Intra-op Monitoring Plan: Standard ASA Monitors  Post Op Pain Control Plan: multimodal analgesia  Induction:  IV  Airway Plan: Direct  Informed Consent: Informed consent signed with the Patient and all parties understand the risks and agree with anesthesia plan.  All questions answered.   ASA Score: 3  Day of Surgery Review of History & Physical: H&P Update referred to the surgeon/provider.I have interviewed and examined the patient. I have reviewed the patient's H&P dated: There are no significant changes. H&P completed by Anesthesiologist.    Ready For Surgery From Anesthesia Perspective.     .

## 2024-05-14 NOTE — PLAN OF CARE
Discussed poc with pt, pt verbalized understanding    Purposeful rounding every 2hours    VS wnl  Cardiac monitoring in use, pt is NSR, tele monitor # [id number]  Fall precautions in place, remains injury free  Pt denies c/o pain and nausea at this time.  Pain and nausea under control with PRN meds    IVFs  Accurate I&Os  Abx given as prescribed  Bed locked at lowest position  Call light within reach    Chart check complete  Will cont with POC

## 2024-05-14 NOTE — TRANSFER OF CARE
Anesthesia Transfer of Care Note    Patient: Jose Miguel Alvarez Jr.    Procedure(s) Performed: Procedure(s) (LRB):  TURP (TRANSURETHRAL RESECTION OF PROSTATE) (N/A)  CYSTOLITHOLAPAXY (N/A)    Patient location: PACU    Anesthesia Type: general    Transport from OR: Transported from OR on room air with adequate spontaneous ventilation    Post pain: adequate analgesia    Post assessment: no apparent anesthetic complications and tolerated procedure well    Post vital signs: stable    Level of consciousness: responds to stimulation and sedated    Nausea/Vomiting: no nausea/vomiting    Complications: none    Transfer of care protocol was followedComments: Report given to PACU RN at bedside. Hand off tool used. RN given opportunity to ask questions or clarify concerns. No Concerns verbalized. RN was asked if ready to assume care of patient. RN verbally confirmed. Pt. left in stable condition. SV. Vital Signs Return to Near Baseline. No s/s of distress noted.       Last vitals: Visit Vitals  BP (!) 204/93 (BP Location: Right arm, Patient Position: Sitting)   Pulse 65   Temp 36.3 °C (97.3 °F) (Temporal)   Resp 18   Ht 6' (1.829 m)   Wt 101.9 kg (224 lb 10.4 oz)   SpO2 96%   BMI 30.47 kg/m²

## 2024-05-14 NOTE — ANESTHESIA PROCEDURE NOTES
Intubation    Date/Time: 5/14/2024 12:20 PM    Performed by: Marianne Finney CRNA  Authorized by: Jennifer Mcneal MD    Intubation:     Induction:  Intravenous    Intubated:  Postinduction    Mask Ventilation:  Not attempted    Attempted By:  CRNA and student    Difficult Airway Encountered?: No      Complications:  None    Airway Device:  Supraglottic airway/LMA    Airway Device Size:  5.0    Secured at:  The lips    Placement Verified By:  Capnometry    Complicating Factors:  None    Findings Post-Intubation:  BS equal bilateral and atraumatic/condition of teeth unchanged

## 2024-05-14 NOTE — TELEPHONE ENCOUNTER
----- Message from Medardo Garcia MD sent at 5/14/2024  1:20 PM CDT -----  6 weeks with me for a uroflow and pvr

## 2024-05-15 VITALS
WEIGHT: 224.63 LBS | SYSTOLIC BLOOD PRESSURE: 139 MMHG | HEART RATE: 70 BPM | TEMPERATURE: 98 F | RESPIRATION RATE: 18 BRPM | BODY MASS INDEX: 30.42 KG/M2 | HEIGHT: 72 IN | OXYGEN SATURATION: 94 % | DIASTOLIC BLOOD PRESSURE: 62 MMHG

## 2024-05-15 LAB
ANION GAP SERPL CALC-SCNC: 5 MMOL/L (ref 8–16)
ANISOCYTOSIS BLD QL SMEAR: SLIGHT
BASOPHILS # BLD AUTO: 0.01 K/UL (ref 0–0.2)
BASOPHILS NFR BLD: 0.1 % (ref 0–1.9)
BUN SERPL-MCNC: 12 MG/DL (ref 8–23)
CALCIUM SERPL-MCNC: 8.8 MG/DL (ref 8.7–10.5)
CHLORIDE SERPL-SCNC: 107 MMOL/L (ref 95–110)
CO2 SERPL-SCNC: 27 MMOL/L (ref 23–29)
CREAT SERPL-MCNC: 1.1 MG/DL (ref 0.5–1.4)
DIFFERENTIAL METHOD BLD: ABNORMAL
EOSINOPHIL # BLD AUTO: 0 K/UL (ref 0–0.5)
EOSINOPHIL NFR BLD: 0 % (ref 0–8)
ERYTHROCYTE [DISTWIDTH] IN BLOOD BY AUTOMATED COUNT: 21.8 % (ref 11.5–14.5)
EST. GFR  (NO RACE VARIABLE): >60 ML/MIN/1.73 M^2
GLUCOSE SERPL-MCNC: 157 MG/DL (ref 70–110)
HCT VFR BLD AUTO: 32.2 % (ref 40–54)
HGB BLD-MCNC: 9.6 G/DL (ref 14–18)
HYPOCHROMIA BLD QL SMEAR: ABNORMAL
IMM GRANULOCYTES # BLD AUTO: 0.03 K/UL (ref 0–0.04)
IMM GRANULOCYTES NFR BLD AUTO: 0.4 % (ref 0–0.5)
LYMPHOCYTES # BLD AUTO: 0.6 K/UL (ref 1–4.8)
LYMPHOCYTES NFR BLD: 8 % (ref 18–48)
MCH RBC QN AUTO: 23.2 PG (ref 27–31)
MCHC RBC AUTO-ENTMCNC: 29.8 G/DL (ref 32–36)
MCV RBC AUTO: 78 FL (ref 82–98)
MONOCYTES # BLD AUTO: 0.6 K/UL (ref 0.3–1)
MONOCYTES NFR BLD: 7.6 % (ref 4–15)
NEUTROPHILS # BLD AUTO: 6.2 K/UL (ref 1.8–7.7)
NEUTROPHILS NFR BLD: 83.9 % (ref 38–73)
NRBC BLD-RTO: 0 /100 WBC
OVALOCYTES BLD QL SMEAR: ABNORMAL
PLATELET # BLD AUTO: 137 K/UL (ref 150–450)
PLATELET BLD QL SMEAR: ABNORMAL
PMV BLD AUTO: ABNORMAL FL (ref 9.2–12.9)
POIKILOCYTOSIS BLD QL SMEAR: SLIGHT
POLYCHROMASIA BLD QL SMEAR: ABNORMAL
POTASSIUM SERPL-SCNC: 4 MMOL/L (ref 3.5–5.1)
RBC # BLD AUTO: 4.13 M/UL (ref 4.6–6.2)
SODIUM SERPL-SCNC: 139 MMOL/L (ref 136–145)
WBC # BLD AUTO: 7.35 K/UL (ref 3.9–12.7)

## 2024-05-15 PROCEDURE — 36415 COLL VENOUS BLD VENIPUNCTURE: CPT | Performed by: UROLOGY

## 2024-05-15 PROCEDURE — 80048 BASIC METABOLIC PNL TOTAL CA: CPT | Performed by: UROLOGY

## 2024-05-15 PROCEDURE — 85025 COMPLETE CBC W/AUTO DIFF WBC: CPT | Performed by: UROLOGY

## 2024-05-15 PROCEDURE — 99900035 HC TECH TIME PER 15 MIN (STAT)

## 2024-05-15 PROCEDURE — 63600175 PHARM REV CODE 636 W HCPCS: Performed by: UROLOGY

## 2024-05-15 PROCEDURE — 25000003 PHARM REV CODE 250: Performed by: UROLOGY

## 2024-05-15 RX ORDER — CEFDINIR 300 MG/1
300 CAPSULE ORAL 2 TIMES DAILY
Qty: 10 CAPSULE | Refills: 0 | Status: SHIPPED | OUTPATIENT
Start: 2024-05-15 | End: 2024-05-20

## 2024-05-15 RX ORDER — PHENAZOPYRIDINE HYDROCHLORIDE 200 MG/1
200 TABLET, FILM COATED ORAL 3 TIMES DAILY PRN
Qty: 15 TABLET | Refills: 0 | Status: SHIPPED | OUTPATIENT
Start: 2024-05-15

## 2024-05-15 RX ORDER — OXYCODONE AND ACETAMINOPHEN 5; 325 MG/1; MG/1
1 TABLET ORAL EVERY 4 HOURS PRN
Qty: 10 TABLET | Refills: 0 | Status: SHIPPED | OUTPATIENT
Start: 2024-05-15

## 2024-05-15 RX ADMIN — AMIODARONE HYDROCHLORIDE 200 MG: 200 TABLET ORAL at 08:05

## 2024-05-15 RX ADMIN — OXYBUTYNIN CHLORIDE 5 MG: 5 TABLET ORAL at 08:05

## 2024-05-15 RX ADMIN — PHENAZOPYRIDINE 200 MG: 100 TABLET ORAL at 08:05

## 2024-05-15 RX ADMIN — PANTOPRAZOLE SODIUM 40 MG: 40 TABLET, DELAYED RELEASE ORAL at 08:05

## 2024-05-15 RX ADMIN — CARVEDILOL 6.25 MG: 6.25 TABLET, FILM COATED ORAL at 08:05

## 2024-05-15 RX ADMIN — CEFAZOLIN 2 G: 2 INJECTION, POWDER, FOR SOLUTION INTRAMUSCULAR; INTRAVENOUS at 04:05

## 2024-05-15 RX ADMIN — HYDRALAZINE HYDROCHLORIDE 50 MG: 50 TABLET ORAL at 05:05

## 2024-05-15 NOTE — PLAN OF CARE
[facility] - Med Surg  Discharge Final Note    Primary Care Provider: Toshia Valladares MD    Expected Discharge Date: 5/15/2024    Final Discharge Note (most recent)       Final Note - 05/15/24 0842          Final Note    Assessment Type Final Discharge Note     Anticipated Discharge Disposition Home or Self Care     Hospital Resources/Appts/Education Provided Appointments scheduled and added to AVS        Post-Acute Status    Discharge Delays None known at this time                   No d/c needs/orders at this time.

## 2024-05-15 NOTE — DISCHARGE SUMMARY
[facility] - Med Surg  Discharge Note  Short Stay    Procedure(s) (LRB):  TURP (TRANSURETHRAL RESECTION OF PROSTATE) (N/A)  CYSTOLITHOLAPAXY (N/A)      OUTCOME: Patient tolerated treatment/procedure well without complication and is now ready for discharge.    DISPOSITION: Home or Self Care    FINAL DIAGNOSIS:  BPH    FOLLOWUP: In clinic    DISCHARGE INSTRUCTIONS:    Discharge Procedure Orders   Diet Adult Regular     Pelvic Rest     Notify your health care provider if you experience any of the following:  severe uncontrolled pain     Notify your health care provider if you experience any of the following:  persistent nausea and vomiting or diarrhea     Notify your health care provider if you experience any of the following:  temperature >100.4     Activity as tolerated        TIME SPENT ON DISCHARGE: 5 minutes

## 2024-05-15 NOTE — PROGRESS NOTES
Chief Complaint:  BPH    HPI:   5/15/24- patient is currently doing well with clear urine after irrigation was stopped.    Allergies:  Patient has no known allergies.    Medications:  See MAR    Review of Systems:  General: No fever, chills, fatigability, or weight loss.  Skin: No rashes, itching, or changes in color or texture of skin.  Chest: Denies RECINOS, cyanosis, wheezing, cough, and sputum production.  Abdomen: Appetite fine. No weight loss. Denies diarrhea, abdominal pain, hematemesis, or blood in stool.  Musculoskeletal: No joint stiffness or swelling. Denies back pain.  : As above.  All other review of systems negative.    PMH:   has a past medical history of Anemia, Anticoagulant long-term use, Atrial fibrillation, Bladder cancer, Bladder tumor, CKD (chronic kidney disease), stage III, COPD (chronic obstructive pulmonary disease), Encounter for blood transfusion, Hematuria, History of 2019 novel coronavirus disease (COVID-19), Hypercholesteremia, and Hypertension.    PSH:   has a past surgical history that includes Cholecystectomy; Cystoscopy; Cardioversion; bladder tumor removal; Eye surgery; cataract removal with IOL implants (Bilateral); Cardiac electrophysiology mapping and ablation; Transurethral resection of bladder tumor by bipolar cautery (N/A, 7/23/2019); Esophagogastroduodenoscopy (N/A, 10/25/2021); Intraluminal gastrointestinal tract imaging via capsule (N/A, 12/6/2021); Esophagogastroduodenoscopy (N/A, 12/13/2021); Cystoscopy with biopsy of bladder (Right, 12/28/2021); Cystoscopic litholapaxy (N/A, 12/28/2021); Cataract extraction (Bilateral); Esophagogastroduodenoscopy (N/A, 4/5/2024); Intraluminal gastrointestinal tract imaging via capsule (N/A, 4/18/2024); and Esophagogastroduodenoscopy (N/A, 4/22/2024).    FamHx: family history includes Heart disease in his brother and father; Hypertension in his father.    SocHx:  reports that he quit smoking about 37 years ago. His smoking use included  cigarettes. He started smoking about 57 years ago. He has a 50 pack-year smoking history. He has never used smokeless tobacco. He reports that he does not drink alcohol and does not use drugs.      Physical Exam:  Vitals:    05/15/24 0350   BP: (!) 146/70   Pulse: 65   Resp: 18   Temp: 98.2 °F (36.8 °C)     General: A&Ox3, no apparent distress, no deformities  Neck: No masses, normal ROM  Lungs: normal inspiration, no use of accessory muscles  Heart: normal pulse, no arrhythmias  Abdomen: Soft, NT, ND, no masses, no hernias, no hepatosplenomegaly  Skin: The skin is warm and dry. No jaundice.  Ext: No c/c/e.  :  Clear urine without irrigation.    Labs/Studies:   CBC and BMP are stable, please see the chart.    Impression/Plan:     BPH-  s/p TURP  POD #1    -Pandey catheter removed today, proceed with voiding trial.    -once patient has passed his voiding trial he is clear for discharge to home.    -patient is arrange for follow up as an outpatient.

## 2024-05-15 NOTE — PLAN OF CARE
Problem: Adult Inpatient Plan of Care  Goal: Plan of Care Review  Outcome: Progressing     Problem: Adult Inpatient Plan of Care  Goal: Patient-Specific Goal (Individualized)  Outcome: Progressing  Flowsheets (Taken 5/15/2024 0112)  Individualized Care Needs: blankets  Anxieties, Fears or Concerns: light off     Problem: Diabetes Comorbidity  Goal: Blood Glucose Level Within Targeted Range  Outcome: Progressing     Problem: Infection  Goal: Absence of Infection Signs and Symptoms  Outcome: Progressing       Discussed POC with pt, verbalized understanding.  Patient remains free from injury.  Safety precautions maintained.   Call light and personal belongings within reach, bed in lowest position with bed wheels locked.   No s/s of acute distress.  Purposeful rounding continued this shift.  Pain controlled per MD order.  IVF infusing.   Cardiac monitoring in place, tele box number [id number]. Reading normal sinus  Diet orders continued, pt diet: regular  Vital signs continued per orders this shift.   Pandey care continued this shift  CBI continued this shift.   Chart and orders review completed. Pt education about care completed.

## 2024-05-20 ENCOUNTER — OFFICE VISIT (OUTPATIENT)
Dept: FAMILY MEDICINE | Facility: CLINIC | Age: 84
End: 2024-05-20
Attending: FAMILY MEDICINE
Payer: MEDICARE

## 2024-05-20 VITALS
OXYGEN SATURATION: 96 % | WEIGHT: 222.69 LBS | HEART RATE: 68 BPM | HEIGHT: 72 IN | DIASTOLIC BLOOD PRESSURE: 70 MMHG | SYSTOLIC BLOOD PRESSURE: 128 MMHG | BODY MASS INDEX: 30.16 KG/M2 | TEMPERATURE: 98 F

## 2024-05-20 DIAGNOSIS — I48.0 PAROXYSMAL ATRIAL FIBRILLATION: ICD-10-CM

## 2024-05-20 DIAGNOSIS — Z90.79 H/O PROSTATECTOMY: Primary | ICD-10-CM

## 2024-05-20 PROBLEM — Z01.810 PREOP CARDIOVASCULAR EXAM: Status: RESOLVED | Noted: 2024-05-01 | Resolved: 2024-05-20

## 2024-05-20 PROBLEM — R31.0 GROSS HEMATURIA: Status: RESOLVED | Noted: 2021-12-22 | Resolved: 2024-05-20

## 2024-05-20 PROCEDURE — 3288F FALL RISK ASSESSMENT DOCD: CPT | Mod: CPTII,S$GLB,, | Performed by: FAMILY MEDICINE

## 2024-05-20 PROCEDURE — 1160F RVW MEDS BY RX/DR IN RCRD: CPT | Mod: CPTII,S$GLB,, | Performed by: FAMILY MEDICINE

## 2024-05-20 PROCEDURE — 1126F AMNT PAIN NOTED NONE PRSNT: CPT | Mod: CPTII,S$GLB,, | Performed by: FAMILY MEDICINE

## 2024-05-20 PROCEDURE — 1159F MED LIST DOCD IN RCRD: CPT | Mod: CPTII,S$GLB,, | Performed by: FAMILY MEDICINE

## 2024-05-20 PROCEDURE — 3072F LOW RISK FOR RETINOPATHY: CPT | Mod: CPTII,S$GLB,, | Performed by: FAMILY MEDICINE

## 2024-05-20 PROCEDURE — 3074F SYST BP LT 130 MM HG: CPT | Mod: CPTII,S$GLB,, | Performed by: FAMILY MEDICINE

## 2024-05-20 PROCEDURE — 99999 PR PBB SHADOW E&M-EST. PATIENT-LVL IV: CPT | Mod: PBBFAC,,, | Performed by: FAMILY MEDICINE

## 2024-05-20 PROCEDURE — 99214 OFFICE O/P EST MOD 30 MIN: CPT | Mod: S$GLB,,, | Performed by: FAMILY MEDICINE

## 2024-05-20 PROCEDURE — 3078F DIAST BP <80 MM HG: CPT | Mod: CPTII,S$GLB,, | Performed by: FAMILY MEDICINE

## 2024-05-20 PROCEDURE — 1157F ADVNC CARE PLAN IN RCRD: CPT | Mod: CPTII,S$GLB,, | Performed by: FAMILY MEDICINE

## 2024-05-20 PROCEDURE — 1101F PT FALLS ASSESS-DOCD LE1/YR: CPT | Mod: CPTII,S$GLB,, | Performed by: FAMILY MEDICINE

## 2024-05-20 NOTE — PROGRESS NOTES
Subjective:       Patient ID: Jose Miguel Alvarez Jr. is a 83 y.o. male.    Chief Complaint: Hospital Follow Up    83 y old male with CAD , A fib , Hx of bladder ca  f.u today in [facility]  on 5/14 by Dr Fletcher. Denies post op complications. He has not started Xarelto     Review of Systems   Constitutional: Negative.    HENT: Negative.     Eyes: Negative.    Respiratory: Negative.     Cardiovascular: Negative.    Gastrointestinal: Negative.    Genitourinary: Negative.    Musculoskeletal: Negative.    Skin: Negative.    Hematological: Negative.        Objective:      Physical Exam  Constitutional:       General: He is not in acute distress.     Appearance: He is well-developed. He is not diaphoretic.   HENT:      Head: Normocephalic and atraumatic.      Right Ear: External ear normal.      Left Ear: External ear normal.      Nose: Nose normal.      Mouth/Throat:      Pharynx: No oropharyngeal exudate.   Eyes:      General: No scleral icterus.        Right eye: No discharge.         Left eye: No discharge.      Conjunctiva/sclera: Conjunctivae normal.      Pupils: Pupils are equal, round, and reactive to light.   Neck:      Thyroid: No thyromegaly.      Vascular: No JVD.      Trachea: No tracheal deviation.   Cardiovascular:      Rate and Rhythm: Normal rate and regular rhythm.      Heart sounds: Normal heart sounds. No murmur heard.     No friction rub. No gallop.   Pulmonary:      Effort: Pulmonary effort is normal. No respiratory distress.      Breath sounds: Normal breath sounds. No stridor. No wheezing or rales.   Chest:      Chest wall: No tenderness.   Abdominal:      General: Bowel sounds are normal. There is no distension.      Palpations: Abdomen is soft.      Tenderness: There is no abdominal tenderness. There is no guarding or rebound.   Musculoskeletal:         General: No tenderness. Normal range of motion.      Cervical back: Normal range of motion and neck supple.   Lymphadenopathy:      Cervical: No  cervical adenopathy.   Skin:     General: Skin is warm and dry.      Coloration: Skin is not pale.      Findings: No erythema or rash.   Neurological:      Mental Status: He is alert and oriented to person, place, and time.      Cranial Nerves: No cranial nerve deficit.      Motor: No abnormal muscle tone.      Coordination: Coordination normal.      Deep Tendon Reflexes: Reflexes are normal and symmetric. Reflexes normal.   Psychiatric:         Behavior: Behavior normal.         Thought Content: Thought content normal.         Judgment: Judgment normal.         Assessment:     Jose Miguel was seen today for hospital follow up.    Diagnoses and all orders for this visit:    H/O prostatectomy    Paroxysmal atrial fibrillation       Plan:    F.u with urology   Resume Xarelto .

## 2024-05-21 LAB
FINAL PATHOLOGIC DIAGNOSIS: NORMAL
GROSS: NORMAL
Lab: NORMAL

## 2024-05-27 RX ORDER — CARVEDILOL 6.25 MG/1
6.25 TABLET ORAL 2 TIMES DAILY WITH MEALS
Qty: 180 TABLET | Refills: 3 | Status: SHIPPED | OUTPATIENT
Start: 2024-05-27

## 2024-05-28 RX ORDER — ATORVASTATIN CALCIUM 20 MG/1
20 TABLET, FILM COATED ORAL NIGHTLY
Qty: 90 TABLET | Refills: 0 | Status: SHIPPED | OUTPATIENT
Start: 2024-05-28

## 2024-05-28 NOTE — TELEPHONE ENCOUNTER
No care due was identified.  [facility] Embedded Care Due Messages. Reference number: [id number].   5/28/2024 10:15:10 AM CDT

## 2024-05-28 NOTE — TELEPHONE ENCOUNTER
----- Message from Domingo Ruggiero sent at 5/28/2024  9:53 AM CDT -----  Regarding: pt meds  Can the clinic reply in [facility]: no         Please refill the medication(s) listed below.         Please call the patient when the prescription(s) is ready for  at this phone number  Telephone Information:  Mobile          [phone]              Medication #1 atorvastatin (LIPITOR) 20 MG tablet      Medication #2       Preferred Pharmacy:   [id number] [address] [address] AT [address] [address] & [facility] [address] [address] [address]  Phone: [phone] Fax: [phone]

## 2024-05-28 NOTE — ASSESSMENT & PLAN NOTE
Patients last appointment 3/18/2024.  Patients next scheduled appointment   Future Appointments   Date Time Provider Department Center   6/24/2024  3:00 PM Joel Sanchez MD BDM PAIN [facility]       Troponin 0.015>>0.050  Elevation likely due to AFIB/RVR and symptomatic anemia  Chest pain free on exam  ECHO pending    4/3/24  -TTE with normal EF

## 2024-05-30 RX ORDER — ATORVASTATIN CALCIUM 20 MG/1
TABLET, FILM COATED ORAL
Refills: 0 | OUTPATIENT
Start: 2024-05-30

## 2024-06-12 ENCOUNTER — OFFICE VISIT (OUTPATIENT)
Dept: CARDIOLOGY | Facility: CLINIC | Age: 84
End: 2024-06-12
Payer: MEDICARE

## 2024-06-12 VITALS
HEART RATE: 59 BPM | SYSTOLIC BLOOD PRESSURE: 124 MMHG | HEIGHT: 72 IN | OXYGEN SATURATION: 96 % | WEIGHT: 220.81 LBS | BODY MASS INDEX: 29.91 KG/M2 | DIASTOLIC BLOOD PRESSURE: 70 MMHG

## 2024-06-12 DIAGNOSIS — I73.9 PVD (PERIPHERAL VASCULAR DISEASE): ICD-10-CM

## 2024-06-12 DIAGNOSIS — I50.32 CHRONIC DIASTOLIC CONGESTIVE HEART FAILURE: ICD-10-CM

## 2024-06-12 DIAGNOSIS — E11.69 DM TYPE 2 WITH DIABETIC DYSLIPIDEMIA: ICD-10-CM

## 2024-06-12 DIAGNOSIS — E78.5 DM TYPE 2 WITH DIABETIC DYSLIPIDEMIA: ICD-10-CM

## 2024-06-12 DIAGNOSIS — E78.00 HYPERCHOLESTEREMIA: ICD-10-CM

## 2024-06-12 DIAGNOSIS — I10 PRIMARY HYPERTENSION: ICD-10-CM

## 2024-06-12 DIAGNOSIS — I48.0 PAROXYSMAL ATRIAL FIBRILLATION: ICD-10-CM

## 2024-06-12 DIAGNOSIS — E11.9 TYPE 2 DIABETES MELLITUS WITHOUT COMPLICATION, WITHOUT LONG-TERM CURRENT USE OF INSULIN: ICD-10-CM

## 2024-06-12 DIAGNOSIS — I70.533: Primary | ICD-10-CM

## 2024-06-12 DIAGNOSIS — I70.543: Primary | ICD-10-CM

## 2024-06-12 DIAGNOSIS — N18.30 STAGE 3 CHRONIC KIDNEY DISEASE, UNSPECIFIED WHETHER STAGE 3A OR 3B CKD: ICD-10-CM

## 2024-06-12 PROCEDURE — 1101F PT FALLS ASSESS-DOCD LE1/YR: CPT | Mod: CPTII,S$GLB,, | Performed by: INTERNAL MEDICINE

## 2024-06-12 PROCEDURE — 1159F MED LIST DOCD IN RCRD: CPT | Mod: CPTII,S$GLB,, | Performed by: INTERNAL MEDICINE

## 2024-06-12 PROCEDURE — 1157F ADVNC CARE PLAN IN RCRD: CPT | Mod: CPTII,S$GLB,, | Performed by: INTERNAL MEDICINE

## 2024-06-12 PROCEDURE — 3078F DIAST BP <80 MM HG: CPT | Mod: CPTII,S$GLB,, | Performed by: INTERNAL MEDICINE

## 2024-06-12 PROCEDURE — 3072F LOW RISK FOR RETINOPATHY: CPT | Mod: CPTII,S$GLB,, | Performed by: INTERNAL MEDICINE

## 2024-06-12 PROCEDURE — 99999 PR PBB SHADOW E&M-EST. PATIENT-LVL IV: CPT | Mod: PBBFAC,,, | Performed by: INTERNAL MEDICINE

## 2024-06-12 PROCEDURE — 99214 OFFICE O/P EST MOD 30 MIN: CPT | Mod: S$GLB,,, | Performed by: INTERNAL MEDICINE

## 2024-06-12 PROCEDURE — 3074F SYST BP LT 130 MM HG: CPT | Mod: CPTII,S$GLB,, | Performed by: INTERNAL MEDICINE

## 2024-06-12 PROCEDURE — 3288F FALL RISK ASSESSMENT DOCD: CPT | Mod: CPTII,S$GLB,, | Performed by: INTERNAL MEDICINE

## 2024-06-12 PROCEDURE — 1160F RVW MEDS BY RX/DR IN RCRD: CPT | Mod: CPTII,S$GLB,, | Performed by: INTERNAL MEDICINE

## 2024-06-12 PROCEDURE — 1126F AMNT PAIN NOTED NONE PRSNT: CPT | Mod: CPTII,S$GLB,, | Performed by: INTERNAL MEDICINE

## 2024-06-12 NOTE — PROGRESS NOTES
Subjective:   Patient ID:  Jose Miguel Alvarez Jr. is a 83 y.o. male who presents for follow up of No chief complaint on file.      82 yo male 4 weeks .f/u. 4 weeks  PMH PAF s/p DCCV few yrs ago and RFA at [facility] in 2018 HTN HLD COPD quit smoking  Prior cardiologist Dr. Tanesha Coffman at [facility], h/o COVID 19 in . Hospitalization over a month, anemia, h/o bladder cancer on chemo and XRT, no h./o DM and stroke    10/2021 admitted to severe anemia and HGB dropped to 4.2 and had 4 units of PRBC. Felt better  This was 3rd episode of severe anemia in the past two yrs  No chest pain, RECINOS dizziness palpitation  No active bleeding     visit  S/p cystoscopy in  bladder cancer clear  No BP check at home. Mod compliance. No active bleeding  No chest pain dyspnea headache, palpitation dizziness faint and leg swelling   CMP TSH and chest CT reviewed     visit  No h/o MI DM and CVA chronic SOB.   No recurrent palpitation. No active bleeding. No chest pain dizziness and faint  Plays golf daily. No smoking and drinking  EKG today NSR  CMP TSH and chest done wi one yr. Chest ct showed emphysema  PFT pending    04/23 visit  Ekg NSR LVH   Mo chest pain palpitation dizziness  No active bleeding   Bladder cancer stable    10/23 visit  BP high today. No dizziness faint chest pain.  Plays golf twice a week.   Ekg NSR  LVH    05/24 visit  04/24 admitted for afib with RVR and severe anemia. EGD showed gastric AVM rx with APC. BNP up to 500's and Rx with diuresis and Cr elevated and had kidney stone.   Now BNP back to 88 and Cr back to 1.3  Plan to have TURP by Dr. Garcia  On SR and remains leg swelling. No orthopnea   EKG NSR    Interval history  C/o some hematuria after added xarelto. Urology eval pending  No chest pain dyspnea palpitation orthopnea  Leg swelling improved, golf weekly  AFIB [id number] score of 4                                Past Medical History:   Diagnosis Date    Anemia      Anticoagulant long-term use     Atrial fibrillation     Bladder cancer     Bladder tumor     CKD (chronic kidney disease), stage III     COPD (chronic obstructive pulmonary disease)     pt denies    Encounter for blood transfusion     Hematuria     History of 2019 novel coronavirus disease (COVID-19)     Hypercholesteremia     Hypertension        Past Surgical History:   Procedure Laterality Date    bladder tumor removal      CARDIAC ELECTROPHYSIOLOGY MAPPING AND ABLATION      CARDIOVERSION      several    CATARACT EXTRACTION Bilateral     cataract removal with IOL implants Bilateral     CHOLECYSTECTOMY      CYSTOSCOPIC LITHOLAPAXY N/A 12/28/2021    Procedure: CYSTOLITHOLAPAXY;  Surgeon: Medardo Garcia MD;  Location: [facility] OR;  Service: Urology;  Laterality: N/A;    CYSTOSCOPIC LITHOLAPAXY N/A 5/14/2024    Procedure: CYSTOLITHOLAPAXY;  Surgeon: Medardo Garcia MD;  Location: [facility] OR;  Service: Urology;  Laterality: N/A;    CYSTOSCOPY      CYSTOSCOPY WITH BIOPSY OF BLADDER Right 12/28/2021    Procedure: CYSTOSCOPY, WITH BLADDER BIOPSY, WITH FULGURATION IF INDICATED;  Surgeon: Medardo Garcia MD;  Location: [facility] OR;  Service: Urology;  Laterality: Right;    ESOPHAGOGASTRODUODENOSCOPY N/A 10/25/2021    Procedure: Small Jason Enteroscopy (SBE);  Surgeon: Isabelle Griffin MD;  Location: [facility];  Service: Endoscopy;  Laterality: N/A;    ESOPHAGOGASTRODUODENOSCOPY N/A 12/13/2021    Procedure: EGD (ESOPHAGOGASTRODUODENOSCOPY);  Surgeon: Troy Polanco MD;  Location: [facility];  Service: Endoscopy;  Laterality: N/A;    ESOPHAGOGASTRODUODENOSCOPY N/A 4/5/2024    Procedure: EGD (ESOPHAGOGASTRODUODENOSCOPY);  Surgeon: Myrtle Salcedo MD;  Location: [facility] ENDO;  Service: Endoscopy;  Laterality: N/A;    ESOPHAGOGASTRODUODENOSCOPY N/A 4/22/2024    Procedure: EGD (ESOPHAGOGASTRODUODENOSCOPY);  Surgeon: Myrtle Salcedo MD;  Location: [facility] ENDO;  Service: Endoscopy;  Laterality: N/A;  Push enteroscopy  for positive capsule - bleeding AVM    EYE SURGERY      INTRALUMINAL GASTROINTESTINAL TRACT IMAGING VIA CAPSULE N/A 2021    Procedure: IMAGING PROCEDURE, GI TRACT, INTRALUMINAL, VIA CAPSULE;  Surgeon: Larry Jones RN;  Location: [facility] ENDO;  Service: Endoscopy;  Laterality: N/A;    INTRALUMINAL GASTROINTESTINAL TRACT IMAGING VIA CAPSULE N/A 2024    Procedure: IMAGING PROCEDURE, GI TRACT, INTRALUMINAL, VIA CAPSULE;  Surgeon: Larry Jones RN;  Location: [facility] ENDO;  Service: Endoscopy;  Laterality: N/A;    TRANSURETHRAL RESECTION OF BLADDER TUMOR BY BIPOLAR CAUTERY N/A 2019    Procedure: TURBT, USING BIPOLAR CAUTERY;  Surgeon: Ritesh Marques MD;  Location: [facility] OR;  Service: Urology;  Laterality: N/A;    TRANSURETHRAL RESECTION OF PROSTATE N/A 2024    Procedure: TURP (TRANSURETHRAL RESECTION OF PROSTATE);  Surgeon: Medardo Garcia MD;  Location: [facility] OR;  Service: Urology;  Laterality: N/A;       Social History     Tobacco Use    Smoking status: Former     Current packs/day: 0.00     Average packs/day: 2.5 packs/day for 20.0 years (50.0 ttl pk-yrs)     Types: Cigarettes     Start date:      Quit date:      Years since quittin.4    Smokeless tobacco: Never   Substance Use Topics    Alcohol use: Never     Alcohol/week: 7.0 standard drinks of alcohol     Types: 7 Cans of beer per week     Comment: HOLD 72HRS    Drug use: No       Family History   Problem Relation Name Age of Onset    Heart disease Father      Hypertension Father      Heart disease Brother           ROS    Objective:   Physical Exam  HENT:      Head: Normocephalic.   Eyes:      Pupils: Pupils are equal, round, and reactive to light.   Neck:      Thyroid: No thyromegaly.      Vascular: Normal carotid pulses. No carotid bruit or JVD.   Cardiovascular:      Rate and Rhythm: Normal rate and regular rhythm. Occasional Extrasystoles are present.     Chest Wall: PMI is not displaced.      Pulses: Normal pulses.      Heart  sounds: Normal heart sounds. No murmur heard.     No gallop. No S3 sounds.   Pulmonary:      Effort: No respiratory distress.      Breath sounds: Normal breath sounds. No stridor.   Abdominal:      General: Bowel sounds are normal.      Palpations: Abdomen is soft.      Tenderness: There is no abdominal tenderness. There is no rebound.   Skin:     Findings: No rash.   Neurological:      Mental Status: He is alert and oriented to person, place, and time.   Psychiatric:         Behavior: Behavior normal.         Lab Results   Component Value Date    CHOL 120 01/26/2023    CHOL 73 (L) 05/28/2021    CHOL 116 (L) 11/06/2020     Lab Results   Component Value Date    HDL 42 01/26/2023    HDL 39 (L) 05/28/2021    HDL 58 11/06/2020     Lab Results   Component Value Date    LDLCALC 44.4 (L) 01/26/2023    LDLCALC 22.4 (L) 05/28/2021    LDLCALC 45.6 (L) 11/06/2020     Lab Results   Component Value Date    TRIG 168 (H) 01/26/2023    TRIG 58 05/28/2021    TRIG 62 11/06/2020     Lab Results   Component Value Date    CHOLHDL 35.0 01/26/2023    CHOLHDL 53.4 (H) 05/28/2021    CHOLHDL 50.0 11/06/2020       Chemistry        Component Value Date/Time     05/15/2024 0505    K 4.0 05/15/2024 0505     05/15/2024 0505    CO2 27 05/15/2024 0505    BUN 12 05/15/2024 0505    CREATININE 1.1 05/15/2024 0505     (H) 05/15/2024 0505        Component Value Date/Time    CALCIUM 8.8 05/15/2024 0505    ALKPHOS 79 04/09/2024 1115    AST 22 04/09/2024 1115    AST 29 04/24/2016 1145    ALT 19 04/09/2024 1115    BILITOT 0.5 04/09/2024 1115    ESTGFRAFRICA >60 12/08/2021 2007    EGFRNONAA 56 (A) 12/08/2021 2007          Lab Results   Component Value Date    HGBA1C 6.2 (H) 04/09/2024     Lab Results   Component Value Date    TSH 3.937 04/01/2024     Lab Results   Component Value Date    INR 1.9 10/22/2021    INR 1.0 07/23/2019    INR 1.1 06/21/2016     Lab Results   Component Value Date    WBC 7.35 05/15/2024    HGB 9.6 (L) 05/15/2024     HCT 32.2 (L) 05/15/2024    MCV 78 (L) 05/15/2024     (L) 05/15/2024     BMP  Sodium   Date Value Ref Range Status   05/15/2024 139 136 - 145 mmol/L Final     Potassium   Date Value Ref Range Status   05/15/2024 4.0 3.5 - 5.1 mmol/L Final     Chloride   Date Value Ref Range Status   05/15/2024 107 95 - 110 mmol/L Final     CO2   Date Value Ref Range Status   05/15/2024 27 23 - 29 mmol/L Final     BUN   Date Value Ref Range Status   05/15/2024 12 8 - 23 mg/dL Final     Creatinine   Date Value Ref Range Status   05/15/2024 1.1 0.5 - 1.4 mg/dL Final     Calcium   Date Value Ref Range Status   05/15/2024 8.8 8.7 - 10.5 mg/dL Final     Anion Gap   Date Value Ref Range Status   05/15/2024 5 (L) 8 - 16 mmol/L Final     eGFR if    Date Value Ref Range Status   12/08/2021 >60 >60 mL/min/1.73 m^2 Final     eGFR if non    Date Value Ref Range Status   12/08/2021 56 (A) >60 mL/min/1.73 m^2 Final     Comment:     Calculation used to obtain the estimated glomerular filtration  rate (eGFR) is the CKD-EPI equation.        BNP  @LABRCNTIP(BNP,BNPTRIAGEBLO)@  @LABRCNTIP(troponini)@  CrCl cannot be calculated (Patient's most recent lab result is older than the maximum 7 days allowed.).  No results found in the last 24 hours.  No results found in the last 24 hours.  No results found in the last 24 hours.    Assessment:      1. 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 Chronic diastolic congestive heart failure    3. DM type 2 with diabetic dyslipidemia    4. Hypercholesteremia    5. Primary hypertension    6. Paroxysmal atrial fibrillation    7. PVD (peripheral vascular disease)    8. Stage 3 chronic kidney disease, unspecified whether stage 3a or 3b CKD    9. Type 2 diabetes mellitus without complication, without long-term current use of insulin      PAF on SR  CHF compensated  BP C    Plan:     Continue xarelto  amiodaorne statin coreg hydralazine   Urology eval pending for heamturia  DM Rx per PCP  Counseled DASH  Check Lipid profile with PCP in 6 months  Recommend heart-healthy diet, weight control and regular exercise.  Fadia. Risk modification.   I have reviewed all pertinent labs and cardiac studies independently. Plans and recommendations have been formulated under my direct supervision. All questions answered and patient voiced understanding.   If symptoms persist go to the ED  RTC in 3 months

## 2024-06-26 ENCOUNTER — OFFICE VISIT (OUTPATIENT)
Dept: PODIATRY | Facility: CLINIC | Age: 84
End: 2024-06-26
Payer: MEDICARE

## 2024-06-26 DIAGNOSIS — E11.42 TYPE 2 DIABETES MELLITUS WITH PERIPHERAL NEUROPATHY: ICD-10-CM

## 2024-06-26 DIAGNOSIS — Z79.01 ANTICOAGULATED: ICD-10-CM

## 2024-06-26 DIAGNOSIS — L60.3 ONYCHODYSTROPHY: ICD-10-CM

## 2024-06-26 DIAGNOSIS — L97.512 ULCER OF RIGHT SECOND TOE WITH FAT LAYER EXPOSED: Primary | ICD-10-CM

## 2024-06-26 DIAGNOSIS — N18.30 STAGE 3 CHRONIC KIDNEY DISEASE, UNSPECIFIED WHETHER STAGE 3A OR 3B CKD: ICD-10-CM

## 2024-06-26 PROCEDURE — 3072F LOW RISK FOR RETINOPATHY: CPT | Mod: CPTII,S$GLB,, | Performed by: PODIATRIST

## 2024-06-26 PROCEDURE — 1157F ADVNC CARE PLAN IN RCRD: CPT | Mod: CPTII,S$GLB,, | Performed by: PODIATRIST

## 2024-06-26 PROCEDURE — 99214 OFFICE O/P EST MOD 30 MIN: CPT | Mod: 25,S$GLB,, | Performed by: PODIATRIST

## 2024-06-26 PROCEDURE — 11721 DEBRIDE NAIL 6 OR MORE: CPT | Mod: 59,Q9,S$GLB, | Performed by: PODIATRIST

## 2024-06-26 PROCEDURE — 1159F MED LIST DOCD IN RCRD: CPT | Mod: CPTII,S$GLB,, | Performed by: PODIATRIST

## 2024-06-26 PROCEDURE — 11042 DBRDMT SUBQ TIS 1ST 20SQCM/<: CPT | Mod: S$GLB,,, | Performed by: PODIATRIST

## 2024-06-26 PROCEDURE — 3288F FALL RISK ASSESSMENT DOCD: CPT | Mod: CPTII,S$GLB,, | Performed by: PODIATRIST

## 2024-06-26 PROCEDURE — 99999 PR PBB SHADOW E&M-EST. PATIENT-LVL III: CPT | Mod: PBBFAC,,, | Performed by: PODIATRIST

## 2024-06-26 PROCEDURE — 1160F RVW MEDS BY RX/DR IN RCRD: CPT | Mod: CPTII,S$GLB,, | Performed by: PODIATRIST

## 2024-06-26 PROCEDURE — 1101F PT FALLS ASSESS-DOCD LE1/YR: CPT | Mod: CPTII,S$GLB,, | Performed by: PODIATRIST

## 2024-06-26 RX ORDER — MUPIROCIN 20 MG/G
OINTMENT TOPICAL 2 TIMES DAILY
Qty: 30 G | Refills: 1 | Status: SHIPPED | OUTPATIENT
Start: 2024-06-26

## 2024-06-26 NOTE — PROGRESS NOTES
Subjective:       Patient ID: Jose Miguel Alvarez Jr. is a 83 y.o. male.    Chief Complaint: Routine Foot Care (Patient is a diabetic and continues on xarelto. He denies pain at present and was last seen on 24 by Carlos Flower with cardiology. He has a dfu to right second toe. )    HPI: Jose Miguel Alvarez Jr. presents to the office today for elongated, thickened, dystrophic toenails on the right left.  He continues on Xarelto followed by cardiology and Dr. Flower with last appointment on 2024.  Presents with history of ulceration to the distal aspect of the right 2nd toe.  States noticing some discoloration at this area.  Not currently on antibiotics.     Review of patient's allergies indicates:  No Known Allergies    Past Medical History:   Diagnosis Date    Anemia     Anticoagulant long-term use     Atrial fibrillation     Bladder cancer     Bladder tumor     CKD (chronic kidney disease), stage III     COPD (chronic obstructive pulmonary disease)     pt denies    Encounter for blood transfusion     Hematuria     History of 2019 novel coronavirus disease (COVID-19)     Hypercholesteremia     Hypertension        Family History   Problem Relation Name Age of Onset    Heart disease Father      Hypertension Father      Heart disease Brother         Social History     Socioeconomic History    Marital status:    Tobacco Use    Smoking status: Former     Current packs/day: 0.00     Average packs/day: 2.5 packs/day for 20.0 years (50.0 ttl pk-yrs)     Types: Cigarettes     Start date:      Quit date:      Years since quittin.5    Smokeless tobacco: Never   Substance and Sexual Activity    Alcohol use: Never     Alcohol/week: 7.0 standard drinks of alcohol     Types: 7 Cans of beer per week     Comment: HOLD 72HRS    Drug use: No    Sexual activity: Not Currently     Social Determinants of Health     Financial Resource Strain: Low Risk  (5/10/2022)    Overall Financial Resource Strain  (CARDIA)     Difficulty of Paying Living Expenses: Not hard at all   Food Insecurity: No Food Insecurity (5/10/2022)    Hunger Vital Sign     Worried About Running Out of Food in the Last Year: Never true     Ran Out of Food in the Last Year: Never true   Transportation Needs: No Transportation Needs (5/10/2022)    PRAPARE - Transportation     Lack of Transportation (Medical): No     Lack of Transportation (Non-Medical): No   Physical Activity: Sufficiently Active (5/10/2022)    Exercise Vital Sign     Days of Exercise per Week: 7 days     Minutes of Exercise per Session: 30 min   Stress: No Stress Concern Present (5/10/2022)    [employer] [place] [employer] - Occupational Stress Questionnaire     Feeling of Stress : Not at all   Housing Stability: Low Risk  (5/10/2022)    Housing Stability Vital Sign     Unable to Pay for Housing in the Last Year: No     Number of Places Lived in the Last Year: 1     Unstable Housing in the Last Year: No       Past Surgical History:   Procedure Laterality Date    bladder tumor removal      CARDIAC ELECTROPHYSIOLOGY MAPPING AND ABLATION      CARDIOVERSION      several    CATARACT EXTRACTION Bilateral     cataract removal with IOL implants Bilateral     CHOLECYSTECTOMY      CYSTOSCOPIC LITHOLAPAXY N/A 12/28/2021    Procedure: CYSTOLITHOLAPAXY;  Surgeon: Medardo Garcia MD;  Location: [facility] OR;  Service: Urology;  Laterality: N/A;    CYSTOSCOPIC LITHOLAPAXY N/A 5/14/2024    Procedure: CYSTOLITHOLAPAXY;  Surgeon: Medardo Garcia MD;  Location: [facility] OR;  Service: Urology;  Laterality: N/A;    CYSTOSCOPY      CYSTOSCOPY WITH BIOPSY OF BLADDER Right 12/28/2021    Procedure: CYSTOSCOPY, WITH BLADDER BIOPSY, WITH FULGURATION IF INDICATED;  Surgeon: Medardo Garcia MD;  Location: [facility] OR;  Service: Urology;  Laterality: Right;    ESOPHAGOGASTRODUODENOSCOPY N/A 10/25/2021    Procedure: Small Jason Enteroscopy (SBE);  Surgeon: Isabelle Griffin MD;  Location: [facility]  ENDO;  Service: Endoscopy;  Laterality: N/A;    ESOPHAGOGASTRODUODENOSCOPY N/A 12/13/2021    Procedure: EGD (ESOPHAGOGASTRODUODENOSCOPY);  Surgeon: Troy Polanco MD;  Location: [facility] ENDO;  Service: Endoscopy;  Laterality: N/A;    ESOPHAGOGASTRODUODENOSCOPY N/A 4/5/2024    Procedure: EGD (ESOPHAGOGASTRODUODENOSCOPY);  Surgeon: Myrtle Salcedo MD;  Location: [facility] ENDO;  Service: Endoscopy;  Laterality: N/A;    ESOPHAGOGASTRODUODENOSCOPY N/A 4/22/2024    Procedure: EGD (ESOPHAGOGASTRODUODENOSCOPY);  Surgeon: Myrtle Salcedo MD;  Location: [facility];  Service: Endoscopy;  Laterality: N/A;  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    EYE SURGERY      INTRALUMINAL GASTROINTESTINAL TRACT IMAGING VIA CAPSULE N/A 12/6/2021    Procedure: IMAGING PROCEDURE, GI TRACT, INTRALUMINAL, VIA CAPSULE;  Surgeon: Larry Jones RN;  Location: [facility] ENDO;  Service: Endoscopy;  Laterality: N/A;    INTRALUMINAL GASTROINTESTINAL TRACT IMAGING VIA CAPSULE N/A 4/18/2024    Procedure: IMAGING PROCEDURE, GI TRACT, INTRALUMINAL, VIA CAPSULE;  Surgeon: Larry Jones RN;  Location: [facility];  Service: Endoscopy;  Laterality: N/A;    TRANSURETHRAL RESECTION OF BLADDER TUMOR BY BIPOLAR CAUTERY N/A 7/23/2019    Procedure: TURBT, USING BIPOLAR CAUTERY;  Surgeon: Ritesh Marques MD;  Location: [facility];  Service: Urology;  Laterality: N/A;    TRANSURETHRAL RESECTION OF PROSTATE N/A 5/14/2024    Procedure: TURP (TRANSURETHRAL RESECTION OF PROSTATE);  Surgeon: Medardo Garcia MD;  Location: [facility] OR;  Service: Urology;  Laterality: N/A;     Review of Systems     Objective:   There were no vitals taken for this visit.    US Retroperitoneal Complete  Narrative: EXAMINATION:  US RETROPERITONEAL COMPLETE    CLINICAL HISTORY:  DOMINIQUE;    TECHNIQUE:  Ultrasound of the kidneys and urinary bladder was performed including color flow and Doppler evaluation of the kidneys.    COMPARISON:  CT urogram 04/04/2024    FINDINGS:  Right kidney:  The right kidney measures 11.8 cm. Cortical thinning.  No loss of corticomedullary distinction. Resistive index measures 0.68.  No mass. No renal stone. Moderate hydronephrosis.    Left kidney: The left kidney measures 13.1 cm. Cortical thinning.  No loss of corticomedullary distinction. Resistive index measures 0.81.  No mass. No renal stone. No hydronephrosis.    Urinary bladder is significantly distended, but demonstrates no significant abnormality otherwise..  Impression: Moderate right-sided hydronephrosis.    Chronic medical renal disease.    Electronically signed by: Frederick Love  Date:    04/07/2024  Time:    13:03     Physical Exam   LOWER EXTREMITY PHYSICAL EXAMINATION    Vascular:  The right dorsalis pedis pulse 2/4 and the right posterior tibial pulse 2/4.   Capillary refill is intact.  Pedal hair growth intact    Musculoskeletal: Manual Muscle Testing is 5/5 with dorsiflexion, plantar flexion, abduction, and adduction.   There is normal range of motion in the forefoot, hindfoot, and Ankle joint.      Dermatological:  Skin is supple, moist, intact.  Discoloration present to the distal aspect right 2nd toe.  Hyperkeratotic tissue with underlying discoloration noted.  Upon removal/debridement of the hyperkeratosis, there is noted to be an underlying ulceration measuring 0.6 cm x 0.5 cm x 0.1cm.  No evidence of underlying abscess formation.  Granular wound base noted.  No probing or bone exposure.  No cellulitis  The R1, 2, 5 and left L1,2, 5 are thickened, discolored dystrophic.  There is subungual debris.  Nail plates have area of dark discoloration.  The remaining nails 3-4 on the right foot and the left foot are elongated but of normal color, thickness, and texture.   There is no signs of ingrowing into the medial or lateral borders.  There is no evidence of wounds or skin breakdown.  No edema or erythema.  No obvious lacerations or fissuring.  Interdigital spaces are clean, dry, intact.  No rashes  or scars appreciated.    Assessment:     1. Ulcer of right second toe with fat layer exposed    2. Type 2 diabetes mellitus with peripheral neuropathy    3. Stage 3 chronic kidney disease, unspecified whether stage 3a or 3b CKD    4. Anticoagulated    5. Onychodystrophy          Plan:     Ulcer of right second toe with fat layer exposed    Type 2 diabetes mellitus with peripheral neuropathy    Stage 3 chronic kidney disease, unspecified whether stage 3a or 3b CKD    Anticoagulated    Onychodystrophy    Other orders  -     mupirocin (BACTROBAN) 2 % ointment; Apply topically 2 (two) times daily.  Dispense: 30 g; Refill: 1          Thorough discussion is had with the patient today, concerning the diagnosis, its etiology, and the treatment algorithm at present.    The wound was surgically debrided after adequate prep with alcohol and/or betadine paint. Excisional wound debridement was performed using sharp #10/#15 blade/rounded scalpel and tissue nipper, with removal of all non-viable skin and soft tissues; necrotic skin/tissue formation. The woundbase/wound bed was also debrided to encourage bleeding as to promote/stimulate healing. Debridement was excisional and included epidermal, dermal and subcutaneous tissues. Post debridement measurements are as above. Hemostasis was achieved. Patient tolerated procedure well and without complications. Local woundcare with antibiotic cream and dry dressings and bandage thereafter.     Continue to wear wide toe box shoe gear to prevent pressure and friction to the distal aspect of the toes causing recurrent ulcerations    Dystrophic nail plates, as outlined above (R#1,2,5  ; L#1,2,5 ), are sharply debrided with double action nail nipper, and/or with the assistance of a mechanical rotary hedy, with removal of all offending nail and nail border(s), for reduction of pains. Nails are reduced in terms of length, width and girth with removal of subungual debris to facilitate pain free  weight bearing and ambulation. The elongated nails as outlined in the objective portion of this note, were trimmed to appropriate length, with a double action nail nipper, for alleviation/reduction of pains as well. Follow up in approx. 3-4 months.    Foot counseling and education is provided at this visit. Patient is advised to wear socks and shoes at all times.  Do not walk barefoot, or with just socks, even when indoors.  Be sure to check and inspect the inside of the shoe before putting them on her feet.  Protect your feet at all times.  Walking shoes and/or athletic shoes are the best types of shoe gear. Do not wear vinyl or plastic type shoe gear, as they do not stretch and/or breathe.  Protect your feet from hot and/or cold. Elevate the extremities when sitting.  Do not wear excessively tight socks and/or shoe gear. Wiggle your toes for a few minutes throughout the day. Move your ankles up and down, in and out, to help blood flow in your lower extremity.     Future Appointments   Date Time Provider Department Center   6/28/2024  9:45 AM Medardo Garcia MD [facility] UROLOGY  Medical C   7/18/2024  9:30 AM Xiao Bowman DPM [facility] POD  Medical C   8/14/2024  1:15 PM Medardo Garcia MD [facility] UROLOGY  Medical C   10/2/2024 10:40 AM Carlos Flower MD [facility]

## 2024-06-28 ENCOUNTER — OFFICE VISIT (OUTPATIENT)
Dept: UROLOGY | Facility: CLINIC | Age: 84
End: 2024-06-28
Payer: MEDICARE

## 2024-06-28 DIAGNOSIS — R35.0 BENIGN PROSTATIC HYPERPLASIA WITH URINARY FREQUENCY: ICD-10-CM

## 2024-06-28 DIAGNOSIS — N20.0 NEPHROLITHIASIS: ICD-10-CM

## 2024-06-28 DIAGNOSIS — N40.1 BENIGN PROSTATIC HYPERPLASIA WITH URINARY FREQUENCY: ICD-10-CM

## 2024-06-28 DIAGNOSIS — Z85.51 HISTORY OF BLADDER CANCER: Primary | ICD-10-CM

## 2024-06-28 DIAGNOSIS — R31.0 GROSS HEMATURIA: ICD-10-CM

## 2024-06-28 DIAGNOSIS — N30.01 ACUTE CYSTITIS WITH HEMATURIA: ICD-10-CM

## 2024-06-28 PROCEDURE — 99999 PR PBB SHADOW E&M-EST. PATIENT-LVL III: CPT | Mod: PBBFAC,,, | Performed by: UROLOGY

## 2024-06-28 NOTE — PROGRESS NOTES
Chief Complaint:   Encounter Diagnoses   Name Primary?    History of bladder cancer Yes    Gross hematuria     Benign prostatic hyperplasia with urinary frequency     Nephrolithiasis     Acute cystitis with hematuria        HPI:   6/28/24- patient returns today for follow-up, states that he is voiding much better after the procedure.  Hematuria recurred after starting Xarelto, but intermittent and doing well.  No evidence of stone pain.    12/22/21- patient comes in today to establish care my clinic, recent gross hematuria and reported to see Alexandra.  CT urogram demonstrates bladder stones, urinary retention and a distal right ureteral stone.  Patient has had a Pandey catheter would like this removed today, no more gross hematuria, he is also on antibiotics and tamsulosin.  Patient otherwise was voiding well with no complaints prior to this incident, history of TURBT with subsequent induction BCG, no recurrences.      Allergies:  Patient has no known allergies.    Medications:  has a current medication list which includes the following prescription(s): amiodarone, amlodipine, atorvastatin, donepezil, ergocalciferol, famotidine, ferrous sulfate, hydrochlorothiazide, metoprolol succinate, multivitamin, nitrofurantoin (macrocrystal-monohydrate), oxycodone-acetaminophen, pantoprazole, phenazopyridine, rivaroxaban, and tamsulosin, and the following Facility-Administered Medications: lactated ringers.    Review of Systems:  General: No fever, chills, fatigability, or weight loss.  Skin: No rashes, itching, or changes in color or texture of skin.  Chest: Denies RECINOS, cyanosis, wheezing, cough, and sputum production.  Abdomen: Appetite fine. No weight loss. Denies diarrhea, abdominal pain, hematemesis, or blood in stool.  Musculoskeletal: No joint stiffness or swelling. Denies back pain.  : As above.  All other review of systems negative.    PMH:   has a past medical history of Anemia, Anticoagulant long-term use, Atrial  fibrillation, Bladder cancer, Bladder tumor, CKD (chronic kidney disease), stage III, COPD (chronic obstructive pulmonary disease), Encounter for blood transfusion, Hematuria, History of 2019 novel coronavirus disease (COVID-19), Hypercholesteremia, and Hypertension.    PSH:   has a past surgical history that includes Cholecystectomy; Cystoscopy; Cardioversion; bladder tumor removal; Eye surgery; cataract removal with IOL implants (Bilateral); Cardiac electrophysiology mapping and ablation; Transurethral resection of bladder tumor by bipolar cautery (N/A, 7/23/2019); Esophagogastroduodenoscopy (N/A, 10/25/2021); Intraluminal gastrointestinal tract imaging via capsule (N/A, 12/6/2021); Esophagogastroduodenoscopy (N/A, 12/13/2021); Cystoscopy with biopsy of bladder (Right, 12/28/2021); and Cystoscopic litholapaxy (N/A, 12/28/2021).    FamHx: family history includes Heart disease in his brother and father.    SocHx:  reports that he quit smoking about 35 years ago. His smoking use included cigarettes. He has a 50.00 pack-year smoking history. He has never used smokeless tobacco. He reports current alcohol use of about 8.0 standard drinks of alcohol per week. He reports that he does not use drugs.      Physical Exam:  General: A&Ox3, no apparent distress, no deformities  Neck: No masses, normal thyroid  Lungs: normal inspiration, no use of accessory muscles  Heart: normal pulse, no arrhythmias  Abdomen: Soft, NT, ND, no masses, no hernias, no hepatosplenomegaly  Lymphatic: Neck and groin nodes negative  : 4/24- Test desc filipe, no abnormalities of epididymus. Normal penile and scrotal skin. Meatus normal.     Labs/Studies:    ml 6/24  Cystoscopy lateral lobe coaptation, bladder stone, Pandey catheter changes 4/24  Cytology negative 4/24  CORDELL moderate right-sided hydronephrosis 4/24  CT urogram bladder distention with bilateral hydronephrosis, bladder stone 4/24  CT stone protocol no nephrolithiasis 1/22  CT  urogram 0.5 cm distal right ureteral stone, bladder stones, BPH 12/21  Creatinine 1.3 4/24    Impression/Plan:       1. History of bladder cancer-  BCG 3/22, High pTa, but suspicious for T1 cystoscopy with bladder biopsy, vesical litholapaxy, could not assess the right ureter  12/28/21.  TURBT 5-6 years ago at the outside facility with induction BCG.    Patient will be due in 5 months for cystoscopy and cytology per surveillance protocol.    2. Nephrolithiasis- no recurrent stones, of note we could not get up the right ureter.    3. BPH-  TURP, vesicolithalopaxy  5/14/24    Patient states that his flow is much improved after his procedure, reassess with a uroflow and postvoid residual at his next cystoscopy appointment.  Call with any issues prior to that next visit.    4. Gross hematuria- intermittent and due to Xarelto, call if this becomes worse.    5. Erectile dysfunction- given Viagra 100 mg but currently not an issue.

## 2024-07-03 DIAGNOSIS — Z71.89 COMPLEX CARE COORDINATION: ICD-10-CM

## 2024-07-18 ENCOUNTER — OFFICE VISIT (OUTPATIENT)
Dept: PODIATRY | Facility: CLINIC | Age: 84
End: 2024-07-18
Payer: MEDICARE

## 2024-07-18 DIAGNOSIS — Z79.01 ANTICOAGULATED: ICD-10-CM

## 2024-07-18 DIAGNOSIS — E11.42 TYPE 2 DIABETES MELLITUS WITH PERIPHERAL NEUROPATHY: ICD-10-CM

## 2024-07-18 DIAGNOSIS — L97.512 ULCER OF RIGHT SECOND TOE WITH FAT LAYER EXPOSED: Primary | ICD-10-CM

## 2024-07-18 DIAGNOSIS — N18.30 STAGE 3 CHRONIC KIDNEY DISEASE, UNSPECIFIED WHETHER STAGE 3A OR 3B CKD: ICD-10-CM

## 2024-07-18 PROCEDURE — 99499 UNLISTED E&M SERVICE: CPT | Mod: S$GLB,,, | Performed by: PODIATRIST

## 2024-07-18 PROCEDURE — 11721 DEBRIDE NAIL 6 OR MORE: CPT | Mod: S$GLB,,, | Performed by: PODIATRIST

## 2024-07-18 PROCEDURE — 99999 PR PBB SHADOW E&M-EST. PATIENT-LVL III: CPT | Mod: PBBFAC,,, | Performed by: PODIATRIST

## 2024-07-18 NOTE — PROGRESS NOTES
Subjective:       Patient ID: Jose Miguel Alvarez Jr. is a 83 y.o. male.    Chief Complaint: Wound Care (Patient continues with wound to right second toe. Denies pain at present and is non diabetic.)    HPI: Jose Miguel Alvarez Jr. presents to the office today with a small wound to the distal aspect of the right 2nd toe.  Has been compliant with the appropriate dressing changes.  He continues on Xarelto followed by cardiology and Dr. Flower with last appointment on 2024.      Review of patient's allergies indicates:  No Known Allergies    Past Medical History:   Diagnosis Date    Anemia     Anticoagulant long-term use     Atrial fibrillation     Bladder cancer     Bladder tumor     CKD (chronic kidney disease), stage III     COPD (chronic obstructive pulmonary disease)     pt denies    Encounter for blood transfusion     Hematuria     History of 2019 novel coronavirus disease (COVID-19)     Hypercholesteremia     Hypertension        Family History   Problem Relation Name Age of Onset    Heart disease Father      Hypertension Father      Heart disease Brother         Social History     Socioeconomic History    Marital status:    Tobacco Use    Smoking status: Former     Current packs/day: 0.00     Average packs/day: 2.5 packs/day for 20.0 years (50.0 ttl pk-yrs)     Types: Cigarettes     Start date:      Quit date:      Years since quittin.5    Smokeless tobacco: Never   Substance and Sexual Activity    Alcohol use: Never     Alcohol/week: 7.0 standard drinks of alcohol     Types: 7 Cans of beer per week     Comment: HOLD 72HRS    Drug use: No    Sexual activity: Not Currently     Social Determinants of Health     Financial Resource Strain: Low Risk  (5/10/2022)    Overall Financial Resource Strain (CARDIA)     Difficulty of Paying Living Expenses: Not hard at all   Food Insecurity: No Food Insecurity (5/10/2022)    Hunger Vital Sign     Worried About Running Out of Food in the Last  Year: Never true     Ran Out of Food in the Last Year: Never true   Transportation Needs: No Transportation Needs (5/10/2022)    PRAPARE - Transportation     Lack of Transportation (Medical): No     Lack of Transportation (Non-Medical): No   Physical Activity: Sufficiently Active (5/10/2022)    Exercise Vital Sign     Days of Exercise per Week: 7 days     Minutes of Exercise per Session: 30 min   Stress: No Stress Concern Present (5/10/2022)    [employer] [place] [employer] - Occupational Stress Questionnaire     Feeling of Stress : Not at all   Housing Stability: Low Risk  (5/10/2022)    Housing Stability Vital Sign     Unable to Pay for Housing in the Last Year: No     Number of Places Lived in the Last Year: 1     Unstable Housing in the Last Year: No       Past Surgical History:   Procedure Laterality Date    bladder tumor removal      CARDIAC ELECTROPHYSIOLOGY MAPPING AND ABLATION      CARDIOVERSION      several    CATARACT EXTRACTION Bilateral     cataract removal with IOL implants Bilateral     CHOLECYSTECTOMY      CYSTOSCOPIC LITHOLAPAXY N/A 12/28/2021    Procedure: CYSTOLITHOLAPAXY;  Surgeon: Medardo Garcia MD;  Location: [facility] OR;  Service: Urology;  Laterality: N/A;    CYSTOSCOPIC LITHOLAPAXY N/A 5/14/2024    Procedure: CYSTOLITHOLAPAXY;  Surgeon: Medardo Garcia MD;  Location: [facility] OR;  Service: Urology;  Laterality: N/A;    CYSTOSCOPY      CYSTOSCOPY WITH BIOPSY OF BLADDER Right 12/28/2021    Procedure: CYSTOSCOPY, WITH BLADDER BIOPSY, WITH FULGURATION IF INDICATED;  Surgeon: Medardo Garcia MD;  Location: [facility] OR;  Service: Urology;  Laterality: Right;    ESOPHAGOGASTRODUODENOSCOPY N/A 10/25/2021    Procedure: Small Jason Enteroscopy (SBE);  Surgeon: Isabelle Griffin MD;  Location: [facility] ENDO;  Service: Endoscopy;  Laterality: N/A;    ESOPHAGOGASTRODUODENOSCOPY N/A 12/13/2021    Procedure: EGD (ESOPHAGOGASTRODUODENOSCOPY);  Surgeon: Troy Polanco MD;  Location: [facility]  ENDO;  Service: Endoscopy;  Laterality: N/A;    ESOPHAGOGASTRODUODENOSCOPY N/A 4/5/2024    Procedure: EGD (ESOPHAGOGASTRODUODENOSCOPY);  Surgeon: Mytrle Salcedo MD;  Location: [facility];  Service: Endoscopy;  Laterality: N/A;    ESOPHAGOGASTRODUODENOSCOPY N/A 4/22/2024    Procedure: EGD (ESOPHAGOGASTRODUODENOSCOPY);  Surgeon: Myrtle Salcedo MD;  Location: [facility];  Service: Endoscopy;  Laterality: N/A;  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    EYE SURGERY      INTRALUMINAL GASTROINTESTINAL TRACT IMAGING VIA CAPSULE N/A 12/6/2021    Procedure: IMAGING PROCEDURE, GI TRACT, INTRALUMINAL, VIA CAPSULE;  Surgeon: Larry Jones RN;  Location: [facility];  Service: Endoscopy;  Laterality: N/A;    INTRALUMINAL GASTROINTESTINAL TRACT IMAGING VIA CAPSULE N/A 4/18/2024    Procedure: IMAGING PROCEDURE, GI TRACT, INTRALUMINAL, VIA CAPSULE;  Surgeon: Larry Jones RN;  Location: [facility];  Service: Endoscopy;  Laterality: N/A;    TRANSURETHRAL RESECTION OF BLADDER TUMOR BY BIPOLAR CAUTERY N/A 7/23/2019    Procedure: TURBT, USING BIPOLAR CAUTERY;  Surgeon: Ritesh Marques MD;  Location: [facility];  Service: Urology;  Laterality: N/A;    TRANSURETHRAL RESECTION OF PROSTATE N/A 5/14/2024    Procedure: TURP (TRANSURETHRAL RESECTION OF PROSTATE);  Surgeon: Medardo Garcia MD;  [facility];  Service: Urology;  Laterality: N/A;     Review of Systems     Objective:   There were no vitals taken for this visit.    US Retroperitoneal Complete  Narrative: EXAMINATION:  US RETROPERITONEAL COMPLETE    CLINICAL HISTORY:  DOMINIQUE;    TECHNIQUE:  Ultrasound of the kidneys and urinary bladder was performed including color flow and Doppler evaluation of the kidneys.    COMPARISON:  CT urogram 04/04/2024    FINDINGS:  Right kidney: The right kidney measures 11.8 cm. Cortical thinning.  No loss of corticomedullary distinction. Resistive index measures 0.68.  No mass. No renal stone. Moderate hydronephrosis.    Left  kidney: The left kidney measures 13.1 cm. Cortical thinning.  No loss of corticomedullary distinction. Resistive index measures 0.81.  No mass. No renal stone. No hydronephrosis.    Urinary bladder is significantly distended, but demonstrates no significant abnormality otherwise..  Impression: Moderate right-sided hydronephrosis.    Chronic medical renal disease.    Electronically signed by: Frederick Love  Date:    04/07/2024  Time:    13:03     Physical Exam   LOWER EXTREMITY PHYSICAL EXAMINATION    Vascular:  The right dorsalis pedis pulse 2/4 and the right posterior tibial pulse 2/4.   Capillary refill is intact.  Pedal hair growth intact    Musculoskeletal: Manual Muscle Testing is 5/5 with dorsiflexion, plantar flexion, abduction, and adduction.   There is normal range of motion in the forefoot, hindfoot, and Ankle joint.      Dermatological:  Skin is supple, moist, intact.  Improve discoloration the distal aspect the right 2nd toe.  Overlying hyperkeratosis noted.  Small wound noted underlying.  Post debridement, the wound measures 0.2 x 0.2 cm.  Is no probe bone.  No erythema.  Wound depth 0.1 cm.      Assessment:     1. Ulcer of right second toe with fat layer exposed    2. Type 2 diabetes mellitus with peripheral neuropathy    3. Stage 3 chronic kidney disease, unspecified whether stage 3a or 3b CKD    4. Anticoagulated        Plan:     Ulcer of right second toe with fat layer exposed    Type 2 diabetes mellitus with peripheral neuropathy    Stage 3 chronic kidney disease, unspecified whether stage 3a or 3b CKD    Anticoagulated        Thorough discussion is had with the patient today, concerning the diagnosis, its etiology, and the treatment algorithm at present.    The wound was surgically debrided after adequate prep with alcohol and/or betadine paint. Excisional wound debridement was performed using sharp #10/#15 blade/rounded scalpel and tissue nipper, with removal of all non-viable skin and soft  tissues; necrotic skin/tissue formation. The woundbase/wound bed was also debrided to encourage bleeding as to promote/stimulate healing. Debridement was excisional and included epidermal, dermal and subcutaneous tissues. Post debridement measurements are as above. Hemostasis was achieved. Patient tolerated procedure well and without complications. Local woundcare with antibiotic cream and dry dressings and bandage thereafter.     Continue with daily dressing changes.  Continue with keep the area clean and dry.  No soaking or reduce risk contaminating.    Follow-up in 7-10 days.    Future Appointments   Date Time Provider Department Center   7/29/2024  9:45 AM Xiao Bowman DPM ON POD [facility]   8/5/2024  8:30 AM Leo Winn OD [facility] OPHTH [facility]   8/23/2024  9:00 AM [facility]   10/2/2024 10:40 AM Carlos Flower MD [facility] CARDIO [facility]   11/29/2024  8:30 AM Medardo Garcia MD [facility] UROLOGY [facility]

## 2024-07-22 PROBLEM — K55.21 AVM (ARTERIOVENOUS MALFORMATION) OF SMALL BOWEL, ACQUIRED WITH HEMORRHAGE: Status: RESOLVED | Noted: 2024-04-05 | Resolved: 2024-07-22

## 2024-07-29 ENCOUNTER — OFFICE VISIT (OUTPATIENT)
Dept: PODIATRY | Facility: CLINIC | Age: 84
End: 2024-07-29
Payer: MEDICARE

## 2024-07-29 DIAGNOSIS — Z79.01 ANTICOAGULATED: ICD-10-CM

## 2024-07-29 DIAGNOSIS — L97.512 ULCER OF RIGHT SECOND TOE WITH FAT LAYER EXPOSED: Primary | ICD-10-CM

## 2024-07-29 DIAGNOSIS — N18.30 STAGE 3 CHRONIC KIDNEY DISEASE, UNSPECIFIED WHETHER STAGE 3A OR 3B CKD: ICD-10-CM

## 2024-07-29 DIAGNOSIS — E11.42 TYPE 2 DIABETES MELLITUS WITH PERIPHERAL NEUROPATHY: ICD-10-CM

## 2024-07-29 PROCEDURE — 99999 PR PBB SHADOW E&M-EST. PATIENT-LVL III: CPT | Mod: PBBFAC,,, | Performed by: PODIATRIST

## 2024-07-29 PROCEDURE — 99499 UNLISTED E&M SERVICE: CPT | Mod: 25,S$GLB,, | Performed by: PODIATRIST

## 2024-07-29 PROCEDURE — 11042 DBRDMT SUBQ TIS 1ST 20SQCM/<: CPT | Mod: S$GLB,,, | Performed by: PODIATRIST

## 2024-07-29 NOTE — PROGRESS NOTES
Subjective:       Patient ID: Jose Miguel Alvarez Jr. is a 83 y.o. male.    Chief Complaint: Wound Care (Patient denies pain at present to right second toe wound. Patient is nondiabetic. )    HPI: Jose Miguel Alvarez Jr. presents to the office today with a small wound to the distal aspect of the right 2nd toe.  Has been compliant with the appropriate dressing changes.  He continues on Xarelto followed by cardiology and Dr. Flower with last appointment on 2024.      Review of patient's allergies indicates:  No Known Allergies    Past Medical History:   Diagnosis Date    Anemia     Anticoagulant long-term use     Atrial fibrillation     Bladder cancer     Bladder tumor     CKD (chronic kidney disease), stage III     COPD (chronic obstructive pulmonary disease)     pt denies    Encounter for blood transfusion     Hematuria     History of 2019 novel coronavirus disease (COVID-19)     Hypercholesteremia     Hypertension        Family History   Problem Relation Name Age of Onset    Heart disease Father      Hypertension Father      Heart disease Brother         Social History     Socioeconomic History    Marital status:    Tobacco Use    Smoking status: Former     Current packs/day: 0.00     Average packs/day: 2.5 packs/day for 20.0 years (50.0 ttl pk-yrs)     Types: Cigarettes     Start date:      Quit date:      Years since quittin.6    Smokeless tobacco: Never   Substance and Sexual Activity    Alcohol use: Never     Alcohol/week: 7.0 standard drinks of alcohol     Types: 7 Cans of beer per week     Comment: HOLD 72HRS    Drug use: No    Sexual activity: Not Currently     Social Determinants of Health     Financial Resource Strain: Low Risk  (5/10/2022)    Overall Financial Resource Strain (CARDIA)     Difficulty of Paying Living Expenses: Not hard at all   Food Insecurity: No Food Insecurity (5/10/2022)    Hunger Vital Sign     Worried About Running Out of Food in the Last Year: Never true      Ran Out of Food in the Last Year: Never true   Transportation Needs: No Transportation Needs (5/10/2022)    PRAPARE - Transportation     Lack of Transportation (Medical): No     Lack of Transportation (Non-Medical): No   Physical Activity: Sufficiently Active (5/10/2022)    Exercise Vital Sign     Days of Exercise per Week: 7 days     Minutes of Exercise per Session: 30 min   Stress: No Stress Concern Present (5/10/2022)    [employer] [place] [employer] - Occupational Stress Questionnaire     Feeling of Stress : Not at all   Housing Stability: Low Risk  (5/10/2022)    Housing Stability Vital Sign     Unable to Pay for Housing in the Last Year: No     Number of Places Lived in the Last Year: 1     Unstable Housing in the Last Year: No       Past Surgical History:   Procedure Laterality Date    bladder tumor removal      CARDIAC ELECTROPHYSIOLOGY MAPPING AND ABLATION      CARDIOVERSION      several    CATARACT EXTRACTION Bilateral     cataract removal with IOL implants Bilateral     CHOLECYSTECTOMY      CYSTOSCOPIC LITHOLAPAXY N/A 12/28/2021    Procedure: CYSTOLITHOLAPAXY;  Surgeon: Medardo Garcia MD;  Location: [facility] OR;  Service: Urology;  Laterality: N/A;    CYSTOSCOPIC LITHOLAPAXY N/A 5/14/2024    Procedure: CYSTOLITHOLAPAXY;  Surgeon: Medardo Garcia MD;  Location: [facility] OR;  Service: Urology;  Laterality: N/A;    CYSTOSCOPY      CYSTOSCOPY WITH BIOPSY OF BLADDER Right 12/28/2021    Procedure: CYSTOSCOPY, WITH BLADDER BIOPSY, WITH FULGURATION IF INDICATED;  Surgeon: Medardo Garcia MD;  Location: [facility] OR;  Service: Urology;  Laterality: Right;    ESOPHAGOGASTRODUODENOSCOPY N/A 10/25/2021    Procedure: Small Jason Enteroscopy (SBE);  Surgeon: Isabelle Griffin MD;  Location: [facility] ENDO;  Service: Endoscopy;  Laterality: N/A;    ESOPHAGOGASTRODUODENOSCOPY N/A 12/13/2021    Procedure: EGD (ESOPHAGOGASTRODUODENOSCOPY);  Surgeon: Troy Polanco MD;  Location: [facility] ENDO;  Service:  Endoscopy;  Laterality: N/A;    ESOPHAGOGASTRODUODENOSCOPY N/A 4/5/2024    Procedure: EGD (ESOPHAGOGASTRODUODENOSCOPY);  Surgeon: Myrtle Salcedo MD;  Location: [facility];  Service: Endoscopy;  Laterality: N/A;    ESOPHAGOGASTRODUODENOSCOPY N/A 4/22/2024    Procedure: EGD (ESOPHAGOGASTRODUODENOSCOPY);  Surgeon: Myrtle Salcedo MD;  Location: [facility];  Service: Endoscopy;  Laterality: N/A;  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    EYE SURGERY      INTRALUMINAL GASTROINTESTINAL TRACT IMAGING VIA CAPSULE N/A 12/6/2021    Procedure: IMAGING PROCEDURE, GI TRACT, INTRALUMINAL, VIA CAPSULE;  Surgeon: Larry Jones RN;  Location: [facility];  Service: Endoscopy;  Laterality: N/A;    INTRALUMINAL GASTROINTESTINAL TRACT IMAGING VIA CAPSULE N/A 4/18/2024    Procedure: IMAGING PROCEDURE, GI TRACT, INTRALUMINAL, VIA CAPSULE;  Surgeon: Larry Jones RN;  Location: [facility];  Service: Endoscopy;  Laterality: N/A;    TRANSURETHRAL RESECTION OF BLADDER TUMOR BY BIPOLAR CAUTERY N/A 7/23/2019    Procedure: TURBT, USING BIPOLAR CAUTERY;  Surgeon: Ritesh Marques MD;  Location: [facility];  Service: Urology;  Laterality: N/A;    TRANSURETHRAL RESECTION OF PROSTATE N/A 5/14/2024    Procedure: TURP (TRANSURETHRAL RESECTION OF PROSTATE);  Surgeon: Medardo Garcia MD;  Location: [facility];  Service: Urology;  Laterality: N/A;     Review of Systems     Objective:   There were no vitals taken for this visit.    US Retroperitoneal Complete  Narrative: EXAMINATION:  US RETROPERITONEAL COMPLETE    CLINICAL HISTORY:  DOMINIQUE;    TECHNIQUE:  Ultrasound of the kidneys and urinary bladder was performed including color flow and Doppler evaluation of the kidneys.    COMPARISON:  CT urogram 04/04/2024    FINDINGS:  Right kidney: The right kidney measures 11.8 cm. Cortical thinning.  No loss of corticomedullary distinction. Resistive index measures 0.68.  No mass. No renal stone. Moderate hydronephrosis.    Left kidney: The left  kidney measures 13.1 cm. Cortical thinning.  No loss of corticomedullary distinction. Resistive index measures 0.81.  No mass. No renal stone. No hydronephrosis.    Urinary bladder is significantly distended, but demonstrates no significant abnormality otherwise..  Impression: Moderate right-sided hydronephrosis.    Chronic medical renal disease.    Electronically signed by: Frederick Love  Date:    04/07/2024  Time:    13:03     Physical Exam   LOWER EXTREMITY PHYSICAL EXAMINATION    Vascular:  The right dorsalis pedis pulse 2/4 and the right posterior tibial pulse 2/4.   Capillary refill is intact.  Pedal hair growth intact    Musculoskeletal: Manual Muscle Testing is 5/5 with dorsiflexion, plantar flexion, abduction, and adduction.   There is normal range of motion in the forefoot, hindfoot, and Ankle joint.      Dermatological:  Skin is supple, moist, intact.  Improve discoloration the distal aspect the right 2nd toe.  Overlying hyperkeratosis noted.  Small wound noted underlying.  Post debridement, the wound measures 0.1 x 0.2 cm.  Is no probe bone.  No erythema.  Wound depth 0.1 cm.      Assessment:     1. Ulcer of right second toe with fat layer exposed    2. Type 2 diabetes mellitus with peripheral neuropathy    3. Stage 3 chronic kidney disease, unspecified whether stage 3a or 3b CKD    4. Anticoagulated      Plan:     Ulcer of right second toe with fat layer exposed    Type 2 diabetes mellitus with peripheral neuropathy    Stage 3 chronic kidney disease, unspecified whether stage 3a or 3b CKD    Anticoagulated      Thorough discussion is had with the patient today, concerning the diagnosis, its etiology, and the treatment algorithm at present.    The wound was surgically debrided after adequate prep with alcohol and/or betadine paint. Excisional wound debridement was performed using sharp #10/#15 blade/rounded scalpel and tissue nipper, with removal of all non-viable skin and soft tissues; necrotic  skin/tissue formation. The woundbase/wound bed was also debrided to encourage bleeding as to promote/stimulate healing. Debridement was excisional and included epidermal, dermal and subcutaneous tissues. Post debridement measurements are as above. Hemostasis was achieved. Patient tolerated procedure well and without complications. Local woundcare with antibiotic cream and dry dressings and bandage thereafter.     Continue with daily dressing changes.  Continue with keep the area clean and dry.  No soaking or reduce risk contaminating.    Future Appointments   Date Time Provider Department Center   8/5/2024  8:30 AM Leo Winn OD [facility] OPHTH [facility]   8/12/2024  9:30 AM Xiao Bowman DPM ON POD  Medical    8/23/2024  9:00 AM Rahel Walton NP [facility]   10/2/2024 10:40 AM Carlos Flower MD [facility] CARDIO [facility]   11/29/2024  8:30 AM Medardo Garcia MD ON UROLOGY  Medical

## 2024-08-05 ENCOUNTER — PATIENT MESSAGE (OUTPATIENT)
Dept: OPHTHALMOLOGY | Facility: CLINIC | Age: 84
End: 2024-08-05
Payer: MEDICARE

## 2024-08-05 ENCOUNTER — OFFICE VISIT (OUTPATIENT)
Dept: OPHTHALMOLOGY | Facility: CLINIC | Age: 84
End: 2024-08-05
Payer: MEDICARE

## 2024-08-05 DIAGNOSIS — H52.7 REFRACTIVE ERRORS: ICD-10-CM

## 2024-08-05 DIAGNOSIS — Z96.1 PSEUDOPHAKIA OF BOTH EYES: ICD-10-CM

## 2024-08-05 DIAGNOSIS — H35.3131 NONEXUDATIVE AGE-RELATED MACULAR DEGENERATION, BILATERAL, EARLY DRY STAGE: ICD-10-CM

## 2024-08-05 DIAGNOSIS — E11.9 DIABETES MELLITUS TYPE 2 WITHOUT RETINOPATHY: Primary | ICD-10-CM

## 2024-08-05 DIAGNOSIS — H18.003 CORNEA VERTICILLATA OF BOTH EYES: ICD-10-CM

## 2024-08-05 PROCEDURE — 92015 DETERMINE REFRACTIVE STATE: CPT | Mod: S$GLB,,, | Performed by: OPTOMETRIST

## 2024-08-05 PROCEDURE — 2023F DILAT RTA XM W/O RTNOPTHY: CPT | Mod: CPTII,S$GLB,, | Performed by: OPTOMETRIST

## 2024-08-05 PROCEDURE — 99999 PR PBB SHADOW E&M-EST. PATIENT-LVL II: CPT | Mod: PBBFAC,,, | Performed by: OPTOMETRIST

## 2024-08-05 PROCEDURE — 92014 COMPRE OPH EXAM EST PT 1/>: CPT | Mod: S$GLB,,, | Performed by: OPTOMETRIST

## 2024-08-05 PROCEDURE — 1159F MED LIST DOCD IN RCRD: CPT | Mod: CPTII,S$GLB,, | Performed by: OPTOMETRIST

## 2024-08-05 PROCEDURE — 1157F ADVNC CARE PLAN IN RCRD: CPT | Mod: CPTII,S$GLB,, | Performed by: OPTOMETRIST

## 2024-08-12 ENCOUNTER — OFFICE VISIT (OUTPATIENT)
Dept: PODIATRY | Facility: CLINIC | Age: 84
End: 2024-08-12
Payer: MEDICARE

## 2024-08-12 DIAGNOSIS — Z87.2 HISTORY OF FOOT ULCER: Primary | ICD-10-CM

## 2024-08-12 DIAGNOSIS — E11.42 TYPE 2 DIABETES MELLITUS WITH PERIPHERAL NEUROPATHY: ICD-10-CM

## 2024-08-12 DIAGNOSIS — Z79.01 ANTICOAGULATED: ICD-10-CM

## 2024-08-12 DIAGNOSIS — N18.30 STAGE 3 CHRONIC KIDNEY DISEASE, UNSPECIFIED WHETHER STAGE 3A OR 3B CKD: ICD-10-CM

## 2024-08-12 PROCEDURE — 1126F AMNT PAIN NOTED NONE PRSNT: CPT | Mod: CPTII,S$GLB,, | Performed by: PODIATRIST

## 2024-08-12 PROCEDURE — 3288F FALL RISK ASSESSMENT DOCD: CPT | Mod: CPTII,S$GLB,, | Performed by: PODIATRIST

## 2024-08-12 PROCEDURE — 1157F ADVNC CARE PLAN IN RCRD: CPT | Mod: CPTII,S$GLB,, | Performed by: PODIATRIST

## 2024-08-12 PROCEDURE — 99999 PR PBB SHADOW E&M-EST. PATIENT-LVL III: CPT | Mod: PBBFAC,,, | Performed by: PODIATRIST

## 2024-08-12 PROCEDURE — 1160F RVW MEDS BY RX/DR IN RCRD: CPT | Mod: CPTII,S$GLB,, | Performed by: PODIATRIST

## 2024-08-12 PROCEDURE — 1159F MED LIST DOCD IN RCRD: CPT | Mod: CPTII,S$GLB,, | Performed by: PODIATRIST

## 2024-08-12 PROCEDURE — 99213 OFFICE O/P EST LOW 20 MIN: CPT | Mod: S$GLB,,, | Performed by: PODIATRIST

## 2024-08-12 PROCEDURE — 1101F PT FALLS ASSESS-DOCD LE1/YR: CPT | Mod: CPTII,S$GLB,, | Performed by: PODIATRIST

## 2024-08-12 NOTE — PROGRESS NOTES
Subjective:       Patient ID: Jose Miguel Alvarez Jr. is a 83 y.o. male.    Chief Complaint: Follow-up (Patient denies pain at present to right second toe wound. Patient is nondiabetic. No drainage at present. )    HPI: Jose Miguel Alvarez Jr. presents to the office today follow-up on ulceration to the distal aspect of the right 2nd toe.  States no pain.  Has noticed no drainage.  He continues on Xarelto followed by cardiology and Dr. Flower with last appointment on 2024.      Review of patient's allergies indicates:  No Known Allergies    Past Medical History:   Diagnosis Date    Anemia     Anticoagulant long-term use     Atrial fibrillation     Bladder cancer     Bladder tumor     CKD (chronic kidney disease), stage III     COPD (chronic obstructive pulmonary disease)     pt denies    Encounter for blood transfusion     Hematuria     History of 2019 novel coronavirus disease (COVID-19)     Hypercholesteremia     Hypertension        Family History   Problem Relation Name Age of Onset    Heart disease Father      Hypertension Father      Heart disease Brother         Social History     Socioeconomic History    Marital status:    Tobacco Use    Smoking status: Former     Current packs/day: 0.00     Average packs/day: 2.5 packs/day for 20.0 years (50.0 ttl pk-yrs)     Types: Cigarettes     Start date:      Quit date:      Years since quittin.6    Smokeless tobacco: Never   Substance and Sexual Activity    Alcohol use: Never     Alcohol/week: 7.0 standard drinks of alcohol     Types: 7 Cans of beer per week     Comment: HOLD 72HRS    Drug use: No    Sexual activity: Not Currently     Social Determinants of Health     Financial Resource Strain: Low Risk  (5/10/2022)    Overall Financial Resource Strain (CARDIA)     Difficulty of Paying Living Expenses: Not hard at all   Food Insecurity: No Food Insecurity (5/10/2022)    Hunger Vital Sign     Worried About Running Out of Food in the Last  Year: Never true     Ran Out of Food in the Last Year: Never true   Transportation Needs: No Transportation Needs (5/10/2022)    PRAPARE - Transportation     Lack of Transportation (Medical): No     Lack of Transportation (Non-Medical): No   Physical Activity: Sufficiently Active (5/10/2022)    Exercise Vital Sign     Days of Exercise per Week: 7 days     Minutes of Exercise per Session: 30 min   Stress: No Stress Concern Present (5/10/2022)    [employer] - Occupational Stress Questionnaire     Feeling of Stress : Not at all   Housing Stability: Low Risk  (5/10/2022)    Housing Stability Vital Sign     Unable to Pay for Housing in the Last Year: No     Number of Places Lived in the Last Year: 1     Unstable Housing in the Last Year: No       Past Surgical History:   Procedure Laterality Date    bladder tumor removal      CARDIAC ELECTROPHYSIOLOGY MAPPING AND ABLATION      CARDIOVERSION      several    CATARACT EXTRACTION Bilateral     cataract removal with IOL implants Bilateral     CHOLECYSTECTOMY      CYSTOSCOPIC LITHOLAPAXY N/A 12/28/2021    Procedure: CYSTOLITHOLAPAXY;  Surgeon: Medardo Garcia MD;  Location: [facility] OR;  Service: Urology;  Laterality: N/A;    CYSTOSCOPIC LITHOLAPAXY N/A 5/14/2024    Procedure: CYSTOLITHOLAPAXY;  Surgeon: Medardo Garcia MD;  Location: [facility] OR;  Service: Urology;  Laterality: N/A;    CYSTOSCOPY      CYSTOSCOPY WITH BIOPSY OF BLADDER Right 12/28/2021    Procedure: CYSTOSCOPY, WITH BLADDER BIOPSY, WITH FULGURATION IF INDICATED;  Surgeon: Medardo Garcia MD;  Location: [facility] OR;  Service: Urology;  Laterality: Right;    ESOPHAGOGASTRODUODENOSCOPY N/A 10/25/2021    Procedure: Small Jason Enteroscopy (SBE);  Surgeon: Isabelle Griffin MD;  Location: [facility] ENDO;  Service: Endoscopy;  Laterality: N/A;    ESOPHAGOGASTRODUODENOSCOPY N/A 12/13/2021    Procedure: EGD (ESOPHAGOGASTRODUODENOSCOPY);  Surgeon: Troy Polanco MD;  Location: [facility]  ENDO;  Service: Endoscopy;  Laterality: N/A;    ESOPHAGOGASTRODUODENOSCOPY N/A 4/5/2024    Procedure: EGD (ESOPHAGOGASTRODUODENOSCOPY);  Surgeon: Myrtle Salcedo MD;  Location: [facility];  Service: Endoscopy;  Laterality: N/A;    ESOPHAGOGASTRODUODENOSCOPY N/A 4/22/2024    Procedure: EGD (ESOPHAGOGASTRODUODENOSCOPY);  Surgeon: Myrtle Salcedo MD;  Location: [facility];  Service: Endoscopy;  Laterality: N/A;  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    EYE SURGERY      INTRALUMINAL GASTROINTESTINAL TRACT IMAGING VIA CAPSULE N/A 12/6/2021    Procedure: IMAGING PROCEDURE, GI TRACT, INTRALUMINAL, VIA CAPSULE;  Surgeon: Larry Jones RN;  Location: [facility];  Service: Endoscopy;  Laterality: N/A;    INTRALUMINAL GASTROINTESTINAL TRACT IMAGING VIA CAPSULE N/A 4/18/2024    Procedure: IMAGING PROCEDURE, GI TRACT, INTRALUMINAL, VIA CAPSULE;  Surgeon: Larry Jones RN;  Location: [facility];  Service: Endoscopy;  Laterality: N/A;    TRANSURETHRAL RESECTION OF BLADDER TUMOR BY BIPOLAR CAUTERY N/A 7/23/2019    Procedure: TURBT, USING BIPOLAR CAUTERY;  Surgeon: Ritesh Marques MD;  Location: [facility];  Service: Urology;  Laterality: N/A;    TRANSURETHRAL RESECTION OF PROSTATE N/A 5/14/2024    Procedure: TURP (TRANSURETHRAL RESECTION OF PROSTATE);  Surgeon: Medardo Garcia MD;  Location: [facility];  Service: Urology;  Laterality: N/A;     Review of Systems     Objective:   There were no vitals taken for this visit.    US Retroperitoneal Complete  Narrative: EXAMINATION:  US RETROPERITONEAL COMPLETE    CLINICAL HISTORY:  DOMINIQUE;    TECHNIQUE:  Ultrasound of the kidneys and urinary bladder was performed including color flow and Doppler evaluation of the kidneys.    COMPARISON:  CT urogram 04/04/2024    FINDINGS:  Right kidney: The right kidney measures 11.8 cm. Cortical thinning.  No loss of corticomedullary distinction. Resistive index measures 0.68.  No mass. No renal stone. Moderate hydronephrosis.    Left  kidney: The left kidney measures 13.1 cm. Cortical thinning.  No loss of corticomedullary distinction. Resistive index measures 0.81.  No mass. No renal stone. No hydronephrosis.    Urinary bladder is significantly distended, but demonstrates no significant abnormality otherwise..  Impression: Moderate right-sided hydronephrosis.    Chronic medical renal disease.    Electronically signed by: Frederick Love  Date:    04/07/2024  Time:    13:03     Physical Exam   LOWER EXTREMITY PHYSICAL EXAMINATION    Vascular:  The right dorsalis pedis pulse 2/4 and the right posterior tibial pulse 2/4.   Capillary refill is intact.  Pedal hair growth intact    Musculoskeletal: Manual Muscle Testing is 5/5 with dorsiflexion, plantar flexion, abduction, and adduction.   There is normal range of motion in the forefoot, hindfoot, and Ankle joint.      Dermatological:  Skin is supple, moist, intact.  Improve discoloration the distal aspect the right 2nd toe.  Upon removal of the hyperkeratosis, there is no signs of underlying ulceration.  No signs acute infection.  Toe completely healed    Assessment:     1. History of foot ulcer    2. Type 2 diabetes mellitus with peripheral neuropathy    3. Stage 3 chronic kidney disease, unspecified whether stage 3a or 3b CKD    4. Anticoagulated      Plan:     History of foot ulcer    Type 2 diabetes mellitus with peripheral neuropathy    Stage 3 chronic kidney disease, unspecified whether stage 3a or 3b CKD    Anticoagulated      Thorough discussion is had with the patient today, concerning the diagnosis, its etiology, and the treatment algorithm at present.    Foot counseling and education is provided at this visit. Patient is advised to wear socks and shoes at all times.  Do not walk barefoot, or with just socks, even when indoors.  Be sure to check and inspect the inside of the shoe before putting them on her feet.  Protect your feet at all times.  Walking shoes and/or athletic shoes are the  best types of shoe gear. Do not wear vinyl or plastic type shoe gear, as they do not stretch and/or breathe.  Protect your feet from hot and/or cold. Elevate the extremities when sitting.  Do not wear excessively tight socks and/or shoe gear. Wiggle your toes for a few minutes throughout the day. Move your ankles up and down, in and out, to help blood flow in your lower extremity.     Future Appointments   Date Time Provider Department Center   8/23/2024  9:00 AM Rahel Walton NP [facility]   10/2/2024 10:40 AM Carlos Flower MD [facility] CARDIO [facility]   11/29/2024  8:30 AM Medardo Garcia MD [facility] UROLOGY  Medical

## 2024-08-19 ENCOUNTER — TELEPHONE (OUTPATIENT)
Dept: FAMILY MEDICINE | Facility: CLINIC | Age: 84
End: 2024-08-19
Payer: MEDICARE

## 2024-08-19 NOTE — TELEPHONE ENCOUNTER
----- Message from Kirsten Graves sent at 8/19/2024  2:40 PM CDT -----  Type:  Needs Medical Advice    Who Called: pt  Symptoms (please be specific): na   How long has patient had these symptoms:  na  Pharmacy name and phone #:  na  Would the patient rather a call back or a response via [facility]? call  Best Call Back Number: [phone]    Additional Information: received medication in mail and not sure what it is or if he should take it.

## 2024-08-19 NOTE — TELEPHONE ENCOUNTER
Spoke to pt, he states he received 2 medications in the mail last month. Diltiazem 360 mg and Metoprolol Tartrate 50 mg. He doesn't know who prescribed these for him. Informed him that I saw in the chart these were showing in outside medication tab from a Samuel Christopher but he said that name does not sound familiar. He isn't taking them.  He also mentioned that he scheduled an appt Thursday with Dr. Barrera because he had a fall and hit his head but did not get evaluated.

## 2024-08-22 ENCOUNTER — OFFICE VISIT (OUTPATIENT)
Dept: FAMILY MEDICINE | Facility: CLINIC | Age: 84
End: 2024-08-22
Attending: FAMILY MEDICINE
Payer: MEDICARE

## 2024-08-22 ENCOUNTER — CLINICAL SUPPORT (OUTPATIENT)
Dept: CARDIOLOGY | Facility: CLINIC | Age: 84
End: 2024-08-22
Attending: FAMILY MEDICINE
Payer: MEDICARE

## 2024-08-22 ENCOUNTER — HOSPITAL ENCOUNTER (OUTPATIENT)
Dept: RADIOLOGY | Facility: HOSPITAL | Age: 84
Discharge: HOME | End: 2024-08-22
Attending: FAMILY MEDICINE
Payer: MEDICARE

## 2024-08-22 VITALS
BODY MASS INDEX: 32.26 KG/M2 | OXYGEN SATURATION: 97 % | SYSTOLIC BLOOD PRESSURE: 126 MMHG | HEART RATE: 56 BPM | WEIGHT: 238.19 LBS | HEIGHT: 72 IN | DIASTOLIC BLOOD PRESSURE: 62 MMHG | TEMPERATURE: 98 F

## 2024-08-22 DIAGNOSIS — M54.2 NECK PAIN: ICD-10-CM

## 2024-08-22 DIAGNOSIS — D64.9 ANEMIA, UNSPECIFIED TYPE: Primary | ICD-10-CM

## 2024-08-22 DIAGNOSIS — D69.6 THROMBOCYTOPENIA: ICD-10-CM

## 2024-08-22 DIAGNOSIS — R07.9 CHEST PAIN, UNSPECIFIED TYPE: ICD-10-CM

## 2024-08-22 DIAGNOSIS — R06.02 SOB (SHORTNESS OF BREATH): ICD-10-CM

## 2024-08-22 DIAGNOSIS — M54.2 NECK PAIN: Primary | ICD-10-CM

## 2024-08-22 PROBLEM — I50.33 ACUTE ON CHRONIC DIASTOLIC HEART FAILURE: Status: RESOLVED | Noted: 2024-04-06 | Resolved: 2024-08-22

## 2024-08-22 PROBLEM — E44.0 MODERATE PROTEIN-CALORIE MALNUTRITION: Status: RESOLVED | Noted: 2023-01-26 | Resolved: 2024-08-22

## 2024-08-22 PROCEDURE — 71046 X-RAY EXAM CHEST 2 VIEWS: CPT | Mod: TC,PO

## 2024-08-22 PROCEDURE — 72040 X-RAY EXAM NECK SPINE 2-3 VW: CPT | Mod: TC,PO

## 2024-08-22 PROCEDURE — 3074F SYST BP LT 130 MM HG: CPT | Mod: CPTII,S$GLB,, | Performed by: FAMILY MEDICINE

## 2024-08-22 PROCEDURE — 99215 OFFICE O/P EST HI 40 MIN: CPT | Mod: S$GLB,,, | Performed by: FAMILY MEDICINE

## 2024-08-22 PROCEDURE — 99999 PR PBB SHADOW E&M-EST. PATIENT-LVL IV: CPT | Mod: PBBFAC,,, | Performed by: FAMILY MEDICINE

## 2024-08-22 PROCEDURE — 93005 ELECTROCARDIOGRAM TRACING: CPT | Mod: S$GLB,,, | Performed by: FAMILY MEDICINE

## 2024-08-22 PROCEDURE — 93010 ELECTROCARDIOGRAM REPORT: CPT | Mod: S$GLB,,, | Performed by: INTERNAL MEDICINE

## 2024-08-22 PROCEDURE — 71046 X-RAY EXAM CHEST 2 VIEWS: CPT | Mod: 26,,, | Performed by: RADIOLOGY

## 2024-08-22 PROCEDURE — 3078F DIAST BP <80 MM HG: CPT | Mod: CPTII,S$GLB,, | Performed by: FAMILY MEDICINE

## 2024-08-22 PROCEDURE — 1125F AMNT PAIN NOTED PAIN PRSNT: CPT | Mod: CPTII,S$GLB,, | Performed by: FAMILY MEDICINE

## 2024-08-22 PROCEDURE — 1159F MED LIST DOCD IN RCRD: CPT | Mod: CPTII,S$GLB,, | Performed by: FAMILY MEDICINE

## 2024-08-22 PROCEDURE — 72040 X-RAY EXAM NECK SPINE 2-3 VW: CPT | Mod: 26,,, | Performed by: RADIOLOGY

## 2024-08-22 PROCEDURE — 1160F RVW MEDS BY RX/DR IN RCRD: CPT | Mod: CPTII,S$GLB,, | Performed by: FAMILY MEDICINE

## 2024-08-22 PROCEDURE — 70450 CT HEAD/BRAIN W/O DYE: CPT | Mod: 26,,, | Performed by: RADIOLOGY

## 2024-08-22 PROCEDURE — 70450 CT HEAD/BRAIN W/O DYE: CPT | Mod: TC

## 2024-08-22 PROCEDURE — 1157F ADVNC CARE PLAN IN RCRD: CPT | Mod: CPTII,S$GLB,, | Performed by: FAMILY MEDICINE

## 2024-08-22 RX ORDER — METOPROLOL TARTRATE 50 MG/1
50 TABLET ORAL DAILY
COMMUNITY
Start: 2024-07-13 | End: 2024-08-22

## 2024-08-22 RX ORDER — FUROSEMIDE 40 MG/1
40 TABLET ORAL 2 TIMES DAILY
Qty: 6 TABLET | Refills: 0 | Status: SHIPPED | OUTPATIENT
Start: 2024-08-22 | End: 2025-08-22

## 2024-08-22 RX ORDER — DILTIAZEM HYDROCHLORIDE 360 MG/1
360 CAPSULE, EXTENDED RELEASE ORAL DAILY
COMMUNITY
Start: 2024-07-14 | End: 2024-08-22

## 2024-08-22 NOTE — PROGRESS NOTES
"Subjective:       Patient ID: Jose Miguel Alvarez Jr. is a 83 y.o. male.    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 He has been acting confused per son report . He has mild neck pain. Taking Acetaminophen and using hot  compresses . Also worried about brief  episodes  of CP and RECINOS, they last 5 min  . He has gained 18 lbs in 2 m , he has LYLE, no orthopnea . Keeps same diet . Not on diuretics       Review of Systems   Constitutional: Negative.    HENT: Negative.     Eyes: Negative.    Respiratory:  Positive for shortness of breath.    Cardiovascular:  Positive for chest pain and leg swelling.   Gastrointestinal: Negative.    Genitourinary: Negative.    Musculoskeletal:  Positive for arthralgias.   Skin: Negative.    Hematological: Negative.        Objective:      Physical Exam  Constitutional:       General: He is not in acute distress.     Appearance: He is well-developed. He is not diaphoretic.   HENT:      Head: Normocephalic and atraumatic.      Right Ear: External ear normal.      Left Ear: External ear normal.      Nose: Nose normal.      Mouth/Throat:      Pharynx: No oropharyngeal exudate.   Eyes:      General: No scleral icterus.        Right eye: No discharge.         Left eye: No discharge.      Conjunctiva/sclera: Conjunctivae normal.      Pupils: Pupils are equal, round, and reactive to light.   Neck:      Thyroid: No thyromegaly.      Vascular: No JVD.      Trachea: No tracheal deviation.   Cardiovascular:      Rate and Rhythm: Normal rate and regular rhythm.      Heart sounds: Normal heart sounds. No murmur heard.     No friction rub. No gallop.   Pulmonary:      Effort: Pulmonary effort is normal. No respiratory distress.      Breath sounds: Normal breath sounds. " No stridor. No wheezing or rales.   Chest:      Chest wall: No tenderness.   Abdominal:      General: Bowel sounds are normal. There is no distension.      Palpations: Abdomen is soft.      Tenderness: There is no abdominal tenderness. There is no guarding or rebound.   Musculoskeletal:         General: No tenderness.      Cervical back: Neck supple. Spinous process tenderness and muscular tenderness present. Decreased range of motion.      Right lower leg: 3+ Edema present.      Left lower leg: 3+ Edema present.   Lymphadenopathy:      Cervical: No cervical adenopathy.   Skin:     General: Skin is warm and dry.      Coloration: Skin is not pale.      Findings: No erythema or rash.   Neurological:      Mental Status: He is alert and oriented to person, place, and time.      Cranial Nerves: No cranial nerve deficit.      Motor: No abnormal muscle tone.      Coordination: Coordination normal.      Deep Tendon Reflexes: Reflexes are normal and symmetric. Reflexes normal.   Psychiatric:         Behavior: Behavior normal.         Thought Content: Thought content normal.         Judgment: Judgment normal.         Assessment:       1. Neck pain    2. SOB (shortness of breath)    3. Chest pain, unspecified type        Plan:     Jose Miguel was seen today for chest pain and fall.    Diagnoses and all orders for this visit:    Neck pain  -     CT Head Without Contrast; Future  -     X-Ray Cervical Spine AP And Lateral; Future    SOB (shortness of breath)  -     EKG 12-lead; Future  -     CBC Auto Differential; Future  -     Comprehensive Metabolic Panel; Future  -     X-Ray Chest PA And Lateral; Future  -     BNP; Future    Chest pain, unspecified type  -     EKG 12-lead; Future  -     X-Ray Chest PA And Lateral; Future  -     BNP; Future    Other orders  -     furosemide (LASIX) 40 MG tablet; Take 1 tablet (40 mg total) by mouth 2 (two) times daily.     CT   WS discussed.   He will reduce dose of Hydralazine to 25 mg BID while  taking Furosemide  See Card  ASAP .     Time spent: 60  minutes in face to face discussion concerning diagnosis, prognosis, review of lab and test results, benefits of treatment as well as management of disease, counseling of patient and coordination of care between various health care providers . Greater than half the time spent was used for coordination of care and counseling of patient.

## 2024-08-23 ENCOUNTER — HOSPITAL ENCOUNTER (OUTPATIENT)
Facility: HOSPITAL | Age: 84
Discharge: HOME | End: 2024-08-24
Attending: EMERGENCY MEDICINE | Admitting: FAMILY MEDICINE
Payer: MEDICARE

## 2024-08-23 DIAGNOSIS — R60.0 BILATERAL LOWER EXTREMITY EDEMA: ICD-10-CM

## 2024-08-23 DIAGNOSIS — R53.1 WEAKNESS: ICD-10-CM

## 2024-08-23 DIAGNOSIS — D72.829 LEUKOCYTOSIS, UNSPECIFIED TYPE: ICD-10-CM

## 2024-08-23 DIAGNOSIS — Z79.01 CHRONIC ANTICOAGULATION: ICD-10-CM

## 2024-08-23 DIAGNOSIS — R19.5 HEME POSITIVE STOOL: ICD-10-CM

## 2024-08-23 DIAGNOSIS — D62 ACUTE ON CHRONIC BLOOD LOSS ANEMIA: Primary | ICD-10-CM

## 2024-08-23 DIAGNOSIS — I48.20 CHRONIC ATRIAL FIBRILLATION: ICD-10-CM

## 2024-08-23 DIAGNOSIS — N30.01 ACUTE CYSTITIS WITH HEMATURIA: ICD-10-CM

## 2024-08-23 PROBLEM — K31.811 GASTROINTESTINAL HEMORRHAGE ASSOCIATED WITH ANGIODYSPLASIA OF STOMACH AND DUODENUM: Status: ACTIVE | Noted: 2021-05-29

## 2024-08-23 LAB
ABO + RH BLD: NORMAL
ALBUMIN SERPL BCP-MCNC: 3.6 G/DL (ref 3.5–5.2)
ALP SERPL-CCNC: 62 U/L (ref 55–135)
ALT SERPL W/O P-5'-P-CCNC: 23 U/L (ref 10–44)
ANION GAP SERPL CALC-SCNC: 11 MMOL/L (ref 8–16)
APTT PPP: 33 SEC (ref 21–32)
AST SERPL-CCNC: 25 U/L (ref 10–40)
BACTERIA #/AREA URNS HPF: ABNORMAL /HPF
BASOPHILS # BLD AUTO: 0.04 K/UL (ref 0–0.2)
BASOPHILS NFR BLD: 0.8 % (ref 0–1.9)
BILIRUB SERPL-MCNC: 0.7 MG/DL (ref 0.1–1)
BILIRUB UR QL STRIP: NEGATIVE
BLD GP AB SCN CELLS X3 SERPL QL: NORMAL
BLD PROD TYP BPU: NORMAL
BLD PROD TYP BPU: NORMAL
BLOOD UNIT EXPIRATION DATE: NORMAL
BLOOD UNIT EXPIRATION DATE: NORMAL
BLOOD UNIT TYPE CODE: 9500
BLOOD UNIT TYPE CODE: 9500
BLOOD UNIT TYPE: NORMAL
BLOOD UNIT TYPE: NORMAL
BUN SERPL-MCNC: 24 MG/DL (ref 8–23)
CALCIUM SERPL-MCNC: 9.2 MG/DL (ref 8.7–10.5)
CHLORIDE SERPL-SCNC: 106 MMOL/L (ref 95–110)
CLARITY UR: ABNORMAL
CO2 SERPL-SCNC: 24 MMOL/L (ref 23–29)
CODING SYSTEM: NORMAL
CODING SYSTEM: NORMAL
COLOR UR: YELLOW
CREAT SERPL-MCNC: 1.4 MG/DL (ref 0.5–1.4)
CROSSMATCH INTERPRETATION: NORMAL
CROSSMATCH INTERPRETATION: NORMAL
DIFFERENTIAL METHOD BLD: ABNORMAL
DISPENSE STATUS: NORMAL
DISPENSE STATUS: NORMAL
EOSINOPHIL # BLD AUTO: 0 K/UL (ref 0–0.5)
EOSINOPHIL NFR BLD: 0.6 % (ref 0–8)
ERYTHROCYTE [DISTWIDTH] IN BLOOD BY AUTOMATED COUNT: 17.2 % (ref 11.5–14.5)
EST. GFR  (NO RACE VARIABLE): 50 ML/MIN/1.73 M^2
FERRITIN SERPL-MCNC: 6 NG/ML (ref 20–300)
FOLATE SERPL-MCNC: 18.4 NG/ML (ref 4–24)
GLUCOSE SERPL-MCNC: 117 MG/DL (ref 70–110)
GLUCOSE UR QL STRIP: NEGATIVE
HCT VFR BLD AUTO: 23.9 % (ref 40–54)
HGB BLD-MCNC: 6.6 G/DL (ref 14–18)
HGB UR QL STRIP: ABNORMAL
HYALINE CASTS #/AREA URNS LPF: 0 /LPF
IMM GRANULOCYTES # BLD AUTO: 0.02 K/UL (ref 0–0.04)
IMM GRANULOCYTES NFR BLD AUTO: 0.4 % (ref 0–0.5)
INR PPP: 1.3 (ref 0.8–1.2)
IRON SERPL-MCNC: 14 UG/DL (ref 45–160)
KETONES UR QL STRIP: NEGATIVE
LEUKOCYTE ESTERASE UR QL STRIP: ABNORMAL
LYMPHOCYTES # BLD AUTO: 0.8 K/UL (ref 1–4.8)
LYMPHOCYTES NFR BLD: 15.6 % (ref 18–48)
MCH RBC QN AUTO: 19.8 PG (ref 27–31)
MCHC RBC AUTO-ENTMCNC: 27.6 G/DL (ref 32–36)
MCV RBC AUTO: 72 FL (ref 82–98)
MICROSCOPIC COMMENT: ABNORMAL
MONOCYTES # BLD AUTO: 0.8 K/UL (ref 0.3–1)
MONOCYTES NFR BLD: 16 % (ref 4–15)
NEUTROPHILS # BLD AUTO: 3.3 K/UL (ref 1.8–7.7)
NEUTROPHILS NFR BLD: 66.6 % (ref 38–73)
NITRITE UR QL STRIP: POSITIVE
NRBC BLD-RTO: 0 /100 WBC
NUM UNITS TRANS PACKED RBC: NORMAL
NUM UNITS TRANS PACKED RBC: NORMAL
OB PNL STL: POSITIVE
PH UR STRIP: 7 [PH] (ref 5–8)
PLATELET # BLD AUTO: 128 K/UL (ref 150–450)
PMV BLD AUTO: 12 FL (ref 9.2–12.9)
POTASSIUM SERPL-SCNC: 3.9 MMOL/L (ref 3.5–5.1)
PROT SERPL-MCNC: 6.6 G/DL (ref 6–8.4)
PROT UR QL STRIP: ABNORMAL
PROTHROMBIN TIME: 14.3 SEC (ref 9–12.5)
RBC # BLD AUTO: 3.33 M/UL (ref 4.6–6.2)
RBC #/AREA URNS HPF: >100 /HPF (ref 0–4)
SATURATED IRON: 3 % (ref 20–50)
SODIUM SERPL-SCNC: 141 MMOL/L (ref 136–145)
SP GR UR STRIP: 1.01 (ref 1–1.03)
SPECIMEN OUTDATE: NORMAL
TOTAL IRON BINDING CAPACITY: 468 UG/DL (ref 250–450)
TRANSFERRIN SERPL-MCNC: 316 MG/DL (ref 200–375)
URN SPEC COLLECT METH UR: ABNORMAL
UROBILINOGEN UR STRIP-ACNC: NEGATIVE EU/DL
VIT B12 SERPL-MCNC: 335 PG/ML (ref 210–950)
WBC # BLD AUTO: 4.99 K/UL (ref 3.9–12.7)
WBC #/AREA URNS HPF: >100 /HPF (ref 0–5)
WBC CLUMPS URNS QL MICRO: ABNORMAL

## 2024-08-23 PROCEDURE — 86920 COMPATIBILITY TEST SPIN: CPT | Performed by: EMERGENCY MEDICINE

## 2024-08-23 PROCEDURE — 85730 THROMBOPLASTIN TIME PARTIAL: CPT | Performed by: EMERGENCY MEDICINE

## 2024-08-23 PROCEDURE — 99291 CRITICAL CARE FIRST HOUR: CPT

## 2024-08-23 PROCEDURE — 93010 ELECTROCARDIOGRAM REPORT: CPT | Mod: ,,, | Performed by: INTERNAL MEDICINE

## 2024-08-23 PROCEDURE — 96375 TX/PRO/DX INJ NEW DRUG ADDON: CPT

## 2024-08-23 PROCEDURE — 81000 URINALYSIS NONAUTO W/SCOPE: CPT | Performed by: EMERGENCY MEDICINE

## 2024-08-23 PROCEDURE — 36430 TRANSFUSION BLD/BLD COMPNT: CPT

## 2024-08-23 PROCEDURE — 86900 BLOOD TYPING SEROLOGIC ABO: CPT | Performed by: EMERGENCY MEDICINE

## 2024-08-23 PROCEDURE — 87086 URINE CULTURE/COLONY COUNT: CPT | Performed by: EMERGENCY MEDICINE

## 2024-08-23 PROCEDURE — 25000003 PHARM REV CODE 250: Performed by: FAMILY MEDICINE

## 2024-08-23 PROCEDURE — 25000003 PHARM REV CODE 250: Performed by: NURSE PRACTITIONER

## 2024-08-23 PROCEDURE — G0378 HOSPITAL OBSERVATION PER HR: HCPCS

## 2024-08-23 PROCEDURE — P9016 RBC LEUKOCYTES REDUCED: HCPCS | Performed by: EMERGENCY MEDICINE

## 2024-08-23 PROCEDURE — 86850 RBC ANTIBODY SCREEN: CPT | Performed by: EMERGENCY MEDICINE

## 2024-08-23 PROCEDURE — 85610 PROTHROMBIN TIME: CPT | Performed by: EMERGENCY MEDICINE

## 2024-08-23 PROCEDURE — 93005 ELECTROCARDIOGRAM TRACING: CPT

## 2024-08-23 PROCEDURE — 83540 ASSAY OF IRON: CPT | Performed by: EMERGENCY MEDICINE

## 2024-08-23 PROCEDURE — 63600175 PHARM REV CODE 636 W HCPCS: Performed by: NURSE PRACTITIONER

## 2024-08-23 PROCEDURE — 63600175 PHARM REV CODE 636 W HCPCS: Performed by: FAMILY MEDICINE

## 2024-08-23 PROCEDURE — 82728 ASSAY OF FERRITIN: CPT | Performed by: EMERGENCY MEDICINE

## 2024-08-23 PROCEDURE — 87088 URINE BACTERIA CULTURE: CPT | Performed by: EMERGENCY MEDICINE

## 2024-08-23 PROCEDURE — 96365 THER/PROPH/DIAG IV INF INIT: CPT

## 2024-08-23 PROCEDURE — 85025 COMPLETE CBC W/AUTO DIFF WBC: CPT | Performed by: EMERGENCY MEDICINE

## 2024-08-23 PROCEDURE — 82607 VITAMIN B-12: CPT | Performed by: EMERGENCY MEDICINE

## 2024-08-23 PROCEDURE — 82272 OCCULT BLD FECES 1-3 TESTS: CPT | Performed by: EMERGENCY MEDICINE

## 2024-08-23 PROCEDURE — 87186 SC STD MICRODIL/AGAR DIL: CPT | Performed by: EMERGENCY MEDICINE

## 2024-08-23 PROCEDURE — 82746 ASSAY OF FOLIC ACID SERUM: CPT | Performed by: EMERGENCY MEDICINE

## 2024-08-23 PROCEDURE — 80053 COMPREHEN METABOLIC PANEL: CPT | Performed by: EMERGENCY MEDICINE

## 2024-08-23 RX ORDER — HYDRALAZINE HYDROCHLORIDE 50 MG/1
50 TABLET, FILM COATED ORAL EVERY 8 HOURS
Status: DISCONTINUED | OUTPATIENT
Start: 2024-08-23 | End: 2024-08-24 | Stop reason: HOSPADM

## 2024-08-23 RX ORDER — DONEPEZIL HYDROCHLORIDE 5 MG/1
5 TABLET, FILM COATED ORAL NIGHTLY
Status: DISCONTINUED | OUTPATIENT
Start: 2024-08-23 | End: 2024-08-24 | Stop reason: HOSPADM

## 2024-08-23 RX ORDER — HYDRALAZINE HYDROCHLORIDE 50 MG/1
50 TABLET, FILM COATED ORAL EVERY 8 HOURS
Status: DISCONTINUED | OUTPATIENT
Start: 2024-08-23 | End: 2024-08-23

## 2024-08-23 RX ORDER — HYDROCODONE BITARTRATE AND ACETAMINOPHEN 500; 5 MG/1; MG/1
TABLET ORAL
Status: DISCONTINUED | OUTPATIENT
Start: 2024-08-23 | End: 2024-08-24 | Stop reason: HOSPADM

## 2024-08-23 RX ORDER — PANTOPRAZOLE SODIUM 40 MG/10ML
40 INJECTION, POWDER, LYOPHILIZED, FOR SOLUTION INTRAVENOUS 2 TIMES DAILY
Status: DISCONTINUED | OUTPATIENT
Start: 2024-08-23 | End: 2024-08-24 | Stop reason: HOSPADM

## 2024-08-23 RX ORDER — CARVEDILOL 6.25 MG/1
6.25 TABLET ORAL 2 TIMES DAILY WITH MEALS
Status: DISCONTINUED | OUTPATIENT
Start: 2024-08-23 | End: 2024-08-24 | Stop reason: HOSPADM

## 2024-08-23 RX ORDER — FUROSEMIDE 10 MG/ML
40 INJECTION INTRAMUSCULAR; INTRAVENOUS ONCE
Status: COMPLETED | OUTPATIENT
Start: 2024-08-23 | End: 2024-08-23

## 2024-08-23 RX ORDER — AMIODARONE HYDROCHLORIDE 200 MG/1
200 TABLET ORAL DAILY
Status: DISCONTINUED | OUTPATIENT
Start: 2024-08-23 | End: 2024-08-24 | Stop reason: HOSPADM

## 2024-08-23 RX ORDER — ATORVASTATIN CALCIUM 10 MG/1
20 TABLET, FILM COATED ORAL NIGHTLY
Status: DISCONTINUED | OUTPATIENT
Start: 2024-08-23 | End: 2024-08-24 | Stop reason: HOSPADM

## 2024-08-23 RX ADMIN — HYDRALAZINE HYDROCHLORIDE 50 MG: 50 TABLET ORAL at 06:08

## 2024-08-23 RX ADMIN — DONEPEZIL HYDROCHLORIDE 5 MG: 5 TABLET, FILM COATED ORAL at 08:08

## 2024-08-23 RX ADMIN — PANTOPRAZOLE SODIUM 40 MG: 40 INJECTION, POWDER, FOR SOLUTION INTRAVENOUS at 09:08

## 2024-08-23 RX ADMIN — CEFTRIAXONE 2 G: 2 INJECTION, POWDER, FOR SOLUTION INTRAMUSCULAR; INTRAVENOUS at 08:08

## 2024-08-23 RX ADMIN — FUROSEMIDE 40 MG: 10 INJECTION, SOLUTION INTRAMUSCULAR; INTRAVENOUS at 05:08

## 2024-08-23 RX ADMIN — CARVEDILOL 6.25 MG: 6.25 TABLET, FILM COATED ORAL at 05:08

## 2024-08-23 RX ADMIN — ATORVASTATIN CALCIUM 20 MG: 10 TABLET, FILM COATED ORAL at 08:08

## 2024-08-23 NOTE — ED PROVIDER NOTES
"SCRIBE #1 NOTE: I, Bernice Cain, am scribing for, and in the presence of, Marilou Hay MD. I have scribed the entire note.       History     Chief Complaint   Patient presents with    Abnormal Lab     Patient was not feeling well. Went to PCP. Labs drawn and was told to come to ED because H&H was low (6.4/24) on 8/22. Patient c/o dizziness. Notably pale in triage.      Review of patient's allergies indicates:  No Known Allergies      History of Present Illness     HPI    8/23/2024, 12:09 PM  History obtained from the son and patient  Son at bedside      History of Present Illness: Jose Miguel Alvarez Jr. is a 83 y.o. male patient with a PMHx of HTN, hypercholesteremia, CKD, COPD, A-fib, anemia, anticoagulant long term use, bladder cancer who presents to the Emergency Department for evaluation of an abnormal lab result which was taken yesterday. The pt's son states that the pt has not been feeling well and visited his PCP yesterday (Dr. Valladares). The pt states that he has been experiencing SOB, fatigue, and CP. He states that "it felt like his chest on fire." The pt also complains of BLE swelling. Symptoms are constant and moderate in severity. Patient denies any fever, chills, n/v/d, abdominal pain, headaches, cough, congestion, palpitations, dizziness, and all other sxs at this time. Pt's son states that the pt is compliant with his medications and does take xarelto and lasix. No further complaints or concerns at this time.       Arrival mode: Personal vehicle     PCP: No, Primary Doctor        Past Medical History:  Past Medical History:   Diagnosis Date    Anemia     Anticoagulant long-term use     Atrial fibrillation     Bladder cancer     Bladder tumor     CKD (chronic kidney disease), stage III     COPD (chronic obstructive pulmonary disease)     pt denies    Encounter for blood transfusion     Hematuria     History of 2019 novel coronavirus disease (COVID-19)     Hypercholesteremia     " Hypertension        Past Surgical History:  Past Surgical History:   Procedure Laterality Date    bladder tumor removal      CARDIAC ELECTROPHYSIOLOGY MAPPING AND ABLATION      CARDIOVERSION      several    CATARACT EXTRACTION Bilateral     cataract removal with IOL implants Bilateral     CHOLECYSTECTOMY      CYSTOSCOPIC LITHOLAPAXY N/A 12/28/2021    Procedure: CYSTOLITHOLAPAXY;  Surgeon: Medardo Garcia MD;  Location: [facility];  Service: Urology;  Laterality: N/A;    CYSTOSCOPIC LITHOLAPAXY N/A 5/14/2024    Procedure: CYSTOLITHOLAPAXY;  Surgeon: Medardo Garcia MD;  Location: [facility];  Service: Urology;  Laterality: N/A;    CYSTOSCOPY      CYSTOSCOPY WITH BIOPSY OF BLADDER Right 12/28/2021    Procedure: CYSTOSCOPY, WITH BLADDER BIOPSY, WITH FULGURATION IF INDICATED;  Surgeon: Medardo Garcia MD;  Location: [facility];  Service: Urology;  Laterality: Right;    ESOPHAGOGASTRODUODENOSCOPY N/A 10/25/2021    Procedure: Small Jason Enteroscopy (SBE);  Surgeon: Isabelle Griffin MD;  Location: [facility];  Service: Endoscopy;  Laterality: N/A;    ESOPHAGOGASTRODUODENOSCOPY N/A 12/13/2021    Procedure: EGD (ESOPHAGOGASTRODUODENOSCOPY);  Surgeon: Troy Polanco MD;  Location: [facility];  Service: Endoscopy;  Laterality: N/A;    ESOPHAGOGASTRODUODENOSCOPY N/A 4/5/2024    Procedure: EGD (ESOPHAGOGASTRODUODENOSCOPY);  Surgeon: Myrtle Salcedo MD;  Location: [facility];  Service: Endoscopy;  Laterality: N/A;    ESOPHAGOGASTRODUODENOSCOPY N/A 4/22/2024    Procedure: EGD (ESOPHAGOGASTRODUODENOSCOPY);  Surgeon: Myrtle Salcedo MD;  Location: [facility];  Service: Endoscopy;  Laterality: N/A;  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 ENDO;  Service: Endoscopy;  Laterality: N/A;    INTRALUMINAL  GASTROINTESTINAL TRACT IMAGING VIA CAPSULE N/A 2024    Procedure: IMAGING PROCEDURE, GI TRACT, INTRALUMINAL, VIA CAPSULE;  Surgeon: Larry Jones RN;  Location: [facility] ENDO;  Service: Endoscopy;  Laterality: N/A;    TRANSURETHRAL RESECTION OF BLADDER TUMOR BY BIPOLAR CAUTERY N/A 2019    Procedure: TURBT, USING BIPOLAR CAUTERY;  Surgeon: Ritesh Marques MD;  Location: [facility] OR;  Service: Urology;  Laterality: N/A;    TRANSURETHRAL RESECTION OF PROSTATE N/A 2024    Procedure: TURP (TRANSURETHRAL RESECTION OF PROSTATE);  Surgeon: Medardo Garcia MD;  Location: [facility] OR;  Service: Urology;  Laterality: N/A;         Family History:  Family History   Problem Relation Name Age of Onset    Heart disease Father      Hypertension Father      Heart disease Brother         Social History:  Social History     Tobacco Use    Smoking status: Former     Current packs/day: 0.00     Average packs/day: 2.5 packs/day for 20.0 years (50.0 ttl pk-yrs)     Types: Cigarettes     Start date:      Quit date:      Years since quittin.6    Smokeless tobacco: Never   Substance and Sexual Activity    Alcohol use: Never     Alcohol/week: 7.0 standard drinks of alcohol     Types: 7 Cans of beer per week     Comment: HOLD 72HRS    Drug use: No    Sexual activity: Not Currently        Review of Systems     Review of Systems   Constitutional:  Positive for fatigue. Negative for chills and fever.   HENT:  Negative for congestion and sore throat.    Respiratory:  Positive for shortness of breath. Negative for cough.    Cardiovascular:  Positive for chest pain. Negative for palpitations.   Gastrointestinal:  Negative for abdominal pain, diarrhea, nausea and vomiting.   Genitourinary:  Negative for dysuria.   Musculoskeletal:  Negative for back pain.   Skin:  Negative for rash.   Neurological:  Negative for dizziness, weakness and headaches.   Hematological:  Does not bruise/bleed easily.   All other systems reviewed and  are negative.     Physical Exam     Initial Vitals [08/23/24 1051]   BP Pulse Resp Temp SpO2   (!) 92/55 (!) 52 18 97.9 °F (36.6 °C) 96 %      MAP       --          Physical Exam  Nursing Notes and Vital Signs Reviewed.  Constitutional: Patient is in no acute distress. Well-developed and well-nourished.  Head: Atraumatic. Normocephalic.  Eyes: PERRL. EOM intact. Conjunctivae are not pale. No scleral icterus.  ENT: Mucous membranes are moist. Oropharynx is clear and symmetric.    Neck: Supple. Full ROM. No lymphadenopathy.  Cardiovascular: Regular rate. Regular rhythm. No murmurs, rubs, or gallops. Distal pulses are 2+ and symmetric.  Pulmonary/Chest: No respiratory distress. Clear to auscultation bilaterally. No wheezing or rales.  Abdominal: Soft and non-distended.  There is no tenderness.  No rebound, guarding, or rigidity. Good bowel sounds.  Genitourinary: Rectal exam is normal. Stool has a normal brown appearance. There is no melena or bright red blood.  Musculoskeletal: Moves all extremities. No obvious deformities. +2 edema to BLE.  Skin: A little pale.  Neurological:  Alert, awake, and appropriate.  Normal speech.  No acute focal neurological deficits are appreciated.  Psychiatric: Normal affect. Good eye contact. Appropriate in content.     ED Course   Critical Care    Date/Time: 8/23/2024 1:00 PM    Performed by: Marilou Hay MD  Authorized by: Jannet Doherty MD  Direct patient critical care time: 35 minutes  Additional history critical care time: 8 minutes  Ordering / reviewing critical care time: 8 minutes  Documentation critical care time: 6 minutes  Consulting other physicians critical care time: 5 minutes  Total critical care time (exclusive of procedural time) : 62 minutes  Critical care was necessary to treat or prevent imminent or life-threatening deterioration of the following conditions: circulatory failure, cardiac failure, respiratory failure and dehydration.  Critical care was time spent  personally by me on the following activities: blood draw for specimens, development of treatment plan with patient or surrogate, discussions with consultants, discussions with primary provider, ordering and performing treatments and interventions, ordering and review of laboratory studies, ordering and review of radiographic studies, obtaining history from patient or surrogate, examination of patient, evaluation of patient's response to treatment, interpretation of cardiac output measurements, re-evaluation of patient's condition, review of old charts and pulse oximetry.        ED Vital Signs:  Vitals:    08/23/24 1520 08/23/24 1605 08/23/24 1630 08/23/24 1720   BP: (!) 160/65 (!) 160/72 (!) 167/79 (!) 184/72   Pulse:  (!) 51 (!) 50 (!) 52   Resp: 20 14 19 18   Temp: 97.6 °F (36.4 °C) 98 °F (36.7 °C)  98.4 °F (36.9 °C)   TempSrc: Oral Oral  Oral   SpO2:  97% 98% 100%   Weight:       Height:        08/23/24 1730 08/23/24 1745 08/23/24 1815 08/23/24 2000   BP: (!) 180/77 (!) 178/78 (!) 180/80    Pulse: (!) 51 (!) 52 (!) 56 60   Resp: 19 20 20    Temp: 98.4 °F (36.9 °C) 98.2 °F (36.8 °C) 96.6 °F (35.9 °C)    TempSrc:  Oral Oral    SpO2: 100% 100% 95%    Weight:       Height:        08/23/24 2024 08/24/24 0015 08/24/24 0400 08/24/24 0547   BP: (!) 157/68 (!) 183/74  (!) 124/58   Pulse: (!) 56 86 76 82   Resp: 20 20  20   Temp: 98 °F (36.7 °C) 97.8 °F (36.6 °C)  100.1 °F (37.8 °C)   TempSrc: Oral Axillary  Axillary   SpO2: (!) 94% (!) 91%  (!) 93%   Weight: 98.7 kg (217 lb 9.5 oz)      Height:        08/24/24 0803 08/24/24 1100 08/24/24 1434   BP: (!) 127/58  (!) 112/55   Pulse: 81  73   Resp: 18  18   Temp: 100.3 °F (37.9 °C)  98.6 °F (37 °C)   TempSrc: Oral  Oral   SpO2: (!) 88% (!) 92% (!) 90%   Weight:      Height:          Abnormal Lab Results:  Labs Reviewed   CBC W/ AUTO DIFFERENTIAL - Abnormal       Result Value    WBC 4.99      RBC 3.33 (*)     Hemoglobin 6.6 (*)     Hematocrit 23.9 (*)     MCV 72 (*)     MCH  19.8 (*)     MCHC 27.6 (*)     RDW 17.2 (*)     Platelets 128 (*)     MPV 12.0      Immature Granulocytes 0.4      Gran # (ANC) 3.3      Immature Grans (Abs) 0.02      Lymph # 0.8 (*)     Mono # 0.8      Eos # 0.0      Baso # 0.04      nRBC 0      Gran % 66.6      Lymph % 15.6 (*)     Mono % 16.0 (*)     Eosinophil % 0.6      Basophil % 0.8      Differential Method Automated     COMPREHENSIVE METABOLIC PANEL - Abnormal    Sodium 141      Potassium 3.9      Chloride 106      CO2 24      Glucose 117 (*)     BUN 24 (*)     Creatinine 1.4      Calcium 9.2      Total Protein 6.6      Albumin 3.6      Total Bilirubin 0.7      Alkaline Phosphatase 62      AST 25      ALT 23      eGFR 50 (*)     Anion Gap 11     URINALYSIS, REFLEX TO URINE CULTURE - Abnormal    Specimen UA Urine, Clean Catch      Color, UA Yellow      Appearance, UA Hazy (*)     pH, UA 7.0      Specific Gravity, UA 1.015      Protein, UA 1+ (*)     Glucose, UA Negative      Ketones, UA Negative      Bilirubin (UA) Negative      Occult Blood UA 2+ (*)     Nitrite, UA Positive (*)     Urobilinogen, UA Negative      Leukocytes, UA 3+ (*)     Narrative:     Specimen Source->Urine   APTT - Abnormal    aPTT 33.0 (*)    PROTIME-INR - Abnormal    Prothrombin Time 14.3 (*)     INR 1.3 (*)    OCCULT BLOOD X 1, STOOL - Abnormal    Occult Blood Positive (*)    FERRITIN - Abnormal    Ferritin 6 (*)    URINALYSIS MICROSCOPIC - Abnormal    RBC, UA >100 (*)     WBC, UA >100 (*)     WBC Clumps, UA Many (*)     Bacteria Occasional      Hyaline Casts, UA 0      Microscopic Comment SEE COMMENT      Narrative:     Specimen Source->Urine   TYPE & SCREEN    Group & Rh O NEG      Indirect Marciano NEG      Specimen Outdate 08/26/2024 23:59     PREPARE RBC SOFT    UNIT NUMBER [id number]      Product Code D6844H59      DISPENSE STATUS TRANSFUSED      CODING SYSTEM [id number]      Unit Blood Type Code 9500      Unit Blood Type O NEG      Unit Expiration [id number]      CROSSMATCH  INTERPRETATION Compatible      UNIT NUMBER [id number]      Product Code X4724C44      DISPENSE STATUS TRANSFUSED      CODING SYSTEM [id number]      Unit Blood Type Code 9500      Unit Blood Type O NEG      Unit Expiration [id number]      CROSSMATCH INTERPRETATION Compatible          All Lab Results:  Results for orders placed or performed during the hospital encounter of 08/23/24   Urine culture    Specimen: Urine   Result Value Ref Range    Urine Culture, Routine (A)      GRAM NEGATIVE LUCY  10,000 - 49,999 cfu/ml  Identification and susceptibility pending     CBC auto differential   Result Value Ref Range    WBC 4.99 3.90 - 12.70 K/uL    RBC 3.33 (L) 4.60 - 6.20 M/uL    Hemoglobin 6.6 (L) 14.0 - 18.0 g/dL    Hematocrit 23.9 (L) 40.0 - 54.0 %    MCV 72 (L) 82 - 98 fL    MCH 19.8 (L) 27.0 - 31.0 pg    MCHC 27.6 (L) 32.0 - 36.0 g/dL    RDW 17.2 (H) 11.5 - 14.5 %    Platelets 128 (L) 150 - 450 K/uL    MPV 12.0 9.2 - 12.9 fL    Immature Granulocytes 0.4 0.0 - 0.5 %    Gran # (ANC) 3.3 1.8 - 7.7 K/uL    Immature Grans (Abs) 0.02 0.00 - 0.04 K/uL    Lymph # 0.8 (L) 1.0 - 4.8 K/uL    Mono # 0.8 0.3 - 1.0 K/uL    Eos # 0.0 0.0 - 0.5 K/uL    Baso # 0.04 0.00 - 0.20 K/uL    nRBC 0 0 /100 WBC    Gran % 66.6 38.0 - 73.0 %    Lymph % 15.6 (L) 18.0 - 48.0 %    Mono % 16.0 (H) 4.0 - 15.0 %    Eosinophil % 0.6 0.0 - 8.0 %    Basophil % 0.8 0.0 - 1.9 %    Differential Method Automated    Comprehensive metabolic panel   Result Value Ref Range    Sodium 141 136 - 145 mmol/L    Potassium 3.9 3.5 - 5.1 mmol/L    Chloride 106 95 - 110 mmol/L    CO2 24 23 - 29 mmol/L    Glucose 117 (H) 70 - 110 mg/dL    BUN 24 (H) 8 - 23 mg/dL    Creatinine 1.4 0.5 - 1.4 mg/dL    Calcium 9.2 8.7 - 10.5 mg/dL    Total Protein 6.6 6.0 - 8.4 g/dL    Albumin 3.6 3.5 - 5.2 g/dL    Total Bilirubin 0.7 0.1 - 1.0 mg/dL    Alkaline Phosphatase 62 55 - 135 U/L    AST 25 10 - 40 U/L    ALT 23 10 - 44 U/L    eGFR 50 (A) >60 mL/min/1.73 m^2    Anion Gap 11 8 - 16  mmol/L   Urinalysis, Reflex to Urine Culture Urine, Clean Catch    Specimen: Urine   Result Value Ref Range    Specimen UA Urine, Clean Catch     Color, UA Yellow Yellow, Straw, Kirsten    Appearance, UA Hazy (A) Clear    pH, UA 7.0 5.0 - 8.0    Specific Gravity, UA 1.015 1.005 - 1.030    Protein, UA 1+ (A) Negative    Glucose, UA Negative Negative    Ketones, UA Negative Negative    Bilirubin (UA) Negative Negative    Occult Blood UA 2+ (A) Negative    Nitrite, UA Positive (A) Negative    Urobilinogen, UA Negative <2.0 EU/dL    Leukocytes, UA 3+ (A) Negative   APTT   Result Value Ref Range    aPTT 33.0 (H) 21.0 - 32.0 sec   Protime-INR   Result Value Ref Range    Prothrombin Time 14.3 (H) 9.0 - 12.5 sec    INR 1.3 (H) 0.8 - 1.2   Occult blood x 1, stool    Specimen: Stool   Result Value Ref Range    Occult Blood Positive (A) Negative   Iron and TIBC   Result Value Ref Range    Iron 14 (L) 45 - 160 ug/dL    Transferrin 316 200 - 375 mg/dL    TIBC 468 (H) 250 - 450 ug/dL    Saturated Iron 3 (L) 20 - 50 %   Ferritin   Result Value Ref Range    Ferritin 6 (L) 20.0 - 300.0 ng/mL   Vitamin B12   Result Value Ref Range    Vitamin B-12 335 210 - 950 pg/mL   Folate   Result Value Ref Range    Folate 18.4 4.0 - 24.0 ng/mL   Urinalysis Microscopic   Result Value Ref Range    RBC, UA >100 (H) 0 - 4 /hpf    WBC, UA >100 (H) 0 - 5 /hpf    WBC Clumps, UA Many (A) None-Rare    Bacteria Occasional None-Occ /hpf    Hyaline Casts, UA 0 0-1/lpf /lpf    Microscopic Comment SEE COMMENT    CBC auto differential   Result Value Ref Range    WBC 13.09 (H) 3.90 - 12.70 K/uL    RBC 3.91 (L) 4.60 - 6.20 M/uL    Hemoglobin 8.2 (L) 14.0 - 18.0 g/dL    Hematocrit 28.3 (L) 40.0 - 54.0 %    MCV 72 (L) 82 - 98 fL    MCH 21.0 (L) 27.0 - 31.0 pg    MCHC 29.0 (L) 32.0 - 36.0 g/dL    RDW 17.7 (H) 11.5 - 14.5 %    Platelets 108 (L) 150 - 450 K/uL    MPV SEE COMMENT 9.2 - 12.9 fL    Immature Granulocytes 0.5 0.0 - 0.5 %    Gran # (ANC) 11.8 (H) 1.8 - 7.7  K/uL    Immature Grans (Abs) 0.07 (H) 0.00 - 0.04 K/uL    Lymph # 0.2 (L) 1.0 - 4.8 K/uL    Mono # 1.0 0.3 - 1.0 K/uL    Eos # 0.1 0.0 - 0.5 K/uL    Baso # 0.01 0.00 - 0.20 K/uL    nRBC 0 0 /100 WBC    Gran % 90.0 (H) 38.0 - 73.0 %    Lymph % 1.3 (L) 18.0 - 48.0 %    Mono % 7.6 4.0 - 15.0 %    Eosinophil % 0.5 0.0 - 8.0 %    Basophil % 0.1 0.0 - 1.9 %    Differential Method Automated    CBC auto differential   Result Value Ref Range    WBC 14.56 (H) 3.90 - 12.70 K/uL    RBC 4.01 (L) 4.60 - 6.20 M/uL    Hemoglobin 8.5 (L) 14.0 - 18.0 g/dL    Hematocrit 29.0 (L) 40.0 - 54.0 %    MCV 72 (L) 82 - 98 fL    MCH 21.2 (L) 27.0 - 31.0 pg    MCHC 29.3 (L) 32.0 - 36.0 g/dL    RDW 17.8 (H) 11.5 - 14.5 %    Platelets 138 (L) 150 - 450 K/uL    MPV 12.0 9.2 - 12.9 fL    Immature Granulocytes 0.7 (H) 0.0 - 0.5 %    Gran # (ANC) 13.1 (H) 1.8 - 7.7 K/uL    Immature Grans (Abs) 0.10 (H) 0.00 - 0.04 K/uL    Lymph # 0.2 (L) 1.0 - 4.8 K/uL    Mono # 1.1 (H) 0.3 - 1.0 K/uL    Eos # 0.1 0.0 - 0.5 K/uL    Baso # 0.01 0.00 - 0.20 K/uL    nRBC 0 0 /100 WBC    Gran % 89.9 (H) 38.0 - 73.0 %    Lymph % 1.1 (L) 18.0 - 48.0 %    Mono % 7.7 4.0 - 15.0 %    Eosinophil % 0.5 0.0 - 8.0 %    Basophil % 0.1 0.0 - 1.9 %    Differential Method Automated    CBC auto differential   Result Value Ref Range    WBC 15.05 (H) 3.90 - 12.70 K/uL    RBC 3.95 (L) 4.60 - 6.20 M/uL    Hemoglobin 8.4 (L) 14.0 - 18.0 g/dL    Hematocrit 28.7 (L) 40.0 - 54.0 %    MCV 73 (L) 82 - 98 fL    MCH 21.3 (L) 27.0 - 31.0 pg    MCHC 29.3 (L) 32.0 - 36.0 g/dL    RDW 18.0 (H) 11.5 - 14.5 %    Platelets 148 (L) 150 - 450 K/uL    MPV 11.2 9.2 - 12.9 fL    Immature Granulocytes 0.5 0.0 - 0.5 %    Gran # (ANC) 13.3 (H) 1.8 - 7.7 K/uL    Immature Grans (Abs) 0.07 (H) 0.00 - 0.04 K/uL    Lymph # 0.3 (L) 1.0 - 4.8 K/uL    Mono # 1.3 (H) 0.3 - 1.0 K/uL    Eos # 0.1 0.0 - 0.5 K/uL    Baso # 0.01 0.00 - 0.20 K/uL    nRBC 0 0 /100 WBC    Gran % 88.4 (H) 38.0 - 73.0 %    Lymph % 1.7 (L) 18.0 -  48.0 %    Mono % 8.6 4.0 - 15.0 %    Eosinophil % 0.7 0.0 - 8.0 %    Basophil % 0.1 0.0 - 1.9 %    Differential Method Automated    EKG 12-lead   Result Value Ref Range    QRS Duration 80 ms    [facility] QTC Calculation 474 ms   Type & Screen   Result Value Ref Range    Group & Rh O NEG     Indirect Marciano NEG     Specimen Outdate 08/26/2024 23:59    Prepare RBC 2 Units; active bleeding, hgb less than 7   Result Value Ref Range    UNIT NUMBER [id number]     Product Code L0239W78     DISPENSE STATUS TRANSFUSED     CODING SYSTEM PEXQ872     Unit Blood Type Code 9500     Unit Blood Type O NEG     Unit Expiration [id number]     CROSSMATCH INTERPRETATION Compatible     UNIT NUMBER [id number]     Product Code A0679D19     DISPENSE STATUS TRANSFUSED     CODING SYSTEM ICGA354     Unit Blood Type Code 9500     Unit Blood Type O NEG     Unit Expiration [id number]     CROSSMATCH INTERPRETATION Compatible         The EKG was ordered, reviewed, and independently interpreted by the ED provider.  Interpretation time: 11:34  Rate: 64 BPM  Rhythm: atrial fibrillation  Interpretation: Cannot rule out Anterior infarct, age undetermined. No STEMI.           The Emergency Provider reviewed the vital signs and test results, which are outlined above.     ED Discussion     12:56 PM: Discussed case with Christina De Leon NP (Hospital Medicine). Dr. Doherty agrees with current care and management of pt and accepts admission.   Admitting Service: Hospital Medicine  Admitting Physician: Dr. Doherty  Admit to: Obs     12:59 PM: Re-evaluated pt. I have discussed test results, shared treatment plan, and the need for admission with patient and family at bedside. Pt and family express understanding at this time and agree with all information. All questions answered. Pt and family have no further questions or concerns at this time. Pt is ready for admit.        Medical Decision Making  DDX: 1. Anticoagulation side effect 2. Symptomatic anemia 3.  CHF    ECG reviewed and chronic afib noted, h/h reviewed and lower than baseline, stool heme positive, no gross blood on exam, blood transfusion initiated, lasix given for edema, hospital med to admit.     Amount and/or Complexity of Data Reviewed  External Data Reviewed: labs, ECG and notes.  Labs: ordered. Decision-making details documented in ED Course.  ECG/medicine tests: ordered and independent interpretation performed. Decision-making details documented in ED Course.    Risk  Prescription drug management.  Decision regarding hospitalization.  Diagnosis or treatment significantly limited by social determinants of health.                ED Medication(s):  Medications   furosemide injection 40 mg (40 mg Intravenous Given by Other 8/23/24 1730)       Discharge Medication List as of 8/24/2024  4:17 PM        START taking these medications    Details   cefdinir (OMNICEF) 300 MG capsule Take 1 capsule (300 mg total) by mouth 2 (two) times daily. for 10 days, Starting Sat 8/24/2024, Until Tue 9/3/2024, Normal      ferrous sulfate (IRON) 325 mg (65 mg iron) Tab tablet Take 1 tablet (325 mg total) by mouth every other day., Starting Mon 9/2/2024, Normal              Follow-up Information       Your Primary Care Provider. Schedule an appointment as soon as possible for a visit in 3 day(s).    Why: hospital follow up             Carlos Flower MD. Go in 1 week(s).    Specialties: Cardiology, Cardiovascular Disease  Why: hospital follow up  Contact information:  [address]  [place] [address]  [phone]               Eduardo Ruff MD. Go in 1 week(s).    Specialty: Gastroenterology  Why: hospital follow up  Contact information:  [address] [place] [id number] [phone]                                 Scribe Attestation:   Scribe #1: I performed the above scribed service and the documentation accurately describes the services I performed. I attest to the accuracy of the note.      Attending:   Physician Attestation Statement for Scribe #1: I, Marilou Hay MD, personally performed the services described in this documentation, as scribed by Bernice Cain, in my presence, and it is both accurate and complete.           Clinical Impression       ICD-10-CM ICD-9-CM   1. Acute on chronic blood loss anemia  D62 285.1   2. Weakness  R53.1 780.79   3. Chronic anticoagulation  Z79.01 V58.61   4. Heme positive stool  R19.5 792.1   5. Chronic atrial fibrillation  I48.20 427.31   6. Bilateral lower extremity edema  R60.0 782.3   7. Leukocytosis, unspecified type  D72.829 288.60   8. Acute cystitis with hematuria  N30.01 595.0       Disposition:   Disposition: Placed in Observation  Condition: Stable        Marilou Hay MD  08/25/24 0919

## 2024-08-23 NOTE — ASSESSMENT & PLAN NOTE
Patient is identified as having Diastolic (HFpEF) heart failure that is Acute. CHF is currently uncontrolled due to Dyspnea not returned to baseline after 0 doses of IV diuretic and Pulmonary edema/pleural effusion on CXR. Latest ECHO performed and demonstrates- Results for orders placed during the hospital encounter of 04/01/24    Echo Saline Bubble? No    Interpretation Summary    Left Ventricle: The left ventricle is normal in size. Mildly increased wall thickness. There is concentric remodeling. There is normal systolic function with a visually estimated ejection fraction of 60 - 65%. Unable to assess diastolic function due to atrial fibrillation.    Right Ventricle: Normal right ventricular cavity size. Wall thickness is normal. Right ventricle wall motion  is normal. Systolic function is normal.    Left Atrium: Left atrium is moderately dilated.    Tricuspid Valve: There is mild regurgitation.    IVC/SVC: Intermediate venous pressure at 8 mmHg.  . Continue Beta Blocker and monitor clinical status closely. Monitor on telemetry. Patient is on CHF pathway.  Monitor strict Is&Os and daily weights.  Place on fluid restriction of 1.5 L. Cardiology has not been consulted. Continue to stress to patient importance of self efficacy and  on diet for CHF. Last BNP reviewed- and noted below   Recent Labs   Lab 08/22/24  0937   BNP 64

## 2024-08-23 NOTE — PHARMACY MED REC
"Admission Medication History     The home medication history was taken by Tiera Pittman.    You may go to "Admission" then "Reconcile Home Medications" tabs to review and/or act upon these items.     The home medication list has been updated by the Pharmacy department.   Please read ALL comments highlighted in yellow.   Please address this information as you see fit.    Feel free to contact us if you have any questions or require assistance.        Medications listed below were obtained from: Patient/family and Analytic software- [employer]      [facility] REC COMPLETED:     Tiera Pittman  [id number]    Current Outpatient Medications on File Prior to Encounter   Medication Sig Dispense Refill Last Dose    amiodarone (PACERONE) 200 MG Tab Take 1 tablet (200 mg total) by mouth once daily. 30 tablet 11 8/23/2024    atorvastatin (LIPITOR) 20 MG tablet Take 1 tablet (20 mg total) by mouth every evening. 90 tablet 0 8/22/2024    carvediloL (COREG) 6.25 MG tablet Take 1 tablet (6.25 mg total) by mouth 2 (two) times daily with meals. 180 tablet 3 8/23/2024    cholecalciferol, vitamin D3, (VITAMIN D3 ORAL) Take 1 tablet by mouth every other day.   8/23/2024    donepeziL (ARICEPT) 5 MG tablet TAKE 1 TABLET EVERY EVENING 90 tablet 3 8/22/2024    furosemide (LASIX) 40 MG tablet Take 1 tablet (40 mg total) by mouth 2 (two) times daily. 6 tablet 0 8/22/2024    hydrALAZINE (APRESOLINE) 50 MG tablet Take 1 tablet (50 mg total) by mouth every 8 (eight) hours. 270 tablet 2 8/23/2024    multivitamin (THERAGRAN) per tablet Take 1 tablet by mouth once daily.   8/23/2024    pantoprazole (PROTONIX) 20 MG tablet Take 1 tablet (20 mg total) by mouth once daily. 90 tablet 3 8/23/2024    XARELTO 15 mg Tab TAKE 1 TABLET DAILY WITH DINNER OR EVENING MEAL 90 tablet 3 8/22/2024                           .          "
walking/toileting/standing

## 2024-08-23 NOTE — SUBJECTIVE & OBJECTIVE
Past Medical History:   Diagnosis Date    Anemia     Anticoagulant long-term use     Atrial fibrillation     Bladder cancer     Bladder tumor     CKD (chronic kidney disease), stage III     COPD (chronic obstructive pulmonary disease)     pt denies    Encounter for blood transfusion     Hematuria     History of 2019 novel coronavirus disease (COVID-19)     Hypercholesteremia     Hypertension        Past Surgical History:   Procedure Laterality Date    bladder tumor removal      CARDIAC ELECTROPHYSIOLOGY MAPPING AND ABLATION      CARDIOVERSION      several    CATARACT EXTRACTION Bilateral     cataract removal with IOL implants Bilateral     CHOLECYSTECTOMY      CYSTOSCOPIC LITHOLAPAXY N/A 12/28/2021    Procedure: CYSTOLITHOLAPAXY;  Surgeon: Medardo Garcia MD;  Location: [facility];  Service: Urology;  Laterality: N/A;    CYSTOSCOPIC LITHOLAPAXY N/A 5/14/2024    Procedure: CYSTOLITHOLAPAXY;  Surgeon: Medardo Garcia MD;  Location: [facility];  Service: Urology;  Laterality: N/A;    CYSTOSCOPY      CYSTOSCOPY WITH BIOPSY OF BLADDER Right 12/28/2021    Procedure: CYSTOSCOPY, WITH BLADDER BIOPSY, WITH FULGURATION IF INDICATED;  Surgeon: Medardo Garcia MD;  Location: [facility];  Service: Urology;  Laterality: Right;    ESOPHAGOGASTRODUODENOSCOPY N/A 10/25/2021    Procedure: Small Jason Enteroscopy (SBE);  Surgeon: Isabelle Griffin MD;  Location: [facility];  Service: Endoscopy;  Laterality: N/A;    ESOPHAGOGASTRODUODENOSCOPY N/A 12/13/2021    Procedure: EGD (ESOPHAGOGASTRODUODENOSCOPY);  Surgeon: Troy Polanco MD;  Location: [facility];  Service: Endoscopy;  Laterality: N/A;    ESOPHAGOGASTRODUODENOSCOPY N/A 4/5/2024    Procedure: EGD (ESOPHAGOGASTRODUODENOSCOPY);  Surgeon: Myrtle Salcedo MD;  Location: [facility] ENDO;  Service: Endoscopy;  Laterality: N/A;    ESOPHAGOGASTRODUODENOSCOPY N/A 4/22/2024    Procedure: EGD (ESOPHAGOGASTRODUODENOSCOPY);  Surgeon: Myrtle Salcedo MD;  Location: [facility];   Service: Endoscopy;  Laterality: N/A;  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    EYE SURGERY      INTRALUMINAL GASTROINTESTINAL TRACT IMAGING VIA CAPSULE N/A 12/6/2021    Procedure: IMAGING PROCEDURE, GI TRACT, INTRALUMINAL, VIA CAPSULE;  Surgeon: Larry Jones RN;  Location: [facility] ENDO;  Service: Endoscopy;  Laterality: N/A;    INTRALUMINAL GASTROINTESTINAL TRACT IMAGING VIA CAPSULE N/A 4/18/2024    Procedure: IMAGING PROCEDURE, GI TRACT, INTRALUMINAL, VIA CAPSULE;  Surgeon: Larry Jones RN;  Location: [facility] ENDO;  Service: Endoscopy;  Laterality: N/A;    TRANSURETHRAL RESECTION OF BLADDER TUMOR BY BIPOLAR CAUTERY N/A 7/23/2019    Procedure: TURBT, USING BIPOLAR CAUTERY;  Surgeon: Ritesh Marques MD;  Location: [facility] OR;  Service: Urology;  Laterality: N/A;    TRANSURETHRAL RESECTION OF PROSTATE N/A 5/14/2024    Procedure: TURP (TRANSURETHRAL RESECTION OF PROSTATE);  Surgeon: Medardo Garcia MD;  Location: [facility] OR;  Service: Urology;  Laterality: N/A;       Review of patient's allergies indicates:  No Known Allergies    Current Facility-Administered Medications on File Prior to Encounter   Medication    lactated ringers infusion     Current Outpatient Medications on File Prior to Encounter   Medication Sig    amiodarone (PACERONE) 200 MG Tab Take 1 tablet (200 mg total) by mouth once daily.    atorvastatin (LIPITOR) 20 MG tablet Take 1 tablet (20 mg total) by mouth every evening.    carvediloL (COREG) 6.25 MG tablet Take 1 tablet (6.25 mg total) by mouth 2 (two) times daily with meals.    donepeziL (ARICEPT) 5 MG tablet TAKE 1 TABLET EVERY EVENING    hydrALAZINE (APRESOLINE) 50 MG tablet Take 1 tablet (50 mg total) by mouth every 8 (eight) hours.    pantoprazole (PROTONIX) 20 MG tablet Take 1 tablet (20 mg total) by mouth once daily.    XARELTO 15 mg Tab TAKE 1 TABLET DAILY WITH DINNER OR EVENING MEAL    cholecalciferol, vitamin D3, (VITAMIN D3 ORAL) Take 1 tablet by mouth every other day.     furosemide (LASIX) 40 MG tablet Take 1 tablet (40 mg total) by mouth 2 (two) times daily.    multivitamin (THERAGRAN) per tablet Take 1 tablet by mouth once daily.     Family History       Problem Relation (Age of Onset)    Heart disease Father, Brother    Hypertension Father          Tobacco Use    Smoking status: Former     Current packs/day: 0.00     Average packs/day: 2.5 packs/day for 20.0 years (50.0 ttl pk-yrs)     Types: Cigarettes     Start date:      Quit date:      Years since quittin.6    Smokeless tobacco: Never   Substance and Sexual Activity    Alcohol use: Never     Alcohol/week: 7.0 standard drinks of alcohol     Types: 7 Cans of beer per week     Comment: HOLD 72HRS    Drug use: No    Sexual activity: Not Currently     Review of Systems   Constitutional:  Positive for fatigue. Negative for chills.   Respiratory:  Positive for shortness of breath. Negative for cough.    Cardiovascular:  Positive for chest pain and leg swelling.   Gastrointestinal:  Positive for nausea. Negative for blood in stool and constipation.   Neurological:  Positive for dizziness and light-headedness.     Objective:     Vital Signs (Most Recent):  Temp: 97.4 °F (36.3 °C) (24 1430)  Pulse: (!) 52 (24 143)  Resp: 20 (24 143)  BP: (!) 166/98 (24 1430)  SpO2: 100 % (24 143) Vital Signs (24h Range):  Temp:  [97.4 °F (36.3 °C)-97.9 °F (36.6 °C)] 97.4 °F (36.3 °C)  Pulse:  [50-59] 52  Resp:  [16-21] 20  SpO2:  [95 %-100 %] 100 %  BP: ()/(55-98) 166/98     Weight: 103.1 kg (227 lb 3.2 oz)  Body mass index is 30.81 kg/m².     Physical Exam  Vitals and nursing note reviewed.   Constitutional:       Appearance: Normal appearance.   Eyes:      Comments: Pale conjunctivae     Cardiovascular:      Rate and Rhythm: Rhythm irregular.      Heart sounds: Murmur heard.   Pulmonary:      Effort: Pulmonary effort is normal.   Abdominal:      General: Abdomen is flat.   Musculoskeletal:       Right lower leg: Edema present.      Left lower leg: Edema present.   Skin:     Comments: Varicosities to BLE   Neurological:      Mental Status: He is alert and oriented to person, place, and time.                Significant Labs: All pertinent labs within the past 24 hours have been reviewed.  CBC:   Recent Labs   Lab 08/22/24  0937 08/23/24  1154   WBC 4.87 4.99   HGB 6.4* 6.6*   HCT 24.0* 23.9*   * 128*     CMP:   Recent Labs   Lab 08/22/24  0937 08/23/24  1154    141   K 4.7 3.9    106   CO2 25 24   GLU 91 117*   BUN 23 24*   CREATININE 1.1 1.4   CALCIUM 8.9 9.2   PROT 6.3 6.6   ALBUMIN 3.5 3.6   BILITOT 0.6 0.7   ALKPHOS 60 62   AST 22 25   ALT 21 23   ANIONGAP 6* 11       Significant Imaging: I have reviewed all pertinent imaging results/findings within the past 24 hours.

## 2024-08-23 NOTE — ASSESSMENT & PLAN NOTE
Patient has acute blood loss due to hemorrhage, the hemorrhage is due to gastrointestinal bleed, patient does have a propensity for bleeding due to a medication, the medication is OAC.. Will trend hemoglobin/hematocrit Daily, as well as monitor and correct for any coagulation defects. CBC and vital signs have been reviewed and last CBC was noted-   Lab Results   Component Value Date    WBC 4.99 08/23/2024    HGB 6.6 (L) 08/23/2024    HCT 23.9 (L) 08/23/2024    MCV 72 (L) 08/23/2024     (L) 08/23/2024         Will order a type and screen and consent patient for blood transfusion. Will transfuse if Hgb is <7g/dl (<8g/dl in cases of active ACS) or if patient has rapid bleeding leading to hemodynamic instability.Transfuse 2 units of PRBCs  Discussed with GI. Recommends outpatient follow up with [facility] team.

## 2024-08-23 NOTE — H&P
[facility] - Emergency Dept.  [facility]  History & Physical    Patient Name: Jose Miguel Alvarez Jr.  MRN: [id number]  Patient Class: OP- Observation  Admission Date: 8/23/2024  Attending Physician: Tracy Connelly NP  Primary Care Provider: Alex[facility] Primary Doctor         Patient information was obtained from patient and ER records.     Subjective:     Principal Problem:Gastrointestinal hemorrhage associated with angiodysplasia of stomach and duodenum    Chief Complaint:   Chief Complaint   Patient presents with    Abnormal Lab     Patient was not feeling well. Went to PCP. Labs drawn and was told to come to ED because H&H was low (6.4/24) on 8/22. Patient c/o dizziness. Notably pale in triage.         HPI: Jose Miguel Alvarez Jr. is a 83 y.o. male patient with HTN, hypercholesteremia, CKD, COPD, A-fib, anemia, anticoagulant long term use, bladder cancer who presents to the Emergency Department for evaluation of an abnormal lab with hgb 6.4 ordered by his PCP. He reports generalized weakness, fatigue, dyspnea,  dizziness with standing and peripheral edema that is progressively worsening over the last 2 weeks. He reports burning in his chest unrelieved with home PPI.   He was evaluated by his PCP 2 days ago and underwent blood work and was given lasix po for peripheral edema. Denies blood in stool. He reports compliance with Xarelto.     Upper GI in 4/2024 shows Multiple non-bleeding angioectasias in the stomach. Treated with argon plasma coagulation   SBE 4/2024 shows A single non-bleeding angioectasia in the duodenum. Treated with argon plasma coagulation     Past Medical History:   Diagnosis Date    Anemia     Anticoagulant long-term use     Atrial fibrillation     Bladder cancer     Bladder tumor     CKD (chronic kidney disease), stage III     COPD (chronic obstructive pulmonary disease)     pt denies    Encounter for blood transfusion     Hematuria     History of 2019 novel coronavirus disease (COVID-19)      Hypercholesteremia     Hypertension        Past Surgical History:   Procedure Laterality Date    bladder tumor removal      CARDIAC ELECTROPHYSIOLOGY MAPPING AND ABLATION      CARDIOVERSION      several    CATARACT EXTRACTION Bilateral     cataract removal with IOL implants Bilateral     CHOLECYSTECTOMY      CYSTOSCOPIC LITHOLAPAXY N/A 12/28/2021    Procedure: CYSTOLITHOLAPAXY;  Surgeon: Medardo Garcia MD;  Location: [facility];  Service: Urology;  Laterality: N/A;    CYSTOSCOPIC LITHOLAPAXY N/A 5/14/2024    Procedure: CYSTOLITHOLAPAXY;  Surgeon: Medardo Garcia MD;  Location: [facility];  Service: Urology;  Laterality: N/A;    CYSTOSCOPY      CYSTOSCOPY WITH BIOPSY OF BLADDER Right 12/28/2021    Procedure: CYSTOSCOPY, WITH BLADDER BIOPSY, WITH FULGURATION IF INDICATED;  Surgeon: Medardo Garcia MD;  Location: [facility];  Service: Urology;  Laterality: Right;    ESOPHAGOGASTRODUODENOSCOPY N/A 10/25/2021    Procedure: Small Jason Enteroscopy (SBE);  Surgeon: Isabelle Griffin MD;  Location: [facility];  Service: Endoscopy;  Laterality: N/A;    ESOPHAGOGASTRODUODENOSCOPY N/A 12/13/2021    Procedure: EGD (ESOPHAGOGASTRODUODENOSCOPY);  Surgeon: Troy Polanco MD;  Location: [facility];  Service: Endoscopy;  Laterality: N/A;    ESOPHAGOGASTRODUODENOSCOPY N/A 4/5/2024    Procedure: EGD (ESOPHAGOGASTRODUODENOSCOPY);  Surgeon: Myrtle Salcedo MD;  Location: [facility];  Service: Endoscopy;  Laterality: N/A;    ESOPHAGOGASTRODUODENOSCOPY N/A 4/22/2024    Procedure: EGD (ESOPHAGOGASTRODUODENOSCOPY);  Surgeon: Myrtle Salcedo MD;  Location: [facility];  Service: Endoscopy;  Laterality: N/A;  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    EYE SURGERY      INTRALUMINAL GASTROINTESTINAL TRACT IMAGING VIA CAPSULE N/A 12/6/2021    Procedure: IMAGING PROCEDURE, GI TRACT, INTRALUMINAL, VIA CAPSULE;  Surgeon: Larry Jones RN;  Location: [facility] ENDO;  Service: Endoscopy;  Laterality: N/A;    INTRALUMINAL  GASTROINTESTINAL TRACT IMAGING VIA CAPSULE N/A 4/18/2024    Procedure: IMAGING PROCEDURE, GI TRACT, INTRALUMINAL, VIA CAPSULE;  Surgeon: Larry Jones RN;  Location: [facility] ENDO;  Service: Endoscopy;  Laterality: N/A;    TRANSURETHRAL RESECTION OF BLADDER TUMOR BY BIPOLAR CAUTERY N/A 7/23/2019    Procedure: TURBT, USING BIPOLAR CAUTERY;  Surgeon: Ritesh aMrques MD;  Location: [facility] OR;  Service: Urology;  Laterality: N/A;    TRANSURETHRAL RESECTION OF PROSTATE N/A 5/14/2024    Procedure: TURP (TRANSURETHRAL RESECTION OF PROSTATE);  Surgeon: Medardo Garcia MD;  Location: [facility] OR;  Service: Urology;  Laterality: N/A;       Review of patient's allergies indicates:  No Known Allergies    Current Facility-Administered Medications on File Prior to Encounter   Medication    lactated ringers infusion     Current Outpatient Medications on File Prior to Encounter   Medication Sig    amiodarone (PACERONE) 200 MG Tab Take 1 tablet (200 mg total) by mouth once daily.    atorvastatin (LIPITOR) 20 MG tablet Take 1 tablet (20 mg total) by mouth every evening.    carvediloL (COREG) 6.25 MG tablet Take 1 tablet (6.25 mg total) by mouth 2 (two) times daily with meals.    donepeziL (ARICEPT) 5 MG tablet TAKE 1 TABLET EVERY EVENING    hydrALAZINE (APRESOLINE) 50 MG tablet Take 1 tablet (50 mg total) by mouth every 8 (eight) hours.    pantoprazole (PROTONIX) 20 MG tablet Take 1 tablet (20 mg total) by mouth once daily.    XARELTO 15 mg Tab TAKE 1 TABLET DAILY WITH DINNER OR EVENING MEAL    cholecalciferol, vitamin D3, (VITAMIN D3 ORAL) Take 1 tablet by mouth every other day.    furosemide (LASIX) 40 MG tablet Take 1 tablet (40 mg total) by mouth 2 (two) times daily.    multivitamin (THERAGRAN) per tablet Take 1 tablet by mouth once daily.     Family History       Problem Relation (Age of Onset)    Heart disease Father, Brother    Hypertension Father          Tobacco Use    Smoking status: Former     Current packs/day: 0.00      Average packs/day: 2.5 packs/day for 20.0 years (50.0 ttl pk-yrs)     Types: Cigarettes     Start date:      Quit date:      Years since quittin.6    Smokeless tobacco: Never   Substance and Sexual Activity    Alcohol use: Never     Alcohol/week: 7.0 standard drinks of alcohol     Types: 7 Cans of beer per week     Comment: HOLD 72HRS    Drug use: No    Sexual activity: Not Currently     Review of Systems   Constitutional:  Positive for fatigue. Negative for chills.   Respiratory:  Positive for shortness of breath. Negative for cough.    Cardiovascular:  Positive for chest pain and leg swelling.   Gastrointestinal:  Positive for nausea. Negative for blood in stool and constipation.   Neurological:  Positive for dizziness and light-headedness.     Objective:     Vital Signs (Most Recent):  Temp: 97.4 °F (36.3 °C) (24 1430)  Pulse: (!) 52 (24 1430)  Resp: 20 (24 1430)  BP: (!) 166/98 (24 1430)  SpO2: 100 % (24 1430) Vital Signs (24h Range):  Temp:  [97.4 °F (36.3 °C)-97.9 °F (36.6 °C)] 97.4 °F (36.3 °C)  Pulse:  [50-59] 52  Resp:  [16-21] 20  SpO2:  [95 %-100 %] 100 %  BP: ()/(55-98) 166/98     Weight: 103.1 kg (227 lb 3.2 oz)  Body mass index is 30.81 kg/m².     Physical Exam  Vitals and nursing note reviewed.   Constitutional:       Appearance: Normal appearance.   Eyes:      Comments: Pale conjunctivae     Cardiovascular:      Rate and Rhythm: Rhythm irregular.      Heart sounds: Murmur heard.   Pulmonary:      Effort: Pulmonary effort is normal.   Abdominal:      General: Abdomen is flat.   Musculoskeletal:      Right lower leg: Edema present.      Left lower leg: Edema present.   Skin:     Comments: Varicosities to BLE   Neurological:      Mental Status: He is alert and oriented to person, place, and time.                Significant Labs: All pertinent labs within the past 24 hours have been reviewed.  CBC:   Recent Labs   Lab 24  0937 24  1154   WBC  4.87 4.99   HGB 6.4* 6.6*   HCT 24.0* 23.9*   * 128*     CMP:   Recent Labs   Lab 08/22/24  0937 08/23/24  1154    141   K 4.7 3.9    106   CO2 25 24   GLU 91 117*   BUN 23 24*   CREATININE 1.1 1.4   CALCIUM 8.9 9.2   PROT 6.3 6.6   ALBUMIN 3.5 3.6   BILITOT 0.6 0.7   ALKPHOS 60 62   AST 22 25   ALT 21 23   ANIONGAP 6* 11       Significant Imaging: I have reviewed all pertinent imaging results/findings within the past 24 hours.  Assessment/Plan:     * Gastrointestinal hemorrhage associated with angiodysplasia of stomach and duodenum  Symptomatic anemia with dyspnea, chest pain, and orthostasis. Patient has acute blood loss due to hemorrhage, the hemorrhage is due to gastrointestinal bleed, patient does have a propensity for bleeding due to a medication, the medication is OAC.. Will trend hemoglobin/hematocrit Daily, as well as monitor and correct for any coagulation defects. CBC and vital signs have been reviewed and last CBC was noted-   Lab Results   Component Value Date    WBC 4.99 08/23/2024    HGB 6.6 (L) 08/23/2024    HCT 23.9 (L) 08/23/2024    MCV 72 (L) 08/23/2024     (L) 08/23/2024         Will order a type and screen and consent patient for blood transfusion. Will transfuse if Hgb is <7g/dl (<8g/dl in cases of active ACS) or if patient has rapid bleeding leading to hemodynamic instability.Transfuse 2 units of PRBCs  Discussed with GI. Recommends outpatient follow up with [facility] team.   Referral to hematology and CBC monthly x 6 months    PVD (peripheral vascular disease)  Patient with varicosities to BLE.       Chronic combined systolic (congestive) and diastolic (congestive) heart failure  Patient is identified as having Diastolic (HFpEF) heart failure that is Acute. CHF is currently uncontrolled due to Dyspnea not returned to baseline after 0 doses of IV diuretic and Pulmonary edema/pleural effusion on CXR. Latest ECHO performed and demonstrates- Results for orders placed  during the hospital encounter of 04/01/24    Echo Saline Bubble? No    Interpretation Summary    Left Ventricle: The left ventricle is normal in size. Mildly increased wall thickness. There is concentric remodeling. There is normal systolic function with a visually estimated ejection fraction of 60 - 65%. Unable to assess diastolic function due to atrial fibrillation.    Right Ventricle: Normal right ventricular cavity size. Wall thickness is normal. Right ventricle wall motion  is normal. Systolic function is normal.    Left Atrium: Left atrium is moderately dilated.    Tricuspid Valve: There is mild regurgitation.    IVC/SVC: Intermediate venous pressure at 8 mmHg.  . Continue Beta Blocker and monitor clinical status closely. Monitor on telemetry. Patient is on CHF pathway.  Monitor strict Is&Os and daily weights.  Place on fluid restriction of 1.5 L. Cardiology has not been consulted. Continue to stress to patient importance of self efficacy and  on diet for CHF. Last BNP reviewed- and noted below   Recent Labs   Lab 08/22/24  0937   BNP 64       Iron deficiency anemia secondary to blood loss (chronic)  Anemia is likely due to acute blood loss which was from GIB . Most recent hemoglobin and hematocrit are listed below.  Recent Labs     08/22/24  0937 08/23/24  1154   HGB 6.4* 6.6*   HCT 24.0* 23.9*     Plan  - Monitor serial CBC: Daily  - Transfuse PRBC if patient becomes hemodynamically unstable, symptomatic or H/H drops below 7/21.  - Patient has not received any PRBC transfusions to date  - Patient's anemia is currently worsening. Will adjust treatment as follows: will receive 2 units of PRBCs      Stage 3 chronic kidney disease  Creatine stable for now. BMP reviewed- noted Estimated Creatinine Clearance: 48.8 mL/min (based on SCr of 1.4 mg/dL). according to latest data. Based on current GFR, CKD stage is stage 3 - GFR 30-59.  Monitor UOP and serial BMP and adjust therapy as needed. Renally dose meds.  Avoid nephrotoxic medications and procedures.    COPD (chronic obstructive pulmonary disease)  Patient's COPD is controlled currently.  Patient is currently off COPD Pathway. Continue scheduled inhalers Supplemental oxygen and monitor respiratory status closely.     Paroxysmal atrial fibrillation  Patient with Paroxysmal (<7 days) atrial fibrillation which is controlled currently with Beta Blocker. Patient is currently in atrial fibrillation.[id number] Score: 3.  Anticoagulation indicated. Anticoagulation done with xarelto .      VTE Risk Mitigation (From admission, onward)           Ordered     IP VTE HIGH RISK PATIENT  Once         08/23/24 1307     Place sequential compression device  Until discontinued         08/23/24 1307                         On 08/23/2024, patient should be placed in hospital observation services under my care in collaboration with Dr. Doherty.           Tracy Connelly NP  Department of Hospital Medicine  '[facility] - Emergency Dept.

## 2024-08-23 NOTE — ASSESSMENT & PLAN NOTE
Creatine stable for now. BMP reviewed- noted Estimated Creatinine Clearance: 48.8 mL/min (based on SCr of 1.4 mg/dL). according to latest data. Based on current GFR, CKD stage is stage 3 - GFR 30-59.  Monitor UOP and serial BMP and adjust therapy as needed. Renally dose meds. Avoid nephrotoxic medications and procedures.

## 2024-08-23 NOTE — ASSESSMENT & PLAN NOTE
Patient with Paroxysmal (<7 days) atrial fibrillation which is controlled currently with Beta Blocker. Patient is currently in atrial fibrillation.[id number] Score: 3.  Anticoagulation indicated. Anticoagulation done with xarelto .

## 2024-08-23 NOTE — HPI
Jose Miguel Alvarez . is a 83 y.o. male patient with HTN, hypercholesteremia, CKD, COPD, A-fib, anemia, anticoagulant long term use, bladder cancer who presents to the Emergency Department for evaluation of an abnormal lab with hgb 6.4 ordered by his PCP. He reports generalized weakness, fatigue, dyspnea,  dizziness with standing and peripheral edema that is progressively worsening over the last 2 weeks. He reports burning in his chest unrelieved with home PPI.   He was evaluated by his PCP 2 days ago and underwent blood work and was given lasix po for peripheral edema. Denies blood in stool. He reports compliance with Xarelto.     Upper GI in 4/2024 shows Multiple non-bleeding angioectasias in the stomach. Treated with argon plasma coagulation   SBE 4/2024 shows A single non-bleeding angioectasia in the duodenum. Treated with argon plasma coagulation

## 2024-08-23 NOTE — PLAN OF CARE
[facility] - Emergency Dept.  Initial Discharge Assessment       Primary Care Provider: Alexus, Primary Doctor    Admission Diagnosis: Acute on chronic blood loss anemia [D62]    Admission Date: 8/23/2024  Expected Discharge Date:     Transition of Care Barriers: None    Payor: [employer] [facility] / Plan: [employer] ADV / Product Type: Medicare Advantage /     Extended Emergency Contact Information  Primary Emergency Contact: Martin Sterling  Mobile Phone: [phone]  Relation: Son  Secondary Emergency Contact: Hector Alvarez   [place]  Mobile Phone: [phone]  Relation: Son    Discharge Plan A: Home  Discharge Plan B: Home Health      [facility] - [place], [place] - [address]  [address] [address]  Phone: [phone] Fax: [phone]    [id number] - [place], [place] - [address] [place] [address] [place] [address]  [address] [address] [address]  Phone: [phone] Fax: [phone]    [facility] - [place], [place] - [address]  [address] [address]  Phone: [phone] Fax: [phone]    [id number] - [place], [place] - [address]  [address]  [address] [address] [address]  Phone: [phone] Fax: [phone]      Initial Assessment (most recent)       Adult Discharge Assessment - 08/23/24 1327          Discharge Assessment    Assessment Type Discharge Planning Assessment     Confirmed/corrected address, phone number and insurance Yes     Confirmed Demographics Correct on Facesheet     Source of Information patient;family     People in Home spouse     Do you expect to return to your current living situation? Yes     Do you have help at home or someone to help you manage your care at home? Yes     Prior to hospitilization cognitive status: Alert/Oriented     Current cognitive status:  Alert/Oriented     Walking or Climbing Stairs Difficulty no     Dressing/Bathing Difficulty no     Equipment Currently Used at Home none     Readmission within 30 days? No     Patient currently being followed by outpatient case management? No     Do you currently have service(s) that help you manage your care at home? No     Do you take prescription medications? Yes     Do you have prescription coverage? Yes     Do you have any problems affording any of your prescribed medications? No     Is the patient taking medications as prescribed? yes     How do you get to doctors appointments? car, drives self     Are you on dialysis? No     Do you take coumadin? No     Discharge Plan A Home     Discharge Plan B Home Health     DME Needed Upon Discharge  none     Discharge Plan discussed with: Patient;Adult children     Transition of Care Barriers None        Physical Activity    On average, how many days per week do you engage in moderate to strenuous exercise (like a brisk walk)? 6 days     On average, how many minutes do you engage in exercise at this level? 150+ min   PLAYS GOLF DAILY       Financial Resource Strain    How hard is it for you to pay for the very basics like food, housing, medical care, and heating? Not hard at all        Housing Stability    In the last 12 months, was there a time when you were not able to pay the mortgage or rent on time? No     At any time in the past 12 months, were you homeless or living in a shelter (including now)? No        Transportation Needs    Has the lack of transportation kept you from medical appointments, meetings, work or from getting things needed for daily living? No        Food Insecurity    Within the past 12 months, you worried that your food would run out before you got the money to buy more. Never true     Within the past 12 months, the food you bought just didn't last and you didn't have money to get more. Never true        Stress    Do you feel stress - tense,  restless, nervous, or anxious, or unable to sleep at night because your mind is troubled all the time - these days? Not at all        Social Isolation    How often do you feel lonely or isolated from those around you?  Never        Alcohol Use    Q1: How often do you have a drink containing alcohol? Never     Q2: How many drinks containing alcohol do you have on a typical day when you are drinking? Patient does not drink     Q3: How often do you have six or more drinks on one occasion? Never        Utilities    In the past 12 months has the electric, gas, oil, or water company threatened to shut off services in your home? No        Health Literacy    How often do you need to have someone help you when you read instructions, pamphlets, or other written material from your doctor or pharmacy? Never                      Independent with care.  No anticipated needs.

## 2024-08-23 NOTE — ASSESSMENT & PLAN NOTE
Patient with varicosities to BLE.      Airway patent, Nasal mucosa clear. Mouth with normal mucosa. Throat has no vesicles, no oropharyngeal exudates and uvula is midline.

## 2024-08-23 NOTE — ASSESSMENT & PLAN NOTE
Anemia is likely due to acute blood loss which was from GIB . Most recent hemoglobin and hematocrit are listed below.  Recent Labs     08/22/24  0937 08/23/24  1154   HGB 6.4* 6.6*   HCT 24.0* 23.9*     Plan  - Monitor serial CBC: Daily  - Transfuse PRBC if patient becomes hemodynamically unstable, symptomatic or H/H drops below 7/21.  - Patient has not received any PRBC transfusions to date  - Patient's anemia is currently worsening. Will adjust treatment as follows: will receive 2 units of PRBCs

## 2024-08-24 VITALS
TEMPERATURE: 99 F | BODY MASS INDEX: 29.48 KG/M2 | RESPIRATION RATE: 18 BRPM | OXYGEN SATURATION: 90 % | SYSTOLIC BLOOD PRESSURE: 112 MMHG | WEIGHT: 217.63 LBS | HEIGHT: 72 IN | HEART RATE: 73 BPM | DIASTOLIC BLOOD PRESSURE: 55 MMHG

## 2024-08-24 LAB
BASOPHILS # BLD AUTO: 0.01 K/UL (ref 0–0.2)
BASOPHILS NFR BLD: 0.1 % (ref 0–1.9)
DIFFERENTIAL METHOD BLD: ABNORMAL
EOSINOPHIL # BLD AUTO: 0.1 K/UL (ref 0–0.5)
EOSINOPHIL NFR BLD: 0.5 % (ref 0–8)
EOSINOPHIL NFR BLD: 0.5 % (ref 0–8)
EOSINOPHIL NFR BLD: 0.7 % (ref 0–8)
ERYTHROCYTE [DISTWIDTH] IN BLOOD BY AUTOMATED COUNT: 17.7 % (ref 11.5–14.5)
ERYTHROCYTE [DISTWIDTH] IN BLOOD BY AUTOMATED COUNT: 17.8 % (ref 11.5–14.5)
ERYTHROCYTE [DISTWIDTH] IN BLOOD BY AUTOMATED COUNT: 18 % (ref 11.5–14.5)
HCT VFR BLD AUTO: 28.3 % (ref 40–54)
HCT VFR BLD AUTO: 28.7 % (ref 40–54)
HCT VFR BLD AUTO: 29 % (ref 40–54)
HGB BLD-MCNC: 8.2 G/DL (ref 14–18)
HGB BLD-MCNC: 8.4 G/DL (ref 14–18)
HGB BLD-MCNC: 8.5 G/DL (ref 14–18)
IMM GRANULOCYTES # BLD AUTO: 0.07 K/UL (ref 0–0.04)
IMM GRANULOCYTES # BLD AUTO: 0.07 K/UL (ref 0–0.04)
IMM GRANULOCYTES # BLD AUTO: 0.1 K/UL (ref 0–0.04)
IMM GRANULOCYTES NFR BLD AUTO: 0.5 % (ref 0–0.5)
IMM GRANULOCYTES NFR BLD AUTO: 0.5 % (ref 0–0.5)
IMM GRANULOCYTES NFR BLD AUTO: 0.7 % (ref 0–0.5)
LYMPHOCYTES # BLD AUTO: 0.2 K/UL (ref 1–4.8)
LYMPHOCYTES # BLD AUTO: 0.2 K/UL (ref 1–4.8)
LYMPHOCYTES # BLD AUTO: 0.3 K/UL (ref 1–4.8)
LYMPHOCYTES NFR BLD: 1.1 % (ref 18–48)
LYMPHOCYTES NFR BLD: 1.3 % (ref 18–48)
LYMPHOCYTES NFR BLD: 1.7 % (ref 18–48)
MCH RBC QN AUTO: 21 PG (ref 27–31)
MCH RBC QN AUTO: 21.2 PG (ref 27–31)
MCH RBC QN AUTO: 21.3 PG (ref 27–31)
MCHC RBC AUTO-ENTMCNC: 29 G/DL (ref 32–36)
MCHC RBC AUTO-ENTMCNC: 29.3 G/DL (ref 32–36)
MCHC RBC AUTO-ENTMCNC: 29.3 G/DL (ref 32–36)
MCV RBC AUTO: 72 FL (ref 82–98)
MCV RBC AUTO: 72 FL (ref 82–98)
MCV RBC AUTO: 73 FL (ref 82–98)
MONOCYTES # BLD AUTO: 1 K/UL (ref 0.3–1)
MONOCYTES # BLD AUTO: 1.1 K/UL (ref 0.3–1)
MONOCYTES # BLD AUTO: 1.3 K/UL (ref 0.3–1)
MONOCYTES NFR BLD: 7.6 % (ref 4–15)
MONOCYTES NFR BLD: 7.7 % (ref 4–15)
MONOCYTES NFR BLD: 8.6 % (ref 4–15)
NEUTROPHILS # BLD AUTO: 11.8 K/UL (ref 1.8–7.7)
NEUTROPHILS # BLD AUTO: 13.1 K/UL (ref 1.8–7.7)
NEUTROPHILS # BLD AUTO: 13.3 K/UL (ref 1.8–7.7)
NEUTROPHILS NFR BLD: 88.4 % (ref 38–73)
NEUTROPHILS NFR BLD: 89.9 % (ref 38–73)
NEUTROPHILS NFR BLD: 90 % (ref 38–73)
NRBC BLD-RTO: 0 /100 WBC
PLATELET # BLD AUTO: 108 K/UL (ref 150–450)
PLATELET # BLD AUTO: 138 K/UL (ref 150–450)
PLATELET # BLD AUTO: 148 K/UL (ref 150–450)
PMV BLD AUTO: 11.2 FL (ref 9.2–12.9)
PMV BLD AUTO: 12 FL (ref 9.2–12.9)
PMV BLD AUTO: ABNORMAL FL (ref 9.2–12.9)
RBC # BLD AUTO: 3.91 M/UL (ref 4.6–6.2)
RBC # BLD AUTO: 3.95 M/UL (ref 4.6–6.2)
RBC # BLD AUTO: 4.01 M/UL (ref 4.6–6.2)
WBC # BLD AUTO: 13.09 K/UL (ref 3.9–12.7)
WBC # BLD AUTO: 14.56 K/UL (ref 3.9–12.7)
WBC # BLD AUTO: 15.05 K/UL (ref 3.9–12.7)

## 2024-08-24 PROCEDURE — 96374 THER/PROPH/DIAG INJ IV PUSH: CPT | Mod: 59

## 2024-08-24 PROCEDURE — 85025 COMPLETE CBC W/AUTO DIFF WBC: CPT | Mod: 91 | Performed by: NURSE PRACTITIONER

## 2024-08-24 PROCEDURE — 63600175 PHARM REV CODE 636 W HCPCS: Performed by: NURSE PRACTITIONER

## 2024-08-24 PROCEDURE — G0378 HOSPITAL OBSERVATION PER HR: HCPCS

## 2024-08-24 PROCEDURE — 36415 COLL VENOUS BLD VENIPUNCTURE: CPT | Performed by: NURSE PRACTITIONER

## 2024-08-24 PROCEDURE — 25000003 PHARM REV CODE 250: Performed by: NURSE PRACTITIONER

## 2024-08-24 PROCEDURE — 25000003 PHARM REV CODE 250: Performed by: FAMILY MEDICINE

## 2024-08-24 RX ORDER — PANTOPRAZOLE SODIUM 20 MG/1
40 TABLET, DELAYED RELEASE ORAL DAILY
Qty: 30 TABLET | Refills: 0 | Status: SHIPPED | OUTPATIENT
Start: 2024-08-24

## 2024-08-24 RX ORDER — FERROUS SULFATE 325(65) MG
325 TABLET ORAL EVERY OTHER DAY
Qty: 15 TABLET | Refills: 0 | Status: SHIPPED | OUTPATIENT
Start: 2024-09-02

## 2024-08-24 RX ORDER — CEFDINIR 300 MG/1
300 CAPSULE ORAL 2 TIMES DAILY
Qty: 20 CAPSULE | Refills: 0 | Status: SHIPPED | OUTPATIENT
Start: 2024-08-24 | End: 2024-09-03

## 2024-08-24 RX ADMIN — HYDRALAZINE HYDROCHLORIDE 50 MG: 50 TABLET ORAL at 05:08

## 2024-08-24 RX ADMIN — HYDRALAZINE HYDROCHLORIDE 50 MG: 50 TABLET ORAL at 02:08

## 2024-08-24 RX ADMIN — PANTOPRAZOLE SODIUM 40 MG: 40 INJECTION, POWDER, FOR SOLUTION INTRAVENOUS at 08:08

## 2024-08-24 RX ADMIN — CARVEDILOL 6.25 MG: 6.25 TABLET, FILM COATED ORAL at 07:08

## 2024-08-24 RX ADMIN — AMIODARONE HYDROCHLORIDE 200 MG: 200 TABLET ORAL at 08:08

## 2024-08-24 NOTE — SUBJECTIVE & OBJECTIVE
Past Medical History:   Diagnosis Date    Anemia     Anticoagulant long-term use     Atrial fibrillation     Bladder cancer     Bladder tumor     CKD (chronic kidney disease), stage III     COPD (chronic obstructive pulmonary disease)     pt denies    Encounter for blood transfusion     Hematuria     History of 2019 novel coronavirus disease (COVID-19)     Hypercholesteremia     Hypertension        Past Surgical History:   Procedure Laterality Date    bladder tumor removal      CARDIAC ELECTROPHYSIOLOGY MAPPING AND ABLATION      CARDIOVERSION      several    CATARACT EXTRACTION Bilateral     cataract removal with IOL implants Bilateral     CHOLECYSTECTOMY      CYSTOSCOPIC LITHOLAPAXY N/A 12/28/2021    Procedure: CYSTOLITHOLAPAXY;  Surgeon: Medardo Garcia MD;  Location: [facility];  Service: Urology;  Laterality: N/A;    CYSTOSCOPIC LITHOLAPAXY N/A 5/14/2024    Procedure: CYSTOLITHOLAPAXY;  Surgeon: Medardo Garcia MD;  Location: [facility];  Service: Urology;  Laterality: N/A;    CYSTOSCOPY      CYSTOSCOPY WITH BIOPSY OF BLADDER Right 12/28/2021    Procedure: CYSTOSCOPY, WITH BLADDER BIOPSY, WITH FULGURATION IF INDICATED;  Surgeon: Medardo Garcia MD;  Location: [facility];  Service: Urology;  Laterality: Right;    ESOPHAGOGASTRODUODENOSCOPY N/A 10/25/2021    Procedure: Small Jason Enteroscopy (SBE);  Surgeon: Isabelle Griffin MD;  Location: [facility];  Service: Endoscopy;  Laterality: N/A;    ESOPHAGOGASTRODUODENOSCOPY N/A 12/13/2021    Procedure: EGD (ESOPHAGOGASTRODUODENOSCOPY);  Surgeon: Troy Polanco MD;  Location: [facility];  Service: Endoscopy;  Laterality: N/A;    ESOPHAGOGASTRODUODENOSCOPY N/A 4/5/2024    Procedure: EGD (ESOPHAGOGASTRODUODENOSCOPY);  Surgeon: Myrtle Salcedo MD;  Location: [facility] ENDO;  Service: Endoscopy;  Laterality: N/A;    ESOPHAGOGASTRODUODENOSCOPY N/A 4/22/2024    Procedure: EGD (ESOPHAGOGASTRODUODENOSCOPY);  Surgeon: Myrtle Salcedo MD;  Location: [facility];   Service: Endoscopy;  Laterality: N/A;  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    EYE SURGERY      INTRALUMINAL GASTROINTESTINAL TRACT IMAGING VIA CAPSULE N/A 12/6/2021    Procedure: IMAGING PROCEDURE, GI TRACT, INTRALUMINAL, VIA CAPSULE;  Surgeon: Larry Jones RN;  Location: [facility] ENDO;  Service: Endoscopy;  Laterality: N/A;    INTRALUMINAL GASTROINTESTINAL TRACT IMAGING VIA CAPSULE N/A 4/18/2024    Procedure: IMAGING PROCEDURE, GI TRACT, INTRALUMINAL, VIA CAPSULE;  Surgeon: Larry Jones RN;  Location: [facility] ENDO;  Service: Endoscopy;  Laterality: N/A;    TRANSURETHRAL RESECTION OF BLADDER TUMOR BY BIPOLAR CAUTERY N/A 7/23/2019    Procedure: TURBT, USING BIPOLAR CAUTERY;  Surgeon: Ritesh Marques MD;  Location: [facility] OR;  Service: Urology;  Laterality: N/A;    TRANSURETHRAL RESECTION OF PROSTATE N/A 5/14/2024    Procedure: TURP (TRANSURETHRAL RESECTION OF PROSTATE);  Surgeon: Medardo Garcia MD;  Location: [facility] OR;  Service: Urology;  Laterality: N/A;       Review of patient's allergies indicates:  No Known Allergies    Current Facility-Administered Medications on File Prior to Encounter   Medication    lactated ringers infusion     Current Outpatient Medications on File Prior to Encounter   Medication Sig    amiodarone (PACERONE) 200 MG Tab Take 1 tablet (200 mg total) by mouth once daily.    atorvastatin (LIPITOR) 20 MG tablet Take 1 tablet (20 mg total) by mouth every evening.    carvediloL (COREG) 6.25 MG tablet Take 1 tablet (6.25 mg total) by mouth 2 (two) times daily with meals.    cholecalciferol, vitamin D3, (VITAMIN D3 ORAL) Take 1 tablet by mouth every other day.    donepeziL (ARICEPT) 5 MG tablet TAKE 1 TABLET EVERY EVENING    furosemide (LASIX) 40 MG tablet Take 1 tablet (40 mg total) by mouth 2 (two) times daily.    hydrALAZINE (APRESOLINE) 50 MG tablet Take 1 tablet (50 mg total) by mouth every 8 (eight) hours.    multivitamin (THERAGRAN) per tablet Take 1 tablet by mouth once  daily.    pantoprazole (PROTONIX) 20 MG tablet Take 1 tablet (20 mg total) by mouth once daily.    XARELTO 15 mg Tab TAKE 1 TABLET DAILY WITH DINNER OR EVENING MEAL     Family History       Problem Relation (Age of Onset)    Heart disease Father, Brother    Hypertension Father          Tobacco Use    Smoking status: Former     Current packs/day: 0.00     Average packs/day: 2.5 packs/day for 20.0 years (50.0 ttl pk-yrs)     Types: Cigarettes     Start date:      Quit date:      Years since quittin.6    Smokeless tobacco: Never   Substance and Sexual Activity    Alcohol use: Never     Alcohol/week: 7.0 standard drinks of alcohol     Types: 7 Cans of beer per week     Comment: HOLD 72HRS    Drug use: No    Sexual activity: Not Currently     Review of Systems   Constitutional: Positive for malaise/fatigue.   Cardiovascular:  Positive for irregular heartbeat.   Neurological:  Positive for dizziness and light-headedness.     Objective:     Vital Signs (Most Recent):  Temp: 100.3 °F (37.9 °C) (24)  Pulse: 81 (24)  Resp: 18 (24)  BP: (!) 127/58 (24)  SpO2: (!) 88 % (24) Vital Signs (24h Range):  Temp:  [96.6 °F (35.9 °C)-100.3 °F (37.9 °C)] 100.3 °F (37.9 °C)  Pulse:  [50-86] 81  Resp:  [14-21] 18  SpO2:  [88 %-100 %] 88 %  BP: (121-184)/(58-98) 127/58     Weight: 98.7 kg (217 lb 9.5 oz)  Body mass index is 29.51 kg/m².    SpO2: (!) 88 %         Intake/Output Summary (Last 24 hours) at 2024 1125  Last data filed at 2024  Gross per 24 hour   Intake 484.58 ml   Output --   Net 484.58 ml       Lines/Drains/Airways       Peripheral Intravenous Line  Duration                  Peripheral IV - Single Lumen 24 1151 20 G Left Forearm <1 day         Peripheral IV - Single Lumen 24 1619 22 G Anterior;Right Forearm <1 day                     Physical Exam  HENT:      Head: Normocephalic.   Eyes:      Pupils: Pupils are equal, round, and  "reactive to light.   Neck:      Thyroid: No thyromegaly.      Vascular: Normal carotid pulses. No carotid bruit or JVD.   Cardiovascular:      Rate and Rhythm: Normal rate. Rhythm irregularly irregular. No extrasystoles are present.     Chest Wall: PMI is not displaced.      Pulses: Normal pulses.      Heart sounds: Normal heart sounds. No murmur heard.     No gallop. No S3 sounds.   Pulmonary:      Effort: No respiratory distress.      Breath sounds: Normal breath sounds. No stridor.   Abdominal:      General: Bowel sounds are normal.      Palpations: Abdomen is soft.      Tenderness: There is no abdominal tenderness. There is no rebound.   Skin:     Findings: No rash.   Neurological:      Mental Status: He is alert and oriented to person, place, and time.   Psychiatric:         Behavior: Behavior normal.          Significant Labs: ABG: No results for input(s): "PH", "PCO2", "HCO3", "POCSATURATED", "BE" in the last 48 hours., Blood Culture: No results for input(s): "LABBLOO" in the last 48 hours., BMP:   Recent Labs   Lab 08/23/24  1154   *      K 3.9      CO2 24   BUN 24*   CREATININE 1.4   CALCIUM 9.2   , CMP   Recent Labs   Lab 08/23/24  1154      K 3.9      CO2 24   *   BUN 24*   CREATININE 1.4   CALCIUM 9.2   PROT 6.6   ALBUMIN 3.6   BILITOT 0.7   ALKPHOS 62   AST 25   ALT 23   ANIONGAP 11   , CBC   Recent Labs   Lab 08/23/24  1154 08/24/24  0503 08/24/24  0809   WBC 4.99 13.09* 14.56*   HGB 6.6* 8.2* 8.5*   HCT 23.9* 28.3* 29.0*   * 108* 138*   , INR   Recent Labs   Lab 08/23/24  1154   INR 1.3*   , Lipid Panel No results for input(s): "CHOL", "HDL", "LDLCALC", "TRIG", "CHOLHDL" in the last 48 hours., and Troponin No results for input(s): "TROPONINI" in the last 48 hours.    Significant Imaging: EKG:    "

## 2024-08-24 NOTE — HPI
Jose Miguel Alvarez Jr. is a 83 y.o. male patient with HTN, hypercholesteremia, CKD, COPD, A-fib, anemia, anticoagulant long term use, bladder cancer who presents to the Emergency Department for evaluation of an abnormal lab with hgb 6.4 ordered by his PCP. He reports generalized weakness, fatigue, dyspnea,  dizziness with standing and peripheral edema that is progressively worsening over the last 2 weeks. He reports burning in his chest unrelieved with home PPI.   He was evaluated by his PCP 2 days ago and underwent blood work and was given lasix po for peripheral edema. Denies blood in stool. He reports compliance with Xarelto.      Upper GI in 4/2024 shows Multiple non-bleeding angioectasias in the stomach. Treated with argon plasma coagulation   SBE 4/2024 shows A single non-bleeding angioectasia in the duodenum. Treated with argon plasma coagulation     Cardiology consult for afib Rx  PMH PAF s/p DCCV few yrs ago and RFA at [facility] in 2018 HTN HLD COPD quit smoking \  Admitted for severe anemia HGB 6.6 and had PRBC transfusion  GI service advised OP f/u at [facility] for advanced endoscopy for GI bleeding  Now HGB 8.6, improved fatigue and SOB  EKG reviewed by myself today reveals afib  AFIB [id number] score of 4

## 2024-08-24 NOTE — ASSESSMENT & PLAN NOTE
AFIB VR C today  H/o GI bleeding and severe anemia      - ok to hold xarelto 15 mg daily now  - add iron supplement as tolerated  - f/u GI at [facility] as OP  - will see him as OP  - continue amiodarone and coreg

## 2024-08-24 NOTE — PLAN OF CARE
[facility] - Telemetry (Hospital)  Discharge Final Note    Primary Care Provider: No, Primary Doctor    Expected Discharge Date: 8/24/2024    Final Discharge Note (most recent)       Final Note - 08/24/24 0917          Final Note    Assessment Type Final Discharge Note     Anticipated Discharge Disposition Home or Self Care        Post-Acute Status    Discharge Delays None known at this time                     Important Message from Medicare

## 2024-08-24 NOTE — CONSULTS
[facility] - Tele[facility] ([facility])  Cardiology  Consult Note    Patient Name: Jose Miguel Alvarez Jr.  MRN: [id number]  Admission Date: 8/23/2024  Hospital Length of Stay: 0 days  Code Status: Full Code   Attending Provider: Jannet Doherty MD   Consulting Provider: Carlos Flower MD  Primary Care Physician: Alexus, Primary Doctor  Principal Problem:Gastrointestinal hemorrhage associated with angiodysplasia of stomach and duodenum    Patient information was obtained from patient and ER records.     Inpatient consult to Cardiology  Consult performed by: Carlos Flower MD  Consult ordered by: Jannet Doherty MD        Subjective:     HPI:   Jose Miguel Alvarez Jr. is a 83 y.o. male patient with HTN, hypercholesteremia, CKD, COPD, A-fib, anemia, anticoagulant long term use, bladder cancer who presents to the Emergency Department for evaluation of an abnormal lab with hgb 6.4 ordered by his PCP. He reports generalized weakness, fatigue, dyspnea,  dizziness with standing and peripheral edema that is progressively worsening over the last 2 weeks. He reports burning in his chest unrelieved with home PPI.   He was evaluated by his PCP 2 days ago and underwent blood work and was given lasix po for peripheral edema. Denies blood in stool. He reports compliance with Xarelto.      Upper GI in 4/2024 shows Multiple non-bleeding angioectasias in the stomach. Treated with argon plasma coagulation   SBE 4/2024 shows A single non-bleeding angioectasia in the duodenum. Treated with argon plasma coagulation     Cardiology consult for afib Rx  PMH PAF s/p DCCV few yrs ago and RFA at [facility] in 2018 HTN HLD COPD quit smoking \  Admitted for severe anemia HGB 6.6 and had PRBC transfusion  GI service advised OP f/u at [facility] for advanced endoscopy for GI bleeding  Now HGB 8.6, improved fatigue and SOB  EKG reviewed by myself today reveals afib  AFIB [id number] score of 4          Past Medical History:   Diagnosis Date    Anemia     Anticoagulant  long-term use     Atrial fibrillation     Bladder cancer     Bladder tumor     CKD (chronic kidney disease), stage III     COPD (chronic obstructive pulmonary disease)     pt denies    Encounter for blood transfusion     Hematuria     History of 2019 novel coronavirus disease (COVID-19)     Hypercholesteremia     Hypertension        Past Surgical History:   Procedure Laterality Date    bladder tumor removal      CARDIAC ELECTROPHYSIOLOGY MAPPING AND ABLATION      CARDIOVERSION      several    CATARACT EXTRACTION Bilateral     cataract removal with IOL implants Bilateral     CHOLECYSTECTOMY      CYSTOSCOPIC LITHOLAPAXY N/A 12/28/2021    Procedure: CYSTOLITHOLAPAXY;  Surgeon: Medardo Garcia MD;  Location: [facility] OR;  Service: Urology;  Laterality: N/A;    CYSTOSCOPIC LITHOLAPAXY N/A 5/14/2024    Procedure: CYSTOLITHOLAPAXY;  Surgeon: Medardo Garcia MD;  Location: [facility] OR;  Service: Urology;  Laterality: N/A;    CYSTOSCOPY      CYSTOSCOPY WITH BIOPSY OF BLADDER Right 12/28/2021    Procedure: CYSTOSCOPY, WITH BLADDER BIOPSY, WITH FULGURATION IF INDICATED;  Surgeon: Medardo Garcia MD;  Location: [facility] OR;  Service: Urology;  Laterality: Right;    ESOPHAGOGASTRODUODENOSCOPY N/A 10/25/2021    Procedure: Small Jason Enteroscopy (SBE);  Surgeon: Isabelle Griffin MD;  Location: [facility];  Service: Endoscopy;  Laterality: N/A;    ESOPHAGOGASTRODUODENOSCOPY N/A 12/13/2021    Procedure: EGD (ESOPHAGOGASTRODUODENOSCOPY);  Surgeon: Troy Polanco MD;  Location: [facility];  Service: Endoscopy;  Laterality: N/A;    ESOPHAGOGASTRODUODENOSCOPY N/A 4/5/2024    Procedure: EGD (ESOPHAGOGASTRODUODENOSCOPY);  Surgeon: Myrtle Salcedo MD;  Location: [facility] ENDO;  Service: Endoscopy;  Laterality: N/A;    ESOPHAGOGASTRODUODENOSCOPY N/A 4/22/2024    Procedure: EGD (ESOPHAGOGASTRODUODENOSCOPY);  Surgeon: Myrtle Salcedo MD;  Location: [facility] ENDO;  Service: Endoscopy;  Laterality: N/A;  Push enteroscopy for positive  capsule - bleeding AVM    EYE SURGERY      INTRALUMINAL GASTROINTESTINAL TRACT IMAGING VIA CAPSULE N/A 12/6/2021    Procedure: IMAGING PROCEDURE, GI TRACT, INTRALUMINAL, VIA CAPSULE;  Surgeon: Larry Jones RN;  Location: [facility] ENDO;  Service: Endoscopy;  Laterality: N/A;    INTRALUMINAL GASTROINTESTINAL TRACT IMAGING VIA CAPSULE N/A 4/18/2024    Procedure: IMAGING PROCEDURE, GI TRACT, INTRALUMINAL, VIA CAPSULE;  Surgeon: Larry Jones RN;  Location: [facility] ENDO;  Service: Endoscopy;  Laterality: N/A;    TRANSURETHRAL RESECTION OF BLADDER TUMOR BY BIPOLAR CAUTERY N/A 7/23/2019    Procedure: TURBT, USING BIPOLAR CAUTERY;  Surgeon: Ritesh Marques MD;  Location: [facility] OR;  Service: Urology;  Laterality: N/A;    TRANSURETHRAL RESECTION OF PROSTATE N/A 5/14/2024    Procedure: TURP (TRANSURETHRAL RESECTION OF PROSTATE);  Surgeon: Medardo Garcia MD;  Location: [facility] OR;  Service: Urology;  Laterality: N/A;       Review of patient's allergies indicates:  No Known Allergies    Current Facility-Administered Medications on File Prior to Encounter   Medication    lactated ringers infusion     Current Outpatient Medications on File Prior to Encounter   Medication Sig    amiodarone (PACERONE) 200 MG Tab Take 1 tablet (200 mg total) by mouth once daily.    atorvastatin (LIPITOR) 20 MG tablet Take 1 tablet (20 mg total) by mouth every evening.    carvediloL (COREG) 6.25 MG tablet Take 1 tablet (6.25 mg total) by mouth 2 (two) times daily with meals.    cholecalciferol, vitamin D3, (VITAMIN D3 ORAL) Take 1 tablet by mouth every other day.    donepeziL (ARICEPT) 5 MG tablet TAKE 1 TABLET EVERY EVENING    furosemide (LASIX) 40 MG tablet Take 1 tablet (40 mg total) by mouth 2 (two) times daily.    hydrALAZINE (APRESOLINE) 50 MG tablet Take 1 tablet (50 mg total) by mouth every 8 (eight) hours.    multivitamin (THERAGRAN) per tablet Take 1 tablet by mouth once daily.    pantoprazole (PROTONIX) 20 MG tablet Take 1 tablet (20 mg  total) by mouth once daily.    XARELTO 15 mg Tab TAKE 1 TABLET DAILY WITH DINNER OR EVENING MEAL     Family History       Problem Relation (Age of Onset)    Heart disease Father, Brother    Hypertension Father          Tobacco Use    Smoking status: Former     Current packs/day: 0.00     Average packs/day: 2.5 packs/day for 20.0 years (50.0 ttl pk-yrs)     Types: Cigarettes     Start date:      Quit date:      Years since quittin.6    Smokeless tobacco: Never   Substance and Sexual Activity    Alcohol use: Never     Alcohol/week: 7.0 standard drinks of alcohol     Types: 7 Cans of beer per week     Comment: HOLD 72HRS    Drug use: No    Sexual activity: Not Currently     Review of Systems   Constitutional: Positive for malaise/fatigue.   Cardiovascular:  Positive for irregular heartbeat.   Neurological:  Positive for dizziness and light-headedness.     Objective:     Vital Signs (Most Recent):  Temp: 100.3 °F (37.9 °C) (24)  Pulse: 81 (24)  Resp: 18 (24)  BP: (!) 127/58 (24)  SpO2: (!) 88 % (24) Vital Signs (24h Range):  Temp:  [96.6 °F (35.9 °C)-100.3 °F (37.9 °C)] 100.3 °F (37.9 °C)  Pulse:  [50-86] 81  Resp:  [14-21] 18  SpO2:  [88 %-100 %] 88 %  BP: (121-184)/(58-98) 127/58     Weight: 98.7 kg (217 lb 9.5 oz)  Body mass index is 29.51 kg/m².    SpO2: (!) 88 %         Intake/Output Summary (Last 24 hours) at 2024 1125  Last data filed at 2024  Gross per 24 hour   Intake 484.58 ml   Output --   Net 484.58 ml       Lines/Drains/Airways       Peripheral Intravenous Line  Duration                  Peripheral IV - Single Lumen 24 1151 20 G Left Forearm <1 day         Peripheral IV - Single Lumen 24 1619 22 G Anterior;Right Forearm <1 day                     Physical Exam  HENT:      Head: Normocephalic.   Eyes:      Pupils: Pupils are equal, round, and reactive to light.   Neck:      Thyroid: No thyromegaly.      Vascular:  "Normal carotid pulses. No carotid bruit or JVD.   Cardiovascular:      Rate and Rhythm: Normal rate. Rhythm irregularly irregular. No extrasystoles are present.     Chest Wall: PMI is not displaced.      Pulses: Normal pulses.      Heart sounds: Normal heart sounds. No murmur heard.     No gallop. No S3 sounds.   Pulmonary:      Effort: No respiratory distress.      Breath sounds: Normal breath sounds. No stridor.   Abdominal:      General: Bowel sounds are normal.      Palpations: Abdomen is soft.      Tenderness: There is no abdominal tenderness. There is no rebound.   Skin:     Findings: No rash.   Neurological:      Mental Status: He is alert and oriented to person, place, and time.   Psychiatric:         Behavior: Behavior normal.          Significant Labs: ABG: No results for input(s): "PH", "PCO2", "HCO3", "POCSATURATED", "BE" in the last 48 hours., Blood Culture: No results for input(s): "LABBLOO" in the last 48 hours., BMP:   Recent Labs   Lab 08/23/24  1154   *      K 3.9      CO2 24   BUN 24*   CREATININE 1.4   CALCIUM 9.2   , CMP   Recent Labs   Lab 08/23/24  1154      K 3.9      CO2 24   *   BUN 24*   CREATININE 1.4   CALCIUM 9.2   PROT 6.6   ALBUMIN 3.6   BILITOT 0.7   ALKPHOS 62   AST 25   ALT 23   ANIONGAP 11   , CBC   Recent Labs   Lab 08/23/24  1154 08/24/24  0503 08/24/24  0809   WBC 4.99 13.09* 14.56*   HGB 6.6* 8.2* 8.5*   HCT 23.9* 28.3* 29.0*   * 108* 138*   , INR   Recent Labs   Lab 08/23/24  1154   INR 1.3*   , Lipid Panel No results for input(s): "CHOL", "HDL", "LDLCALC", "TRIG", "CHOLHDL" in the last 48 hours., and Troponin No results for input(s): "TROPONINI" in the last 48 hours.    Significant Imaging: EKG:    Assessment and Plan:     Chronic combined systolic (congestive) and diastolic (congestive) heart failure  Patient is identified as having Systolic (HFrEF) heart failure that is Chronic. CHF is currently controlled. Latest ECHO performed " and demonstrates- Results for orders placed during the hospital encounter of 04/01/24    Echo Saline Bubble? No    Interpretation Summary    Left Ventricle: The left ventricle is normal in size. Mildly increased wall thickness. There is concentric remodeling. There is normal systolic function with a visually estimated ejection fraction of 60 - 65%. Unable to assess diastolic function due to atrial fibrillation.    Right Ventricle: Normal right ventricular cavity size. Wall thickness is normal. Right ventricle wall motion  is normal. Systolic function is normal.    Left Atrium: Left atrium is moderately dilated.    Tricuspid Valve: There is mild regurgitation.    IVC/SVC: Intermediate venous pressure at 8 mmHg.  . Continue Beta Blocker and monitor clinical status closely. Monitor on telemetry. Patient is on CHF pathway.  Monitor strict Is&Os and daily weights.  Place on fluid restriction of 1.5 L. Cardiology has been consulted. Continue to stress to patient importance of self efficacy and  on diet for CHF. Last BNP reviewed- and noted below   Recent Labs   Lab 08/22/24  0937   BNP 64       CHFrEF compensated  Continue coreg  Ok to hold ARB to avoid low BP      Paroxysmal atrial fibrillation  AFIB VR C today  H/o GI bleeding and severe anemia      - ok to hold xarelto 15 mg daily now  - add iron supplement as tolerated  - f/u GI at [facility] as OP  - will see him as OP  - continue amiodarone and coreg        VTE Risk Mitigation (From admission, onward)           Ordered     IP VTE HIGH RISK PATIENT  Once         08/23/24 1307     Place sequential compression device  Until discontinued         08/23/24 1307                    Thank you for your consult. I will follow-up with patient. Please contact us if you have any additional questions.    Carlos Flower MD  Cardiology   [facility] - Telemetry ([facility])

## 2024-08-24 NOTE — ASSESSMENT & PLAN NOTE
Patient is identified as having Systolic (HFrEF) heart failure that is Chronic. CHF is currently controlled. Latest ECHO performed and demonstrates- Results for orders placed during the hospital encounter of 04/01/24    Echo Saline Bubble? No    Interpretation Summary    Left Ventricle: The left ventricle is normal in size. Mildly increased wall thickness. There is concentric remodeling. There is normal systolic function with a visually estimated ejection fraction of 60 - 65%. Unable to assess diastolic function due to atrial fibrillation.    Right Ventricle: Normal right ventricular cavity size. Wall thickness is normal. Right ventricle wall motion  is normal. Systolic function is normal.    Left Atrium: Left atrium is moderately dilated.    Tricuspid Valve: There is mild regurgitation.    IVC/SVC: Intermediate venous pressure at 8 mmHg.  . Continue Beta Blocker and monitor clinical status closely. Monitor on telemetry. Patient is on CHF pathway.  Monitor strict Is&Os and daily weights.  Place on fluid restriction of 1.5 L. Cardiology has been consulted. Continue to stress to patient importance of self efficacy and  on diet for CHF. Last BNP reviewed- and noted below   Recent Labs   Lab 08/22/24  0937   BNP 64       CHFrEF compensated  Continue coreg  Ok to hold ARB to avoid low BP

## 2024-08-24 NOTE — DISCHARGE SUMMARY
[facility] - Tele[facility] ([facility] Medicine  Discharge Summary      Patient Name: Jose Miguel Alvarez Jr.  MRN: [id number]  TONE: [id number]  Patient Class: OP- Observation  Admission Date: 8/23/2024  Hospital Length of Stay: 0 days  Discharge Date and Time:  08/24/2024 2:27 PM  Attending Physician: Jannet Doherty MD   Discharging Provider: Jannet Doherty MD  Primary Care Provider: Alexus, Primary Doctor    Primary Care Team: [facility] MEDICINE C    HPI:   Jose Miguel Alvarez Jr. is a 83 y.o. male patient with HTN, hypercholesteremia, CKD, COPD, A-fib, anemia, anticoagulant long term use, bladder cancer who presents to the Emergency Department for evaluation of an abnormal lab with hgb 6.4 ordered by his PCP. He reports generalized weakness, fatigue, dyspnea,  dizziness with standing and peripheral edema that is progressively worsening over the last 2 weeks. He reports burning in his chest unrelieved with home PPI.   He was evaluated by his PCP 2 days ago and underwent blood work and was given lasix po for peripheral edema. Denies blood in stool. He reports compliance with Xarelto.     Upper GI in 4/2024 shows Multiple non-bleeding angioectasias in the stomach. Treated with argon plasma coagulation   SBE 4/2024 shows A single non-bleeding angioectasia in the duodenum. Treated with argon plasma coagulation     * No surgery found *      Hospital Course:   8/24  Admitted for anemia due to GI bleed. Patient denies symptoms other than leg weakness. On xarelto for stroke prophy in setting of afib. Gastroenterology workup in past with AVMs. After discussing with gastroenterology and cardiology, ok to discharge home and continue holding a/c until follow up with gastroenterology and cardiology. Possible watchman device as outpatient as unable to tolerate a/c. Gastroenterology to facilitate outpatient follow up with advanced endoscopist for further management of avms. Discussed risk vs benefits of a/c in setting of afib and  GI bleed. Tolerated blood transfusion. Hb/hct stable. Similar presentation in April 2024.     Urinalysis positive for acute cystitis. Received intravenous rocephin during this hospitalization. Follow urine cultures. Discharge with po antibiotic(s). Follow up outpatient primary care provider. Advised to take iron supplemental after completion of po antibiotic(s).     Patient seen and evaluated by me. Patient was determined to be suitable for discharge. Patient deemed stable for discharge to home with homehealth physical/occupational therapy and nurse practitioner to visit home program.         Goals of Care Treatment Preferences:  Code Status: Full Code    Living Will: Yes              SDOH Screening:  The patient was screened for utility difficulties, food insecurity, transport difficulties, housing insecurity, and interpersonal safety and there were no concerns identified this admission.     Consults:   Consults (From admission, onward)          Status Ordering Provider     Inpatient consult to Social Work/Case Management  Once        Provider:  (Not yet assigned)    Completed ELA SEBASTIAN     Inpatient consult to Cardiology  Once        Provider:  Carlos Flower MD    Completed ELA SEBASTIAN            No new Assessment & Plan notes have been filed under this hospital service since the last note was generated.  Service: Hospital Medicine    Final Active Diagnoses:    Diagnosis Date Noted POA    PRINCIPAL PROBLEM:  Gastrointestinal hemorrhage associated with angiodysplasia of stomach and duodenum [K31.811] 05/29/2021 Yes    PVD (peripheral vascular disease) [I73.9] 10/25/2023 Yes    Chronic combined systolic (congestive) and diastolic (congestive) heart failure [I50.42] 01/26/2023 Yes    Iron deficiency anemia secondary to blood loss (chronic) [D50.0] 05/29/2021 Yes    Stage 3 chronic kidney disease [N18.30] 10/06/2017 Yes    Paroxysmal atrial fibrillation [I48.0] 04/24/2016 Yes    COPD (chronic obstructive pulmonary  disease) [J44.9] 04/24/2016 Yes      Problems Resolved During this Admission:       Discharged Condition: stable    Disposition:     Follow Up:   Follow-up Information       Your Primary Care Provider. Schedule an appointment as soon as possible for a visit in 3 day(s).    Why: hospital follow up             Carlos Flower MD. Go in 1 week(s).    Specialties: Cardiology, Cardiovascular Disease  Why: hospital follow up  Contact information:  [address]  [place] [address]  [phone]               Eduardo Ruff MD. Go in 1 week(s).    Specialty: Gastroenterology  Why: hospital follow up  Contact information:  [address] [place] [address]  [phone]                           Patient Instructions:      CBC Without Differential   Standing Status: Standing Number of Occurrences: 6 Standing Exp. Date: 11/21/25     Ambulatory referral/consult to Hematology / Oncology   Standing Status: Future   Referral Priority: Routine Referral Type: Consultation   Referral Reason: Specialty Services Required   Requested Specialty: Hematology and Oncology   Number of Visits Requested: 1     Ambulatory referral/consult to [facility]     Ambulatory referral/consult to Home Health   Standing Status: Future   Referral Priority: Routine Referral Type: Home Health   Referral Reason: Specialty Services Required   Requested Specialty: Home Health Services   Number of Visits Requested: 1       Significant Diagnostic Studies: Labs: CMP   Recent Labs   Lab 08/23/24  1154      K 3.9      CO2 24   *   BUN 24*   CREATININE 1.4   CALCIUM 9.2   PROT 6.6   ALBUMIN 3.6   BILITOT 0.7   ALKPHOS 62   AST 25   ALT 23   ANIONGAP 11   , CBC   Recent Labs   Lab 08/24/24  0503 08/24/24  0809 08/24/24  1323   WBC 13.09* 14.56* 15.05*   HGB 8.2* 8.5* 8.4*   HCT 28.3* 29.0* 28.7*   * 138* 148*   , INR   Lab Results   Component Value Date    INR 1.3 (H) 08/23/2024    INR 1.9 10/22/2021     INR 1.0 07/23/2019   , and All labs within the past 24 hours have been reviewed  Microbiology: Urine Culture    Lab Results   Component Value Date    LABURIN No growth 04/09/2024     Radiology: X-Ray: CXR: X-Ray Chest PA and Lateral (CXR): No results found for this visit on 08/23/24. and xray cervical spine  CT scan:  ct head without contrast    Pending Diagnostic Studies:       None           Medications:  Reconciled Home Medications:      Medication List        START taking these medications      cefdinir 300 MG capsule  Commonly known as: OMNICEF  Take 1 capsule (300 mg total) by mouth 2 (two) times daily. for 10 days     ferrous sulfate 325 mg (65 mg iron) Tab tablet  Commonly known as: IRON  Take 1 tablet (325 mg total) by mouth every other day.  Start taking on: September 2, 2024            CHANGE how you take these medications      pantoprazole 20 MG tablet  Commonly known as: PROTONIX  Take 2 tablets (40 mg total) by mouth once daily.  What changed: how much to take            CONTINUE taking these medications      amiodarone 200 MG Tab  Commonly known as: PACERONE  Take 1 tablet (200 mg total) by mouth once daily.     atorvastatin 20 MG tablet  Commonly known as: LIPITOR  Take 1 tablet (20 mg total) by mouth every evening.     carvediloL 6.25 MG tablet  Commonly known as: COREG  Take 1 tablet (6.25 mg total) by mouth 2 (two) times daily with meals.     donepeziL 5 MG tablet  Commonly known as: ARICEPT  TAKE 1 TABLET EVERY EVENING     furosemide 40 MG tablet  Commonly known as: LASIX  Take 1 tablet (40 mg total) by mouth 2 (two) times daily.     hydrALAZINE 50 MG tablet  Commonly known as: APRESOLINE  Take 1 tablet (50 mg total) by mouth every 8 (eight) hours.     multivitamin per tablet  Commonly known as: THERAGRAN  Take 1 tablet by mouth once daily.     VITAMIN D3 ORAL  Take 1 tablet by mouth every other day.            STOP taking these medications      XARELTO 15 mg Tab  Generic drug: rivaroxaban               Indwelling Lines/Drains at time of discharge:   Lines/Drains/Airways       None                   Time spent on the discharge of patient: 39 minutes         Jannet Doherty MD  Department of Hospital Medicine  [facility] - Telemetry ([facility])

## 2024-08-24 NOTE — HOSPITAL COURSE
8/24  Admitted for anemia due to GI bleed. Patient denies symptoms other than leg weakness. On xarelto for stroke prophy in setting of afib. Gastroenterology workup in past with AVMs. After discussing with gastroenterology and cardiology, ok to discharge home and continue holding a/c until follow up with gastroenterology and cardiology. Possible watchman device as outpatient as unable to tolerate a/c. Gastroenterology to facilitate outpatient follow up with advanced endoscopist for further management of avms. Discussed risk vs benefits of a/c in setting of afib and GI bleed. Tolerated blood transfusion. Hb/hct stable. Similar presentation in April 2024.     Urinalysis positive for acute cystitis. Received intravenous rocephin during this hospitalization. Follow urine cultures. Discharge with po antibiotic(s). Follow up outpatient primary care provider. Advised to take iron supplemental after completion of po antibiotic(s).     Patient seen and evaluated by me. Patient was determined to be suitable for discharge. Patient deemed stable for discharge to home with homehealth physical/occupational therapy and nurse practitioner to visit home program.

## 2024-08-24 NOTE — PLAN OF CARE
Problem: Adult Inpatient Plan of Care  Goal: Plan of Care Review  Outcome: Progressing  Goal: Patient-Specific Goal (Individualized)  Outcome: Progressing  Goal: Absence of Hospital-Acquired Illness or Injury  Outcome: Progressing  Goal: Optimal Comfort and Wellbeing  Outcome: Progressing  Goal: Readiness for Transition of Care  Outcome: Progressing     Problem: Gastrointestinal Bleeding  Goal: Optimal Coping with Acute Illness  Outcome: Progressing  Goal: Hemostasis  Outcome: Progressing     Problem: Fall Injury Risk  Goal: Absence of Fall and Fall-Related Injury  Outcome: Progressing     Problem: Diabetes Comorbidity  Goal: Blood Glucose Level Within Targeted Range  Outcome: Progressing

## 2024-08-24 NOTE — CONSULTS
SW spoke with pt and referral made to [facility]. HH orders is need. Request sent to NP via secure chat.

## 2024-08-24 NOTE — DISCHARGE INSTRUCTIONS
You have been started on new medication(s) from this hospitalization. Please take as directed and schedule hospital follow up appointments with your primary providers.    Homehealth with physical/occupational therapy order has been ordered. Someone will be in contact.    A nurse practitioner may be contacting you to assess your status post-hospitalization.

## 2024-08-25 LAB
OHS QRS DURATION: 80 MS
OHS QTC CALCULATION: 474 MS

## 2024-08-26 ENCOUNTER — TELEPHONE (OUTPATIENT)
Dept: GASTROENTEROLOGY | Facility: CLINIC | Age: 84
End: 2024-08-26
Payer: MEDICARE

## 2024-08-26 ENCOUNTER — LAB VISIT (OUTPATIENT)
Dept: LAB | Facility: HOSPITAL | Age: 84
End: 2024-08-26
Attending: FAMILY MEDICINE
Payer: MEDICARE

## 2024-08-26 ENCOUNTER — TELEPHONE (OUTPATIENT)
Dept: CARDIOLOGY | Facility: CLINIC | Age: 84
End: 2024-08-26
Payer: MEDICARE

## 2024-08-26 DIAGNOSIS — I48.0 PAROXYSMAL ATRIAL FIBRILLATION: ICD-10-CM

## 2024-08-26 DIAGNOSIS — D64.9 ANEMIA, UNSPECIFIED: ICD-10-CM

## 2024-08-26 DIAGNOSIS — D64.9 ANEMIA, UNSPECIFIED: Primary | ICD-10-CM

## 2024-08-26 LAB
ALBUMIN SERPL BCP-MCNC: 3.2 G/DL (ref 3.5–5.2)
ALP SERPL-CCNC: 59 U/L (ref 55–135)
ALT SERPL W/O P-5'-P-CCNC: 19 U/L (ref 10–44)
ANION GAP SERPL CALC-SCNC: 9 MMOL/L (ref 8–16)
ANISOCYTOSIS BLD QL SMEAR: SLIGHT
AST SERPL-CCNC: 20 U/L (ref 10–40)
BACTERIA UR CULT: ABNORMAL
BASOPHILS # BLD AUTO: 0.02 K/UL (ref 0–0.2)
BASOPHILS NFR BLD: 0.4 % (ref 0–1.9)
BILIRUB SERPL-MCNC: 0.7 MG/DL (ref 0.1–1)
BUN SERPL-MCNC: 25 MG/DL (ref 8–23)
CALCIUM SERPL-MCNC: 8.5 MG/DL (ref 8.7–10.5)
CHLORIDE SERPL-SCNC: 105 MMOL/L (ref 95–110)
CO2 SERPL-SCNC: 24 MMOL/L (ref 23–29)
CREAT SERPL-MCNC: 1.5 MG/DL (ref 0.5–1.4)
DIFFERENTIAL METHOD BLD: ABNORMAL
EOSINOPHIL # BLD AUTO: 0.5 K/UL (ref 0–0.5)
EOSINOPHIL NFR BLD: 9.7 % (ref 0–8)
ERYTHROCYTE [DISTWIDTH] IN BLOOD BY AUTOMATED COUNT: 19.1 % (ref 11.5–14.5)
EST. GFR  (NO RACE VARIABLE): 45.6 ML/MIN/1.73 M^2
GLUCOSE SERPL-MCNC: 200 MG/DL (ref 70–110)
HCT VFR BLD AUTO: 28.7 % (ref 40–54)
HGB BLD-MCNC: 7.9 G/DL (ref 14–18)
IMM GRANULOCYTES # BLD AUTO: 0.03 K/UL (ref 0–0.04)
IMM GRANULOCYTES NFR BLD AUTO: 0.6 % (ref 0–0.5)
LYMPHOCYTES # BLD AUTO: 0.5 K/UL (ref 1–4.8)
LYMPHOCYTES NFR BLD: 8.6 % (ref 18–48)
MCH RBC QN AUTO: 20.4 PG (ref 27–31)
MCHC RBC AUTO-ENTMCNC: 27.5 G/DL (ref 32–36)
MCV RBC AUTO: 74 FL (ref 82–98)
MONOCYTES # BLD AUTO: 0.6 K/UL (ref 0.3–1)
MONOCYTES NFR BLD: 11.4 % (ref 4–15)
NEUTROPHILS # BLD AUTO: 3.7 K/UL (ref 1.8–7.7)
NEUTROPHILS NFR BLD: 69.3 % (ref 38–73)
NRBC BLD-RTO: 0 /100 WBC
OHS QRS DURATION: 84 MS
OHS QTC CALCULATION: 477 MS
PLATELET # BLD AUTO: 128 K/UL (ref 150–450)
PLATELET BLD QL SMEAR: ABNORMAL
PMV BLD AUTO: 12.1 FL (ref 9.2–12.9)
POIKILOCYTOSIS BLD QL SMEAR: SLIGHT
POTASSIUM SERPL-SCNC: 3.7 MMOL/L (ref 3.5–5.1)
PROT SERPL-MCNC: 5.9 G/DL (ref 6–8.4)
RBC # BLD AUTO: 3.87 M/UL (ref 4.6–6.2)
SODIUM SERPL-SCNC: 138 MMOL/L (ref 136–145)
SPHEROCYTES BLD QL SMEAR: ABNORMAL
WBC # BLD AUTO: 5.36 K/UL (ref 3.9–12.7)

## 2024-08-26 PROCEDURE — 85025 COMPLETE CBC W/AUTO DIFF WBC: CPT | Performed by: FAMILY MEDICINE

## 2024-08-26 PROCEDURE — 80053 COMPREHEN METABOLIC PANEL: CPT | Performed by: FAMILY MEDICINE

## 2024-08-26 RX ORDER — ATORVASTATIN CALCIUM 20 MG/1
20 TABLET, FILM COATED ORAL NIGHTLY
Qty: 90 TABLET | Refills: 0 | Status: SHIPPED | OUTPATIENT
Start: 2024-08-26

## 2024-08-26 RX ORDER — DONEPEZIL HYDROCHLORIDE 5 MG/1
5 TABLET, FILM COATED ORAL NIGHTLY
Qty: 90 TABLET | Refills: 3 | Status: SHIPPED | OUTPATIENT
Start: 2024-08-26

## 2024-08-26 NOTE — TELEPHONE ENCOUNTER
----- Message from Eduardo Ruff MD sent at 8/26/2024 11:31 AM CDT -----  Regarding: FW: Recurrent symptomatic anemia, known AVMs  Hi Alexandra!   Please schedule this patient referral to see us in clinic in 1-2 weeks.   Thanks!  ----- Message -----  From: Myrtle Salcedo MD  Sent: 8/24/2024   2:07 PM CDT  To: Eduardo Ruff MD  Subject: Recurrent symptomatic anemia, known AVMs         Dr. Ruff    This patient has been worked up extensively in [place] for his symptomatic anemia. He is on Xarelto for afib and wants to undergo Watchman's but it keeps getting cancelled because of the symptomatic anemia requiring blood transfusion and hospitalization.    Extensive GI work up included:  2017 EGD/Colon - results unknown    2021   - EGD: negative  - Colon: AVMs in prx ascending colon s/p cautery   - VCE: oozing in the duodenum  - EGD: gastric polyps, otherwise negative    2024   - EGD: showed multiple gastric AVMs s/p APC.   - VCE: showed a single AVM in small bowel  - SBE: found the AVM and treated with APC    He was readmitted a few days ago. Xarelto held. Transfused. Seen by CV. Xarelto held for next 2 weeks. At this point I think he needs investigation deep in the small bowel. Likely needs a single or double balloon enteroscopy. I'd like to send him to you. Can your office arrange fairly soon?     Thanks  AN

## 2024-08-26 NOTE — TELEPHONE ENCOUNTER
----- Message from Mona Sharp sent at 8/26/2024  2:55 PM CDT -----  Contact: Hector  Type:  Sooner Appointment Request    Caller is requesting a sooner appointment.  Caller declined first available appointment listed below.  Caller will not accept being placed on the waitlist and is requesting a message be sent to doctor.  Name of Caller:pt son   When is the first available appointment?  Symptoms:ER follow up  Would the patient rather a call back or a response [facility]?   Best Call Back Number:  Additional Information:

## 2024-08-26 NOTE — TELEPHONE ENCOUNTER
Clinic appt scheduled with Hector, son , on Wednesday, September 11, 2024 at 10am.  Clinic address and instructions to check in on 1st floor MOB.

## 2024-08-28 ENCOUNTER — OFFICE VISIT (OUTPATIENT)
Dept: CARDIOLOGY | Facility: CLINIC | Age: 84
End: 2024-08-28
Payer: MEDICARE

## 2024-08-28 ENCOUNTER — OFFICE VISIT (OUTPATIENT)
Dept: FAMILY MEDICINE | Facility: CLINIC | Age: 84
End: 2024-08-28
Attending: FAMILY MEDICINE
Payer: MEDICARE

## 2024-08-28 VITALS
OXYGEN SATURATION: 95 % | WEIGHT: 228.81 LBS | BODY MASS INDEX: 30.99 KG/M2 | SYSTOLIC BLOOD PRESSURE: 130 MMHG | HEART RATE: 64 BPM | TEMPERATURE: 97 F | HEIGHT: 72 IN | DIASTOLIC BLOOD PRESSURE: 72 MMHG

## 2024-08-28 VITALS
DIASTOLIC BLOOD PRESSURE: 54 MMHG | WEIGHT: 226.31 LBS | HEART RATE: 57 BPM | HEIGHT: 72 IN | SYSTOLIC BLOOD PRESSURE: 128 MMHG | BODY MASS INDEX: 30.65 KG/M2 | OXYGEN SATURATION: 97 %

## 2024-08-28 DIAGNOSIS — I10 PRIMARY HYPERTENSION: ICD-10-CM

## 2024-08-28 DIAGNOSIS — E78.00 HYPERCHOLESTEREMIA: ICD-10-CM

## 2024-08-28 DIAGNOSIS — E11.69 DM TYPE 2 WITH DIABETIC DYSLIPIDEMIA: ICD-10-CM

## 2024-08-28 DIAGNOSIS — D50.0 IRON DEFICIENCY ANEMIA DUE TO CHRONIC BLOOD LOSS: ICD-10-CM

## 2024-08-28 DIAGNOSIS — I73.9 PVD (PERIPHERAL VASCULAR DISEASE): ICD-10-CM

## 2024-08-28 DIAGNOSIS — I48.0 PAROXYSMAL ATRIAL FIBRILLATION: ICD-10-CM

## 2024-08-28 DIAGNOSIS — I50.42 CHRONIC COMBINED SYSTOLIC (CONGESTIVE) AND DIASTOLIC (CONGESTIVE) HEART FAILURE: ICD-10-CM

## 2024-08-28 DIAGNOSIS — K31.811 GASTROINTESTINAL HEMORRHAGE ASSOCIATED WITH ANGIODYSPLASIA OF STOMACH AND DUODENUM: Primary | ICD-10-CM

## 2024-08-28 DIAGNOSIS — K31.811 GASTROINTESTINAL HEMORRHAGE ASSOCIATED WITH ANGIODYSPLASIA OF STOMACH AND DUODENUM: ICD-10-CM

## 2024-08-28 DIAGNOSIS — K55.21 AVM (ARTERIOVENOUS MALFORMATION) OF SMALL BOWEL, ACQUIRED WITH HEMORRHAGE: ICD-10-CM

## 2024-08-28 DIAGNOSIS — E78.5 DM TYPE 2 WITH DIABETIC DYSLIPIDEMIA: ICD-10-CM

## 2024-08-28 DIAGNOSIS — I70.543: ICD-10-CM

## 2024-08-28 DIAGNOSIS — N18.30 STAGE 3 CHRONIC KIDNEY DISEASE, UNSPECIFIED WHETHER STAGE 3A OR 3B CKD: ICD-10-CM

## 2024-08-28 DIAGNOSIS — E11.9 TYPE 2 DIABETES MELLITUS WITHOUT COMPLICATION, WITHOUT LONG-TERM CURRENT USE OF INSULIN: ICD-10-CM

## 2024-08-28 DIAGNOSIS — I70.533: ICD-10-CM

## 2024-08-28 DIAGNOSIS — I48.0 PAROXYSMAL ATRIAL FIBRILLATION: Primary | ICD-10-CM

## 2024-08-28 PROCEDURE — 1157F ADVNC CARE PLAN IN RCRD: CPT | Mod: CPTII,S$GLB,, | Performed by: FAMILY MEDICINE

## 2024-08-28 PROCEDURE — 1157F ADVNC CARE PLAN IN RCRD: CPT | Mod: CPTII,S$GLB,, | Performed by: INTERNAL MEDICINE

## 2024-08-28 PROCEDURE — 3078F DIAST BP <80 MM HG: CPT | Mod: CPTII,S$GLB,, | Performed by: INTERNAL MEDICINE

## 2024-08-28 PROCEDURE — 99215 OFFICE O/P EST HI 40 MIN: CPT | Mod: S$GLB,,, | Performed by: INTERNAL MEDICINE

## 2024-08-28 PROCEDURE — 1160F RVW MEDS BY RX/DR IN RCRD: CPT | Mod: CPTII,S$GLB,, | Performed by: INTERNAL MEDICINE

## 2024-08-28 PROCEDURE — 3288F FALL RISK ASSESSMENT DOCD: CPT | Mod: CPTII,S$GLB,, | Performed by: FAMILY MEDICINE

## 2024-08-28 PROCEDURE — 3074F SYST BP LT 130 MM HG: CPT | Mod: CPTII,S$GLB,, | Performed by: INTERNAL MEDICINE

## 2024-08-28 PROCEDURE — 99214 OFFICE O/P EST MOD 30 MIN: CPT | Mod: S$GLB,,, | Performed by: FAMILY MEDICINE

## 2024-08-28 PROCEDURE — 99999 PR PBB SHADOW E&M-EST. PATIENT-LVL IV: CPT | Mod: PBBFAC,,, | Performed by: INTERNAL MEDICINE

## 2024-08-28 PROCEDURE — 1101F PT FALLS ASSESS-DOCD LE1/YR: CPT | Mod: CPTII,S$GLB,, | Performed by: INTERNAL MEDICINE

## 2024-08-28 PROCEDURE — 99999 PR PBB SHADOW E&M-EST. PATIENT-LVL III: CPT | Mod: PBBFAC,,, | Performed by: FAMILY MEDICINE

## 2024-08-28 PROCEDURE — 1160F RVW MEDS BY RX/DR IN RCRD: CPT | Mod: CPTII,S$GLB,, | Performed by: FAMILY MEDICINE

## 2024-08-28 PROCEDURE — 1101F PT FALLS ASSESS-DOCD LE1/YR: CPT | Mod: CPTII,S$GLB,, | Performed by: FAMILY MEDICINE

## 2024-08-28 PROCEDURE — 1126F AMNT PAIN NOTED NONE PRSNT: CPT | Mod: CPTII,S$GLB,, | Performed by: INTERNAL MEDICINE

## 2024-08-28 PROCEDURE — 3288F FALL RISK ASSESSMENT DOCD: CPT | Mod: CPTII,S$GLB,, | Performed by: INTERNAL MEDICINE

## 2024-08-28 PROCEDURE — 1126F AMNT PAIN NOTED NONE PRSNT: CPT | Mod: CPTII,S$GLB,, | Performed by: FAMILY MEDICINE

## 2024-08-28 PROCEDURE — 1159F MED LIST DOCD IN RCRD: CPT | Mod: CPTII,S$GLB,, | Performed by: FAMILY MEDICINE

## 2024-08-28 PROCEDURE — 1159F MED LIST DOCD IN RCRD: CPT | Mod: CPTII,S$GLB,, | Performed by: INTERNAL MEDICINE

## 2024-08-28 PROCEDURE — 3075F SYST BP GE 130 - 139MM HG: CPT | Mod: CPTII,S$GLB,, | Performed by: FAMILY MEDICINE

## 2024-08-28 PROCEDURE — 3078F DIAST BP <80 MM HG: CPT | Mod: CPTII,S$GLB,, | Performed by: FAMILY MEDICINE

## 2024-08-28 NOTE — PROGRESS NOTES
Subjective:       Patient ID: Jose Miguel Alvarez Jr. is a 83 y.o. male.    Chief Complaint: Hospital Follow Up    Transitional Care Note    Family and/or Caretaker present at visit?  Yes.  Diagnostic tests reviewed/disposition: I have reviewed all completed as well as pending diagnostic tests at the time of discharge.  Disease/illness education: yes  Home health/community services discussion/referrals: Patient does not have home health established from hospital visit.  They do not need home health.  If needed, we will set up home health for the patient.   Establishment or re-establishment of referral orders for community resources: No other necessary community resources.   Discussion with other health care providers: No discussion with other health care providers necessary.             83 y old male with HTN , dlp , a fib , CKD , copd , a fib , stomach telangiectasias , bladder ca  f.u on Observation stay at [facility] on 8/23 due to symptomatic anemia : weakness, fatigue, CP , dizziness . He was transfused 2 U of PRBC. Card and GI were consulted . Melody was d/c . He was scheduled to see EP to discuss watchman device and GI to discuss AVM . He was also started on Rocephin giving his Urine culture results . He was advised to start oral iron after finishing abx  .He has koko with Hematology tomorrow . Denies sob , cp , weakness. LYLE has improved . Evaluated by Card today . Started on Eliquis          Review of Systems   Constitutional: Negative.    HENT: Negative.     Eyes: Negative.    Respiratory: Negative.     Cardiovascular: Negative.    Gastrointestinal: Negative.    Genitourinary: Negative.    Musculoskeletal: Negative.    Skin: Negative.    Hematological: Negative.        Objective:      Physical Exam  Constitutional:       General: He is not in acute distress.     Appearance: He is well-developed. He is not diaphoretic.   HENT:      Head: Normocephalic and atraumatic.      Right Ear: External ear normal.      Left  Ear: External ear normal.      Nose: Nose normal.      Mouth/Throat:      Pharynx: No oropharyngeal exudate.   Eyes:      General: No scleral icterus.        Right eye: No discharge.         Left eye: No discharge.      Conjunctiva/sclera: Conjunctivae normal.      Pupils: Pupils are equal, round, and reactive to light.   Neck:      Thyroid: No thyromegaly.      Vascular: No JVD.      Trachea: No tracheal deviation.   Cardiovascular:      Rate and Rhythm: Normal rate and regular rhythm.      Heart sounds: Normal heart sounds. No murmur heard.     No friction rub. No gallop.   Pulmonary:      Effort: Pulmonary effort is normal. No respiratory distress.      Breath sounds: Normal breath sounds. No stridor. No wheezing or rales.   Chest:      Chest wall: No tenderness.   Abdominal:      General: Bowel sounds are normal. There is no distension.      Palpations: Abdomen is soft.      Tenderness: There is no abdominal tenderness. There is no guarding or rebound.   Musculoskeletal:         General: No tenderness. Normal range of motion.      Cervical back: Normal range of motion and neck supple.   Lymphadenopathy:      Cervical: No cervical adenopathy.   Skin:     General: Skin is warm and dry.      Coloration: Skin is not pale.      Findings: No erythema or rash.   Neurological:      Mental Status: He is alert and oriented to person, place, and time.      Cranial Nerves: No cranial nerve deficit.      Motor: No abnormal muscle tone.      Coordination: Coordination normal.      Deep Tendon Reflexes: Reflexes are normal and symmetric. Reflexes normal.   Psychiatric:         Behavior: Behavior normal.         Thought Content: Thought content normal.         Judgment: Judgment normal.         Assessment:       1. Gastrointestinal hemorrhage associated with angiodysplasia of stomach and duodenum    2. Chronic combined systolic (congestive) and diastolic (congestive) heart failure    3. Iron deficiency anemia due to chronic  blood loss    4. Stage 3 chronic kidney disease, unspecified whether stage 3a or 3b CKD    5. Paroxysmal atrial fibrillation        Plan:     Jose Miguel was seen today for hospital follow up.    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

## 2024-08-28 NOTE — PROGRESS NOTES
Subjective:   Patient ID:  Jose Miguel Alvarez Jr. is a 83 y.o. male who presents for follow up of No chief complaint on file.      84 yo male, HFU  PMH PAF s/p DCCV few yrs ago and RFA at [facility] in 2018 HTN HLD COPD quit smoking  Prior cardiologist Dr. Tanesha Coffman at [facility], h/o COVID 19 in . Hospitalization over a month, anemia, h/o bladder cancer on chemo and XRT, no h./o DM and stroke    10/2021 admitted to severe anemia and HGB dropped to 4.2 and had 4 units of PRBC. Felt better  This was 3rd episode of severe anemia in the past two yrs  No chest pain, RECINOS dizziness palpitation  No active bleeding     visit  S/p cystoscopy in  bladder cancer clear  No BP check at home. Mod compliance. No active bleeding  No chest pain dyspnea headache, palpitation dizziness faint and leg swelling   CMP TSH and chest CT reviewed     visit  No h/o MI DM and CVA chronic SOB.   No recurrent palpitation. No active bleeding. No chest pain dizziness and faint  Plays golf daily. No smoking and drinking  EKG today NSR  CMP TSH and chest done wi one yr. Chest ct showed emphysema  PFT pending    04/23 visit  Ekg NSR LVH   Mo chest pain palpitation dizziness  No active bleeding   Bladder cancer stable    10/23 visit  BP high today. No dizziness faint chest pain.  Plays golf twice a week.   Ekg NSR  LVH    05/24 visit  04/24 admitted for afib with RVR and severe anemia. EGD showed gastric AVM rx with APC. BNP up to 500's and Rx with diuresis and Cr elevated and had kidney stone.   Now BNP back to 88 and Cr back to 1.3  Plan to have TURP by Dr. Garcia  On SR and remains leg swelling. No orthopnea   EKG NSR    06/24 visit  C/o some hematuria after added xarelto. Urology eval pending  No chest pain dyspnea palpitation orthopnea  Leg swelling improved, golf weekly  AFIB [id number] score of 4      Interval history  08/23/24 admitted for severe anemia from pcp's office. HGB 6.4 had 2 unites of  PRBC transfusion  Never saw the blood in the stool.  Upper GI in 4/2024 shows Multiple non-bleeding angioectasias in the stomach. Treated with argon plasma coagulation   SBE 4/2024 shows A single non-bleeding angioectasia in the duodenum. Treated with argon plasma coagulation.  GI consult and advised advanced scopy eval at [facility]. Scheduled on 09/11/24  Held xarelto and d/c home  Today feels stable. No active bleeding.                                   Past Medical History:   Diagnosis Date    Anemia     Anticoagulant long-term use     Atrial fibrillation     Bladder cancer     Bladder tumor     CKD (chronic kidney disease), stage III     COPD (chronic obstructive pulmonary disease)     pt denies    Encounter for blood transfusion     Hematuria     History of 2019 novel coronavirus disease (COVID-19)     Hypercholesteremia     Hypertension        Past Surgical History:   Procedure Laterality Date    bladder tumor removal      CARDIAC ELECTROPHYSIOLOGY MAPPING AND ABLATION      CARDIOVERSION      several    CATARACT EXTRACTION Bilateral     cataract removal with IOL implants Bilateral     CHOLECYSTECTOMY      CYSTOSCOPIC LITHOLAPAXY N/A 12/28/2021    Procedure: CYSTOLITHOLAPAXY;  Surgeon: Medardo Garcia MD;  Location: [facility] OR;  Service: Urology;  Laterality: N/A;    CYSTOSCOPIC LITHOLAPAXY N/A 5/14/2024    Procedure: CYSTOLITHOLAPAXY;  Surgeon: Medardo Garcia MD;  Location: [facility] OR;  Service: Urology;  Laterality: N/A;    CYSTOSCOPY      CYSTOSCOPY WITH BIOPSY OF BLADDER Right 12/28/2021    Procedure: CYSTOSCOPY, WITH BLADDER BIOPSY, WITH FULGURATION IF INDICATED;  Surgeon: Medardo Garcia MD;  Location: [facility] OR;  Service: Urology;  Laterality: Right;    ESOPHAGOGASTRODUODENOSCOPY N/A 10/25/2021    Procedure: Small Jason Enteroscopy (SBE);  Surgeon: Isabelle Griffin MD;  Location: [facility];  Service: Endoscopy;  Laterality: N/A;    ESOPHAGOGASTRODUODENOSCOPY N/A 12/13/2021    Procedure: EGD  (ESOPHAGOGASTRODUODENOSCOPY);  Surgeon: Troy Polanco MD;  Location: [facility];  Service: Endoscopy;  Laterality: N/A;    ESOPHAGOGASTRODUODENOSCOPY N/A 2024    Procedure: EGD (ESOPHAGOGASTRODUODENOSCOPY);  Surgeon: Myrtle Salcedo MD;  Location: [facility];  Service: Endoscopy;  Laterality: N/A;    ESOPHAGOGASTRODUODENOSCOPY N/A 2024    Procedure: EGD (ESOPHAGOGASTRODUODENOSCOPY);  Surgeon: Myrtle Salcedo MD;  Location: [facility];  Service: Endoscopy;  Laterality: N/A;  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    EYE SURGERY      INTRALUMINAL GASTROINTESTINAL TRACT IMAGING VIA CAPSULE N/A 2021    Procedure: IMAGING PROCEDURE, GI TRACT, INTRALUMINAL, VIA CAPSULE;  Surgeon: Larry Jones RN;  Location: [facility];  Service: Endoscopy;  Laterality: N/A;    INTRALUMINAL GASTROINTESTINAL TRACT IMAGING VIA CAPSULE N/A 2024    Procedure: IMAGING PROCEDURE, GI TRACT, INTRALUMINAL, VIA CAPSULE;  Surgeon: Larry Jones RN;  Location: [facility];  Service: Endoscopy;  Laterality: N/A;    TRANSURETHRAL RESECTION OF BLADDER TUMOR BY BIPOLAR CAUTERY N/A 2019    Procedure: TURBT, USING BIPOLAR CAUTERY;  Surgeon: Ritesh Marques MD;  Location: [facility];  Service: Urology;  Laterality: N/A;    TRANSURETHRAL RESECTION OF PROSTATE N/A 2024    Procedure: TURP (TRANSURETHRAL RESECTION OF PROSTATE);  Surgeon: Medardo Garcia MD;  Location: [facility];  Service: Urology;  Laterality: N/A;       Social History     Tobacco Use    Smoking status: Former     Current packs/day: 0.00     Average packs/day: 2.5 packs/day for 20.0 years (50.0 ttl pk-yrs)     Types: Cigarettes     Start date:      Quit date:      Years since quittin.7    Smokeless tobacco: Never   Substance Use Topics    Alcohol use: Never     Alcohol/week: 7.0 standard drinks of alcohol     Types: 7 Cans of beer per week     Comment: HOLD 72HRS    Drug use: No       Family History   Problem Relation Name Age of  Onset    Heart disease Father      Hypertension Father      Heart disease Brother           ROS    Objective:   Physical Exam  HENT:      Head: Normocephalic.   Eyes:      Pupils: Pupils are equal, round, and reactive to light.   Neck:      Thyroid: No thyromegaly.      Vascular: Normal carotid pulses. No carotid bruit or JVD.   Cardiovascular:      Rate and Rhythm: Normal rate and regular rhythm. Occasional Extrasystoles are present.     Chest Wall: PMI is not displaced.      Pulses: Normal pulses.      Heart sounds: Normal heart sounds. No murmur heard.     No gallop. No S3 sounds.   Pulmonary:      Effort: No respiratory distress.      Breath sounds: Normal breath sounds. No stridor.   Abdominal:      General: Bowel sounds are normal.      Palpations: Abdomen is soft.      Tenderness: There is no abdominal tenderness. There is no rebound.   Skin:     Findings: No rash.   Neurological:      Mental Status: He is alert and oriented to person, place, and time.   Psychiatric:         Behavior: Behavior normal.         Lab Results   Component Value Date    CHOL 96 (L) 09/04/2024    CHOL 120 01/26/2023    CHOL 73 (L) 05/28/2021     Lab Results   Component Value Date    HDL 42 09/04/2024    HDL 42 01/26/2023    HDL 39 (L) 05/28/2021     Lab Results   Component Value Date    LDLCALC 41.8 (L) 09/04/2024    LDLCALC 44.4 (L) 01/26/2023    LDLCALC 22.4 (L) 05/28/2021     Lab Results   Component Value Date    TRIG 61 09/04/2024    TRIG 168 (H) 01/26/2023    TRIG 58 05/28/2021     Lab Results   Component Value Date    CHOLHDL 43.8 09/04/2024    CHOLHDL 35.0 01/26/2023    CHOLHDL 53.4 (H) 05/28/2021       Chemistry        Component Value Date/Time     08/26/2024 1048    K 3.7 08/26/2024 1048     08/26/2024 1048    CO2 24 08/26/2024 1048    BUN 25 (H) 08/26/2024 1048    CREATININE 1.5 (H) 08/26/2024 1048     (H) 08/26/2024 1048        Component Value Date/Time    CALCIUM 8.5 (L) 08/26/2024 1048    ALKPHOS 59  08/26/2024 1048    AST 20 08/26/2024 1048    AST 29 04/24/2016 1145    ALT 19 08/26/2024 1048    BILITOT 0.7 08/26/2024 1048    ESTGFRAFRICA >60 12/08/2021 2007    EGFRNONAA 56 (A) 12/08/2021 2007          Lab Results   Component Value Date    HGBA1C 6.2 (H) 04/09/2024     Lab Results   Component Value Date    TSH 3.937 04/01/2024     Lab Results   Component Value Date    INR 1.3 (H) 08/23/2024    INR 1.9 10/22/2021    INR 1.0 07/23/2019     Lab Results   Component Value Date    WBC 3.40 (L) 09/04/2024    HGB 8.3 (L) 09/04/2024    HCT 30.1 (L) 09/04/2024    MCV 75 (L) 09/04/2024     09/04/2024     BMP  Sodium   Date Value Ref Range Status   08/26/2024 138 136 - 145 mmol/L Final     Potassium   Date Value Ref Range Status   08/26/2024 3.7 3.5 - 5.1 mmol/L Final     Chloride   Date Value Ref Range Status   08/26/2024 105 95 - 110 mmol/L Final     CO2   Date Value Ref Range Status   08/26/2024 24 23 - 29 mmol/L Final     BUN   Date Value Ref Range Status   08/26/2024 25 (H) 8 - 23 mg/dL Final     Creatinine   Date Value Ref Range Status   08/26/2024 1.5 (H) 0.5 - 1.4 mg/dL Final     Calcium   Date Value Ref Range Status   08/26/2024 8.5 (L) 8.7 - 10.5 mg/dL Final     Anion Gap   Date Value Ref Range Status   08/26/2024 9 8 - 16 mmol/L Final     eGFR if    Date Value Ref Range Status   12/08/2021 >60 >60 mL/min/1.73 m^2 Final     eGFR if non    Date Value Ref Range Status   12/08/2021 56 (A) >60 mL/min/1.73 m^2 Final     Comment:     Calculation used to obtain the estimated glomerular filtration  rate (eGFR) is the CKD-EPI equation.        BNP  @LABRCNTIP(BNP,BNPTRIAGEBLO)@  @LABRCNTIP(troponini)@  CrCl cannot be calculated (Patient's most recent lab result is older than the maximum 7 days allowed.).  No results found in the last 24 hours.  No results found in the last 24 hours.  No results found in the last 24 hours.    Assessment:      1. Paroxysmal atrial fibrillation    2.  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 Chronic combined systolic (congestive) and diastolic (congestive) heart failure    4. DM type 2 with diabetic dyslipidemia    5. Hypercholesteremia    6. Primary hypertension    7. PVD (peripheral vascular disease)    8. Stage 3 chronic kidney disease, unspecified whether stage 3a or 3b CKD    9. Iron deficiency anemia due to chronic blood loss    10. Type 2 diabetes mellitus without complication, without long-term current use of insulin    11. AVM (arteriovenous malformation) of small bowel, acquired with hemorrhage    12. Gastrointestinal hemorrhage associated with angiodysplasia of stomach and duodenum        Plan:   Add eliquis 2.5 mg bid for For systemic thromboembolism prophylaxis.  Discussed the benefits and risks of NOAC.   and CBC in 1 week  F/u GI as scheduled  Refer to EP for watchman   Continue amio lipitor coreg hydralazine lasix   RTC in

## 2024-08-29 ENCOUNTER — OFFICE VISIT (OUTPATIENT)
Dept: HEMATOLOGY/ONCOLOGY | Facility: CLINIC | Age: 84
End: 2024-08-29
Attending: FAMILY MEDICINE
Payer: MEDICARE

## 2024-08-29 DIAGNOSIS — R79.89 LOW SERUM TOTAL PROTEIN LEVEL: Primary | ICD-10-CM

## 2024-08-29 DIAGNOSIS — C67.9 MALIGNANT NEOPLASM OF URINARY BLADDER, UNSPECIFIED SITE: ICD-10-CM

## 2024-08-29 DIAGNOSIS — Z12.5 ENCOUNTER FOR SCREENING FOR MALIGNANT NEOPLASM OF PROSTATE: ICD-10-CM

## 2024-08-29 DIAGNOSIS — D50.9 MICROCYTIC ANEMIA: ICD-10-CM

## 2024-08-29 DIAGNOSIS — D69.6 THROMBOCYTOPENIA: ICD-10-CM

## 2024-08-29 DIAGNOSIS — D50.0 IRON DEFICIENCY ANEMIA DUE TO CHRONIC BLOOD LOSS: ICD-10-CM

## 2024-08-29 RX ORDER — SODIUM CHLORIDE 0.9 % (FLUSH) 0.9 %
10 SYRINGE (ML) INJECTION
OUTPATIENT
Start: 2024-08-29

## 2024-08-29 RX ORDER — EPINEPHRINE 0.3 MG/.3ML
0.3 INJECTION SUBCUTANEOUS ONCE AS NEEDED
OUTPATIENT
Start: 2024-08-29

## 2024-08-29 RX ORDER — DIPHENHYDRAMINE HYDROCHLORIDE 50 MG/ML
50 INJECTION INTRAMUSCULAR; INTRAVENOUS ONCE AS NEEDED
OUTPATIENT
Start: 2024-08-29

## 2024-08-29 NOTE — PROGRESS NOTES
The patient location is: home  The chief complaint leading to consultation is: fatigue    Visit type: audiovisual    Face to Face time with patient: 45  60 minutes of total time spent on the encounter, which includes face to face time and non-face to face time preparing to see the patient (eg, review of tests), Obtaining and/or reviewing separately obtained history, Documenting clinical information in the electronic or other health record, Independently interpreting results (not separately reported) and communicating results to the patient/family/caregiver, or Care coordination (not separately reported).     Each patient to whom he or she provides medical services by telemedicine is:  (1) informed of the relationship between the physician and patient and the respective role of any other health care provider with respect to management of the patient; and (2) notified that he or she may decline to receive medical services by telemedicine and may withdraw from such care at any time.    Patient ID: Jose Miguel Alvarez Jr. is a 83 y.o. male.    Chief Complaint: fatigue    HPI:  82 yo male presents to the clinic as a new patient for further eval and recommendations - previously followed in the clinic for anemia with h/o bladder cancer in 2019 s/p resection. Has a h/o AVMs with previous gi bleed.  Has received multiple IV iron infusions as well as multiple blood transfusions in the past. Stopped taking iron due to currently being treated for UTI with omnicief.    Is currently taking Eliquis 2.5 mg PO bid.  Was on Xarelto previously for treatment of A. fib    Since 2016 has on/off low platelets now persistently low.  Hgb currently 7.9 mcv 128.  Lowest count noted 99 K.    Previously followed in the clinic by Dr. King.  Today is the first time I am evaluating/assessing the patient.      Patient is accompanied by his daughter-in-law.       I have reviewed all of the patient's relevant lab work available in the medical  record and have utilized this in my evaluation and management recommendations today.      Social History     Socioeconomic History    Marital status:    Tobacco Use    Smoking status: Former     Current packs/day: 0.00     Average packs/day: 2.5 packs/day for 20.0 years (50.0 ttl pk-yrs)     Types: Cigarettes     Start date:      Quit date:      Years since quittin.6    Smokeless tobacco: Never   Substance and Sexual Activity    Alcohol use: Never     Alcohol/week: 7.0 standard drinks of alcohol     Types: 7 Cans of beer per week     Comment: HOLD 72HRS    Drug use: No    Sexual activity: Not Currently     Social Determinants of Health     Financial Resource Strain: Low Risk  (2024)    Overall Financial Resource Strain (CARDIA)     Difficulty of Paying Living Expenses: Not hard at all   Food Insecurity: No Food Insecurity (2024)    Hunger Vital Sign     Worried About Running Out of Food in the Last Year: Never true     Ran Out of Food in the Last Year: Never true   Transportation Needs: No Transportation Needs (2024)    TRANSPORTATION NEEDS     Transportation : No   Physical Activity: Sufficiently Active (2024)    Exercise Vital Sign     Days of Exercise per Week: 6 days     Minutes of Exercise per Session: 150+ min   Stress: No Stress Concern Present (2024)    [employer] [place] - Occupational Stress Questionnaire     Feeling of Stress : Not at all   Housing Stability: Low Risk  (2024)    Housing Stability Vital Sign     Unable to Pay for Housing in the Last Year: No     Homeless in the Last Year: No       Family History   Problem Relation Name Age of Onset    Heart disease Father      Hypertension Father      Heart disease Brother         Past Surgical History:   Procedure Laterality Date    bladder tumor removal      CARDIAC ELECTROPHYSIOLOGY MAPPING AND ABLATION      CARDIOVERSION      several    CATARACT EXTRACTION Bilateral     cataract  removal with IOL implants Bilateral     CHOLECYSTECTOMY      CYSTOSCOPIC LITHOLAPAXY N/A 12/28/2021    Procedure: CYSTOLITHOLAPAXY;  Surgeon: Medardo Garcia MD;  Location: [facility];  Service: Urology;  Laterality: N/A;    CYSTOSCOPIC LITHOLAPAXY N/A 5/14/2024    Procedure: CYSTOLITHOLAPAXY;  Surgeon: Medardo Garcia MD;  Location: [facility];  Service: Urology;  Laterality: N/A;    CYSTOSCOPY      CYSTOSCOPY WITH BIOPSY OF BLADDER Right 12/28/2021    Procedure: CYSTOSCOPY, WITH BLADDER BIOPSY, WITH FULGURATION IF INDICATED;  Surgeon: Medardo Garcia MD;  Location: [facility];  Service: Urology;  Laterality: Right;    ESOPHAGOGASTRODUODENOSCOPY N/A 10/25/2021    Procedure: Small Jason Enteroscopy (SBE);  Surgeon: Isabelle Griffin MD;  Location: [facility];  Service: Endoscopy;  Laterality: N/A;    ESOPHAGOGASTRODUODENOSCOPY N/A 12/13/2021    Procedure: EGD (ESOPHAGOGASTRODUODENOSCOPY);  Surgeon: Troy Polanco MD;  Location: [facility];  Service: Endoscopy;  Laterality: N/A;    ESOPHAGOGASTRODUODENOSCOPY N/A 4/5/2024    Procedure: EGD (ESOPHAGOGASTRODUODENOSCOPY);  Surgeon: Myrtle Salcedo MD;  Location: [facility];  Service: Endoscopy;  Laterality: N/A;    ESOPHAGOGASTRODUODENOSCOPY N/A 4/22/2024    Procedure: EGD (ESOPHAGOGASTRODUODENOSCOPY);  Surgeon: Myrtle Salcedo MD;  Location: [facility];  Service: Endoscopy;  Laterality: N/A;  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    EYE SURGERY      INTRALUMINAL GASTROINTESTINAL TRACT IMAGING VIA CAPSULE N/A 12/6/2021    Procedure: IMAGING PROCEDURE, GI TRACT, INTRALUMINAL, VIA CAPSULE;  Surgeon: Larry Jones RN;  Location: [facility];  Service: Endoscopy;  Laterality: N/A;    INTRALUMINAL GASTROINTESTINAL TRACT IMAGING VIA CAPSULE N/A 4/18/2024    Procedure: IMAGING PROCEDURE, GI TRACT, INTRALUMINAL, VIA CAPSULE;  Surgeon: Larry Jones RN;  Location: [facility] ENDO;  Service: Endoscopy;  Laterality: N/A;    TRANSURETHRAL RESECTION OF BLADDER  TUMOR BY BIPOLAR CAUTERY N/A 7/23/2019    Procedure: TURBT, USING BIPOLAR CAUTERY;  Surgeon: Ritesh Marques MD;  Location: [facility] OR;  Service: Urology;  Laterality: N/A;    TRANSURETHRAL RESECTION OF PROSTATE N/A 5/14/2024    Procedure: TURP (TRANSURETHRAL RESECTION OF PROSTATE);  Surgeon: Medardo Garcia MD;  Location: [facility] OR;  Service: Urology;  Laterality: N/A;       Past Medical History:   Diagnosis Date    Anemia     Anticoagulant long-term use     Atrial fibrillation     Bladder cancer     Bladder tumor     CKD (chronic kidney disease), stage III     COPD (chronic obstructive pulmonary disease)     pt denies    Encounter for blood transfusion     Hematuria     History of 2019 novel coronavirus disease (COVID-19)     Hypercholesteremia     Hypertension        Review of Systems   Constitutional:  Positive for fatigue. Negative for appetite change and unexpected weight change.   HENT:  Negative for mouth sores and nosebleeds.    Eyes:  Negative for visual disturbance.   Respiratory:  Positive for shortness of breath. Negative for cough.    Cardiovascular:  Positive for chest pain.   Gastrointestinal:  Negative for abdominal pain, anal bleeding, blood in stool, constipation and diarrhea.   Endocrine: Negative.    Genitourinary:  Negative for frequency and hematuria.   Musculoskeletal:  Negative for back pain.   Skin:  Positive for pallor. Negative for rash.   Allergic/Immunologic: Negative.    Neurological:  Negative for headaches.   Hematological:  Negative for adenopathy. Does not bruise/bleed easily.   Psychiatric/Behavioral:  The patient is not nervous/anxious.           Medication List with Changes/Refills   Current Medications    AMIODARONE (PACERONE) 200 MG TAB    Take 1 tablet (200 mg total) by mouth once daily.    APIXABAN (ELIQUIS) 2.5 MG TAB    Take 1 tablet (2.5 mg total) by mouth 2 (two) times daily.    ATORVASTATIN (LIPITOR) 20 MG TABLET    Take 1 tablet (20 mg total) by mouth every evening.     CARVEDILOL (COREG) 6.25 MG TABLET    Take 1 tablet (6.25 mg total) by mouth 2 (two) times daily with meals.    CEFDINIR (OMNICEF) 300 MG CAPSULE    Take 1 capsule (300 mg total) by mouth 2 (two) times daily. for 10 days    CHOLECALCIFEROL, VITAMIN D3, (VITAMIN D3 ORAL)    Take 1 tablet by mouth every other day.    DONEPEZIL (ARICEPT) 5 MG TABLET    Take 1 tablet (5 mg total) by mouth every evening.    FERROUS SULFATE (IRON) 325 MG (65 MG IRON) TAB TABLET    Take 1 tablet (325 mg total) by mouth every other day.    FUROSEMIDE (LASIX) 40 MG TABLET    Take 1 tablet (40 mg total) by mouth 2 (two) times daily.    HYDRALAZINE (APRESOLINE) 50 MG TABLET    Take 1 tablet (50 mg total) by mouth every 8 (eight) hours.    MULTIVITAMIN (THERAGRAN) PER TABLET    Take 1 tablet by mouth once daily.    PANTOPRAZOLE (PROTONIX) 20 MG TABLET    Take 2 tablets (40 mg total) by mouth once daily.        Objective:   There were no vitals filed for this visit.    Physical Exam  Constitutional:       Appearance: Normal appearance.   Pulmonary:      Effort: Pulmonary effort is normal.   Neurological:      Mental Status: He is alert and oriented to person, place, and time.   Psychiatric:         Mood and Affect: Mood normal.         Behavior: Behavior normal.         Thought Content: Thought content normal.         Judgment: Judgment normal.      Comments: forgetfull         Assessment:     Problem List Items Addressed This Visit          Oncology    Iron deficiency anemia     Other Visit Diagnoses       Low serum total protein level    -  Primary    Relevant Orders    Immunofixation Electrophoresis    Protein Electrophoresis, Serum    Immunoglobulin Free LT Chains Blood    IMMUNOGLOBULINS (IGG, IGA, IGM) QUANTITATIVE    Malignant neoplasm of urinary bladder, unspecified site        Microcytic anemia        Relevant Orders    PSA, SCREENING    Pathologist Interpretation Differential    Thrombocytopenia        Relevant Orders    Pathologist  Interpretation Differential    Methylmalonic Acid, Serum    HOMOCYSTEINE, SERUM    Encounter for screening for malignant neoplasm of prostate        Relevant Orders    PSA, SCREENING            Lab Results   Component Value Date    WBC 5.36 08/26/2024    RBC 3.87 (L) 08/26/2024    HGB 7.9 (L) 08/26/2024    HCT 28.7 (L) 08/26/2024    MCV 74 (L) 08/26/2024    MCH 20.4 (L) 08/26/2024    MCHC 27.5 (L) 08/26/2024    RDW 19.1 (H) 08/26/2024     (L) 08/26/2024    MPV 12.1 08/26/2024    GRAN 3.7 08/26/2024    GRAN 69.3 08/26/2024    LYMPH 0.5 (L) 08/26/2024    LYMPH 8.6 (L) 08/26/2024    MONO 0.6 08/26/2024    MONO 11.4 08/26/2024    EOS 0.5 08/26/2024    BASO 0.02 08/26/2024    EOSINOPHIL 9.7 (H) 08/26/2024    BASOPHIL 0.4 08/26/2024      Lab Results   Component Value Date     08/26/2024    K 3.7 08/26/2024     08/26/2024    CO2 24 08/26/2024    BUN 25 (H) 08/26/2024    CREATININE 1.5 (H) 08/26/2024    CALCIUM 8.5 (L) 08/26/2024    ANIONGAP 9 08/26/2024    ESTGFRAFRICA >60 12/08/2021    EGFRNONAA 56 (A) 12/08/2021     Lab Results   Component Value Date    ALT 19 08/26/2024    AST 20 08/26/2024    ALKPHOS 59 08/26/2024    BILITOT 0.7 08/26/2024     Lab Results   Component Value Date    IRON 14 (L) 08/23/2024    TRANSFERRIN 316 08/23/2024    TIBC 468 (H) 08/23/2024    FESATURATED 3 (L) 08/23/2024    FERRITIN 6 (L) 08/23/2024      Lab Results   Component Value Date    [id number] 335 08/23/2024     Lab Results   Component Value Date    FOLATE 18.4 08/23/2024     Lab Results   Component Value Date    TSH 3.937 04/01/2024       Plan:   Low serum total protein level  -     Immunofixation Electrophoresis; Future; Expected date: 08/29/2024  -     Protein Electrophoresis, Serum; Future; Expected date: 08/29/2024  -     Immunoglobulin Free LT Chains Blood; Future; Expected date: 08/29/2024  -     IMMUNOGLOBULINS (IGG, IGA, IGM) QUANTITATIVE; Future; Expected date: 08/29/2024    Iron deficiency anemia due to chronic  blood loss  -     Ambulatory referral/consult to Hematology / Oncology    Malignant neoplasm of urinary bladder, unspecified site  -     Ambulatory referral/consult to Hematology / Oncology    Microcytic anemia  -     PSA, SCREENING; Future; Expected date: 08/29/2024  -     Pathologist Interpretation Differential; Future; Expected date: 08/29/2024    Thrombocytopenia  -     Pathologist Interpretation Differential; Future; Expected date: 08/29/2024  -     Methylmalonic Acid, Serum; Future; Expected date: 08/29/2024  -     HOMOCYSTEINE, SERUM; Future; Expected date: 08/29/2024    Encounter for screening for malignant neoplasm of prostate  -     PSA, SCREENING; Future; Expected date: 08/29/2024    Other orders  -     ferumoxytoL (FERAHEME) 510 mg in D5W 117 mL IVPB  -     EPINEPHrine (EPIPEN) 0.3 mg/0.3 mL pen injection 0.3 mg  -     diphenhydrAMINE injection 50 mg  -     hydrocortisone sodium succinate injection 100 mg  -     sodium chloride 0.9% flush 10 mL  -     0.9% NaCl 100 mL flush bag    Start ferrous sulfate 325 mg by Tuesday night 9/3/2024.  Continue taking night till first dose of IV iron then stop oral iron until f/u in clinic.  F/u with Dr. Ruff in [place] [facility] procedure to evaluate location of blood loss in GI tract. Start B- complex vitamin daily.       Med Onc Chart Routing      Follow up with physician    Follow up with [facility] . F/u 5 weeks after last dose iron with labs prior - [facility]   Infusion scheduling note   schedule Feraheme infuisons x 2 at the [facility]   Injection scheduling note n/a   Labs   Scheduling:  Preferred lab: [facility] [address]  Lab interval:  path review, homocysteine, mma, spep, flc, keeley, immoglobulin on Wednesday at [address]  Labs prior to next cbc, cmp, iron studies.- [facility]   Imaging   N/a   Pharmacy appointment No pharmacy appointment needed      Other referrals       No additional referrals needed  n/a              Collaborating Provider:  Dr. Caro  Eric    Thank You,  Turner Walton, FNP-C  Benign Hematology

## 2024-09-03 ENCOUNTER — PATIENT MESSAGE (OUTPATIENT)
Dept: CARDIOLOGY | Facility: CLINIC | Age: 84
End: 2024-09-03
Payer: MEDICARE

## 2024-09-03 ENCOUNTER — PATIENT OUTREACH (OUTPATIENT)
Dept: ADMINISTRATIVE | Facility: HOSPITAL | Age: 84
End: 2024-09-03
Payer: MEDICARE

## 2024-09-03 DIAGNOSIS — E11.69 DM TYPE 2 WITH DIABETIC DYSLIPIDEMIA: Primary | ICD-10-CM

## 2024-09-03 DIAGNOSIS — E78.5 DM TYPE 2 WITH DIABETIC DYSLIPIDEMIA: Primary | ICD-10-CM

## 2024-09-03 NOTE — PROGRESS NOTES
Subjective   Patient ID:  Jose Miguel Alvarez Jr. is a 83 y.o. male who presents for evaluation of Atrial Fibrillation      83 yoM here for arrhythmia assessment and LAAO consideration. He has history of AF s/p ablation at [facility] 2017. He has has recurrent AF/RVR as well as severe anemia due to GI bleeding Spring/summer 2024 on xarelto. He converted to NSR and his OAC was switched to eliquis 2.5 bid. No subsequent bleeding events.     CHADSVA 4  HASBLED 3    My interpretation of the ECG is:    Past Medical History:  No date: Anemia  No date: Anticoagulant long-term use  No date: Atrial fibrillation  No date: Bladder cancer  No date: Bladder tumor  No date: CKD (chronic kidney disease), stage III  No date: COPD (chronic obstructive pulmonary disease)      Comment:  pt denies  No date: Encounter for blood transfusion  No date: Hematuria  No date: History of 2019 novel coronavirus disease (COVID-19)  No date: Hypercholesteremia  No date: Hypertension    Past Surgical History:  No date: bladder tumor removal  No date: CARDIAC ELECTROPHYSIOLOGY MAPPING AND ABLATION  No date: CARDIOVERSION      Comment:  several  No date: CATARACT EXTRACTION; Bilateral  No date: cataract removal with IOL implants; Bilateral  No date: CHOLECYSTECTOMY  12/28/2021: CYSTOSCOPIC LITHOLAPAXY; N/A      Comment:  Procedure: CYSTOLITHOLAPAXY;  Surgeon: Medardo Garcia MD;  Location: [facility] OR;  Service: Urology;                 Laterality: N/A;  5/14/2024: CYSTOSCOPIC LITHOLAPAXY; N/A      Comment:  Procedure: CYSTOLITHOLAPAXY;  Surgeon: Medardo Garcia MD;  Location: [facility] OR;  Service: Urology;                 Laterality: N/A;  No date: CYSTOSCOPY  12/28/2021: CYSTOSCOPY WITH BIOPSY OF BLADDER; Right      Comment:  Procedure: CYSTOSCOPY, WITH BLADDER BIOPSY, WITH                FULGURATION IF INDICATED;  Surgeon: Medardo Garcia MD;  Location: [facility] OR;  Service: Urology;  Laterality:                 Right;  10/25/2021: ESOPHAGOGASTRODUODENOSCOPY; N/A      Comment:  Procedure: Small Jason Enteroscopy (SBE);  Surgeon:                Isabelle Griffin MD;  Location: [facility];  Service:                Endoscopy;  Laterality: N/A;  12/13/2021: ESOPHAGOGASTRODUODENOSCOPY; N/A      Comment:  Procedure: EGD (ESOPHAGOGASTRODUODENOSCOPY);  Surgeon:                Troy Polanco MD;  Location: [facility];  Service:                Endoscopy;  Laterality: N/A;  4/5/2024: ESOPHAGOGASTRODUODENOSCOPY; N/A      Comment:  Procedure: EGD (ESOPHAGOGASTRODUODENOSCOPY);  Surgeon:                Myrtle Salcedo MD;  Location: [facility];  Service:                Endoscopy;  Laterality: N/A;  4/22/2024: ESOPHAGOGASTRODUODENOSCOPY; N/A      Comment:  Procedure: EGD (ESOPHAGOGASTRODUODENOSCOPY);  Surgeon:                Myrtle Salcedo MD;  Location: [facility];  Service:                Endoscopy;  Laterality: N/A;  Push enteroscopy for                positive capsule - bleeding AVM  No date: EYE SURGERY  12/6/2021: INTRALUMINAL GASTROINTESTINAL TRACT IMAGING VIA CAPSULE; N/  A      Comment:  Procedure: IMAGING PROCEDURE, GI TRACT, INTRALUMINAL,                VIA CAPSULE;  Surgeon: Larry Jones RN;  Location: [facility];  Service: Endoscopy;  Laterality: N/A;  4/18/2024: INTRALUMINAL GASTROINTESTINAL TRACT IMAGING VIA CAPSULE; N/  A      Comment:  Procedure: IMAGING PROCEDURE, GI TRACT, INTRALUMINAL,                VIA CAPSULE;  Surgeon: Larry Jones RN;  Location: [facility];  Service: Endoscopy;  Laterality: N/A;  7/23/2019: TRANSURETHRAL RESECTION OF BLADDER TUMOR BY BIPOLAR   CAUTERY; N/A      Comment:  Procedure: TURBT, USING BIPOLAR CAUTERY;  Surgeon: Ritesh Marques MD;  Location: [facility];  Service: Urology;                 Laterality: N/A;  5/14/2024: TRANSURETHRAL RESECTION OF PROSTATE; N/A      Comment:  Procedure: TURP (TRANSURETHRAL RESECTION OF PROSTATE);                  Surgeon: Medardo Garcia MD;  Location: [facility];                 Service: Urology;  Laterality: N/A;    Social History    Socioeconomic History      Marital status:     Tobacco Use      Smoking status: Former        Packs/day: 0.00        Years: 2.5 packs/day for 20.0 years (50.0 ttl pk-yrs)        Types: Cigarettes        Start date:         Quit date:         Years since quittin.6      Smokeless tobacco: Never    Substance and Sexual Activity      Alcohol use: Never        Alcohol/week: 7.0 standard drinks of alcohol        Types: 7 Cans of beer per week        Comment: HOLD 72HRS      Drug use: No      Sexual activity: Not Currently    Social Determinants of Health  Financial Resource Strain: Low Risk  (2024)      Overall Financial Resource Strain (CARDIA)          Difficulty of Paying Living Expenses: Not hard at all  Food Insecurity: No Food Insecurity (2024)      Hunger Vital Sign          Worried About Running Out of Food in the Last Year: Never true          Ran Out of Food in the Last Year: Never true  Transportation Needs: No Transportation Needs (2024)      TRANSPORTATION NEEDS          Transportation : No  Physical Activity: Sufficiently Active (2024)      Exercise Vital Sign          Days of Exercise per Week: 6 days          Minutes of Exercise per Session: 150+ min  Stress: No Stress Concern Present (2024)      [employer] [place] [employer] - Occupational Stress Questionnaire          Feeling of Stress : Not at all  Housing Stability: Low Risk  (2024)      Housing Stability Vital Sign          Unable to Pay for Housing in the Last Year: No          Homeless in the Last Year: No    Review of patient's family history indicates:  Problem: Heart disease      Relation: Father          Name:               Age of Onset: (Not Specified)  Problem: Hypertension      Relation: Father          Name:               Age of Onset: (Not  Specified)  Problem: Heart disease      Relation: Brother          Name:               Age of Onset: (Not Specified)      Current Outpatient Medications:  amiodarone (PACERONE) 200 MG Tab, Take 1 tablet (200 mg total) by mouth once daily., Disp: 30 tablet, Rfl: 11  apixaban (ELIQUIS) 2.5 mg Tab, Take 1 tablet (2.5 mg total) by mouth 2 (two) times daily. (Patient not taking: Reported on 8/28/2024), Disp: 60 tablet, Rfl: 5  atorvastatin (LIPITOR) 20 MG tablet, Take 1 tablet (20 mg total) by mouth every evening., Disp: 90 tablet, Rfl: 0  carvediloL (COREG) 6.25 MG tablet, Take 1 tablet (6.25 mg total) by mouth 2 (two) times daily with meals., Disp: 180 tablet, Rfl: 3  cefdinir (OMNICEF) 300 MG capsule, Take 1 capsule (300 mg total) by mouth 2 (two) times daily. for 10 days (Patient not taking: Reported on 8/28/2024), Disp: 20 capsule, Rfl: 0  cholecalciferol, vitamin D3, (VITAMIN D3 ORAL), Take 1 tablet by mouth every other day., Disp: , Rfl:   donepeziL (ARICEPT) 5 MG tablet, Take 1 tablet (5 mg total) by mouth every evening., Disp: 90 tablet, Rfl: 3  ferrous sulfate (IRON) 325 mg (65 mg iron) Tab tablet, Take 1 tablet (325 mg total) by mouth every other day., Disp: 15 tablet, Rfl: 0  furosemide (LASIX) 40 MG tablet, Take 1 tablet (40 mg total) by mouth 2 (two) times daily. (Patient not taking: Reported on 8/28/2024), Disp: 6 tablet, Rfl: 0  hydrALAZINE (APRESOLINE) 50 MG tablet, Take 1 tablet (50 mg total) by mouth every 8 (eight) hours., Disp: 270 tablet, Rfl: 2  multivitamin (THERAGRAN) per tablet, Take 1 tablet by mouth once daily., Disp: , Rfl:   pantoprazole (PROTONIX) 20 MG tablet, Take 2 tablets (40 mg total) by mouth once daily., Disp: 30 tablet, Rfl: 0    No current facility-administered medications for this visit.  Facility-Administered Medications Ordered in Other Visits:  lactated ringers infusion, , Intravenous, Continuous, Denisse Conteh MD, Stopped at 12/28/21 1241            Review of Systems    Constitutional: Negative.   HENT: Negative.     Eyes: Negative.    Cardiovascular:  Negative for chest pain, dyspnea on exertion, leg swelling, near-syncope, palpitations and syncope.   Respiratory: Negative.  Negative for shortness of breath.    Endocrine: Negative.    Hematologic/Lymphatic: Negative.    Skin: Negative.    Musculoskeletal: Negative.    Gastrointestinal: Negative.    Genitourinary: Negative.    Neurological: Negative.  Negative for dizziness and light-headedness.   Psychiatric/Behavioral: Negative.     Allergic/Immunologic: Negative.           Objective     Physical Exam  Vitals reviewed.   Constitutional:       General: He is not in acute distress.     Appearance: He is well-developed.   HENT:      Head: Normocephalic and atraumatic.   Eyes:      Pupils: Pupils are equal, round, and reactive to light.   Neck:      Thyroid: No thyromegaly.      Vascular: No JVD.   Cardiovascular:      Rate and Rhythm: Normal rate and regular rhythm.      Chest Wall: PMI is not displaced.      Heart sounds: Normal heart sounds, S1 normal and S2 normal. No murmur heard.     No friction rub. No gallop.   Pulmonary:      Effort: Pulmonary effort is normal. No respiratory distress.      Breath sounds: Normal breath sounds. No wheezing or rales.   Abdominal:      General: Bowel sounds are normal. There is no distension.      Palpations: Abdomen is soft.      Tenderness: There is no abdominal tenderness. There is no guarding or rebound.   Musculoskeletal:         General: No tenderness. Normal range of motion.      Cervical back: Normal range of motion.   Skin:     General: Skin is warm and dry.      Findings: No erythema or rash.   Neurological:      Mental Status: He is alert and oriented to person, place, and time.      Cranial Nerves: No cranial nerve deficit.   Psychiatric:         Behavior: Behavior normal.         Thought Content: Thought content normal.         Judgment: Judgment normal.            Assessment and  Plan     1. Paroxysmal atrial fibrillation        Plan:  83 yoM here for LAAO consideration. He had a recent severe GI bleeding event. The patient can tolerate short term OAC but is not a candidate for long term OAC due to recurrent bleeding issues. I discussed management options regarding LAAO. I discussed the process of LAAO via Watchman including pre-procedure testing  as well as the need to take OAC for 6 weeks post implant. We discussed post procedure FIOR studies to assess ISMAEL occlusion. Additionally I mentioned the need to take DAPT up to the 6 month point post implant.      [facility] with Anesthesia support

## 2024-09-03 NOTE — PROGRESS NOTES
DM LABS: per chart review pt is overdue for LIPID/MICROALBUMIN I placed order and  linked to lab appt scheduled 9.4.24

## 2024-09-04 ENCOUNTER — OFFICE VISIT (OUTPATIENT)
Dept: CARDIOLOGY | Facility: CLINIC | Age: 84
End: 2024-09-04
Payer: MEDICARE

## 2024-09-04 ENCOUNTER — LAB VISIT (OUTPATIENT)
Dept: LAB | Facility: HOSPITAL | Age: 84
End: 2024-09-04
Attending: INTERNAL MEDICINE
Payer: MEDICARE

## 2024-09-04 ENCOUNTER — TELEPHONE (OUTPATIENT)
Dept: INFUSION THERAPY | Facility: HOSPITAL | Age: 84
End: 2024-09-04
Payer: MEDICARE

## 2024-09-04 VITALS
HEART RATE: 64 BPM | SYSTOLIC BLOOD PRESSURE: 144 MMHG | WEIGHT: 226 LBS | DIASTOLIC BLOOD PRESSURE: 72 MMHG | RESPIRATION RATE: 16 BRPM | BODY MASS INDEX: 30.61 KG/M2 | OXYGEN SATURATION: 94 % | HEIGHT: 72 IN

## 2024-09-04 DIAGNOSIS — E78.5 DM TYPE 2 WITH DIABETIC DYSLIPIDEMIA: ICD-10-CM

## 2024-09-04 DIAGNOSIS — I48.0 PAROXYSMAL ATRIAL FIBRILLATION: ICD-10-CM

## 2024-09-04 DIAGNOSIS — D64.9 ANEMIA, UNSPECIFIED TYPE: ICD-10-CM

## 2024-09-04 DIAGNOSIS — D69.6 THROMBOCYTOPENIA: ICD-10-CM

## 2024-09-04 DIAGNOSIS — E11.69 DM TYPE 2 WITH DIABETIC DYSLIPIDEMIA: ICD-10-CM

## 2024-09-04 LAB
BASOPHILS # BLD AUTO: 0.05 K/UL (ref 0–0.2)
BASOPHILS NFR BLD: 1.5 % (ref 0–1.9)
CHOLEST SERPL-MCNC: 96 MG/DL (ref 120–199)
CHOLEST/HDLC SERPL: 2.3 {RATIO} (ref 2–5)
DIFFERENTIAL METHOD BLD: ABNORMAL
EOSINOPHIL # BLD AUTO: 0 K/UL (ref 0–0.5)
EOSINOPHIL NFR BLD: 0.6 % (ref 0–8)
ERYTHROCYTE [DISTWIDTH] IN BLOOD BY AUTOMATED COUNT: 19.5 % (ref 11.5–14.5)
HCT VFR BLD AUTO: 30.1 % (ref 40–54)
HDLC SERPL-MCNC: 42 MG/DL (ref 40–75)
HDLC SERPL: 43.8 % (ref 20–50)
HGB BLD-MCNC: 8.3 G/DL (ref 14–18)
IMM GRANULOCYTES # BLD AUTO: 0.01 K/UL (ref 0–0.04)
IMM GRANULOCYTES NFR BLD AUTO: 0.3 % (ref 0–0.5)
LDLC SERPL CALC-MCNC: 41.8 MG/DL (ref 63–159)
LYMPHOCYTES # BLD AUTO: 1.1 K/UL (ref 1–4.8)
LYMPHOCYTES NFR BLD: 31.5 % (ref 18–48)
MCH RBC QN AUTO: 20.8 PG (ref 27–31)
MCHC RBC AUTO-ENTMCNC: 27.6 G/DL (ref 32–36)
MCV RBC AUTO: 75 FL (ref 82–98)
MONOCYTES # BLD AUTO: 0.6 K/UL (ref 0.3–1)
MONOCYTES NFR BLD: 16.5 % (ref 4–15)
NEUTROPHILS # BLD AUTO: 1.7 K/UL (ref 1.8–7.7)
NEUTROPHILS NFR BLD: 49.6 % (ref 38–73)
NONHDLC SERPL-MCNC: 54 MG/DL
NRBC BLD-RTO: 0 /100 WBC
PLATELET # BLD AUTO: 160 K/UL (ref 150–450)
PMV BLD AUTO: 12 FL (ref 9.2–12.9)
RBC # BLD AUTO: 3.99 M/UL (ref 4.6–6.2)
TRIGL SERPL-MCNC: 61 MG/DL (ref 30–150)
WBC # BLD AUTO: 3.4 K/UL (ref 3.9–12.7)

## 2024-09-04 PROCEDURE — 1157F ADVNC CARE PLAN IN RCRD: CPT | Mod: CPTII,S$GLB,, | Performed by: INTERNAL MEDICINE

## 2024-09-04 PROCEDURE — 3078F DIAST BP <80 MM HG: CPT | Mod: CPTII,S$GLB,, | Performed by: INTERNAL MEDICINE

## 2024-09-04 PROCEDURE — 85025 COMPLETE CBC W/AUTO DIFF WBC: CPT | Performed by: INTERNAL MEDICINE

## 2024-09-04 PROCEDURE — 36415 COLL VENOUS BLD VENIPUNCTURE: CPT | Performed by: FAMILY MEDICINE

## 2024-09-04 PROCEDURE — 1159F MED LIST DOCD IN RCRD: CPT | Mod: CPTII,S$GLB,, | Performed by: INTERNAL MEDICINE

## 2024-09-04 PROCEDURE — 99214 OFFICE O/P EST MOD 30 MIN: CPT | Mod: S$GLB,,, | Performed by: INTERNAL MEDICINE

## 2024-09-04 PROCEDURE — 1101F PT FALLS ASSESS-DOCD LE1/YR: CPT | Mod: CPTII,S$GLB,, | Performed by: INTERNAL MEDICINE

## 2024-09-04 PROCEDURE — 3288F FALL RISK ASSESSMENT DOCD: CPT | Mod: CPTII,S$GLB,, | Performed by: INTERNAL MEDICINE

## 2024-09-04 PROCEDURE — 3077F SYST BP >= 140 MM HG: CPT | Mod: CPTII,S$GLB,, | Performed by: INTERNAL MEDICINE

## 2024-09-04 PROCEDURE — 80061 LIPID PANEL: CPT | Performed by: FAMILY MEDICINE

## 2024-09-04 PROCEDURE — 99999 PR PBB SHADOW E&M-EST. PATIENT-LVL IV: CPT | Mod: PBBFAC,,, | Performed by: INTERNAL MEDICINE

## 2024-09-05 ENCOUNTER — TELEPHONE (OUTPATIENT)
Dept: INFUSION THERAPY | Facility: HOSPITAL | Age: 84
End: 2024-09-05
Payer: MEDICARE

## 2024-09-06 ENCOUNTER — TELEPHONE (OUTPATIENT)
Dept: CARDIOLOGY | Facility: CLINIC | Age: 84
End: 2024-09-06
Payer: MEDICARE

## 2024-09-09 ENCOUNTER — TELEPHONE (OUTPATIENT)
Dept: GASTROENTEROLOGY | Facility: CLINIC | Age: 84
End: 2024-09-09
Payer: MEDICARE

## 2024-09-09 ENCOUNTER — INFUSION (OUTPATIENT)
Dept: INFUSION THERAPY | Facility: HOSPITAL | Age: 84
End: 2024-09-09
Attending: INTERNAL MEDICINE
Payer: MEDICARE

## 2024-09-09 VITALS
DIASTOLIC BLOOD PRESSURE: 40 MMHG | TEMPERATURE: 97 F | HEART RATE: 53 BPM | SYSTOLIC BLOOD PRESSURE: 62 MMHG | OXYGEN SATURATION: 99 % | RESPIRATION RATE: 20 BRPM

## 2024-09-09 DIAGNOSIS — D50.0 IRON DEFICIENCY ANEMIA SECONDARY TO BLOOD LOSS (CHRONIC): Primary | ICD-10-CM

## 2024-09-09 PROCEDURE — 96375 TX/PRO/DX INJ NEW DRUG ADDON: CPT

## 2024-09-09 PROCEDURE — 63600175 PHARM REV CODE 636 W HCPCS: Performed by: NURSE PRACTITIONER

## 2024-09-09 PROCEDURE — 25000003 PHARM REV CODE 250: Performed by: NURSE PRACTITIONER

## 2024-09-09 PROCEDURE — 96374 THER/PROPH/DIAG INJ IV PUSH: CPT

## 2024-09-09 RX ORDER — DIPHENHYDRAMINE HYDROCHLORIDE 50 MG/ML
50 INJECTION INTRAMUSCULAR; INTRAVENOUS ONCE AS NEEDED
Status: DISCONTINUED | OUTPATIENT
Start: 2024-09-09 | End: 2024-09-09 | Stop reason: HOSPADM

## 2024-09-09 RX ORDER — EPINEPHRINE 0.3 MG/.3ML
0.3 INJECTION SUBCUTANEOUS ONCE AS NEEDED
Status: DISCONTINUED | OUTPATIENT
Start: 2024-09-09 | End: 2024-09-09 | Stop reason: HOSPADM

## 2024-09-09 RX ORDER — DIPHENHYDRAMINE HYDROCHLORIDE 50 MG/ML
50 INJECTION INTRAMUSCULAR; INTRAVENOUS ONCE AS NEEDED
Status: CANCELLED | OUTPATIENT
Start: 2024-09-09

## 2024-09-09 RX ORDER — EPINEPHRINE 0.3 MG/.3ML
0.3 INJECTION SUBCUTANEOUS ONCE AS NEEDED
Status: CANCELLED | OUTPATIENT
Start: 2024-09-09

## 2024-09-09 RX ORDER — SODIUM CHLORIDE 0.9 % (FLUSH) 0.9 %
10 SYRINGE (ML) INJECTION
Status: CANCELLED | OUTPATIENT
Start: 2024-09-09

## 2024-09-09 RX ADMIN — HYDROCORTISONE SODIUM SUCCINATE 100 MG: 100 INJECTION, POWDER, FOR SOLUTION INTRAMUSCULAR; INTRAVENOUS at 01:09

## 2024-09-09 RX ADMIN — FERUMOXYTOL 510 MG: 510 INJECTION INTRAVENOUS at 01:09

## 2024-09-09 RX ADMIN — SODIUM CHLORIDE: 9 INJECTION, SOLUTION INTRAVENOUS at 01:09
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
## 2024-09-09 NOTE — DISCHARGE INSTRUCTIONS
.[facility]  [address] [address]  [address]  [phone] phone     [phone] fax  Hours of Operation: Monday- Friday 8:00am- 5:00pm  After hours phone  [phone]  Hematology / Oncology Physicians on call    Dr. Eric Mccollum           Nurse Practitioners:     Melanie Mai, GENARO Chisholm, KAYDEN Latham, GENARO Medeiros, GENARO Walton, NP    Please don't hesitate to call if you have any concerns.      FALL PREVENTION   Falls often occur due to slipping, tripping or losing your balance. Here are ways to reduce your risk of falling again.   Was there anything that caused your fall that can be fixed, removed or replaced?   Make your home safe by keeping walkways clear of objects you may trip over.   Use non-slip pads under rugs.   Do not walk in poorly lit areas.   Do not stand on chairs or wobbly ladders.   Use caution when reaching overhead or looking upward. This position can cause a loss of balance.   Be sure your shoes fit properly, have non-slip bottoms and are in good condition.   Be cautious when going up and down stairs, curbs, and when walking on uneven sidewalks.   If your balance is poor, consider using a cane or walker.   If your fall was related to alcohol use, stop or limit alcohol intake.   If your fall was related to use of sleeping medicines, talk to your doctor about this. You may need to reduce your dosage at bedtime if you awaken during the night to go to the bathroom.   To reduce the need for nighttime bathroom trips:   Avoid drinking fluids for several hours before going to bed   Empty your bladder before going to bed   Men can keep a urinal at the bedside   © 5972-3017 [employer], [address] [place] [address], [place] [address]. All rights reserved. This information is not intended as a substitute for professional medical care. Always follow your healthcare  professional's instructions.    WAYS TO HELP PREVENT INFECTION        WASH YOUR HANDS OFTEN DURING THE DAY, ESPECIALLY BEFORE YOU EAT, AFTER USING THE BATHROOM, AND AFTER TOUCHING ANIMALS    STAY AWAY FROM PEOPLE WHO HAVE ILLNESSES YOU CAN CATCH; SUCH AS COLDS, FLU, CHICKEN POX    TRY TO AVOID CROWDS    STAY AWAY FROM CHILDREN WHO RECENTLY HAVE RECEIVED LIVE VIRUS VACCINES    MAINTAIN GOOD MOUTH CARE    DO NOT SQUEEZE OR SCRATCH PIMPLES    CLEAN CUTS & SCRAPES RIGHT AWAY AND DAILY UNTIL HEALED WITH WARM WATER, SOAP & AN ANTISEPTIC    AVOID CONTACT WITH LITTER BOXES, BIRD CAGES, & FISH TANKS    AVOID STANDING WATER, IE., BIRD BATHS, FLOWER POTS/VASES, OR HUMIDIFIERS    WEAR GLOVES WHEN GARDENING OR CLEANING UP AFTER OTHERS, ESPECIALLY BABIES & SMALL CHILDREN    DO NOT EAT RAW FISH, SEAFOOD, MEAT, OR EGGS

## 2024-09-10 ENCOUNTER — TELEPHONE (OUTPATIENT)
Dept: GASTROENTEROLOGY | Facility: CLINIC | Age: 84
End: 2024-09-10
Payer: MEDICARE
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
## 2024-09-11 RX ORDER — EPINEPHRINE 0.3 MG/.3ML
0.3 INJECTION SUBCUTANEOUS ONCE AS NEEDED
OUTPATIENT
Start: 2024-09-11

## 2024-09-11 RX ORDER — SODIUM CHLORIDE 0.9 % (FLUSH) 0.9 %
10 SYRINGE (ML) INJECTION
OUTPATIENT
Start: 2024-09-11

## 2024-09-11 RX ORDER — METHYLPREDNISOLONE SOD SUCC 125 MG
80 VIAL (EA) INJECTION
Start: 2024-09-11

## 2024-09-11 RX ORDER — SODIUM CHLORIDE 9 MG/ML
INJECTION, SOLUTION INTRAVENOUS CONTINUOUS
OUTPATIENT
Start: 2024-09-11

## 2024-09-11 RX ORDER — DIPHENHYDRAMINE HYDROCHLORIDE 50 MG/ML
50 INJECTION INTRAMUSCULAR; INTRAVENOUS ONCE AS NEEDED
OUTPATIENT
Start: 2024-09-11

## 2024-09-13 ENCOUNTER — OFFICE VISIT (OUTPATIENT)
Dept: HOME HEALTH SERVICES | Facility: CLINIC | Age: 84
End: 2024-09-13
Payer: MEDICARE

## 2024-09-13 DIAGNOSIS — D50.0 IRON DEFICIENCY ANEMIA SECONDARY TO BLOOD LOSS (CHRONIC): ICD-10-CM

## 2024-09-13 DIAGNOSIS — J44.9 CHRONIC OBSTRUCTIVE PULMONARY DISEASE, UNSPECIFIED COPD TYPE: ICD-10-CM

## 2024-09-13 DIAGNOSIS — Z09 HOSPITAL DISCHARGE FOLLOW-UP: Primary | ICD-10-CM

## 2024-09-13 DIAGNOSIS — I48.0 PAROXYSMAL ATRIAL FIBRILLATION: ICD-10-CM

## 2024-09-13 DIAGNOSIS — I50.42 CHRONIC COMBINED SYSTOLIC (CONGESTIVE) AND DIASTOLIC (CONGESTIVE) HEART FAILURE: ICD-10-CM

## 2024-09-13 NOTE — PROGRESS NOTES
[facility]  Transitional Care Management (TCM) Home Visit    Encounter Provider: Frederick Mendoza   PCP: Toshia Valladares MD  Consult Requested By: Dr. Jannet Doherty  Admit Date: 8/23/24   IP Discharge Date: 8/24/24  Hospital Length of Stay:RRHLOS@ days  Days since discharge (from IP or SNF): 20   [facility] Contact Note: 9/3  Hospital Diagnosis: Acute on chronic blood loss anemia [D62]     HISTORY OF PRESENT ILLNESS      Patient ID: Jose Miguel Alvarez Jr. is a 83 y.o. male was recently admitted to the hospital, this is their TCM encounter.    Hospital Course Synopsis:    Jose Miguel Alvarez Jr. is a 83 y.o. male patient with HTN, hypercholesteremia, CKD, COPD, A-fib, anemia, anticoagulant long term use, bladder cancer who presents to the Emergency Department for evaluation of an abnormal lab with hgb 6.4 ordered by his PCP. He reports generalized weakness, fatigue, dyspnea,  dizziness with standing and peripheral edema that is progressively worsening over the last 2 weeks. He reports burning in his chest unrelieved with home PPI.   He was evaluated by his PCP 2 days ago and underwent blood work and was given lasix po for peripheral edema. Denies blood in stool. He reports compliance with Xarelto.      Upper GI in 4/2024 shows Multiple non-bleeding angioectasias in the stomach. Treated with argon plasma coagulation   SBE 4/2024 shows A single non-bleeding angioectasia in the duodenum. Treated with argon plasma coagulation      Hospital Course:   8/24  Admitted for anemia due to GI bleed. Patient denies symptoms other than leg weakness. On xarelto for stroke prophy in setting of afib. Gastroenterology workup in past with AVMs. After discussing with gastroenterology and cardiology, ok to discharge home and continue holding a/c until follow up with gastroenterology and cardiology. Possible watchman device as outpatient as unable to tolerate a/c. Gastroenterology to facilitate outpatient follow up  with advanced endoscopist for further management of avms. Discussed risk vs benefits of a/c in setting of afib and GI bleed. Tolerated blood transfusion. Hb/hct stable. Similar presentation in April 2024.      Urinalysis positive for acute cystitis. Received intravenous rocephin during this hospitalization. Follow urine cultures. Discharge with po antibiotic(s). Follow up outpatient primary care provider. Advised to take iron supplemental after completion of po antibiotic(s).      Doing well since discharge. Denies bleeding since discharge. Denies reoccurrence of symptoms in which prompted hospital stay. Reports compliance with medications. Had follow up with PCP, heme/onc, and cardiology following discharge. Has follow up with GI 9/18. Questions elicited. Time allowed for questions, all issues addressed. Contact info given for any concerns or changes.   DECISION MAKING TODAY       Assessment & Plan:  1. Hospital discharge follow-up    2. Iron deficiency anemia secondary to blood loss (chronic)  Assessment & Plan:  Recent admission presenting with weakness, fatigue, dyspnea, dizziness  Hemoglobin 6.4 prior to admit  Currently stable, managed by heme/onc  Recent H/H 9.5/33.6  Continue current plan  F/U with heme/onc      3. Paroxysmal atrial fibrillation  Assessment & Plan:  Currently on eliquix, amiodarone, coreg  Possible watchman with cardiolgy in near future  Continue current medications  F/U with cardiology      4. Chronic combined systolic (congestive) and diastolic (congestive) heart failure  Assessment & Plan:  Chronic, currently stable  Continue current medications  CHF diet, fluid restriction  F/U with cards      5. Chronic obstructive pulmonary disease, unspecified COPD type  Assessment & Plan:  Chronic, currently controlled  Continue current medications             Medication List on Discharge:     Medication List            Accurate as of September 13, 2024 11:59 PM. If you have any questions, ask your  nurse or doctor.                CONTINUE taking these medications      amiodarone 200 MG Tab  Commonly known as: PACERONE  Take 1 tablet (200 mg total) by mouth once daily.     amoxicillin-clavulanate 875-125mg 875-125 mg per tablet  Commonly known as: AUGMENTIN  Take 1 tablet by mouth 2 (two) times daily.     apixaban 2.5 mg Tab  Commonly known as: ELIQUIS  Take 1 tablet (2.5 mg total) by mouth 2 (two) times daily.     atorvastatin 20 MG tablet  Commonly known as: LIPITOR  Take 1 tablet (20 mg total) by mouth every evening.     carvediloL 6.25 MG tablet  Commonly known as: COREG  Take 1 tablet (6.25 mg total) by mouth 2 (two) times daily with meals.     donepeziL 5 MG tablet  Commonly known as: ARICEPT  Take 1 tablet (5 mg total) by mouth every evening.     ferrous sulfate 325 mg (65 mg iron) Tab tablet  Commonly known as: IRON  Take 1 tablet (325 mg total) by mouth every other day.     furosemide 40 MG tablet  Commonly known as: LASIX  Take 1 tablet (40 mg total) by mouth 2 (two) times daily.     hydrALAZINE 50 MG tablet  Commonly known as: APRESOLINE  Take 1 tablet (50 mg total) by mouth every 8 (eight) hours.     multivitamin per tablet  Commonly known as: THERAGRAN  Take 1 tablet by mouth once daily.     pantoprazole 20 MG tablet  Commonly known as: PROTONIX  Take 2 tablets (40 mg total) by mouth once daily.     predniSONE 50 MG Tab  Commonly known as: DELTASONE  Take 1 tablet (50 mg total) by mouth once daily.     VITAMIN D3 ORAL  Take 1 tablet by mouth every other day.              Medication Reconciliation:  Were medications changed on discharge? Yes  Were medications in the home? Yes  Is the patient taking the medications as directed? Yes  Does the patient understand the medications and changes? Yes  Does updated med list accurately reflects meds patient is currently taking? Yes    ENVIRONMENT OF CARE      Family and/or Caregiver present at visit?  Yes  Name of Caregiver: spouse  History provided by:  patient    Advance Care Planning   Advanced Care Planning Status:  Patient has had an ACP conversation  Living Will: Yes  Power of : No  [place]: No    Does Caregiver have HCPoA: No  Changes today: none  Is patient hospice appropriate: No  (If needed, use PPS <30 or FAST score >7)  Was referral to hospice placed: No       Impression upon entering the home:  Physical Dwelling: single family home   Appearance of home environment: cleaniness: clean, walking pathways: clear, lighting: adequate, and home structure: sound structure  Functional Status: independent  Mobility: ambulatory  Nutritional access: adequate intake and access  Home Health: Yes, [facility]     DME/Supplies: none     Diagnostic tests reviewed/disposition: I have reviewed all completed as well as pending diagnostic tests at the time of discharge.  Disease/illness education: Take all medication as prescribed. Activity as tolerated. Keep all upcoming appts.   Establishment or re-establishment of referral orders for community resources: No other necessary community resources.   Discussion with other health care providers: No discussion with other health care providers necessary.   Does patient have a PCP at [facility]? Yes   Repatriation plan with PCP? follow-up with PCP within 90d   Does patient have an ostomy (ileostomy, colostomy, suprapubic catheter, nephrostomy tube, tracheostomy, PEG tube, pleurex catheter, cholecystostomy, etc)? No  Were BPAs reviewed? Yes    Social History     Socioeconomic History    Marital status:    Tobacco Use    Smoking status: Former     Current packs/day: 0.00     Average packs/day: 2.5 packs/day for 20.0 years (50.0 ttl pk-yrs)     Types: Cigarettes     Start date:      Quit date:      Years since quittin.7    Smokeless tobacco: Never   Substance and Sexual Activity    Alcohol use: Never     Alcohol/week: 7.0 standard drinks of alcohol     Types: 7 Cans of beer per week     Comment: HOLD 72HRS     Drug use: No    Sexual activity: Not Currently     Social Determinants of Health     Financial Resource Strain: Low Risk  (8/23/2024)    Overall Financial Resource Strain (CARDIA)     Difficulty of Paying Living Expenses: Not hard at all   Food Insecurity: No Food Insecurity (8/23/2024)    Hunger Vital Sign     Worried About Running Out of Food in the Last Year: Never true     Ran Out of Food in the Last Year: Never true   Transportation Needs: No Transportation Needs (8/23/2024)    TRANSPORTATION NEEDS     Transportation : No   Physical Activity: Sufficiently Active (8/23/2024)    Exercise Vital Sign     Days of Exercise per Week: 6 days     Minutes of Exercise per Session: 150+ min   Stress: No Stress Concern Present (8/23/2024)    [employer] - Occupational Stress Questionnaire     Feeling of Stress : Not at all   Housing Stability: Low Risk  (8/23/2024)    Housing Stability Vital Sign     Unable to Pay for Housing in the Last Year: No     Homeless in the Last Year: No       OBJECTIVE:     Vital Signs:  Vitals:    09/13/24 1117   BP: 138/76   Pulse: 77   Resp: 18       Review of Systems   Constitutional: Negative.    HENT: Negative.     Eyes: Negative.    Respiratory: Negative.  Negative for chest tightness.    Cardiovascular: Negative.  Negative for leg swelling.   Gastrointestinal: Negative.    Endocrine: Negative.    Genitourinary: Negative.    Musculoskeletal: Negative.    Skin: Negative.    Allergic/Immunologic: Negative.    Neurological: Negative.    Hematological: Negative.    Psychiatric/Behavioral: Negative.  Negative for agitation.    All other systems reviewed and are negative.      Physical Exam:  Physical Exam  Vitals reviewed.   Constitutional:       General: He is not in acute distress.     Appearance: Normal appearance. He is well-developed.   HENT:      Head: Normocephalic and atraumatic.      Nose: Nose normal.      Mouth/Throat:      Mouth: Mucous membranes are dry.       Pharynx: Oropharynx is clear.   Eyes:      Pupils: Pupils are equal, round, and reactive to light.   Cardiovascular:      Rate and Rhythm: Normal rate and regular rhythm.      Pulses: Normal pulses.      Heart sounds: Normal heart sounds.   Pulmonary:      Effort: Pulmonary effort is normal.      Breath sounds: Normal breath sounds.   Abdominal:      General: Bowel sounds are normal.      Palpations: Abdomen is soft.   Musculoskeletal:         General: Normal range of motion.      Cervical back: Normal range of motion and neck supple.   Skin:     General: Skin is warm and dry.   Neurological:      General: No focal deficit present.      Mental Status: He is alert and oriented to person, place, and time. Mental status is at baseline.   Psychiatric:         Mood and Affect: Mood normal.         Behavior: Behavior normal.         Thought Content: Thought content normal.         Judgment: Judgment normal.         INSTRUCTIONS FOR PATIENT:   - Continue all medications, treatments and therapies as ordered.   - Follow all instructions, recommendations as discussed.  - Maintain Safety Precautions at all times.  - Attend all medical appointments as scheduled.  - For worsening symptoms: call Primary Care Physician or Nurse Practitioner.  - For emergencies, call 911 or immediately report to the nearest emergency room.   Scheduled Follow-up, Appts Reviewed with Modifications if Needed: Yes  Future Appointments   Date Time Provider Department Center   9/18/2024  2:45 PM Eduardo Ruff MD [facility]   10/25/2024 12:10 PM LABORATORY, [facility] LAB [facility]   10/28/2024 11:30 AM Rahel Walton NP [facility]   11/26/2024 10:00 AM LABORATORY, STORMY VIERA [facility] LAB [facility]   11/27/2024  7:40 AM Carlos Flower MD [facility] CARDIO [facility]   11/29/2024  8:30 AM Medardo Garcia MD [facility] UROLOGY  Medical C   12/5/2024  7:00 AM 3PREP, SHAHEED DONOVAN [facility]   12/5/2024  9:00 AM  INPATIENT, [facility] ECHOSTR Gareth Calixto   2/28/2025 10:20 AM Toshia Valladares MD [facility]       Signature: Frederick Mendoza NP    Transition of Care Visit:  I have reviewed and updated the history and problem list.  I have reconciled the medication list.  I have discussed the hospitalization and current medical issues, prognosis and plans with the patient/family.

## 2024-09-16 VITALS
OXYGEN SATURATION: 96 % | SYSTOLIC BLOOD PRESSURE: 138 MMHG | RESPIRATION RATE: 18 BRPM | DIASTOLIC BLOOD PRESSURE: 76 MMHG | HEART RATE: 77 BPM

## 2024-09-16 PROBLEM — L97.511 NON-PRESSURE CHRONIC ULCER OF OTHER PART OF RIGHT FOOT LIMITED TO BREAKDOWN OF SKIN: Status: RESOLVED | Noted: 2023-01-26 | Resolved: 2024-09-16

## 2024-09-16 PROBLEM — D50.9 IRON DEFICIENCY ANEMIA: Status: RESOLVED | Noted: 2017-10-06 | Resolved: 2024-09-16

## 2024-09-16 PROBLEM — N18.30 STAGE 3 CHRONIC KIDNEY DISEASE: Status: RESOLVED | Noted: 2017-10-06 | Resolved: 2024-09-16

## 2024-09-16 PROBLEM — K55.21 AVM (ARTERIOVENOUS MALFORMATION) OF SMALL BOWEL, ACQUIRED WITH HEMORRHAGE: Status: RESOLVED | Noted: 2024-04-05 | Resolved: 2024-09-16

## 2024-09-16 NOTE — ASSESSMENT & PLAN NOTE
Currently on eliquix, amiodarone, coreg  Possible watchman with cardiolgy in near future  Continue current medications  F/U with cardiology

## 2024-09-16 NOTE — ASSESSMENT & PLAN NOTE
Recent admission presenting with weakness, fatigue, dyspnea, dizziness  Hemoglobin 6.4 prior to admit  Currently stable, managed by heme/onc  Recent H/H 9.5/33.6  Continue current plan  F/U with heme/onc

## 2024-09-16 NOTE — ASSESSMENT & PLAN NOTE
Chronic, currently stable  Continue current medications  CHF diet, fluid restriction  F/U with cards

## 2024-09-18 ENCOUNTER — OFFICE VISIT (OUTPATIENT)
Dept: GASTROENTEROLOGY | Facility: CLINIC | Age: 84
End: 2024-09-18
Payer: MEDICARE

## 2024-09-18 VITALS
HEART RATE: 58 BPM | WEIGHT: 222.69 LBS | BODY MASS INDEX: 30.16 KG/M2 | HEIGHT: 72 IN | SYSTOLIC BLOOD PRESSURE: 171 MMHG | DIASTOLIC BLOOD PRESSURE: 75 MMHG

## 2024-09-18 DIAGNOSIS — D50.0 IRON DEFICIENCY ANEMIA SECONDARY TO BLOOD LOSS (CHRONIC): Primary | ICD-10-CM

## 2024-09-18 PROCEDURE — 99999 PR PBB SHADOW E&M-EST. PATIENT-LVL IV: CPT | Mod: PBBFAC,,, | Performed by: INTERNAL MEDICINE

## 2024-09-18 PROCEDURE — 99214 OFFICE O/P EST MOD 30 MIN: CPT | Mod: S$GLB,,, | Performed by: INTERNAL MEDICINE

## 2024-09-18 PROCEDURE — 3078F DIAST BP <80 MM HG: CPT | Mod: CPTII,S$GLB,, | Performed by: INTERNAL MEDICINE

## 2024-09-18 PROCEDURE — 1126F AMNT PAIN NOTED NONE PRSNT: CPT | Mod: CPTII,S$GLB,, | Performed by: INTERNAL MEDICINE

## 2024-09-18 PROCEDURE — 1101F PT FALLS ASSESS-DOCD LE1/YR: CPT | Mod: CPTII,S$GLB,, | Performed by: INTERNAL MEDICINE

## 2024-09-18 PROCEDURE — 1157F ADVNC CARE PLAN IN RCRD: CPT | Mod: CPTII,S$GLB,, | Performed by: INTERNAL MEDICINE

## 2024-09-18 PROCEDURE — 3288F FALL RISK ASSESSMENT DOCD: CPT | Mod: CPTII,S$GLB,, | Performed by: INTERNAL MEDICINE

## 2024-09-18 PROCEDURE — 3077F SYST BP >= 140 MM HG: CPT | Mod: CPTII,S$GLB,, | Performed by: INTERNAL MEDICINE

## 2024-09-18 NOTE — PATIENT INSTRUCTIONS
1145am arrival to [facility] 1st floor Admit.     Upper DBE Instructions     [facility]  [address]  Clinic Office [phone]  Endoscopy Lab [phone]     You are scheduled for an Upper DBE with Dr. Ruff on  Thursday, September 26, 2024 at [facility] in [place].    Check in at the H008 to reschedule.     You cannot have anything to eat or drink after Midnight. You can brush your teeth with a sip of water.      An adult friend/family member must come with you to drive you home.  You cannot drive, take a taxi, Uber/Lyft or bus to leave the Endoscopy Center alone.  If you do not have someone to drive you home, your test will be cancelled.      Please follow the directions of your doctor if you take any pills that thin your blood. If you take these meds: Aggrenox, Brilinta, Effient, Eliquis, Lovenox, Plavix, Pletal, Pradaxa, Ticilid, Xarelto or Coumadin, let the doctor's office know.     Please hold any GLP-1 medications prior to the procedure: Dulaglutide Trulicity(hold week prior), Exenatide Byetta (hold the morning of procedure), Semaglutide Ozempic (hold week prior), Liraglutide Victoza, Saxenda(hold week prior), Lixisenatide Adlyxin (hold the morning of procedure), Semaglutide Rybelsus (hold the morning of procedure), Tirzepatide Mounjaro (hold week prior)      DON'T: On the morning of the test do not take insulin or pills for diabetes.      DO: On the morning of the test, do take any pills for blood pressure, heart, anti-rejection and or seizures with a small sip of water. Bring any inhalers with you.     Leave all valuables and jewelry at home. You will be at the hospital for 2-4 hours.     Call the Endoscopy department at [phone] with any questions about your procedure.     Thank you for choosing [facility].

## 2024-09-18 NOTE — PROGRESS NOTES
[facility]    CC: Chronic LIDA     HPI 83 y.o. male with a history of atrial fibrillation on xarelto along with a 4-5 year history of iron deficiency anemia requiring blood transfusions almost every 5-6 months along with frequent iron transfusions. He denies overt GI blood loss. Currently discussing with cardiology about placing a Watchmen device.     Has undergone push enteroscopy with ablation of small bowel angioectasias with last in 4/22/2024. VCE in 4/2024 with active small bowel bleeding seen.     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 Treated with APC.   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 breakdown of skin 01/26/2023    Stage 3 chronic kidney disease 10/06/2017         Physical Examination  BP (!) 171/75 (BP Location: Left arm, Patient Position: Sitting, BP Method: Large (Automatic))   Pulse (!) 58   Ht 6' (1.829 m)   Wt 101 kg (222 lb 10.6 oz)   BMI 30.20 kg/m²   General appearance: alert, cooperative, no distress  Lungs: clear to auscultation bilaterally, no dullness to percussion bilaterally  Heart: regular rate and rhythm without rub; no displacement of the PMI   Abdomen: soft, non-tender; bowel sounds normoactive; no organomegaly      Assessment:   83 year old male with a history of A fib on  Melody who presents for evaluation of chronic GI blood loss from angioectasia bleeding. (Chronic issue with exacerbation)    Plan:  -Upper DBE on 9/26/2024  -Continue Xarelto and advise continued follow up with cardiology regarding Watchmen device as stopping his anticoagulation will help his chronic GI blood loss   - Iron supplementation as primary therapy with oral iron once daily plus parenteral iron as needed such as Injectofer 750mg x 2 doses every 6-12  months(note infusion reaction to ferumoxytol)  to ensure adequate iron reserves (goal ferritin > 30)  - In the case of angioectasias, expect GI blood loss to recur intermittently over time as these lesions are typically multiple and challenging to cure by endoscopic ablation   - If anemia worsens despite adequate iron therapy, subsequent interventions may include repeat enteroscopy and/or Sandostatin Depot injections 20mg IM each month (72% response)  -Tranexamic acid 650mg bid may be considered if patient fails to respond to Sandostatin       Eduardo Ruff MD   [address] [place] [address], [address]  [place] [address] [phone]

## 2024-09-24 ENCOUNTER — OFFICE VISIT (OUTPATIENT)
Dept: FAMILY MEDICINE | Facility: CLINIC | Age: 84
End: 2024-09-24
Attending: FAMILY MEDICINE
Payer: MEDICARE

## 2024-09-24 ENCOUNTER — HOSPITAL ENCOUNTER (OUTPATIENT)
Dept: RADIOLOGY | Facility: HOSPITAL | Age: 84
Discharge: HOME | End: 2024-09-24
Attending: FAMILY MEDICINE
Payer: MEDICARE

## 2024-09-24 VITALS
SYSTOLIC BLOOD PRESSURE: 124 MMHG | BODY MASS INDEX: 30.03 KG/M2 | TEMPERATURE: 97 F | WEIGHT: 221.69 LBS | HEART RATE: 53 BPM | OXYGEN SATURATION: 95 % | HEIGHT: 72 IN | DIASTOLIC BLOOD PRESSURE: 78 MMHG

## 2024-09-24 DIAGNOSIS — J18.9 PNEUMONIA OF LEFT LOWER LOBE DUE TO INFECTIOUS ORGANISM: ICD-10-CM

## 2024-09-24 DIAGNOSIS — Q27.30 AVM (ARTERIOVENOUS MALFORMATION): Primary | ICD-10-CM

## 2024-09-24 DIAGNOSIS — N39.0 URINARY TRACT INFECTION WITHOUT HEMATURIA, SITE UNSPECIFIED: ICD-10-CM

## 2024-09-24 LAB
BACTERIA #/AREA URNS AUTO: ABNORMAL /HPF
MICROSCOPIC COMMENT: ABNORMAL
RBC #/AREA URNS AUTO: >100 /HPF (ref 0–4)
WBC #/AREA URNS AUTO: 8 /HPF (ref 0–5)
WBC CLUMPS UR QL AUTO: ABNORMAL

## 2024-09-24 PROCEDURE — 87086 URINE CULTURE/COLONY COUNT: CPT | Performed by: FAMILY MEDICINE

## 2024-09-24 PROCEDURE — 87088 URINE BACTERIA CULTURE: CPT | Performed by: FAMILY MEDICINE

## 2024-09-24 PROCEDURE — 99999 PR PBB SHADOW E&M-EST. PATIENT-LVL IV: CPT | Mod: PBBFAC,,, | Performed by: FAMILY MEDICINE

## 2024-09-24 PROCEDURE — 71046 X-RAY EXAM CHEST 2 VIEWS: CPT | Mod: TC,PO

## 2024-09-24 PROCEDURE — 3074F SYST BP LT 130 MM HG: CPT | Mod: CPTII,S$GLB,, | Performed by: FAMILY MEDICINE

## 2024-09-24 PROCEDURE — 71046 X-RAY EXAM CHEST 2 VIEWS: CPT | Mod: 26,,, | Performed by: RADIOLOGY

## 2024-09-24 PROCEDURE — 1101F PT FALLS ASSESS-DOCD LE1/YR: CPT | Mod: CPTII,S$GLB,, | Performed by: FAMILY MEDICINE

## 2024-09-24 PROCEDURE — 1160F RVW MEDS BY RX/DR IN RCRD: CPT | Mod: CPTII,S$GLB,, | Performed by: FAMILY MEDICINE

## 2024-09-24 PROCEDURE — 1157F ADVNC CARE PLAN IN RCRD: CPT | Mod: CPTII,S$GLB,, | Performed by: FAMILY MEDICINE

## 2024-09-24 PROCEDURE — 99214 OFFICE O/P EST MOD 30 MIN: CPT | Mod: S$GLB,,, | Performed by: FAMILY MEDICINE

## 2024-09-24 PROCEDURE — 1159F MED LIST DOCD IN RCRD: CPT | Mod: CPTII,S$GLB,, | Performed by: FAMILY MEDICINE

## 2024-09-24 PROCEDURE — 87186 SC STD MICRODIL/AGAR DIL: CPT | Performed by: FAMILY MEDICINE

## 2024-09-24 PROCEDURE — 1126F AMNT PAIN NOTED NONE PRSNT: CPT | Mod: CPTII,S$GLB,, | Performed by: FAMILY MEDICINE

## 2024-09-24 PROCEDURE — 81001 URINALYSIS AUTO W/SCOPE: CPT | Performed by: FAMILY MEDICINE

## 2024-09-24 PROCEDURE — 3078F DIAST BP <80 MM HG: CPT | Mod: CPTII,S$GLB,, | Performed by: FAMILY MEDICINE

## 2024-09-24 PROCEDURE — 3288F FALL RISK ASSESSMENT DOCD: CPT | Mod: CPTII,S$GLB,, | Performed by: FAMILY MEDICINE

## 2024-09-24 NOTE — PROGRESS NOTES
Subjective:       Patient ID: Jose Miguel Alvarez Jr. is a 83 y.o. male.    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 He had a syncopal episode while getting infusion . He was noted to have LL pneumonia and an UTI . Started on Augmentin and prednisone . He is schedule for an enteroscopy in 2 days and would like to make sure he is safe to proceed. Normal BM , no abdominal pain , no cough , sob , dysuria .     Review of Systems   Constitutional: Negative.    HENT: Negative.     Eyes: Negative.    Respiratory: Negative.     Cardiovascular: Negative.    Gastrointestinal: Negative.    Genitourinary: Negative.    Musculoskeletal: Negative.    Skin: Negative.    Hematological: Negative.        Objective:      Physical Exam  Constitutional:       General: He is not in acute distress.     Appearance: He is well-developed. He is not diaphoretic.   HENT:      Head: Normocephalic and atraumatic.      Right Ear: External ear normal.      Left Ear: External ear normal.      Nose: Nose normal.      Mouth/Throat:      Pharynx: No oropharyngeal exudate.   Eyes:      General: No scleral icterus.        Right eye: No discharge.         Left eye: No discharge.      Conjunctiva/sclera: Conjunctivae normal.      Pupils: Pupils are equal, round, and reactive to light.   Neck:      Thyroid: No thyromegaly.      Vascular: No JVD.      Trachea: No tracheal deviation.   Cardiovascular:      Rate and Rhythm: Normal rate and regular rhythm.      Heart sounds: Normal heart sounds. No murmur heard.     No friction rub. No gallop.   Pulmonary:      Effort: Pulmonary effort is normal. No respiratory distress.      Breath sounds: Normal breath sounds. No stridor. No wheezing or rales.   Chest:      Chest wall: No tenderness.   Abdominal:      General: Bowel sounds are normal. There is no distension.      Palpations: Abdomen is soft.       Tenderness: There is no abdominal tenderness. There is no guarding or rebound.   Musculoskeletal:         General: No tenderness. Normal range of motion.      Cervical back: Normal range of motion and neck supple.   Lymphadenopathy:      Cervical: No cervical adenopathy.   Skin:     General: Skin is warm and dry.      Coloration: Skin is not pale.      Findings: No erythema or rash.   Neurological:      Mental Status: He is alert and oriented to person, place, and time.      Cranial Nerves: No cranial nerve deficit.      Motor: No abnormal muscle tone.      Coordination: Coordination normal.      Deep Tendon Reflexes: Reflexes are normal and symmetric. Reflexes normal.   Psychiatric:         Behavior: Behavior normal.         Thought Content: Thought content normal.         Judgment: Judgment normal.       Assessment:       1. AVM (arteriovenous malformation)    2. Urinary tract infection without hematuria, site unspecified    3. Pneumonia of left lower lobe due to infectious organism        Plan:    F.u with Hem and GI   Repeat u/a   Repeat CXR .

## 2024-09-25 ENCOUNTER — OFFICE VISIT (OUTPATIENT)
Dept: HEMATOLOGY/ONCOLOGY | Facility: CLINIC | Age: 84
End: 2024-09-25
Payer: MEDICARE

## 2024-09-25 DIAGNOSIS — K31.811 GASTROINTESTINAL HEMORRHAGE ASSOCIATED WITH ANGIODYSPLASIA OF STOMACH AND DUODENUM: ICD-10-CM

## 2024-09-25 DIAGNOSIS — D50.9 IRON DEFICIENCY ANEMIA, UNSPECIFIED IRON DEFICIENCY ANEMIA TYPE: Primary | ICD-10-CM

## 2024-09-25 PROCEDURE — 99214 OFFICE O/P EST MOD 30 MIN: CPT | Mod: 95,,, | Performed by: NURSE PRACTITIONER

## 2024-09-25 PROCEDURE — 1157F ADVNC CARE PLAN IN RCRD: CPT | Mod: CPTII,95,, | Performed by: NURSE PRACTITIONER

## 2024-09-25 PROCEDURE — 1159F MED LIST DOCD IN RCRD: CPT | Mod: CPTII,95,, | Performed by: NURSE PRACTITIONER

## 2024-09-25 PROCEDURE — G2211 COMPLEX E/M VISIT ADD ON: HCPCS | Mod: 95,,, | Performed by: NURSE PRACTITIONER

## 2024-09-25 PROCEDURE — 1160F RVW MEDS BY RX/DR IN RCRD: CPT | Mod: CPTII,95,, | Performed by: NURSE PRACTITIONER

## 2024-09-25 RX ORDER — FERROUS SULFATE 325(65) MG
325 TABLET ORAL DAILY
Qty: 90 TABLET | Refills: 1 | Status: SHIPPED | OUTPATIENT
Start: 2024-09-25 | End: 2024-12-24

## 2024-09-25 NOTE — PROGRESS NOTES
The patient location is: home  The chief complaint leading to consultation is: no complaints    Visit type: audiovisual    Face to Face time with patient: 30  45 minutes of total time spent on the encounter, which includes face to face time and non-face to face time preparing to see the patient (eg, review of tests), Obtaining and/or reviewing separately obtained history, Documenting clinical information in the electronic or other health record, Independently interpreting results (not separately reported) and communicating results to the patient/family/caregiver, or Care coordination (not separately reported).         Each patient to whom he or she provides medical services by telemedicine is:  (1) informed of the relationship between the physician and patient and the respective role of any other health care provider with respect to management of the patient; and (2) notified that he or she may decline to receive medical services by telemedicine and may withdraw from such care at any time.      Patient ID: Jose Miguel Alvarez Jr. is a 83 y.o. male.    Chief Complaint: no complaints    HPI:  82 yo male presents to the clinic as a new patient for further eval and recommendations - previously followed in the clinic for anemia with h/o bladder cancer in 2019 s/p resection. Has a h/o AVMs with previous gi bleed.  Has received multiple IV iron infusions as well as multiple blood transfusions in the past. Stopped taking iron due to currently being treated for UTI with omnicief.     Is currently taking Eliquis 2.5 mg PO bid.  Was on Xarelto previously for treatment of A. fib     Since 2016 has on/off low platelets now persistently low.  Hgb currently 7.9 mcv 128.  Lowest count noted 99 K.     Previously followed in the clinic by Dr. King.  Today is the first time I am evaluating/assessing the patient.       Patient is accompanied by his daughter-in-law.     Interval history:  9/25/2024 Presents today to discuss  treatment recommendations due to anaphalactic reaction with first dose of Feraheme on 2024.  Upon initiation of IV iron he started to have severe back pain so infusions was stopped and saline was infusing.  Symptoms resolved and infusions was restarted at a slower rate.  Patient began to lose vision and had a syncopal episode with an extreme drop in blood pressure.  He was give steroid injection and BP began to rise and regained consciousness.  He was transported from facility to hospital via ambulance for evaluation.  Feraheme has been listed as an allergy in his chart.  Review of past iron infusions show that he has received Injectafer infusions in the past without issue in  and he also tells me that he received IV iron after blood transfusion many years ago in the hospital and tolerated fine.  Currently taking ferrous sulfate 325 mg PO every other day and is tolerating well.  He is accompanied by his son. He will be having a special GI procedure tomorrow Upper DBE on 2024 with dr. Ruff in [place] to assess for and treat angiectasias in the small intestines.  He continues to take Protonix 40 mg po daily.  He denies and known abnormal bleeding and is asymptomatic. VCE in 2024 with active small bowel bleeding seen   I have reviewed all of the patient's relevant lab work available in the medical record and have utilized this in my evaluation and management recommendations today.      Social History     Socioeconomic History    Marital status:    Tobacco Use    Smoking status: Former     Current packs/day: 0.00     Average packs/day: 2.5 packs/day for 20.0 years (50.0 ttl pk-yrs)     Types: Cigarettes     Start date:      Quit date:      Years since quittin.7    Smokeless tobacco: Never   Substance and Sexual Activity    Alcohol use: Never     Alcohol/week: 7.0 standard drinks of alcohol     Types: 7 Cans of beer per week     Comment: HOLD 72HRS    Drug use: No    Sexual activity:  Not Currently     Social Determinants of Health     Financial Resource Strain: Low Risk  (8/23/2024)    Overall Financial Resource Strain (CARDIA)     Difficulty of Paying Living Expenses: Not hard at all   Food Insecurity: No Food Insecurity (8/23/2024)    Hunger Vital Sign     Worried About Running Out of Food in the Last Year: Never true     Ran Out of Food in the Last Year: Never true   Transportation Needs: No Transportation Needs (8/23/2024)    TRANSPORTATION NEEDS     Transportation : No   Physical Activity: Sufficiently Active (8/23/2024)    Exercise Vital Sign     Days of Exercise per Week: 6 days     Minutes of Exercise per Session: 150+ min   Stress: No Stress Concern Present (8/23/2024)    [employer] - Occupational Stress Questionnaire     Feeling of Stress : Not at all   Housing Stability: Low Risk  (8/23/2024)    Housing Stability Vital Sign     Unable to Pay for Housing in the Last Year: No     Homeless in the Last Year: No       Family History   Problem Relation Name Age of Onset    Heart disease Father      Hypertension Father      Heart disease Brother         Past Surgical History:   Procedure Laterality Date    bladder tumor removal      CARDIAC ELECTROPHYSIOLOGY MAPPING AND ABLATION      CARDIOVERSION      several    CATARACT EXTRACTION Bilateral     cataract removal with IOL implants Bilateral     CHOLECYSTECTOMY      CYSTOSCOPIC LITHOLAPAXY N/A 12/28/2021    Procedure: CYSTOLITHOLAPAXY;  Surgeon: Medardo Garcia MD;  Location: [facility] OR;  Service: Urology;  Laterality: N/A;    CYSTOSCOPIC LITHOLAPAXY N/A 5/14/2024    Procedure: CYSTOLITHOLAPAXY;  Surgeon: Medardo Garcia MD;  Location: [facility] OR;  Service: Urology;  Laterality: N/A;    CYSTOSCOPY      CYSTOSCOPY WITH BIOPSY OF BLADDER Right 12/28/2021    Procedure: CYSTOSCOPY, WITH BLADDER BIOPSY, WITH FULGURATION IF INDICATED;  Surgeon: Medardo Garcia MD;  Location: [facility] OR;  Service: Urology;   Laterality: Right;    ESOPHAGOGASTRODUODENOSCOPY N/A 10/25/2021    Procedure: Small Jason Enteroscopy (SBE);  Surgeon: Isabelle Griffin MD;  Location: [facility] [place] ENDO;  Service: Endoscopy;  Laterality: N/A;    ESOPHAGOGASTRODUODENOSCOPY N/A 12/13/2021    Procedure: EGD (ESOPHAGOGASTRODUODENOSCOPY);  Surgeon: Troy Polanco MD;  Location: [facility] [place] END[facility];  Service: Endoscopy;  Laterality: N/A;    ESOPHAGOGASTRODUODENOSCOPY N/A 4/5/2024    Procedure: EGD (ESOPHAGOGASTRODUODENOSCOPY);  Surgeon: Myrtle Salcedo MD;  Location: [facility];  Service: Endoscopy;  Laterality: N/A;    ESOPHAGOGASTRODUODENOSCOPY N/A 4/22/2024    Procedure: EGD (ESOPHAGOGASTRODUODENOSCOPY);  Surgeon: Myrtle Salcedo MD;  Location: [facility];  Service: Endoscopy;  Laterality: N/A;  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    EYE SURGERY      INTRALUMINAL GASTROINTESTINAL TRACT IMAGING VIA CAPSULE N/A 12/6/2021    Procedure: IMAGING PROCEDURE, GI TRACT, INTRALUMINAL, VIA CAPSULE;  Surgeon: Larry Jones RN;  Location: [facility];  Service: Endoscopy;  Laterality: N/A;    INTRALUMINAL GASTROINTESTINAL TRACT IMAGING VIA CAPSULE N/A 4/18/2024    Procedure: IMAGING PROCEDURE, GI TRACT, INTRALUMINAL, VIA CAPSULE;  Surgeon: Larry Jones RN;  Location: [facility] ENDO;  Service: Endoscopy;  Laterality: N/A;    TRANSURETHRAL RESECTION OF BLADDER TUMOR BY BIPOLAR CAUTERY N/A 7/23/2019    Procedure: TURBT, USING BIPOLAR CAUTERY;  Surgeon: Ritesh Marques MD;  Location: [facility];  Service: Urology;  Laterality: N/A;    TRANSURETHRAL RESECTION OF PROSTATE N/A 5/14/2024    Procedure: TURP (TRANSURETHRAL RESECTION OF PROSTATE);  Surgeon: Medardo Garcia MD;  Location: [facility];  Service: Urology;  Laterality: N/A;       Past Medical History:   Diagnosis Date    Anemia     Anticoagulant long-term use     Atrial fibrillation     AVM (arteriovenous malformation) of small bowel, acquired with hemorrhage 04/05/2024    EGD 4/5/24: multiple small gastric  AVMs in the antrim and fundus. Treated with APC.   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 breakdown of skin 01/26/2023    Stage 3 chronic kidney disease 10/06/2017       Review of Systems   Constitutional:  Negative for appetite change, fatigue and unexpected weight change.   HENT:  Negative for mouth sores and nosebleeds.    Eyes:  Negative for visual disturbance.   Respiratory:  Negative for cough and shortness of breath.    Cardiovascular:  Negative for chest pain.   Gastrointestinal:  Negative for abdominal pain, anal bleeding, blood in stool and diarrhea.   Endocrine: Negative.    Genitourinary:  Negative for frequency and hematuria.   Musculoskeletal:  Negative for back pain.   Skin:  Negative for rash.   Allergic/Immunologic: Negative.    Neurological:  Negative for headaches.   Hematological:  Negative for adenopathy. Does not bruise/bleed easily.   Psychiatric/Behavioral: Negative.  The patient is not nervous/anxious.           Medication List with Changes/Refills   Current Medications    AMIODARONE (PACERONE) 200 MG TAB    Take 1 tablet (200 mg total) by mouth once daily.    APIXABAN (ELIQUIS) 2.5 MG TAB    Take 1 tablet (2.5 mg total) by mouth 2 (two) times daily.    ATORVASTATIN (LIPITOR) 20 MG TABLET    Take 1 tablet (20 mg total) by mouth every evening.    CARVEDILOL (COREG) 6.25 MG TABLET    Take 1 tablet (6.25 mg total) by mouth 2 (two) times daily with meals.    CHOLECALCIFEROL, VITAMIN D3, (VITAMIN D3 ORAL)    Take 1 tablet by mouth every other  day.    DONEPEZIL (ARICEPT) 5 MG TABLET    Take 1 tablet (5 mg total) by mouth every evening.    HYDRALAZINE (APRESOLINE) 50 MG TABLET    Take 1 tablet (50 mg total) by mouth every 8 (eight) hours.    MULTIVITAMIN (THERAGRAN) PER TABLET    Take 1 tablet by mouth once daily.    PANTOPRAZOLE (PROTONIX) 20 MG TABLET    Take 2 tablets (40 mg total) by mouth once daily.   Changed and/or Refilled Medications    Modified Medication Previous Medication    FERROUS SULFATE (IRON) 325 MG (65 MG IRON) TAB TABLET ferrous sulfate (IRON) 325 mg (65 mg iron) Tab tablet       Take 1 tablet (325 mg total) by mouth once daily.    Take 1 tablet (325 mg total) by mouth every other day.        Objective:   There were no vitals filed for this visit.    Physical Exam  Constitutional:       Appearance: Normal appearance.   Pulmonary:      Effort: Pulmonary effort is normal.   Skin:     Coloration: Skin is pale.   Neurological:      General: No focal deficit present.      Mental Status: He is alert.   Psychiatric:         Mood and Affect: Mood normal.         Behavior: Behavior normal.         Thought Content: Thought content normal.         Judgment: Judgment normal.         Assessment:     Problem List Items Addressed This Visit          Oncology    Anemia - Primary    Relevant Medications    ferrous sulfate (IRON) 325 mg (65 mg iron) Tab tablet    Other Relevant Orders    CBC Auto Differential    Ferritin    Iron and TIBC       GI    Gastrointestinal hemorrhage associated with angiodysplasia of stomach and duodenum    Relevant Medications    ferrous sulfate (IRON) 325 mg (65 mg iron) Tab tablet    Other Relevant Orders    CBC Auto Differential    Ferritin    Iron and TIBC       Lab Results   Component Value Date    WBC 4.06 09/24/2024    RBC 4.15 (L) 09/24/2024    HGB 9.0 (L) 09/24/2024    HCT 32.0 (L) 09/24/2024    MCV 77 (L) 09/24/2024    MCH 21.7 (L) 09/24/2024    MCHC 28.1 (L) 09/24/2024    RDW 22.9 (H) 09/24/2024     (L)  09/24/2024    MPV SEE COMMENT 09/24/2024    GRAN 2.3 09/24/2024    GRAN 56.8 09/24/2024    LYMPH 1.1 09/24/2024    LYMPH 26.8 09/24/2024    MONO 0.6 09/24/2024    MONO 14.0 09/24/2024    EOS 0.0 09/24/2024    BASO 0.05 09/24/2024    EOSINOPHIL 1.0 09/24/2024    BASOPHIL 1.2 09/24/2024      Lab Results   Component Value Date     09/24/2024    K 4.8 09/24/2024     09/24/2024    CO2 25 09/24/2024    BUN 18 09/24/2024    CREATININE 1.4 09/24/2024    CALCIUM 9.6 09/24/2024    ANIONGAP 6 (L) 09/24/2024    ESTGFRAFRICA >60 12/08/2021    EGFRNONAA 56 (A) 12/08/2021     Lab Results   Component Value Date    ALT 30 09/24/2024    AST 34 09/24/2024    ALKPHOS 53 (L) 09/24/2024    BILITOT 0.5 09/24/2024     Lab Results   Component Value Date    IRON 14 (L) 08/23/2024    TRANSFERRIN 316 08/23/2024    TIBC 468 (H) 08/23/2024    FESATURATED 3 (L) 08/23/2024    FERRITIN 6 (L) 08/23/2024     Med Onc Chart Routing      Follow up with physician    Follow up with [facility] 1 month. VV   Infusion scheduling note   n/a   Injection scheduling note n/a   Labs   Scheduling:  Preferred lab: [facility] [address]  Lab interval:  standing cbc's weekly; iron studies monthly   Imaging   N/a   Pharmacy appointment No pharmacy appointment needed      Other referrals       No additional referrals needed  n/a              Plan:   Iron deficiency anemia, unspecified iron deficiency anemia type  -     ferrous sulfate (IRON) 325 mg (65 mg iron) Tab tablet; Take 1 tablet (325 mg total) by mouth once daily.  Dispense: 90 tablet; Refill: 1  -     CBC Auto Differential; Standing  -     Ferritin; Future; Expected date: 09/25/2024  -     Iron and TIBC; Future; Expected date: 09/25/2024    Gastrointestinal hemorrhage associated with angiodysplasia of stomach and duodenum  -     ferrous sulfate (IRON) 325 mg (65 mg iron) Tab tablet; Take 1 tablet (325 mg total) by mouth once daily.  Dispense: 90 tablet; Refill: 1  -     CBC Auto Differential; Standing  -      Ferritin; Future; Expected date: 09/25/2024  -     Iron and TIBC; Future; Expected date: 09/25/2024    Discussed treatment with injectafer with pre steroids due to tolerating this iron preparation in the past or possibly venofer infusions with pre steroids since he told me that he has received IV iron in the hospital in the past; however patient states he recent experience was horrible and he does not want to take a chance and have the same experience again.  He is to have a special procedure with Dr. Ruff in [place] tomorrow and is hoping that whatever bleeding he is having in the small bowel will be fixed.  We have decided to increased his oral iron from every other day to ferrous sulfate 325 mg PO daily.  We will do standing weekly cbc's with iron studies monthly.  We will transfuse in the out patient setting for hgb <7.5 due to cardiac history.  Possible Sandostatin injections per GI or tranexamic acid if does not tolerate Sandostatin injections in the future.  F/u with VV in 1 month.    Collaborating Provider:  Dr. Gordo Reyes    Thank You,  MOE Schaefer  Benign Hematology

## 2024-09-26 ENCOUNTER — ANESTHESIA EVENT (OUTPATIENT)
Dept: ENDOSCOPY | Facility: HOSPITAL | Age: 84
End: 2024-09-26
Payer: MEDICARE

## 2024-09-26 ENCOUNTER — HOSPITAL ENCOUNTER (OUTPATIENT)
Facility: HOSPITAL | Age: 84
Discharge: HOME | End: 2024-09-26
Attending: INTERNAL MEDICINE | Admitting: INTERNAL MEDICINE
Payer: MEDICARE

## 2024-09-26 ENCOUNTER — ANESTHESIA (OUTPATIENT)
Dept: ENDOSCOPY | Facility: HOSPITAL | Age: 84
End: 2024-09-26
Payer: MEDICARE

## 2024-09-26 ENCOUNTER — TELEPHONE (OUTPATIENT)
Dept: FAMILY MEDICINE | Facility: CLINIC | Age: 84
End: 2024-09-26
Payer: MEDICARE

## 2024-09-26 VITALS
RESPIRATION RATE: 11 BRPM | HEART RATE: 58 BPM | OXYGEN SATURATION: 99 % | DIASTOLIC BLOOD PRESSURE: 85 MMHG | BODY MASS INDEX: 29.8 KG/M2 | SYSTOLIC BLOOD PRESSURE: 178 MMHG | WEIGHT: 220 LBS | TEMPERATURE: 97 F | HEIGHT: 72 IN

## 2024-09-26 DIAGNOSIS — D64.9 ANEMIA: ICD-10-CM

## 2024-09-26 DIAGNOSIS — R31.9 HEMATURIA, UNSPECIFIED TYPE: Primary | ICD-10-CM

## 2024-09-26 DIAGNOSIS — D50.0 IRON DEFICIENCY ANEMIA SECONDARY TO BLOOD LOSS (CHRONIC): Primary | ICD-10-CM

## 2024-09-26 DIAGNOSIS — D50.0 ANEMIA DUE TO GASTROINTESTINAL BLOOD LOSS: Primary | ICD-10-CM

## 2024-09-26 PROCEDURE — 88305 TISSUE EXAM BY PATHOLOGIST: CPT | Mod: 26,,, | Performed by: STUDENT IN AN ORGANIZED HEALTH CARE EDUCATION/TRAINING PROGRAM

## 2024-09-26 PROCEDURE — 88305 TISSUE EXAM BY PATHOLOGIST: CPT | Performed by: STUDENT IN AN ORGANIZED HEALTH CARE EDUCATION/TRAINING PROGRAM

## 2024-09-26 PROCEDURE — 37000009 HC ANESTHESIA EA ADD 15 MINS: Performed by: INTERNAL MEDICINE

## 2024-09-26 PROCEDURE — 27202087 HC PROBE, APC: Performed by: INTERNAL MEDICINE

## 2024-09-26 PROCEDURE — 63600175 PHARM REV CODE 636 W HCPCS: Performed by: NURSE ANESTHETIST, CERTIFIED REGISTERED

## 2024-09-26 PROCEDURE — 37000008 HC ANESTHESIA 1ST 15 MINUTES: Performed by: INTERNAL MEDICINE

## 2024-09-26 PROCEDURE — 25000003 PHARM REV CODE 250: Performed by: NURSE ANESTHETIST, CERTIFIED REGISTERED

## 2024-09-26 PROCEDURE — 27201028 HC NEEDLE, SCLERO: Performed by: INTERNAL MEDICINE

## 2024-09-26 PROCEDURE — 88342 IMHCHEM/IMCYTCHM 1ST ANTB: CPT | Mod: 26,,, | Performed by: STUDENT IN AN ORGANIZED HEALTH CARE EDUCATION/TRAINING PROGRAM

## 2024-09-26 PROCEDURE — 88342 IMHCHEM/IMCYTCHM 1ST ANTB: CPT | Performed by: STUDENT IN AN ORGANIZED HEALTH CARE EDUCATION/TRAINING PROGRAM

## 2024-09-26 PROCEDURE — 27201238 HC BALLOON, OVERTUBE (ANY): Performed by: INTERNAL MEDICINE

## 2024-09-26 PROCEDURE — 27201089 HC SNARE, DISP (ANY): Performed by: INTERNAL MEDICINE

## 2024-09-26 PROCEDURE — 44799 UNLISTED PX SMALL INTESTINE: CPT | Performed by: INTERNAL MEDICINE

## 2024-09-26 PROCEDURE — 27202363 HC INJECTION AGENT, SUBMUCOSAL, ANY: Performed by: INTERNAL MEDICINE

## 2024-09-26 PROCEDURE — 25000003 PHARM REV CODE 250: Performed by: INTERNAL MEDICINE

## 2024-09-26 RX ORDER — SODIUM CHLORIDE 0.9 % (FLUSH) 0.9 %
10 SYRINGE (ML) INJECTION
Status: DISCONTINUED | OUTPATIENT
Start: 2024-09-26 | End: 2024-09-26 | Stop reason: HOSPADM

## 2024-09-26 RX ORDER — EPHEDRINE SULFATE 50 MG/ML
INJECTION, SOLUTION INTRAVENOUS
Status: DISCONTINUED | OUTPATIENT
Start: 2024-09-26 | End: 2024-09-26

## 2024-09-26 RX ORDER — SODIUM CHLORIDE 9 MG/ML
INJECTION, SOLUTION INTRAVENOUS CONTINUOUS
Status: DISCONTINUED | OUTPATIENT
Start: 2024-09-26 | End: 2024-09-26 | Stop reason: HOSPADM

## 2024-09-26 RX ORDER — SUCCINYLCHOLINE CHLORIDE 20 MG/ML
INJECTION INTRAMUSCULAR; INTRAVENOUS
Status: DISCONTINUED | OUTPATIENT
Start: 2024-09-26 | End: 2024-09-26

## 2024-09-26 RX ORDER — LIDOCAINE HYDROCHLORIDE 20 MG/ML
INJECTION INTRAVENOUS
Status: DISCONTINUED | OUTPATIENT
Start: 2024-09-26 | End: 2024-09-26

## 2024-09-26 RX ORDER — ROCURONIUM BROMIDE 10 MG/ML
INJECTION, SOLUTION INTRAVENOUS
Status: DISCONTINUED | OUTPATIENT
Start: 2024-09-26 | End: 2024-09-26

## 2024-09-26 RX ORDER — SULFAMETHOXAZOLE AND TRIMETHOPRIM 800; 160 MG/1; MG/1
1 TABLET ORAL 2 TIMES DAILY
Qty: 14 TABLET | Refills: 0 | Status: SHIPPED | OUTPATIENT
Start: 2024-09-26 | End: 2024-09-27

## 2024-09-26 RX ORDER — PROPOFOL 10 MG/ML
VIAL (ML) INTRAVENOUS
Status: DISCONTINUED | OUTPATIENT
Start: 2024-09-26 | End: 2024-09-26

## 2024-09-26 RX ORDER — DEXTROMETHORPHAN/PSEUDOEPHED 2.5-7.5/.8
DROPS ORAL
Status: COMPLETED | OUTPATIENT
Start: 2024-09-26 | End: 2024-09-26

## 2024-09-26 RX ADMIN — PROPOFOL 100 MG: 10 INJECTION, EMULSION INTRAVENOUS at 02:09

## 2024-09-26 RX ADMIN — ROCURONIUM BROMIDE 5 MG: 10 INJECTION, SOLUTION INTRAVENOUS at 02:09

## 2024-09-26 RX ADMIN — EPHEDRINE SULFATE 15 MG: 50 INJECTION INTRAVENOUS at 02:09

## 2024-09-26 RX ADMIN — LIDOCAINE HYDROCHLORIDE 100 MG: 20 INJECTION, SOLUTION INTRAVENOUS at 02:09

## 2024-09-26 RX ADMIN — EPHEDRINE SULFATE 10 MG: 50 INJECTION INTRAVENOUS at 02:09

## 2024-09-26 RX ADMIN — GLYCOPYRROLATE 0.1 MG: 0.2 INJECTION, SOLUTION INTRAMUSCULAR; INTRAVITREAL at 02:09

## 2024-09-26 RX ADMIN — SODIUM CHLORIDE: 9 INJECTION, SOLUTION INTRAVENOUS at 01:09

## 2024-09-26 RX ADMIN — SUCCINYLCHOLINE CHLORIDE 140 MG: 20 INJECTION, SOLUTION INTRAMUSCULAR; INTRAVENOUS at 02:09

## 2024-09-26 RX ADMIN — HYPROMELLOSE 2910 2 DROP: 5 SOLUTION OPHTHALMIC at 02:09

## 2024-09-26 NOTE — ANESTHESIA PROCEDURE NOTES
Intubation    Date/Time: 9/26/2024 2:03 PM    Performed by: Sebastian Benites CRNA  Authorized by: Yuri Perez DO    Intubation:     Induction:  Intravenous    Intubated:  Postinduction    Mask Ventilation:  Easy with oral airway    Attempts:  1    Attempted By:  Student    Method of Intubation:  Video laryngoscopy    Blade:  Gaines 3    Laryngeal View Grade: Grade I - full view of cords      Difficult Airway Encountered?: No      Complications:  None    Airway Device:  Oral endotracheal tube    Airway Device Size:  7.5    Style/Cuff Inflation:  Cuffed (inflated to minimal occlusive pressure)    Tube secured:  23    Secured at:  The lips    Placement Verified By:  Capnometry    Complicating Factors:  None    Findings Post-Intubation:  BS equal bilateral and atraumatic/condition of teeth unchanged

## 2024-09-26 NOTE — [employer]OVATION PATIENT INSTRUCTIONS
Discharge Summary/Instructions after an Endoscopic Procedure  Patient Name: Jose Miguel Alvarez  Patient MRN: [id number]  Patient YOB: 1940 Thursday, September 26, 2024  Edurado Ruff MD  Dear patient,  As a result of recent federal legislation (The Federal Cures Act), you may   receive lab or pathology results from your procedure in your [facility]   account before your physician is able to contact you. Your physician or   their representative will relay the results to you with their   recommendations at their soonest availability.  Thank you,  Your health is very important to us during the Covid Crisis. Following your   procedure today, you will receive a daily text for 2 weeks asking about   signs or symptoms of Covid 19.  Please respond to this text when you   receive it so we can follow up and keep you as safe as possible.   RESTRICTIONS:  During your procedure today, you received medications for sedation.  These   medications may affect your judgment, balance and coordination.  Therefore,   for 24 hours, you have the following restrictions:   - DO NOT drive a car, operate machinery, make legal/financial decisions,   sign important papers or drink alcohol.    ACTIVITY:  Today: no heavy lifting, straining or running due to procedural   sedation/anesthesia.  The following day: return to full activity including work.  DIET:  Eat and drink normally unless instructed otherwise.     TREATMENT FOR COMMON SIDE EFFECTS:  - Mild abdominal pain, nausea, belching, bloating or excessive gas:  rest,   eat lightly and use a heating pad.  - Sore Throat: treat with throat lozenges and/or gargle with warm salt   water.  - Because air was used during the procedure, expelling large amounts of air   from your rectum or belching is normal.  - If a bowel prep was taken, you may not have a bowel movement for 1-3 days.    This is normal.  SYMPTOMS TO WATCH FOR AND REPORT TO YOUR PHYSICIAN:  1. Abdominal pain or bloating, other  than gas cramps.  2. Chest pain.  3. Back pain.  4. Signs of infection such as: chills or fever occurring within 24 hours   after the procedure.  5. Rectal bleeding, which would show as bright red, maroon, or black stools.   (A tablespoon of blood from the rectum is not serious, especially if   hemorrhoids are present.)  6. Vomiting.  7. Weakness or dizziness.  GO DIRECTLY TO THE NEAREST EMERGENCY ROOM IF YOU HAVE ANY OF THE FOLLOWING:      Difficulty breathing              Chills and/or fever over 101 F   Persistent vomiting and/or vomiting blood   Severe abdominal pain   Severe chest pain   Black, tarry stools   Bleeding- more than one tablespoon   Any other symptom or condition that you feel may need urgent attention  Your doctor recommends these additional instructions:  If any biopsies were taken, your doctors clinic will contact you in 1 to 2   weeks with any results.  - Hold Eliquis x 10 days following hot snare   - Continue PPI   - Add misoprostol 200mcg qid x 14 days to promote healing of polypectomy   sites   - In the case of angioectasias, expect GI blood loss to recur intermittently   over time as these lesions are typically multiple and challenging to cure   by endoscopic ablation   - Parenteral iron therapy will likely be required to improve this patient's   anemia   - If anemia worsens despite adequate iron therapy, Sandostatin Depot   injections is likely next step followed by repeat colonoscopy to evaluate   for additional angioectasias in the right colon which might be candidate   for ablation   - Proceed with Afib ablation procedure if possible as this may improve his   cardiac function as well as allow for withdrawal of chronic antithrombotic   therapy   - Discharge to home  - Resume previous diet and medications  - Condition stable   - The signs and symptoms of potential delayed complications were discussed   with the patient. If signs or symptoms of these complications develop, call   the  [facility] at [phone].   - Return to normal activities tomorrow.  Written discharge instructions were   provided to the patient.  For questions, problems or results please call your physician - Eduardo Ruff MD.  EMERGENCY PHONE NUMBER: [phone],  LAB RESULTS: [phone]  IF A COMPLICATION OR EMERGENCY SITUATION ARISES AND YOU ARE UNABLE TO REACH   YOUR PHYSICIAN - GO DIRECTLY TO THE EMERGENCY ROOM.  MD Eduardo Yeh MD  9/26/2024 3:42:44 PM  This report has been verified and signed electronically.  Dear patient,  As a result of recent federal legislation (The Federal Cures Act), you may   receive lab or pathology results from your procedure in your [facility]   account before your physician is able to contact you. Your physician or   their representative will relay the results to you with their   recommendations at their soonest availability.  Thank you,  PROVATION

## 2024-09-26 NOTE — ANESTHESIA PREPROCEDURE EVALUATION
[facility]  Anesthesia Pre-Operative Evaluation         Patient Name: Jose Miguel Alvarez Jr.  YOB: 1940  MRN: [id number]    SUBJECTIVE:     Pre-operative evaluation for Procedure(s) (LRB):  ENTEROSCOPY--prximal DBE (N/A)     09/26/2024    Jose Miguel Alvarez Jr. is a 83 y.o. male w/ a significant PMHx of PAF s/p ablation (return of af episodes after covid infection), HTN, HLD, COPD, former smoking, angiodysplasia of stomach and duodenum requiring frequent blood transfusions.  H&H stable today, no new obvious bloody stool or melena.    Took eliquis last night    Patient now presents for the above procedure(s).    TTE:   Echo Saline Bubble? No    Result Date: 4/1/2024    Left Ventricle: The left ventricle is normal in size. Mildly increased   wall thickness. There is concentric remodeling. There is normal systolic   function with a visually estimated ejection fraction of 60 - 65%. Unable   to assess diastolic function due to atrial fibrillation.    Right Ventricle: Normal right ventricular cavity size. Wall thickness   is normal. Right ventricle wall motion  is normal. Systolic function is   normal.    Left Atrium: Left atrium is moderately dilated.    Tricuspid Valve: There is mild regurgitation.    IVC/SVC: Intermediate venous pressure at 8 mmHg.           Patient Active Problem List   Diagnosis    Paroxysmal atrial fibrillation    Bladder tumor    Hypertension    Hypercholesteremia    COPD (chronic obstructive pulmonary disease)    Anemia    Urothelial carcinoma of bladder    Gastrointestinal hemorrhage associated with angiodysplasia of stomach and duodenum    Iron deficiency anemia secondary to blood loss (chronic)    Pancytopenia    History of bladder cancer    Benign prostatic hyperplasia with urinary frequency    Nephrolithiasis    Gross hematuria    Acute cystitis with hematuria    DM type 2 with diabetic dyslipidemia    Other nonthrombocytopenic purpura    Chronic combined  systolic (congestive) and diastolic (congestive) heart failure    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    Dementia associated with other underlying disease, without behavioral disturbance, psychotic disturbance, mood disturbance, or anxiety, unspecified dementia severity    PVD (peripheral vascular disease)    Type 2 diabetes mellitus    Leukocytosis       Review of patient's allergies indicates:   Allergen Reactions    Feraheme [ferumoxytol] Anaphylaxis       Current Inpatient Medications:      Current Facility-Administered Medications on File Prior to Encounter   Medication Dose Route Frequency Provider Last Rate Last Admin    lactated ringers infusion   Intravenous Continuous Densise Conteh MD   Stopped at 12/28/21 1241     Current Outpatient Medications on File Prior to Encounter   Medication Sig Dispense Refill    amiodarone (PACERONE) 200 MG Tab Take 1 tablet (200 mg total) by mouth once daily. 30 tablet 11    apixaban (ELIQUIS) 2.5 mg Tab Take 1 tablet (2.5 mg total) by mouth 2 (two) times daily. 60 tablet 5    atorvastatin (LIPITOR) 20 MG tablet Take 1 tablet (20 mg total) by mouth every evening. 90 tablet 0    carvediloL (COREG) 6.25 MG tablet Take 1 tablet (6.25 mg total) by mouth 2 (two) times daily with meals. 180 tablet 3    cholecalciferol, vitamin D3, (VITAMIN D3 ORAL) Take 1 tablet by mouth every other day.      donepeziL (ARICEPT) 5 MG tablet Take 1 tablet (5 mg total) by mouth every evening. 90 tablet 3    hydrALAZINE (APRESOLINE) 50 MG tablet Take 1 tablet (50 mg total) by mouth every 8 (eight) hours. 270 tablet 2    pantoprazole (PROTONIX) 20 MG tablet Take 2 tablets (40 mg total) by mouth once daily. 30 tablet 0    multivitamin (THERAGRAN) per tablet Take 1 tablet by mouth once daily.         Past Surgical History:   Procedure Laterality Date    bladder tumor removal      CARDIAC ELECTROPHYSIOLOGY MAPPING AND  ABLATION      CARDIOVERSION      several    CATARACT EXTRACTION Bilateral     cataract removal with IOL implants Bilateral     CHOLECYSTECTOMY      CYSTOSCOPIC LITHOLAPAXY N/A 12/28/2021    Procedure: CYSTOLITHOLAPAXY;  Surgeon: Medardo Garcia MD;  Location: [facility] OR;  Service: Urology;  Laterality: N/A;    CYSTOSCOPIC LITHOLAPAXY N/A 5/14/2024    Procedure: CYSTOLITHOLAPAXY;  Surgeon: Medardo Garcia MD;  Location: [facility] OR;  Service: Urology;  Laterality: N/A;    CYSTOSCOPY      CYSTOSCOPY WITH BIOPSY OF BLADDER Right 12/28/2021    Procedure: CYSTOSCOPY, WITH BLADDER BIOPSY, WITH FULGURATION IF INDICATED;  Surgeon: Medardo Garcia MD;  Location: [facility] OR;  Service: Urology;  Laterality: Right;    ESOPHAGOGASTRODUODENOSCOPY N/A 10/25/2021    Procedure: Small Jason Enteroscopy (SBE);  Surgeon: Isabelle Griffin MD;  Location: [facility];  Service: Endoscopy;  Laterality: N/A;    ESOPHAGOGASTRODUODENOSCOPY N/A 12/13/2021    Procedure: EGD (ESOPHAGOGASTRODUODENOSCOPY);  Surgeon: Troy Polanco MD;  Location: [facility];  Service: Endoscopy;  Laterality: N/A;    ESOPHAGOGASTRODUODENOSCOPY N/A 4/5/2024    Procedure: EGD (ESOPHAGOGASTRODUODENOSCOPY);  Surgeon: Myrtle Salcedo MD;  Location: [facility];  Service: Endoscopy;  Laterality: N/A;    ESOPHAGOGASTRODUODENOSCOPY N/A 4/22/2024    Procedure: EGD (ESOPHAGOGASTRODUODENOSCOPY);  Surgeon: Myrtle Salcedo MD;  Location: [facility] ENDO;  Service: Endoscopy;  Laterality: N/A;  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    EYE SURGERY      INTRALUMINAL GASTROINTESTINAL TRACT IMAGING VIA CAPSULE N/A 12/6/2021    Procedure: IMAGING PROCEDURE, GI TRACT, INTRALUMINAL, VIA CAPSULE;  Surgeon: Larry Jones RN;  Location: [facility] ENDO;  Service: Endoscopy;  Laterality: N/A;    INTRALUMINAL GASTROINTESTINAL TRACT IMAGING VIA CAPSULE N/A 4/18/2024    Procedure: IMAGING PROCEDURE, GI TRACT, INTRALUMINAL, VIA CAPSULE;  Surgeon: Larry Jones RN;  Location:  [facility] ENDO;  Service: Endoscopy;  Laterality: N/A;    TRANSURETHRAL RESECTION OF BLADDER TUMOR BY BIPOLAR CAUTERY N/A 7/23/2019    Procedure: TURBT, USING BIPOLAR CAUTERY;  Surgeon: Ritesh Marques MD;  Location: [facility] OR;  Service: Urology;  Laterality: N/A;    TRANSURETHRAL RESECTION OF PROSTATE N/A 5/14/2024    Procedure: TURP (TRANSURETHRAL RESECTION OF PROSTATE);  Surgeon: Medardo Garcia MD;  Location: [facility] OR;  Service: Urology;  Laterality: N/A;       OBJECTIVE:     Vital Signs Range (Last 24H):  Temp:  [36.6 °C (97.9 °F)]   Pulse:  [53]   Resp:  [18]   BP: (197)/(91)   SpO2:  [98 %]       Significant Labs:  Lab Results   Component Value Date    WBC 4.06 09/24/2024    HGB 9.0 (L) 09/24/2024    HCT 32.0 (L) 09/24/2024     (L) 09/24/2024    CHOL 96 (L) 09/04/2024    TRIG 61 09/04/2024    HDL 42 09/04/2024    ALT 30 09/24/2024    AST 34 09/24/2024     09/24/2024    K 4.8 09/24/2024     09/24/2024    CREATININE 1.4 09/24/2024    BUN 18 09/24/2024    CO2 25 09/24/2024    TSH 3.937 04/01/2024    PSA 2.1 11/06/2020    INR 1.3 (H) 08/23/2024    HGBA1C 6.2 (H) 04/09/2024       Diagnostic Studies: No relevant studies.    EKG:   Results for orders placed or performed during the hospital encounter of 09/09/24   EKG 12-lead    Collection Time: 09/09/24  2:14 PM   Result Value Ref Range    QRS Duration 76 ms    OHS QTC Calculation 463 ms    Narrative    Test Reason : R07.9,    Vent. Rate : 057 BPM     Atrial Rate : 057 BPM     P-R Int : 194 ms          QRS Dur : 076 ms      QT Int : 476 ms       P-R-T Axes : 071 -24 016 degrees     QTc Int : 463 ms    Sinus arrhythmia  Cannot rule out Anterior infarct (cited on or before 23-AUG-2024)  Abnormal ECG  When compared with ECG of 23-AUG-2024 11:34,  No significant change was found  Confirmed by MD TESFAYE, BENITO ([id number]) on 9/11/2024 10:58:12 AM    Referred By: EMANUELERR   SELF           Confirmed By:BENITO CARLIN MD       ASSESSMENT/PLAN:       Pre-op  Assessment    I have reviewed the Patient Summary Reports.     I have reviewed the Nursing Notes. I have reviewed the NPO Status.   I have reviewed the Medications.     Review of Systems  Anesthesia Hx:  No problems with previous Anesthesia               Denies Personal Hx of Anesthesia complications.                    Social:  Non-Smoker       Hematology/Oncology:       -- Anemia:               Hematology Comments: Blood loss anemia    Denies Current/Recent Cancer                Cardiovascular:     Hypertension   Denies MI.  Denies CAD.    Dysrhythmias atrial fibrillation   Denies CHF.    hyperlipidemia   ECG has been reviewed.  Functional Capacity good / => 4 METS                         Pulmonary:   COPD, mild  Denies Asthma.   Denies Shortness of breath.   Denies Sleep Apnea. Just recovered from PNA, chest recently negative               Renal/:   Denies Chronic Renal Disease.                Hepatic/GI:      Denies GERD.   Angiodysplasia of stomach and duodenum          Musculoskeletal:  Musculoskeletal Normal                Neurological:  Denies TIA.  Denies CVA.    Denies Seizures.                                Endocrine:  Diabetes           Psych:  Psychiatric History                  Physical Exam  General: Well nourished, Cooperative, Alert and Oriented    Airway:  Mallampati: II   Mouth Opening: Normal  TM Distance: Normal  Tongue: Normal  Neck ROM: Extension Decreased    Dental:  No teeth lower.  Chipped canines upper.      Anesthesia Plan  Type of Anesthesia, risks & benefits discussed:    Anesthesia Type: Gen ETT  Intra-op Monitoring Plan: Standard ASA Monitors  Post Op Pain Control Plan: multimodal analgesia and IV/PO Opioids PRN  Induction:  IV  Airway Plan: Video, Post-Induction  Informed Consent: Informed consent signed with the Patient and all parties understand the risks and agree with anesthesia plan.  All questions answered.   ASA Score: 3  Day of Surgery Review of History & Physical: H&P  Update referred to the surgeon/provider.    Ready For Surgery From Anesthesia Perspective.     .

## 2024-09-26 NOTE — TRANSFER OF CARE
Anesthesia Transfer of Care Note    Patient: Jose Miguel Alvarez JrNorma    Procedure(s) Performed: Procedure(s) (LRB):  ENTEROSCOPY--prximal DBE (N/A)    Patient location: PACU    Anesthesia Type: general    Transport from OR: Transported from OR on 6-10 L/min O2 by face mask with adequate spontaneous ventilation    Post pain: adequate analgesia    Post assessment: no apparent anesthetic complications and tolerated procedure well    Post vital signs: stable    Level of consciousness: awake and alert    Nausea/Vomiting: no nausea/vomiting    Complications: none    Transfer of care protocol was followed    Last vitals: Visit Vitals  BP (!) 197/91 (BP Location: Left arm, Patient Position: Lying)   Pulse (!) 53   Temp 36.6 °C (97.9 °F) (Skin)   Resp 18   Ht 6' (1.829 m)   Wt 99.8 kg (220 lb)   SpO2 98%   BMI 29.84 kg/m²

## 2024-09-26 NOTE — H&P
[facility]    CC: Chronic LIDA      HPI 83 y.o. male with a history of atrial fibrillation on xarelto along with a 4-5 year history of iron deficiency anemia requiring blood transfusions approximately every 5-6 months along with frequent iron transfusions. He denies overt GI blood loss. Currently discussing with cardiology about placing a Watchmen device.      Has undergone push enteroscopy with ablation of small bowel angioectasias with last in 4/22/2024. VCE in 4/2024 with active small bowel bleeding seen.     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 Treated with APC.   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 breakdown of skin 01/26/2023    Stage 3 chronic kidney disease 10/06/2017     Physical Examination  BP (!) 197/91 (BP Location: Left arm, Patient Position: Lying)   Pulse (!) 53   Temp 97.9 °F (36.6 °C) (Skin)   Resp 18   Ht 6' (1.829 m)   Wt 99.8 kg (220 lb)   SpO2 98%   BMI 29.84 kg/m²   General appearance: alert, cooperative, no distress  Lungs: clear to auscultation bilaterally, no dullness to percussion bilaterally  Heart: regular rate and rhythm without rub; no displacement of the PMI   Abdomen: soft, non-tender; bowel sounds normoactive; no organomegaly    Assessment:   83 year old male with a  history of A fib on Xarelto who presents for evaluation of chronic GI blood loss from angioectasia bleeding.     Plan:  - Upper DBE today    Eduardo Ruff MD   [address], [address]  [place] [address] [phone]

## 2024-09-26 NOTE — ANESTHESIA POSTPROCEDURE EVALUATION
Anesthesia Post Evaluation    Patient: Jose Miguel Alvarez JrNorma    Procedure(s) Performed: Procedure(s) (LRB):  ENTEROSCOPY--prximal DBE (N/A)    Final Anesthesia Type: general      Patient location during evaluation: PACU  Patient participation: Yes- Able to Participate  Level of consciousness: awake and alert and oriented  Post-procedure vital signs: reviewed and stable  Pain management: adequate  Airway patency: patent  JOAQUINA mitigation strategies: Multimodal analgesia, Extubation while patient is awake and Verification of full reversal of neuromuscular block  PONV status at discharge: No PONV  Anesthetic complications: no      Cardiovascular status: hemodynamically stable  Respiratory status: spontaneous ventilation and unassisted  Hydration status: euvolemic  Follow-up not needed.              Vitals Value Taken Time   /75 09/26/24 1554   Temp 36.7 °C (98.1 °F) 09/26/24 1526   Pulse 62 09/26/24 1555   Resp 18 09/26/24 1555   SpO2 92 % 09/26/24 1555   Vitals shown include unfiled device data.      No case tracking events are documented in the log.      Pain/Johanna Score: Johanna Score: 10 (9/26/2024  3:45 PM)

## 2024-09-27 ENCOUNTER — TELEPHONE (OUTPATIENT)
Dept: FAMILY MEDICINE | Facility: CLINIC | Age: 84
End: 2024-09-27
Payer: MEDICARE

## 2024-09-27 ENCOUNTER — EXTERNAL HOME HEALTH (OUTPATIENT)
Dept: HOME HEALTH SERVICES | Facility: HOSPITAL | Age: 84
End: 2024-09-27
Payer: MEDICARE

## 2024-09-27 RX ORDER — CIPROFLOXACIN 250 MG/1
250 TABLET, FILM COATED ORAL EVERY 12 HOURS
Qty: 10 TABLET | Refills: 0 | Status: SHIPPED | OUTPATIENT
Start: 2024-09-27

## 2024-09-28 LAB — BACTERIA UR CULT: ABNORMAL

## 2024-09-30 ENCOUNTER — PATIENT OUTREACH (OUTPATIENT)
Dept: ADMINISTRATIVE | Facility: HOSPITAL | Age: 84
End: 2024-09-30
Payer: MEDICARE

## 2024-09-30 ENCOUNTER — LAB VISIT (OUTPATIENT)
Dept: LAB | Facility: HOSPITAL | Age: 84
End: 2024-09-30
Attending: NURSE PRACTITIONER
Payer: MEDICARE

## 2024-09-30 DIAGNOSIS — R31.9 HEMATURIA, UNSPECIFIED TYPE: ICD-10-CM

## 2024-09-30 DIAGNOSIS — E11.69 DM TYPE 2 WITH DIABETIC DYSLIPIDEMIA: Primary | ICD-10-CM

## 2024-09-30 DIAGNOSIS — K31.811 GASTROINTESTINAL HEMORRHAGE ASSOCIATED WITH ANGIODYSPLASIA OF STOMACH AND DUODENUM: ICD-10-CM

## 2024-09-30 DIAGNOSIS — D50.9 IRON DEFICIENCY ANEMIA, UNSPECIFIED IRON DEFICIENCY ANEMIA TYPE: ICD-10-CM

## 2024-09-30 DIAGNOSIS — E78.5 DM TYPE 2 WITH DIABETIC DYSLIPIDEMIA: Primary | ICD-10-CM

## 2024-09-30 LAB
BACTERIA #/AREA URNS AUTO: ABNORMAL /HPF
BASOPHILS # BLD AUTO: 0.04 K/UL (ref 0–0.2)
BASOPHILS NFR BLD: 1.3 % (ref 0–1.9)
DIFFERENTIAL METHOD BLD: ABNORMAL
EOSINOPHIL # BLD AUTO: 0.1 K/UL (ref 0–0.5)
EOSINOPHIL NFR BLD: 3 % (ref 0–8)
ERYTHROCYTE [DISTWIDTH] IN BLOOD BY AUTOMATED COUNT: 22.9 % (ref 11.5–14.5)
FERRITIN SERPL-MCNC: 21 NG/ML (ref 20–300)
HCT VFR BLD AUTO: 31.9 % (ref 40–54)
HGB BLD-MCNC: 9.1 G/DL (ref 14–18)
IMM GRANULOCYTES # BLD AUTO: 0.01 K/UL (ref 0–0.04)
IMM GRANULOCYTES NFR BLD AUTO: 0.3 % (ref 0–0.5)
IRON SERPL-MCNC: 56 UG/DL (ref 45–160)
LYMPHOCYTES # BLD AUTO: 0.7 K/UL (ref 1–4.8)
LYMPHOCYTES NFR BLD: 24.1 % (ref 18–48)
MCH RBC QN AUTO: 21.6 PG (ref 27–31)
MCHC RBC AUTO-ENTMCNC: 28.5 G/DL (ref 32–36)
MCV RBC AUTO: 76 FL (ref 82–98)
MICROSCOPIC COMMENT: ABNORMAL
MONOCYTES # BLD AUTO: 0.6 K/UL (ref 0.3–1)
MONOCYTES NFR BLD: 18.7 % (ref 4–15)
NEUTROPHILS # BLD AUTO: 1.6 K/UL (ref 1.8–7.7)
NEUTROPHILS NFR BLD: 52.6 % (ref 38–73)
NRBC BLD-RTO: 0 /100 WBC
PLATELET # BLD AUTO: 159 K/UL (ref 150–450)
PMV BLD AUTO: ABNORMAL FL (ref 9.2–12.9)
RBC # BLD AUTO: 4.22 M/UL (ref 4.6–6.2)
RBC #/AREA URNS AUTO: 6 /HPF (ref 0–4)
SATURATED IRON: 13 % (ref 20–50)
SQUAMOUS #/AREA URNS AUTO: 0 /HPF
TOTAL IRON BINDING CAPACITY: 438 UG/DL (ref 250–450)
TRANSFERRIN SERPL-MCNC: 296 MG/DL (ref 200–375)
WBC # BLD AUTO: 2.99 K/UL (ref 3.9–12.7)
WBC #/AREA URNS AUTO: 20 /HPF (ref 0–5)

## 2024-09-30 PROCEDURE — 82728 ASSAY OF FERRITIN: CPT | Performed by: NURSE PRACTITIONER

## 2024-09-30 PROCEDURE — 81001 URINALYSIS AUTO W/SCOPE: CPT | Performed by: FAMILY MEDICINE

## 2024-09-30 PROCEDURE — 83540 ASSAY OF IRON: CPT | Performed by: NURSE PRACTITIONER

## 2024-09-30 PROCEDURE — 36415 COLL VENOUS BLD VENIPUNCTURE: CPT | Mod: PO | Performed by: NURSE PRACTITIONER

## 2024-09-30 PROCEDURE — 85025 COMPLETE CBC W/AUTO DIFF WBC: CPT | Performed by: NURSE PRACTITIONER

## 2024-10-01 ENCOUNTER — LAB VISIT (OUTPATIENT)
Dept: LAB | Facility: HOSPITAL | Age: 84
End: 2024-10-01
Payer: MEDICARE

## 2024-10-01 DIAGNOSIS — R31.9 HEMATURIA, UNSPECIFIED TYPE: ICD-10-CM

## 2024-10-01 LAB
FINAL PATHOLOGIC DIAGNOSIS: NORMAL
GROSS: NORMAL
Lab: NORMAL
MICROSCOPIC EXAM: NORMAL

## 2024-10-01 PROCEDURE — 87086 URINE CULTURE/COLONY COUNT: CPT | Performed by: FAMILY MEDICINE

## 2024-10-02 ENCOUNTER — DOCUMENT SCAN (OUTPATIENT)
Dept: HOME HEALTH SERVICES | Facility: HOSPITAL | Age: 84
End: 2024-10-02
Payer: MEDICARE

## 2024-10-03 LAB — BACTERIA UR CULT: NO GROWTH

## 2024-10-07 ENCOUNTER — PATIENT OUTREACH (OUTPATIENT)
Dept: ADMINISTRATIVE | Facility: HOSPITAL | Age: 84
End: 2024-10-07
Payer: MEDICARE

## 2024-10-07 DIAGNOSIS — E11.9 TYPE 2 DIABETES MELLITUS WITHOUT COMPLICATION, WITHOUT LONG-TERM CURRENT USE OF INSULIN: Primary | ICD-10-CM

## 2024-10-07 NOTE — PROGRESS NOTES
Lab visit report: Patient has upcoming lab appointment on 10/9/24, Hemoglobin A1c order entered and linked.

## 2024-10-09 ENCOUNTER — LAB VISIT (OUTPATIENT)
Dept: LAB | Facility: HOSPITAL | Age: 84
End: 2024-10-09
Attending: NURSE PRACTITIONER
Payer: MEDICARE

## 2024-10-09 DIAGNOSIS — K31.811 GASTROINTESTINAL HEMORRHAGE ASSOCIATED WITH ANGIODYSPLASIA OF STOMACH AND DUODENUM: ICD-10-CM

## 2024-10-09 DIAGNOSIS — D50.9 IRON DEFICIENCY ANEMIA, UNSPECIFIED IRON DEFICIENCY ANEMIA TYPE: ICD-10-CM

## 2024-10-09 DIAGNOSIS — E11.9 TYPE 2 DIABETES MELLITUS WITHOUT COMPLICATION, WITHOUT LONG-TERM CURRENT USE OF INSULIN: ICD-10-CM

## 2024-10-09 LAB
BASOPHILS # BLD AUTO: 0.05 K/UL (ref 0–0.2)
BASOPHILS NFR BLD: 1 % (ref 0–1.9)
DIFFERENTIAL METHOD BLD: ABNORMAL
EOSINOPHIL # BLD AUTO: 0.2 K/UL (ref 0–0.5)
EOSINOPHIL NFR BLD: 4 % (ref 0–8)
ERYTHROCYTE [DISTWIDTH] IN BLOOD BY AUTOMATED COUNT: 23.3 % (ref 11.5–14.5)
ESTIMATED AVG GLUCOSE: 111 MG/DL (ref 68–131)
HBA1C MFR BLD: 5.5 % (ref 4–5.6)
HCT VFR BLD AUTO: 31.5 % (ref 40–54)
HGB BLD-MCNC: 9.2 G/DL (ref 14–18)
IMM GRANULOCYTES # BLD AUTO: 0.01 K/UL (ref 0–0.04)
IMM GRANULOCYTES NFR BLD AUTO: 0.2 % (ref 0–0.5)
LYMPHOCYTES # BLD AUTO: 1 K/UL (ref 1–4.8)
LYMPHOCYTES NFR BLD: 19 % (ref 18–48)
MCH RBC QN AUTO: 22.2 PG (ref 27–31)
MCHC RBC AUTO-ENTMCNC: 29.2 G/DL (ref 32–36)
MCV RBC AUTO: 76 FL (ref 82–98)
MONOCYTES # BLD AUTO: 0.7 K/UL (ref 0.3–1)
MONOCYTES NFR BLD: 12.5 % (ref 4–15)
NEUTROPHILS # BLD AUTO: 3.3 K/UL (ref 1.8–7.7)
NEUTROPHILS NFR BLD: 63.3 % (ref 38–73)
NRBC BLD-RTO: 0 /100 WBC
PLATELET # BLD AUTO: 130 K/UL (ref 150–450)
PMV BLD AUTO: 11.1 FL (ref 9.2–12.9)
RBC # BLD AUTO: 4.14 M/UL (ref 4.6–6.2)
WBC # BLD AUTO: 5.22 K/UL (ref 3.9–12.7)

## 2024-10-09 PROCEDURE — 85025 COMPLETE CBC W/AUTO DIFF WBC: CPT | Performed by: NURSE PRACTITIONER

## 2024-10-09 PROCEDURE — 83036 HEMOGLOBIN GLYCOSYLATED A1C: CPT | Performed by: FAMILY MEDICINE

## 2024-10-10 DIAGNOSIS — I48.0 PAROXYSMAL ATRIAL FIBRILLATION: Primary | ICD-10-CM

## 2024-10-14 ENCOUNTER — PATIENT OUTREACH (OUTPATIENT)
Dept: ADMINISTRATIVE | Facility: HOSPITAL | Age: 84
End: 2024-10-14
Payer: MEDICARE

## 2024-10-14 NOTE — PROGRESS NOTES
Lab visit report: Patient has a lab appointment scheduled on 10/25/24 for recheck of diabetic labs.

## 2024-10-16 ENCOUNTER — PATIENT MESSAGE (OUTPATIENT)
Dept: ELECTROPHYSIOLOGY | Facility: CLINIC | Age: 84
End: 2024-10-16
Payer: MEDICARE

## 2024-10-16 ENCOUNTER — LAB VISIT (OUTPATIENT)
Dept: LAB | Facility: HOSPITAL | Age: 84
End: 2024-10-16
Attending: NURSE PRACTITIONER
Payer: MEDICARE

## 2024-10-16 DIAGNOSIS — D50.9 IRON DEFICIENCY ANEMIA, UNSPECIFIED IRON DEFICIENCY ANEMIA TYPE: ICD-10-CM

## 2024-10-16 DIAGNOSIS — I48.0 PAROXYSMAL ATRIAL FIBRILLATION: Primary | ICD-10-CM

## 2024-10-16 DIAGNOSIS — K31.811 GASTROINTESTINAL HEMORRHAGE ASSOCIATED WITH ANGIODYSPLASIA OF STOMACH AND DUODENUM: ICD-10-CM

## 2024-10-16 LAB
BASOPHILS # BLD AUTO: 0.07 K/UL (ref 0–0.2)
BASOPHILS NFR BLD: 1.9 % (ref 0–1.9)
DIFFERENTIAL METHOD BLD: ABNORMAL
EOSINOPHIL # BLD AUTO: 0.3 K/UL (ref 0–0.5)
EOSINOPHIL NFR BLD: 7.2 % (ref 0–8)
ERYTHROCYTE [DISTWIDTH] IN BLOOD BY AUTOMATED COUNT: 23.6 % (ref 11.5–14.5)
HCT VFR BLD AUTO: 32.5 % (ref 40–54)
HGB BLD-MCNC: 9.5 G/DL (ref 14–18)
IMM GRANULOCYTES # BLD AUTO: 0.02 K/UL (ref 0–0.04)
IMM GRANULOCYTES NFR BLD AUTO: 0.5 % (ref 0–0.5)
LYMPHOCYTES # BLD AUTO: 0.9 K/UL (ref 1–4.8)
LYMPHOCYTES NFR BLD: 22.6 % (ref 18–48)
MCH RBC QN AUTO: 22.6 PG (ref 27–31)
MCHC RBC AUTO-ENTMCNC: 29.2 G/DL (ref 32–36)
MCV RBC AUTO: 77 FL (ref 82–98)
MONOCYTES # BLD AUTO: 0.6 K/UL (ref 0.3–1)
MONOCYTES NFR BLD: 16 % (ref 4–15)
NEUTROPHILS # BLD AUTO: 2 K/UL (ref 1.8–7.7)
NEUTROPHILS NFR BLD: 51.8 % (ref 38–73)
NRBC BLD-RTO: 0 /100 WBC
PLATELET # BLD AUTO: 123 K/UL (ref 150–450)
PMV BLD AUTO: ABNORMAL FL (ref 9.2–12.9)
RBC # BLD AUTO: 4.2 M/UL (ref 4.6–6.2)
WBC # BLD AUTO: 3.76 K/UL (ref 3.9–12.7)

## 2024-10-16 PROCEDURE — 36415 COLL VENOUS BLD VENIPUNCTURE: CPT | Mod: PO | Performed by: NURSE PRACTITIONER

## 2024-10-16 PROCEDURE — 85025 COMPLETE CBC W/AUTO DIFF WBC: CPT | Performed by: NURSE PRACTITIONER

## 2024-10-21 ENCOUNTER — PATIENT OUTREACH (OUTPATIENT)
Dept: ADMINISTRATIVE | Facility: HOSPITAL | Age: 84
End: 2024-10-21
Payer: MEDICARE

## 2024-10-23 ENCOUNTER — LAB VISIT (OUTPATIENT)
Dept: LAB | Facility: HOSPITAL | Age: 84
End: 2024-10-23
Attending: NURSE PRACTITIONER
Payer: MEDICARE

## 2024-10-23 DIAGNOSIS — R79.89 LOW SERUM TOTAL PROTEIN LEVEL: ICD-10-CM

## 2024-10-23 DIAGNOSIS — D64.9 ANEMIA, UNSPECIFIED TYPE: ICD-10-CM

## 2024-10-23 DIAGNOSIS — E78.5 DM TYPE 2 WITH DIABETIC DYSLIPIDEMIA: ICD-10-CM

## 2024-10-23 DIAGNOSIS — D69.6 THROMBOCYTOPENIA: ICD-10-CM

## 2024-10-23 DIAGNOSIS — Z12.5 ENCOUNTER FOR SCREENING FOR MALIGNANT NEOPLASM OF PROSTATE: ICD-10-CM

## 2024-10-23 DIAGNOSIS — D50.9 MICROCYTIC ANEMIA: ICD-10-CM

## 2024-10-23 DIAGNOSIS — E11.69 DM TYPE 2 WITH DIABETIC DYSLIPIDEMIA: ICD-10-CM

## 2024-10-23 LAB
ALBUMIN/CREAT UR: 121.2 UG/MG (ref 0–30)
BASOPHILS # BLD AUTO: 0.06 K/UL (ref 0–0.2)
BASOPHILS NFR BLD: 1.5 % (ref 0–1.9)
COMPLEXED PSA SERPL-MCNC: 2.1 NG/ML (ref 0–4)
CREAT UR-MCNC: 151 MG/DL (ref 23–375)
DIFFERENTIAL METHOD BLD: ABNORMAL
EOSINOPHIL # BLD AUTO: 0.2 K/UL (ref 0–0.5)
EOSINOPHIL NFR BLD: 6.1 % (ref 0–8)
ERYTHROCYTE [DISTWIDTH] IN BLOOD BY AUTOMATED COUNT: 23.3 % (ref 11.5–14.5)
FERRITIN SERPL-MCNC: 13 NG/ML (ref 20–300)
FOLATE SERPL-MCNC: 17.2 NG/ML (ref 4–24)
HCT VFR BLD AUTO: 33.5 % (ref 40–54)
HCYS SERPL-SCNC: 7.2 UMOL/L (ref 4–16.5)
HGB BLD-MCNC: 9.6 G/DL (ref 14–18)
IGA SERPL-MCNC: 117 MG/DL (ref 40–350)
IGG SERPL-MCNC: 1023 MG/DL (ref 650–1600)
IGM SERPL-MCNC: 211 MG/DL (ref 50–300)
IMM GRANULOCYTES # BLD AUTO: 0.01 K/UL (ref 0–0.04)
IMM GRANULOCYTES NFR BLD AUTO: 0.3 % (ref 0–0.5)
IRON SERPL-MCNC: 95 UG/DL (ref 45–160)
LDH SERPL L TO P-CCNC: 252 U/L (ref 110–260)
LYMPHOCYTES # BLD AUTO: 0.7 K/UL (ref 1–4.8)
LYMPHOCYTES NFR BLD: 18.3 % (ref 18–48)
MCH RBC QN AUTO: 22.6 PG (ref 27–31)
MCHC RBC AUTO-ENTMCNC: 28.7 G/DL (ref 32–36)
MCV RBC AUTO: 79 FL (ref 82–98)
MICROALBUMIN UR DL<=1MG/L-MCNC: 183 UG/ML
MONOCYTES # BLD AUTO: 0.6 K/UL (ref 0.3–1)
MONOCYTES NFR BLD: 14 % (ref 4–15)
NEUTROPHILS # BLD AUTO: 2.4 K/UL (ref 1.8–7.7)
NEUTROPHILS NFR BLD: 59.8 % (ref 38–73)
NRBC BLD-RTO: 0 /100 WBC
PLATELET # BLD AUTO: 128 K/UL (ref 150–450)
PMV BLD AUTO: ABNORMAL FL (ref 9.2–12.9)
RBC # BLD AUTO: 4.25 M/UL (ref 4.6–6.2)
RETICS/RBC NFR AUTO: 1.8 % (ref 0.4–2)
SATURATED IRON: 23 % (ref 20–50)
TOTAL IRON BINDING CAPACITY: 413 UG/DL (ref 250–450)
TRANSFERRIN SERPL-MCNC: 279 MG/DL (ref 200–375)
VIT B12 SERPL-MCNC: 510 PG/ML (ref 210–950)
WBC # BLD AUTO: 3.93 K/UL (ref 3.9–12.7)

## 2024-10-23 PROCEDURE — 86334 IMMUNOFIX E-PHORESIS SERUM: CPT | Performed by: NURSE PRACTITIONER

## 2024-10-23 PROCEDURE — 36415 COLL VENOUS BLD VENIPUNCTURE: CPT | Mod: PO | Performed by: NURSE PRACTITIONER

## 2024-10-23 PROCEDURE — 85045 AUTOMATED RETICULOCYTE COUNT: CPT | Performed by: NURSE PRACTITIONER

## 2024-10-23 PROCEDURE — 86334 IMMUNOFIX E-PHORESIS SERUM: CPT | Mod: 26,,, | Performed by: PATHOLOGY

## 2024-10-23 PROCEDURE — 83615 LACTATE (LD) (LDH) ENZYME: CPT | Performed by: NURSE PRACTITIONER

## 2024-10-23 PROCEDURE — 84165 PROTEIN E-PHORESIS SERUM: CPT | Mod: 26,,, | Performed by: PATHOLOGY

## 2024-10-23 PROCEDURE — 85025 COMPLETE CBC W/AUTO DIFF WBC: CPT | Performed by: NURSE PRACTITIONER

## 2024-10-23 PROCEDURE — 82784 ASSAY IGA/IGD/IGG/IGM EACH: CPT | Mod: 59 | Performed by: NURSE PRACTITIONER

## 2024-10-23 PROCEDURE — 83540 ASSAY OF IRON: CPT | Performed by: NURSE PRACTITIONER

## 2024-10-23 PROCEDURE — 82746 ASSAY OF FOLIC ACID SERUM: CPT | Performed by: NURSE PRACTITIONER

## 2024-10-23 PROCEDURE — 83521 IG LIGHT CHAINS FREE EACH: CPT | Performed by: NURSE PRACTITIONER

## 2024-10-23 PROCEDURE — 82728 ASSAY OF FERRITIN: CPT | Performed by: NURSE PRACTITIONER

## 2024-10-23 PROCEDURE — 83921 ORGANIC ACID SINGLE QUANT: CPT | Performed by: NURSE PRACTITIONER

## 2024-10-23 PROCEDURE — 84153 ASSAY OF PSA TOTAL: CPT | Performed by: NURSE PRACTITIONER

## 2024-10-23 PROCEDURE — 84165 PROTEIN E-PHORESIS SERUM: CPT | Performed by: NURSE PRACTITIONER

## 2024-10-23 PROCEDURE — 85049 AUTOMATED PLATELET COUNT: CPT | Performed by: NURSE PRACTITIONER

## 2024-10-23 PROCEDURE — 82607 VITAMIN B-12: CPT | Performed by: NURSE PRACTITIONER

## 2024-10-23 PROCEDURE — 82570 ASSAY OF URINE CREATININE: CPT | Performed by: FAMILY MEDICINE

## 2024-10-23 PROCEDURE — 83090 ASSAY OF HOMOCYSTEINE: CPT | Performed by: NURSE PRACTITIONER

## 2024-10-24 LAB
ALBUMIN SERPL ELPH-MCNC: 3.85 G/DL (ref 3.35–5.55)
ALPHA1 GLOB SERPL ELPH-MCNC: 0.28 G/DL (ref 0.17–0.41)
ALPHA2 GLOB SERPL ELPH-MCNC: 0.73 G/DL (ref 0.43–0.99)
B-GLOBULIN SERPL ELPH-MCNC: 0.66 G/DL (ref 0.5–1.1)
GAMMA GLOB SERPL ELPH-MCNC: 0.98 G/DL (ref 0.67–1.58)
INTERPRETATION SERPL IFE-IMP: NORMAL
KAPPA LC SER QL IA: 3.94 MG/DL (ref 0.33–1.94)
KAPPA LC/LAMBDA SER IA: 1.6 (ref 0.26–1.65)
LAMBDA LC SER QL IA: 2.46 MG/DL (ref 0.57–2.63)
MPV, BLUE TOP: ABNORMAL FL (ref 9.2–12.9)
PLATELET, BLUE TOP: 130 K/UL (ref 150–450)
PROT SERPL-MCNC: 6.5 G/DL (ref 6–8.4)

## 2024-10-25 LAB
PATHOLOGIST INTERPRETATION IFE: NORMAL
PATHOLOGIST INTERPRETATION SPE: NORMAL

## 2024-10-28 LAB — METHYLMALONATE SERPL-SCNC: 0.76 UMOL/L

## 2024-10-31 ENCOUNTER — OFFICE VISIT (OUTPATIENT)
Dept: HEMATOLOGY/ONCOLOGY | Facility: CLINIC | Age: 84
End: 2024-10-31
Payer: MEDICARE

## 2024-10-31 DIAGNOSIS — D69.6 THROMBOCYTOPENIA: ICD-10-CM

## 2024-10-31 DIAGNOSIS — D50.0 IRON DEFICIENCY ANEMIA DUE TO CHRONIC BLOOD LOSS: ICD-10-CM

## 2024-10-31 DIAGNOSIS — E53.8 B12 DEFICIENCY: ICD-10-CM

## 2024-10-31 DIAGNOSIS — K31.811 GASTROINTESTINAL HEMORRHAGE ASSOCIATED WITH ANGIODYSPLASIA OF STOMACH AND DUODENUM: Primary | ICD-10-CM

## 2024-10-31 DIAGNOSIS — Z85.51 HISTORY OF BLADDER CANCER: ICD-10-CM

## 2024-10-31 PROCEDURE — 1157F ADVNC CARE PLAN IN RCRD: CPT | Mod: CPTII,95,, | Performed by: NURSE PRACTITIONER

## 2024-10-31 PROCEDURE — G2211 COMPLEX E/M VISIT ADD ON: HCPCS | Mod: 95,,, | Performed by: NURSE PRACTITIONER

## 2024-10-31 PROCEDURE — 1159F MED LIST DOCD IN RCRD: CPT | Mod: CPTII,95,, | Performed by: NURSE PRACTITIONER

## 2024-10-31 PROCEDURE — 99215 OFFICE O/P EST HI 40 MIN: CPT | Mod: 95,,, | Performed by: NURSE PRACTITIONER

## 2024-10-31 PROCEDURE — 1160F RVW MEDS BY RX/DR IN RCRD: CPT | Mod: CPTII,95,, | Performed by: NURSE PRACTITIONER

## 2024-10-31 RX ORDER — EPINEPHRINE 0.3 MG/.3ML
0.3 INJECTION SUBCUTANEOUS ONCE AS NEEDED
OUTPATIENT
Start: 2024-10-31

## 2024-10-31 RX ORDER — SODIUM CHLORIDE 0.9 % (FLUSH) 0.9 %
10 SYRINGE (ML) INJECTION
OUTPATIENT
Start: 2024-10-31

## 2024-10-31 RX ORDER — METHYLPREDNISOLONE SOD SUCC 125 MG
40 VIAL (EA) INJECTION
Start: 2024-10-31

## 2024-10-31 RX ORDER — DIPHENHYDRAMINE HYDROCHLORIDE 50 MG/ML
50 INJECTION INTRAMUSCULAR; INTRAVENOUS ONCE AS NEEDED
OUTPATIENT
Start: 2024-10-31

## 2024-11-07 ENCOUNTER — TELEPHONE (OUTPATIENT)
Dept: ELECTROPHYSIOLOGY | Facility: CLINIC | Age: 84
End: 2024-11-07
Payer: MEDICARE

## 2024-11-07 ENCOUNTER — PATIENT MESSAGE (OUTPATIENT)
Dept: ELECTROPHYSIOLOGY | Facility: CLINIC | Age: 84
End: 2024-11-07
Payer: MEDICARE

## 2024-11-07 NOTE — TELEPHONE ENCOUNTER
Called pt offered sooner appt due to cancellation, pt agreed, also spoke with pts son and they both agreed to sooner date for procedure, will send updated intructions

## 2024-11-11 ENCOUNTER — LAB VISIT (OUTPATIENT)
Dept: LAB | Facility: HOSPITAL | Age: 84
End: 2024-11-11
Attending: INTERNAL MEDICINE
Payer: MEDICARE

## 2024-11-11 ENCOUNTER — PATIENT MESSAGE (OUTPATIENT)
Dept: INFUSION THERAPY | Facility: HOSPITAL | Age: 84
End: 2024-11-11
Payer: MEDICARE

## 2024-11-11 DIAGNOSIS — I48.0 PAROXYSMAL ATRIAL FIBRILLATION: ICD-10-CM

## 2024-11-11 LAB
ANION GAP SERPL CALC-SCNC: 9 MMOL/L (ref 8–16)
BUN SERPL-MCNC: 19 MG/DL (ref 8–23)
CALCIUM SERPL-MCNC: 9.1 MG/DL (ref 8.7–10.5)
CHLORIDE SERPL-SCNC: 108 MMOL/L (ref 95–110)
CO2 SERPL-SCNC: 26 MMOL/L (ref 23–29)
CREAT SERPL-MCNC: 1.1 MG/DL (ref 0.5–1.4)
ERYTHROCYTE [DISTWIDTH] IN BLOOD BY AUTOMATED COUNT: 22.1 % (ref 11.5–14.5)
EST. GFR  (NO RACE VARIABLE): >60 ML/MIN/1.73 M^2
GLUCOSE SERPL-MCNC: 112 MG/DL (ref 70–110)
HCT VFR BLD AUTO: 35.2 % (ref 40–54)
HGB BLD-MCNC: 10.1 G/DL (ref 14–18)
MCH RBC QN AUTO: 23.9 PG (ref 27–31)
MCHC RBC AUTO-ENTMCNC: 28.7 G/DL (ref 32–36)
MCV RBC AUTO: 83 FL (ref 82–98)
PLATELET # BLD AUTO: 119 K/UL (ref 150–450)
PMV BLD AUTO: ABNORMAL FL (ref 9.2–12.9)
POTASSIUM SERPL-SCNC: 4.3 MMOL/L (ref 3.5–5.1)
RBC # BLD AUTO: 4.22 M/UL (ref 4.6–6.2)
SODIUM SERPL-SCNC: 143 MMOL/L (ref 136–145)
WBC # BLD AUTO: 6.13 K/UL (ref 3.9–12.7)

## 2024-11-11 PROCEDURE — 85610 PROTHROMBIN TIME: CPT | Performed by: INTERNAL MEDICINE

## 2024-11-11 PROCEDURE — 85027 COMPLETE CBC AUTOMATED: CPT | Performed by: INTERNAL MEDICINE

## 2024-11-11 PROCEDURE — 80048 BASIC METABOLIC PNL TOTAL CA: CPT | Performed by: INTERNAL MEDICINE

## 2024-11-11 PROCEDURE — 85730 THROMBOPLASTIN TIME PARTIAL: CPT | Performed by: INTERNAL MEDICINE

## 2024-11-11 PROCEDURE — 36415 COLL VENOUS BLD VENIPUNCTURE: CPT | Performed by: INTERNAL MEDICINE

## 2024-11-12 LAB
APTT PPP: 29.9 SEC (ref 21–32)
INR PPP: 1.1 (ref 0.8–1.2)
PROTHROMBIN TIME: 11.6 SEC (ref 9–12.5)

## 2024-11-13 ENCOUNTER — TELEPHONE (OUTPATIENT)
Dept: ELECTROPHYSIOLOGY | Facility: CLINIC | Age: 84
End: 2024-11-13
Payer: MEDICARE

## 2024-11-13 NOTE — TELEPHONE ENCOUNTER
Spoke to patient and patients son (Hector)     CONFIRMED procedure arrival time of 10:00 AM on 11/14/2024    Reiterated instructions including:  -Directions to check in desk  -NPO after midnight night prior to procedure  -High importance of HOLDING Eliquis the morning of the procedure. Patient verbalized understanding.   -Confirmed compliance of Eliquis. Instructed patient to take the morning and evening dose of Eliquis on 11/13/2024. Patient verbalized understanding.   -Pre-procedure LABS Reviewed. No alerts noted.   -Confirmed absence of implanted device/stimulator   -Confirmed no fever, cough, or shortness of breath in the past 30 days  -Confirmed no redness, rash, irritation, or yeast infection to groin area.     -Reviewed current visitor policy    Patient verbalized understanding of above and appreciated the call.

## 2024-11-14 ENCOUNTER — HOSPITAL ENCOUNTER (INPATIENT)
Facility: HOSPITAL | Age: 84
LOS: 1 days | Discharge: HOME | DRG: 274 | End: 2024-11-14
Attending: INTERNAL MEDICINE | Admitting: INTERNAL MEDICINE
Payer: MEDICARE

## 2024-11-14 ENCOUNTER — ANESTHESIA EVENT (OUTPATIENT)
Dept: MEDSURG UNIT | Facility: HOSPITAL | Age: 84
End: 2024-11-14
Payer: MEDICARE

## 2024-11-14 ENCOUNTER — ANESTHESIA (OUTPATIENT)
Dept: MEDSURG UNIT | Facility: HOSPITAL | Age: 84
End: 2024-11-14
Payer: MEDICARE

## 2024-11-14 VITALS
OXYGEN SATURATION: 95 % | WEIGHT: 220 LBS | DIASTOLIC BLOOD PRESSURE: 74 MMHG | HEIGHT: 72 IN | SYSTOLIC BLOOD PRESSURE: 171 MMHG | RESPIRATION RATE: 18 BRPM | TEMPERATURE: 98 F | BODY MASS INDEX: 29.8 KG/M2 | HEART RATE: 52 BPM

## 2024-11-14 DIAGNOSIS — I48.0 PAROXYSMAL ATRIAL FIBRILLATION: ICD-10-CM

## 2024-11-14 DIAGNOSIS — I49.9 ARRHYTHMIA: ICD-10-CM

## 2024-11-14 DIAGNOSIS — I48.91 ATRIAL FIBRILLATION: ICD-10-CM

## 2024-11-14 LAB
ABO + RH BLD: NORMAL
BLD GP AB SCN CELLS X3 SERPL QL: NORMAL
POCT GLUCOSE: 103 MG/DL (ref 70–110)
SPECIMEN OUTDATE: NORMAL

## 2024-11-14 PROCEDURE — C1889 IMPLANT/INSERT DEVICE, NOC: HCPCS | Performed by: INTERNAL MEDICINE

## 2024-11-14 PROCEDURE — 36415 COLL VENOUS BLD VENIPUNCTURE: CPT | Performed by: NURSE PRACTITIONER

## 2024-11-14 PROCEDURE — C1894 INTRO/SHEATH, NON-LASER: HCPCS | Performed by: INTERNAL MEDICINE

## 2024-11-14 PROCEDURE — 25000242 PHARM REV CODE 250 ALT 637 W/ HCPCS: Performed by: NURSE ANESTHETIST, CERTIFIED REGISTERED

## 2024-11-14 PROCEDURE — 02L73DK OCCLUSION OF LEFT ATRIAL APPENDAGE WITH INTRALUMINAL DEVICE, PERCUTANEOUS APPROACH: ICD-10-PCS | Performed by: INTERNAL MEDICINE

## 2024-11-14 PROCEDURE — 25000003 PHARM REV CODE 250: Performed by: NURSE ANESTHETIST, CERTIFIED REGISTERED

## 2024-11-14 PROCEDURE — 37000009 HC ANESTHESIA EA ADD 15 MINS: Performed by: INTERNAL MEDICINE

## 2024-11-14 PROCEDURE — 63600175 PHARM REV CODE 636 W HCPCS: Performed by: INTERNAL MEDICINE

## 2024-11-14 PROCEDURE — C1769 GUIDE WIRE: HCPCS | Performed by: INTERNAL MEDICINE

## 2024-11-14 PROCEDURE — 86850 RBC ANTIBODY SCREEN: CPT | Performed by: NURSE PRACTITIONER

## 2024-11-14 PROCEDURE — 37000008 HC ANESTHESIA 1ST 15 MINUTES: Performed by: INTERNAL MEDICINE

## 2024-11-14 PROCEDURE — 11000001 HC ACUTE MED/SURG PRIVATE ROOM

## 2024-11-14 PROCEDURE — 33340 PERQ CLSR TCAT L ATR APNDGE: CPT | Mod: Q0,GC,, | Performed by: INTERNAL MEDICINE

## 2024-11-14 PROCEDURE — 27201423 OPTIME MED/SURG SUP & DEVICES STERILE SUPPLY: Performed by: INTERNAL MEDICINE

## 2024-11-14 PROCEDURE — C1760 CLOSURE DEV, VASC: HCPCS | Performed by: INTERNAL MEDICINE

## 2024-11-14 PROCEDURE — 33340 PERQ CLSR TCAT L ATR APNDGE: CPT | Mod: Q0 | Performed by: INTERNAL MEDICINE

## 2024-11-14 PROCEDURE — 82962 GLUCOSE BLOOD TEST: CPT | Performed by: INTERNAL MEDICINE

## 2024-11-14 PROCEDURE — 86920 COMPATIBILITY TEST SPIN: CPT | Performed by: NURSE PRACTITIONER

## 2024-11-14 PROCEDURE — 63600175 PHARM REV CODE 636 W HCPCS: Performed by: NURSE ANESTHETIST, CERTIFIED REGISTERED

## 2024-11-14 DEVICE — LEFT ATRIAL APPENDAGE CLOSURE DEVICE WITH DELIVERY SYSTEM
Type: IMPLANTABLE DEVICE | Site: HEART | Status: FUNCTIONAL
Brand: WATCHMAN FLX™ PRO

## 2024-11-14 RX ORDER — GLUCAGON 1 MG
1 KIT INJECTION
Status: DISCONTINUED | OUTPATIENT
Start: 2024-11-14 | End: 2024-11-14 | Stop reason: HOSPADM

## 2024-11-14 RX ORDER — FENTANYL CITRATE 50 UG/ML
25 INJECTION, SOLUTION INTRAMUSCULAR; INTRAVENOUS EVERY 5 MIN PRN
Status: DISCONTINUED | OUTPATIENT
Start: 2024-11-14 | End: 2024-11-14 | Stop reason: HOSPADM

## 2024-11-14 RX ORDER — VASOPRESSIN 20 [USP'U]/ML
INJECTION, SOLUTION INTRAMUSCULAR; SUBCUTANEOUS
Status: DISCONTINUED | OUTPATIENT
Start: 2024-11-14 | End: 2024-11-14

## 2024-11-14 RX ORDER — CEFAZOLIN 2 G/1
2 INJECTION, POWDER, FOR SOLUTION INTRAMUSCULAR; INTRAVENOUS
Status: DISCONTINUED | OUTPATIENT
Start: 2024-11-14 | End: 2024-11-14 | Stop reason: HOSPADM

## 2024-11-14 RX ORDER — SODIUM CHLORIDE 0.9 % (FLUSH) 0.9 %
10 SYRINGE (ML) INJECTION
Status: DISCONTINUED | OUTPATIENT
Start: 2024-11-14 | End: 2024-11-14 | Stop reason: HOSPADM

## 2024-11-14 RX ORDER — ALBUTEROL SULFATE 90 UG/1
INHALANT RESPIRATORY (INHALATION)
Status: DISCONTINUED | OUTPATIENT
Start: 2024-11-14 | End: 2024-11-14

## 2024-11-14 RX ORDER — HYDROCODONE BITARTRATE AND ACETAMINOPHEN 500; 5 MG/1; MG/1
TABLET ORAL
Status: DISCONTINUED | OUTPATIENT
Start: 2024-11-14 | End: 2024-11-14 | Stop reason: HOSPADM

## 2024-11-14 RX ORDER — LIDOCAINE HYDROCHLORIDE 20 MG/ML
INJECTION INTRAVENOUS
Status: DISCONTINUED | OUTPATIENT
Start: 2024-11-14 | End: 2024-11-14

## 2024-11-14 RX ORDER — CEFAZOLIN 2 G/1
INJECTION, POWDER, FOR SOLUTION INTRAMUSCULAR; INTRAVENOUS
Status: DISCONTINUED | OUTPATIENT
Start: 2024-11-14 | End: 2024-11-14

## 2024-11-14 RX ORDER — EPHEDRINE SULFATE 50 MG/ML
INJECTION, SOLUTION INTRAVENOUS
Status: DISCONTINUED | OUTPATIENT
Start: 2024-11-14 | End: 2024-11-14

## 2024-11-14 RX ORDER — HEPARIN SOD,PORCINE/0.9 % NACL 1000/500ML
INTRAVENOUS SOLUTION INTRAVENOUS
Status: DISCONTINUED | OUTPATIENT
Start: 2024-11-14 | End: 2024-11-14 | Stop reason: HOSPADM

## 2024-11-14 RX ORDER — ASPIRIN 81 MG/1
81 TABLET ORAL DAILY
Qty: 90 TABLET | Refills: 3 | Status: SHIPPED | OUTPATIENT
Start: 2024-11-14 | End: 2025-11-14

## 2024-11-14 RX ORDER — ROCURONIUM BROMIDE 10 MG/ML
INJECTION, SOLUTION INTRAVENOUS
Status: DISCONTINUED | OUTPATIENT
Start: 2024-11-14 | End: 2024-11-14

## 2024-11-14 RX ORDER — ONDANSETRON HYDROCHLORIDE 2 MG/ML
INJECTION, SOLUTION INTRAVENOUS
Status: DISCONTINUED | OUTPATIENT
Start: 2024-11-14 | End: 2024-11-14

## 2024-11-14 RX ORDER — FENTANYL CITRATE 50 UG/ML
INJECTION, SOLUTION INTRAMUSCULAR; INTRAVENOUS
Status: DISCONTINUED | OUTPATIENT
Start: 2024-11-14 | End: 2024-11-14

## 2024-11-14 RX ORDER — PROPOFOL 10 MG/ML
VIAL (ML) INTRAVENOUS
Status: DISCONTINUED | OUTPATIENT
Start: 2024-11-14 | End: 2024-11-14

## 2024-11-14 RX ORDER — PROTAMINE SULFATE 10 MG/ML
INJECTION, SOLUTION INTRAVENOUS
Status: DISCONTINUED | OUTPATIENT
Start: 2024-11-14 | End: 2024-11-14

## 2024-11-14 RX ORDER — LIDOCAINE HYDROCHLORIDE 20 MG/ML
INJECTION, SOLUTION INFILTRATION; PERINEURAL
Status: DISCONTINUED | OUTPATIENT
Start: 2024-11-14 | End: 2024-11-14 | Stop reason: HOSPADM

## 2024-11-14 RX ORDER — DEXAMETHASONE SODIUM PHOSPHATE 4 MG/ML
INJECTION, SOLUTION INTRA-ARTICULAR; INTRALESIONAL; INTRAMUSCULAR; INTRAVENOUS; SOFT TISSUE
Status: DISCONTINUED | OUTPATIENT
Start: 2024-11-14 | End: 2024-11-14

## 2024-11-14 RX ORDER — HEPARIN SODIUM 1000 [USP'U]/ML
INJECTION, SOLUTION INTRAVENOUS; SUBCUTANEOUS
Status: DISCONTINUED | OUTPATIENT
Start: 2024-11-14 | End: 2024-11-14

## 2024-11-14 RX ORDER — ONDANSETRON HYDROCHLORIDE 2 MG/ML
4 INJECTION, SOLUTION INTRAVENOUS DAILY PRN
Status: DISCONTINUED | OUTPATIENT
Start: 2024-11-14 | End: 2024-11-14 | Stop reason: HOSPADM

## 2024-11-14 RX ORDER — ACETAMINOPHEN 325 MG/1
650 TABLET ORAL EVERY 4 HOURS PRN
Status: DISCONTINUED | OUTPATIENT
Start: 2024-11-14 | End: 2024-11-14 | Stop reason: HOSPADM

## 2024-11-14 RX ADMIN — EPHEDRINE SULFATE 10 MG: 50 INJECTION INTRAVENOUS at 03:11

## 2024-11-14 RX ADMIN — DEXAMETHASONE SODIUM PHOSPHATE 12 MG: 4 INJECTION, SOLUTION INTRAMUSCULAR; INTRAVENOUS at 03:11

## 2024-11-14 RX ADMIN — ROCURONIUM BROMIDE 20 MG: 10 INJECTION, SOLUTION INTRAVENOUS at 03:11

## 2024-11-14 RX ADMIN — VASOPRESSIN 1 UNITS: 20 INJECTION INTRAVENOUS at 03:11

## 2024-11-14 RX ADMIN — SUGAMMADEX 200 MG: 100 INJECTION, SOLUTION INTRAVENOUS at 04:11

## 2024-11-14 RX ADMIN — LIDOCAINE HYDROCHLORIDE 100 MG: 20 INJECTION INTRAVENOUS at 02:11

## 2024-11-14 RX ADMIN — PROTAMINE SULFATE 10 MG: 10 INJECTION, SOLUTION INTRAVENOUS at 04:11

## 2024-11-14 RX ADMIN — ONDANSETRON 4 MG: 2 INJECTION INTRAMUSCULAR; INTRAVENOUS at 04:11

## 2024-11-14 RX ADMIN — ROCURONIUM BROMIDE 50 MG: 10 INJECTION, SOLUTION INTRAVENOUS at 02:11

## 2024-11-14 RX ADMIN — PROTAMINE SULFATE 90 MG: 10 INJECTION, SOLUTION INTRAVENOUS at 04:11

## 2024-11-14 RX ADMIN — CEFAZOLIN 2 G: 2 INJECTION, POWDER, FOR SOLUTION INTRAMUSCULAR; INTRAVENOUS at 03:11

## 2024-11-14 RX ADMIN — EPHEDRINE SULFATE 5 MG: 50 INJECTION INTRAVENOUS at 03:11

## 2024-11-14 RX ADMIN — SODIUM CHLORIDE: 0.9 INJECTION, SOLUTION INTRAVENOUS at 02:11

## 2024-11-14 RX ADMIN — PROPOFOL 150 MG: 10 INJECTION, EMULSION INTRAVENOUS at 02:11

## 2024-11-14 RX ADMIN — FENTANYL CITRATE 100 MCG: 50 INJECTION, SOLUTION INTRAMUSCULAR; INTRAVENOUS at 02:11

## 2024-11-14 RX ADMIN — HEPARIN SODIUM 10000 UNITS: 1000 INJECTION, SOLUTION INTRAVENOUS; SUBCUTANEOUS at 03:11

## 2024-11-14 RX ADMIN — ALBUTEROL SULFATE 2 PUFF: 108 INHALANT RESPIRATORY (INHALATION) at 04:11

## 2024-11-14 NOTE — DISCHARGE SUMMARY
[facility]  Cardiac Electrophysiology  Discharge Summary      Patient Name: Jose Miguel Alvarez Jr.  MRN: [id number]  Admission Date: 11/14/2024  Hospital Length of Stay: 0 days  Discharge Date and Time:  11/14/2024 5:25 PM  Attending Physician: Aroldo Ortiz MD    Discharging Provider: Sherry Patel MD  Primary Care Physician: Toshia Valladares MD    HPI:   83 yoM here implantation of LAAO device. He has history of AF s/p ablation at [facility] 2017. He has has recurrent AF/RVR as well as severe anemia due to GI bleeding Spring/summer 2024 on xarelto. He converted to NSR and his OAC was switched to eliquis 2.5 bid. No subsequent bleeding events.      CHADSVA 4  HASBLED 3     My interpretation of the ECG is: pending but feels regular (NSR)    Procedure(s) (LRB):  Left atrial appendage occlusion (N/A)  ECHOCARDIOGRAM, TRANSESOPHAGEAL (N/A)     Indwelling Lines/Drains at time of discharge:  Lines/Drains/Airways       None                   Hospital Course:  Patient underwent successful placement of 27mm watchman with no complications.    Recommendations following Watchman implantation:  Continue OAC + aspirin for 6 weeks after implantation  Follow up FIOR in 6 weeks.   If FIOR shows well seated device without DRT and abscence of peridevice leak (or <5 mm) will stop Eliquis and start clopidogrel and Aspirin 81 mg daily   6 months after device implantation Aspirin monotherapy        Goals of Care Treatment Preferences:  Code Status: Full Code    Living Will: Yes              Consults:     Significant Diagnostic Studies: N/A    Pending Diagnostic Studies:       None            Final Active Diagnoses:    Diagnosis Date Noted POA    PRINCIPAL PROBLEM:  Paroxysmal atrial fibrillation [I48.0] 04/24/2016 Yes      Problems Resolved During this Admission:     No new Assessment & Plan notes have been filed under this hospital service since the last note was generated.  Service:  Arrhythmia      Discharged Condition: stable    Disposition:     Follow Up:   Follow-up Information       Aroldo Ortiz MD Follow up in 8 week(s).    Specialties: Electrophysiology, Cardiovascular Disease, Cardiology  Contact information:  [address]  [facility] [id number] [phone]                           Patient Instructions:   No discharge procedures on file.  Medications:  Reconciled Home Medications:      Medication List        START taking these medications      aspirin 81 MG EC tablet  Commonly known as: ECOTRIN  Take 1 tablet (81 mg total) by mouth once daily.            CONTINUE taking these medications      amiodarone 200 MG Tab  Commonly known as: PACERONE  Take 1 tablet (200 mg total) by mouth once daily.     apixaban 2.5 mg Tab  Commonly known as: ELIQUIS  Take 1 tablet (2.5 mg total) by mouth 2 (two) times daily.     atorvastatin 20 MG tablet  Commonly known as: LIPITOR  Take 1 tablet (20 mg total) by mouth every evening.     carvediloL 6.25 MG tablet  Commonly known as: COREG  Take 1 tablet (6.25 mg total) by mouth 2 (two) times daily with meals.     donepeziL 5 MG tablet  Commonly known as: ARICEPT  Take 1 tablet (5 mg total) by mouth every evening.     ferrous sulfate 325 mg (65 mg iron) Tab tablet  Commonly known as: IRON  Take 1 tablet (325 mg total) by mouth once daily.     hydrALAZINE 50 MG tablet  Commonly known as: APRESOLINE  Take 1 tablet (50 mg total) by mouth every 8 (eight) hours.     multivitamin per tablet  Commonly known as: THERAGRAN  Take 1 tablet by mouth once daily.     pantoprazole 20 MG tablet  Commonly known as: PROTONIX  Take 2 tablets (40 mg total) by mouth once daily.     VITAMIN D3 ORAL  Take 1 tablet by mouth every other day.              Time spent on the discharge of patient: 45 minutes    Sherry Patel MD  Cardiac Electrophysiology  [facility]

## 2024-11-14 NOTE — NURSING TRANSFER
Nursing Transfer Note      11/14/2024   5:30 PM    Nurse giving handoff:karyna bartlett   Nurse receiving handoff:david [facility] rn    Reason patient is being transferred: ep pacu 3 to [facility]scu 4/home    Transfer To: ep pacu 3 to [facility] 4/home    Transfer via stretcher    Transfer with cardiac monitoring, tele box on confirmed by tele tech. 2lnc portable oxygen    Transported by karyna thayer    Transfer Vital Signs:  Blood Pressure:149/68  Heart Rate:53  O2:100% 2l nc  Temperature:97.5  Respirations:16    Telemetry: Box Number [id number], Rate 53, Rhythm sb, and Telemetry  dayday  Order for Tele Monitor? Yes    Additional Lines: none    Medicines sent: none    Any special needs or follow-up needed: bedrest x2hrs from 1615 to 1815    Patient belongings transferred with patient:  [facility] locker    Chart send with patient: Yes    Notified: son    Patient reassessed at: 11/14/24 1715. Next due 1730 then at 1800    Upon arrival to floor: cardiac monitor applied, patient oriented to room, call bell in reach, and bed in lowest position. . Groin check done with [facility]sc[facility] rn david upon arrival. 2l nc wall oxygen

## 2024-11-14 NOTE — PLAN OF CARE
Vss. Sb noted. Pt aaox4 follows commands. S/p watchman procedure. Pt arrived to ep pacu 3 with right groin. Perclose, gauze/trans film placed at 1615. Bedrest x2hrs. No hematoma or drainage noted. Palpable pulses noted. Pt has right leg with edema to ankle greater than left leg. Pt denies wearing mita hose at home. Does have that swelling at home. Pt has small swollen right upper lip from bite block in procedure. Denies pain or sob. Pt does have post nasal drip. Pt coughing at times, request oral suction yanker. Clear secretions noted. Hob elevated slightly. Pt educated on purpose of holding pressure on right groin when coughs or clears throat. Demonstrates proper placement and pressure held. Will continue to monitor. Pt's son updated over phone. Tele box on confirmed by tele tech sb. Dr hurd updated pt in ep pacu. Verbalizes understanding. Groin check done with david upon arrival to sscu 4. See flowsheet for full assessment. Pt tolerating apple juice.

## 2024-11-14 NOTE — SUBJECTIVE & OBJECTIVE
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 Treated with APC.   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 breakdown of skin 01/26/2023    Stage 3 chronic kidney disease 10/06/2017       Past Surgical History:   Procedure Laterality Date    bladder tumor removal      CARDIAC ELECTROPHYSIOLOGY MAPPING AND ABLATION      CARDIOVERSION      several    CATARACT EXTRACTION Bilateral     cataract removal with IOL implants Bilateral     CHOLECYSTECTOMY      CYSTOSCOPIC LITHOLAPAXY N/A 12/28/2021    Procedure: CYSTOLITHOLAPAXY;  Surgeon: Medardo Garcia MD;  Location: [facility] OR;  Service: Urology;  Laterality: N/A;    CYSTOSCOPIC LITHOLAPAXY N/A 5/14/2024    Procedure: CYSTOLITHOLAPAXY;  Surgeon: Medardo Garcia MD;  Location: [facility] OR;  Service: Urology;  Laterality: N/A;    CYSTOSCOPY      CYSTOSCOPY WITH BIOPSY OF BLADDER Right 12/28/2021    Procedure: CYSTOSCOPY, WITH BLADDER BIOPSY, WITH FULGURATION IF INDICATED;  Surgeon: Medardo Garcia MD;  Location: [facility] OR;  Service: Urology;  Laterality: Right;    ESOPHAGOGASTRODUODENOSCOPY N/A 10/25/2021    Procedure: Small Jason Enteroscopy (SBE);  Surgeon: Isabelle Griffin MD;  Location: [facility];  Service: Endoscopy;  Laterality: N/A;    ESOPHAGOGASTRODUODENOSCOPY N/A 12/13/2021    Procedure: EGD  (ESOPHAGOGASTRODUODENOSCOPY);  Surgeon: Troy Polanco MD;  Location: [facility];  Service: Endoscopy;  Laterality: N/A;    ESOPHAGOGASTRODUODENOSCOPY N/A 4/5/2024    Procedure: EGD (ESOPHAGOGASTRODUODENOSCOPY);  Surgeon: Myrtle Salcedo MD;  Location: [facility];  Service: Endoscopy;  Laterality: N/A;    ESOPHAGOGASTRODUODENOSCOPY N/A 4/22/2024    Procedure: EGD (ESOPHAGOGASTRODUODENOSCOPY);  Surgeon: Myrtle Salcedo MD;  Location: [facility];  Service: Endoscopy;  Laterality: N/A;  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    EYE SURGERY      INTRALUMINAL GASTROINTESTINAL TRACT IMAGING VIA CAPSULE N/A 12/6/2021    Procedure: IMAGING PROCEDURE, GI TRACT, INTRALUMINAL, VIA CAPSULE;  Surgeon: Larry Joens RN;  Location: [facility];  Service: Endoscopy;  Laterality: N/A;    INTRALUMINAL GASTROINTESTINAL TRACT IMAGING VIA CAPSULE N/A 4/18/2024    Procedure: IMAGING PROCEDURE, GI TRACT, INTRALUMINAL, VIA CAPSULE;  Surgeon: Larry Jones, RN;  Location: [facility];  Service: Endoscopy;  Laterality: N/A;    SMALL BOWEL ENTEROSCOPY N/A 9/26/2024    Procedure: ENTEROSCOPY--prximal DBE;  Surgeon: Eduardo Ruff MD;  Location: [facility];  Service: Endoscopy;  Laterality: N/A;    TRANSURETHRAL RESECTION OF BLADDER TUMOR BY BIPOLAR CAUTERY N/A 7/23/2019    Procedure: TURBT, USING BIPOLAR CAUTERY;  Surgeon: Ritesh Marques MD;  Location: [facility];  Service: Urology;  Laterality: N/A;    TRANSURETHRAL RESECTION OF PROSTATE N/A 5/14/2024    Procedure: TURP (TRANSURETHRAL RESECTION OF PROSTATE);  Surgeon: Medardo Garcia MD;  Location: [facility];  Service: Urology;  Laterality: N/A;       Review of patient's allergies indicates:   Allergen Reactions    Feraheme [ferumoxytol] Anaphylaxis       Current Facility-Administered Medications on File Prior to Encounter   Medication    lactated ringers infusion     Current Outpatient Medications on File Prior to Encounter   Medication Sig    amiodarone (PACERONE)  200 MG Tab Take 1 tablet (200 mg total) by mouth once daily.    atorvastatin (LIPITOR) 20 MG tablet Take 1 tablet (20 mg total) by mouth every evening.    carvediloL (COREG) 6.25 MG tablet Take 1 tablet (6.25 mg total) by mouth 2 (two) times daily with meals.    cholecalciferol, vitamin D3, (VITAMIN D3 ORAL) Take 1 tablet by mouth every other day.    donepeziL (ARICEPT) 5 MG tablet Take 1 tablet (5 mg total) by mouth every evening.    hydrALAZINE (APRESOLINE) 50 MG tablet Take 1 tablet (50 mg total) by mouth every 8 (eight) hours.    multivitamin (THERAGRAN) per tablet Take 1 tablet by mouth once daily.    pantoprazole (PROTONIX) 20 MG tablet Take 2 tablets (40 mg total) by mouth once daily.     Family History       Problem Relation (Age of Onset)    Heart disease Father, Brother    Hypertension Father          Tobacco Use    Smoking status: Former     Current packs/day: 0.00     Average packs/day: 2.5 packs/day for 20.0 years (50.0 ttl pk-yrs)     Types: Cigarettes     Start date:      Quit date:      Years since quittin.8    Smokeless tobacco: Never   Substance and Sexual Activity    Alcohol use: Not Currently     Alcohol/week: 7.0 standard drinks of alcohol     Types: 7 Cans of beer per week     Comment: occasionally    Drug use: No    Sexual activity: Not Currently     Review of Systems   All other systems reviewed and are negative.    Objective:     Vital Signs (Most Recent):    Vital Signs (24h Range):             There is no height or weight on file to calculate BMI.             Physical Exam  Vitals and nursing note reviewed.   Constitutional:       Appearance: Normal appearance.   Cardiovascular:      Rate and Rhythm: Normal rate and regular rhythm.      Pulses: Normal pulses.      Heart sounds: Normal heart sounds.   Pulmonary:      Effort: Pulmonary effort is normal.      Breath sounds: Normal breath sounds.   Musculoskeletal:      Right lower leg: No edema.      Left lower leg: No edema.    Skin:     General: Skin is warm.   Neurological:      Mental Status: He is alert.            Significant Labs: All pertinent lab results from the last 24 hours have been reviewed.

## 2024-11-14 NOTE — CONSULTS
[facility] - Short Stay Cardiac Unit  Cardiology  FIOR Consult Note    Patient Name: Jose Miguel Alvarez Jr.  MRN: [id number]  Admission Date: 11/14/2024  Hospital Length of Stay: 0 days  Code Status: Prior   Attending Provider: Aroldo Ortiz MD   Consulting Provider: Lizzette Richards PA-C  Primary Care Physician: Toshia Valladares MD  Principal Problem:Paroxysmal atrial fibrillation    Patient information was obtained from patient and past medical records.     Consults  Subjective:     Chief Complaint:  Atrial fibrillation      HPI:   83 yoM with PMHx significant for PAF s/p ablation 2017, HTN, HLD, COPD, and recurrent upper GI bleeds. He underwent push enteroscopy with ablation of small bowel angioectasias on 4/22/2024. He was admitted 8/23/24 for severe anemia. He underwent EGD 9/26/24 which showed actively bleeding hyperplastic polyps in the gastric cardia. Hemostasis achieved by saline injection/hot snare resection. He is currently anticoagulated with Eliquis 2.5 mg bid. He is not a candidate for longterm anticoagulation given bleeding history. He presents today for LAAO with Watchman device.     TTE 4/1/2024    Left Ventricle: The left ventricle is normal in size. Mildly increased wall thickness. There is concentric remodeling. There is normal systolic function with a visually estimated ejection fraction of 60 - 65%. Unable to assess diastolic function due to atrial fibrillation.    Right Ventricle: Normal right ventricular cavity size. Wall thickness is normal. Right ventricle wall motion  is normal. Systolic function is normal.    Left Atrium: Left atrium is moderately dilated.    Tricuspid Valve: There is mild regurgitation.    IVC/SVC: Intermediate venous pressure at 8 mmHg.    Anticoagulant/antiplatelets: Eliquis 2.5 mg bid   ECG:Pending    Dysphagia or odynophagia:  No  Liver Disease, esophageal disease, or known varices:  No  Upper GI Bleeding:  Hx of upper GI bleed. EGD 9/2024 with  bleeding gastric polyps s/p hot snare resection. No further bleeding since then.   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 Treated with APC.   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 breakdown of skin 01/26/2023    Stage 3 chronic kidney disease 10/06/2017       Past Surgical History:   Procedure Laterality Date    bladder tumor removal      CARDIAC ELECTROPHYSIOLOGY MAPPING AND ABLATION      CARDIOVERSION      several    CATARACT EXTRACTION Bilateral     cataract removal with IOL implants Bilateral     CHOLECYSTECTOMY      CYSTOSCOPIC LITHOLAPAXY N/A 12/28/2021    Procedure: CYSTOLITHOLAPAXY;  Surgeon: Medardo Garcia MD;  Location: [facility] OR;  Service: Urology;  Laterality: N/A;    CYSTOSCOPIC LITHOLAPAXY N/A 5/14/2024    Procedure: CYSTOLITHOLAPAXY;  Surgeon: Medardo Garcia MD;  Location: [facility] OR;  Service: Urology;  Laterality: N/A;    CYSTOSCOPY      CYSTOSCOPY WITH BIOPSY OF BLADDER Right 12/28/2021    Procedure: CYSTOSCOPY, WITH  BLADDER BIOPSY, WITH FULGURATION IF INDICATED;  Surgeon: Medardo Garcia MD;  Location: [facility] OR;  Service: Urology;  Laterality: Right;    ESOPHAGOGASTRODUODENOSCOPY N/A 10/25/2021    Procedure: Small Jason Enteroscopy (SBE);  Surgeon: Isabelle Griffin MD;  Location: [facility];  Service: Endoscopy;  Laterality: N/A;    ESOPHAGOGASTRODUODENOSCOPY N/A 12/13/2021    Procedure: EGD (ESOPHAGOGASTRODUODENOSCOPY);  Surgeon: Troy Polanco MD;  Location: [facility];  Service: Endoscopy;  Laterality: N/A;    ESOPHAGOGASTRODUODENOSCOPY N/A 4/5/2024    Procedure: EGD (ESOPHAGOGASTRODUODENOSCOPY);  Surgeon: Myrtle Salcedo MD;  Location: [facility];  Service: Endoscopy;  Laterality: N/A;    ESOPHAGOGASTRODUODENOSCOPY N/A 4/22/2024    Procedure: EGD (ESOPHAGOGASTRODUODENOSCOPY);  Surgeon: Myrtle Salcedo MD;  Location: [facility];  Service: Endoscopy;  Laterality: N/A;  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    EYE SURGERY      INTRALUMINAL GASTROINTESTINAL TRACT IMAGING VIA CAPSULE N/A 12/6/2021    Procedure: IMAGING PROCEDURE, GI TRACT, INTRALUMINAL, VIA CAPSULE;  Surgeon: Larry Jones RN;  Location: [facility];  Service: Endoscopy;  Laterality: N/A;    INTRALUMINAL GASTROINTESTINAL TRACT IMAGING VIA CAPSULE N/A 4/18/2024    Procedure: IMAGING PROCEDURE, GI TRACT, INTRALUMINAL, VIA CAPSULE;  Surgeon: Larry Jones RN;  Location: [facility];  Service: Endoscopy;  Laterality: N/A;    SMALL BOWEL ENTEROSCOPY N/A 9/26/2024    Procedure: ENTEROSCOPY--prximal DBE;  Surgeon: Eduardo Ruff MD;  Location: [facility];  Service: Endoscopy;  Laterality: N/A;    TRANSURETHRAL RESECTION OF BLADDER TUMOR BY BIPOLAR CAUTERY N/A 7/23/2019    Procedure: TURBT, USING BIPOLAR CAUTERY;  Surgeon: Ritesh Marques MD;  Location: [facility];  Service: Urology;  Laterality: N/A;    TRANSURETHRAL RESECTION OF PROSTATE N/A 5/14/2024    Procedure: TURP (TRANSURETHRAL RESECTION OF PROSTATE);  Surgeon: Medardo Garcia MD;   Location: [facility] OR;  Service: Urology;  Laterality: N/A;       Review of patient's allergies indicates:   Allergen Reactions    Feraheme [ferumoxytol] Anaphylaxis       Current Facility-Administered Medications on File Prior to Encounter   Medication    lactated ringers infusion     Current Outpatient Medications on File Prior to Encounter   Medication Sig    amiodarone (PACERONE) 200 MG Tab Take 1 tablet (200 mg total) by mouth once daily.    atorvastatin (LIPITOR) 20 MG tablet Take 1 tablet (20 mg total) by mouth every evening.    carvediloL (COREG) 6.25 MG tablet Take 1 tablet (6.25 mg total) by mouth 2 (two) times daily with meals.    cholecalciferol, vitamin D3, (VITAMIN D3 ORAL) Take 1 tablet by mouth every other day.    donepeziL (ARICEPT) 5 MG tablet Take 1 tablet (5 mg total) by mouth every evening.    hydrALAZINE (APRESOLINE) 50 MG tablet Take 1 tablet (50 mg total) by mouth every 8 (eight) hours.    multivitamin (THERAGRAN) per tablet Take 1 tablet by mouth once daily.    pantoprazole (PROTONIX) 20 MG tablet Take 2 tablets (40 mg total) by mouth once daily.     Family History       Problem Relation (Age of Onset)    Heart disease Father, Brother    Hypertension Father          Tobacco Use    Smoking status: Former     Current packs/day: 0.00     Average packs/day: 2.5 packs/day for 20.0 years (50.0 ttl pk-yrs)     Types: Cigarettes     Start date:      Quit date:      Years since quittin.8    Smokeless tobacco: Never   Substance and Sexual Activity    Alcohol use: Not Currently     Alcohol/week: 7.0 standard drinks of alcohol     Types: 7 Cans of beer per week     Comment: occasionally    Drug use: No    Sexual activity: Not Currently     Review of Systems   Constitutional: Negative for diaphoresis, malaise/fatigue, weight gain and weight loss.   HENT:  Negative for nosebleeds.    Eyes:  Negative for vision loss in left eye, vision loss in right eye and visual disturbance.   Cardiovascular:   Negative for chest pain, claudication, dyspnea on exertion, irregular heartbeat, leg swelling, near-syncope, orthopnea, palpitations, paroxysmal nocturnal dyspnea and syncope.   Respiratory:  Negative for cough, shortness of breath, sleep disturbances due to breathing, snoring and wheezing.    Hematologic/Lymphatic: Negative for bleeding problem. Does not bruise/bleed easily.   Skin:  Negative for poor wound healing and rash.   Musculoskeletal:  Negative for muscle cramps and myalgias.   Gastrointestinal:  Negative for bloating, abdominal pain, diarrhea, heartburn, melena, nausea and vomiting.   Genitourinary:  Negative for hematuria and nocturia.   Neurological:  Negative for brief paralysis, dizziness, headaches, light-headedness, numbness and weakness.   Psychiatric/Behavioral:  Negative for depression.    Allergic/Immunologic: Negative for hives.     Objective:     Vital Signs (Most Recent):    Vital Signs (24h Range):           There is no height or weight on file to calculate BMI.            No intake or output data in the 24 hours ending 11/14/24 1239    Lines/Drains/Airways       None                    Physical Exam  Vitals and nursing note reviewed.   Constitutional:       Appearance: He is well-developed. He is not diaphoretic.   HENT:      Head: Normocephalic and atraumatic.   Eyes:      Conjunctiva/sclera: Conjunctivae normal.   Neck:      Vascular: No carotid bruit or JVD.   Cardiovascular:      Rate and Rhythm: Normal rate and regular rhythm.      Pulses: Normal pulses and intact distal pulses.      Heart sounds: Normal heart sounds. No murmur heard.     No friction rub. No gallop.   Pulmonary:      Effort: Pulmonary effort is normal.      Breath sounds: Normal breath sounds. No rales.   Chest:      Chest wall: No tenderness.   Abdominal:      General: There is no distension.      Palpations: Abdomen is soft. There is no mass.      Tenderness: There is no abdominal tenderness.   Skin:     General:  Skin is warm and dry.      Coloration: Skin is not pale.   Neurological:      Mental Status: He is alert and oriented to person, place, and time.          Significant Labs: All pertinent lab results from the last 24 hours have been reviewed.  Assessment and Plan:     * Paroxysmal atrial fibrillation  Here today for LAAO with Watchman device with FIOR guidance.   -No absolute contraindications of esophageal stricture, tumor, perforation, laceration,or diverticulum and/or active GI bleed  -The risks, benefits & alternatives of the procedure were explained to the patient.   -The risks of transesophageal echo include but are not limited to:  Dental trauma, esophageal trauma/perforation, bleeding, laryngospasm/brochospasm, aspiration, sore throat/hoarseness, & dislodgement of the endotracheal tube/nasogastric tube (where applicable).    -The risks of ANES monitored sedation include hypotension, respiratory depression, arrhythmias, bronchospasm, & death.    -Informed consent was obtained. The patient is agreeable to proceed with the procedure and all questions and concerns addressed.    Case discussed with an attending in echocardiography lab.    Further recommendations per attending addendum         VTE Risk Mitigation (From admission, onward)      None            Thank you for your consult. I will sign off. Please contact us if you have any additional questions.    Lizzette Richards PA-C  Cardiology   [facility] - Short Stay Cardiac Unit

## 2024-11-14 NOTE — HOSPITAL COURSE
Patient underwent successful placement of 27mm watchman with no complications.    Recommendations following Watchman implantation:  Continue OAC + aspirin for 6 weeks after implantation  Follow up FIOR in 6 weeks.   If FIOR shows well seated device without DRT and abscence of peridevice leak (or <5 mm) will stop Eliquis and start clopidogrel and Aspirin 81 mg daily   6 months after device implantation Aspirin monotherapy

## 2024-11-14 NOTE — TRANSFER OF CARE
Anesthesia Transfer of Care Note    Patient: Jose Miguel Alvarez     Procedure(s) Performed: Procedure(s) (LRB):  Left atrial appendage occlusion (N/A)  ECHOCARDIOGRAM, TRANSESOPHAGEAL (N/A)    Patient location: Cath Lab    Transport from OR: Transported from OR on 6-10 L/min O2 by face mask with adequate spontaneous ventilation    Post pain: adequate analgesia    Post assessment: no apparent anesthetic complications and tolerated procedure well    Post vital signs: stable    Level of consciousness: awake    Nausea/Vomiting: no nausea/vomiting    Complications: none    Transfer of care protocol was followed      Last vitals: Visit Vitals  BP (!) 200/88 (BP Location: Right arm, Patient Position: Sitting)   Pulse (!) 48   Temp 36.3 °C (97.3 °F) (Temporal)   Resp 14   Ht 6' (1.829 m)   Wt 99.8 kg (220 lb)   SpO2 100%   BMI 29.84 kg/m²

## 2024-11-14 NOTE — PLAN OF CARE
Patient arrived to room. PIV placed, labs sent. Admit assessment completed. Plan of care discussed with patient. Valentina anesthesia MD at bedside and aware of 190-200 systolic blood pressures.

## 2024-11-14 NOTE — ASSESSMENT & PLAN NOTE
Here today for LAAO with Watchman device with FIOR guidance.   -No absolute contraindications of esophageal stricture, tumor, perforation, laceration,or diverticulum and/or active GI bleed  -The risks, benefits & alternatives of the procedure were explained to the patient.   -The risks of transesophageal echo include but are not limited to:  Dental trauma, esophageal trauma/perforation, bleeding, laryngospasm/brochospasm, aspiration, sore throat/hoarseness, & dislodgement of the endotracheal tube/nasogastric tube (where applicable).    -The risks of ANES monitored sedation include hypotension, respiratory depression, arrhythmias, bronchospasm, & death.    -Informed consent was obtained. The patient is agreeable to proceed with the procedure and all questions and concerns addressed.    Case discussed with an attending in echocardiography lab.    Further recommendations per attending addendum

## 2024-11-14 NOTE — H&P
[facility] Short Stay Cardiac [facility]  Cardiac Electrophysiology  History and Physical     Admission Date: 11/14/2024  Code Status: Prior   Attending Provider: Aroldo Ortiz MD   Principal Problem:Paroxysmal atrial fibrillation    Subjective:     Chief Complaint:  AF     HPI:  83 yoM here implantation of LAAO device. He has history of AF s/p ablation at [facility] 2017. He has has recurrent AF/RVR as well as severe anemia due to GI bleeding Spring/summer 2024 on xarelto. He converted to NSR and his OAC was switched to eliquis 2.5 bid. No subsequent bleeding events.      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 Treated with APC.   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 breakdown of skin 01/26/2023    Stage 3 chronic kidney disease 10/06/2017       Past Surgical History:   Procedure Laterality Date    bladder tumor removal      CARDIAC ELECTROPHYSIOLOGY MAPPING AND ABLATION      CARDIOVERSION      several    CATARACT EXTRACTION Bilateral     cataract removal with IOL implants Bilateral     CHOLECYSTECTOMY      CYSTOSCOPIC LITHOLAPAXY N/A 12/28/2021    Procedure: CYSTOLITHOLAPAXY;  Surgeon: Medardo Garcia MD;  Location: [facility];  Service: Urology;   Laterality: N/A;    CYSTOSCOPIC LITHOLAPAXY N/A 5/14/2024    Procedure: CYSTOLITHOLAPAXY;  Surgeon: Medardo Garcai MD;  Location: [facility] OR;  Service: Urology;  Laterality: N/A;    CYSTOSCOPY      CYSTOSCOPY WITH BIOPSY OF BLADDER Right 12/28/2021    Procedure: CYSTOSCOPY, WITH BLADDER BIOPSY, WITH FULGURATION IF INDICATED;  Surgeon: Medardo Garcia MD;  Location: [facility] OR;  Service: Urology;  Laterality: Right;    ESOPHAGOGASTRODUODENOSCOPY N/A 10/25/2021    Procedure: Small Jason Enteroscopy (SBE);  Surgeon: Isabelle Griffin MD;  Location: [facility];  Service: Endoscopy;  Laterality: N/A;    ESOPHAGOGASTRODUODENOSCOPY N/A 12/13/2021    Procedure: EGD (ESOPHAGOGASTRODUODENOSCOPY);  Surgeon: Troy Polanco MD;  Location: [facility];  Service: Endoscopy;  Laterality: N/A;    ESOPHAGOGASTRODUODENOSCOPY N/A 4/5/2024    Procedure: EGD (ESOPHAGOGASTRODUODENOSCOPY);  Surgeon: Myrtle Salcedo MD;  Location: [facility];  Service: Endoscopy;  Laterality: N/A;    ESOPHAGOGASTRODUODENOSCOPY N/A 4/22/2024    Procedure: EGD (ESOPHAGOGASTRODUODENOSCOPY);  Surgeon: Myrtle Salcedo MD;  Location: [facility];  Service: Endoscopy;  Laterality: N/A;  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    EYE SURGERY      INTRALUMINAL GASTROINTESTINAL TRACT IMAGING VIA CAPSULE N/A 12/6/2021    Procedure: IMAGING PROCEDURE, GI TRACT, INTRALUMINAL, VIA CAPSULE;  Surgeon: Larry Jones RN;  Location: [facility] ENDO;  Service: Endoscopy;  Laterality: N/A;    INTRALUMINAL GASTROINTESTINAL TRACT IMAGING VIA CAPSULE N/A 4/18/2024    Procedure: IMAGING PROCEDURE, GI TRACT, INTRALUMINAL, VIA CAPSULE;  Surgeon: Larry Jones RN;  Location: [facility];  Service: Endoscopy;  Laterality: N/A;    SMALL BOWEL ENTEROSCOPY N/A 9/26/2024    Procedure: ENTEROSCOPY--prximal DBE;  Surgeon: Eduardo Ruff MD;  Location: [facility];  Service: Endoscopy;  Laterality: N/A;    TRANSURETHRAL RESECTION OF BLADDER TUMOR BY BIPOLAR CAUTERY N/A  2019    Procedure: TURBT, USING BIPOLAR CAUTERY;  Surgeon: Ritesh Marques MD;  Location: [facility] OR;  Service: Urology;  Laterality: N/A;    TRANSURETHRAL RESECTION OF PROSTATE N/A 2024    Procedure: TURP (TRANSURETHRAL RESECTION OF PROSTATE);  Surgeon: Medardo Garcia MD;  Location: [facility] OR;  Service: Urology;  Laterality: N/A;       Review of patient's allergies indicates:   Allergen Reactions    Feraheme [ferumoxytol] Anaphylaxis       Current Facility-Administered Medications on File Prior to Encounter   Medication    lactated ringers infusion     Current Outpatient Medications on File Prior to Encounter   Medication Sig    amiodarone (PACERONE) 200 MG Tab Take 1 tablet (200 mg total) by mouth once daily.    atorvastatin (LIPITOR) 20 MG tablet Take 1 tablet (20 mg total) by mouth every evening.    carvediloL (COREG) 6.25 MG tablet Take 1 tablet (6.25 mg total) by mouth 2 (two) times daily with meals.    cholecalciferol, vitamin D3, (VITAMIN D3 ORAL) Take 1 tablet by mouth every other day.    donepeziL (ARICEPT) 5 MG tablet Take 1 tablet (5 mg total) by mouth every evening.    hydrALAZINE (APRESOLINE) 50 MG tablet Take 1 tablet (50 mg total) by mouth every 8 (eight) hours.    multivitamin (THERAGRAN) per tablet Take 1 tablet by mouth once daily.    pantoprazole (PROTONIX) 20 MG tablet Take 2 tablets (40 mg total) by mouth once daily.     Family History       Problem Relation (Age of Onset)    Heart disease Father, Brother    Hypertension Father          Tobacco Use    Smoking status: Former     Current packs/day: 0.00     Average packs/day: 2.5 packs/day for 20.0 years (50.0 ttl pk-yrs)     Types: Cigarettes     Start date:      Quit date:      Years since quittin.8    Smokeless tobacco: Never   Substance and Sexual Activity    Alcohol use: Not Currently     Alcohol/week: 7.0 standard drinks of alcohol     Types: 7 Cans of beer per week     Comment: occasionally    Drug use: No     Sexual activity: Not Currently     Review of Systems   All other systems reviewed and are negative.    Objective:     Vital Signs (Most Recent):    Vital Signs (24h Range):             There is no height or weight on file to calculate BMI.             Physical Exam  Vitals and nursing note reviewed.   Constitutional:       Appearance: Normal appearance.   Cardiovascular:      Rate and Rhythm: Normal rate and regular rhythm.      Pulses: Normal pulses.      Heart sounds: Normal heart sounds.   Pulmonary:      Effort: Pulmonary effort is normal.      Breath sounds: Normal breath sounds.   Musculoskeletal:      Right lower leg: No edema.      Left lower leg: No edema.   Skin:     General: Skin is warm.   Neurological:      Mental Status: He is alert.            Significant Labs: All pertinent lab results from the last 24 hours have been reviewed.    Assessment and Plan:     * Paroxysmal atrial fibrillation  Here for LAAO with Watchman device    Recommendations following Watchman implantation:  Continue OAC + aspirin for 6 weeks after implantation  Follow up FIOR in 6 weeks.   If FIRO shows well seated device without DRT and abscence of peridevice leak (or <5 mm) will stop Eliquis and start clopidogrel and Aspirin 81 mg daily   6 months after device implantation Aspirin monotherapy    The risks, benefits and alternatives of the procedure were explained to the patient, patient's family and/or surrogate decision maker. Risks include (but not limited to) bleeding, hematoma, infection, pain, vascular damage, damage to structures surrounding the vasculature, myocardial damage [perforation, valvular damage], cardiac tamponade, device related thrombus, device emobolization potentially requiring surgical intervention, CVA, MI, and death. All questions were answered. Patient is understanding of these risks, and would like to proceed. Consents signed.          Sherry Patel MD  Cardiac Electrophysiology  [facility]  Stay Cardiac Unit

## 2024-11-14 NOTE — SUBJECTIVE & OBJECTIVE
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 Treated with APC.   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 breakdown of skin 01/26/2023    Stage 3 chronic kidney disease 10/06/2017       Past Surgical History:   Procedure Laterality Date    bladder tumor removal      CARDIAC ELECTROPHYSIOLOGY MAPPING AND ABLATION      CARDIOVERSION      several    CATARACT EXTRACTION Bilateral     cataract removal with IOL implants Bilateral     CHOLECYSTECTOMY      CYSTOSCOPIC LITHOLAPAXY N/A 12/28/2021    Procedure: CYSTOLITHOLAPAXY;  Surgeon: Medardo Garcia MD;  Location: [facility] OR;  Service: Urology;  Laterality: N/A;    CYSTOSCOPIC LITHOLAPAXY N/A 5/14/2024    Procedure: CYSTOLITHOLAPAXY;  Surgeon: Medardo Garcia MD;  Location: [facility] OR;  Service: Urology;  Laterality: N/A;    CYSTOSCOPY      CYSTOSCOPY WITH BIOPSY OF BLADDER Right 12/28/2021    Procedure: CYSTOSCOPY, WITH BLADDER BIOPSY, WITH FULGURATION IF INDICATED;  Surgeon: Medardo Garcia MD;  Location: [facility] OR;  Service: Urology;  Laterality: Right;    ESOPHAGOGASTRODUODENOSCOPY N/A 10/25/2021    Procedure: Small Jason Enteroscopy (SBE);  Surgeon: Isabelle Griffin MD;  Location: [facility];  Service: Endoscopy;  Laterality: N/A;    ESOPHAGOGASTRODUODENOSCOPY N/A 12/13/2021    Procedure: EGD  (ESOPHAGOGASTRODUODENOSCOPY);  Surgeon: Troy Polanco MD;  Location: [facility];  Service: Endoscopy;  Laterality: N/A;    ESOPHAGOGASTRODUODENOSCOPY N/A 4/5/2024    Procedure: EGD (ESOPHAGOGASTRODUODENOSCOPY);  Surgeon: Myrtle Salcedo MD;  Location: [facility];  Service: Endoscopy;  Laterality: N/A;    ESOPHAGOGASTRODUODENOSCOPY N/A 4/22/2024    Procedure: EGD (ESOPHAGOGASTRODUODENOSCOPY);  Surgeon: Myrtle Salcedo MD;  Location: [facility];  Service: Endoscopy;  Laterality: N/A;  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    EYE SURGERY      INTRALUMINAL GASTROINTESTINAL TRACT IMAGING VIA CAPSULE N/A 12/6/2021    Procedure: IMAGING PROCEDURE, GI TRACT, INTRALUMINAL, VIA CAPSULE;  Surgeon: Larry Jones RN;  Location: [facility];  Service: Endoscopy;  Laterality: N/A;    INTRALUMINAL GASTROINTESTINAL TRACT IMAGING VIA CAPSULE N/A 4/18/2024    Procedure: IMAGING PROCEDURE, GI TRACT, INTRALUMINAL, VIA CAPSULE;  Surgeon: Larry Jones, RN;  Location: [facility];  Service: Endoscopy;  Laterality: N/A;    SMALL BOWEL ENTEROSCOPY N/A 9/26/2024    Procedure: ENTEROSCOPY--prximal DBE;  Surgeon: Eduardo Ruff MD;  Location: [facility];  Service: Endoscopy;  Laterality: N/A;    TRANSURETHRAL RESECTION OF BLADDER TUMOR BY BIPOLAR CAUTERY N/A 7/23/2019    Procedure: TURBT, USING BIPOLAR CAUTERY;  Surgeon: Ritesh Marques MD;  Location: [facility];  Service: Urology;  Laterality: N/A;    TRANSURETHRAL RESECTION OF PROSTATE N/A 5/14/2024    Procedure: TURP (TRANSURETHRAL RESECTION OF PROSTATE);  Surgeon: Medardo Garcia MD;  Location: [facility];  Service: Urology;  Laterality: N/A;       Review of patient's allergies indicates:   Allergen Reactions    Feraheme [ferumoxytol] Anaphylaxis       Current Facility-Administered Medications on File Prior to Encounter   Medication    lactated ringers infusion     Current Outpatient Medications on File Prior to Encounter   Medication Sig    amiodarone (PACERONE)  200 MG Tab Take 1 tablet (200 mg total) by mouth once daily.    atorvastatin (LIPITOR) 20 MG tablet Take 1 tablet (20 mg total) by mouth every evening.    carvediloL (COREG) 6.25 MG tablet Take 1 tablet (6.25 mg total) by mouth 2 (two) times daily with meals.    cholecalciferol, vitamin D3, (VITAMIN D3 ORAL) Take 1 tablet by mouth every other day.    donepeziL (ARICEPT) 5 MG tablet Take 1 tablet (5 mg total) by mouth every evening.    hydrALAZINE (APRESOLINE) 50 MG tablet Take 1 tablet (50 mg total) by mouth every 8 (eight) hours.    multivitamin (THERAGRAN) per tablet Take 1 tablet by mouth once daily.    pantoprazole (PROTONIX) 20 MG tablet Take 2 tablets (40 mg total) by mouth once daily.     Family History       Problem Relation (Age of Onset)    Heart disease Father, Brother    Hypertension Father          Tobacco Use    Smoking status: Former     Current packs/day: 0.00     Average packs/day: 2.5 packs/day for 20.0 years (50.0 ttl pk-yrs)     Types: Cigarettes     Start date:      Quit date:      Years since quittin.8    Smokeless tobacco: Never   Substance and Sexual Activity    Alcohol use: Not Currently     Alcohol/week: 7.0 standard drinks of alcohol     Types: 7 Cans of beer per week     Comment: occasionally    Drug use: No    Sexual activity: Not Currently     Review of Systems   Constitutional: Negative for diaphoresis, malaise/fatigue, weight gain and weight loss.   HENT:  Negative for nosebleeds.    Eyes:  Negative for vision loss in left eye, vision loss in right eye and visual disturbance.   Cardiovascular:  Negative for chest pain, claudication, dyspnea on exertion, irregular heartbeat, leg swelling, near-syncope, orthopnea, palpitations, paroxysmal nocturnal dyspnea and syncope.   Respiratory:  Negative for cough, shortness of breath, sleep disturbances due to breathing, snoring and wheezing.    Hematologic/Lymphatic: Negative for bleeding problem. Does not bruise/bleed easily.    Skin:  Negative for poor wound healing and rash.   Musculoskeletal:  Negative for muscle cramps and myalgias.   Gastrointestinal:  Negative for bloating, abdominal pain, diarrhea, heartburn, melena, nausea and vomiting.   Genitourinary:  Negative for hematuria and nocturia.   Neurological:  Negative for brief paralysis, dizziness, headaches, light-headedness, numbness and weakness.   Psychiatric/Behavioral:  Negative for depression.    Allergic/Immunologic: Negative for hives.     Objective:     Vital Signs (Most Recent):    Vital Signs (24h Range):           There is no height or weight on file to calculate BMI.            No intake or output data in the 24 hours ending 11/14/24 1239    Lines/Drains/Airways       None                    Physical Exam  Vitals and nursing note reviewed.   Constitutional:       Appearance: He is well-developed. He is not diaphoretic.   HENT:      Head: Normocephalic and atraumatic.   Eyes:      Conjunctiva/sclera: Conjunctivae normal.   Neck:      Vascular: No carotid bruit or JVD.   Cardiovascular:      Rate and Rhythm: Normal rate and regular rhythm.      Pulses: Normal pulses and intact distal pulses.      Heart sounds: Normal heart sounds. No murmur heard.     No friction rub. No gallop.   Pulmonary:      Effort: Pulmonary effort is normal.      Breath sounds: Normal breath sounds. No rales.   Chest:      Chest wall: No tenderness.   Abdominal:      General: There is no distension.      Palpations: Abdomen is soft. There is no mass.      Tenderness: There is no abdominal tenderness.   Skin:     General: Skin is warm and dry.      Coloration: Skin is not pale.   Neurological:      Mental Status: He is alert and oriented to person, place, and time.          Significant Labs: All pertinent lab results from the last 24 hours have been reviewed.

## 2024-11-14 NOTE — BRIEF OP NOTE
: Aroldo Ortiz MD  Date of procedure: 11/14/2024    Post-operative diagnosis: AF    Procedure performed: Left Atrial Appendage Occlusion with Watchman device    Description of procedure: The patient was brought to the EP lab in the fasting state. Prepped and draped in sterile fashion. Safety timeout was performed. Sedation administered by anesthesia staff. Ultrasound guided venous access of the right femoral vein was performed. 16 Fr short sheath placed in right femoral vein. Heparin bolus given. FIOR and fluoroscopic guided single transseptal puncture performed. Watchman deployed in ISMAEL using FIOR and fluoroscopic guidance. Tug test confirmed stable position. FIOR confirmed adequate compression and no significant lon-device flow. Sheaths removed. Two silk figure of 8 sutures placed at right femoral access site for hemostasis.     EBL: <10 mL    Specimens removed: None  Complications: no immediate    The attending physician was present for entire duration of the procedure    Sherry Patel MD, PGY8  Electrophysiology

## 2024-11-14 NOTE — HPI
83 yoM here implantation of LAAO device. He has history of AF s/p ablation at [facility] 2017. He has has recurrent AF/RVR as well as severe anemia due to GI bleeding Spring/summer 2024 on xarelto. He converted to NSR and his OAC was switched to eliquis 2.5 bid. No subsequent bleeding events.      CHADSVA 4  HASBLED 3     My interpretation of the ECG is: pending but feels regular (NSR)  
83 yoM with PMHx significant for PAF s/p ablation 2017, HTN, HLD, COPD, and recurrent upper GI bleeds. He underwent push enteroscopy with ablation of small bowel angioectasias on 4/22/2024. He was admitted 8/23/24 for severe anemia. He underwent EGD 9/26/24 which showed actively bleeding hyperplastic polyps in the gastric cardia. Hemostasis achieved by saline injection/hot snare resection. He is currently anticoagulated with Eliquis 2.5 mg bid. He is not a candidate for longterm anticoagulation given bleeding history. He presents today for LAAO with Watchman device.     TTE 4/1/2024    Left Ventricle: The left ventricle is normal in size. Mildly increased wall thickness. There is concentric remodeling. There is normal systolic function with a visually estimated ejection fraction of 60 - 65%. Unable to assess diastolic function due to atrial fibrillation.    Right Ventricle: Normal right ventricular cavity size. Wall thickness is normal. Right ventricle wall motion  is normal. Systolic function is normal.    Left Atrium: Left atrium is moderately dilated.    Tricuspid Valve: There is mild regurgitation.    IVC/SVC: Intermediate venous pressure at 8 mmHg.    Anticoagulant/antiplatelets: Eliquis 2.5 mg bid   ECG:Pending    Dysphagia or odynophagia:  No  Liver Disease, esophageal disease, or known varices:  No  Upper GI Bleeding:  Hx of upper GI bleed. EGD 9/2024 with bleeding gastric polyps s/p hot snare resection. No further bleeding since then.   Snoring:  No  Sleep Apnea:  No  Prior neck surgery or radiation:  No  Able to move neck in all directions:  Yes  History of anesthetic difficulties:  No  Family history of anesthetic difficulties:  No  Last oral intake: yesterday before midnight  GLP-1 use: None    Platelet count: 119k  INR: 1.1    
No

## 2024-11-14 NOTE — ANESTHESIA PREPROCEDURE EVALUATION
11/14/2024  Jose Miguel Alvarez Jr. is a 83 y.o., male.    Pre-operative evaluation for Procedure(s) (LRB):  Left atrial appendage occlusion (N/A)  ECHOCARDIOGRAM, TRANSESOPHAGEAL (N/A)    Jose Miguel Alvarez Jr. is a 83 y.o. male     Patient Active Problem List   Diagnosis    Paroxysmal atrial fibrillation    Bladder tumor    Hypertension    Hypercholesteremia    COPD (chronic obstructive pulmonary disease)    Anemia due to gastrointestinal blood loss    Urothelial carcinoma of bladder    Gastrointestinal hemorrhage associated with angiodysplasia of stomach and duodenum    Iron deficiency anemia secondary to blood loss (chronic)    Pancytopenia    History of bladder cancer    Benign prostatic hyperplasia with urinary frequency    Nephrolithiasis    Gross hematuria    Acute cystitis with hematuria    DM type 2 with diabetic dyslipidemia    Other nonthrombocytopenic purpura    Chronic combined systolic (congestive) and diastolic (congestive) heart failure    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    Dementia associated with other underlying disease, without behavioral disturbance, psychotic disturbance, mood disturbance, or anxiety, unspecified dementia severity    PVD (peripheral vascular disease)    Type 2 diabetes mellitus    Leukocytosis       Review of patient's allergies indicates:   Allergen Reactions    Feraheme [ferumoxytol] Anaphylaxis       Current Facility-Administered Medications on File Prior to Encounter   Medication Dose Route Frequency Provider Last Rate Last Admin    lactated ringers infusion   Intravenous Continuous Denisse Conteh MD   Stopped at 12/28/21 1241     Current Outpatient Medications on File Prior to Encounter   Medication Sig Dispense Refill    amiodarone (PACERONE) 200 MG Tab Take 1 tablet (200  mg total) by mouth once daily. 30 tablet 11    atorvastatin (LIPITOR) 20 MG tablet Take 1 tablet (20 mg total) by mouth every evening. 90 tablet 0    carvediloL (COREG) 6.25 MG tablet Take 1 tablet (6.25 mg total) by mouth 2 (two) times daily with meals. 180 tablet 3    cholecalciferol, vitamin D3, (VITAMIN D3 ORAL) Take 1 tablet by mouth every other day.      donepeziL (ARICEPT) 5 MG tablet Take 1 tablet (5 mg total) by mouth every evening. 90 tablet 3    hydrALAZINE (APRESOLINE) 50 MG tablet Take 1 tablet (50 mg total) by mouth every 8 (eight) hours. 270 tablet 2    multivitamin (THERAGRAN) per tablet Take 1 tablet by mouth once daily.      pantoprazole (PROTONIX) 20 MG tablet Take 2 tablets (40 mg total) by mouth once daily. 30 tablet 0       Past Surgical History:   Procedure Laterality Date    bladder tumor removal      CARDIAC ELECTROPHYSIOLOGY MAPPING AND ABLATION      CARDIOVERSION      several    CATARACT EXTRACTION Bilateral     cataract removal with IOL implants Bilateral     CHOLECYSTECTOMY      CYSTOSCOPIC LITHOLAPAXY N/A 12/28/2021    Procedure: CYSTOLITHOLAPAXY;  Surgeon: Medardo Garcia MD;  Location: [facility] OR;  Service: Urology;  Laterality: N/A;    CYSTOSCOPIC LITHOLAPAXY N/A 5/14/2024    Procedure: CYSTOLITHOLAPAXY;  Surgeon: Medardo Garcia MD;  Location: [facility] OR;  Service: Urology;  Laterality: N/A;    CYSTOSCOPY      CYSTOSCOPY WITH BIOPSY OF BLADDER Right 12/28/2021    Procedure: CYSTOSCOPY, WITH BLADDER BIOPSY, WITH FULGURATION IF INDICATED;  Surgeon: Medardo Garcia MD;  Location: [facility] OR;  Service: Urology;  Laterality: Right;    ESOPHAGOGASTRODUODENOSCOPY N/A 10/25/2021    Procedure: Small Jason Enteroscopy (SBE);  Surgeon: Isabelle Griffin MD;  Location: [facility] ENDO;  Service: Endoscopy;  Laterality: N/A;    ESOPHAGOGASTRODUODENOSCOPY N/A 12/13/2021    Procedure: EGD (ESOPHAGOGASTRODUODENOSCOPY);  Surgeon: Troy Polanco MD;  Location: [facility] ENDO;  Service: Endoscopy;   Laterality: N/A;    ESOPHAGOGASTRODUODENOSCOPY N/A 2024    Procedure: EGD (ESOPHAGOGASTRODUODENOSCOPY);  Surgeon: Myrtle Salcedo MD;  Location: [facility];  Service: Endoscopy;  Laterality: N/A;    ESOPHAGOGASTRODUODENOSCOPY N/A 2024    Procedure: EGD (ESOPHAGOGASTRODUODENOSCOPY);  Surgeon: Myrtle Salcedo MD;  Location: [facility];  Service: Endoscopy;  Laterality: N/A;  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    EYE SURGERY      INTRALUMINAL GASTROINTESTINAL TRACT IMAGING VIA CAPSULE N/A 2021    Procedure: IMAGING PROCEDURE, GI TRACT, INTRALUMINAL, VIA CAPSULE;  Surgeon: Larry Jones RN;  Location: [facility];  Service: Endoscopy;  Laterality: N/A;    INTRALUMINAL GASTROINTESTINAL TRACT IMAGING VIA CAPSULE N/A 2024    Procedure: IMAGING PROCEDURE, GI TRACT, INTRALUMINAL, VIA CAPSULE;  Surgeon: Larry Jones RN;  Location: [facility];  Service: Endoscopy;  Laterality: N/A;    SMALL BOWEL ENTEROSCOPY N/A 2024    Procedure: ENTEROSCOPY--prximal DBE;  Surgeon: Eduardo Ruff MD;  Location: [facility];  Service: Endoscopy;  Laterality: N/A;    TRANSURETHRAL RESECTION OF BLADDER TUMOR BY BIPOLAR CAUTERY N/A 2019    Procedure: TURBT, USING BIPOLAR CAUTERY;  Surgeon: Ritesh Marques MD;  Location: [facility];  Service: Urology;  Laterality: N/A;    TRANSURETHRAL RESECTION OF PROSTATE N/A 2024    Procedure: TURP (TRANSURETHRAL RESECTION OF PROSTATE);  Surgeon: Medardo Garcia MD;  Location: [facility];  Service: Urology;  Laterality: N/A;       Social History     Socioeconomic History    Marital status:    Tobacco Use    Smoking status: Former     Current packs/day: 0.00     Average packs/day: 2.5 packs/day for 20.0 years (50.0 ttl pk-yrs)     Types: Cigarettes     Start date:      Quit date:      Years since quittin.8    Smokeless tobacco: Never   Substance and Sexual Activity    Alcohol use: Yes     Alcohol/week: 7.0 standard drinks of alcohol      Types: 7 Cans of beer per week     Comment: occasionally    Drug use: No    Sexual activity: Not Currently     Social Drivers of Health     Financial Resource Strain: Low Risk  (2024)    Overall Financial Resource Strain (CARDIA)     Difficulty of Paying Living Expenses: Not hard at all   Food Insecurity: No Food Insecurity (2024)    Hunger Vital Sign     Worried About Running Out of Food in the Last Year: Never true     Ran Out of Food in the Last Year: Never true   Transportation Needs: No Transportation Needs (2024)    TRANSPORTATION NEEDS     Transportation : No   Physical Activity: Sufficiently Active (2024)    Exercise Vital Sign     Days of Exercise per Week: 6 days     Minutes of Exercise per Session: 150+ min   Stress: No Stress Concern Present (2024)    [employer] - Occupational Stress Questionnaire     Feeling of Stress : Not at all   Housing Stability: Low Risk  (2024)    Housing Stability Vital Sign     Unable to Pay for Housing in the Last Year: No     Homeless in the Last Year: No         CBC:   Recent Labs     24  1413   WBC 6.13   RBC 4.22*   HGB 10.1*   HCT 35.2*   *   MCV 83   MCH 23.9*   MCHC 28.7*       CMP:   Recent Labs     24  1413      K 4.3      CO2 26   BUN 19   CREATININE 1.1   *   CALCIUM 9.1       INR  Recent Labs     24  1413   INR 1.1   APTT 29.9           Diagnostic Studies:      EKD Echo:  Results for orders placed or performed during the hospital encounter of 10/06/17   2D echo with color flow doppler    Collection Time: 10/07/17  9:50 AM   Result Value Ref Range    EF + QEF 55 55 - 65    Mitral Valve Regurgitation TRIVIAL     Aortic Valve Regurgitation TRIVIAL     Est. PA Systolic Pressure 40.72 (A)     Tricuspid Valve Regurgitation MILD            Pre-op Assessment    I have reviewed the Patient Summary Reports.    I have reviewed the NPO Status.   I have reviewed  the Medications.     Review of Systems  Anesthesia Hx:  No problems with previous Anesthesia               Denies Personal Hx of Anesthesia complications.                    Cardiovascular:  Exercise tolerance: good   Hypertension    Dysrhythmias atrial fibrillation  CHF                                   Pulmonary:   COPD                     Renal/:  Chronic Renal Disease, CKD                Neurological:    Denies CVA.    Denies Seizures.                                Endocrine:  Diabetes, type 2               Physical Exam  General: Cooperative, Alert and Oriented    Airway:  Mallampati: III   Mouth Opening: Normal  TM Distance: Normal  Tongue: Normal    Dental:  Partial Dentures        Anesthesia Plan  Type of Anesthesia, risks & benefits discussed:    Anesthesia Type: Gen ETT  Intra-op Monitoring Plan: Standard ASA Monitors  Post Op Pain Control Plan: multimodal analgesia and IV/PO Opioids PRN  Induction:  IV  Airway Plan: Video, Post-Induction  Informed Consent: Informed consent signed with the Patient and all parties understand the risks and agree with anesthesia plan.  All questions answered.   ASA Score: 3  Day of Surgery Review of History & Physical: H&P Update referred to the surgeon/provider.    Ready For Surgery From Anesthesia Perspective.     .

## 2024-11-14 NOTE — DISCHARGE INSTRUCTIONS
Watchman  Make sure to continuing taking your blood thinner (apixiban) after your procedure as you would normally take it  Take Aspirin 81 mg daily  You will have an ultrasound of your heart and Watchman device in approximately 6 weeks. Further recommendations will be made at that time depending on the findings  If oozing from groin site occurs, apply pressure without letting up for 15 minutes and lay flat for 1 hour. If bleeding has resolved, you can continue to monitor. If the bleeding continues or there is significant swelling or pain in the groin area, please visit the nearest ER for evaluation and treatment. DO NOT STOP TAKING YOUR BLOOD THINNER UNLESS INSTRUCTED BY A PHYSICIAN.   Do not take baths or submerge your groin area or at least 1 week or when the puncture site(s) in your groin have completely healed  Do not lift anything over 10 lbs for the first week after your procedure, and avoid strenuous activity during this time to allow for the groin sites to heal. After 1 week, you have no activity restrictions  Please contact the electrophysiology clinic or go to the ER if you experience: chest pain, shortness of breath, bleeding or swelling of the groin sites, or any other concerns.

## 2024-11-14 NOTE — ANESTHESIA PROCEDURE NOTES
Intubation    Date/Time: 11/14/2024 2:54 PM    Performed by: Dee Torres CRNA  Authorized by: Delonte Montgomery MD    Intubation:     Induction:  Intravenous    Intubated:  Postinduction    Mask Ventilation:  Easy with oral airway (two hand mask)    Attempts:  2    Attempted By:  Student    Method of Intubation:  Video laryngoscopy    Blade:  Gaines 3    Laryngeal View Grade: Grade IIb - only the arytenoids and epiglottis seen      Attempted By (2nd Attempt):  Student    Method of Intubation (2nd Attempt):  Video laryngoscopy    Blade (2nd Attempt):  Gaines 4    Laryngeal View Grade (2nd Attempt): Grade IIa - cords partially seen      Difficult Airway Encountered?: No      Complications:  None    Airway Device:  Oral endotracheal tube    Airway Device Size:  7.5    Style/Cuff Inflation:  Cuffed    Placement Verified By:  Capnometry    Complicating Factors:  Anterior larynx    Findings Post-Intubation:  BS equal bilateral

## 2024-11-14 NOTE — ASSESSMENT & PLAN NOTE
Here for LAAO with Watchman device    Recommendations following Watchman implantation:  Continue OAC + aspirin for 6 weeks after implantation  Follow up FIOR in 6 weeks.   If FIOR shows well seated device without DRT and abscence of peridevice leak (or <5 mm) will stop Eliquis and start clopidogrel and Aspirin 81 mg daily   6 months after device implantation Aspirin monotherapy    The risks, benefits and alternatives of the procedure were explained to the patient, patient's family and/or surrogate decision maker. Risks include (but not limited to) bleeding, hematoma, infection, pain, vascular damage, damage to structures surrounding the vasculature, myocardial damage [perforation, valvular damage], cardiac tamponade, device related thrombus, device emobolization potentially requiring surgical intervention, CVA, MI, and death. All questions were answered. Patient is understanding of these risks, and would like to proceed. Consents signed.

## 2024-11-14 NOTE — NURSING
Received report from Sarah MEJIA. Patient s/p watchman, AAOx4.   VSS, no c/o pain or discomfort at this time, resp even and unlabored.   Gauze/tegaderm dressing to R groin is CDI. No active bleeding. No hematoma noted. Post procedure protocol reviewed with patient and patient's family. Understanding verbalized. Family members at bedside. Nurse call bell within reach.     Tele maintained.

## 2024-11-14 NOTE — Clinical Note
An angiography was performed of the ISMAEL. The angiography was performed via hand injection with 10 mL of contrast.  Per MD

## 2024-11-15 ENCOUNTER — TELEPHONE (OUTPATIENT)
Dept: INFUSION THERAPY | Facility: HOSPITAL | Age: 84
End: 2024-11-15
Payer: MEDICARE

## 2024-11-15 NOTE — NURSING
Pt walked around the unit per orders. Pt's dressing's to bilateral groin remained CDI.   No s/s of bleeding noted. VSS.  NAD noted. Will DC pt per orders.

## 2024-11-15 NOTE — NURSING
Pt DC'd per orders.  DC paperwork reviewed w pt and son.  Pt verbalized DC instructions.    IV's removed. Tele DC'd.  Pt was able to drink, eat, walk, and urinate with no difficulties.    Pt being wheeled off unit per son in wheelchair.

## 2024-11-15 NOTE — ANESTHESIA POSTPROCEDURE EVALUATION
Anesthesia Post Evaluation    Patient: Jose Miguel Alvarez     Procedure(s) Performed: Procedure(s) (LRB):  Left atrial appendage occlusion (N/A)  ECHOCARDIOGRAM, TRANSESOPHAGEAL (N/A)    Final Anesthesia Type: general      Patient location during evaluation: PACU  Patient participation: Yes- Able to Participate  Level of consciousness: awake and alert  Post-procedure vital signs: reviewed and stable  Pain management: adequate  Airway patency: patent    PONV status at discharge: No PONV  Anesthetic complications: no      Cardiovascular status: hemodynamically stable  Respiratory status: unassisted  Hydration status: euvolemic  Follow-up not needed.              Vitals Value Taken Time   /74 11/14/24 1815   Temp 36.7 °C (98 °F) 11/14/24 1815   Pulse 52 11/14/24 1815   Resp 18 11/14/24 1815   SpO2 95 % 11/14/24 1815         No case tracking events are documented in the log.      Pain/Johanna Score: Pain Rating Prior to Med Admin: 0 (11/14/2024  5:15 PM)  Johanna Score: 10 (11/14/2024  6:15 PM)

## 2024-11-15 NOTE — TELEPHONE ENCOUNTER
----- Message from Rahel Walton NP sent at 11/15/2024  8:26 AM CST -----  I think he is safe don't worry about checking more cbc's.    rahel [email or web address]'  ----- Message -----  From: Teresa Veras MA  Sent: 11/11/2024   9:08 AM CST  To: Rahel Walton NP    He is having lab this afternoon for another provider.  Will that count or do you want me to put it for for Friday am?  ----- Message -----  From: Rahel Walton NP  Sent: 11/11/2024   7:59 AM CST  To: Teresa Veras MA    Let's check CBC on more time before his venofer and I will make a decision based on that CBC.  It looks like he is slowly trending upward.  ----- Message -----  From: Teresa Veras MA  Sent: 11/11/2024   7:46 AM CST  To: Rahel Walton NP    You wanted his cbc checked every 10 days.  He is scheduled to start venofer 11/18.  Do you want a cbc during his those days?

## 2024-11-18 ENCOUNTER — TELEPHONE (OUTPATIENT)
Dept: UROLOGY | Facility: CLINIC | Age: 84
End: 2024-11-18
Payer: MEDICARE

## 2024-11-18 ENCOUNTER — HOSPITAL ENCOUNTER (EMERGENCY)
Facility: HOSPITAL | Age: 84
Discharge: HOME | End: 2024-11-18
Attending: EMERGENCY MEDICINE
Payer: MEDICARE

## 2024-11-18 ENCOUNTER — TELEPHONE (OUTPATIENT)
Dept: CARDIOLOGY | Facility: CLINIC | Age: 84
End: 2024-11-18

## 2024-11-18 VITALS
TEMPERATURE: 98 F | WEIGHT: 205.31 LBS | DIASTOLIC BLOOD PRESSURE: 81 MMHG | OXYGEN SATURATION: 95 % | HEART RATE: 51 BPM | BODY MASS INDEX: 27.81 KG/M2 | HEIGHT: 72 IN | SYSTOLIC BLOOD PRESSURE: 175 MMHG | RESPIRATION RATE: 16 BRPM

## 2024-11-18 DIAGNOSIS — M54.50 RIGHT-SIDED LOW BACK PAIN WITHOUT SCIATICA, UNSPECIFIED CHRONICITY: ICD-10-CM

## 2024-11-18 DIAGNOSIS — R82.998 URINE WBC INCREASED: ICD-10-CM

## 2024-11-18 DIAGNOSIS — R31.9 HEMATURIA, UNSPECIFIED TYPE: Primary | ICD-10-CM

## 2024-11-18 DIAGNOSIS — R00.1 BRADYCARDIA: ICD-10-CM

## 2024-11-18 LAB
ALBUMIN SERPL BCP-MCNC: 4 G/DL (ref 3.5–5.2)
ALP SERPL-CCNC: 67 U/L (ref 40–150)
ALT SERPL W/O P-5'-P-CCNC: 29 U/L (ref 10–44)
ANION GAP SERPL CALC-SCNC: 10 MMOL/L (ref 8–16)
AST SERPL-CCNC: 27 U/L (ref 10–40)
BACTERIA #/AREA URNS HPF: ABNORMAL /HPF
BASOPHILS # BLD AUTO: 0.05 K/UL (ref 0–0.2)
BASOPHILS NFR BLD: 0.7 % (ref 0–1.9)
BILIRUB SERPL-MCNC: 0.4 MG/DL (ref 0.1–1)
BILIRUB UR QL STRIP: NEGATIVE
BLD PROD TYP BPU: NORMAL
BLD PROD TYP BPU: NORMAL
BLOOD UNIT EXPIRATION DATE: NORMAL
BLOOD UNIT EXPIRATION DATE: NORMAL
BLOOD UNIT TYPE CODE: 9500
BLOOD UNIT TYPE CODE: 9500
BLOOD UNIT TYPE: NORMAL
BLOOD UNIT TYPE: NORMAL
BUN SERPL-MCNC: 23 MG/DL (ref 8–23)
CALCIUM SERPL-MCNC: 9.7 MG/DL (ref 8.7–10.5)
CHLORIDE SERPL-SCNC: 105 MMOL/L (ref 95–110)
CLARITY UR: ABNORMAL
CO2 SERPL-SCNC: 26 MMOL/L (ref 23–29)
CODING SYSTEM: NORMAL
CODING SYSTEM: NORMAL
COLOR UR: ABNORMAL
CREAT SERPL-MCNC: 1.2 MG/DL (ref 0.5–1.4)
CROSSMATCH INTERPRETATION: NORMAL
CROSSMATCH INTERPRETATION: NORMAL
DIFFERENTIAL METHOD BLD: ABNORMAL
DISPENSE STATUS: NORMAL
DISPENSE STATUS: NORMAL
EOSINOPHIL # BLD AUTO: 0.3 K/UL (ref 0–0.5)
EOSINOPHIL NFR BLD: 4.2 % (ref 0–8)
ERYTHROCYTE [DISTWIDTH] IN BLOOD BY AUTOMATED COUNT: 20.8 % (ref 11.5–14.5)
EST. GFR  (NO RACE VARIABLE): 60 ML/MIN/1.73 M^2
GLUCOSE SERPL-MCNC: 132 MG/DL (ref 70–110)
GLUCOSE UR QL STRIP: NEGATIVE
HCT VFR BLD AUTO: 37.1 % (ref 40–54)
HGB BLD-MCNC: 11.4 G/DL (ref 14–18)
HGB UR QL STRIP: ABNORMAL
HYALINE CASTS #/AREA URNS LPF: 0 /LPF
IMM GRANULOCYTES # BLD AUTO: 0.03 K/UL (ref 0–0.04)
IMM GRANULOCYTES NFR BLD AUTO: 0.4 % (ref 0–0.5)
KETONES UR QL STRIP: NEGATIVE
LEUKOCYTE ESTERASE UR QL STRIP: ABNORMAL
LYMPHOCYTES # BLD AUTO: 0.9 K/UL (ref 1–4.8)
LYMPHOCYTES NFR BLD: 12.7 % (ref 18–48)
MCH RBC QN AUTO: 24.2 PG (ref 27–31)
MCHC RBC AUTO-ENTMCNC: 30.7 G/DL (ref 32–36)
MCV RBC AUTO: 79 FL (ref 82–98)
MICROSCOPIC COMMENT: ABNORMAL
MONOCYTES # BLD AUTO: 1.1 K/UL (ref 0.3–1)
MONOCYTES NFR BLD: 14.5 % (ref 4–15)
NEUTROPHILS # BLD AUTO: 4.9 K/UL (ref 1.8–7.7)
NEUTROPHILS NFR BLD: 67.5 % (ref 38–73)
NITRITE UR QL STRIP: NEGATIVE
NRBC BLD-RTO: 0 /100 WBC
NUM UNITS TRANS PACKED RBC: NORMAL
NUM UNITS TRANS PACKED RBC: NORMAL
PH UR STRIP: 7 [PH] (ref 5–8)
PLATELET # BLD AUTO: 120 K/UL (ref 150–450)
PMV BLD AUTO: ABNORMAL FL (ref 9.2–12.9)
POTASSIUM SERPL-SCNC: 4 MMOL/L (ref 3.5–5.1)
PROT SERPL-MCNC: 7.4 G/DL (ref 6–8.4)
PROT UR QL STRIP: ABNORMAL
RBC # BLD AUTO: 4.71 M/UL (ref 4.6–6.2)
RBC #/AREA URNS HPF: >100 /HPF (ref 0–4)
SODIUM SERPL-SCNC: 141 MMOL/L (ref 136–145)
SP GR UR STRIP: 1.01 (ref 1–1.03)
URN SPEC COLLECT METH UR: ABNORMAL
UROBILINOGEN UR STRIP-ACNC: NEGATIVE EU/DL
WBC # BLD AUTO: 7.3 K/UL (ref 3.9–12.7)
WBC #/AREA URNS HPF: >100 /HPF (ref 0–5)

## 2024-11-18 PROCEDURE — 99285 EMERGENCY DEPT VISIT HI MDM: CPT | Mod: 25

## 2024-11-18 PROCEDURE — 93005 ELECTROCARDIOGRAM TRACING: CPT

## 2024-11-18 PROCEDURE — 85025 COMPLETE CBC W/AUTO DIFF WBC: CPT | Performed by: EMERGENCY MEDICINE

## 2024-11-18 PROCEDURE — 93010 ELECTROCARDIOGRAM REPORT: CPT | Mod: ,,, | Performed by: STUDENT IN AN ORGANIZED HEALTH CARE EDUCATION/TRAINING PROGRAM

## 2024-11-18 PROCEDURE — 81000 URINALYSIS NONAUTO W/SCOPE: CPT | Performed by: EMERGENCY MEDICINE

## 2024-11-18 PROCEDURE — 87086 URINE CULTURE/COLONY COUNT: CPT | Performed by: EMERGENCY MEDICINE

## 2024-11-18 PROCEDURE — 80053 COMPREHEN METABOLIC PANEL: CPT | Performed by: EMERGENCY MEDICINE

## 2024-11-18 RX ORDER — CEFUROXIME AXETIL 250 MG/1
250 TABLET ORAL EVERY 12 HOURS
Qty: 14 TABLET | Refills: 0 | Status: SHIPPED | OUTPATIENT
Start: 2024-11-18

## 2024-11-18 NOTE — DISCHARGE INSTRUCTIONS
Hold eliquis as discussed here     Follow up with Dr melo your urologist call this week to see if outpatient cystoscopy can be moved up     Take cefuroxime  as directed given wbcs noted in your urine

## 2024-11-18 NOTE — TELEPHONE ENCOUNTER
Called and spoke with patient's son Hector and let him know that we scheduled the patient on the 21rst of this week on Thursday with  due to experiencing hematuria.       ----- Message from [place] sent at 11/18/2024  4:16 PM CST -----  Contact: Hector/son  Type:  Sooner Apoointment Request    Caller is requesting a sooner appointment.  Caller declined first available appointment listed below.  Caller will not accept being placed on the waitlist and is requesting a message be sent to doctor.  Name of Caller:Hector  When is the first available appointment?march  Symptoms:hospital follow up for blood in urine  Would the patient rather a call back or a response via [facility]? call  Best Call Back Number:[phone]  Additional Information:

## 2024-11-18 NOTE — ED PROVIDER NOTES
SCRIBE #1 NOTE: I, Jade Zavala, am scribing for, and in the presence of, Nicola Lainez MD. I have scribed the HPI, ROS, and PEx.     SCRIBE #2 NOTE: I, Karina Wang, am scribing for, and in the presence of,  Real Bailey MD. I have scribed the remaining portions of the note not scribed by Scribe #1.      History     Chief Complaint   Patient presents with    Hematuria     Had watchman procedure done on Thursday at [facility], started having flank pain and urinating dark blood yesterday morning. +eliquis      Review of patient's allergies indicates:   Allergen Reactions    Feraheme [ferumoxytol] Anaphylaxis         History of Present Illness     HPI    11/18/2024, 5:33 AM  History obtained from the patient      History of Present Illness: Jose Miguel Alvarez Jr. is a 83 y.o. male patient with a PMHx of A-fib, CKD, HTN, Anemia, Bladder cancer, Hematuria, COPD, and Hypercholesteremia who presents to the Emergency Department for evaluation of hematuria which onset yesterday morning. Symptoms are constant with urination and mild in severity. No mitigating or exacerbating factors reported. Associated sxs include R back pain. Patient denies any myalgias, dizziness, dysuria, and all other sxs at this time. No prior Tx reported. No further complaints or concerns at this time.       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 Treated with APC.   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 limited to breakdown of skin 01/26/2023    Stage 3 chronic kidney disease 10/06/2017       Past Surgical History:  Past Surgical History:   Procedure Laterality Date    bladder tumor removal      CARDIAC ELECTROPHYSIOLOGY MAPPING AND ABLATION      CARDIOVERSION      several    CATARACT EXTRACTION Bilateral     cataract removal with IOL implants Bilateral     CHOLECYSTECTOMY      CYSTOSCOPIC LITHOLAPAXY N/A 12/28/2021    Procedure: CYSTOLITHOLAPAXY;  Surgeon: Medardo Garcia MD;  Location: [facility];  Service: Urology;  Laterality: N/A;    CYSTOSCOPIC LITHOLAPAXY N/A 5/14/2024    Procedure: CYSTOLITHOLAPAXY;  Surgeon: Medardo Garcia MD;  Location: [facility];  Service: Urology;  Laterality: N/A;    CYSTOSCOPY      CYSTOSCOPY WITH BIOPSY OF BLADDER Right 12/28/2021    Procedure: CYSTOSCOPY, WITH BLADDER BIOPSY, WITH FULGURATION IF INDICATED;  Surgeon: Medardo Garcia MD;  Location: [facility];  Service: Urology;  Laterality: Right;    ESOPHAGOGASTRODUODENOSCOPY N/A 10/25/2021    Procedure: Small Jason Enteroscopy (SBE);  Surgeon: Isabelle Griffin MD;  Location: [facility];  Service: Endoscopy;  Laterality: N/A;    ESOPHAGOGASTRODUODENOSCOPY N/A 12/13/2021    Procedure: EGD (ESOPHAGOGASTRODUODENOSCOPY);  Surgeon: Troy Polanco MD;  Location: [facility];  Service: Endoscopy;  Laterality: N/A;    ESOPHAGOGASTRODUODENOSCOPY N/A 4/5/2024    Procedure: EGD (ESOPHAGOGASTRODUODENOSCOPY);  Surgeon: Myrtle Salcedo MD;  Location: [facility] ENDO;  Service: Endoscopy;  Laterality: N/A;    ESOPHAGOGASTRODUODENOSCOPY N/A 4/22/2024    Procedure: EGD (ESOPHAGOGASTRODUODENOSCOPY);  Surgeon: Myrtle Salcedo MD;  Location: [facility] ENDO;  Service: Endoscopy;  Laterality: N/A;  Push enteroscopy for  positive capsule - bleeding AVM    EYE SURGERY      INTRALUMINAL GASTROINTESTINAL TRACT IMAGING VIA CAPSULE N/A 12/6/2021    Procedure: IMAGING PROCEDURE, GI TRACT, INTRALUMINAL, VIA CAPSULE;  Surgeon: Larry Jones RN;  Location: [facility] ENDO;  Service: Endoscopy;  Laterality: N/A;    INTRALUMINAL GASTROINTESTINAL TRACT IMAGING VIA CAPSULE N/A 4/18/2024    Procedure: IMAGING PROCEDURE, GI TRACT, INTRALUMINAL, VIA CAPSULE;  Surgeon: Larry Jones RN;  Location: [facility] ENDO;  Service: Endoscopy;  Laterality: N/A;    OCCLUSION OF LEFT ATRIAL APPENDAGE N/A 11/14/2024    Procedure: Left atrial appendage occlusion;  Surgeon: Aroldo Ortiz MD;  Location: [facility] EP LAB;  Service: Cardiology;  Laterality: N/A;  afib, watchman, BSCI, venkatesh, anes, MB, 3prep    SMALL BOWEL ENTEROSCOPY N/A 9/26/2024    Procedure: ENTEROSCOPY--prximal DBE;  Surgeon: Eduardo Ruff MD;  Location: [facility] ENDO;  Service: Endoscopy;  Laterality: N/A;    TRANSESOPHAGEAL ECHOCARDIOGRAPHY N/A 11/14/2024    Procedure: ECHOCARDIOGRAM, TRANSESOPHAGEAL;  Surgeon: Aroldo Martinez MD;  Location: [facility] EP LAB;  Service: Cardiology;  Laterality: N/A;    TRANSURETHRAL RESECTION OF BLADDER TUMOR BY BIPOLAR CAUTERY N/A 7/23/2019    Procedure: TURBT, USING BIPOLAR CAUTERY;  Surgeon: Ritesh Marques MD;  Location: [facility] OR;  Service: Urology;  Laterality: N/A;    TRANSURETHRAL RESECTION OF PROSTATE N/A 5/14/2024    Procedure: TURP (TRANSURETHRAL RESECTION OF PROSTATE);  Surgeon: Medardo Garcia MD;  Location: [facility] OR;  Service: Urology;  Laterality: N/A;         Family History:  Family History   Problem Relation Name Age of Onset    Heart disease Father      Hypertension Father      Heart disease Brother         Social History:  Social History     Tobacco Use    Smoking status: Former     Current packs/day: 0.00     Average packs/day: 2.5 packs/day for 20.0 years (50.0 ttl pk-yrs)     Types: Cigarettes     Start date: 1967     Quit date: 1987     Years since  quittin.9    Smokeless tobacco: Never   Substance and Sexual Activity    Alcohol use: Yes     Alcohol/week: 7.0 standard drinks of alcohol     Types: 7 Cans of beer per week     Comment: occasionally    Drug use: No    Sexual activity: Not Currently        Review of Systems     Review of Systems   Genitourinary:  Negative for dysuria.   Musculoskeletal:  Positive for back pain (R). Negative for myalgias.   Neurological:  Negative for dizziness.   All other systems reviewed and are negative.       Physical Exam     Initial Vitals [24 0335]   BP Pulse Resp Temp SpO2   (!) 175/84 (!) 54 14 98.4 °F (36.9 °C) 96 %      MAP       --          Physical Exam  Nursing Notes and Vital Signs Reviewed.  Constitutional: Patient is in no acute distress. Well-developed and well-nourished.  Head: Atraumatic. Normocephalic.  Eyes: PERRL. EOM intact. Conjunctivae are not pale. No scleral icterus.  ENT: Mucous membranes are moist. Oropharynx is clear and symmetric.    Neck: Supple. Full ROM. No lymphadenopathy.  Cardiovascular: Bradycardic. Regular rhythm. No murmurs, rubs, or gallops. Distal pulses are 2+ and symmetric.  Pulmonary/Chest: No respiratory distress. Clear to auscultation bilaterally. No wheezing or rales.  Abdominal: Soft and non-distended.  There is no tenderness.  No rebound, guarding, or rigidity. Good bowel sounds.  Genitourinary: No CVA tenderness  Musculoskeletal: Moves all extremities. No obvious deformities. No edema. No calf tenderness.  Skin: Warm and dry.  Neurological:  Alert, awake, and appropriate.  Normal speech.  No acute focal neurological deficits are appreciated.  Psychiatric: Normal affect. Good eye contact. Appropriate in content.     ED Course   Procedures  ED Vital Signs:  Vitals:    24 0335 24 0421 24 0437 24 0519   BP: (!) 175/84   (!) 188/83   Pulse: (!) 54  (S) (!) 51 (!) 50   Resp: 14   14   Temp: 98.4 °F (36.9 °C)      TempSrc: Oral      SpO2: 96%   95%    Weight:  93.1 kg (205 lb 4.8 oz)     Height: 6' (1.829 m)       11/18/24 0603 11/18/24 0735 11/18/24 0805 11/18/24 0832   BP: (!) 179/120 (!) 175/84 (!) 168/74 (!) 169/75   Pulse: (!) 50 (!) 48 (!) 47 (!) 49   Resp: 16 14 14 14   Temp:       TempSrc:       SpO2: 95% 95% 95% 96%   Weight:       Height:        11/18/24 0925   BP: (!) 175/81   Pulse: (!) 51   Resp: 16   Temp: 97.7 °F (36.5 °C)   TempSrc: Oral   SpO2: 95%   Weight:    Height:        Abnormal Lab Results:  Labs Reviewed   CBC W/ AUTO DIFFERENTIAL - Abnormal       Result Value    WBC 7.30      RBC 4.71      Hemoglobin 11.4 (*)     Hematocrit 37.1 (*)     MCV 79 (*)     MCH 24.2 (*)     MCHC 30.7 (*)     RDW 20.8 (*)     Platelets 120 (*)     MPV SEE COMMENT      Immature Granulocytes 0.4      Gran # (ANC) 4.9      Immature Grans (Abs) 0.03      Lymph # 0.9 (*)     Mono # 1.1 (*)     Eos # 0.3      Baso # 0.05      nRBC 0      Gran % 67.5      Lymph % 12.7 (*)     Mono % 14.5      Eosinophil % 4.2      Basophil % 0.7      Differential Method Automated     COMPREHENSIVE METABOLIC PANEL - Abnormal    Sodium 141      Potassium 4.0      Chloride 105      CO2 26      Glucose 132 (*)     BUN 23      Creatinine 1.2      Calcium 9.7      Total Protein 7.4      Albumin 4.0      Total Bilirubin 0.4      Alkaline Phosphatase 67      AST 27      ALT 29      eGFR 60      Anion Gap 10     URINALYSIS, REFLEX TO URINE CULTURE - Abnormal    Specimen UA Urine, Clean Catch      Color, UA Brown (*)     Appearance, UA Cloudy (*)     pH, UA 7.0      Specific Gravity, UA 1.010      Protein, UA 2+ (*)     Glucose, UA Negative      Ketones, UA Negative      Bilirubin (UA) Negative      Occult Blood UA 3+ (*)     Nitrite, UA Negative      Urobilinogen, UA Negative      Leukocytes, UA 1+ (*)     Narrative:     Specimen Source->Urine   URINALYSIS MICROSCOPIC - Abnormal    RBC, UA >100 (*)     WBC, UA >100 (*)     Bacteria None      Hyaline Casts, UA 0      Microscopic Comment SEE  COMMENT      Narrative:     Specimen Source->Urine   CULTURE, URINE        All Lab Results:  Results for orders placed or performed during the hospital encounter of 11/18/24   CBC auto differential    Collection Time: 11/18/24  5:31 AM   Result Value Ref Range    WBC 7.30 3.90 - 12.70 K/uL    RBC 4.71 4.60 - 6.20 M/uL    Hemoglobin 11.4 (L) 14.0 - 18.0 g/dL    Hematocrit 37.1 (L) 40.0 - 54.0 %    MCV 79 (L) 82 - 98 fL    MCH 24.2 (L) 27.0 - 31.0 pg    MCHC 30.7 (L) 32.0 - 36.0 g/dL    RDW 20.8 (H) 11.5 - 14.5 %    Platelets 120 (L) 150 - 450 K/uL    MPV SEE COMMENT 9.2 - 12.9 fL    Immature Granulocytes 0.4 0.0 - 0.5 %    Gran # (ANC) 4.9 1.8 - 7.7 K/uL    Immature Grans (Abs) 0.03 0.00 - 0.04 K/uL    Lymph # 0.9 (L) 1.0 - 4.8 K/uL    Mono # 1.1 (H) 0.3 - 1.0 K/uL    Eos # 0.3 0.0 - 0.5 K/uL    Baso # 0.05 0.00 - 0.20 K/uL    nRBC 0 0 /100 WBC    Gran % 67.5 38.0 - 73.0 %    Lymph % 12.7 (L) 18.0 - 48.0 %    Mono % 14.5 4.0 - 15.0 %    Eosinophil % 4.2 0.0 - 8.0 %    Basophil % 0.7 0.0 - 1.9 %    Differential Method Automated    Comprehensive metabolic panel    Collection Time: 11/18/24  5:31 AM   Result Value Ref Range    Sodium 141 136 - 145 mmol/L    Potassium 4.0 3.5 - 5.1 mmol/L    Chloride 105 95 - 110 mmol/L    CO2 26 23 - 29 mmol/L    Glucose 132 (H) 70 - 110 mg/dL    BUN 23 8 - 23 mg/dL    Creatinine 1.2 0.5 - 1.4 mg/dL    Calcium 9.7 8.7 - 10.5 mg/dL    Total Protein 7.4 6.0 - 8.4 g/dL    Albumin 4.0 3.5 - 5.2 g/dL    Total Bilirubin 0.4 0.1 - 1.0 mg/dL    Alkaline Phosphatase 67 40 - 150 U/L    AST 27 10 - 40 U/L    ALT 29 10 - 44 U/L    eGFR 60 >60 mL/min/1.73 m^2    Anion Gap 10 8 - 16 mmol/L   EKG 12-lead    Collection Time: 11/18/24  5:47 AM   Result Value Ref Range    QRS Duration 82 ms    [facility] QTC Calculation 428 ms   Urinalysis, Reflex to Urine Culture Urine, Clean Catch    Collection Time: 11/18/24  5:54 AM    Specimen: Urine   Result Value Ref Range    Specimen UA Urine, Clean Catch     Color, UA  Brown (A) Yellow, Straw, Kirsten    Appearance, UA Cloudy (A) Clear    pH, UA 7.0 5.0 - 8.0    Specific Gravity, UA 1.010 1.005 - 1.030    Protein, UA 2+ (A) Negative    Glucose, UA Negative Negative    Ketones, UA Negative Negative    Bilirubin (UA) Negative Negative    Occult Blood UA 3+ (A) Negative    Nitrite, UA Negative Negative    Urobilinogen, UA Negative <2.0 EU/dL    Leukocytes, UA 1+ (A) Negative   Urinalysis Microscopic    Collection Time: 11/18/24  5:54 AM   Result Value Ref Range    RBC, UA >100 (H) 0 - 4 /hpf    WBC, UA >100 (H) 0 - 5 /hpf    Bacteria None None-Occ /hpf    Hyaline Casts, UA 0 0-1/lpf /lpf    Microscopic Comment SEE COMMENT          Imaging Results:  Imaging Results              CT Renal Stone Study ABD Pelvis WO (Final result)  Result time 11/18/24 07:47:44      Final result by Hector Arriaga MD (11/18/24 07:47:44)                   Impression:      Focal thickening seen along the posterior bladder wall asymmetric to the right which produces moderate right-sided hydronephrosis and hydroureter. Findings concerning for bladder mass.  Recommend cystoscopy to exclude bladder mass.    All CT scans at this facility use dose modulation, iterative reconstruction, and/or weight based dosing when appropriate to reduce radiation dose to as low as reasonable achievable.      Electronically signed by: Hector Arriaga MD  Date:    11/18/2024  Time:    07:47               Narrative:    EXAMINATION:  CT RENAL STONE STUDY ABD PELVIS WO    CLINICAL HISTORY:  Flank pain, kidney stone suspected;    TECHNIQUE:  Low dose axial images, sagittal and coronal reformations were obtained from the lung bases to the pubic symphysis.  Oral contrast was not administered.    COMPARISON:  None    FINDINGS:  Heart: Normal size. No effusion.    Lung Bases: Clear.    Liver: Normal size and attenuation. No focal lesions.  Dystrophic calcifications along the right hepatic dome.    Gallbladder: Post cholecystectomy    Bile  Ducts: No dilatation.    Pancreas: No obvious mass. No peripancreatic fat stranding.    Spleen: Normal.    Adrenals: Normal.    Kidneys/Ureters: Mild cortical thinning.  Nonspecific perinephric stranding.  13 mm hyperdense cyst lower pole left kidney.  Dystrophic calcification lower pole left kidney.  No mass, hydroureteronephrosis, or nephroureterolithiasis.    Bladder: Mild nonspecific wall thickening.  Focal thickening seen along the posterior bladder wall slightly asymmetric to the right which produces moderate right-sided hydronephrosis and hydroureter.  Findings concerning for bladder mass.  Recommend cystoscopy to exclude bladder mass.    Reproductive organs: Prostate gland is mildly enlarged measuring approximately 5.5 x 4.4 cm.    GI Tract/Mesentery: No evidence of bowel obstruction or inflammation.    Peritoneal Space: No ascites or free air.    Retroperitoneum: Shotty adenopathy.  Shotty inguinal nodes.    Abdominal wall: Small hiatal hernia.    Vasculature: No aneurysm.    Bones: No acute fracture.  Moderate degenerative change.  No suspicious lytic or sclerotic lesions.                                       The EKG was ordered, reviewed, and independently interpreted by the ED provider.  Interpretation time: 5:27 AM  Rate: 54 BPM  Rhythm: sinus bradycardia  Interpretation: Minimal voltage criteria for LVH, may be normal variant ( R in aVL ). Cannot rule out anterior infarct. No STEMI.           The Emergency Provider reviewed the vital signs and test results, which are outlined above.     ED Discussion       6:00 AM: Dr. Lainez transfers care of patient to Dr. Bailey pending lab and radiology results.    7:16 AM: Re-evaluated pt. Pt is resting comfortably and is in no acute distress.  Pt states he is currently in no pain and denies any chest pain or shortness of breath. Family reports Dr. Aroldo Ortiz (Cardiology) completed the left atrial appendage occlusion procedure in [place] 4 days  ago.    8:39 AM: Discussed imaging results. Pt states Dr. Garcia evaluates the cancer, but reports he hasn't had a recent assessment.    8:56 AM: Discussed pt's case with Dr. Falk (Urology) who recommends a cysto in the outpatient setting. If the hematuria is the only problem and no clots are noted, the pt can continue Eliquis and does not need admission.    8:58 AM: Reassessed pt at this time. Discussed with pt all pertinent ED information and results. Discussed pt dx and plan of tx. Gave pt all f/u and return to the ED instructions. All questions and concerns were addressed at this time. Pt expresses understanding of information and instructions, and is comfortable with plan to discharge. Pt is stable for discharge.    I discussed with patient and/or family/caretaker that evaluation in the ED does not suggest any emergent or life threatening medical conditions requiring immediate intervention beyond what was provided in the ED, and I believe patient is safe for discharge.  Regardless, an unremarkable evaluation in the ED does not preclude the development or presence of a serious of life threatening condition. As such, patient was instructed to return immediately for any worsening or change in current symptoms.             Medical Decision Making  Amount and/or Complexity of Data Reviewed  External Data Reviewed: notes.     Details: Dr. Ortiz's cardiology procedure on 11/14/24 to confirm procedure and physician name.  Labs: ordered. Decision-making details documented in ED Course.  Radiology: ordered. Decision-making details documented in ED Course.  ECG/medicine tests: ordered and independent interpretation performed. Decision-making details documented in ED Course.                ED Medication(s):  Medications - No data to display    Discharge Medication List as of 11/18/2024  9:14 AM           Follow-up Information       Call  Medardo Garcia MD.    Specialty: Urology  Why: today to see if your  urology appt can be moved up  Contact information:  [id number]  [place] [address]  [phone]                                 Scribe Attestation:   Scribe #1: I performed the above scribed service and the documentation accurately describes the services I performed. I attest to the accuracy of the note.     Attending:   Physician Attestation Statement for Scribe #1: I, Nicola Lainez MD, personally performed the services described in this documentation, as scribed by Jade Zavala, in my presence, and it is both accurate and complete.       Scribe Attestation:   Scribe #2: I performed the above scribed service and the documentation accurately describes the services I performed. I attest to the accuracy of the note.    Attending Attestation:           Physician Attestation for Scribe:    Physician Attestation Statement for Scribe #2: I, Real Bailey MD, reviewed documentation, as scribed by Karina Wang in my presence, and it is both accurate and complete. I also acknowledge and confirm the content of the note done by Scribe #1.           Clinical Impression       ICD-10-CM ICD-9-CM   1. Hematuria, unspecified type  R31.9 599.70   2. Bradycardia  R00.1 427.89   3. Right-sided low back pain without sciatica, unspecified chronicity  M54.50 724.2   4. Urine WBC increased  R82.998 791.7       Disposition:   Disposition: Discharged  Condition: Stable         Real Bailey Jr., MD  11/18/24 [id number]

## 2024-11-18 NOTE — TELEPHONE ENCOUNTER
Telephoned patient to discuss scheduling FIOR with Dr. Flower 6 weeks after Watchman placement        ----- Message from Carlos Flower MD sent at 11/15/2024  5:05 PM CST -----  Regarding: RE: Needs FIOR scheduled in 6 weeks  12/30/24 Monday at noon  ----- Message -----  From: Natasha Puente LPN  Sent: 11/15/2024   3:17 PM CST  To: Carlos Flower MD; Emilie Meza RN  Subject: Needs FIOR scheduled in 6 weeks                   Dr. Flower,    Patient will need date/time forTEE end of December or first week of January per Dr. Ortiz's request.    Natasha Potter  ----- Message -----  From: Emilie Meza, RN  Sent: 11/15/2024  10:36 AM CST  To: Natasha Puente LPN    Pt had his watchman placed successfully yesterday by Dr Ortiz and will need a 6 week f/u FIOR scheduled to be done in [place]  Emilie Potter

## 2024-11-19 ENCOUNTER — TELEPHONE (OUTPATIENT)
Dept: ELECTROPHYSIOLOGY | Facility: CLINIC | Age: 84
End: 2024-11-19
Payer: MEDICARE

## 2024-11-19 LAB
BACTERIA UR CULT: NO GROWTH
OHS QRS DURATION: 82 MS
OHS QTC CALCULATION: 428 MS
POC ACTIVATED CLOTTING TIME K: 232 SEC (ref 74–137)
SAMPLE: ABNORMAL

## 2024-11-19 NOTE — TELEPHONE ENCOUNTER
----- Message from Deyanira sent at 11/19/2024  2:29 PM CST -----  Patient is calling regarding his procedure that was done on 11-14--24. He would like to know when can he remove the seal. He is unsure of instruction and would like a call back at [phone].      Thank you

## 2024-11-19 NOTE — TELEPHONE ENCOUNTER
Spoke with pt, reports he still has a small dressing on his groin site, denies any drainage or bleeding from site, informed pt he can remove the dressing

## 2024-11-21 ENCOUNTER — PROCEDURE VISIT (OUTPATIENT)
Dept: UROLOGY | Facility: CLINIC | Age: 84
End: 2024-11-21
Payer: MEDICARE

## 2024-11-21 ENCOUNTER — INFUSION (OUTPATIENT)
Dept: INFUSION THERAPY | Facility: HOSPITAL | Age: 84
End: 2024-11-21
Attending: EMERGENCY MEDICINE
Payer: MEDICARE

## 2024-11-21 VITALS
RESPIRATION RATE: 16 BRPM | HEART RATE: 48 BPM | SYSTOLIC BLOOD PRESSURE: 115 MMHG | DIASTOLIC BLOOD PRESSURE: 55 MMHG | TEMPERATURE: 97 F | OXYGEN SATURATION: 98 %

## 2024-11-21 DIAGNOSIS — Z85.51 HISTORY OF BLADDER CANCER: Primary | ICD-10-CM

## 2024-11-21 DIAGNOSIS — R31.0 GROSS HEMATURIA: ICD-10-CM

## 2024-11-21 DIAGNOSIS — R35.0 BENIGN PROSTATIC HYPERPLASIA WITH URINARY FREQUENCY: ICD-10-CM

## 2024-11-21 DIAGNOSIS — N40.1 BENIGN PROSTATIC HYPERPLASIA WITH URINARY FREQUENCY: ICD-10-CM

## 2024-11-21 DIAGNOSIS — D50.0 IRON DEFICIENCY ANEMIA SECONDARY TO BLOOD LOSS (CHRONIC): Primary | ICD-10-CM

## 2024-11-21 DIAGNOSIS — N30.01 ACUTE CYSTITIS WITH HEMATURIA: ICD-10-CM

## 2024-11-21 DIAGNOSIS — N20.0 NEPHROLITHIASIS: ICD-10-CM

## 2024-11-21 PROCEDURE — 63600175 PHARM REV CODE 636 W HCPCS: Performed by: NURSE PRACTITIONER

## 2024-11-21 PROCEDURE — 96374 THER/PROPH/DIAG INJ IV PUSH: CPT

## 2024-11-21 PROCEDURE — 25000003 PHARM REV CODE 250: Performed by: NURSE PRACTITIONER

## 2024-11-21 PROCEDURE — 96375 TX/PRO/DX INJ NEW DRUG ADDON: CPT

## 2024-11-21 RX ORDER — METHYLPREDNISOLONE SOD SUCC 125 MG
40 VIAL (EA) INJECTION
Status: COMPLETED | OUTPATIENT
Start: 2024-11-21 | End: 2024-11-21

## 2024-11-21 RX ORDER — SODIUM CHLORIDE 0.9 % (FLUSH) 0.9 %
10 SYRINGE (ML) INJECTION
OUTPATIENT
Start: 2024-11-22

## 2024-11-21 RX ORDER — METHYLPREDNISOLONE SOD SUCC 125 MG
40 VIAL (EA) INJECTION
Start: 2024-11-22

## 2024-11-21 RX ORDER — DIPHENHYDRAMINE HYDROCHLORIDE 50 MG/ML
50 INJECTION INTRAMUSCULAR; INTRAVENOUS ONCE AS NEEDED
OUTPATIENT
Start: 2024-11-22

## 2024-11-21 RX ORDER — EPINEPHRINE 0.3 MG/.3ML
0.3 INJECTION SUBCUTANEOUS ONCE AS NEEDED
Status: DISCONTINUED | OUTPATIENT
Start: 2024-11-21 | End: 2024-11-21 | Stop reason: HOSPADM

## 2024-11-21 RX ORDER — SODIUM CHLORIDE 0.9 % (FLUSH) 0.9 %
10 SYRINGE (ML) INJECTION
Status: DISCONTINUED | OUTPATIENT
Start: 2024-11-21 | End: 2024-11-21 | Stop reason: HOSPADM

## 2024-11-21 RX ORDER — CIPROFLOXACIN 500 MG/1
500 TABLET ORAL
Status: COMPLETED | OUTPATIENT
Start: 2024-11-21 | End: 2024-11-21

## 2024-11-21 RX ORDER — LIDOCAINE HYDROCHLORIDE 20 MG/ML
JELLY TOPICAL
Status: COMPLETED | OUTPATIENT
Start: 2024-11-21 | End: 2024-11-21

## 2024-11-21 RX ORDER — EPINEPHRINE 0.3 MG/.3ML
0.3 INJECTION SUBCUTANEOUS ONCE AS NEEDED
OUTPATIENT
Start: 2024-11-22

## 2024-11-21 RX ORDER — DIPHENHYDRAMINE HYDROCHLORIDE 50 MG/ML
50 INJECTION INTRAMUSCULAR; INTRAVENOUS ONCE AS NEEDED
Status: DISCONTINUED | OUTPATIENT
Start: 2024-11-21 | End: 2024-11-21 | Stop reason: HOSPADM

## 2024-11-21 RX ORDER — CEFAZOLIN SODIUM 2 G/50ML
2 SOLUTION INTRAVENOUS
OUTPATIENT
Start: 2024-11-21

## 2024-11-21 RX ADMIN — METHYLPREDNISOLONE SODIUM SUCCINATE 40 MG: 125 INJECTION, POWDER, FOR SOLUTION INTRAMUSCULAR; INTRAVENOUS at 07:11

## 2024-11-21 RX ADMIN — IRON SUCROSE 200 MG: 20 INJECTION, SOLUTION INTRAVENOUS at 07:11

## 2024-11-21 RX ADMIN — CIPROFLOXACIN 500 MG: 500 TABLET ORAL at 02:11

## 2024-11-21 RX ADMIN — SODIUM CHLORIDE: 9 INJECTION, SOLUTION INTRAVENOUS at 07:11

## 2024-11-21 RX ADMIN — LIDOCAINE HYDROCHLORIDE 11 ML: 20 JELLY TOPICAL at 02:11

## 2024-11-21 NOTE — PROCEDURES
Procedures  Chief Complaint:   Encounter Diagnoses   Name Primary?    History of bladder cancer Yes    Gross hematuria     Benign prostatic hyperplasia with urinary frequency     Nephrolithiasis     Acute cystitis with hematuria        HPI:   11/21/24- here today for cystoscopy.  Recent episode of hematuria, CT demonstrated thickening of the bladder.    12/22/21- patient comes in today to establish care my clinic, recent gross hematuria and reported to see Alexandra.  CT urogram demonstrates bladder stones, urinary retention and a distal right ureteral stone.  Patient has had a Pandey catheter would like this removed today, no more gross hematuria, he is also on antibiotics and tamsulosin.  Patient otherwise was voiding well with no complaints prior to this incident, history of TURBT with subsequent induction BCG, no recurrences.      Allergies:  Patient has no known allergies.    Medications:  has a current medication list which includes the following prescription(s): amiodarone, amlodipine, atorvastatin, donepezil, ergocalciferol, famotidine, ferrous sulfate, hydrochlorothiazide, metoprolol succinate, multivitamin, nitrofurantoin (macrocrystal-monohydrate), oxycodone-acetaminophen, pantoprazole, phenazopyridine, rivaroxaban, and tamsulosin, and the following Facility-Administered Medications: lactated ringers.    Review of Systems:  General: No fever, chills, fatigability, or weight loss.  Skin: No rashes, itching, or changes in color or texture of skin.  Chest: Denies RECINOS, cyanosis, wheezing, cough, and sputum production.  Abdomen: Appetite fine. No weight loss. Denies diarrhea, abdominal pain, hematemesis, or blood in stool.  Musculoskeletal: No joint stiffness or swelling. Denies back pain.  : As above.  All other review of systems negative.    PMH:   has a past medical history of Anemia, Anticoagulant long-term use, Atrial fibrillation, Bladder cancer, Bladder tumor, CKD (chronic kidney disease), stage III, COPD  (chronic obstructive pulmonary disease), Encounter for blood transfusion, Hematuria, History of 2019 novel coronavirus disease (COVID-19), Hypercholesteremia, and Hypertension.    PSH:   has a past surgical history that includes Cholecystectomy; Cystoscopy; Cardioversion; bladder tumor removal; Eye surgery; cataract removal with IOL implants (Bilateral); Cardiac electrophysiology mapping and ablation; Transurethral resection of bladder tumor by bipolar cautery (N/A, 7/23/2019); Esophagogastroduodenoscopy (N/A, 10/25/2021); Intraluminal gastrointestinal tract imaging via capsule (N/A, 12/6/2021); Esophagogastroduodenoscopy (N/A, 12/13/2021); Cystoscopy with biopsy of bladder (Right, 12/28/2021); and Cystoscopic litholapaxy (N/A, 12/28/2021).    FamHx: family history includes Heart disease in his brother and father.    SocHx:  reports that he quit smoking about 35 years ago. His smoking use included cigarettes. He has a 50.00 pack-year smoking history. He has never used smokeless tobacco. He reports current alcohol use of about 8.0 standard drinks of alcohol per week. He reports that he does not use drugs.      Physical Exam:  General: A&Ox3, no apparent distress, no deformities  Neck: No masses, normal thyroid  Lungs: normal inspiration, no use of accessory muscles  Heart: normal pulse, no arrhythmias  Abdomen: Soft, NT, ND, no masses, no hernias, no hepatosplenomegaly  Lymphatic: Neck and groin nodes negative  : 11/24- Test desc filipe, no abnormalities of epididymus. Normal penile and scrotal skin. Meatus normal.     Labs/Studies:    ml 6/24  Cystoscopy tumors along the lateral wall left side, debris, TUR defect 11/24  Cytology negative 4/24  CT stone protocol asymetry to right bladder wall with hydro 11/24  CORDELL moderate right-sided hydronephrosis 4/24  CT urogram bladder distention with bilateral hydronephrosis, bladder stone 4/24  CT stone protocol no nephrolithiasis 1/22  CT urogram 0.5 cm distal right  ureteral stone, bladder stones, BPH 12/21  Creatinine 1.2 11/24  PSA 2.1 10/24    Procedure: Diagnostic Cystoscopy    Procedure in Detail: After proper consents were obtained, the patient was prepped and draped in normal sterile fashion for diagnostic cystoscopy. 5 ml of lidocaine jelly was instilled in the urethra. The flexible cystoscope was then introduced into the urethra, and advanced into the bladder under direct vision. The urethral mucosa appeared normal, and no strictures were noted. The sphincter appeared to be normal, and the veru montanum was unremarkable. The prostatic mucosa demonstrated TUR defect. The bladder neck was normal. Inspection of the interior of the bladder was then carried out. The trigone was unremarkable, with no mucosal lesions. The ureteral orifices were normal in position and configuration. Systematic inspection of the mucosa of the bladder it was then carried out, rotating the cystoscope so that all areas of the left and right lateral walls, the dome of the bladder, and the posterior wall were all visualized. The cystoscope was then advanced further into the bladder, and maximum deflection of the scope was performed so that the bladder neck could be inspected.  Left lateral wall demonstrated lesions consistent with TCC, difficult to fully ascertain due to significant debris. The cystoscope was then removed, and the procedure terminated.     Findings:  Tumors along the left lateral wall, significant debris, TUR defect      Impression/Plan:       1. History of bladder cancer-  BCG 3/22, High pTa, but suspicious for T1 cystoscopy with bladder biopsy, vesical litholapaxy, could not assess the right ureter  12/28/21.  TURBT 5-6 years ago at the outside facility with induction BCG.    Patient appears to have a recurrence at this juncture, will take him for cystoscopy, TURBT, bilateral retrogrades with chemotherapy instillation, please see below in regards to our discussion today.  Call  with any worsening symptoms prior to the next appointment.    2. Nephrolithiasis- no recurrent stones, of note we could not get up the right ureter.    3. BPH-  TURP, vesicolithalopaxy  5/14/24    Patient states that he is voiding well off all medical management.    4. Gross hematuria- intermittent, most likely due to the tumors, only on baby aspirin.    5. Erectile dysfunction- given Viagra 100 mg but currently not an issue.    Patient understands the risks, benefits and alternatives of the above-stated procedure.  These include but not limited to damage to the surrounding structures including the urethra, prostate, ureters and bladder.  Risk of the need for stent placement, perforation requiring open procedures, Pandey catheterization at the conclusion of the procedure.  Risk of recurrence or need for further surgeries.  Risk of pain, hematuria, infections.  Risk of heart attack, stroke, death, DVT and PE.  Patient understands he may require hospitalization post procedure, understanding of all the above he has elected to pursue.

## 2024-11-21 NOTE — PLAN OF CARE
Problem: Adult Inpatient Plan of Care  Goal: Plan of Care Review  Outcome: Progressing  Flowsheets (Taken 11/21/2024 0711)  Plan of Care Reviewed With:   patient   child  Goal: Patient-Specific Goal (Individualized)  Outcome: Progressing  Flowsheets (Taken 11/21/2024 0711)  Individualized Care Needs: pt in recliner, warm blanket/pillow under iv arm  Anxieties, Fears or Concerns: Patient is worried about having iron due to reaction he had to feraheme  Patient/Family-Specific Goals (Include Timeframe): Son at chairside, pt will tolerate venofer ivp without any adverse reaction  Goal: Absence of Hospital-Acquired Illness or Injury  Outcome: Progressing  Goal: Optimal Comfort and Wellbeing  Outcome: Progressing  Goal: Readiness for Transition of Care  Outcome: Progressing     Problem: Anemia  Goal: Anemia Symptom Improvement  Outcome: Progressing

## 2024-11-21 NOTE — DISCHARGE INSTRUCTIONS
..[facility]  [address] [facility]  [address]  [phone] phone     [phone] fax  Hours of Operation: Monday- Friday 8:00am- 5:00pm  After hours phone  [phone]  Hematology / Oncology Physicians on call    Dr. Eric Acharya        Nurse Practitioners:    Melanie Mai, GENRAO Walton, GENARO Chisholm, GENARO Lynch, GENARO Medeiros, GENARO Onofre, PA      Please don't hesitate to call if you have any concerns.    .FALL PREVENTION   Falls often occur due to slipping, tripping or losing your balance. Here are ways to reduce your risk of falling again.   Was there anything that caused your fall that can be fixed, removed or replaced?   Make your home safe by keeping walkways clear of objects you may trip over.   Use non-slip pads under rugs.   Do not walk in poorly lit areas.   Do not stand on chairs or wobbly ladders.   Use caution when reaching overhead or looking upward. This position can cause a loss of balance.   Be sure your shoes fit properly, have non-slip bottoms and are in good condition.   Be cautious when going up and down stairs, curbs, and when walking on uneven sidewalks.   If your balance is poor, consider using a cane or walker.   If your fall was related to alcohol use, stop or limit alcohol intake.   If your fall was related to use of sleeping medicines, talk to your doctor about this. You may need to reduce your dosage at bedtime if you awaken during the night to go to the bathroom.   To reduce the need for nighttime bathroom trips:   Avoid drinking fluids for several hours before going to bed   Empty your bladder before going to bed   Men can keep a urinal at the bedside   © 7540-1142 [employer], [address], [place] [address]. All rights reserved. This information is not intended as a substitute for professional medical care. Always follow your healthcare  professional's instructions.  .WAYS TO HELP PREVENT INFECTION        WASH YOUR HANDS OFTEN DURING THE DAY, ESPECIALLY BEFORE YOU EAT, AFTER USING THE BATHROOM, AND AFTER TOUCHING ANIMALS    STAY AWAY FROM PEOPLE WHO HAVE ILLNESSES YOU CAN CATCH; SUCH AS COLDS, FLU, CHICKEN POX    TRY TO AVOID CROWDS    STAY AWAY FROM CHILDREN WHO RECENTLY HAVE RECEIVED LIVE VIRUS VACCINES    MAINTAIN GOOD MOUTH CARE    DO NOT SQUEEZE OR SCRATCH PIMPLES    CLEAN CUTS & SCRAPES RIGHT AWAY AND DAILY UNTIL HEALED WITH WARM WATER, SOAP & AN ANTISEPTIC    AVOID CONTACT WITH LITTER BOXES, BIRD CAGES, & FISH TANKS    AVOID STANDING WATER, IE., BIRD BATHS, FLOWER POTS/VASES, OR HUMIDIFIERS    WEAR GLOVES WHEN GARDENING OR CLEANING UP AFTER OTHERS, ESPECIALLY BABIES & SMALL CHILDREN    DO NOT EAT RAW FISH, SEAFOOD, MEAT, OR EGGS

## 2024-11-25 ENCOUNTER — INFUSION (OUTPATIENT)
Dept: INFUSION THERAPY | Facility: HOSPITAL | Age: 84
End: 2024-11-25
Attending: EMERGENCY MEDICINE
Payer: MEDICARE

## 2024-11-25 VITALS
RESPIRATION RATE: 16 BRPM | HEART RATE: 65 BPM | SYSTOLIC BLOOD PRESSURE: 186 MMHG | OXYGEN SATURATION: 97 % | TEMPERATURE: 98 F | DIASTOLIC BLOOD PRESSURE: 90 MMHG

## 2024-11-25 DIAGNOSIS — D50.0 IRON DEFICIENCY ANEMIA SECONDARY TO BLOOD LOSS (CHRONIC): Primary | ICD-10-CM

## 2024-11-25 RX ORDER — SODIUM CHLORIDE 0.9 % (FLUSH) 0.9 %
10 SYRINGE (ML) INJECTION
OUTPATIENT
Start: 2024-11-26

## 2024-11-25 RX ORDER — METHYLPREDNISOLONE SOD SUCC 125 MG
40 VIAL (EA) INJECTION
Status: DISCONTINUED | OUTPATIENT
Start: 2024-11-25 | End: 2024-11-25 | Stop reason: HOSPADM

## 2024-11-25 RX ORDER — DIPHENHYDRAMINE HYDROCHLORIDE 50 MG/ML
50 INJECTION INTRAMUSCULAR; INTRAVENOUS ONCE AS NEEDED
OUTPATIENT
Start: 2024-11-26

## 2024-11-25 RX ORDER — EPINEPHRINE 0.3 MG/.3ML
0.3 INJECTION SUBCUTANEOUS ONCE AS NEEDED
OUTPATIENT
Start: 2024-11-26

## 2024-11-25 RX ORDER — METHYLPREDNISOLONE SOD SUCC 125 MG
40 VIAL (EA) INJECTION
Start: 2024-11-26

## 2024-11-25 NOTE — DISCHARGE INSTRUCTIONS
.[facility]  [address] [address]  [address]  [phone] phone     [phone] fax  Hours of Operation: Monday- Friday 8:00am- 5:00pm  After hours phone  [phone]  Hematology / Oncology Physicians on call    Dr. Eric Mccollum           Nurse Practitioners:     Melanie Mai, GENARO Chisholm, KAYDEN Latham, GENARO Medeiros, GENARO Walton, NP    Please don't hesitate to call if you have any concerns.      FALL PREVENTION   Falls often occur due to slipping, tripping or losing your balance. Here are ways to reduce your risk of falling again.   Was there anything that caused your fall that can be fixed, removed or replaced?   Make your home safe by keeping walkways clear of objects you may trip over.   Use non-slip pads under rugs.   Do not walk in poorly lit areas.   Do not stand on chairs or wobbly ladders.   Use caution when reaching overhead or looking upward. This position can cause a loss of balance.   Be sure your shoes fit properly, have non-slip bottoms and are in good condition.   Be cautious when going up and down stairs, curbs, and when walking on uneven sidewalks.   If your balance is poor, consider using a cane or walker.   If your fall was related to alcohol use, stop or limit alcohol intake.   If your fall was related to use of sleeping medicines, talk to your doctor about this. You may need to reduce your dosage at bedtime if you awaken during the night to go to the bathroom.   To reduce the need for nighttime bathroom trips:   Avoid drinking fluids for several hours before going to bed   Empty your bladder before going to bed   Men can keep a urinal at the bedside   © 9138-3800 [employer], [address] [place] [address], [place] [address]. All rights reserved. This information is not intended as a substitute for professional medical care. Always follow your healthcare  professional's instructions.    WAYS TO HELP PREVENT INFECTION        WASH YOUR HANDS OFTEN DURING THE DAY, ESPECIALLY BEFORE YOU EAT, AFTER USING THE BATHROOM, AND AFTER TOUCHING ANIMALS    STAY AWAY FROM PEOPLE WHO HAVE ILLNESSES YOU CAN CATCH; SUCH AS COLDS, FLU, CHICKEN POX    TRY TO AVOID CROWDS    STAY AWAY FROM CHILDREN WHO RECENTLY HAVE RECEIVED LIVE VIRUS VACCINES    MAINTAIN GOOD MOUTH CARE    DO NOT SQUEEZE OR SCRATCH PIMPLES    CLEAN CUTS & SCRAPES RIGHT AWAY AND DAILY UNTIL HEALED WITH WARM WATER, SOAP & AN ANTISEPTIC    AVOID CONTACT WITH LITTER BOXES, BIRD CAGES, & FISH TANKS    AVOID STANDING WATER, IE., BIRD BATHS, FLOWER POTS/VASES, OR HUMIDIFIERS    WEAR GLOVES WHEN GARDENING OR CLEANING UP AFTER OTHERS, ESPECIALLY BABIES & SMALL CHILDREN    DO NOT EAT RAW FISH, SEAFOOD, MEAT, OR EGGS

## 2024-11-25 NOTE — PLAN OF CARE
Problem: Adult Inpatient Plan of Care  Goal: Plan of Care Review  Outcome: Progressing  Flowsheets (Taken 11/25/2024 0732)  Plan of Care Reviewed With:   patient   child  Goal: Patient-Specific Goal (Individualized)  Outcome: Progressing  Flowsheets (Taken 11/25/2024 0732)  Individualized Care Needs: Reclined in the chair, warm blanket provided. BP is elevated, waiting on provider to give an answer whether or not to proceed  Anxieties, Fears or Concerns: No concerns expressed today.  Patient/Family-Specific Goals (Include Timeframe): Daughter at chairside, To tolerate 2nd dose of venofer without any adverse reactions today  Goal: Optimal Comfort and Wellbeing  Outcome: Progressing  Intervention: Monitor Pain and Promote Comfort  Flowsheets (Taken 11/25/2024 0732)  Pain Management Interventions:   warm blanket provided   quiet environment facilitated  Intervention: Provide Person-Centered Care  Flowsheets (Taken 11/25/2024 0732)  Trust Relationship/Rapport:   care explained   reassurance provided   choices provided   thoughts/feelings acknowledged   emotional support provided   empathic listening provided   questions answered   questions encouraged     Problem: Anemia  Goal: Anemia Symptom Improvement  Outcome: Progressing  Intervention: Monitor and Manage Anemia  Flowsheets (Taken 11/25/2024 0732)  Safety Promotion/Fall Prevention:   in recliner, wheels locked   instructed to call staff for mobility   lighting adjusted   medications reviewed  Fatigue Management: frequent rest breaks encouraged     Problem: Fall Injury Risk  Goal: Absence of Fall and Fall-Related Injury  Outcome: Progressing  Intervention: Identify and Manage Contributors  Flowsheets (Taken 11/25/2024 0732)  Self-Care Promotion: BADL personal objects within reach  Medication Review/Management: medications reviewed

## 2024-11-25 NOTE — NURSING
"Patient was unable to be treated today due to BP being elevated. He stated "I took my handful of meds that my son puts in my med case". He stated he took the meds right before leaving to come here. It wasn't but 45 minutes since he took medication. Attempted to take a couple of time after relaxing and also took a manual reading as well and it too was high. His daughter stated she would go inside and see if he took the meds or not and also take his BP reading at home. Will add another dose to the end of the schedule and push back his f/u appt 1 week. RTC on 11/29/24   "

## 2024-11-26 ENCOUNTER — TELEPHONE (OUTPATIENT)
Dept: PREADMISSION TESTING | Facility: HOSPITAL | Age: 84
End: 2024-11-26
Payer: MEDICARE

## 2024-11-26 NOTE — TELEPHONE ENCOUNTER
----- Message from Med Assistant Lam sent at 11/26/2024 10:23 AM CST -----    ----- Message -----  From: Carlos Flower MD  Sent: 11/25/2024   4:29 PM CST  To: Carole Barron MA      Elevated periop risk of CV events for non-high risk procedure.  Ok to proceed the scheduled procedure without further cardiac study.  OK to hold ASA 5 days and eliquis 2 days before the procedure and resume postop once hemodynamically stable  ----- Message -----  From: Carole Barron MA  Sent: 11/25/2024   3:17 PM CST  To: Carlos Flower MD      ----- Message -----  From: Lashawn King MA  Sent: 11/23/2024   8:26 AM CST  To: [facility] Staff    Good morning. This patient will be having surgery on 12/3 for a TURBT with Dr. Garcia. He will need cardiac clearance prior to this surgery. Please review and advise if he can be cleared to proceed with surgery. Thanks, Have a Happy Thanksgiving.

## 2024-11-27 ENCOUNTER — OFFICE VISIT (OUTPATIENT)
Dept: CARDIOLOGY | Facility: CLINIC | Age: 84
End: 2024-11-27
Payer: MEDICARE

## 2024-11-27 VITALS
SYSTOLIC BLOOD PRESSURE: 144 MMHG | DIASTOLIC BLOOD PRESSURE: 70 MMHG | WEIGHT: 213.94 LBS | HEART RATE: 64 BPM | HEIGHT: 72 IN | OXYGEN SATURATION: 96 % | BODY MASS INDEX: 28.98 KG/M2

## 2024-11-27 DIAGNOSIS — E11.69 DM TYPE 2 WITH DIABETIC DYSLIPIDEMIA: ICD-10-CM

## 2024-11-27 DIAGNOSIS — E11.9 TYPE 2 DIABETES MELLITUS WITHOUT COMPLICATION, WITHOUT LONG-TERM CURRENT USE OF INSULIN: ICD-10-CM

## 2024-11-27 DIAGNOSIS — I48.0 PAROXYSMAL ATRIAL FIBRILLATION: ICD-10-CM

## 2024-11-27 DIAGNOSIS — E78.5 DM TYPE 2 WITH DIABETIC DYSLIPIDEMIA: ICD-10-CM

## 2024-11-27 DIAGNOSIS — E78.00 HYPERCHOLESTEREMIA: ICD-10-CM

## 2024-11-27 DIAGNOSIS — I10 PRIMARY HYPERTENSION: ICD-10-CM

## 2024-11-27 DIAGNOSIS — I70.543: Primary | ICD-10-CM

## 2024-11-27 DIAGNOSIS — I70.533: Primary | ICD-10-CM

## 2024-11-27 DIAGNOSIS — I73.9 PVD (PERIPHERAL VASCULAR DISEASE): ICD-10-CM

## 2024-11-27 DIAGNOSIS — I50.42 CHRONIC COMBINED SYSTOLIC (CONGESTIVE) AND DIASTOLIC (CONGESTIVE) HEART FAILURE: ICD-10-CM

## 2024-11-27 PROCEDURE — 3078F DIAST BP <80 MM HG: CPT | Mod: CPTII,S$GLB,, | Performed by: INTERNAL MEDICINE

## 2024-11-27 PROCEDURE — 1157F ADVNC CARE PLAN IN RCRD: CPT | Mod: CPTII,S$GLB,, | Performed by: INTERNAL MEDICINE

## 2024-11-27 PROCEDURE — 1159F MED LIST DOCD IN RCRD: CPT | Mod: CPTII,S$GLB,, | Performed by: INTERNAL MEDICINE

## 2024-11-27 PROCEDURE — 99999 PR PBB SHADOW E&M-EST. PATIENT-LVL IV: CPT | Mod: PBBFAC,,, | Performed by: INTERNAL MEDICINE

## 2024-11-27 PROCEDURE — 99215 OFFICE O/P EST HI 40 MIN: CPT | Mod: S$GLB,,, | Performed by: INTERNAL MEDICINE

## 2024-11-27 PROCEDURE — 1126F AMNT PAIN NOTED NONE PRSNT: CPT | Mod: CPTII,S$GLB,, | Performed by: INTERNAL MEDICINE

## 2024-11-27 PROCEDURE — 3077F SYST BP >= 140 MM HG: CPT | Mod: CPTII,S$GLB,, | Performed by: INTERNAL MEDICINE

## 2024-11-27 PROCEDURE — 3288F FALL RISK ASSESSMENT DOCD: CPT | Mod: CPTII,S$GLB,, | Performed by: INTERNAL MEDICINE

## 2024-11-27 PROCEDURE — 1160F RVW MEDS BY RX/DR IN RCRD: CPT | Mod: CPTII,S$GLB,, | Performed by: INTERNAL MEDICINE

## 2024-11-27 PROCEDURE — 1101F PT FALLS ASSESS-DOCD LE1/YR: CPT | Mod: CPTII,S$GLB,, | Performed by: INTERNAL MEDICINE

## 2024-11-27 PROCEDURE — 1111F DSCHRG MED/CURRENT MED MERGE: CPT | Mod: CPTII,S$GLB,, | Performed by: INTERNAL MEDICINE

## 2024-11-27 RX ORDER — AMLODIPINE BESYLATE 2.5 MG/1
2.5 TABLET ORAL DAILY
Qty: 90 TABLET | Refills: 3 | Status: SHIPPED | OUTPATIENT
Start: 2024-11-27 | End: 2025-11-27

## 2024-11-27 NOTE — PROGRESS NOTES
Subjective:   Patient ID:  Jose Miguel Alvarez Jr. is a 83 y.o. male who presents for follow up of Hypertension (Recent episode of HBP during iron infusion )      82 yo male, HFU  PMH PAF s/p DCCV few yrs ago and RFA at [facility] in 2018 HTN HLD COPD quit smoking  Prior cardiologist Dr. Tanesha Coffman at [facility], h/o COVID 19 in . Hospitalization over a month, anemia, h/o bladder cancer on chemo and XRT, no h./o DM and stroke    10/2021 admitted to severe anemia and HGB dropped to 4.2 and had 4 units of PRBC. Felt better  This was 3rd episode of severe anemia in the past two yrs  No chest pain, RECINOS dizziness palpitation  No active bleeding     visit  S/p cystoscopy in  bladder cancer clear  No BP check at home. Mod compliance. No active bleeding  No chest pain dyspnea headache, palpitation dizziness faint and leg swelling   CMP TSH and chest CT reviewed     visit  No h/o MI DM and CVA chronic SOB.   No recurrent palpitation. No active bleeding. No chest pain dizziness and faint  Plays golf daily. No smoking and drinking  EKG today NSR  CMP TSH and chest done wi one yr. Chest ct showed emphysema  PFT pending    04/23 visit  Ekg NSR LVH   Mo chest pain palpitation dizziness  No active bleeding   Bladder cancer stable    10/23 visit  BP high today. No dizziness faint chest pain.  Plays golf twice a week.   Ekg NSR  LVH    05/24 visit  04/24 admitted for afib with RVR and severe anemia. EGD showed gastric AVM rx with APC. BNP up to 500's and Rx with diuresis and Cr elevated and had kidney stone.   Now BNP back to 88 and Cr back to 1.3  Plan to have TURP by Dr. Garcia  On SR and remains leg swelling. No orthopnea   EKG NSR    06/24 visit  C/o some hematuria after added xarelto. Urology eval pending  No chest pain dyspnea palpitation orthopnea  Leg swelling improved, golf weekly  AFIB [id number] score of 4    08/24 visit  08/23/24 admitted for severe anemia from pcp's office. HGB  6.4 had 2 unites of PRBC transfusion  Never saw the blood in the stool.  Upper GI in 4/2024 shows Multiple non-bleeding angioectasias in the stomach. Treated with argon plasma coagulation   SBE 4/2024 shows A single non-bleeding angioectasia in the duodenum. Treated with argon plasma coagulation.  GI consult and advised advanced scopy eval at [facility]. Scheduled on 09/11/24  Held xarelto and d/c home  Today feels stable. No active bleeding.     Interval history  S/p LAAO watchman  on 11/1/4/24 at [facility] by Dr. Ortiz. Off eliquis now due to hematuria and no active bleeding. Checked today and no right groin hematoma  Dx of bladder tumor and plan to remove it by urology  EKG showed NSR  Post op 6 weeks FIOR scheduled  No dizziness faint chest pain orthopnea and leg swelling   LDL 42 C                              Past Medical History:   Diagnosis Date    Anemia     Anticoagulant long-term use     Atrial fibrillation     AVM (arteriovenous malformation) of small bowel, 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 Treated with APC.   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 breakdown of skin 01/26/2023    Stage 3 chronic kidney disease 10/06/2017       Past Surgical History:   Procedure Laterality Date    bladder tumor removal      CARDIAC ELECTROPHYSIOLOGY MAPPING AND ABLATION      CARDIOVERSION      several    CATARACT EXTRACTION Bilateral     cataract removal with IOL implants Bilateral      CHOLECYSTECTOMY      CYSTOSCOPIC LITHOLAPAXY N/A 12/28/2021    Procedure: CYSTOLITHOLAPAXY;  Surgeon: Medardo Garcia MD;  Location: [facility] OR;  Service: Urology;  Laterality: N/A;    CYSTOSCOPIC LITHOLAPAXY N/A 5/14/2024    Procedure: CYSTOLITHOLAPAXY;  Surgeon: Medardo Garcia MD;  Location: [facility] OR;  Service: Urology;  Laterality: N/A;    CYSTOSCOPY      CYSTOSCOPY WITH BIOPSY OF BLADDER Right 12/28/2021    Procedure: CYSTOSCOPY, WITH BLADDER BIOPSY, WITH FULGURATION IF INDICATED;  Surgeon: Medardo Garcia MD;  Location: [facility] OR;  Service: Urology;  Laterality: Right;    ESOPHAGOGASTRODUODENOSCOPY N/A 10/25/2021    Procedure: Small Jason Enteroscopy (SBE);  Surgeon: Isabelle Griffin MD;  Location: [facility];  Service: Endoscopy;  Laterality: N/A;    ESOPHAGOGASTRODUODENOSCOPY N/A 12/13/2021    Procedure: EGD (ESOPHAGOGASTRODUODENOSCOPY);  Surgeon: Troy Polanco MD;  Location: [facility];  Service: Endoscopy;  Laterality: N/A;    ESOPHAGOGASTRODUODENOSCOPY N/A 4/5/2024    Procedure: EGD (ESOPHAGOGASTRODUODENOSCOPY);  Surgeon: Myrtle Salcedo MD;  Location: [facility];  Service: Endoscopy;  Laterality: N/A;    ESOPHAGOGASTRODUODENOSCOPY N/A 4/22/2024    Procedure: EGD (ESOPHAGOGASTRODUODENOSCOPY);  Surgeon: Myrtle Salcedo MD;  Location: [facility];  Service: Endoscopy;  Laterality: N/A;  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    EYE SURGERY      INTRALUMINAL GASTROINTESTINAL TRACT IMAGING VIA CAPSULE N/A 12/6/2021    Procedure: IMAGING PROCEDURE, GI TRACT, INTRALUMINAL, VIA CAPSULE;  Surgeon: Larry Jones RN;  Location: [facility] ENDO;  Service: Endoscopy;  Laterality: N/A;    INTRALUMINAL GASTROINTESTINAL TRACT IMAGING VIA CAPSULE N/A 4/18/2024    Procedure: IMAGING PROCEDURE, GI TRACT, INTRALUMINAL, VIA CAPSULE;  Surgeon: Larry Jones RN;  Location: [facility] ENDO;  Service: Endoscopy;  Laterality: N/A;    OCCLUSION OF LEFT ATRIAL APPENDAGE N/A 11/14/2024    Procedure: Left atrial  appendage occlusion;  Surgeon: Aroldo Ortiz MD;  Location: [facility] EP LAB;  Service: Cardiology;  Laterality: N/A;  afib, watchman, BSCI, venkatesh, anes, MB, 3prep    SMALL BOWEL ENTEROSCOPY N/A 2024    Procedure: ENTEROSCOPY--prximal DBE;  Surgeon: Eduardo Ruff MD;  Location: [facility] ENDO;  Service: Endoscopy;  Laterality: N/A;    TRANSESOPHAGEAL ECHOCARDIOGRAPHY N/A 2024    Procedure: ECHOCARDIOGRAM, TRANSESOPHAGEAL;  Surgeon: Aroldo Martinez MD;  Location: [facility] EP LAB;  Service: Cardiology;  Laterality: N/A;    TRANSURETHRAL RESECTION OF BLADDER TUMOR BY BIPOLAR CAUTERY N/A 2019    Procedure: TURBT, USING BIPOLAR CAUTERY;  Surgeon: Ritesh Marques MD;  Location: [facility] OR;  Service: Urology;  Laterality: N/A;    TRANSURETHRAL RESECTION OF PROSTATE N/A 2024    Procedure: TURP (TRANSURETHRAL RESECTION OF PROSTATE);  Surgeon: Medardo Garcia MD;  Location: [facility] OR;  Service: Urology;  Laterality: N/A;       Social History     Tobacco Use    Smoking status: Former     Current packs/day: 0.00     Average packs/day: 2.5 packs/day for 20.0 years (50.0 ttl pk-yrs)     Types: Cigarettes     Start date:      Quit date:      Years since quittin.9    Smokeless tobacco: Never   Substance Use Topics    Alcohol use: Yes     Alcohol/week: 7.0 standard drinks of alcohol     Types: 7 Cans of beer per week     Comment: occasionally    Drug use: No       Family History   Problem Relation Name Age of Onset    Heart disease Father      Hypertension Father      Heart disease Brother           ROS    Objective:   Physical Exam  HENT:      Head: Normocephalic.   Eyes:      Pupils: Pupils are equal, round, and reactive to light.   Neck:      Thyroid: No thyromegaly.      Vascular: Normal carotid pulses. No carotid bruit or JVD.   Cardiovascular:      Rate and Rhythm: Normal rate and regular rhythm. Occasional Extrasystoles are present.     Chest Wall: PMI is not displaced.      Pulses: Normal  pulses.      Heart sounds: Normal heart sounds. No murmur heard.     No gallop. No S3 sounds.   Pulmonary:      Effort: No respiratory distress.      Breath sounds: Normal breath sounds. No stridor.   Abdominal:      General: Bowel sounds are normal.      Palpations: Abdomen is soft.      Tenderness: There is no abdominal tenderness. There is no rebound.   Skin:     Findings: No rash.   Neurological:      Mental Status: He is alert and oriented to person, place, and time.   Psychiatric:         Behavior: Behavior normal.         Lab Results   Component Value Date    CHOL 96 (L) 09/04/2024    CHOL 120 01/26/2023    CHOL 73 (L) 05/28/2021     Lab Results   Component Value Date    HDL 42 09/04/2024    HDL 42 01/26/2023    HDL 39 (L) 05/28/2021     Lab Results   Component Value Date    LDLCALC 41.8 (L) 09/04/2024    LDLCALC 44.4 (L) 01/26/2023    LDLCALC 22.4 (L) 05/28/2021     Lab Results   Component Value Date    TRIG 61 09/04/2024    TRIG 168 (H) 01/26/2023    TRIG 58 05/28/2021     Lab Results   Component Value Date    CHOLHDL 43.8 09/04/2024    CHOLHDL 35.0 01/26/2023    CHOLHDL 53.4 (H) 05/28/2021       Chemistry        Component Value Date/Time     11/18/2024 0531    K 4.0 11/18/2024 0531     11/18/2024 0531    CO2 26 11/18/2024 0531    BUN 23 11/18/2024 0531    CREATININE 1.2 11/18/2024 0531     (H) 11/18/2024 0531        Component Value Date/Time    CALCIUM 9.7 11/18/2024 0531    ALKPHOS 67 11/18/2024 0531    AST 27 11/18/2024 0531    AST 29 04/24/2016 1145    ALT 29 11/18/2024 0531    BILITOT 0.4 11/18/2024 0531    ESTGFRAFRICA >60 12/08/2021 2007    EGFRNONAA 56 (A) 12/08/2021 2007          Lab Results   Component Value Date    HGBA1C 5.5 10/09/2024     Lab Results   Component Value Date    TSH 3.937 04/01/2024     Lab Results   Component Value Date    INR 1.1 11/11/2024    INR 1.3 (H) 08/23/2024    INR 1.9 10/22/2021     Lab Results   Component Value Date    WBC 7.30 11/18/2024    HGB 11.4  (L) 11/18/2024    HCT 37.1 (L) 11/18/2024    MCV 79 (L) 11/18/2024     (L) 11/18/2024     BMP  Sodium   Date Value Ref Range Status   11/18/2024 141 136 - 145 mmol/L Final     Potassium   Date Value Ref Range Status   11/18/2024 4.0 3.5 - 5.1 mmol/L Final     Chloride   Date Value Ref Range Status   11/18/2024 105 95 - 110 mmol/L Final     CO2   Date Value Ref Range Status   11/18/2024 26 23 - 29 mmol/L Final     BUN   Date Value Ref Range Status   11/18/2024 23 8 - 23 mg/dL Final     Creatinine   Date Value Ref Range Status   11/18/2024 1.2 0.5 - 1.4 mg/dL Final     Calcium   Date Value Ref Range Status   11/18/2024 9.7 8.7 - 10.5 mg/dL Final     Anion Gap   Date Value Ref Range Status   11/18/2024 10 8 - 16 mmol/L Final     eGFR if    Date Value Ref Range Status   12/08/2021 >60 >60 mL/min/1.73 m^2 Final     eGFR if non    Date Value Ref Range Status   12/08/2021 56 (A) >60 mL/min/1.73 m^2 Final     Comment:     Calculation used to obtain the estimated glomerular filtration  rate (eGFR) is the CKD-EPI equation.        BNP  @LABRCNTIP(BNP,BNPTRIAGEBLO)@  @LABRCNTIP(troponini)@  CrCl cannot be calculated (Patient's most recent lab result is older than the maximum 7 days allowed.).  No results found in the last 24 hours.  No results found in the last 24 hours.  No results found in the last 24 hours.    Assessment:      1. 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 Chronic combined systolic (congestive) and diastolic (congestive) heart failure    3. DM type 2 with diabetic dyslipidemia    4. Hypercholesteremia    5. Primary hypertension    6. Paroxysmal atrial fibrillation    7. PVD (peripheral vascular disease)    8. Type 2 diabetes mellitus without complication, without long-term current use of insulin        Plan:   ADD Amlodipine 2.5 mg daily for HTN  Ok for the bladder procedure  Continue asa  Amiodarone lipitor coreg hydralazine   Do the FIOR as scheduled  I have explained the risks, benefits, and alternatives of the procedure in detail with patient and family. The patient voices understanding and all questions have been addressed.  The patient agrees to proceed as planned.

## 2024-11-29 NOTE — PRE-PROCEDURE INSTRUCTIONS
Pre op instructions reviewed with patient over telephone, verbalized understanding.    To confirm, Surgery is scheduled on 12/03/24. We will call you late afternoon the business day prior to surgery with your arrival time.    *Please report to the [facility] Lobby (1st Floor) located off of [facility] (2nd Entrance/Building on the left, in front of the flag pole). Address: [address] [place] [address] [place]. [address]      INSTRUCTIONS IMPORTANT!!!  DO NOT Eat, Drink, or Smoke after 12 midnight unless instructed otherwise by your Surgeon. OK to brush teeth, no gum, candy or mints!    >>>MEDICATION INSTRUCTIONS<<<: Morning of Surgery, Please take the following medications, with a small sip of water, if you can take them on an empty stomach and if you normally take them in the morning:  Amiodarone, (Pacerone)  Amlodipine, (Norvasc)  Carvedilol, (Coreg)  Donepezil, (Aricept)  Hydralazine, (Apresoline)  Pantoprazole (Protonix)    *Blood Thinners:Hold your Aspirin on the morning of surgery, per Physician Instructions! Call your Surgeon office to inquire about any questions regarding your blood thinner medication.    *Diabetic/ Prediabetic Patients: !!!If you take diabetic or weight loss medication, Do NOT take morning of surgery unless instructed by Doctor!!!  Metformin to be stopped 24 hrs prior to surgery.   Long Acting Insulin Instructions: HOLD the night before surgery unless instructed differently by Provider!    If your are taking Ozempic/ Mounjaro/ Wegovy/ Trulicity/ Semaglutide injections or weight loss medication, such as Phentermine,  please notify us immediately as they must be stopped 7 days prior to surgery.    !!!STOP ALL Aspirins, NSAIDS, WEIGHT LOSS INJECTIONS/PILLS, Herbal supplements, & Vitamins 7 DAYS BEFORE SURGERY!!!    ____  Avoid Alcoholic beverages 3 days prior to surgery, as it can thin the blood.  ____  NO Acrylic/fake nails or nail polish worn day of surgery (specifically hand/arm &  foot surgeries).  ____  NO powder, lotions, deodorants, oils or cream on body.  ____  Remove all jewelry & piercings & foreign objects before arrival & leave at home.  ____  Remove Dentures, Hearing Aids & Contact Lens prior to surgery.  ____  Bring photo ID and insurance information to hospital (Leave Valuables at Home).  ____  If going home the same day, arrange for a ride home. You will not be able to drive for 24 hrs if Anesthesia was used.   ____  Males: Stop ED medications (Viagra, Cialis) 24 hrs prior to surgery.  ____  Wear clean, loose fitting clothing to allow for dressings/ bandages.      Bathing Instructions:    -Shower with anti-bacterial Soap (Hibiclens or Dial) the night before surgery and the morning of.   -Do not use Hibiclens on your face or genitals.   -Apply clean clothes after shower.  -Do not shave your face or body 3 days prior to surgery unless instructed otherwise by your Surgeon.    [facility] Vis[facility]/Ride Policy:  Only 2 adults allowed in pre op/recovery area during your procedure. You MUST HAVE A RIDE HOME from a responsible adult that you know and trust. Medical Transport, Uber or Lyft can ONLY be used if patient has a responsible adult to accompany them during ride home.       *Signs and symptoms of Infection Before or After Surgery:               !!!If you experience any fever, chills, nausea/ vomiting, foul odor/ excessive drainage from surgical site, flu-like symptoms, new wounds or cuts, PLEASE CALL THE SURGEON OFFICE at [phone] or SEND MESSAGE THROUGH [facility] PORTAL!!!     *If you are running late the morning of surgery, please call the Hospital Surgery Dept @ [phone].     *Billing questions:  [phone]     Thank you,  -[facility] Pre Admit Dept.  [phone] Streeter   or [phone]  M-F 7:30 am-4:00 pm (Closed Major Holidays)

## 2024-12-02 ENCOUNTER — INFUSION (OUTPATIENT)
Dept: INFUSION THERAPY | Facility: HOSPITAL | Age: 84
End: 2024-12-02
Attending: EMERGENCY MEDICINE
Payer: MEDICARE

## 2024-12-02 ENCOUNTER — PATIENT MESSAGE (OUTPATIENT)
Dept: PREADMISSION TESTING | Facility: HOSPITAL | Age: 84
End: 2024-12-02
Payer: MEDICARE

## 2024-12-02 VITALS
TEMPERATURE: 97 F | OXYGEN SATURATION: 97 % | DIASTOLIC BLOOD PRESSURE: 77 MMHG | RESPIRATION RATE: 18 BRPM | SYSTOLIC BLOOD PRESSURE: 147 MMHG | HEART RATE: 46 BPM

## 2024-12-02 DIAGNOSIS — D50.0 IRON DEFICIENCY ANEMIA SECONDARY TO BLOOD LOSS (CHRONIC): Primary | ICD-10-CM

## 2024-12-02 PROCEDURE — 96374 THER/PROPH/DIAG INJ IV PUSH: CPT

## 2024-12-02 PROCEDURE — 96375 TX/PRO/DX INJ NEW DRUG ADDON: CPT

## 2024-12-02 PROCEDURE — 63600175 PHARM REV CODE 636 W HCPCS: Performed by: NURSE PRACTITIONER

## 2024-12-02 RX ORDER — METHYLPREDNISOLONE SOD SUCC 125 MG
40 VIAL (EA) INJECTION
Status: COMPLETED | OUTPATIENT
Start: 2024-12-02 | End: 2024-12-02

## 2024-12-02 RX ORDER — SODIUM CHLORIDE 0.9 % (FLUSH) 0.9 %
10 SYRINGE (ML) INJECTION
Status: CANCELLED | OUTPATIENT
Start: 2024-12-03

## 2024-12-02 RX ORDER — DIPHENHYDRAMINE HYDROCHLORIDE 50 MG/ML
50 INJECTION INTRAMUSCULAR; INTRAVENOUS ONCE AS NEEDED
Status: CANCELLED | OUTPATIENT
Start: 2024-12-03

## 2024-12-02 RX ORDER — EPINEPHRINE 0.3 MG/.3ML
0.3 INJECTION SUBCUTANEOUS ONCE AS NEEDED
Status: DISCONTINUED | OUTPATIENT
Start: 2024-12-02 | End: 2024-12-02 | Stop reason: HOSPADM

## 2024-12-02 RX ORDER — EPINEPHRINE 0.3 MG/.3ML
0.3 INJECTION SUBCUTANEOUS ONCE AS NEEDED
Status: CANCELLED | OUTPATIENT
Start: 2024-12-03

## 2024-12-02 RX ORDER — METHYLPREDNISOLONE SOD SUCC 125 MG
40 VIAL (EA) INJECTION
Status: CANCELLED
Start: 2024-12-03

## 2024-12-02 RX ORDER — DIPHENHYDRAMINE HYDROCHLORIDE 50 MG/ML
50 INJECTION INTRAMUSCULAR; INTRAVENOUS ONCE AS NEEDED
Status: DISCONTINUED | OUTPATIENT
Start: 2024-12-02 | End: 2024-12-02 | Stop reason: HOSPADM

## 2024-12-02 RX ADMIN — METHYLPREDNISOLONE SODIUM SUCCINATE 40 MG: 125 INJECTION, POWDER, FOR SOLUTION INTRAMUSCULAR; INTRAVENOUS at 07:12

## 2024-12-02 RX ADMIN — IRON SUCROSE 200 MG: 20 INJECTION, SOLUTION INTRAVENOUS at 07:12

## 2024-12-02 NOTE — PRE-PROCEDURE INSTRUCTIONS
Called and spoke with pt about the following:     Please arrive to [facility] ([facility] at 10 am on 12/3/2024 for your scheduled procedure.  Address: [address] [place] [address] [place]. [address] (2nd Building on left, 1st Floor Lobby)    !!!NO FOOD after midnight! You may have clear liquids up to 3 hrs before your arrival to the Hospital!!!  Clear liquids include Gatorade, water, soda, black coffee or tea (no milk or creamer), and clear juices.  Clear liquids do NOT include anything with pulp or food particles (Chicken broth, ice cream, yogurt, Jello, etc.)    Thank you,  -[facility] Pre [facility]mit Dept.  Mon-Fri 7:30 am - 4 pm [phone]

## 2024-12-02 NOTE — PLAN OF CARE
Problem: Adult Inpatient Plan of Care  Goal: Plan of Care Review  Outcome: Progressing  Flowsheets (Taken 12/2/2024 0728)  Plan of Care Reviewed With: patient  Goal: Optimal Comfort and Wellbeing  Outcome: Progressing  Intervention: Provide Person-Centered Care  Flowsheets (Taken 12/2/2024 0728)  Trust Relationship/Rapport:   care explained   choices provided   emotional support provided   empathic listening provided   questions answered   questions encouraged   reassurance provided   thoughts/feelings acknowledged

## 2024-12-03 ENCOUNTER — HOSPITAL ENCOUNTER (OUTPATIENT)
Facility: HOSPITAL | Age: 84
Discharge: HOME | End: 2024-12-03
Attending: UROLOGY | Admitting: UROLOGY
Payer: MEDICARE

## 2024-12-03 ENCOUNTER — ANESTHESIA EVENT (OUTPATIENT)
Dept: SURGERY | Facility: HOSPITAL | Age: 84
End: 2024-12-03
Payer: MEDICARE

## 2024-12-03 ENCOUNTER — TELEPHONE (OUTPATIENT)
Dept: UROLOGY | Facility: CLINIC | Age: 84
End: 2024-12-03
Payer: MEDICARE

## 2024-12-03 ENCOUNTER — ANESTHESIA (OUTPATIENT)
Dept: SURGERY | Facility: HOSPITAL | Age: 84
End: 2024-12-03
Payer: MEDICARE

## 2024-12-03 VITALS
OXYGEN SATURATION: 98 % | SYSTOLIC BLOOD PRESSURE: 157 MMHG | HEIGHT: 72 IN | TEMPERATURE: 97 F | WEIGHT: 212.19 LBS | BODY MASS INDEX: 28.74 KG/M2 | DIASTOLIC BLOOD PRESSURE: 70 MMHG | HEART RATE: 46 BPM | RESPIRATION RATE: 12 BRPM

## 2024-12-03 DIAGNOSIS — Z85.51 HISTORY OF BLADDER CANCER: ICD-10-CM

## 2024-12-03 DIAGNOSIS — Z85.51 HISTORY OF BLADDER CANCER: Primary | ICD-10-CM

## 2024-12-03 PROCEDURE — 25500020 PHARM REV CODE 255: Performed by: UROLOGY

## 2024-12-03 PROCEDURE — 63600175 PHARM REV CODE 636 W HCPCS: Performed by: STUDENT IN AN ORGANIZED HEALTH CARE EDUCATION/TRAINING PROGRAM

## 2024-12-03 PROCEDURE — 71000015 HC POSTOP RECOV 1ST HR: Performed by: UROLOGY

## 2024-12-03 PROCEDURE — 52235 CYSTOSCOPY AND TREATMENT: CPT | Mod: ,,, | Performed by: UROLOGY

## 2024-12-03 PROCEDURE — 36000707: Performed by: UROLOGY

## 2024-12-03 PROCEDURE — 37000009 HC ANESTHESIA EA ADD 15 MINS: Performed by: UROLOGY

## 2024-12-03 PROCEDURE — C1758 CATHETER, URETERAL: HCPCS | Performed by: UROLOGY

## 2024-12-03 PROCEDURE — 63600175 PHARM REV CODE 636 W HCPCS: Mod: JZ,JG | Performed by: UROLOGY

## 2024-12-03 PROCEDURE — 36000706: Performed by: UROLOGY

## 2024-12-03 PROCEDURE — 27201423 OPTIME MED/SURG SUP & DEVICES STERILE SUPPLY: Performed by: UROLOGY

## 2024-12-03 PROCEDURE — 25000003 PHARM REV CODE 250: Performed by: STUDENT IN AN ORGANIZED HEALTH CARE EDUCATION/TRAINING PROGRAM

## 2024-12-03 PROCEDURE — 88307 TISSUE EXAM BY PATHOLOGIST: CPT | Mod: 26,,, | Performed by: PATHOLOGY

## 2024-12-03 PROCEDURE — 63600175 PHARM REV CODE 636 W HCPCS: Performed by: UROLOGY

## 2024-12-03 PROCEDURE — 88307 TISSUE EXAM BY PATHOLOGIST: CPT | Performed by: PATHOLOGY

## 2024-12-03 PROCEDURE — 71000033 HC RECOVERY, INTIAL HOUR: Performed by: UROLOGY

## 2024-12-03 PROCEDURE — 25000003 PHARM REV CODE 250: Performed by: UROLOGY

## 2024-12-03 PROCEDURE — 37000008 HC ANESTHESIA 1ST 15 MINUTES: Performed by: UROLOGY

## 2024-12-03 RX ORDER — HYDROMORPHONE HYDROCHLORIDE 1 MG/ML
0.2 INJECTION, SOLUTION INTRAMUSCULAR; INTRAVENOUS; SUBCUTANEOUS EVERY 5 MIN PRN
Status: DISCONTINUED | OUTPATIENT
Start: 2024-12-03 | End: 2024-12-03 | Stop reason: HOSPADM

## 2024-12-03 RX ORDER — CEFDINIR 300 MG/1
300 CAPSULE ORAL 2 TIMES DAILY
Qty: 20 CAPSULE | Refills: 0 | Status: SHIPPED | OUTPATIENT
Start: 2024-12-03 | End: 2024-12-13

## 2024-12-03 RX ORDER — PROPOFOL 10 MG/ML
VIAL (ML) INTRAVENOUS
Status: DISCONTINUED | OUTPATIENT
Start: 2024-12-03 | End: 2024-12-03

## 2024-12-03 RX ORDER — LIDOCAINE HYDROCHLORIDE 20 MG/ML
INJECTION INTRAVENOUS
Status: DISCONTINUED | OUTPATIENT
Start: 2024-12-03 | End: 2024-12-03

## 2024-12-03 RX ORDER — ATORVASTATIN CALCIUM 20 MG/1
20 TABLET, FILM COATED ORAL NIGHTLY
Qty: 90 TABLET | Refills: 0 | Status: SHIPPED | OUTPATIENT
Start: 2024-12-03

## 2024-12-03 RX ORDER — MEPERIDINE HYDROCHLORIDE 25 MG/ML
12.5 INJECTION INTRAMUSCULAR; INTRAVENOUS; SUBCUTANEOUS EVERY 10 MIN PRN
Status: DISCONTINUED | OUTPATIENT
Start: 2024-12-03 | End: 2024-12-03 | Stop reason: HOSPADM

## 2024-12-03 RX ORDER — PHENAZOPYRIDINE HYDROCHLORIDE 200 MG/1
200 TABLET, FILM COATED ORAL 3 TIMES DAILY PRN
Qty: 15 TABLET | Refills: 0 | Status: SHIPPED | OUTPATIENT
Start: 2024-12-03

## 2024-12-03 RX ORDER — CEFAZOLIN 2 G/1
2 INJECTION, POWDER, FOR SOLUTION INTRAMUSCULAR; INTRAVENOUS
Status: COMPLETED | OUTPATIENT
Start: 2024-12-03 | End: 2024-12-03

## 2024-12-03 RX ORDER — OXYCODONE AND ACETAMINOPHEN 5; 325 MG/1; MG/1
1 TABLET ORAL
Status: DISCONTINUED | OUTPATIENT
Start: 2024-12-03 | End: 2024-12-03 | Stop reason: HOSPADM

## 2024-12-03 RX ORDER — OXYCODONE AND ACETAMINOPHEN 5; 325 MG/1; MG/1
1 TABLET ORAL EVERY 4 HOURS PRN
Qty: 10 TABLET | Refills: 0 | Status: SHIPPED | OUTPATIENT
Start: 2024-12-03

## 2024-12-03 RX ORDER — ONDANSETRON HYDROCHLORIDE 2 MG/ML
4 INJECTION, SOLUTION INTRAVENOUS DAILY PRN
Status: DISCONTINUED | OUTPATIENT
Start: 2024-12-03 | End: 2024-12-03 | Stop reason: HOSPADM

## 2024-12-03 RX ADMIN — PROPOFOL 100 MG: 10 INJECTION, EMULSION INTRAVENOUS at 03:12

## 2024-12-03 RX ADMIN — LIDOCAINE HYDROCHLORIDE 100 MG: 20 INJECTION INTRAVENOUS at 03:12

## 2024-12-03 RX ADMIN — SODIUM CHLORIDE 40 MG: 9 INJECTION, SOLUTION INTRAVENOUS at 04:12

## 2024-12-03 RX ADMIN — CEFAZOLIN 2 G: 2 INJECTION, POWDER, FOR SOLUTION INTRAMUSCULAR; INTRAVENOUS at 03:12

## 2024-12-03 RX ADMIN — SODIUM CHLORIDE: 9 INJECTION, SOLUTION INTRAVENOUS at 03:12

## 2024-12-03 NOTE — TRANSFER OF CARE
Anesthesia Transfer of Care Note    Patient: Jose Miguel Alvarez Jr.    Procedure(s) Performed: Procedure(s) (LRB):  TURBT (TRANSURETHRAL RESECTION OF BLADDER TUMOR) (N/A)  CYSTOTOMY, WITH FULGURATION AND RADIOACTIVE MATERIAL INSERTION (N/A)    Patient location: PACU    Anesthesia Type: general    Transport from OR: Transported from OR on room air with adequate spontaneous ventilation    Post pain: adequate analgesia    Post assessment: no apparent anesthetic complications and tolerated procedure well    Post vital signs: stable    Level of consciousness: responds to stimulation and sedated    Nausea/Vomiting: no nausea/vomiting    Complications: none    Transfer of care protocol was followedComments: Report given to PACU RN at bedside. Hand off tool used. RN given opportunity to ask questions or clarify concerns. No Concerns verbalized. RN was asked if ready to assume care of patient. RN verbally confirmed. Pt. left in stable condition. SV. Vital Signs Return to Near Baseline. No s/s of distress noted.       Last vitals: Visit Vitals  BP (!) 199/83   Pulse (!) 56   Temp 36.7 °C (98.1 °F) (Temporal)   Resp 16   Ht 6' (1.829 m)   Wt 96.2 kg (212 lb 3.1 oz)   SpO2 97%   BMI 28.78 kg/m²

## 2024-12-03 NOTE — ANESTHESIA PREPROCEDURE EVALUATION
12/03/2024  Jose Miguel Alvarez Jr. is a 83 y.o., male.      Pre-op Assessment    I have reviewed the Patient Summary Reports.     I have reviewed the Nursing Notes. I have reviewed the NPO Status.      Review of Systems  Anesthesia Hx:  No problems with previous Anesthesia                Hematology/Oncology:  Hematology Normal   Oncology Normal                                   Cardiovascular:     Hypertension       CHF                                   Pulmonary:   COPD                     Renal/:  Chronic Renal Disease                Hepatic/GI:  Hepatic/GI Normal                    Neurological:  Neurology Normal                                      Endocrine:  Diabetes           Dermatological:  Skin Normal    Psych:  Psychiatric History                Patient Active Problem List   Diagnosis    Paroxysmal atrial fibrillation    Bladder tumor    Hypertension    Hypercholesteremia    COPD (chronic obstructive pulmonary disease)    Anemia due to gastrointestinal blood loss    Urothelial carcinoma of bladder    Gastrointestinal hemorrhage associated with angiodysplasia of stomach and duodenum    Iron deficiency anemia secondary to blood loss (chronic)    Pancytopenia    History of bladder cancer    Benign prostatic hyperplasia with urinary frequency    Nephrolithiasis    Gross hematuria    Acute cystitis with hematuria    DM type 2 with diabetic dyslipidemia    Other nonthrombocytopenic purpura    Chronic combined systolic (congestive) and diastolic (congestive) heart failure    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 No    Interpretation Summary    Left Ventricle: The left ventricle is normal in size. Mildly increased wall thickness. There is concentric remodeling. There is normal systolic function with a visually estimated ejection fraction of 60 - 65%. Unable to assess diastolic function due to atrial fibrillation.    Right Ventricle: Normal right ventricular cavity size. Wall thickness is normal. Right ventricle wall motion  is normal. Systolic function is normal.    Left Atrium: Left atrium is moderately dilated.    Tricuspid Valve: There is mild regurgitation.    IVC/SVC: Intermediate venous pressure at 8 mmHg.      Physical Exam  General: Well nourished, Cooperative, Alert and Oriented    Airway:  Mallampati: II / II  Mouth Opening: Normal  TM Distance: Normal  Tongue: Normal  Neck ROM: Normal ROM    Dental:  Intact        Anesthesia Plan  Type of Anesthesia, risks & benefits discussed:    Anesthesia Type: Gen Supraglottic Airway  Intra-op Monitoring Plan: Standard ASA Monitors  Post Op Pain Control Plan: multimodal analgesia  Induction:  IV  Airway Plan: Direct  Informed Consent: Informed consent signed with the Patient and all parties understand the risks and agree with anesthesia plan.  All questions answered.   ASA Score: 3  Day of Surgery Review of History & Physical: H&P Update referred to the surgeon/provider.I have interviewed and examined the patient. I have reviewed the patient's H&P dated: There are no significant changes. H&P completed by Anesthesiologist.    Ready For Surgery From Anesthesia Perspective.     .    Lab Results   Component Value Date    WBC 7.30 11/18/2024    HGB 11.4 (L) 11/18/2024    HCT 37.1 (L) 11/18/2024    MCV 79 (L) 11/18/2024     (L) 11/18/2024         Chemistry        Component Value Date/Time     11/18/2024 0534     K 4.0 11/18/2024 0531     11/18/2024 0531    CO2 26 11/18/2024 0531    BUN 23 11/18/2024 0531    CREATININE 1.2 11/18/2024 0531     (H) 11/18/2024 0531        Component Value Date/Time    CALCIUM 9.7 11/18/2024 0531    ALKPHOS 67 11/18/2024 0531    AST 27 11/18/2024 0531    AST 29 04/24/2016 1145    ALT 29 11/18/2024 0531    BILITOT 0.4 11/18/2024 0531    ESTGFRAFRICA >60 12/08/2021 2007    EGFRNONAA 56 (A) 12/08/2021 2007

## 2024-12-03 NOTE — ANESTHESIA POSTPROCEDURE EVALUATION
Anesthesia Post Evaluation    Patient: Jose Miguel Alvarez     Procedure(s) Performed: Procedure(s) (LRB):  TURBT (TRANSURETHRAL RESECTION OF BLADDER TUMOR) (N/A)  CYSTOTOMY, WITH FULGURATION AND RADIOACTIVE MATERIAL INSERTION (N/A)    Final Anesthesia Type: general      Patient location during evaluation: PACU  Patient participation: Yes- Able to Participate  Level of consciousness: awake and alert  Post-procedure vital signs: reviewed and stable  Pain management: adequate  Airway patency: patent    PONV status at discharge: No PONV  Anesthetic complications: no      Cardiovascular status: blood pressure returned to baseline  Respiratory status: unassisted  Hydration status: euvolemic  Follow-up not needed.              Vitals Value Taken Time   /70 12/03/24 1640   Temp 36.7 °C (98 °F) 12/03/24 1615   Pulse 47 12/03/24 1642   Resp 19 12/03/24 1642   SpO2 100 % 12/03/24 1642   Vitals shown include unfiled device data.      No case tracking events are documented in the log.      Pain/Johanna Score: Johanna Score: 6 (12/3/2024  4:15 PM)

## 2024-12-03 NOTE — TELEPHONE ENCOUNTER
No care due was identified.  [facility] Embedded Care Due Messages. Reference number: [id number].   12/03/2024 3:35:50 AM CST

## 2024-12-03 NOTE — DISCHARGE SUMMARY
[facility] - Surgery (Hospital)  Discharge Note  Short Stay    Procedure(s) (LRB):  TURBT (TRANSURETHRAL RESECTION OF BLADDER TUMOR) (N/A)  CYSTOTOMY, WITH FULGURATION AND RADIOACTIVE MATERIAL INSERTION (N/A)      OUTCOME: Patient tolerated treatment/procedure well without complication and is now ready for discharge.    DISPOSITION: Home or Self Care    FINAL DIAGNOSIS:  bladder cancer    FOLLOWUP: In clinic    DISCHARGE INSTRUCTIONS:    Discharge Procedure Orders   Diet Adult Regular     Notify your health care provider if you experience any of the following:  severe uncontrolled pain     Notify your health care provider if you experience any of the following:  persistent nausea and vomiting or diarrhea     Notify your health care provider if you experience any of the following:  temperature >100.4     Activity as tolerated        TIME SPENT ON DISCHARGE: 5 minutes

## 2024-12-03 NOTE — OP NOTE
Date of Procedure: 12/03/2024    PREOP DIAGNOSIS:  Bladder cancer.    POSTOP DIAGNOSIS:  Bladder cancer.    PROCEDURES:      1. TURBT -- medium- 3 cm.    2. Tumor fulguration    3. Chemotherapy instillation    SURGEON:  Medardo Garcia M.D.    Assistants: None    Specimen: Bladder tumor    ANESTHESIA:  General endotracheal.    BLOOD LOSS:  None.    FINDINGS:  Tumor extracted, 3 cm in total size.  40 mg of mitomycin instilled conclusion of the case.      PROCEDURE IN DETAIL:  Patient was brought to the operative suite and placed under general anesthesia and positioned into the dorsal lithotomy position.  After being sterilely prepped and draped a 21 Dutch sheath cystoscope was inserted into a normal urethra.  Prostatic urethra demonstrated lateral lobe coaptation.  Bladder was examined, tumor was identified proximally 3 cm along the right lateral wall involving the site of the ureteral orifice.  Could not identify the right ureteral orifice, left ureteral orifices was normal in size, shape, caliber and location.  Cystoscope was removed, 24 Dutch resectoscope was then inserted into the urethra and bladder.  Dissection was continued until the tumor was completely dissected from the bladder wall.  I then fulgurated the edges and the base of the tumor.  Still no evidence of the right ureteral orifice, previously were unable to see it at his past surgery.  At the conclusion hemostasis was meticulously maintained and there was no evidence of residual tumor burden within the bladder.  A 20 Dutch Pandey catheter was placed without difficulty, 10 mL of sterile water was placed within the bladder and it sat nicely open the bladder neck.  40 mg of mitomycin with a still within the bladder, we will hold it for 1 hour.  Patient was transferred to the PACU in stable condition.  Patient will return for a nurse visit for a Pandey catheter voiding trial in 48 hours.  I will have the patient return in 3 weeks to discuss  pathology.    COMPLICATIONS: None

## 2024-12-03 NOTE — TELEPHONE ENCOUNTER
----- Message from Med Assistant Lam sent at 11/25/2024  4:30 PM CST -----    ----- Message -----  From: Carlos Flower MD  Sent: 11/25/2024   4:29 PM CST  To: Carole Barron MA      Elevated periop risk of CV events for non-high risk procedure.  Ok to proceed the scheduled procedure without further cardiac study.  OK to hold ASA 5 days and eliquis 2 days before the procedure and resume postop once hemodynamically stable  ----- Message -----  From: Carole Barron MA  Sent: 11/25/2024   3:17 PM CST  To: Carlos Flower MD      ----- Message -----  From: Lashawn King MA  Sent: 11/23/2024   8:26 AM CST  To: [facility] Staff    Good morning. This patient will be having surgery on 12/3 for a TURBT with Dr. Garcia. He will need cardiac clearance prior to this surgery. Please review and advise if he can be cleared to proceed with surgery. Thanks, Have a Happy Thanksgiving.

## 2024-12-03 NOTE — H&P
Chief Complaint:        Encounter Diagnoses   Name Primary?    History of bladder cancer Yes    Gross hematuria      Benign prostatic hyperplasia with urinary frequency      Nephrolithiasis      Acute cystitis with hematuria           HPI:   11/21/24- here today for cystoscopy.  Recent episode of hematuria, CT demonstrated thickening of the bladder.     12/22/21- patient comes in today to establish care my clinic, recent gross hematuria and reported to see Alexandra.  CT urogram demonstrates bladder stones, urinary retention and a distal right ureteral stone.  Patient has had a Pandey catheter would like this removed today, no more gross hematuria, he is also on antibiotics and tamsulosin.  Patient otherwise was voiding well with no complaints prior to this incident, history of TURBT with subsequent induction BCG, no recurrences.        Allergies:  Patient has no known allergies.     Medications:  has a current medication list which includes the following prescription(s): amiodarone, amlodipine, atorvastatin, donepezil, ergocalciferol, famotidine, ferrous sulfate, hydrochlorothiazide, metoprolol succinate, multivitamin, nitrofurantoin (macrocrystal-monohydrate), oxycodone-acetaminophen, pantoprazole, phenazopyridine, rivaroxaban, and tamsulosin, and the following Facility-Administered Medications: lactated ringers.     Review of Systems:  General: No fever, chills, fatigability, or weight loss.  Skin: No rashes, itching, or changes in color or texture of skin.  Chest: Denies RECINOS, cyanosis, wheezing, cough, and sputum production.  Abdomen: Appetite fine. No weight loss. Denies diarrhea, abdominal pain, hematemesis, or blood in stool.  Musculoskeletal: No joint stiffness or swelling. Denies back pain.  : As above.  All other review of systems negative.     PMH:   has a past medical history of Anemia, Anticoagulant long-term use, Atrial fibrillation, Bladder cancer, Bladder tumor, CKD (chronic kidney disease), stage III,  COPD (chronic obstructive pulmonary disease), Encounter for blood transfusion, Hematuria, History of 2019 novel coronavirus disease (COVID-19), Hypercholesteremia, and Hypertension.     PSH:   has a past surgical history that includes Cholecystectomy; Cystoscopy; Cardioversion; bladder tumor removal; Eye surgery; cataract removal with IOL implants (Bilateral); Cardiac electrophysiology mapping and ablation; Transurethral resection of bladder tumor by bipolar cautery (N/A, 7/23/2019); Esophagogastroduodenoscopy (N/A, 10/25/2021); Intraluminal gastrointestinal tract imaging via capsule (N/A, 12/6/2021); Esophagogastroduodenoscopy (N/A, 12/13/2021); Cystoscopy with biopsy of bladder (Right, 12/28/2021); and Cystoscopic litholapaxy (N/A, 12/28/2021).     FamHx: family history includes Heart disease in his brother and father.     SocHx:  reports that he quit smoking about 35 years ago. His smoking use included cigarettes. He has a 50.00 pack-year smoking history. He has never used smokeless tobacco. He reports current alcohol use of about 8.0 standard drinks of alcohol per week. He reports that he does not use drugs.       Physical Exam:  General: A&Ox3, no apparent distress, no deformities  Neck: No masses, normal thyroid  Lungs: normal inspiration, no use of accessory muscles  Heart: normal pulse, no arrhythmias  Abdomen: Soft, NT, ND, no masses, no hernias, no hepatosplenomegaly  Lymphatic: Neck and groin nodes negative  : 11/24- Test desc filipe, no abnormalities of epididymus. Normal penile and scrotal skin. Meatus normal.      Labs/Studies:    ml 6/24  Cystoscopy tumors along the lateral wall left side, debris, TUR defect 11/24  Cytology negative 4/24  CT stone protocol asymetry to right bladder wall with hydro 11/24  CORDELL moderate right-sided hydronephrosis 4/24  CT urogram bladder distention with bilateral hydronephrosis, bladder stone 4/24  CT stone protocol no nephrolithiasis 1/22  CT urogram 0.5 cm distal  right ureteral stone, bladder stones, BPH 12/21  Creatinine 1.2 11/24  PSA 2.1 10/24     Procedure: Diagnostic Cystoscopy     Procedure in Detail: After proper consents were obtained, the patient was prepped and draped in normal sterile fashion for diagnostic cystoscopy. 5 ml of lidocaine jelly was instilled in the urethra. The flexible cystoscope was then introduced into the urethra, and advanced into the bladder under direct vision. The urethral mucosa appeared normal, and no strictures were noted. The sphincter appeared to be normal, and the veru montanum was unremarkable. The prostatic mucosa demonstrated TUR defect. The bladder neck was normal. Inspection of the interior of the bladder was then carried out. The trigone was unremarkable, with no mucosal lesions. The ureteral orifices were normal in position and configuration. Systematic inspection of the mucosa of the bladder it was then carried out, rotating the cystoscope so that all areas of the left and right lateral walls, the dome of the bladder, and the posterior wall were all visualized. The cystoscope was then advanced further into the bladder, and maximum deflection of the scope was performed so that the bladder neck could be inspected.  Left lateral wall demonstrated lesions consistent with TCC, difficult to fully ascertain due to significant debris. The cystoscope was then removed, and the procedure terminated.      Findings:  Tumors along the left lateral wall, significant debris, TUR defect        Impression/Plan:        1. History of bladder cancer-  BCG 3/22, High pTa, but suspicious for T1 cystoscopy with bladder biopsy, vesical litholapaxy, could not assess the right ureter  12/28/21.  TURBT 5-6 years ago at the outside facility with induction BCG.     Patient appears to have a recurrence at this juncture, will take him for cystoscopy, TURBT, bilateral retrogrades with chemotherapy instillation, please see below in regards to our discussion  today.  Call with any worsening symptoms prior to the next appointment.     2. Nephrolithiasis- no recurrent stones, of note we could not get up the right ureter.     3. BPH-  TURP, vesicolithalopaxy  5/14/24     Patient states that he is voiding well off all medical management.     4. Gross hematuria- intermittent, most likely due to the tumors, only on baby aspirin.     5. Erectile dysfunction- given Viagra 100 mg but currently not an issue.     Patient understands the risks, benefits and alternatives of the above-stated procedure.  These include but not limited to damage to the surrounding structures including the urethra, prostate, ureters and bladder.  Risk of the need for stent placement, perforation requiring open procedures, Pandey catheterization at the conclusion of the procedure.  Risk of recurrence or need for further surgeries.  Risk of pain, hematuria, infections.  Risk of heart attack, stroke, death, DVT and PE.  Patient understands he may require hospitalization post procedure, understanding of all the above he has elected to pursue.

## 2024-12-03 NOTE — ANESTHESIA PROCEDURE NOTES
Intubation    Date/Time: 12/3/2024 3:10 PM    Performed by: Henri Lindquist CRNA  Authorized by: Henri Lindquist CRNA    Intubation:     Induction:  Intravenous    Intubated:  Postinduction    Mask Ventilation:  Easy mask    Attempts:  1    Attempted By:  CRNA    Method of Intubation:  Blind intubation    Difficult Airway Encountered?: No      Complications:  None    Airway Device:  Supraglottic airway/LMA    Airway Device Size:  5.0    Style/Cuff Inflation:  Uncuffed    Secured at:  The lips    Placement Verified By:  Capnometry    Complicating Factors:  Obesity and short neck    Findings Post-Intubation:  BS equal bilateral and atraumatic/condition of teeth unchanged

## 2024-12-04 ENCOUNTER — TELEPHONE (OUTPATIENT)
Dept: ELECTROPHYSIOLOGY | Facility: CLINIC | Age: 84
End: 2024-12-04
Payer: MEDICARE

## 2024-12-04 ENCOUNTER — TELEPHONE (OUTPATIENT)
Dept: UROLOGY | Facility: CLINIC | Age: 84
End: 2024-12-04
Payer: MEDICARE

## 2024-12-04 NOTE — TELEPHONE ENCOUNTER
----- Message from Medardo Garcia MD sent at 12/3/2024  5:00 PM CST -----  Thursday at the [facility] in the am for a voiding trial as a nurse visit, check out with me.  See me in 3 weeks.

## 2024-12-05 ENCOUNTER — CLINICAL SUPPORT (OUTPATIENT)
Dept: UROLOGY | Facility: CLINIC | Age: 84
End: 2024-12-05
Payer: MEDICARE

## 2024-12-05 ENCOUNTER — LAB VISIT (OUTPATIENT)
Dept: LAB | Facility: HOSPITAL | Age: 84
End: 2024-12-05
Attending: UROLOGY
Payer: MEDICARE

## 2024-12-05 ENCOUNTER — TELEPHONE (OUTPATIENT)
Dept: CARDIOLOGY | Facility: CLINIC | Age: 84
End: 2024-12-05
Payer: MEDICARE

## 2024-12-05 ENCOUNTER — INFUSION (OUTPATIENT)
Dept: INFUSION THERAPY | Facility: HOSPITAL | Age: 84
End: 2024-12-05
Attending: EMERGENCY MEDICINE
Payer: MEDICARE

## 2024-12-05 VITALS
TEMPERATURE: 98 F | HEART RATE: 56 BPM | SYSTOLIC BLOOD PRESSURE: 159 MMHG | OXYGEN SATURATION: 95 % | RESPIRATION RATE: 16 BRPM | DIASTOLIC BLOOD PRESSURE: 73 MMHG

## 2024-12-05 DIAGNOSIS — Z85.51 HISTORY OF BLADDER CANCER: ICD-10-CM

## 2024-12-05 DIAGNOSIS — Z85.51 HISTORY OF BLADDER CANCER: Primary | ICD-10-CM

## 2024-12-05 DIAGNOSIS — D50.0 IRON DEFICIENCY ANEMIA SECONDARY TO BLOOD LOSS (CHRONIC): Primary | ICD-10-CM

## 2024-12-05 LAB
ANION GAP SERPL CALC-SCNC: 9 MMOL/L (ref 8–16)
BUN SERPL-MCNC: 20 MG/DL (ref 8–23)
CALCIUM SERPL-MCNC: 9.6 MG/DL (ref 8.7–10.5)
CHLORIDE SERPL-SCNC: 107 MMOL/L (ref 95–110)
CO2 SERPL-SCNC: 26 MMOL/L (ref 23–29)
CREAT SERPL-MCNC: 1.3 MG/DL (ref 0.5–1.4)
EST. GFR  (NO RACE VARIABLE): 53.8 ML/MIN/1.73 M^2
FINAL PATHOLOGIC DIAGNOSIS: ABNORMAL
GLUCOSE SERPL-MCNC: 96 MG/DL (ref 70–110)
GROSS: ABNORMAL
Lab: ABNORMAL
POTASSIUM SERPL-SCNC: 4.5 MMOL/L (ref 3.5–5.1)
SODIUM SERPL-SCNC: 142 MMOL/L (ref 136–145)

## 2024-12-05 PROCEDURE — 96374 THER/PROPH/DIAG INJ IV PUSH: CPT

## 2024-12-05 PROCEDURE — 99999 PR PBB SHADOW E&M-EST. PATIENT-LVL II: CPT | Mod: PBBFAC,,,

## 2024-12-05 PROCEDURE — 80048 BASIC METABOLIC PNL TOTAL CA: CPT | Performed by: UROLOGY

## 2024-12-05 PROCEDURE — 63600175 PHARM REV CODE 636 W HCPCS: Performed by: NURSE PRACTITIONER

## 2024-12-05 PROCEDURE — 36415 COLL VENOUS BLD VENIPUNCTURE: CPT | Performed by: UROLOGY

## 2024-12-05 PROCEDURE — 96375 TX/PRO/DX INJ NEW DRUG ADDON: CPT

## 2024-12-05 RX ORDER — METHYLPREDNISOLONE SOD SUCC 125 MG
40 VIAL (EA) INJECTION
Start: 2024-12-06

## 2024-12-05 RX ORDER — SODIUM CHLORIDE 0.9 % (FLUSH) 0.9 %
10 SYRINGE (ML) INJECTION
Status: DISCONTINUED | OUTPATIENT
Start: 2024-12-05 | End: 2024-12-05 | Stop reason: HOSPADM

## 2024-12-05 RX ORDER — SODIUM CHLORIDE 0.9 % (FLUSH) 0.9 %
10 SYRINGE (ML) INJECTION
OUTPATIENT
Start: 2024-12-06

## 2024-12-05 RX ORDER — METHYLPREDNISOLONE SOD SUCC 125 MG
40 VIAL (EA) INJECTION
Status: COMPLETED | OUTPATIENT
Start: 2024-12-05 | End: 2024-12-05

## 2024-12-05 RX ORDER — EPINEPHRINE 0.3 MG/.3ML
0.3 INJECTION SUBCUTANEOUS ONCE AS NEEDED
OUTPATIENT
Start: 2024-12-06

## 2024-12-05 RX ORDER — DIPHENHYDRAMINE HYDROCHLORIDE 50 MG/ML
50 INJECTION INTRAMUSCULAR; INTRAVENOUS ONCE AS NEEDED
OUTPATIENT
Start: 2024-12-06

## 2024-12-05 RX ADMIN — METHYLPREDNISOLONE SODIUM SUCCINATE 40 MG: 125 INJECTION, POWDER, FOR SOLUTION INTRAMUSCULAR; INTRAVENOUS at 07:12

## 2024-12-05 RX ADMIN — IRON SUCROSE 200 MG: 20 INJECTION, SOLUTION INTRAVENOUS at 07:12

## 2024-12-05 NOTE — PROGRESS NOTES
.Pt came to clinic for a voiding trial. Leg bag was removed with 50 mL of clear urine. I put in 200 mL of sterile water and pt said that he had to urinate. Using a 10 cc syringe, the balloon was deflated and catheter was removed. After a few minutes pt urinated 180 ml of clear urine and the rest was un the blue pad. Dr Garcia notified. Patient left clinic ambulatory per self.     Loraine Milanes RN

## 2024-12-05 NOTE — TELEPHONE ENCOUNTER
----- Message from Carlos Flower MD sent at 11/15/2024  5:05 PM CST -----  Regarding: RE: Needs FIOR scheduled in 6 weeks  12/30/24 Monday at noon  ----- Message -----  From: Natasha Puente LPN  Sent: 11/15/2024   3:17 PM CST  To: Carlos Flower MD; Emilie Meza RN  Subject: Needs FIOR scheduled in 6 weeks                   Dr. Flower,    Patient will need date/time forTEE end of December or first week of January per Dr. Ortiz's request.    Natasha Potter  ----- Message -----  From: Emilie Meza, RN  Sent: 11/15/2024  10:36 AM CST  To: Natasha Puente LPN    Pt had his watchman placed successfully yesterday by Dr Ortiz and will need a 6 week f/u FIOR scheduled to be done in [place]  Emilie Potter

## 2024-12-05 NOTE — PLAN OF CARE
Problem: Adult Inpatient Plan of Care  Goal: Plan of Care Review  Outcome: Progressing  Flowsheets (Taken 12/5/2024 1610)  Plan of Care Reviewed With:   patient   child  Goal: Optimal Comfort and Wellbeing  Outcome: Progressing     Problem: Anemia  Goal: Anemia Symptom Improvement  Outcome: Progressing     Problem: Fall Injury Risk  Goal: Absence of Fall and Fall-Related Injury  Outcome: Progressing

## 2024-12-05 NOTE — DISCHARGE INSTRUCTIONS
..[facility]  [address] [address]  [address]  [phone] phone     [phone] fax  Hours of Operation: Monday- Friday 8:00am- 5:00pm  After hours phone  [phone]  Hematology / Oncology Physicians on call    Dr. Eric Acharya        Nurse Practitioners:    Melanie Mai, GENARO Walton, GENARO Chisholm, GENARO Lynch, GENARO Medeiros, GENARO Onofre, PA      Please don't hesitate to call if you have any concerns.    .FALL PREVENTION   Falls often occur due to slipping, tripping or losing your balance. Here are ways to reduce your risk of falling again.   Was there anything that caused your fall that can be fixed, removed or replaced?   Make your home safe by keeping walkways clear of objects you may trip over.   Use non-slip pads under rugs.   Do not walk in poorly lit areas.   Do not stand on chairs or wobbly ladders.   Use caution when reaching overhead or looking upward. This position can cause a loss of balance.   Be sure your shoes fit properly, have non-slip bottoms and are in good condition.   Be cautious when going up and down stairs, curbs, and when walking on uneven sidewalks.   If your balance is poor, consider using a cane or walker.   If your fall was related to alcohol use, stop or limit alcohol intake.   If your fall was related to use of sleeping medicines, talk to your doctor about this. You may need to reduce your dosage at bedtime if you awaken during the night to go to the bathroom.   To reduce the need for nighttime bathroom trips:   Avoid drinking fluids for several hours before going to bed   Empty your bladder before going to bed   Men can keep a urinal at the bedside   © 8646-7858 [employer], [address], [place] [address]. All rights reserved. This information is not intended as a substitute for professional medical care. Always follow your healthcare  professional's instructions.  .WAYS TO HELP PREVENT INFECTION        WASH YOUR HANDS OFTEN DURING THE DAY, ESPECIALLY BEFORE YOU EAT, AFTER USING THE BATHROOM, AND AFTER TOUCHING ANIMALS    STAY AWAY FROM PEOPLE WHO HAVE ILLNESSES YOU CAN CATCH; SUCH AS COLDS, FLU, CHICKEN POX    TRY TO AVOID CROWDS    STAY AWAY FROM CHILDREN WHO RECENTLY HAVE RECEIVED LIVE VIRUS VACCINES    MAINTAIN GOOD MOUTH CARE    DO NOT SQUEEZE OR SCRATCH PIMPLES    CLEAN CUTS & SCRAPES RIGHT AWAY AND DAILY UNTIL HEALED WITH WARM WATER, SOAP & AN ANTISEPTIC    AVOID CONTACT WITH LITTER BOXES, BIRD CAGES, & FISH TANKS    AVOID STANDING WATER, IE., BIRD BATHS, FLOWER POTS/VASES, OR HUMIDIFIERS    WEAR GLOVES WHEN GARDENING OR CLEANING UP AFTER OTHERS, ESPECIALLY BABIES & SMALL CHILDREN    DO NOT EAT RAW FISH, SEAFOOD, MEAT, OR EGGS

## 2024-12-12 ENCOUNTER — INFUSION (OUTPATIENT)
Dept: INFUSION THERAPY | Facility: HOSPITAL | Age: 84
End: 2024-12-12
Attending: EMERGENCY MEDICINE
Payer: MEDICARE

## 2024-12-12 VITALS
TEMPERATURE: 98 F | RESPIRATION RATE: 16 BRPM | DIASTOLIC BLOOD PRESSURE: 79 MMHG | HEART RATE: 58 BPM | SYSTOLIC BLOOD PRESSURE: 152 MMHG | OXYGEN SATURATION: 98 %

## 2024-12-12 DIAGNOSIS — D50.0 IRON DEFICIENCY ANEMIA SECONDARY TO BLOOD LOSS (CHRONIC): Primary | ICD-10-CM

## 2024-12-12 PROCEDURE — 63600175 PHARM REV CODE 636 W HCPCS: Performed by: NURSE PRACTITIONER

## 2024-12-12 PROCEDURE — 96375 TX/PRO/DX INJ NEW DRUG ADDON: CPT

## 2024-12-12 PROCEDURE — 96374 THER/PROPH/DIAG INJ IV PUSH: CPT

## 2024-12-12 RX ORDER — DIPHENHYDRAMINE HYDROCHLORIDE 50 MG/ML
50 INJECTION INTRAMUSCULAR; INTRAVENOUS ONCE AS NEEDED
OUTPATIENT
Start: 2024-12-19

## 2024-12-12 RX ORDER — METHYLPREDNISOLONE SOD SUCC 125 MG
40 VIAL (EA) INJECTION
Status: CANCELLED
Start: 2024-12-19

## 2024-12-12 RX ORDER — METHYLPREDNISOLONE SOD SUCC 125 MG
40 VIAL (EA) INJECTION
Status: COMPLETED | OUTPATIENT
Start: 2024-12-12 | End: 2024-12-12

## 2024-12-12 RX ORDER — DIPHENHYDRAMINE HYDROCHLORIDE 50 MG/ML
50 INJECTION INTRAMUSCULAR; INTRAVENOUS ONCE AS NEEDED
Status: DISCONTINUED | OUTPATIENT
Start: 2024-12-12 | End: 2024-12-12 | Stop reason: HOSPADM

## 2024-12-12 RX ORDER — EPINEPHRINE 0.3 MG/.3ML
0.3 INJECTION SUBCUTANEOUS ONCE AS NEEDED
Status: DISCONTINUED | OUTPATIENT
Start: 2024-12-12 | End: 2024-12-12 | Stop reason: HOSPADM

## 2024-12-12 RX ORDER — SODIUM CHLORIDE 0.9 % (FLUSH) 0.9 %
10 SYRINGE (ML) INJECTION
OUTPATIENT
Start: 2024-12-19

## 2024-12-12 RX ORDER — EPINEPHRINE 0.3 MG/.3ML
0.3 INJECTION SUBCUTANEOUS ONCE AS NEEDED
OUTPATIENT
Start: 2024-12-19

## 2024-12-12 RX ADMIN — METHYLPREDNISOLONE SODIUM SUCCINATE 40 MG: 125 INJECTION, POWDER, FOR SOLUTION INTRAMUSCULAR; INTRAVENOUS at 07:12

## 2024-12-12 RX ADMIN — IRON SUCROSE 200 MG: 20 INJECTION, SOLUTION INTRAVENOUS at 07:12

## 2024-12-12 NOTE — PLAN OF CARE
Problem: Adult Inpatient Plan of Care  Goal: Plan of Care Review  Outcome: Progressing  Flowsheets (Taken 12/12/2024 0725)  Plan of Care Reviewed With: patient  Goal: Optimal Comfort and Wellbeing  Outcome: Progressing  Intervention: Provide Person-Centered Care  Flowsheets (Taken 12/12/2024 0725)  Trust Relationship/Rapport:   care explained   choices provided   emotional support provided   empathic listening provided   questions answered   questions encouraged   reassurance provided   thoughts/feelings acknowledged

## 2024-12-16 ENCOUNTER — TELEPHONE (OUTPATIENT)
Dept: UROLOGY | Facility: CLINIC | Age: 84
End: 2024-12-16
Payer: MEDICARE

## 2024-12-16 NOTE — TELEPHONE ENCOUNTER
Called the patient, after verification of name and , the patient was informed  that his appt for a post op appt. Explained to him that is was just to f/u from surgery. PT stated he will keep appt  and voiced understanding       ----- Message from Marianna sent at 2024 10:06 AM CST -----  Type:  Patient Return Call   Requested    Who Called:Son/ Hector  Who Left Message for Patient:  Does the patient know what this is regarding?:scheduling conflict  Would the patient rather a call back or a response via [facility]? callback  Best Call Back Number:[phone]  Additional Information: Concerning appt 24. Please callback

## 2024-12-23 ENCOUNTER — HOSPITAL ENCOUNTER (OUTPATIENT)
Dept: RADIOLOGY | Facility: HOSPITAL | Age: 84
Discharge: HOME | End: 2024-12-23
Attending: UROLOGY
Payer: MEDICARE

## 2024-12-23 DIAGNOSIS — Z85.51 HISTORY OF BLADDER CANCER: ICD-10-CM

## 2024-12-23 PROCEDURE — 76770 US EXAM ABDO BACK WALL COMP: CPT | Mod: TC

## 2024-12-23 PROCEDURE — 76770 US EXAM ABDO BACK WALL COMP: CPT | Mod: 26,,, | Performed by: RADIOLOGY

## 2024-12-24 DIAGNOSIS — E78.5 DM TYPE 2 WITH DIABETIC DYSLIPIDEMIA: ICD-10-CM

## 2024-12-24 DIAGNOSIS — E11.69 DM TYPE 2 WITH DIABETIC DYSLIPIDEMIA: ICD-10-CM

## 2024-12-24 DIAGNOSIS — F02.80 DEMENTIA ASSOCIATED WITH OTHER UNDERLYING DISEASE, WITHOUT BEHAVIORAL DISTURBANCE, PSYCHOTIC DISTURBANCE, MOOD DISTURBANCE, OR ANXIETY, UNSPECIFIED DEMENTIA SEVERITY: Primary | ICD-10-CM

## 2024-12-24 NOTE — TELEPHONE ENCOUNTER
No care due was identified.  [facility] Embedded Care Due Messages. Reference number: [id number].   12/24/2024 1:33:55 PM CST

## 2024-12-26 RX ORDER — AMLODIPINE BESYLATE 2.5 MG/1
2.5 TABLET ORAL DAILY
Qty: 90 TABLET | Refills: 2 | Status: SHIPPED | OUTPATIENT
Start: 2024-12-26

## 2024-12-26 RX ORDER — ATORVASTATIN CALCIUM 20 MG/1
20 TABLET, FILM COATED ORAL NIGHTLY
Qty: 90 TABLET | Refills: 1 | Status: SHIPPED | OUTPATIENT
Start: 2024-12-26

## 2024-12-26 RX ORDER — DONEPEZIL HYDROCHLORIDE 5 MG/1
5 TABLET, FILM COATED ORAL DAILY
Qty: 90 TABLET | Refills: 1 | Status: SHIPPED | OUTPATIENT
Start: 2024-12-26

## 2024-12-26 NOTE — TELEPHONE ENCOUNTER
Refill Routing Note   Medication(s) are not appropriate for processing by [facility] for the following reason(s):        Outside of protocol    [facility] action(s):  Defer  Route               Appointments  past 12m or future 3m with PCP    Date Provider   Last Visit   9/24/2024 Toshia Valladares MD   Next Visit   2/28/2025 Toshia Valladares MD   ED visits in past 90 days: 1        Note composed:7:28 AM 12/26/2024

## 2024-12-27 ENCOUNTER — TELEPHONE (OUTPATIENT)
Dept: RADIATION ONCOLOGY | Facility: CLINIC | Age: 84
End: 2024-12-27
Payer: MEDICARE

## 2024-12-27 ENCOUNTER — OFFICE VISIT (OUTPATIENT)
Dept: UROLOGY | Facility: CLINIC | Age: 84
End: 2024-12-27
Payer: MEDICARE

## 2024-12-27 VITALS
DIASTOLIC BLOOD PRESSURE: 76 MMHG | HEIGHT: 72 IN | BODY MASS INDEX: 28.11 KG/M2 | HEART RATE: 54 BPM | WEIGHT: 207.56 LBS | SYSTOLIC BLOOD PRESSURE: 149 MMHG

## 2024-12-27 DIAGNOSIS — N30.01 ACUTE CYSTITIS WITH HEMATURIA: ICD-10-CM

## 2024-12-27 DIAGNOSIS — R31.0 GROSS HEMATURIA: ICD-10-CM

## 2024-12-27 DIAGNOSIS — R35.0 BENIGN PROSTATIC HYPERPLASIA WITH URINARY FREQUENCY: ICD-10-CM

## 2024-12-27 DIAGNOSIS — Z85.51 HISTORY OF BLADDER CANCER: Primary | ICD-10-CM

## 2024-12-27 DIAGNOSIS — N40.1 BENIGN PROSTATIC HYPERPLASIA WITH URINARY FREQUENCY: ICD-10-CM

## 2024-12-27 DIAGNOSIS — N20.0 NEPHROLITHIASIS: ICD-10-CM

## 2024-12-27 PROCEDURE — 99999 PR PBB SHADOW E&M-EST. PATIENT-LVL IV: CPT | Mod: PBBFAC,,, | Performed by: UROLOGY

## 2024-12-27 NOTE — PROGRESS NOTES
Chief Complaint:   Encounter Diagnoses   Name Primary?    History of bladder cancer Yes    Gross hematuria     Benign prostatic hyperplasia with urinary frequency     Nephrolithiasis     Acute cystitis with hematuria        HPI:   12/27/24- here for follow up to discuss his pathology, otherwise voiding well with no complaints.    12/22/21- patient comes in today to establish care my clinic, recent gross hematuria and reported to see Alexandra.  CT urogram demonstrates bladder stones, urinary retention and a distal right ureteral stone.  Patient has had a Pandey catheter would like this removed today, no more gross hematuria, he is also on antibiotics and tamsulosin.  Patient otherwise was voiding well with no complaints prior to this incident, history of TURBT with subsequent induction BCG, no recurrences.      Allergies:  Patient has no known allergies.    Medications:  has a current medication list which includes the following prescription(s): amiodarone, amlodipine, atorvastatin, donepezil, ergocalciferol, famotidine, ferrous sulfate, hydrochlorothiazide, metoprolol succinate, multivitamin, nitrofurantoin (macrocrystal-monohydrate), oxycodone-acetaminophen, pantoprazole, phenazopyridine, rivaroxaban, and tamsulosin, and the following Facility-Administered Medications: lactated ringers.    Review of Systems:  General: No fever, chills, fatigability, or weight loss.  Skin: No rashes, itching, or changes in color or texture of skin.  Chest: Denies RECINOS, cyanosis, wheezing, cough, and sputum production.  Abdomen: Appetite fine. No weight loss. Denies diarrhea, abdominal pain, hematemesis, or blood in stool.  Musculoskeletal: No joint stiffness or swelling. Denies back pain.  : As above.  All other review of systems negative.    PMH:   has a past medical history of Anemia, Anticoagulant long-term use, Atrial fibrillation, Bladder cancer, Bladder tumor, CKD (chronic kidney disease), stage III, COPD (chronic obstructive  pulmonary disease), Encounter for blood transfusion, Hematuria, History of 2019 novel coronavirus disease (COVID-19), Hypercholesteremia, and Hypertension.    PSH:   has a past surgical history that includes Cholecystectomy; Cystoscopy; Cardioversion; bladder tumor removal; Eye surgery; cataract removal with IOL implants (Bilateral); Cardiac electrophysiology mapping and ablation; Transurethral resection of bladder tumor by bipolar cautery (N/A, 7/23/2019); Esophagogastroduodenoscopy (N/A, 10/25/2021); Intraluminal gastrointestinal tract imaging via capsule (N/A, 12/6/2021); Esophagogastroduodenoscopy (N/A, 12/13/2021); Cystoscopy with biopsy of bladder (Right, 12/28/2021); and Cystoscopic litholapaxy (N/A, 12/28/2021).    FamHx: family history includes Heart disease in his brother and father.    SocHx:  reports that he quit smoking about 35 years ago. His smoking use included cigarettes. He has a 50.00 pack-year smoking history. He has never used smokeless tobacco. He reports current alcohol use of about 8.0 standard drinks of alcohol per week. He reports that he does not use drugs.      Physical Exam:  General: A&Ox3, no apparent distress, no deformities  Neck: No masses, normal thyroid  Lungs: normal inspiration, no use of accessory muscles  Heart: normal pulse, no arrhythmias  Abdomen: Soft, NT, ND, no masses, no hernias, no hepatosplenomegaly  Lymphatic: Neck and groin nodes negative  : 11/24- Test desc filipe, no abnormalities of epididymus. Normal penile and scrotal skin. Meatus normal.     Labs/Studies:    ml 6/24  Cystoscopy tumors along the lateral wall left side, debris, TUR defect 11/24  Cytology suspicious 11/24  Cytology negative 4/24  CORDELL right hydro 12/24  CT stone protocol asymetry to right bladder wall with hydro 11/24  OCRDELL moderate right-sided hydronephrosis 4/24  CT urogram bladder distention with bilateral hydronephrosis, bladder stone 4/24  CT stone protocol no nephrolithiasis 1/22  CT  urogram 0.5 cm distal right ureteral stone, bladder stones, BPH 12/21  Creatinine 1.3 12/24  PSA 2.1 10/24    Impression/Plan:      1. History of bladder cancer-  pT1 high turbt with mitomycin  12/3/24,  BCG 3/22, High pTa, but suspicious for T1 cystoscopy with bladder biopsy, vesical litholapaxy, could not assess the right ureter  12/28/21.  TURBT 5-6 years ago at the outside facility with induction BCG.    Patient now has high-grade T1 disease, no muscle present.  Given the fact that he has right ureteral obstruction with no evidence of UO on cystoscopic evaluation, this is highly suspicious for T2 disease.  We have discussed radical cystectomy, at this time due to other comorbidities and his age, he would rather not pursue.  Therefore will refer to Radiation Oncology and Medical Oncology for possible chemoradiation therapy.  In addition due to the obstructed right ureter will proceed with percutaneous nephrostomy with hopefully antegrade stent placement by Interventional Radiology.  Patient is currently asymptomatic in regards to the hydronephrosis, no significant change per imaging.  Creatinine has been relatively stable up to this point.  Will see him back following these consultations, call with any issues in the meantime.    2. Nephrolithiasis- no recurrent stones.    3. BPH-  TURP, vesicolithalopaxy  5/14/24    Patient states that he is voiding well off all medical management.    4. Gross hematuria- intermittent but no evidence today.  Most likely due to the tumors, only on baby aspirin.    5. Erectile dysfunction- given Viagra 100 mg but currently not an issue.

## 2024-12-27 NOTE — TELEPHONE ENCOUNTER
Called patient to schedule consult in radiation oncology. Patient stated to call back at end of next week when his son was back from [place] to make that appt, Will call back next Friday on 1/3/25 per patient request.

## 2024-12-30 ENCOUNTER — HOSPITAL ENCOUNTER (OUTPATIENT)
Facility: HOSPITAL | Age: 84
Discharge: HOME | End: 2024-12-30
Attending: INTERNAL MEDICINE | Admitting: INTERNAL MEDICINE
Payer: MEDICARE

## 2024-12-30 ENCOUNTER — ANESTHESIA EVENT (OUTPATIENT)
Dept: CARDIOLOGY | Facility: HOSPITAL | Age: 84
End: 2024-12-30
Payer: MEDICARE

## 2024-12-30 ENCOUNTER — ANESTHESIA (OUTPATIENT)
Dept: CARDIOLOGY | Facility: HOSPITAL | Age: 84
End: 2024-12-30
Payer: MEDICARE

## 2024-12-30 VITALS
OXYGEN SATURATION: 99 % | TEMPERATURE: 98 F | DIASTOLIC BLOOD PRESSURE: 87 MMHG | SYSTOLIC BLOOD PRESSURE: 160 MMHG | HEART RATE: 43 BPM | HEIGHT: 72 IN | BODY MASS INDEX: 28.04 KG/M2 | WEIGHT: 207 LBS | RESPIRATION RATE: 20 BRPM

## 2024-12-30 DIAGNOSIS — Z95.818 PRESENCE OF WATCHMAN LEFT ATRIAL APPENDAGE CLOSURE DEVICE: ICD-10-CM

## 2024-12-30 DIAGNOSIS — Z95.818 PRESENCE OF WATCHMAN LEFT ATRIAL APPENDAGE CLOSURE DEVICE: Primary | ICD-10-CM

## 2024-12-30 PROCEDURE — 37000009 HC ANESTHESIA EA ADD 15 MINS: Performed by: INTERNAL MEDICINE

## 2024-12-30 PROCEDURE — 37000008 HC ANESTHESIA 1ST 15 MINUTES: Performed by: INTERNAL MEDICINE

## 2024-12-30 PROCEDURE — 63600175 PHARM REV CODE 636 W HCPCS: Performed by: NURSE ANESTHETIST, CERTIFIED REGISTERED

## 2024-12-30 PROCEDURE — 25000003 PHARM REV CODE 250: Performed by: NURSE ANESTHETIST, CERTIFIED REGISTERED

## 2024-12-30 RX ORDER — SODIUM CHLORIDE 0.9 % (FLUSH) 0.9 %
10 SYRINGE (ML) INJECTION
Status: DISCONTINUED | OUTPATIENT
Start: 2024-12-30 | End: 2024-12-30 | Stop reason: HOSPADM

## 2024-12-30 RX ORDER — LIDOCAINE HYDROCHLORIDE 20 MG/ML
INJECTION, SOLUTION EPIDURAL; INFILTRATION; INTRACAUDAL; PERINEURAL
Status: DISCONTINUED | OUTPATIENT
Start: 2024-12-30 | End: 2024-12-30

## 2024-12-30 RX ORDER — PROPOFOL 10 MG/ML
VIAL (ML) INTRAVENOUS
Status: DISCONTINUED | OUTPATIENT
Start: 2024-12-30 | End: 2024-12-30

## 2024-12-30 RX ORDER — SODIUM CHLORIDE 9 MG/ML
INJECTION, SOLUTION INTRAVENOUS ONCE
Status: DISCONTINUED | OUTPATIENT
Start: 2024-12-30 | End: 2024-12-30 | Stop reason: HOSPADM

## 2024-12-30 RX ADMIN — LIDOCAINE HYDROCHLORIDE 100 MG: 20 INJECTION, SOLUTION EPIDURAL; INFILTRATION; INTRACAUDAL; PERINEURAL at 12:12

## 2024-12-30 RX ADMIN — SODIUM CHLORIDE: 9 INJECTION, SOLUTION INTRAVENOUS at 12:12

## 2024-12-30 RX ADMIN — PROPOFOL 60 MG: 10 INJECTION, EMULSION INTRAVENOUS at 12:12

## 2024-12-30 NOTE — OP NOTE
[facility] - [facility] Lab (Hospital)  Brief Operative Note    Surgery Date: 12/30/2024     Surgeons and Role:     * Carlos Flower MD - Primary    Assisting Surgeon: None    Pre-op Diagnosis:  Presence of Watchman left atrial appendage closure device [Z95.818]  PAF (paroxysmal atrial fibrillation) [I48.0]    Post-op Diagnosis:  Post-Op Diagnosis Codes:     * Presence of Watchman left atrial appendage closure device [Z95.818]     * PAF (paroxysmal atrial fibrillation) [I48.0]    Procedure(s) (LRB):  ECHOCARDIOGRAM, TRANSESOPHAGEAL (N/A)    Anesthesia: Monitor Anesthesia Care    Operative Findings:   Procedure Description: The risks, benefits, and alternatives of the procedure expalined in detail with patient and family. The patient voices understanding and all questions have been addressed. The patient agrees to proceed as planned.   The patient was sedated by anesthesiology service. The probe was advanced via throat into esophagus smoothly. The patient tolerated the procedure well and there was no complications. The probed was withdrawal at the end of study. The patient was hemodynamically stable.   Estimated Blood Loss: none.   Findings / Operative Note:   S/p watchman LAAC seating well. No para device leaking by color doppler  EF nml  Mild AI  Mild MR    The EP service was notified.     Ok to discharge to home once hemodynamically stable.  F/U with me in 2 to 4 weeks at cardiology clinic.  DIET dash  ACTIVITY as tolerated    Estimated Blood Loss: * No values recorded between 12/30/2024 12:34 PM and 12/30/2024 12:43 PM *         Specimens:   Specimen (24h ago, onward)      None              Discharge Note    OUTCOME: Patient tolerated treatment/procedure well without complication and is now ready for discharge.    DISPOSITION: Home or Self Care    FINAL DIAGNOSIS:  <principal problem not specified>    FOLLOWUP: In clinic    DISCHARGE INSTRUCTIONS:  No discharge procedures on file.

## 2024-12-30 NOTE — HPI
82 yo male, HFU  PMH PAF s/p DCCV few yrs ago and RFA at [facility] in 2018 HTN HLD COPD quit smoking  Prior cardiologist Dr. Tanesha Coffman at [facility], h/o COVID 19 in . Hospitalization over a month, anemia, h/o bladder cancer on chemo and XRT, no h./o DM and stroke     10/2021 admitted to severe anemia and HGB dropped to 4.2 and had 4 units of PRBC. Felt better  This was 3rd episode of severe anemia in the past two yrs  No chest pain, RECINOS dizziness palpitation  No active bleeding      visit  S/p cystoscopy in  bladder cancer clear  No BP check at home. Mod compliance. No active bleeding  No chest pain dyspnea headache, palpitation dizziness faint and leg swelling   CMP TSH and chest CT reviewed      visit  No h/o MI DM and CVA chronic SOB.   No recurrent palpitation. No active bleeding. No chest pain dizziness and faint  Plays golf daily. No smoking and drinking  EKG today NSR  CMP TSH and chest done wi one yr. Chest ct showed emphysema  PFT pending     04/23 visit  Ekg NSR LVH   Mo chest pain palpitation dizziness  No active bleeding   Bladder cancer stable     10/23 visit  BP high today. No dizziness faint chest pain.  Plays golf twice a week.   Ekg NSR  LVH     05/24 visit  04/24 admitted for afib with RVR and severe anemia. EGD showed gastric AVM rx with APC. BNP up to 500's and Rx with diuresis and Cr elevated and had kidney stone.   Now BNP back to 88 and Cr back to 1.3  Plan to have TURP by Dr. Garcia  On SR and remains leg swelling. No orthopnea   EKG NSR     06/24 visit  C/o some hematuria after added xarelto. Urology eval pending  No chest pain dyspnea palpitation orthopnea  Leg swelling improved, golf weekly  AFIB [id number] score of 4     08/24 visit  08/23/24 admitted for severe anemia from pcp's office. HGB 6.4 had 2 unites of PRBC transfusion  Never saw the blood in the stool.  Upper GI in 4/2024 shows Multiple non-bleeding angioectasias in the stomach.  Treated with argon plasma coagulation   SBE 4/2024 shows A single non-bleeding angioectasia in the duodenum. Treated with argon plasma coagulation.  GI consult and advised advanced scopy eval at [facility]. Scheduled on 09/11/24  Held xarelto and d/c home  Today feels stable. No active bleeding.      Interval history  S/p LAAO watchman  on 11/1/4/24 at [facility] by Dr. Ortiz. Off eliquis now due to hematuria and no active bleeding. 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 Treated with APC.   VCE 4/18/24: positive VCE  SBE 4/22/24: small bowel AVM s/p APC and tattoo at most distal part reached      Bladder cancer      Bladder tumor      CKD (chronic kidney disease), stage III      COPD (chronic obstructive pulmonary disease)       pt denies    Encounter for blood transfusion      Hematuria      History of 2019 novel coronavirus disease (COVID-19)      Hypercholesteremia      Hypertension      Iron deficiency anemia 10/06/2017    Non-pressure chronic ulcer of other part of right foot limited to breakdown of skin 01/26/2023    Stage 3 chronic kidney disease 10/06/2017               Past Surgical History:   Procedure Laterality Date    bladder tumor removal        CARDIAC ELECTROPHYSIOLOGY MAPPING AND ABLATION        CARDIOVERSION         several    CATARACT EXTRACTION Bilateral      cataract removal with IOL implants Bilateral      CHOLECYSTECTOMY        CYSTOSCOPIC LITHOLAPAXY N/A 12/28/2021     Procedure: CYSTOLITHOLAPAXY;  Surgeon:  Medardo Garcia MD;  Location: [facility] OR;  Service: Urology;  Laterality: N/A;    CYSTOSCOPIC LITHOLAPAXY N/A 5/14/2024     Procedure: CYSTOLITHOLAPAXY;  Surgeon: Medardo Garcia MD;  Location: [facility] OR;  Service: Urology;  Laterality: N/A;    CYSTOSCOPY        CYSTOSCOPY WITH BIOPSY OF BLADDER Right 12/28/2021     Procedure: CYSTOSCOPY, WITH BLADDER BIOPSY, WITH FULGURATION IF INDICATED;  Surgeon: Medardo Garcia MD;  Location: [facility] OR;  Service: Urology;  Laterality: Right;    ESOPHAGOGASTRODUODENOSCOPY N/A 10/25/2021     Procedure: Small Jason Enteroscopy (SBE);  Surgeon: Isabelle Griffin MD;  Location: [facility];  Service: Endoscopy;  Laterality: N/A;    ESOPHAGOGASTRODUODENOSCOPY N/A 12/13/2021     Procedure: EGD (ESOPHAGOGASTRODUODENOSCOPY);  Surgeon: Troy Polanco MD;  Location: [facility];  Service: Endoscopy;  Laterality: N/A;    ESOPHAGOGASTRODUODENOSCOPY N/A 4/5/2024     Procedure: EGD (ESOPHAGOGASTRODUODENOSCOPY);  Surgeon: Myrtle Salcedo MD;  Location: [facility];  Service: Endoscopy;  Laterality: N/A;    ESOPHAGOGASTRODUODENOSCOPY N/A 4/22/2024     Procedure: EGD (ESOPHAGOGASTRODUODENOSCOPY);  Surgeon: Myrtle Salcedo MD;  Location: [facility] ENDO;  Service: Endoscopy;  Laterality: N/A;  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    EYE SURGERY        INTRALUMINAL GASTROINTESTINAL TRACT IMAGING VIA CAPSULE N/A 12/6/2021     Procedure: IMAGING PROCEDURE, GI TRACT, INTRALUMINAL, VIA CAPSULE;  Surgeon: Larry Jones RN;  Location: [facility] ENDO;  Service: Endoscopy;  Laterality: N/A;    INTRALUMINAL GASTROINTESTINAL TRACT IMAGING VIA CAPSULE N/A 4/18/2024     Procedure: IMAGING PROCEDURE, GI TRACT, INTRALUMINAL, VIA CAPSULE;  Surgeon: Larry Jones RN;  Location: [facility] ENDO;  Service: Endoscopy;  Laterality: N/A;    OCCLUSION OF LEFT ATRIAL APPENDAGE N/A 11/14/2024     Procedure: Left atrial appendage occlusion;  Surgeon: Aroldo Ortiz MD;  Location: [facility];  Service:  Cardiology;  Laterality: N/A;  afib, watchman, BSCI, venkatesh, anes, MB, 3prep    SMALL BOWEL ENTEROSCOPY N/A 2024     Procedure: ENTEROSCOPY--prximal DBE;  Surgeon: Eduardo Ruff MD;  Location: [facility] ENDO;  Service: Endoscopy;  Laterality: N/A;    TRANSESOPHAGEAL ECHOCARDIOGRAPHY N/A 2024     Procedure: ECHOCARDIOGRAM, TRANSESOPHAGEAL;  Surgeon: Aroldo Martinez MD;  Location: [facility] EP LAB;  Service: Cardiology;  Laterality: N/A;    TRANSURETHRAL RESECTION OF BLADDER TUMOR BY BIPOLAR CAUTERY N/A 2019     Procedure: TURBT, USING BIPOLAR CAUTERY;  Surgeon: Ritesh Marques MD;  Location: [facility] OR;  Service: Urology;  Laterality: N/A;    TRANSURETHRAL RESECTION OF PROSTATE N/A 2024     Procedure: TURP (TRANSURETHRAL RESECTION OF PROSTATE);  Surgeon: Medardo Garcia MD;  Location: [facility] OR;  Service: Urology;  Laterality: N/A;         Social History   Social History            Tobacco Use    Smoking status: Former       Current packs/day: 0.00       Average packs/day: 2.5 packs/day for 20.0 years (50.0 ttl pk-yrs)       Types: Cigarettes       Start date:        Quit date:        Years since quittin.9    Smokeless tobacco: Never   Substance Use Topics    Alcohol use: Yes       Alcohol/week: 7.0 standard drinks of alcohol       Types: 7 Cans of beer per week       Comment: occasionally    Drug use: No                   Family History   Problem Relation Name Age of Onset    Heart disease Father        Hypertension Father        Heart disease Brother                ROS     Objective:   Physical Exam  HENT:      Head: Normocephalic.   Eyes:      Pupils: Pupils are equal, round, and reactive to light.   Neck:      Thyroid: No thyromegaly.      Vascular: Normal carotid pulses. No carotid bruit or JVD.   Cardiovascular:      Rate and Rhythm: Normal rate and regular rhythm. Occasional Extrasystoles are present.     Chest Wall: PMI is not displaced.      Pulses: Normal pulses.      Heart  sounds: Normal heart sounds. No murmur heard.     No gallop. No S3 sounds.   Pulmonary:      Effort: No respiratory distress.      Breath sounds: Normal breath sounds. No stridor.   Abdominal:      General: Bowel sounds are normal.      Palpations: Abdomen is soft.      Tenderness: There is no abdominal tenderness. There is no rebound.   Skin:     Findings: No rash.   Neurological:      Mental Status: He is alert and oriented to person, place, and time.   Psychiatric:         Behavior: Behavior normal.                  Lab Results   Component Value Date     CHOL 96 (L) 09/04/2024     CHOL 120 01/26/2023     CHOL 73 (L) 05/28/2021            Lab Results   Component Value Date     HDL 42 09/04/2024     HDL 42 01/26/2023     HDL 39 (L) 05/28/2021            Lab Results   Component Value Date     LDLCALC 41.8 (L) 09/04/2024     LDLCALC 44.4 (L) 01/26/2023     LDLCALC 22.4 (L) 05/28/2021            Lab Results   Component Value Date     TRIG 61 09/04/2024     TRIG 168 (H) 01/26/2023     TRIG 58 05/28/2021            Lab Results   Component Value Date     CHOLHDL 43.8 09/04/2024     CHOLHDL 35.0 01/26/2023     CHOLHDL 53.4 (H) 05/28/2021        Chemistry               Component Value Date/Time      11/18/2024 0531     K 4.0 11/18/2024 0531      11/18/2024 0531     CO2 26 11/18/2024 0531     BUN 23 11/18/2024 0531     CREATININE 1.2 11/18/2024 0531      (H) 11/18/2024 0531              Component Value Date/Time     CALCIUM 9.7 11/18/2024 0531     ALKPHOS 67 11/18/2024 0531     AST 27 11/18/2024 0531     AST 29 04/24/2016 1145     ALT 29 11/18/2024 0531     BILITOT 0.4 11/18/2024 0531     ESTGFRAFRICA >60 12/08/2021 2007     EGFRNONAA 56 (A) 12/08/2021 2007                  Lab Results   Component Value Date     HGBA1C 5.5 10/09/2024            Lab Results   Component Value Date     TSH 3.937 04/01/2024            Lab Results   Component Value Date     INR 1.1 11/11/2024     INR 1.3 (H) 08/23/2024     INR  1.9 10/22/2021            Lab Results   Component Value Date     WBC 7.30 11/18/2024     HGB 11.4 (L) 11/18/2024     HCT 37.1 (L) 11/18/2024     MCV 79 (L) 11/18/2024      (L) 11/18/2024      BMP        Sodium   Date Value Ref Range Status   11/18/2024 141 136 - 145 mmol/L Final            Potassium   Date Value Ref Range Status   11/18/2024 4.0 3.5 - 5.1 mmol/L Final            Chloride   Date Value Ref Range Status   11/18/2024 105 95 - 110 mmol/L Final            CO2   Date Value Ref Range Status   11/18/2024 26 23 - 29 mmol/L Final            BUN   Date Value Ref Range Status   11/18/2024 23 8 - 23 mg/dL Final            Creatinine   Date Value Ref Range Status   11/18/2024 1.2 0.5 - 1.4 mg/dL Final            Calcium   Date Value Ref Range Status   11/18/2024 9.7 8.7 - 10.5 mg/dL Final            Anion Gap   Date Value Ref Range Status   11/18/2024 10 8 - 16 mmol/L Final            eGFR if    Date Value Ref Range Status   12/08/2021 >60 >60 mL/min/1.73 m^2 Final              eGFR if non    Date Value Ref Range Status   12/08/2021 56 (A) >60 mL/min/1.73 m^2 Final       Comment:       Calculation used to obtain the estimated glomerular filtration  rate (eGFR) is the CKD-EPI equation.          BNP  @LABRCNTIP(BNP,BNPTRIAGEBLO)@  @LABRCNTIP(troponini)@  CrCl cannot be calculated (Patient's most recent lab result is older than the maximum 7 days allowed.).  No results found in the last 24 hours.  No results found in the last 24 hours.  No results found in the last 24 hours.     Assessment:      1. 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 Chronic combined systolic (congestive) and diastolic (congestive) heart failure    3. DM type 2 with diabetic dyslipidemia    4. Hypercholesteremia    5. Primary hypertension    6. Paroxysmal atrial fibrillation    7. PVD (peripheral vascular disease)    8. Type  2 diabetes mellitus without complication, without long-term current use of insulin          Plan:   ADD Amlodipine 2.5 mg daily for HTN  Ok for the bladder procedure  Continue asa Amiodarone lipitor coreg hydralazine   Do the FIOR as scheduled  I have explained the risks, benefits, and alternatives of the procedure in detail with patient and family. The patient voices understanding and all questions have been addressed.  The patient agrees to proceed as planned.

## 2024-12-30 NOTE — ASSESSMENT & PLAN NOTE
Repeat FIOR for post op 6 weeks s/p kimberley DUBON     I have explained the risks, benefits, and alternatives of the procedure in detail with patient and family. The patient voices understanding and all questions have been addressed.  The patient agrees to proceed as planned.

## 2024-12-30 NOTE — DISCHARGE SUMMARY
[facility] Lab (Hospital)  Discharge Note  Short Stay    Procedure(s) (LRB):  ECHOCARDIOGRAM, TRANSESOPHAGEAL (N/A)      OUTCOME: Patient tolerated treatment/procedure well without complication and is now ready for discharge.    DISPOSITION: Home or Self Care    FINAL DIAGNOSIS:  <principal problem not specified>    FOLLOWUP: In clinic    DISCHARGE INSTRUCTIONS:  No discharge procedures on file.     TIME SPENT ON DISCHARGE: 10 minutes

## 2024-12-30 NOTE — ANESTHESIA PREPROCEDURE EVALUATION
12/30/2024  Jose Miguel Alvarez Jr. is a 84 y.o., male.      Pre-op Assessment    I have reviewed the Patient Summary Reports.     I have reviewed the Nursing Notes. I have reviewed the NPO Status.   I have reviewed the Medications.     Review of Systems  Anesthesia Hx:  No problems with previous Anesthesia                Social:  Former Smoker, Alcohol Use       Hematology/Oncology:  Hematology Normal   Oncology Normal                                   Cardiovascular:     Hypertension       CHF   PVD hyperlipidemia        Vent. Rate :  54 BPM     Atrial Rate :  54 BPM      P-R Int : 166 ms          QRS Dur :  82 ms       QT Int : 452 ms       P-R-T Axes :  24  -5   4 degrees     QTcB Int : 428 ms     Sinus bradycardia   Minimal voltage criteria for LVH, may be normal variant ( R in aVL )   Cannot rule out Anterior infarct (cited on or before 23-Aug-2024)   Abnormal ECG   When compared with ECG of 09-Sep-2024 14:14,   Premature atrial complexes are no longer Present   Confirmed by Shruthi Gu ([id number]) on 11/19/2024 8:22:13 PM                              Pulmonary:   COPD                     Renal/:  Chronic Renal Disease renal calculi BPH Bladder CA              Hepatic/GI:  Hepatic/GI Normal                    Neurological:  Neurology Normal                                      Endocrine:  Diabetes, type 2           Dermatological:  Skin Normal    Psych:  Psychiatric History                  Physical Exam  General: Well nourished, Cooperative, Alert and Oriented    Airway:  Mallampati: II / II  Mouth Opening: Normal  TM Distance: Normal  Tongue: Normal  Neck ROM: Normal ROM    Dental:  Intact        Anesthesia Plan  Type of Anesthesia, risks & benefits discussed:    Anesthesia Type: MAC  Intra-op Monitoring Plan: Standard ASA Monitors  Post Op Pain Control Plan: multimodal analgesia  Induction:   IV  Informed Consent: Informed consent signed with the Patient and all parties understand the risks and agree with anesthesia plan.  All questions answered.   ASA Score: 3  Day of Surgery Review of History & Physical: H&P Update referred to the surgeon/provider.I have interviewed and examined the patient. I have reviewed the patient's H&P dated: There are no significant changes.     Ready For Surgery From Anesthesia Perspective.     .

## 2024-12-30 NOTE — H&P
[facility] ([facility])  Cardiology  History and Physical     Patient Name: Jose Miguel Alvarez Jr.  MRN: [id number]  Admission Date: 12/30/2024  Code Status: Full Code   Attending Provider: Carlos Flower MD   Primary Care Physician: Toshia Valladares MD  Principal Problem:<principal problem not specified>    Patient information was obtained from patient and ER records.     Subjective:         HPI:  82 yo male, HFU  PMH PAF s/p DCCV few yrs ago and RFA at [facility] in 2018 HTN HLD COPD quit smoking  Prior cardiologist Dr. Tanesha Coffman at [facility], h/o COVID 19 in . Hospitalization over a month, anemia, h/o bladder cancer on chemo and XRT, no h./o DM and stroke     10/2021 admitted to severe anemia and HGB dropped to 4.2 and had 4 units of PRBC. Felt better  This was 3rd episode of severe anemia in the past two yrs  No chest pain, RECINOS dizziness palpitation  No active bleeding      visit  S/p cystoscopy in  bladder cancer clear  No BP check at home. Mod compliance. No active bleeding  No chest pain dyspnea headache, palpitation dizziness faint and leg swelling   CMP TSH and chest CT reviewed      visit  No h/o MI DM and CVA chronic SOB.   No recurrent palpitation. No active bleeding. No chest pain dizziness and faint  Plays golf daily. No smoking and drinking  EKG today NSR  CMP TSH and chest done wi one yr. Chest ct showed emphysema  PFT pending     04/23 visit  Ekg NSR LVH   Mo chest pain palpitation dizziness  No active bleeding   Bladder cancer stable     10/23 visit  BP high today. No dizziness faint chest pain.  Plays golf twice a week.   Ekg NSR  LVH     05/24 visit  04/24 admitted for afib with RVR and severe anemia. EGD showed gastric AVM rx with APC. BNP up to 500's and Rx with diuresis and Cr elevated and had kidney stone.   Now BNP back to 88 and Cr back to 1.3  Plan to have TURP by Dr. Garcia  On SR and remains leg swelling. No orthopnea   EKG NSR      06/24 visit  C/o some hematuria after added xarelto. Urology eval pending  No chest pain dyspnea palpitation orthopnea  Leg swelling improved, golf weekly  AFIB [id number] score of 4     08/24 visit  08/23/24 admitted for severe anemia from pcp's office. HGB 6.4 had 2 unites of PRBC transfusion  Never saw the blood in the stool.  Upper GI in 4/2024 shows Multiple non-bleeding angioectasias in the stomach. Treated with argon plasma coagulation   SBE 4/2024 shows A single non-bleeding angioectasia in the duodenum. Treated with argon plasma coagulation.  GI consult and advised advanced scopy eval at [facility]. Scheduled on 09/11/24  Held xarelto and d/c home  Today feels stable. No active bleeding.      Interval history  S/p LAAO watchman  on 11/1/4/24 at [facility] by Dr. Ortiz. Off eliquis now due to hematuria and no active bleeding. 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 Treated with APC.   VCE 4/18/24: positive VCE  SBE 4/22/24: small bowel AVM s/p APC and tattoo at most distal part reached      Bladder cancer      Bladder tumor      CKD (chronic kidney disease), stage III      COPD (chronic obstructive pulmonary disease)       pt denies    Encounter for blood transfusion      Hematuria      History of 2019 novel coronavirus disease (COVID-19)      Hypercholesteremia      Hypertension      Iron deficiency anemia 10/06/2017    Non-pressure chronic ulcer of other part of right foot limited to breakdown of skin 01/26/2023    Stage 3  chronic kidney disease 10/06/2017               Past Surgical History:   Procedure Laterality Date    bladder tumor removal        CARDIAC ELECTROPHYSIOLOGY MAPPING AND ABLATION        CARDIOVERSION         several    CATARACT EXTRACTION Bilateral      cataract removal with IOL implants Bilateral      CHOLECYSTECTOMY        CYSTOSCOPIC LITHOLAPAXY N/A 12/28/2021     Procedure: CYSTOLITHOLAPAXY;  Surgeon: Medardo Garcia MD;  Location: [facility];  Service: Urology;  Laterality: N/A;    CYSTOSCOPIC LITHOLAPAXY N/A 5/14/2024     Procedure: CYSTOLITHOLAPAXY;  Surgeon: Medardo Garcia MD;  Location: [facility];  Service: Urology;  Laterality: N/A;    CYSTOSCOPY        CYSTOSCOPY WITH BIOPSY OF BLADDER Right 12/28/2021     Procedure: CYSTOSCOPY, WITH BLADDER BIOPSY, WITH FULGURATION IF INDICATED;  Surgeon: Medardo Garcia MD;  Location: [facility];  Service: Urology;  Laterality: Right;    ESOPHAGOGASTRODUODENOSCOPY N/A 10/25/2021     Procedure: Small Jason Enteroscopy (SBE);  Surgeon: Isabelle Griffin MD;  Location: [facility];  Service: Endoscopy;  Laterality: N/A;    ESOPHAGOGASTRODUODENOSCOPY N/A 12/13/2021     Procedure: EGD (ESOPHAGOGASTRODUODENOSCOPY);  Surgeon: Troy Polanco MD;  Location: [facility];  Service: Endoscopy;  Laterality: N/A;    ESOPHAGOGASTRODUODENOSCOPY N/A 4/5/2024     Procedure: EGD (ESOPHAGOGASTRODUODENOSCOPY);  Surgeon: Myrtle Salcedo MD;  Location: [facility];  Service: Endoscopy;  Laterality: N/A;    ESOPHAGOGASTRODUODENOSCOPY N/A 4/22/2024     Procedure: EGD (ESOPHAGOGASTRODUODENOSCOPY);  Surgeon: Myrtle Salcedo MD;  Location: [facility];  Service: Endoscopy;  Laterality: N/A;  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    EYE SURGERY        INTRALUMINAL GASTROINTESTINAL TRACT IMAGING VIA CAPSULE N/A 12/6/2021     Procedure: IMAGING PROCEDURE, GI TRACT, INTRALUMINAL, VIA CAPSULE;  Surgeon: Larry Jones RN;  Location: [facility];  Service: Endoscopy;  Laterality:  N/A;    INTRALUMINAL GASTROINTESTINAL TRACT IMAGING VIA CAPSULE N/A 2024     Procedure: IMAGING PROCEDURE, GI TRACT, INTRALUMINAL, VIA CAPSULE;  Surgeon: Larry Jones RN;  Location: [facility] ENDO;  Service: Endoscopy;  Laterality: N/A;    OCCLUSION OF LEFT ATRIAL APPENDAGE N/A 2024     Procedure: Left atrial appendage occlusion;  Surgeon: Aroldo Ortiz MD;  Location: [facility] EP LAB;  Service: Cardiology;  Laterality: N/A;  afib, watchman, BSCI, venkatesh, anes, MB, 3prep    SMALL BOWEL ENTEROSCOPY N/A 2024     Procedure: ENTEROSCOPY--prximal DBE;  Surgeon: Eduardo Ruff MD;  Location: [facility] ENDO;  Service: Endoscopy;  Laterality: N/A;    TRANSESOPHAGEAL ECHOCARDIOGRAPHY N/A 2024     Procedure: ECHOCARDIOGRAM, TRANSESOPHAGEAL;  Surgeon: Aroldo Martinez MD;  Location: [facility] EP LAB;  Service: Cardiology;  Laterality: N/A;    TRANSURETHRAL RESECTION OF BLADDER TUMOR BY BIPOLAR CAUTERY N/A 2019     Procedure: TURBT, USING BIPOLAR CAUTERY;  Surgeon: Ritesh Marques MD;  Location: [facility] OR;  Service: Urology;  Laterality: N/A;    TRANSURETHRAL RESECTION OF PROSTATE N/A 2024     Procedure: TURP (TRANSURETHRAL RESECTION OF PROSTATE);  Surgeon: Medardo Garcia MD;  Location: [facility] OR;  Service: Urology;  Laterality: N/A;         Social History   Social History            Tobacco Use    Smoking status: Former       Current packs/day: 0.00       Average packs/day: 2.5 packs/day for 20.0 years (50.0 ttl pk-yrs)       Types: Cigarettes       Start date:        Quit date:        Years since quittin.9    Smokeless tobacco: Never   Substance Use Topics    Alcohol use: Yes       Alcohol/week: 7.0 standard drinks of alcohol       Types: 7 Cans of beer per week       Comment: occasionally    Drug use: No                   Family History   Problem Relation Name Age of Onset    Heart disease Father        Hypertension Father        Heart disease Brother                ROS     Objective:    Physical Exam  HENT:      Head: Normocephalic.   Eyes:      Pupils: Pupils are equal, round, and reactive to light.   Neck:      Thyroid: No thyromegaly.      Vascular: Normal carotid pulses. No carotid bruit or JVD.   Cardiovascular:      Rate and Rhythm: Normal rate and regular rhythm. Occasional Extrasystoles are present.     Chest Wall: PMI is not displaced.      Pulses: Normal pulses.      Heart sounds: Normal heart sounds. No murmur heard.     No gallop. No S3 sounds.   Pulmonary:      Effort: No respiratory distress.      Breath sounds: Normal breath sounds. No stridor.   Abdominal:      General: Bowel sounds are normal.      Palpations: Abdomen is soft.      Tenderness: There is no abdominal tenderness. There is no rebound.   Skin:     Findings: No rash.   Neurological:      Mental Status: He is alert and oriented to person, place, and time.   Psychiatric:         Behavior: Behavior normal.                  Lab Results   Component Value Date     CHOL 96 (L) 09/04/2024     CHOL 120 01/26/2023     CHOL 73 (L) 05/28/2021            Lab Results   Component Value Date     HDL 42 09/04/2024     HDL 42 01/26/2023     HDL 39 (L) 05/28/2021            Lab Results   Component Value Date     LDLCALC 41.8 (L) 09/04/2024     LDLCALC 44.4 (L) 01/26/2023     LDLCALC 22.4 (L) 05/28/2021            Lab Results   Component Value Date     TRIG 61 09/04/2024     TRIG 168 (H) 01/26/2023     TRIG 58 05/28/2021            Lab Results   Component Value Date     CHOLHDL 43.8 09/04/2024     CHOLHDL 35.0 01/26/2023     CHOLHDL 53.4 (H) 05/28/2021        Chemistry               Component Value Date/Time      11/18/2024 0531     K 4.0 11/18/2024 0531      11/18/2024 0531     CO2 26 11/18/2024 0531     BUN 23 11/18/2024 0531     CREATININE 1.2 11/18/2024 0531      (H) 11/18/2024 0531              Component Value Date/Time     CALCIUM 9.7 11/18/2024 0531     ALKPHOS 67 11/18/2024 0531     AST 27 11/18/2024 0531      AST 29 04/24/2016 1145     ALT 29 11/18/2024 0531     BILITOT 0.4 11/18/2024 0531     ESTGFRAFRICA >60 12/08/2021 2007     EGFRNONAA 56 (A) 12/08/2021 2007                  Lab Results   Component Value Date     HGBA1C 5.5 10/09/2024            Lab Results   Component Value Date     TSH 3.937 04/01/2024            Lab Results   Component Value Date     INR 1.1 11/11/2024     INR 1.3 (H) 08/23/2024     INR 1.9 10/22/2021            Lab Results   Component Value Date     WBC 7.30 11/18/2024     HGB 11.4 (L) 11/18/2024     HCT 37.1 (L) 11/18/2024     MCV 79 (L) 11/18/2024      (L) 11/18/2024      BMP        Sodium   Date Value Ref Range Status   11/18/2024 141 136 - 145 mmol/L Final            Potassium   Date Value Ref Range Status   11/18/2024 4.0 3.5 - 5.1 mmol/L Final            Chloride   Date Value Ref Range Status   11/18/2024 105 95 - 110 mmol/L Final            CO2   Date Value Ref Range Status   11/18/2024 26 23 - 29 mmol/L Final            BUN   Date Value Ref Range Status   11/18/2024 23 8 - 23 mg/dL Final            Creatinine   Date Value Ref Range Status   11/18/2024 1.2 0.5 - 1.4 mg/dL Final            Calcium   Date Value Ref Range Status   11/18/2024 9.7 8.7 - 10.5 mg/dL Final            Anion Gap   Date Value Ref Range Status   11/18/2024 10 8 - 16 mmol/L Final            eGFR if    Date Value Ref Range Status   12/08/2021 >60 >60 mL/min/1.73 m^2 Final              eGFR if non    Date Value Ref Range Status   12/08/2021 56 (A) >60 mL/min/1.73 m^2 Final       Comment:       Calculation used to obtain the estimated glomerular filtration  rate (eGFR) is the CKD-EPI equation.          BNP  @LABRCNTIP(BNP,BNPTRIAGEBLO)@  @LABRCNTIP(troponini)@  CrCl cannot be calculated (Patient's most recent lab result is older than the maximum 7 days allowed.).  No results found in the last 24 hours.  No results found in the last 24 hours.  No results found in the last 24 hours.      Assessment:      1. 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 Chronic combined systolic (congestive) and diastolic (congestive) heart failure    3. DM type 2 with diabetic dyslipidemia    4. Hypercholesteremia    5. Primary hypertension    6. Paroxysmal atrial fibrillation    7. PVD (peripheral vascular disease)    8. Type 2 diabetes mellitus without complication, without long-term current use of insulin          Plan:   ADD Amlodipine 2.5 mg daily for HTN  Ok for the bladder procedure  Continue asa Amiodarone lipitor coreg hydralazine   Do the FIOR as scheduled  I have explained the risks, benefits, and alternatives of the procedure in detail with patient and family. The patient voices understanding and all questions have been addressed.  The patient agrees to proceed as planned.    No new subjective & objective note has been filed under this hospital service since the last note was generated.    Assessment and Plan:     Paroxysmal atrial fibrillation  Repeat FIOR for post op 6 weeks s/p watchman LAAC     I have explained the risks, benefits, and alternatives of the procedure in detail with patient and family. The patient voices understanding and all questions have been addressed.  The patient agrees to proceed as planned.          VTE Risk Mitigation (From admission, onward)      Natividad Flower MD  Cardiology   [facility] ([facility])

## 2024-12-30 NOTE — TRANSFER OF CARE
Anesthesia Transfer of Care Note    Patient: Jose Miguel Alvarez Jr.    Procedure(s) Performed: Procedure(s) (LRB):  ECHOCARDIOGRAM, TRANSESOPHAGEAL (N/A)    Patient location: Other: CVRU    Anesthesia Type: MAC    Transport from OR: Transported from OR on room air with adequate spontaneous ventilation    Post pain: adequate analgesia    Post assessment: no apparent anesthetic complications    Post vital signs: stable    Level of consciousness: responds to stimulation    Nausea/Vomiting: no nausea/vomiting    Complications: none    Transfer of care protocol was followed      Last vitals: Visit Vitals  BP (!) 171/79   Pulse (!) 53   Temp 36.7 °C (98.1 °F) (Temporal)   Resp 16   Ht 6' (1.829 m)   Wt 94.2 kg (207 lb 10.8 oz)   SpO2 99%   BMI 28.17 kg/m²

## 2024-12-31 LAB
ASCENDING AORTA: 4.3 CM
BSA FOR ECHO PROCEDURE: 2.18 M2
SINUS: 3.6 CM
STJ: 2.7 CM

## 2025-01-03 ENCOUNTER — TELEPHONE (OUTPATIENT)
Dept: RADIOLOGY | Facility: HOSPITAL | Age: 85
End: 2025-01-03
Payer: MEDICARE

## 2025-01-03 NOTE — TELEPHONE ENCOUNTER
Interventional Radiology:    Spoke to pt and explained in detail the process of the nephrostomy tube placement. Pt states that he feels more comfortable about the procedure and asked that Paige call his son on Monday 1/6/25 to get the procedure scheduled. Pt given direct phone number if any more questions arise and need to be answered and pt verbalized understanding of all.

## 2025-01-06 ENCOUNTER — TELEPHONE (OUTPATIENT)
Dept: RADIOLOGY | Facility: HOSPITAL | Age: 85
End: 2025-01-06
Payer: MEDICARE

## 2025-01-06 DIAGNOSIS — Z85.51 HISTORY OF BLADDER CANCER: ICD-10-CM

## 2025-01-06 DIAGNOSIS — D68.9 COAGULATION DEFICIENCY: Primary | ICD-10-CM

## 2025-01-06 NOTE — TELEPHONE ENCOUNTER
Interventional Radiology  Scheduled 1:00PM neph tube placement for 1/10/25 w/patient's son, Hector.  Instructed that pt. needs to arrive by 11:30AM to hospital off [facility], he must have a ride (Hector will be bringing pt.) and NPO after 4:00AM the day of the procedure.  Pt. took last dose of ASA on 1/6/25.  Explained that pt. can take morning medications the day of the procedure with a small sip of water.  Pt. does not take other blood thinners, NSAIDS, fish oil, or GLP-1/GIP agonists.  I gave Hector our direct number [phone] and he verbalized understanding of all instructions.

## 2025-01-07 ENCOUNTER — TELEPHONE (OUTPATIENT)
Dept: HEMATOLOGY/ONCOLOGY | Facility: CLINIC | Age: 85
End: 2025-01-07
Payer: MEDICARE

## 2025-01-07 ENCOUNTER — DOCUMENTATION ONLY (OUTPATIENT)
Dept: HEMATOLOGY/ONCOLOGY | Facility: CLINIC | Age: 85
End: 2025-01-07
Payer: MEDICARE

## 2025-01-07 RX ORDER — AMIODARONE HYDROCHLORIDE 200 MG/1
200 TABLET ORAL DAILY
Qty: 30 TABLET | Refills: 11 | Status: SHIPPED | OUTPATIENT
Start: 2025-01-07 | End: 2026-01-07

## 2025-01-09 ENCOUNTER — LAB VISIT (OUTPATIENT)
Dept: LAB | Facility: HOSPITAL | Age: 85
End: 2025-01-09
Attending: NURSE PRACTITIONER
Payer: MEDICARE

## 2025-01-09 DIAGNOSIS — D50.0 IRON DEFICIENCY ANEMIA DUE TO CHRONIC BLOOD LOSS: ICD-10-CM

## 2025-01-09 DIAGNOSIS — K31.811 GASTROINTESTINAL HEMORRHAGE ASSOCIATED WITH ANGIODYSPLASIA OF STOMACH AND DUODENUM: ICD-10-CM

## 2025-01-09 DIAGNOSIS — D69.6 THROMBOCYTOPENIA: ICD-10-CM

## 2025-01-09 DIAGNOSIS — D68.9 COAGULATION DEFICIENCY: ICD-10-CM

## 2025-01-09 DIAGNOSIS — Z85.51 HISTORY OF BLADDER CANCER: ICD-10-CM

## 2025-01-09 LAB
ALBUMIN SERPL BCP-MCNC: 3.4 G/DL (ref 3.5–5.2)
ALP SERPL-CCNC: 69 U/L (ref 40–150)
ALT SERPL W/O P-5'-P-CCNC: 23 U/L (ref 10–44)
ANION GAP SERPL CALC-SCNC: 10 MMOL/L (ref 8–16)
AST SERPL-CCNC: 21 U/L (ref 10–40)
BASOPHILS # BLD AUTO: 0.02 K/UL (ref 0–0.2)
BASOPHILS NFR BLD: 0.2 % (ref 0–1.9)
BILIRUB SERPL-MCNC: 0.8 MG/DL (ref 0.1–1)
BUN SERPL-MCNC: 18 MG/DL (ref 8–23)
CALCIUM SERPL-MCNC: 9.3 MG/DL (ref 8.7–10.5)
CHLORIDE SERPL-SCNC: 103 MMOL/L (ref 95–110)
CO2 SERPL-SCNC: 26 MMOL/L (ref 23–29)
CREAT SERPL-MCNC: 1.2 MG/DL (ref 0.5–1.4)
DIFFERENTIAL METHOD BLD: ABNORMAL
EOSINOPHIL # BLD AUTO: 0 K/UL (ref 0–0.5)
EOSINOPHIL NFR BLD: 0.3 % (ref 0–8)
ERYTHROCYTE [DISTWIDTH] IN BLOOD BY AUTOMATED COUNT: 18.9 % (ref 11.5–14.5)
EST. GFR  (NO RACE VARIABLE): 59 ML/MIN/1.73 M^2
FERRITIN SERPL-MCNC: 115 NG/ML (ref 20–300)
GLUCOSE SERPL-MCNC: 214 MG/DL (ref 70–110)
HCT VFR BLD AUTO: 37.9 % (ref 40–54)
HGB BLD-MCNC: 11.6 G/DL (ref 14–18)
IMM GRANULOCYTES # BLD AUTO: 0.19 K/UL (ref 0–0.04)
IMM GRANULOCYTES NFR BLD AUTO: 2 % (ref 0–0.5)
INR PPP: 1.1 (ref 0.8–1.2)
IRON SERPL-MCNC: 16 UG/DL (ref 45–160)
LYMPHOCYTES # BLD AUTO: 0.6 K/UL (ref 1–4.8)
LYMPHOCYTES NFR BLD: 6.3 % (ref 18–48)
MCH RBC QN AUTO: 26.2 PG (ref 27–31)
MCHC RBC AUTO-ENTMCNC: 30.6 G/DL (ref 32–36)
MCV RBC AUTO: 86 FL (ref 82–98)
MONOCYTES # BLD AUTO: 1.3 K/UL (ref 0.3–1)
MONOCYTES NFR BLD: 14.1 % (ref 4–15)
NEUTROPHILS # BLD AUTO: 7.2 K/UL (ref 1.8–7.7)
NEUTROPHILS NFR BLD: 77.1 % (ref 38–73)
NRBC BLD-RTO: 0 /100 WBC
PLATELET # BLD AUTO: 115 K/UL (ref 150–450)
PMV BLD AUTO: ABNORMAL FL (ref 9.2–12.9)
POTASSIUM SERPL-SCNC: 3.9 MMOL/L (ref 3.5–5.1)
PROT SERPL-MCNC: 6.9 G/DL (ref 6–8.4)
PROTHROMBIN TIME: 12 SEC (ref 9–12.5)
RBC # BLD AUTO: 4.43 M/UL (ref 4.6–6.2)
SATURATED IRON: 6 % (ref 20–50)
SODIUM SERPL-SCNC: 139 MMOL/L (ref 136–145)
TOTAL IRON BINDING CAPACITY: 287 UG/DL (ref 250–450)
TRANSFERRIN SERPL-MCNC: 194 MG/DL (ref 200–375)
WBC # BLD AUTO: 9.4 K/UL (ref 3.9–12.7)

## 2025-01-09 PROCEDURE — 36415 COLL VENOUS BLD VENIPUNCTURE: CPT | Mod: PO | Performed by: UROLOGY

## 2025-01-09 PROCEDURE — 82728 ASSAY OF FERRITIN: CPT | Performed by: NURSE PRACTITIONER

## 2025-01-09 PROCEDURE — 85610 PROTHROMBIN TIME: CPT | Performed by: UROLOGY

## 2025-01-09 PROCEDURE — 84466 ASSAY OF TRANSFERRIN: CPT | Performed by: NURSE PRACTITIONER

## 2025-01-09 PROCEDURE — 80053 COMPREHEN METABOLIC PANEL: CPT | Performed by: NURSE PRACTITIONER

## 2025-01-09 PROCEDURE — 85025 COMPLETE CBC W/AUTO DIFF WBC: CPT | Performed by: NURSE PRACTITIONER

## 2025-01-13 ENCOUNTER — OFFICE VISIT (OUTPATIENT)
Dept: HEMATOLOGY/ONCOLOGY | Facility: CLINIC | Age: 85
End: 2025-01-13
Payer: MEDICARE

## 2025-01-13 ENCOUNTER — TELEPHONE (OUTPATIENT)
Dept: RADIOLOGY | Facility: HOSPITAL | Age: 85
End: 2025-01-13
Payer: MEDICARE

## 2025-01-13 DIAGNOSIS — D50.9 IRON DEFICIENCY ANEMIA, UNSPECIFIED IRON DEFICIENCY ANEMIA TYPE: Primary | ICD-10-CM

## 2025-01-13 DIAGNOSIS — C67.9 UROTHELIAL CARCINOMA OF BLADDER: ICD-10-CM

## 2025-01-13 DIAGNOSIS — R16.1 SPLENOMEGALY: ICD-10-CM

## 2025-01-13 DIAGNOSIS — D69.6 THROMBOCYTOPENIA: ICD-10-CM

## 2025-01-13 PROCEDURE — 3072F LOW RISK FOR RETINOPATHY: CPT | Mod: CPTII,95,, | Performed by: NURSE PRACTITIONER

## 2025-01-13 PROCEDURE — 1157F ADVNC CARE PLAN IN RCRD: CPT | Mod: CPTII,95,, | Performed by: NURSE PRACTITIONER

## 2025-01-13 PROCEDURE — G2211 COMPLEX E/M VISIT ADD ON: HCPCS | Mod: 95,,, | Performed by: NURSE PRACTITIONER

## 2025-01-13 PROCEDURE — 1159F MED LIST DOCD IN RCRD: CPT | Mod: CPTII,95,, | Performed by: NURSE PRACTITIONER

## 2025-01-13 PROCEDURE — 1160F RVW MEDS BY RX/DR IN RCRD: CPT | Mod: CPTII,95,, | Performed by: NURSE PRACTITIONER

## 2025-01-13 PROCEDURE — 98006 SYNCH AUDIO-VIDEO EST MOD 30: CPT | Mod: 95,,, | Performed by: NURSE PRACTITIONER

## 2025-01-13 NOTE — PROGRESS NOTES
The patient location is: home  The chief complaint leading to consultation is: no complaints    Visit type: audiovisual    Face to Face time with patient: 25  39 minutes of total time spent on the encounter, which includes face to face time and non-face to face time preparing to see the patient (eg, review of tests), Obtaining and/or reviewing separately obtained history, Documenting clinical information in the electronic or other health record, Independently interpreting results (not separately reported) and communicating results to the patient/family/caregiver, or Care coordination (not separately reported).         Each patient to whom he or she provides medical services by telemedicine is:  (1) informed of the relationship between the physician and patient and the respective role of any other health care provider with respect to management of the patient; and (2) notified that he or she may decline to receive medical services by telemedicine and may withdraw from such care at any time.    Patient ID: Jose Miguel Alvarez Jr. is a 84 y.o. male.    Chief Complaint: no complaints    HPI:  83 yo male presents to the clinic as a new patient for further eval and recommendations - previously followed in the clinic for anemia with h/o bladder cancer in 2019 s/p resection. Has a h/o AVMs with previous gi bleed.  Has received multiple IV iron infusions as well as multiple blood transfusions in the past. Stopped taking iron due to currently being treated for UTI with omnicief.     Is currently taking Eliquis 2.5 mg PO bid.  Was on Xarelto previously for treatment of A. fib     Since 2016 has on/off low platelets now persistently low.  Hgb currently 7.9 mcv 128.  Lowest count noted 99 K.     Previously followed in the clinic by Dr. King.  Today is the first time I am evaluating/assessing the patient.       Patient is accompanied by his daughter-in-law.      Interval history:  9/25/2024 Presents today to discuss  treatment recommendations due to anaphalactic reaction with first dose of Feraheme on 9/9/2024.  Upon initiation of IV iron he started to have severe back pain so infusions was stopped and saline was infusing.  Symptoms resolved and infusions was restarted at a slower rate.  Patient began to lose vision and had a syncopal episode with an extreme drop in blood pressure.  He was give steroid injection and BP began to rise and regained consciousness.  He was transported from facility to hospital via ambulance for evaluation.  Feraheme has been listed as an allergy in his chart.  Review of past iron infusions show that he has received Injectafer infusions in the past without issue in 2021 and he also tells me that he received IV iron after blood transfusion many years ago in the hospital and tolerated fine.  Currently taking ferrous sulfate 325 mg PO every other day and is tolerating well.  He is accompanied by his son. He will be having a special GI procedure tomorrow Upper DBE on 9/26/2024 with dr. Ruff in [place] to assess for and treat angiectasias in the small intestines.  He continues to take Protonix 40 mg po daily.  He denies and known abnormal bleeding and is asymptomatic. VCE in 4/2024 with active small bowel bleeding seen.    Interval History:  10/31/2024 Presents today to evaluate response of oral iron.  Has been taking ferrous sulfate 325 mg PO daily since last visit wtihout complaints.  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  Denies any current known GI bleeding.  Hgb slowly improving - continues to have low ferritin.  Has brain fogginess.  Is accompanied by his son.  Has decided that he would like to get the iron that he received previously that he was able to tolerate.     Interval History:  12/13/2025 Received venofer 200 mg IV x 4 doses and presents today to evaluate response.  Has been found with recurrent bladder cancer and has initial evaluation with oncology  and radiology upcoming. Hgb continues to trend upward.  Denies any known abnormal bleeding.  Continued stable thrombocytopenia with known borderline splenomegaly.  Saturated iron of 6%.  Had watchman procedure done.  No longer on anticoagulation.   I have reviewed all of the patient's relevant lab work available in the medical record and have utilized this in my evaluation and management recommendations today.      Social History     Socioeconomic History    Marital status:    Tobacco Use    Smoking status: Former     Current packs/day: 0.00     Average packs/day: 2.5 packs/day for 20.0 years (50.0 ttl pk-yrs)     Types: Cigarettes     Start date:      Quit date:      Years since quittin.0    Smokeless tobacco: Never   Substance and Sexual Activity    Alcohol use: Yes     Alcohol/week: 7.0 standard drinks of alcohol     Types: 7 Cans of beer per week     Comment: occasionally: hold 72 hrs    Drug use: No    Sexual activity: Not Currently     Social Drivers of Health     Financial Resource Strain: Low Risk  (2025)    Overall Financial Resource Strain (CARDIA)     Difficulty of Paying Living Expenses: Not hard at all   Food Insecurity: No Food Insecurity (2025)    Hunger Vital Sign     Worried About Running Out of Food in the Last Year: Never true     Ran Out of Food in the Last Year: Never true   Transportation Needs: No Transportation Needs (2024)    TRANSPORTATION NEEDS     Transportation : No   Physical Activity: Insufficiently Active (2025)    Exercise Vital Sign     Days of Exercise per Week: 2 days     Minutes of Exercise per Session: 30 min   Stress: No Stress Concern Present (2025)    [employer] - Occupational Stress Questionnaire     Feeling of Stress : Not at all   Housing Stability: Low Risk  (2025)    Housing Stability Vital Sign     Unable to Pay for Housing in the Last Year: No     Homeless in the Last Year: No       Family  History   Problem Relation Name Age of Onset    Heart disease Father      Hypertension Father      Heart disease Brother         Past Surgical History:   Procedure Laterality Date    bladder tumor removal      CARDIAC ELECTROPHYSIOLOGY MAPPING AND ABLATION      CARDIOVERSION      several    CATARACT EXTRACTION Bilateral     cataract removal with IOL implants Bilateral     CHOLECYSTECTOMY      CYSTOSCOPIC LITHOLAPAXY N/A 12/28/2021    Procedure: CYSTOLITHOLAPAXY;  Surgeon: Medardo Garcia MD;  Location: [facility];  Service: Urology;  Laterality: N/A;    CYSTOSCOPIC LITHOLAPAXY N/A 5/14/2024    Procedure: CYSTOLITHOLAPAXY;  Surgeon: Medardo Garcia MD;  Location: [facility];  Service: Urology;  Laterality: N/A;    CYSTOSCOPY      CYSTOSCOPY WITH BIOPSY OF BLADDER Right 12/28/2021    Procedure: CYSTOSCOPY, WITH BLADDER BIOPSY, WITH FULGURATION IF INDICATED;  Surgeon: Medardo Garcia MD;  Location: [facility];  Service: Urology;  Laterality: Right;    CYSTOTOMY, WITH FULGURATION AND RADIOACTIVE MATERIAL INSERTION N/A 12/3/2024    Procedure: CYSTOTOMY, WITH FULGURATION AND RADIOACTIVE MATERIAL INSERTION;  Surgeon: Medardo Garcia MD;  Location: [facility];  Service: Urology;  Laterality: N/A;    ESOPHAGOGASTRODUODENOSCOPY N/A 10/25/2021    Procedure: Small Jason Enteroscopy (SBE);  Surgeon: Isabelle Griffin MD;  Location: [facility];  Service: Endoscopy;  Laterality: N/A;    ESOPHAGOGASTRODUODENOSCOPY N/A 12/13/2021    Procedure: EGD (ESOPHAGOGASTRODUODENOSCOPY);  Surgeon: Troy Polanco MD;  Location: [facility];  Service: Endoscopy;  Laterality: N/A;    ESOPHAGOGASTRODUODENOSCOPY N/A 4/5/2024    Procedure: EGD (ESOPHAGOGASTRODUODENOSCOPY);  Surgeon: Myrtle Salcedo MD;  Location: [facility];  Service: Endoscopy;  Laterality: N/A;    ESOPHAGOGASTRODUODENOSCOPY N/A 4/22/2024    Procedure: EGD (ESOPHAGOGASTRODUODENOSCOPY);  Surgeon: Myrtle Salcedo MD;  Location: [facility];  Service: Endoscopy;   Laterality: N/A;  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    EYE SURGERY      INTRALUMINAL GASTROINTESTINAL TRACT IMAGING VIA CAPSULE N/A 12/6/2021    Procedure: IMAGING PROCEDURE, GI TRACT, INTRALUMINAL, VIA CAPSULE;  Surgeon: Larry Jones RN;  Location: [facility] ENDO;  Service: Endoscopy;  Laterality: N/A;    INTRALUMINAL GASTROINTESTINAL TRACT IMAGING VIA CAPSULE N/A 4/18/2024    Procedure: IMAGING PROCEDURE, GI TRACT, INTRALUMINAL, VIA CAPSULE;  Surgeon: Larry Jones RN;  Location: [facility] ENDO;  Service: Endoscopy;  Laterality: N/A;    OCCLUSION OF LEFT ATRIAL APPENDAGE N/A 11/14/2024    Procedure: Left atrial appendage occlusion;  Surgeon: Aroldo Ortiz MD;  Location: [facility] EP LAB;  Service: Cardiology;  Laterality: N/A;  afib, watchman, BSCI, venkatesh, anes, MB, 3prep    SMALL BOWEL ENTEROSCOPY N/A 9/26/2024    Procedure: ENTEROSCOPY--prximal DBE;  Surgeon: Eduardo Ruff MD;  Location: [facility] ENDO;  Service: Endoscopy;  Laterality: N/A;    TRANSESOPHAGEAL ECHOCARDIOGRAPHY N/A 11/14/2024    Procedure: ECHOCARDIOGRAM, TRANSESOPHAGEAL;  Surgeon: Aroldo Martinez MD;  Location: [facility] EP LAB;  Service: Cardiology;  Laterality: N/A;    TRANSESOPHAGEAL ECHOCARDIOGRAPHY N/A 12/30/2024    Procedure: ECHOCARDIOGRAM, TRANSESOPHAGEAL;  Surgeon: Carlos Flower MD;  Location: [facility] CATH LAB;  Service: Cardiology;  Laterality: N/A;    TRANSURETHRAL RESECTION OF BLADDER TUMOR BY BIPOLAR CAUTERY N/A 7/23/2019    Procedure: TURBT, USING BIPOLAR CAUTERY;  Surgeon: Ritesh Marques MD;  Location: [facility] OR;  Service: Urology;  Laterality: N/A;    TRANSURETHRAL RESECTION OF PROSTATE N/A 5/14/2024    Procedure: TURP (TRANSURETHRAL RESECTION OF PROSTATE);  Surgeon: Medardo Garcia MD;  Location: [facility] OR;  Service: Urology;  Laterality: N/A;    TURBT (TRANSURETHRAL RESECTION OF BLADDER TUMOR) N/A 12/3/2024    Procedure: TURBT (TRANSURETHRAL RESECTION OF BLADDER TUMOR);  Surgeon: Medardo Garcia MD;  Location: [facility]  OR;  Service: Urology;  Laterality: N/A;       Past Medical History:   Diagnosis Date    Anemia     Anticoagulant long-term use     Atrial fibrillation     AVM (arteriovenous malformation) of small bowel, 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 Treated with APC.   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   HENT:  Negative for mouth sores and nosebleeds.    Eyes:  Negative for visual disturbance.   Respiratory:  Negative for cough and shortness of breath.    Cardiovascular:  Negative for chest pain.   Gastrointestinal:  Negative for abdominal pain, anal bleeding, blood in stool, constipation and diarrhea.   Endocrine: Negative.    Genitourinary:  Negative for frequency and hematuria.   Musculoskeletal:  Negative for back pain.   Skin:  Negative for rash.   Allergic/Immunologic: Negative.    Neurological:  Negative for headaches.   Hematological:  Negative for adenopathy.   Psychiatric/Behavioral:  The patient is not nervous/anxious.           Medication List with Changes/Refills   Current Medications    AMIODARONE (PACERONE) 200 MG TAB    Take 1 tablet (200 mg total) by mouth once daily.    AMLODIPINE (NORVASC) 2.5 MG TABLET    Take 1 tablet (2.5 mg total) by mouth once daily.    ASPIRIN (ECOTRIN) 81 MG EC TABLET    Take 1 tablet  (81 mg total) by mouth once daily.    ATORVASTATIN (LIPITOR) 20 MG TABLET    Take 1 tablet (20 mg total) by mouth every evening.    CARVEDILOL (COREG) 6.25 MG TABLET    Take 1 tablet (6.25 mg total) by mouth 2 (two) times daily with meals.    CHOLECALCIFEROL, VITAMIN D3, (VITAMIN D3 ORAL)    Take 1 tablet by mouth every other day.    DONEPEZIL (ARICEPT) 5 MG TABLET    Take 1 tablet (5 mg total) by mouth once daily.    HYDRALAZINE (APRESOLINE) 50 MG TABLET    Take 1 tablet (50 mg total) by mouth every 8 (eight) hours.    MULTIVITAMIN (THERAGRAN) PER TABLET    Take 1 tablet by mouth once daily.    OXYCODONE-ACETAMINOPHEN (PERCOCET) 5-325 MG PER TABLET    Take 1 tablet by mouth every 4 (four) hours as needed for Pain.    PANTOPRAZOLE (PROTONIX) 20 MG TABLET    Take 2 tablets (40 mg total) by mouth once daily.    PHENAZOPYRIDINE (PYRIDIUM) 200 MG TABLET    Take 1 tablet (200 mg total) by mouth 3 (three) times daily as needed for Pain.        Objective:   There were no vitals filed for this visit.    Physical Exam  Constitutional:       Appearance: Normal appearance.   Pulmonary:      Effort: Pulmonary effort is normal.   Neurological:      Mental Status: He is alert and oriented to person, place, and time.   Psychiatric:         Mood and Affect: Mood normal.         Behavior: Behavior normal.         Thought Content: Thought content normal.         Judgment: Judgment normal.         Assessment:     Problem List Items Addressed This Visit    None        Lab Results   Component Value Date    WBC 9.40 01/09/2025    RBC 4.43 (L) 01/09/2025    HGB 11.6 (L) 01/09/2025    HCT 37.9 (L) 01/09/2025    MCV 86 01/09/2025    MCH 26.2 (L) 01/09/2025    MCHC 30.6 (L) 01/09/2025    RDW 18.9 (H) 01/09/2025     (L) 01/09/2025    MPV SEE COMMENT 01/09/2025    GRAN 7.2 01/09/2025    GRAN 77.1 (H) 01/09/2025    LYMPH 0.6 (L) 01/09/2025    LYMPH 6.3 (L) 01/09/2025    MONO 1.3 (H) 01/09/2025    MONO 14.1 01/09/2025    EOS 0.0  01/09/2025    BASO 0.02 01/09/2025    EOSINOPHIL 0.3 01/09/2025    BASOPHIL 0.2 01/09/2025      Lab Results   Component Value Date     01/09/2025    K 3.9 01/09/2025     01/09/2025    CO2 26 01/09/2025    BUN 18 01/09/2025    CREATININE 1.2 01/09/2025    CALCIUM 9.3 01/09/2025    ANIONGAP 10 01/09/2025    ESTGFRAFRICA >60 12/08/2021    EGFRNONAA 56 (A) 12/08/2021     Lab Results   Component Value Date    ALT 23 01/09/2025    AST 21 01/09/2025    ALKPHOS 69 01/09/2025    BILITOT 0.8 01/09/2025     Lab Results   Component Value Date    IRON 16 (L) 01/09/2025    TRANSFERRIN 194 (L) 01/09/2025    TIBC 287 01/09/2025    FESATURATED 6 (L) 01/09/2025    FERRITIN 115 01/09/2025      MMA elevated   Plan:   There are no diagnoses linked to this encounter.    Med Onc Chart Routing      Follow up with physician . F/u about 5 weeks after last dose of IV iron with oncologist to discuss results   Follow up with [facility]    Infusion scheduling note   schedule venofer 200 mg IV weekly x 5 weeks at the [facility] - please schedule for 9am or after due to transportation issues   Injection scheduling note n/a   Labs   Scheduling:  Preferred lab:  Lab interval:  Cbc, cmp, iron studies   Imaging   N/a   Pharmacy appointment No pharmacy appointment needed      Other referrals       No additional referrals needed  n/a           Continue b12 3081-1636 mcg SL daily.  Schedule venofer 200 mg very slow IV push over 8-10 minutes weekly x 5 doses.  Continue ferrous sulfate 325 mg by mouth daily until first dose of IV iron then stop oral iron until follow up.  Labs 5 weeks after last dose of IV iron with f/u with oncologist after to discuss results.  May coordinate appointment with oncology.  No need to see both oncology and hematology.         Total time spent on encounter: 39 minutes    Collaborating Provider:  Dr. Gordo Reyes    Thank You,  Turner Walton, FNP-C  Benign Hematology

## 2025-01-13 NOTE — TELEPHONE ENCOUNTER
Interventional Radiology  Rescheduled 1:00PM neph tube placement for 1/17/25 w/patient's son, Hector.  Instructed that pt. needs to arrive by 11:30AM to hospital off [address], he must have a ride (Hector will bring pt.) and NPO after 4:00AM the day of the procedure.  Pt. took last dose of ASA on 1/13/25.  Explained that pt. can take morning medications the day of the procedure with a small sip of water.  Pt. does not take other blood thinners, NSAIDS, fish oil, or GLP-1/GIP agonists.  Hector verbalized understanding of all instructions.

## 2025-01-14 ENCOUNTER — PATIENT MESSAGE (OUTPATIENT)
Dept: UROLOGY | Facility: CLINIC | Age: 85
End: 2025-01-14
Payer: MEDICARE

## 2025-01-15 ENCOUNTER — OFFICE VISIT (OUTPATIENT)
Dept: PODIATRY | Facility: CLINIC | Age: 85
End: 2025-01-15
Payer: MEDICARE

## 2025-01-15 ENCOUNTER — PATIENT MESSAGE (OUTPATIENT)
Dept: INFUSION THERAPY | Facility: HOSPITAL | Age: 85
End: 2025-01-15
Payer: MEDICARE

## 2025-01-15 DIAGNOSIS — E11.42 TYPE 2 DIABETES MELLITUS WITH PERIPHERAL NEUROPATHY: ICD-10-CM

## 2025-01-15 DIAGNOSIS — Z79.01 ANTICOAGULATED: ICD-10-CM

## 2025-01-15 DIAGNOSIS — L97.512 ULCER OF RIGHT SECOND TOE WITH FAT LAYER EXPOSED: Primary | ICD-10-CM

## 2025-01-15 PROCEDURE — 1126F AMNT PAIN NOTED NONE PRSNT: CPT | Mod: CPTII,S$GLB,, | Performed by: PODIATRIST

## 2025-01-15 PROCEDURE — 11042 DBRDMT SUBQ TIS 1ST 20SQCM/<: CPT | Mod: S$GLB,,, | Performed by: PODIATRIST

## 2025-01-15 PROCEDURE — 99999 PR PBB SHADOW E&M-EST. PATIENT-LVL III: CPT | Mod: PBBFAC,,, | Performed by: PODIATRIST

## 2025-01-15 PROCEDURE — 1159F MED LIST DOCD IN RCRD: CPT | Mod: CPTII,S$GLB,, | Performed by: PODIATRIST

## 2025-01-15 PROCEDURE — 1160F RVW MEDS BY RX/DR IN RCRD: CPT | Mod: CPTII,S$GLB,, | Performed by: PODIATRIST

## 2025-01-15 PROCEDURE — 1157F ADVNC CARE PLAN IN RCRD: CPT | Mod: CPTII,S$GLB,, | Performed by: PODIATRIST

## 2025-01-15 PROCEDURE — 3072F LOW RISK FOR RETINOPATHY: CPT | Mod: CPTII,S$GLB,, | Performed by: PODIATRIST

## 2025-01-15 PROCEDURE — 99213 OFFICE O/P EST LOW 20 MIN: CPT | Mod: 25,S$GLB,, | Performed by: PODIATRIST

## 2025-01-15 NOTE — PROGRESS NOTES
Subjective:       Patient ID: Jose Miguel Alvarez Jr. is a 84 y.o. male.    Chief Complaint: Diabetic Foot Ulcer (Right 2nd toe: c/o denies pain, pt is diabetic, no draining or swelling at present. )    HPI: Jose Miguel Alvarez Jr. presents to the office today with a small wound to the distal aspect of the right 2nd toe.  States that this recently reoccurred without incident.  He continues on Xarelto followed by cardiology and Dr. Flower.  A being worked up for bladder surgery.    Review of patient's allergies indicates:   Allergen Reactions    Feraheme [ferumoxytol] Anaphylaxis       Past Medical History:   Diagnosis Date    Anemia     Anticoagulant long-term use     Atrial fibrillation     AVM (arteriovenous malformation) of small bowel, acquired with hemorrhage 2024    EGD 24: multiple small gastric AVMs in the antrim and fundus. Treated with APC.   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 limited to breakdown of skin 2023    Stage 3 chronic kidney disease 10/06/2017       Family History   Problem Relation Name Age of Onset    Heart disease Father      Hypertension Father      Heart disease Brother         Social History     Socioeconomic History    Marital status:    Tobacco Use    Smoking status: Former     Current packs/day: 0.00     Average packs/day: 2.5 packs/day for 20.0 years (50.0 ttl pk-yrs)     Types: Cigarettes     Start date:      Quit date:      Years since quittin.0    Smokeless tobacco: Never   Substance and Sexual  Activity    Alcohol use: Yes     Alcohol/week: 7.0 standard drinks of alcohol     Types: 7 Cans of beer per week     Comment: occasionally: hold 72 hrs    Drug use: No    Sexual activity: Not Currently     Social Drivers of Health     Financial Resource Strain: Low Risk  (1/13/2025)    Overall Financial Resource Strain (CARDIA)     Difficulty of Paying Living Expenses: Not hard at all   Food Insecurity: No Food Insecurity (1/13/2025)    Hunger Vital Sign     Worried About Running Out of Food in the Last Year: Never true     Ran Out of Food in the Last Year: Never true   Transportation Needs: No Transportation Needs (8/23/2024)    TRANSPORTATION NEEDS     Transportation : No   Physical Activity: Insufficiently Active (1/13/2025)    Exercise Vital Sign     Days of Exercise per Week: 2 days     Minutes of Exercise per Session: 30 min   Stress: No Stress Concern Present (1/13/2025)    [employer] - Occupational Stress Questionnaire     Feeling of Stress : Not at all   Housing Stability: Low Risk  (1/13/2025)    Housing Stability Vital Sign     Unable to Pay for Housing in the Last Year: No     Homeless in the Last Year: No       Past Surgical History:   Procedure Laterality Date    bladder tumor removal      CARDIAC ELECTROPHYSIOLOGY MAPPING AND ABLATION      CARDIOVERSION      several    CATARACT EXTRACTION Bilateral     cataract removal with IOL implants Bilateral     CHOLECYSTECTOMY      CYSTOSCOPIC LITHOLAPAXY N/A 12/28/2021    Procedure: CYSTOLITHOLAPAXY;  Surgeon: Medardo Garcia MD;  Location: [facility] OR;  Service: Urology;  Laterality: N/A;    CYSTOSCOPIC LITHOLAPAXY N/A 5/14/2024    Procedure: CYSTOLITHOLAPAXY;  Surgeon: Medardo Garcia MD;  Location: [facility] OR;  Service: Urology;  Laterality: N/A;    CYSTOSCOPY      CYSTOSCOPY WITH BIOPSY OF BLADDER Right 12/28/2021    Procedure: CYSTOSCOPY, WITH BLADDER BIOPSY, WITH FULGURATION IF INDICATED;  Surgeon: Medardo Garcia  MD;  Location: [facility] OR;  Service: Urology;  Laterality: Right;    CYSTOTOMY, WITH FULGURATION AND RADIOACTIVE MATERIAL INSERTION N/A 12/3/2024    Procedure: CYSTOTOMY, WITH FULGURATION AND RADIOACTIVE MATERIAL INSERTION;  Surgeon: Medardo Garcia MD;  Location: [facility] OR;  Service: Urology;  Laterality: N/A;    ESOPHAGOGASTRODUODENOSCOPY N/A 10/25/2021    Procedure: Small Jason Enteroscopy (SBE);  Surgeon: Isabelle Griffin MD;  Location: [facility] ENDO;  Service: Endoscopy;  Laterality: N/A;    ESOPHAGOGASTRODUODENOSCOPY N/A 12/13/2021    Procedure: EGD (ESOPHAGOGASTRODUODENOSCOPY);  Surgeon: Troy Polanco MD;  Location: [facility] ENDO;  Service: Endoscopy;  Laterality: N/A;    ESOPHAGOGASTRODUODENOSCOPY N/A 4/5/2024    Procedure: EGD (ESOPHAGOGASTRODUODENOSCOPY);  Surgeon: Myrtle Salcedo MD;  Location: [facility];  Service: Endoscopy;  Laterality: N/A;    ESOPHAGOGASTRODUODENOSCOPY N/A 4/22/2024    Procedure: EGD (ESOPHAGOGASTRODUODENOSCOPY);  Surgeon: Myrtle Salcedo MD;  Location: [facility];  Service: Endoscopy;  Laterality: N/A;  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    EYE SURGERY      INTRALUMINAL GASTROINTESTINAL TRACT IMAGING VIA CAPSULE N/A 12/6/2021    Procedure: IMAGING PROCEDURE, GI TRACT, INTRALUMINAL, VIA CAPSULE;  Surgeon: Larry Jones RN;  Location: [facility] ENDO;  Service: Endoscopy;  Laterality: N/A;    INTRALUMINAL GASTROINTESTINAL TRACT IMAGING VIA CAPSULE N/A 4/18/2024    Procedure: IMAGING PROCEDURE, GI TRACT, INTRALUMINAL, VIA CAPSULE;  Surgeon: Larry Jones RN;  Location: [facility] ENDO;  Service: Endoscopy;  Laterality: N/A;    OCCLUSION OF LEFT ATRIAL APPENDAGE N/A 11/14/2024    Procedure: Left atrial appendage occlusion;  Surgeon: Aroldo Ortiz MD;  Location: [facility] EP LAB;  Service: Cardiology;  Laterality: N/A;  afib, watchman, BSCI, venkatesh, anes, MB, 3prep    SMALL BOWEL ENTEROSCOPY N/A 9/26/2024    Procedure: ENTEROSCOPY--prximal DBE;  Surgeon: Eduardo Ruff MD;   Location: [facility] ENDO;  Service: Endoscopy;  Laterality: N/A;    TRANSESOPHAGEAL ECHOCARDIOGRAPHY N/A 11/14/2024    Procedure: ECHOCARDIOGRAM, TRANSESOPHAGEAL;  Surgeon: Aroldo Martinez MD;  Location: [facility] EP LAB;  Service: Cardiology;  Laterality: N/A;    TRANSESOPHAGEAL ECHOCARDIOGRAPHY N/A 12/30/2024    Procedure: ECHOCARDIOGRAM, TRANSESOPHAGEAL;  Surgeon: Carlos Flower MD;  Location: [facility] CATH LAB;  Service: Cardiology;  Laterality: N/A;    TRANSURETHRAL RESECTION OF BLADDER TUMOR BY BIPOLAR CAUTERY N/A 7/23/2019    Procedure: TURBT, USING BIPOLAR CAUTERY;  Surgeon: Ritesh Marques MD;  Location: [facility] OR;  Service: Urology;  Laterality: N/A;    TRANSURETHRAL RESECTION OF PROSTATE N/A 5/14/2024    Procedure: TURP (TRANSURETHRAL RESECTION OF PROSTATE);  Surgeon: Medardo Garcia MD;  Location: [facility] OR;  Service: Urology;  Laterality: N/A;    TURBT (TRANSURETHRAL RESECTION OF BLADDER TUMOR) N/A 12/3/2024    Procedure: TURBT (TRANSURETHRAL RESECTION OF BLADDER TUMOR);  Surgeon: Medardo Garcia MD;  Location: [facility] OR;  Service: Urology;  Laterality: N/A;     Review of Systems     Objective:   There were no vitals taken for this visit.    Transesophageal echo (FIOR) with possible cardioversion    Left Ventricle: The left ventricle is normal in size. There is normal   systolic function with a visually estimated ejection fraction of 55 - 60%.    Right Ventricle: Systolic function is normal.    Left Atrium: Left atrium is dilated. An iatrogenic atrial septal defect   (ASD) is present with left to right shunting indicated by color flow   Doppler. There is a closure device within the appendage. The device is a   Watchman. There is complete occlusion with no residual leaks.    Aortic Valve: There is mild aortic regurgitation.    Tricuspid Valve: There is mild regurgitation.    Aorta: Aortic root is normal in size measuring 3.6 cm. Ascending aorta   is mildly dilated measuring 4.3 cm.     Physical Exam   LOWER  EXTREMITY PHYSICAL EXAMINATION    Vascular:  The right dorsalis pedis pulse 2/4 and the right posterior tibial pulse 2/4.   Capillary refill is intact.  Pedal hair growth intact    Musculoskeletal: Manual Muscle Testing is 5/5 with dorsiflexion, plantar flexion, abduction, and adduction.   There is normal range of motion in the forefoot, hindfoot, and Ankle joint.      Dermatological:  Skin is supple, moist, intact.  No evidence of discoloration the distal aspect the right 2nd toe.  Overlying hyperkeratosis noted.  Small wound noted underlying.  Post debridement, the wound measures 0.1 x 0.3cm. No probe bone.  No erythema.  Wound depth 0.05 cm.      Assessment:     1. Ulcer of right second toe with fat layer exposed    2. Type 2 diabetes mellitus with peripheral neuropathy    3. Anticoagulated      Plan:     Ulcer of right second toe with fat layer exposed  -     Ambulatory referral/consult to Home Health; Future; Expected date: 01/16/2025    Type 2 diabetes mellitus with peripheral neuropathy    Anticoagulated      Thorough discussion is had with the patient today, concerning the diagnosis, its etiology, and the treatment algorithm at present.    The wound was surgically debrided after adequate prep with alcohol and/or betadine paint. Excisional wound debridement was performed using sharp #10/#15 blade/rounded scalpel and tissue nipper, with removal of all non-viable skin and soft tissues; necrotic skin/tissue formation. The woundbase/wound bed was also debrided to encourage bleeding as to promote/stimulate healing. Debridement was excisional and included epidermal, dermal and subcutaneous tissues. Post debridement measurements are as above. Hemostasis was achieved. Patient tolerated procedure well and without complications. Local woundcare with antibiotic cream and dry dressings and bandage thereafter.     Will refer patient to home health to assist with dressing changes  Dressing change instructions.    Thorough  discussion is had with the patient concerning the diagnosis, its etiology, and the treatment algorithm at present. Shoe inspection. Foot Education. Patient reminded of the importance of good nutrition and blood sugar control to help prevent podiatric complications of diabetes. Patient instructed on proper foot hygeine. We discussed wearing proper and supportive shoe gear, daily foot inspections, never walking barefooted or sock footed, never putting sharp instruments to feet which can cause major complications associated with infection, ulcers, lacerations.    Follow-up in 1-2 weeks      Future Appointments   Date Time Provider Department Center   1/17/2025 10:00 AM Frederick Singh MD [facility] RAD ONC [facility]   1/17/2025 11:00 AM Fabián Westbrook MD [facility] HEM ONC [facility]   1/17/2025  1:00 PM [facility] IR1 [facility] RAD  [facility]   1/22/2025 11:45 AM Medardo Garcia MD ON UROLOGY  [facility]   1/23/2025 11:45 AM Xiao Bowman DPM ON POD  [facility]   1/29/2025 11:00 AM Carlos Flower MD [facility] CARDIO [facility]   2/28/2025 10:20 AM Toshia Valladares MD [facility] IM [facility]   3/12/2025  1:00 PM Aroldo Ortiz MD ON ARR  [facility]

## 2025-01-16 ENCOUNTER — TELEPHONE (OUTPATIENT)
Dept: RADIOLOGY | Facility: HOSPITAL | Age: 85
End: 2025-01-16
Payer: MEDICARE

## 2025-01-16 NOTE — TELEPHONE ENCOUNTER
Interventional Radiology  Called pt.'s son, Hector, to confirm the appointment tomorrow, 1/17/25 at 1:00PM.  LVM with all instructions.  Pt. is to arrive by 11:30AM to the hospital ([address]/ [address]) off [address] in [place].  Pt. must have a ride and be NPO after 4:00AM tomorrow.  Pt. should have stopped ASA on 1/13/25.

## 2025-01-17 ENCOUNTER — HOSPITAL ENCOUNTER (OUTPATIENT)
Dept: RADIOLOGY | Facility: HOSPITAL | Age: 85
Discharge: HOME | End: 2025-01-17
Attending: UROLOGY
Payer: MEDICARE

## 2025-01-17 ENCOUNTER — INITIAL CONSULT (OUTPATIENT)
Dept: RADIATION ONCOLOGY | Facility: CLINIC | Age: 85
End: 2025-01-17
Payer: MEDICARE

## 2025-01-17 ENCOUNTER — OFFICE VISIT (OUTPATIENT)
Dept: HEMATOLOGY/ONCOLOGY | Facility: CLINIC | Age: 85
End: 2025-01-17
Payer: MEDICARE

## 2025-01-17 VITALS
SYSTOLIC BLOOD PRESSURE: 154 MMHG | HEART RATE: 59 BPM | BODY MASS INDEX: 28.54 KG/M2 | TEMPERATURE: 97 F | DIASTOLIC BLOOD PRESSURE: 73 MMHG | HEIGHT: 72 IN | RESPIRATION RATE: 19 BRPM | OXYGEN SATURATION: 97 % | WEIGHT: 210.75 LBS

## 2025-01-17 VITALS
SYSTOLIC BLOOD PRESSURE: 135 MMHG | DIASTOLIC BLOOD PRESSURE: 76 MMHG | WEIGHT: 210 LBS | HEART RATE: 48 BPM | BODY MASS INDEX: 28.44 KG/M2 | OXYGEN SATURATION: 98 % | RESPIRATION RATE: 16 BRPM | HEIGHT: 72 IN

## 2025-01-17 VITALS
HEART RATE: 58 BPM | OXYGEN SATURATION: 96 % | WEIGHT: 210.88 LBS | HEIGHT: 72 IN | TEMPERATURE: 98 F | SYSTOLIC BLOOD PRESSURE: 145 MMHG | BODY MASS INDEX: 28.56 KG/M2 | DIASTOLIC BLOOD PRESSURE: 75 MMHG

## 2025-01-17 DIAGNOSIS — Z85.51 HISTORY OF BLADDER CANCER: ICD-10-CM

## 2025-01-17 DIAGNOSIS — D61.818 PANCYTOPENIA: ICD-10-CM

## 2025-01-17 DIAGNOSIS — D50.0 IRON DEFICIENCY ANEMIA SECONDARY TO BLOOD LOSS (CHRONIC): ICD-10-CM

## 2025-01-17 DIAGNOSIS — E78.5 DM TYPE 2 WITH DIABETIC DYSLIPIDEMIA: ICD-10-CM

## 2025-01-17 DIAGNOSIS — I10 PRIMARY HYPERTENSION: ICD-10-CM

## 2025-01-17 DIAGNOSIS — C67.9 UROTHELIAL CARCINOMA OF BLADDER: Primary | ICD-10-CM

## 2025-01-17 DIAGNOSIS — C67.9 MALIGNANT NEOPLASM OF URINARY BLADDER, UNSPECIFIED SITE: ICD-10-CM

## 2025-01-17 DIAGNOSIS — E11.69 DM TYPE 2 WITH DIABETIC DYSLIPIDEMIA: ICD-10-CM

## 2025-01-17 DIAGNOSIS — K31.811 GASTROINTESTINAL HEMORRHAGE ASSOCIATED WITH ANGIODYSPLASIA OF STOMACH AND DUODENUM: ICD-10-CM

## 2025-01-17 DIAGNOSIS — F02.80 DEMENTIA ASSOCIATED WITH OTHER UNDERLYING DISEASE, WITHOUT BEHAVIORAL DISTURBANCE, PSYCHOTIC DISTURBANCE, MOOD DISTURBANCE, OR ANXIETY, UNSPECIFIED DEMENTIA SEVERITY: ICD-10-CM

## 2025-01-17 PROBLEM — D72.829 LEUKOCYTOSIS: Status: RESOLVED | Noted: 2024-04-02 | Resolved: 2025-01-17

## 2025-01-17 PROCEDURE — C1769 GUIDE WIRE: HCPCS

## 2025-01-17 PROCEDURE — 1101F PT FALLS ASSESS-DOCD LE1/YR: CPT | Mod: CPTII,S$GLB,, | Performed by: HOSPITALIST

## 2025-01-17 PROCEDURE — 3072F LOW RISK FOR RETINOPATHY: CPT | Mod: CPTII,S$GLB,, | Performed by: HOSPITALIST

## 2025-01-17 PROCEDURE — 1126F AMNT PAIN NOTED NONE PRSNT: CPT | Mod: CPTII,S$GLB,, | Performed by: HOSPITALIST

## 2025-01-17 PROCEDURE — 50695 PLMT URETERAL STENT PRQ: CPT | Mod: RT | Performed by: RADIOLOGY

## 2025-01-17 PROCEDURE — 63600175 PHARM REV CODE 636 W HCPCS: Performed by: RADIOLOGY

## 2025-01-17 PROCEDURE — 99999 PR PBB SHADOW E&M-EST. PATIENT-LVL IV: CPT | Mod: PBBFAC,,, | Performed by: SPECIALIST

## 2025-01-17 PROCEDURE — 25500020 PHARM REV CODE 255: Performed by: RADIOLOGY

## 2025-01-17 PROCEDURE — 3288F FALL RISK ASSESSMENT DOCD: CPT | Mod: CPTII,S$GLB,, | Performed by: HOSPITALIST

## 2025-01-17 PROCEDURE — 3078F DIAST BP <80 MM HG: CPT | Mod: CPTII,S$GLB,, | Performed by: HOSPITALIST

## 2025-01-17 PROCEDURE — 99215 OFFICE O/P EST HI 40 MIN: CPT | Mod: S$GLB,,, | Performed by: HOSPITALIST

## 2025-01-17 PROCEDURE — 99999 PR PBB SHADOW E&M-EST. PATIENT-LVL IV: CPT | Mod: PBBFAC,,, | Performed by: HOSPITALIST

## 2025-01-17 PROCEDURE — 1157F ADVNC CARE PLAN IN RCRD: CPT | Mod: CPTII,S$GLB,, | Performed by: HOSPITALIST

## 2025-01-17 PROCEDURE — G2211 COMPLEX E/M VISIT ADD ON: HCPCS | Mod: S$GLB,,, | Performed by: HOSPITALIST

## 2025-01-17 PROCEDURE — 99205 OFFICE O/P NEW HI 60 MIN: CPT | Mod: S$GLB,,, | Performed by: SPECIALIST

## 2025-01-17 PROCEDURE — 3077F SYST BP >= 140 MM HG: CPT | Mod: CPTII,S$GLB,, | Performed by: HOSPITALIST

## 2025-01-17 RX ORDER — FENTANYL CITRATE 50 UG/ML
INJECTION, SOLUTION INTRAMUSCULAR; INTRAVENOUS CODE/TRAUMA/SEDATION MEDICATION
Status: COMPLETED | OUTPATIENT
Start: 2025-01-17 | End: 2025-01-17

## 2025-01-17 RX ORDER — DIPHENHYDRAMINE HYDROCHLORIDE 50 MG/ML
INJECTION INTRAMUSCULAR; INTRAVENOUS CODE/TRAUMA/SEDATION MEDICATION
Status: COMPLETED | OUTPATIENT
Start: 2025-01-17 | End: 2025-01-17

## 2025-01-17 RX ORDER — DILTIAZEM HYDROCHLORIDE 360 MG/1
CAPSULE, EXTENDED RELEASE ORAL
COMMUNITY
Start: 2024-10-15

## 2025-01-17 RX ORDER — FERROUS SULFATE 325(65) MG
TABLET ORAL
COMMUNITY
Start: 2025-01-07

## 2025-01-17 RX ORDER — MIDAZOLAM HYDROCHLORIDE 1 MG/ML
INJECTION, SOLUTION INTRAMUSCULAR; INTRAVENOUS CODE/TRAUMA/SEDATION MEDICATION
Status: COMPLETED | OUTPATIENT
Start: 2025-01-17 | End: 2025-01-17

## 2025-01-17 RX ORDER — METOPROLOL TARTRATE 50 MG/1
TABLET ORAL
COMMUNITY
Start: 2024-10-14

## 2025-01-17 RX ADMIN — DIPHENHYDRAMINE HYDROCHLORIDE 50 MG: 50 INJECTION INTRAMUSCULAR; INTRAVENOUS at 01:01

## 2025-01-17 RX ADMIN — MIDAZOLAM HYDROCHLORIDE 0.5 MG: 1 INJECTION, SOLUTION INTRAMUSCULAR; INTRAVENOUS at 01:01

## 2025-01-17 RX ADMIN — MIDAZOLAM HYDROCHLORIDE 1 MG: 1 INJECTION, SOLUTION INTRAMUSCULAR; INTRAVENOUS at 01:01

## 2025-01-17 RX ADMIN — FENTANYL CITRATE 25 MCG: 50 INJECTION, SOLUTION INTRAMUSCULAR; INTRAVENOUS at 01:01

## 2025-01-17 RX ADMIN — IOHEXOL 75 ML: 350 INJECTION, SOLUTION INTRAVENOUS at 01:01

## 2025-01-17 RX ADMIN — FENTANYL CITRATE 50 MCG: 50 INJECTION, SOLUTION INTRAMUSCULAR; INTRAVENOUS at 01:01

## 2025-01-17 NOTE — ASSESSMENT & PLAN NOTE
Patient appears generally well despite advanced age and mild dementia. 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 Given age he is not a good candidate for cystectomy. Although presence of hydronephrosis is less than ideal for concurrent chemoradiation, seems as though he could tolerate and he does want to try a shot at definitive therapy. Will plan to start concurrent weekly cisplatin with bladder irradiation.     - PET CT to complete staging  - FU with chemotherapy education 1/23/25  - FU with me 1/31/25 to confirm treamtent plan  - Tentative plan to start CRT with weekly cisplatin 2/3/25; will confirm with rad onc and urology

## 2025-01-17 NOTE — DISCHARGE INSTRUCTIONS
Please return to ER if any of these symptoms occur:  Fever over 101 degrees,  Bleeding from the puncture site not controlled, (Hold pressure for 5 minutes, if bleeding does not stop then go to the ER.)  Pain not controlled with Aleve or Tylenol,    No driving for 24 hours after procedure due to sedation given during procedure.     Do not lift anything heavy, nothing over the size of a gallon of milk, for at least 2 days.    Do not submerge in standing water.     Resume home medications and diet    Please follow up with Dr. Garcia for results and any other questions or concerns that you may have.

## 2025-01-17 NOTE — H&P (VIEW-ONLY)
[facility] / [facility] - Radiation Oncology Consult Note    HISTORY OF PRESENT ILLNESS:  84-year-old man with longstanding superficial bladder cancer, since presenting to Dr. Garcia with gross hematuria and positive biopsy 07/23/2019.  Per his note on 12/27/2024:    1. History of bladder cancer-  pT1 high turbt with mitomycin  12/3/24,  BCG 3/22, High pTa, but suspicious for T1 cystoscopy with bladder biopsy, vesical litholapaxy, could not assess the right ureter  12/28/21.  TURBT 5-6 years ago at the outside facility with induction BCG.     Patient now has high-grade T1 disease, no muscle present.  Given the fact that he has right ureteral obstruction with no evidence of UO on cystoscopic evaluation, this is highly suspicious for T2 disease.  We have discussed radical cystectomy, at this time due to other comorbidities and his age, he would rather not pursue.  Therefore will refer to Radiation Oncology and Medical Oncology for possible chemoradiation therapy.  In addition due to the obstructed right ureter will proceed with percutaneous nephrostomy with hopefully antegrade stent placement by Interventional Radiology.  Patient is currently asymptomatic in regards to the hydronephrosis, no significant change per imaging.  Creatinine has been relatively stable up to this point.  Will see him back following these consultations, call with any issues in the meantime.     2. Nephrolithiasis- no recurrent stones.     3. BPH-  TURP, vesicolithalopaxy  5/14/24     Patient states that he is voiding well off all medical management.     4. Gross hematuria- intermittent but no evidence today.  Most likely due to the tumors, only on baby aspirin.     5. Erectile dysfunction- given Viagra 100 mg but currently not an issue.    He is also followed by hematology with the following history through 01/13/2025, and plans to transfer all Heme-Onc care to Dr. Hilton in Oncology where he will be seen for initial  evaluation later this morning    9/25/2024 Presents today to discuss treatment recommendations due to anaphalactic reaction with first dose of Feraheme on 9/9/2024.  Upon initiation of IV iron he started to have severe back pain so infusions was stopped and saline was infusing.  Symptoms resolved and infusions was restarted at a slower rate.  Patient began to lose vision and had a syncopal episode with an extreme drop in blood pressure.  He was give steroid injection and BP began to rise and regained consciousness.  He was transported from facility to hospital via ambulance for evaluation.  Feraheme has been listed as an allergy in his chart.  Review of past iron infusions show that he has received Injectafer infusions in the past without issue in 2021 and he also tells me that he received IV iron after blood transfusion many years ago in the hospital and tolerated fine.  Currently taking ferrous sulfate 325 mg PO every other day and is tolerating well.  He is accompanied by his son. He will be having a special GI procedure tomorrow Upper DBE on 9/26/2024 with dr. Ruff in [place] to assess for and treat angiectasias in the small intestines.  He continues to take Protonix 40 mg po daily.  He denies and known abnormal bleeding and is asymptomatic. VCE in 4/2024 with active small bowel bleeding seen.     10/31/2024 Presents today to evaluate response of oral iron.  Has been taking ferrous sulfate 325 mg PO daily since last visit wtihout complaints.  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  Denies any current known GI bleeding.  Hgb slowly improving - continues to have low ferritin.  Has brain fogginess.  Is accompanied by his son.  Has decided that he would like to get the iron that he received previously that he was able to tolerate.      I1/13/2025 Received venofer 200 mg IV x 4 doses and presents today to evaluate response.  Has been found with recurrent bladder cancer and has  initial evaluation with oncology and radiology upcoming. Hgb continues to trend upward.  Denies any known abnormal bleeding.  Continued stable thrombocytopenia with known borderline splenomegaly.  Saturated iron of 6%.  Had watchman procedure done.  No longer on anticoagulation.   I have reviewed all of the patient's relevant lab work available in the medical record and have utilized this in my evaluation and management recommendations today.     Abdominopelvic CT on renal stone protocol on 11/18/2024 described focal thickening along the posterior bladder wall, asymmetric to the right, producing moderate right-sided hydronephrosis and hydroureter.        Retroperitoneal ultrasound on 12/23/2024 shows right-sided hydronephrosis consistent with known right-sided bladder mass at the UVJ.  Normal left side      REVIEW OF SYSTEMS:  He takes Flomax and describes good urinary function without hematuria.  He has no pelvic or other pain or any awareness of his cancer or hydroureter.  He remains active without limitation.  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 Treated with APC.   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 breakdown of skin 01/26/2023    Stage 3 chronic kidney disease 10/06/2017        PAST SURGICAL HISTORY:  Past Surgical History:   Procedure Laterality Date    bladder tumor removal      CARDIAC ELECTROPHYSIOLOGY MAPPING AND ABLATION      CARDIOVERSION      several    CATARACT EXTRACTION Bilateral     cataract removal with IOL implants Bilateral     CHOLECYSTECTOMY      CYSTOSCOPIC LITHOLAPAXY N/A 12/28/2021    Procedure: CYSTOLITHOLAPAXY;  Surgeon: Medardo Garcia MD;  Location: [facility];  Service: Urology;  Laterality: N/A;    CYSTOSCOPIC LITHOLAPAXY N/A 5/14/2024    Procedure: CYSTOLITHOLAPAXY;  Surgeon: Medardo Garcia MD;  Location: [facility];  Service: Urology;  Laterality: N/A;    CYSTOSCOPY      CYSTOSCOPY WITH BIOPSY OF BLADDER Right 12/28/2021    Procedure: CYSTOSCOPY, WITH BLADDER BIOPSY, WITH FULGURATION IF INDICATED;  Surgeon: Medardo Garcia MD;  Location: [facility];  Service: Urology;  Laterality: Right;    CYSTOTOMY, WITH FULGURATION AND RADIOACTIVE MATERIAL INSERTION N/A 12/3/2024    Procedure: CYSTOTOMY, WITH FULGURATION AND RADIOACTIVE MATERIAL INSERTION;  Surgeon: Medardo Garcia MD;  Location: [facility];  Service: Urology;  Laterality: N/A;    ESOPHAGOGASTRODUODENOSCOPY N/A 10/25/2021    Procedure: Small Jason Enteroscopy (SBE);  Surgeon: Isabelle Griffin MD;  Location: [facility];  Service: Endoscopy;  Laterality: N/A;    ESOPHAGOGASTRODUODENOSCOPY N/A 12/13/2021    Procedure: EGD (ESOPHAGOGASTRODUODENOSCOPY);  Surgeon: Troy Polanco MD;  Location: [facility];  Service: Endoscopy;  Laterality: N/A;    ESOPHAGOGASTRODUODENOSCOPY N/A 4/5/2024    Procedure: EGD (ESOPHAGOGASTRODUODENOSCOPY);  Surgeon: Myrtle Salcedo MD;  Location: [facility];  Service: Endoscopy;  Laterality: N/A;    ESOPHAGOGASTRODUODENOSCOPY N/A 4/22/2024    Procedure: EGD (ESOPHAGOGASTRODUODENOSCOPY);  Surgeon: Myrtle Salcedo MD;  Location: [facility];  Service: Endoscopy;  Laterality: N/A;  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    EYE SURGERY      INTRALUMINAL  GASTROINTESTINAL TRACT IMAGING VIA CAPSULE N/A 12/6/2021    Procedure: IMAGING PROCEDURE, GI TRACT, INTRALUMINAL, VIA CAPSULE;  Surgeon: Larry Jones, RN;  Location: [facility] ENDO;  Service: Endoscopy;  Laterality: N/A;    INTRALUMINAL GASTROINTESTINAL TRACT IMAGING VIA CAPSULE N/A 4/18/2024    Procedure: IMAGING PROCEDURE, GI TRACT, INTRALUMINAL, VIA CAPSULE;  Surgeon: Larry Jones RN;  Location: [facility] ENDO;  Service: Endoscopy;  Laterality: N/A;    OCCLUSION OF LEFT ATRIAL APPENDAGE N/A 11/14/2024    Procedure: Left atrial appendage occlusion;  Surgeon: Aroldo Ortiz MD;  Location: [facility] EP LAB;  Service: Cardiology;  Laterality: N/A;  afib, watchman, BSCI, venkatesh, anes, MB, 3prep    SMALL BOWEL ENTEROSCOPY N/A 9/26/2024    Procedure: ENTEROSCOPY--prximal DBE;  Surgeon: Eduardo Ruff MD;  Location: [facility] ENDO;  Service: Endoscopy;  Laterality: N/A;    TRANSESOPHAGEAL ECHOCARDIOGRAPHY N/A 11/14/2024    Procedure: ECHOCARDIOGRAM, TRANSESOPHAGEAL;  Surgeon: Aroldo Martinez MD;  Location: [facility] EP LAB;  Service: Cardiology;  Laterality: N/A;    TRANSESOPHAGEAL ECHOCARDIOGRAPHY N/A 12/30/2024    Procedure: ECHOCARDIOGRAM, TRANSESOPHAGEAL;  Surgeon: Carlos Flower MD;  Location: [facility] CATH LAB;  Service: Cardiology;  Laterality: N/A;    TRANSURETHRAL RESECTION OF BLADDER TUMOR BY BIPOLAR CAUTERY N/A 7/23/2019    Procedure: TURBT, USING BIPOLAR CAUTERY;  Surgeon: Ritesh Marques MD;  Location: [facility] OR;  Service: Urology;  Laterality: N/A;    TRANSURETHRAL RESECTION OF PROSTATE N/A 5/14/2024    Procedure: TURP (TRANSURETHRAL RESECTION OF PROSTATE);  Surgeon: Medardo Garcia MD;  Location: [facility] OR;  Service: Urology;  Laterality: N/A;    TURBT (TRANSURETHRAL RESECTION OF BLADDER TUMOR) N/A 12/3/2024    Procedure: TURBT (TRANSURETHRAL RESECTION OF BLADDER TUMOR);  Surgeon: Medardo Garcia MD;  Location: [facility] OR;  Service: Urology;  Laterality: N/A;       ALLERGIES:   Review of patient's allergies indicates:    Allergen Reactions    Feraheme [ferumoxytol] Anaphylaxis       MEDICATIONS:  Current Outpatient Medications   Medication Sig    amiodarone (PACERONE) 200 MG Tab Take 1 tablet (200 mg total) by mouth once daily.    amLODIPine (NORVASC) 2.5 MG tablet Take 1 tablet (2.5 mg total) by mouth once daily.    aspirin (ECOTRIN) 81 MG EC tablet Take 1 tablet (81 mg total) by mouth once daily.    atorvastatin (LIPITOR) 20 MG tablet Take 1 tablet (20 mg total) by mouth every evening.    carvediloL (COREG) 6.25 MG tablet Take 1 tablet (6.25 mg total) by mouth 2 (two) times daily with meals.    cholecalciferol, vitamin D3, (VITAMIN D3 ORAL) Take 1 tablet by mouth every other day.    donepeziL (ARICEPT) 5 MG tablet Take 1 tablet (5 mg total) by mouth once daily.    hydrALAZINE (APRESOLINE) 50 MG tablet Take 1 tablet (50 mg total) by mouth every 8 (eight) hours.    multivitamin (THERAGRAN) per tablet Take 1 tablet by mouth once daily.    pantoprazole (PROTONIX) 20 MG tablet Take 2 tablets (40 mg total) by mouth once daily.     No current facility-administered medications for this visit.     Facility-Administered Medications Ordered in Other Visits   Medication    lactated ringers infusion       SOCIAL HISTORY:  Social History     Socioeconomic History    Marital status:    Tobacco Use    Smoking status: Former     Current packs/day: 0.00     Average packs/day: 2.5 packs/day for 20.0 years (50.0 ttl pk-yrs)     Types: Cigarettes     Start date:      Quit date:      Years since quittin.0    Smokeless tobacco: Never   Substance and Sexual Activity    Alcohol use: Yes     Alcohol/week: 7.0 standard drinks of alcohol     Types: 7 Cans of beer per week     Comment: occasionally: hold 72 hrs    Drug use: No    Sexual activity: Not Currently     Social Drivers of Health     Financial Resource Strain: Low Risk  (2025)    Overall Financial Resource Strain (CARDIA)     Difficulty of Paying Living Expenses: Not hard  at all   Food Insecurity: No Food Insecurity (1/13/2025)    Hunger Vital Sign     Worried About Running Out of Food in the Last Year: Never true     Ran Out of Food in the Last Year: Never true   Transportation Needs: No Transportation Needs (8/23/2024)    TRANSPORTATION NEEDS     Transportation : No   Physical Activity: Insufficiently Active (1/13/2025)    Exercise Vital Sign     Days of Exercise per Week: 2 days     Minutes of Exercise per Session: 30 min   Stress: No Stress Concern Present (1/13/2025)    [employer] - Occupational Stress Questionnaire     Feeling of Stress : Not at all   Housing Stability: Low Risk  (1/13/2025)    Housing Stability Vital Sign     Unable to Pay for Housing in the Last Year: No     Homeless in the Last Year: No       FAMILY HISTORY:  Family History   Problem Relation Name Age of Onset    Heart disease Father      Hypertension Father      Heart disease Brother       He is accompanied by his attentive son and daughter-in-law    PHYSICAL EXAMINATION:       Vitals:    01/17/25 0949   BP: (!) 154/73   Pulse: (!) 59   Resp: 19   Temp: 97.3 °F (36.3 °C)   SpO2: 97%   Weight: 95.6 kg (210 lb 12.2 oz)   Height: 6' (1.829 m)        General:  A&O x4, NAD   Head:  PERRLA, EOM intact, CN II-XII intact  Lymphatics:  No cervical or supraclavicular adenopathy   Lungs: Clear to auscultation bilaterally   Heart regular:  rate and rhythm  Abdomen:  nontender and nondistended with positive bowel sounds no hepatomegaly  Pelvis:  No inguinal adenopathy, normal phallus scrotal contents, good rectal tone with    KPS:  80    ASSESSMENT:  Young 84-year-old man with a longstanding diagnosis of noninvasive bladder cancer, now likely T2 with dominant site of disease about the right UV junction with obstruction and hydroureter, asymptomatic.  He has good disease insight and good social support    PLAN:  Per Dr. Garcia, he is not a candidate for cystectomy in light of his advanced  age.  I have recommended definitive radiotherapy, and he will meet with Dr. Herrera in Medical Oncology later today to discuss addition of systemic therapy.    We reviewed the logistics of pelvic radiotherapy, including the planning and treatment visits, as well as possible acute and chronic side effects in great detail.    He and his family asked many intelligent questions and he voiced a desire to proceed as described.  Informed written consent was obtained and he was given the original after scanning into the EMR    He will return next week for immobilization and CT simulation and begin treatment 7-10 days later.  I anticipate delivering 55 Gy in 20 fractions.    This afternoon he will undergo percutaneous right ureteral stent placement, hopefully with the immediate internalization.      I spent approximately 60 minutes reviewing the available records and evaluating the patient, out of which over 50% of the time was spent face to face with the patient in counseling and coordinating this patient's care.

## 2025-01-17 NOTE — SEDATION DOCUMENTATION
Procedure completed at this time. VSS, NADN, and pt tolerated procedure well. Bio-patch, sutures, and tegaderm placed to right nephrostomy tube insertion site C/D/I.

## 2025-01-17 NOTE — Clinical Note
Looks better than I was anticipating. Sounds like the plan will be CRT? I can do weekly cisplatin starting ~2/3/25. Does gabriel work for yall?  Fabián

## 2025-01-17 NOTE — PROGRESS NOTES
MEDICAL ONCOLOGY - NEW PATIENT VISIT    Best Contact Phone Number(s): There are no phone numbers on file.     Cancer/Stage/TNM:    Cancer Staging   Urothelial carcinoma of bladder  Staging form: Urinary Bladder, AJCC 7th Edition  - Clinical: Stage II (T2, N0, M0) - Signed by Fabián Westbrook MD on 1/17/2025       Reason for visit: Jose Miguel Alvarez Jr. is a 84 y.o. 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 Has long standing history of localized bladder cancer treated with serial TURBT since at least 2016. 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 He was treated with serial TURBT and induction BCG. More recently found to have recurrent disease with associtaed right sided hydronephrosis. Overall concerning for locally advanced disease. He presents to medical oncology clinic for initial evaluation.    History has been obtained by chart review and discussion with the patient.    HPI:     Overall patient feels well. Has had no further episodes of hematuria since coming off anticoagulation and cystoscopy. No pain with urination. No hesitancy and normal flow. Nocturia about 1x per night. Has some modest urinary urgency. Appetite is good. No N/V/D. Weight is generally stable. Has modest congestion last few weeks with mild cough. No fevers or chills and breathining is comfortable. Lives in [place], [place] with his wife and niece (Rima). Accompanied by son, Hector and Bridget who help manage his medical issues. Remains quite active; walks frequently. Ambulatory and independent. Can walk up stairs. . Continues to play golf 2-3x per week.      He has mild dementia and is on donepezil. Continues to drive. Has atrial fibrillartion; he is on amiodarone, carvedilol, and diltiazem. Also takes amlodipine and hydralazinefor HTN. No longer on anticoagulation since [facility]. Remains on ASA and atorvastatin. No known MI or stroke. . Has iron deficiency anemia and is currently on IV iron.  He has T2DM; A1c very well managed  with diet alone.Had GIB 08/2024. Now resoled. Chronic thrombycytopenai NOS.       Oncology History   Urothelial carcinoma of bladder   5/2014 Procedure    05/14/24 TURP    1. Prostate, chips, transurethral resection:  Prostate with stromal and glandular hyperplasia.    Negative for malignancy.    2. Bladder, stones, removal:  Stones (gross examination only)        4/2016 Procedure    04/27/16  BLADDER TUMOR, TUR:   - HIGH GRADE UROTHELIAL CARCINOMA INVASIVE INTO DEEP MUSCLE.      6/21/2016 Initial Diagnosis    Urothelial carcinoma of bladder     6/2016 Procedure    06/21/16  BLADDER, TUR:   - NO TUMOR SEEN.    - FLORID ACUTE AND CHRONIC ULCERATIVE CYSTITIS WITH POLYPOID    GRANULATION TISSUE FORMATION.      7/2019 Procedure    07/23/19  BLADDER, TUR:   - MINUTE DETACHED FRAGMENT OF HIGH-GRADE CARCINOMA.   - SPECIMEN MOSTLY BLADDER MUCOSA AND WALL TO INCLUDE DEEP MUSCLE    NEGATIVE FOR TUMOR.      12/2021 Procedure    12/28/2021: Cystoscopy  Suspicious mucosa lateral to the right ureteral orifice, biopsied and fulgurated to completion, multiple bladder stones, inability to access the right ureter.       URINARY BLADDER TUMOR, BIOPSIES:   -  High-grade papillary urothelial carcinoma with foci suspicious for, but   not diagnostic of, superficial lamina propria invasion (see comment).   -  No muscularis propria is present for evaluation.      3/2022 Procedure    03/2022  BCG     4/2024 Imaging Significant Findings    04/03/2024 CTU  Impression:   Moderate bladder distension with mild to moderate bilateral hydronephrosis thought to reflect reflux disease.  No definite ureteral stone.  Bladder stone is noted.  Mild thickening of the posterior bladder wall without discrete mass.  Delayed contrast excretion seen within the kidneys.     11/2024 Imaging Significant Findings    11/18/24 CT Renal Stone  Impression:  - Focal thickening seen along the posterior bladder wall asymmetric to the right which produces moderate  right-sided hydronephrosis and hydroureter. Findings concerning for bladder mass.  Recommend cystoscopy to exclude bladder mass.  - All CT scans at this facility use dose modulation, iterative reconstruction, and/or weight based dosing when appropriate to reduce radiation dose to as low as reasonable achievable.        11/2024 Procedure       11/21/24: Flexible cystoscopy: Tumors along the left lateral wall, significant debris, TUR defect     Bladder Cytology  BLADDER WASH, CYTOLOGY:   Suspicious for high grade urothelial carcinoma.    Red blood cells present   Sparsely cellular specimen      12/3/2024 Procedure    12/03/24 Cyttoscopy with TURBT  Bladder was examined, tumor was identified proximally 3 cm along the right lateral wall involving the site of the ureteral orifice.  Could not identify the right ureteral orifice, left ureteral orifices was normal in size, shape, caliber and location.  Cystoscope was removed, 24 Tamazight resectoscope was then inserted into the urethra and bladder.  Dissection was continued until the tumor was completely dissected from the bladder wall.  I then fulgurated the edges and the base of the tumor.  Still no evidence of the right ureteral orifice, previously were unable to see it at his past surgery.    12/3/24  BLADDER TUMOR, TRANSURETHRAL RESECTION:   Papillary urothelial carcinoma with focal invasion of lamina propria, high-grade.   Muscularis propria tissue is not present for evaluation.       12/23/2024 Imaging Significant Findings    12/23/24 US Abdomen  Impression:  - Known right-sided bladder mass at the orifice of the abnormal ureteral insertion.  Right-sided hydronephrosis present.  - There is an 8 mm calcification in the bladder.     1/17/2025 Cancer Staged    Staging form: Urinary Bladder, AJCC 7th Edition  - Clinical: Stage II (T2, N0, M0)     1/31/2025 -  Chemotherapy    Treatment Summary   Plan Name: OP Bladder CISplatin QW + Radiation  Treatment Goal:  Curative  Status: Active  Start Date: 1/31/2025 (Planned)  End Date: 3/7/2025 (Planned)  Provider: Fabián Westbrook MD  Chemotherapy: CISplatin (Platinol) 35 mg/m2 = 77 mg in 0.9% NaCl 327 mL chemo infusion, 35 mg/m2 = 77 mg (original dose ), Intravenous, Clinic/HOD 1 time, 0 of 1 cycle  Dose modification: 35 mg/m2 (Cycle 1, Reason: MD Discretion, Comment: bladder cancer. 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 Treated with APC.   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 breakdown of skin 01/26/2023    Stage 3 chronic kidney disease 10/06/2017        Past Surgical History:   Procedure Laterality Date    bladder tumor removal      CARDIAC ELECTROPHYSIOLOGY MAPPING AND ABLATION      CARDIOVERSION      several    CATARACT EXTRACTION Bilateral     cataract removal with IOL implants Bilateral     CHOLECYSTECTOMY      CYSTOSCOPIC LITHOLAPAXY N/A 12/28/2021    Procedure: CYSTOLITHOLAPAXY;  Surgeon: Medardo Garcia MD;  Location: [facility] OR;  Service: Urology;  Laterality: N/A;    CYSTOSCOPIC LITHOLAPAXY N/A 5/14/2024    Procedure: CYSTOLITHOLAPAXY;  Surgeon: Medardo Garcia MD;  Location: [facility] OR;  Service: Urology;  Laterality: N/A;    CYSTOSCOPY      CYSTOSCOPY WITH BIOPSY OF BLADDER Right 12/28/2021     Procedure: CYSTOSCOPY, WITH BLADDER BIOPSY, WITH FULGURATION IF INDICATED;  Surgeon: Medardo Garcia MD;  Location: [facility] OR;  Service: Urology;  Laterality: Right;    CYSTOTOMY, WITH FULGURATION AND RADIOACTIVE MATERIAL INSERTION N/A 12/3/2024    Procedure: CYSTOTOMY, WITH FULGURATION AND RADIOACTIVE MATERIAL INSERTION;  Surgeon: Medardo Garcia MD;  Location: [facility] OR;  Service: Urology;  Laterality: N/A;    ESOPHAGOGASTRODUODENOSCOPY N/A 10/25/2021    Procedure: Small Jason Enteroscopy (SBE);  Surgeon: Isabelle Griffin MD;  Location: [facility] ENDO;  Service: Endoscopy;  Laterality: N/A;    ESOPHAGOGASTRODUODENOSCOPY N/A 12/13/2021    Procedure: EGD (ESOPHAGOGASTRODUODENOSCOPY);  Surgeon: Troy Polanco MD;  Location: [facility] ENDO;  Service: Endoscopy;  Laterality: N/A;    ESOPHAGOGASTRODUODENOSCOPY N/A 4/5/2024    Procedure: EGD (ESOPHAGOGASTRODUODENOSCOPY);  Surgeon: Myrtle Salcedo MD;  Location: [facility];  Service: Endoscopy;  Laterality: N/A;    ESOPHAGOGASTRODUODENOSCOPY N/A 4/22/2024    Procedure: EGD (ESOPHAGOGASTRODUODENOSCOPY);  Surgeon: Myrtle Salcedo MD;  Location: [facility] ENDO;  Service: Endoscopy;  Laterality: N/A;  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    EYE SURGERY      INTRALUMINAL GASTROINTESTINAL TRACT IMAGING VIA CAPSULE N/A 12/6/2021    Procedure: IMAGING PROCEDURE, GI TRACT, INTRALUMINAL, VIA CAPSULE;  Surgeon: Larry Jones RN;  Location: [facility] ENDO;  Service: Endoscopy;  Laterality: N/A;    INTRALUMINAL GASTROINTESTINAL TRACT IMAGING VIA CAPSULE N/A 4/18/2024    Procedure: IMAGING PROCEDURE, GI TRACT, INTRALUMINAL, VIA CAPSULE;  Surgeon: Larry Jones RN;  Location: [facility] ENDO;  Service: Endoscopy;  Laterality: N/A;    OCCLUSION OF LEFT ATRIAL APPENDAGE N/A 11/14/2024    Procedure: Left atrial appendage occlusion;  Surgeon: Aroldo Ortiz MD;  Location: [facility] EP LAB;  Service: Cardiology;  Laterality: N/A;  afib, kimberley, BSCI, venkatesh, jaime, MB, 3prep    SMALL  BOWEL ENTEROSCOPY N/A 2024    Procedure: ENTEROSCOPY--prximal DBE;  Surgeon: Eduardo Ruff MD;  Location: [facility] ENDO;  Service: Endoscopy;  Laterality: N/A;    TRANSESOPHAGEAL ECHOCARDIOGRAPHY N/A 2024    Procedure: ECHOCARDIOGRAM, TRANSESOPHAGEAL;  Surgeon: Aroldo Martinez MD;  Location: [facility] EP LAB;  Service: Cardiology;  Laterality: N/A;    TRANSESOPHAGEAL ECHOCARDIOGRAPHY N/A 2024    Procedure: ECHOCARDIOGRAM, TRANSESOPHAGEAL;  Surgeon: Carlos Flower MD;  Location: [facility] CATH LAB;  Service: Cardiology;  Laterality: N/A;    TRANSURETHRAL RESECTION OF BLADDER TUMOR BY BIPOLAR CAUTERY N/A 2019    Procedure: TURBT, USING BIPOLAR CAUTERY;  Surgeon: Ritesh Marques MD;  Location: [facility] OR;  Service: Urology;  Laterality: N/A;    TRANSURETHRAL RESECTION OF PROSTATE N/A 2024    Procedure: TURP (TRANSURETHRAL RESECTION OF PROSTATE);  Surgeon: Medardo Garcia MD;  Location: [facility] OR;  Service: Urology;  Laterality: N/A;    TURBT (TRANSURETHRAL RESECTION OF BLADDER TUMOR) N/A 12/3/2024    Procedure: TURBT (TRANSURETHRAL RESECTION OF BLADDER TUMOR);  Surgeon: Medardo Garcia MD;  Location: [facility] OR;  Service: Urology;  Laterality: N/A;        Family History   Problem Relation Name Age of Onset    Heart disease Father      Hypertension Father      Heart disease Brother          Social History     Tobacco Use    Smoking status: Former     Current packs/day: 0.00     Average packs/day: 2.5 packs/day for 20.0 years (50.0 ttl pk-yrs)     Types: Cigarettes     Start date:      Quit date:      Years since quittin.0     Passive exposure: Never    Smokeless tobacco: Never   Substance Use Topics    Alcohol use: Yes     Alcohol/week: 7.0 standard drinks of alcohol     Types: 7 Cans of beer per week     Comment: occasionally: hold 72 hrs        I have reviewed and updated the patient's past medical, surgical, family and social histories.    Review of patient's allergies indicates:    Allergen Reactions    Feraheme [ferumoxytol] Anaphylaxis        Current Outpatient Medications   Medication Sig Dispense Refill    amiodarone (PACERONE) 200 MG Tab Take 1 tablet (200 mg total) by mouth once daily. 30 tablet 11    amLODIPine (NORVASC) 2.5 MG tablet Take 1 tablet (2.5 mg total) by mouth once daily. 90 tablet 2    aspirin (ECOTRIN) 81 MG EC tablet Take 1 tablet (81 mg total) by mouth once daily. 90 tablet 3    atorvastatin (LIPITOR) 20 MG tablet Take 1 tablet (20 mg total) by mouth every evening. 90 tablet 1    carvediloL (COREG) 6.25 MG tablet Take 1 tablet (6.25 mg total) by mouth 2 (two) times daily with meals. 180 tablet 3    cholecalciferol, vitamin D3, (VITAMIN D3 ORAL) Take 1 tablet by mouth every other day.      diltiaZEM (CARDIZEM CD) 360 MG 24 hr capsule       donepeziL (ARICEPT) 5 MG tablet Take 1 tablet (5 mg total) by mouth once daily. 90 tablet 1    FEROSUL 325 mg (65 mg iron) Tab tablet Take by mouth.      hydrALAZINE (APRESOLINE) 50 MG tablet Take 1 tablet (50 mg total) by mouth every 8 (eight) hours. 270 tablet 2    metoprolol tartrate (LOPRESSOR) 50 MG tablet       multivitamin (THERAGRAN) per tablet Take 1 tablet by mouth once daily.      pantoprazole (PROTONIX) 20 MG tablet Take 2 tablets (40 mg total) by mouth once daily. 30 tablet 0     No current facility-administered medications for this visit.     Facility-Administered Medications Ordered in Other Visits   Medication Dose Route Frequency Provider Last Rate Last Admin    lactated ringers infusion   Intravenous Continuous Denisse Conteh MD   Stopped at 12/28/21 1241        Physical Exam:   BP (!) 145/75   Pulse (!) 58   Temp 97.5 °F (36.4 °C) (Temporal)   Ht 6' (1.829 m)   Wt 95.7 kg (210 lb 13.9 oz)   SpO2 96%   BMI 28.60 kg/m²      ECOG Performance status: 1            Physical Exam  Constitutional:       General: He is not in acute distress.     Appearance: Normal appearance.   HENT:      Head: Normocephalic.    Eyes:      General: No scleral icterus.     Extraocular Movements: Extraocular movements intact.      Conjunctiva/sclera: Conjunctivae normal.   Cardiovascular:      Rate and Rhythm: Normal rate.      Heart sounds: No murmur heard.  Pulmonary:      Effort: Pulmonary effort is normal. No respiratory distress.   Abdominal:      General: There is no distension.      Palpations: Abdomen is soft.   Skin:     General: Skin is warm and dry.   Neurological:      Mental Status: He is alert and oriented to person, place, and time.      Motor: No weakness.   Psychiatric:         Mood and Affect: Mood normal.         Behavior: Behavior normal.         Thought Content: Thought content normal.           Labs:   No results found for this or any previous visit (from the past 48 hours).       Imaging:         I have personally reviewed the above imaging    Path:   Reviewed pathology as documented in oncology history      Assessment and Plan:   1. Urothelial carcinoma of bladder  Overview:  Locally advanced urothelial carcionoma of the bladder. Has long standing history of localized bladder cancer treated with serial TURBT since at least 2016. 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 He was treated with serial TURBT and induction BCG. More recently found to have recurrent disease with associtaed right sided hydronephrosis. Overall concerning for locally advanced disease      Assessment & Plan:  Patient appears generally well despite advanced age and mild dementia. 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 Given age he is not a good candidate for cystectomy. Although presence of hydronephrosis is less than ideal for concurrent chemoradiation, seems as though he could tolerate and he does want to try a shot at definitive therapy. Will plan to start concurrent weekly cisplatin with bladder irradiation.     - PET CT to complete staging  - FU with chemotherapy education 1/23/25  - FU with  me 1/31/25 to confirm treamtent plan  - Tentative plan to start CRT with weekly cisplatin 2/3/25; will confirm with rad onc and urology      2. History of bladder cancer  -     Ambulatory referral/consult to Hematology / Oncology    3. Malignant neoplasm of urinary bladder, unspecified site  -     NM PET CT FDG Skull Base to Mid Thigh; Future; Expected date: 01/17/2025  -     CBC Oncology; Standing  -     Comprehensive Metabolic Panel; Standing    4. Pancytopenia  Assessment & Plan:  - Will montior CBC closely on CRT      5. Iron deficiency anemia secondary to blood loss (chronic)  Assessment & Plan:  - Continues on IV iron infusions      6. Primary hypertension  Overview:  Home medications include amldoipine, carvedilol, diltiazem, and hydralazine      7. DM type 2 with diabetic dyslipidemia  Overview:  He has T2DM; A1c very well managed with diet alone.      8. Dementia associated with other underlying disease, without behavioral disturbance, psychotic disturbance, mood disturbance, or anxiety, unspecified dementia severity  Assessment & Plan:  Fairly mild. He is on donepezil      9. Gastrointestinal hemorrhage associated with angiodysplasia of stomach and duodenum  Overview:  Remains on pantoprazole                 Follow up:   Route Chart for Scheduling    Med Onc Chart Routing      Follow up with physician . 1/13/25   Follow up with [facility] 1 week. EV + pembro education   Infusion scheduling note   weekly cisplatin x 4 starting 2/3/25   Injection scheduling note    Labs CBC and CMP   Scheduling:  Preferred lab:  Lab interval:     Imaging PET scan      Pharmacy appointment    Other referrals                  Treatment Plan Information   OP Bladder CISplatin QW + Radiation Fabián Westbrook MD   Associated diagnosis: Urothelial carcinoma of bladder Stage II T2, N0, M0 noted on 6/21/2016   Line of treatment: Induction  Treatment Goal: Curative     Upcoming Treatment Dates - OP Bladder CISplatin QW +  Radiation    1/31/2025       Chemotherapy       CISplatin (Platinol) 35 mg/m2 = 77 mg in 0.9% NaCl 327 mL chemo infusion       Pre-Hydration       0.9% NaCl 500 mL with magnesium sulfate 1 g, potassium chloride 10 mEq infusion       Post-Hydration       Physician communication order       Antiemetics       fosaprepitant 150 mg in 0.9% NaCl 150 mL IVPB       palonosetron (ALOXI) 0.25 mg with Dexamethasone (DECADRON) 12 mg in NS 50 mL IVPB  2/7/2025       Chemotherapy       CISplatin (Platinol) 35 mg/m2 = 77 mg in 0.9% NaCl 327 mL chemo infusion       Pre-Hydration       0.9% NaCl 500 mL with magnesium sulfate 1 g, potassium chloride 10 mEq infusion       Post-Hydration       Physician communication order       Antiemetics       fosaprepitant 150 mg in 0.9% NaCl 150 mL IVPB       palonosetron (ALOXI) 0.25 mg with Dexamethasone (DECADRON) 12 mg in NS 50 mL IVPB  2/14/2025       Chemotherapy       CISplatin (Platinol) 35 mg/m2 = 77 mg in 0.9% NaCl 327 mL chemo infusion       Pre-Hydration       0.9% NaCl 500 mL with magnesium sulfate 1 g, potassium chloride 10 mEq infusion       Post-Hydration       Physician communication order       Antiemetics       fosaprepitant 150 mg in 0.9% NaCl 150 mL IVPB       palonosetron (ALOXI) 0.25 mg with Dexamethasone (DECADRON) 12 mg in NS 50 mL IVPB  2/21/2025       Chemotherapy       CISplatin (Platinol) 35 mg/m2 = 77 mg in 0.9% NaCl 327 mL chemo infusion       Pre-Hydration       0.9% NaCl 500 mL with magnesium sulfate 1 g, potassium chloride 10 mEq infusion       Post-Hydration       Physician communication order       Antiemetics       fosaprepitant 150 mg in 0.9% NaCl 150 mL IVPB       palonosetron (ALOXI) 0.25 mg with Dexamethasone (DECADRON) 12 mg in NS 50 mL IVPB    Therapy Plan Information  VENOFER (IRON SUCROSE) QW for Iron deficiency anemia secondary to blood loss (chronic), noted on 5/29/2021  Anaphylaxis/Hypersensitivity  EPINEPHrine (EPIPEN) 0.3 mg/0.3 mL pen injection  0.3 mg  0.3 mg, Intramuscular, PRN  diphenhydrAMINE injection 50 mg  50 mg, Intravenous, PRN  hydrocortisone sodium succinate injection 100 mg  100 mg, Intravenous, PRN  Flushes  0.9% NaCl 250 mL flush bag  Intravenous, 1 time a week  sodium chloride 0.9% flush 10 mL  10 mL, Intravenous, 1 time a week  5. Medications  iron sucrose injection 200 mg  200 mg, Intravenous, Every visit  methylPREDNISolone sodium succinate injection 40 mg  40 mg, Intravenous, 1 time a week      No therapy plan of the specified type found.    No therapy plan of the specified type found.      The above information has been reviewed with the patient and all questions have been answered to their apparent satisfaction.  They understand that they can call the clinic with any questions.    Fabián Westbrook MD  Hematology/Oncology  [facility]

## 2025-01-17 NOTE — DISCHARGE SUMMARY
Radiology Discharge Summary      Hospital Course: No complications    Admit Date: 1/17/2025  Discharge Date: 01/17/2025     Instructions Given to Patient: Yes  Diet: regular diet and Resume prior diet  Activity: activity as tolerated and no driving for today    Description of Condition on Discharge: Stable  Vital Signs (Most Recent): Pulse: (!) 54 (01/17/25 1420)  Resp: 16 (01/17/25 1420)  BP: (!) 163/74 (01/17/25 1420)  SpO2: 100 % (01/17/25 1420)    Discharge Disposition: Home    Discharge Diagnosis: ureteral stricture     Follow-up: with ir in 2 weeks for stent/neph check with possible removal of neph tube    Hector Arriaga MD  Staff Radiologist  Department of Radiology  Pager: [phone]

## 2025-01-17 NOTE — PROGRESS NOTES
[facility] / [facility] - Radiation Oncology Consult Note    HISTORY OF PRESENT ILLNESS:  84-year-old man with longstanding superficial bladder cancer, since presenting to Dr. Garcia with gross hematuria and positive biopsy 07/23/2019.  Per his note on 12/27/2024:    1. History of bladder cancer-  pT1 high turbt with mitomycin  12/3/24,  BCG 3/22, High pTa, but suspicious for T1 cystoscopy with bladder biopsy, vesical litholapaxy, could not assess the right ureter  12/28/21.  TURBT 5-6 years ago at the outside facility with induction BCG.     Patient now has high-grade T1 disease, no muscle present.  Given the fact that he has right ureteral obstruction with no evidence of UO on cystoscopic evaluation, this is highly suspicious for T2 disease.  We have discussed radical cystectomy, at this time due to other comorbidities and his age, he would rather not pursue.  Therefore will refer to Radiation Oncology and Medical Oncology for possible chemoradiation therapy.  In addition due to the obstructed right ureter will proceed with percutaneous nephrostomy with hopefully antegrade stent placement by Interventional Radiology.  Patient is currently asymptomatic in regards to the hydronephrosis, no significant change per imaging.  Creatinine has been relatively stable up to this point.  Will see him back following these consultations, call with any issues in the meantime.     2. Nephrolithiasis- no recurrent stones.     3. BPH-  TURP, vesicolithalopaxy  5/14/24     Patient states that he is voiding well off all medical management.     4. Gross hematuria- intermittent but no evidence today.  Most likely due to the tumors, only on baby aspirin.     5. Erectile dysfunction- given Viagra 100 mg but currently not an issue.    He is also followed by hematology with the following history through 01/13/2025, and plans to transfer all Heme-Onc care to Dr. Hilton in Oncology where he will be seen for initial  evaluation later this morning    9/25/2024 Presents today to discuss treatment recommendations due to anaphalactic reaction with first dose of Feraheme on 9/9/2024.  Upon initiation of IV iron he started to have severe back pain so infusions was stopped and saline was infusing.  Symptoms resolved and infusions was restarted at a slower rate.  Patient began to lose vision and had a syncopal episode with an extreme drop in blood pressure.  He was give steroid injection and BP began to rise and regained consciousness.  He was transported from facility to hospital via ambulance for evaluation.  Feraheme has been listed as an allergy in his chart.  Review of past iron infusions show that he has received Injectafer infusions in the past without issue in 2021 and he also tells me that he received IV iron after blood transfusion many years ago in the hospital and tolerated fine.  Currently taking ferrous sulfate 325 mg PO every other day and is tolerating well.  He is accompanied by his son. He will be having a special GI procedure tomorrow Upper DBE on 9/26/2024 with dr. Ruff in [place] to assess for and treat angiectasias in the small intestines.  He continues to take Protonix 40 mg po daily.  He denies and known abnormal bleeding and is asymptomatic. VCE in 4/2024 with active small bowel bleeding seen.     10/31/2024 Presents today to evaluate response of oral iron.  Has been taking ferrous sulfate 325 mg PO daily since last visit wtihout complaints.  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  Denies any current known GI bleeding.  Hgb slowly improving - continues to have low ferritin.  Has brain fogginess.  Is accompanied by his son.  Has decided that he would like to get the iron that he received previously that he was able to tolerate.      I1/13/2025 Received venofer 200 mg IV x 4 doses and presents today to evaluate response.  Has been found with recurrent bladder cancer and has  initial evaluation with oncology and radiology upcoming. Hgb continues to trend upward.  Denies any known abnormal bleeding.  Continued stable thrombocytopenia with known borderline splenomegaly.  Saturated iron of 6%.  Had watchman procedure done.  No longer on anticoagulation.   I have reviewed all of the patient's relevant lab work available in the medical record and have utilized this in my evaluation and management recommendations today.     Abdominopelvic CT on renal stone protocol on 11/18/2024 described focal thickening along the posterior bladder wall, asymmetric to the right, producing moderate right-sided hydronephrosis and hydroureter.        Retroperitoneal ultrasound on 12/23/2024 shows right-sided hydronephrosis consistent with known right-sided bladder mass at the UVJ.  Normal left side      REVIEW OF SYSTEMS:  He takes Flomax and describes good urinary function without hematuria.  He has no pelvic or other pain or any awareness of his cancer or hydroureter.  He remains active without limitation.  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 Treated with APC.   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 breakdown of skin 01/26/2023    Stage 3 chronic kidney disease 10/06/2017        PAST SURGICAL HISTORY:  Past Surgical History:   Procedure Laterality Date    bladder tumor removal      CARDIAC ELECTROPHYSIOLOGY MAPPING AND ABLATION      CARDIOVERSION      several    CATARACT EXTRACTION Bilateral     cataract removal with IOL implants Bilateral     CHOLECYSTECTOMY      CYSTOSCOPIC LITHOLAPAXY N/A 12/28/2021    Procedure: CYSTOLITHOLAPAXY;  Surgeon: Medardo Garcia MD;  Location: [facility];  Service: Urology;  Laterality: N/A;    CYSTOSCOPIC LITHOLAPAXY N/A 5/14/2024    Procedure: CYSTOLITHOLAPAXY;  Surgeon: Medardo Garcia MD;  Location: [facility];  Service: Urology;  Laterality: N/A;    CYSTOSCOPY      CYSTOSCOPY WITH BIOPSY OF BLADDER Right 12/28/2021    Procedure: CYSTOSCOPY, WITH BLADDER BIOPSY, WITH FULGURATION IF INDICATED;  Surgeon: Medardo Garcia MD;  Location: [facility];  Service: Urology;  Laterality: Right;    CYSTOTOMY, WITH FULGURATION AND RADIOACTIVE MATERIAL INSERTION N/A 12/3/2024    Procedure: CYSTOTOMY, WITH FULGURATION AND RADIOACTIVE MATERIAL INSERTION;  Surgeon: Medardo Garcia MD;  Location: [facility];  Service: Urology;  Laterality: N/A;    ESOPHAGOGASTRODUODENOSCOPY N/A 10/25/2021    Procedure: Small Jason Enteroscopy (SBE);  Surgeon: Isabelle Griffin MD;  Location: [facility];  Service: Endoscopy;  Laterality: N/A;    ESOPHAGOGASTRODUODENOSCOPY N/A 12/13/2021    Procedure: EGD (ESOPHAGOGASTRODUODENOSCOPY);  Surgeon: Troy Polanco MD;  Location: [facility];  Service: Endoscopy;  Laterality: N/A;    ESOPHAGOGASTRODUODENOSCOPY N/A 4/5/2024    Procedure: EGD (ESOPHAGOGASTRODUODENOSCOPY);  Surgeon: Myrtle Salcedo MD;  Location: [facility];  Service: Endoscopy;  Laterality: N/A;    ESOPHAGOGASTRODUODENOSCOPY N/A 4/22/2024    Procedure: EGD (ESOPHAGOGASTRODUODENOSCOPY);  Surgeon: Myrtle Salcedo MD;  Location: [facility];  Service: Endoscopy;  Laterality: N/A;  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    EYE SURGERY      INTRALUMINAL  GASTROINTESTINAL TRACT IMAGING VIA CAPSULE N/A 12/6/2021    Procedure: IMAGING PROCEDURE, GI TRACT, INTRALUMINAL, VIA CAPSULE;  Surgeon: Larry Jones, RN;  Location: [facility] ENDO;  Service: Endoscopy;  Laterality: N/A;    INTRALUMINAL GASTROINTESTINAL TRACT IMAGING VIA CAPSULE N/A 4/18/2024    Procedure: IMAGING PROCEDURE, GI TRACT, INTRALUMINAL, VIA CAPSULE;  Surgeon: Larry Jones RN;  Location: [facility] ENDO;  Service: Endoscopy;  Laterality: N/A;    OCCLUSION OF LEFT ATRIAL APPENDAGE N/A 11/14/2024    Procedure: Left atrial appendage occlusion;  Surgeon: Aroldo Ortiz MD;  Location: [facility] EP LAB;  Service: Cardiology;  Laterality: N/A;  afib, watchman, BSCI, venkatesh, anes, MB, 3prep    SMALL BOWEL ENTEROSCOPY N/A 9/26/2024    Procedure: ENTEROSCOPY--prximal DBE;  Surgeon: Eduardo Ruff MD;  Location: [facility] ENDO;  Service: Endoscopy;  Laterality: N/A;    TRANSESOPHAGEAL ECHOCARDIOGRAPHY N/A 11/14/2024    Procedure: ECHOCARDIOGRAM, TRANSESOPHAGEAL;  Surgeon: Aroldo Martinez MD;  Location: [facility] EP LAB;  Service: Cardiology;  Laterality: N/A;    TRANSESOPHAGEAL ECHOCARDIOGRAPHY N/A 12/30/2024    Procedure: ECHOCARDIOGRAM, TRANSESOPHAGEAL;  Surgeon: Carlos Flower MD;  Location: [facility] CATH LAB;  Service: Cardiology;  Laterality: N/A;    TRANSURETHRAL RESECTION OF BLADDER TUMOR BY BIPOLAR CAUTERY N/A 7/23/2019    Procedure: TURBT, USING BIPOLAR CAUTERY;  Surgeon: Ritesh Marques MD;  Location: [facility] OR;  Service: Urology;  Laterality: N/A;    TRANSURETHRAL RESECTION OF PROSTATE N/A 5/14/2024    Procedure: TURP (TRANSURETHRAL RESECTION OF PROSTATE);  Surgeon: Medardo Garcia MD;  Location: [facility] OR;  Service: Urology;  Laterality: N/A;    TURBT (TRANSURETHRAL RESECTION OF BLADDER TUMOR) N/A 12/3/2024    Procedure: TURBT (TRANSURETHRAL RESECTION OF BLADDER TUMOR);  Surgeon: Medardo Garcia MD;  Location: [facility] OR;  Service: Urology;  Laterality: N/A;       ALLERGIES:   Review of patient's allergies indicates:    Allergen Reactions    Feraheme [ferumoxytol] Anaphylaxis       MEDICATIONS:  Current Outpatient Medications   Medication Sig    amiodarone (PACERONE) 200 MG Tab Take 1 tablet (200 mg total) by mouth once daily.    amLODIPine (NORVASC) 2.5 MG tablet Take 1 tablet (2.5 mg total) by mouth once daily.    aspirin (ECOTRIN) 81 MG EC tablet Take 1 tablet (81 mg total) by mouth once daily.    atorvastatin (LIPITOR) 20 MG tablet Take 1 tablet (20 mg total) by mouth every evening.    carvediloL (COREG) 6.25 MG tablet Take 1 tablet (6.25 mg total) by mouth 2 (two) times daily with meals.    cholecalciferol, vitamin D3, (VITAMIN D3 ORAL) Take 1 tablet by mouth every other day.    donepeziL (ARICEPT) 5 MG tablet Take 1 tablet (5 mg total) by mouth once daily.    hydrALAZINE (APRESOLINE) 50 MG tablet Take 1 tablet (50 mg total) by mouth every 8 (eight) hours.    multivitamin (THERAGRAN) per tablet Take 1 tablet by mouth once daily.    pantoprazole (PROTONIX) 20 MG tablet Take 2 tablets (40 mg total) by mouth once daily.     No current facility-administered medications for this visit.     Facility-Administered Medications Ordered in Other Visits   Medication    lactated ringers infusion       SOCIAL HISTORY:  Social History     Socioeconomic History    Marital status:    Tobacco Use    Smoking status: Former     Current packs/day: 0.00     Average packs/day: 2.5 packs/day for 20.0 years (50.0 ttl pk-yrs)     Types: Cigarettes     Start date:      Quit date:      Years since quittin.0    Smokeless tobacco: Never   Substance and Sexual Activity    Alcohol use: Yes     Alcohol/week: 7.0 standard drinks of alcohol     Types: 7 Cans of beer per week     Comment: occasionally: hold 72 hrs    Drug use: No    Sexual activity: Not Currently     Social Drivers of Health     Financial Resource Strain: Low Risk  (2025)    Overall Financial Resource Strain (CARDIA)     Difficulty of Paying Living Expenses: Not hard  at all   Food Insecurity: No Food Insecurity (1/13/2025)    Hunger Vital Sign     Worried About Running Out of Food in the Last Year: Never true     Ran Out of Food in the Last Year: Never true   Transportation Needs: No Transportation Needs (8/23/2024)    TRANSPORTATION NEEDS     Transportation : No   Physical Activity: Insufficiently Active (1/13/2025)    Exercise Vital Sign     Days of Exercise per Week: 2 days     Minutes of Exercise per Session: 30 min   Stress: No Stress Concern Present (1/13/2025)    [employer] [place] [employer] - Occupational Stress Questionnaire     Feeling of Stress : Not at all   Housing Stability: Low Risk  (1/13/2025)    Housing Stability Vital Sign     Unable to Pay for Housing in the Last Year: No     Homeless in the Last Year: No       FAMILY HISTORY:  Family History   Problem Relation Name Age of Onset    Heart disease Father      Hypertension Father      Heart disease Brother       He is accompanied by his attentive son and daughter-in-law    PHYSICAL EXAMINATION:       Vitals:    01/17/25 0949   BP: (!) 154/73   Pulse: (!) 59   Resp: 19   Temp: 97.3 °F (36.3 °C)   SpO2: 97%   Weight: 95.6 kg (210 lb 12.2 oz)   Height: 6' (1.829 m)        General:  A&O x4, NAD   Head:  PERRLA, EOM intact, CN II-XII intact  Lymphatics:  No cervical or supraclavicular adenopathy   Lungs: Clear to auscultation bilaterally   Heart regular:  rate and rhythm  Abdomen:  nontender and nondistended with positive bowel sounds no hepatomegaly  Pelvis:  No inguinal adenopathy, normal phallus scrotal contents, good rectal tone with    KPS:  80    ASSESSMENT:  Young 84-year-old man with a longstanding diagnosis of noninvasive bladder cancer, now likely T2 with dominant site of disease about the right UV junction with obstruction and hydroureter, asymptomatic.  He has good disease insight and good social support    PLAN:  Per Dr. Garcia, he is not a candidate for cystectomy in light of his advanced  age.  I have recommended definitive radiotherapy, and he will meet with Dr. Herrera in Medical Oncology later today to discuss addition of systemic therapy.    We reviewed the logistics of pelvic radiotherapy, including the planning and treatment visits, as well as possible acute and chronic side effects in great detail.    He and his family asked many intelligent questions and he voiced a desire to proceed as described.  Informed written consent was obtained and he was given the original after scanning into the EMR    He will return next week for immobilization and CT simulation and begin treatment 7-10 days later.  I anticipate delivering 55 Gy in 20 fractions.    This afternoon he will undergo percutaneous right ureteral stent placement, hopefully with the immediate internalization.      I spent approximately 60 minutes reviewing the available records and evaluating the patient, out of which over 50% of the time was spent face to face with the patient in counseling and coordinating this patient's care.

## 2025-01-17 NOTE — PROCEDURES
Radiology Post-Procedure Note    Pre Op Diagnosis: right hydronephrosis    Post Op Diagnosis: right hydronephrosis    Procedure:right percutaneous nephrostomy    Procedure performed by: Dr Hector Arriaga    Written Informed Consent Obtained: Yes    Specimen Removed: NO    Estimated Blood Loss: less than 50     Findings: Local anesthesia and moderate sedation were used.      Ultrasound and fluoroscopy were used to localize the right collecting system and a percutaneous ureteral stent and catheter was introduced.  Position of the catheter in the collecting system was confirmed using injection of iodinated contrast.      The patient tolerated the procedure well and there were no complications.  Please see Imaging report for further details.    Hector Arriaga MD  Staff Radiologist  Department of Radiology  Pager: [phone]

## 2025-01-17 NOTE — PLAN OF CARE
Sutures, bio-patch, and tegaderm to right mid back nephrostomy tube insertion site C/D/I with no bleeding/redness/swelling noted. VSS, NADN, and pt meets criteria for discharge. Discharge instructions given to and reviewed with pt, and pt verbalized understanding of all. Pt discharged to home, taken out via wheelchair and driven home by son.

## 2025-01-18 PROCEDURE — G0180 MD CERTIFICATION HHA PATIENT: HCPCS | Mod: ,,, | Performed by: PODIATRIST

## 2025-01-19 RX ORDER — PANTOPRAZOLE SODIUM 20 MG/1
TABLET, DELAYED RELEASE ORAL
Qty: 90 TABLET | OUTPATIENT
Start: 2025-01-19

## 2025-01-20 ENCOUNTER — HOSPITAL ENCOUNTER (EMERGENCY)
Facility: HOSPITAL | Age: 85
Discharge: HOME | End: 2025-01-20
Attending: EMERGENCY MEDICINE
Payer: MEDICARE

## 2025-01-20 VITALS
SYSTOLIC BLOOD PRESSURE: 202 MMHG | HEART RATE: 65 BPM | TEMPERATURE: 98 F | DIASTOLIC BLOOD PRESSURE: 94 MMHG | OXYGEN SATURATION: 97 % | BODY MASS INDEX: 28.23 KG/M2 | WEIGHT: 208.13 LBS | RESPIRATION RATE: 16 BRPM

## 2025-01-20 DIAGNOSIS — I10 PRIMARY HYPERTENSION: ICD-10-CM

## 2025-01-20 DIAGNOSIS — Z93.6 NEPHROSTOMY PRESENT: Primary | ICD-10-CM

## 2025-01-20 DIAGNOSIS — Z96.0 URETERAL STENT PRESENT: ICD-10-CM

## 2025-01-20 LAB
ALBUMIN SERPL BCP-MCNC: 3.1 G/DL (ref 3.5–5.2)
ALP SERPL-CCNC: 77 U/L (ref 40–150)
ALT SERPL W/O P-5'-P-CCNC: 24 U/L (ref 10–44)
ANION GAP SERPL CALC-SCNC: 12 MMOL/L (ref 8–16)
APTT PPP: 27.9 SEC (ref 21–32)
AST SERPL-CCNC: 24 U/L (ref 10–40)
BASOPHILS # BLD AUTO: 0.04 K/UL (ref 0–0.2)
BASOPHILS NFR BLD: 0.8 % (ref 0–1.9)
BILIRUB SERPL-MCNC: 0.4 MG/DL (ref 0.1–1)
BUN SERPL-MCNC: 18 MG/DL (ref 8–23)
CALCIUM SERPL-MCNC: 9.5 MG/DL (ref 8.7–10.5)
CHLORIDE SERPL-SCNC: 105 MMOL/L (ref 95–110)
CO2 SERPL-SCNC: 25 MMOL/L (ref 23–29)
CREAT SERPL-MCNC: 1.1 MG/DL (ref 0.5–1.4)
DIFFERENTIAL METHOD BLD: ABNORMAL
EOSINOPHIL # BLD AUTO: 0.1 K/UL (ref 0–0.5)
EOSINOPHIL NFR BLD: 1.7 % (ref 0–8)
ERYTHROCYTE [DISTWIDTH] IN BLOOD BY AUTOMATED COUNT: 17.4 % (ref 11.5–14.5)
EST. GFR  (NO RACE VARIABLE): >60 ML/MIN/1.73 M^2
GLUCOSE SERPL-MCNC: 109 MG/DL (ref 70–110)
HCT VFR BLD AUTO: 35.9 % (ref 40–54)
HGB BLD-MCNC: 11.3 G/DL (ref 14–18)
IMM GRANULOCYTES # BLD AUTO: 0.01 K/UL (ref 0–0.04)
IMM GRANULOCYTES NFR BLD AUTO: 0.2 % (ref 0–0.5)
INR PPP: 1 (ref 0.8–1.2)
LYMPHOCYTES # BLD AUTO: 0.9 K/UL (ref 1–4.8)
LYMPHOCYTES NFR BLD: 16.8 % (ref 18–48)
MCH RBC QN AUTO: 26.1 PG (ref 27–31)
MCHC RBC AUTO-ENTMCNC: 31.5 G/DL (ref 32–36)
MCV RBC AUTO: 83 FL (ref 82–98)
MONOCYTES # BLD AUTO: 0.6 K/UL (ref 0.3–1)
MONOCYTES NFR BLD: 10.5 % (ref 4–15)
NEUTROPHILS # BLD AUTO: 3.7 K/UL (ref 1.8–7.7)
NEUTROPHILS NFR BLD: 70 % (ref 38–73)
NRBC BLD-RTO: 0 /100 WBC
PLATELET # BLD AUTO: 284 K/UL (ref 150–450)
PMV BLD AUTO: 10.5 FL (ref 9.2–12.9)
POTASSIUM SERPL-SCNC: 4.4 MMOL/L (ref 3.5–5.1)
PROT SERPL-MCNC: 7.3 G/DL (ref 6–8.4)
PROTHROMBIN TIME: 11.4 SEC (ref 9–12.5)
RBC # BLD AUTO: 4.33 M/UL (ref 4.6–6.2)
SODIUM SERPL-SCNC: 142 MMOL/L (ref 136–145)
WBC # BLD AUTO: 5.23 K/UL (ref 3.9–12.7)

## 2025-01-20 PROCEDURE — 99284 EMERGENCY DEPT VISIT MOD MDM: CPT | Mod: 25

## 2025-01-20 PROCEDURE — 85610 PROTHROMBIN TIME: CPT | Performed by: NURSE PRACTITIONER

## 2025-01-20 PROCEDURE — 80053 COMPREHEN METABOLIC PANEL: CPT | Performed by: NURSE PRACTITIONER

## 2025-01-20 PROCEDURE — 25000003 PHARM REV CODE 250: Performed by: EMERGENCY MEDICINE

## 2025-01-20 PROCEDURE — 85730 THROMBOPLASTIN TIME PARTIAL: CPT | Performed by: NURSE PRACTITIONER

## 2025-01-20 PROCEDURE — 85025 COMPLETE CBC W/AUTO DIFF WBC: CPT | Performed by: NURSE PRACTITIONER

## 2025-01-20 RX ORDER — CLONIDINE HYDROCHLORIDE 0.2 MG/1
0.2 TABLET ORAL
Status: COMPLETED | OUTPATIENT
Start: 2025-01-20 | End: 2025-01-20

## 2025-01-20 RX ADMIN — CLONIDINE HYDROCHLORIDE 0.2 MG: 0.2 TABLET ORAL at 07:01

## 2025-01-20 NOTE — FIRST PROVIDER EVALUATION
Medical screening examination initiated.  I have conducted a focused provider triage encounter, findings are as follows:    Brief history of present illness:  Reports recent procedure with no drainage from bag    Vitals:    01/20/25 1631   BP: (!) 189/86   BP Location: Right arm   Pulse: (!) 55   Resp: 16   Temp: 98.1 °F (36.7 °C)   TempSrc: Oral   SpO2: 96%   Weight: 94.4 kg (208 lb 1.8 oz)       Pertinent physical exam:  nad    Brief workup plan:  labs, further eval    Preliminary workup initiated; this workup will be continued and followed by the physician or advanced practice provider that is assigned to the patient when roomed.

## 2025-01-21 NOTE — ED PROVIDER NOTES
SCRIBE #1 NOTE: I, Thanh Ramirez, am scribing for, and in the presence of, Christian Chang MD. I have scribed the HPI, ROS, and PE.    SCRIBE #2 NOTE: I, Gus Albarado, am scribing for, and in the presence of,  Nicola Lainez MD. I have scribed the remaining portions of the note not scribed by Scribe #1.      History     Chief Complaint   Patient presents with    Urinary Retention     Has stent placed to right ureter with drainage bag in place. Reports urine drainage has stopped.     Review of patient's allergies indicates:   Allergen Reactions    Feraheme [ferumoxytol] Anaphylaxis         History of Present Illness     HPI    1/20/2025, 6:47 PM  History obtained from the patient      History of Present Illness: Jose Miguel Alvarez Jr. is a 84 y.o. male patient with a PMHx of A-fib, bladder tumor, HTN, hypercholesteremia, bladder cancer, CKD, COPD, and AVM  who presents to the Emergency Department for evaluation of urinary rentention which onset last night. Pt had stent placed to right ureter with drainage bag in place Friday. Pt reports drainage stopped last night. Symptoms are constant and moderate in severity. No mitigating or exacerbating factors reported. No associated sxs. Patient denies any pain, nausea, and all other sxs at this time. No prior Tx. No further complaints or concerns at this time.       Arrival mode: Personal vehicle    PCP: Toshia Valladares MD        Past Medical History:  Past Medical History:   Diagnosis Date    Anemia     Anticoagulant long-term use     Atrial fibrillation     AVM (arteriovenous malformation) of small bowel, 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 Treated with APC.   VCE 4/18/24: positive VCE  SBE 4/22/24: small bowel AVM s/p APC and tattoo at most distal part reached      Bladder cancer     Bladder tumor     CKD (chronic kidney disease), stage III     COPD (chronic obstructive pulmonary disease)      pt denies    Encounter for blood transfusion     Hematuria     History of 2019 novel coronavirus disease (COVID-19)     Hypercholesteremia     Hypertension     Iron deficiency anemia 10/06/2017    Non-pressure chronic ulcer of other part of right foot limited to breakdown of skin 01/26/2023    Stage 3 chronic kidney disease 10/06/2017       Past Surgical History:  Past Surgical History:   Procedure Laterality Date    bladder tumor removal      CARDIAC ELECTROPHYSIOLOGY MAPPING AND ABLATION      CARDIOVERSION      several    CATARACT EXTRACTION Bilateral     cataract removal with IOL implants Bilateral     CHOLECYSTECTOMY      CYSTOSCOPIC LITHOLAPAXY N/A 12/28/2021    Procedure: CYSTOLITHOLAPAXY;  Surgeon: Medardo Garcia MD;  Location: [facility];  Service: Urology;  Laterality: N/A;    CYSTOSCOPIC LITHOLAPAXY N/A 5/14/2024    Procedure: CYSTOLITHOLAPAXY;  Surgeon: Medardo Garcia MD;  Location: [facility];  Service: Urology;  Laterality: N/A;    CYSTOSCOPY      CYSTOSCOPY WITH BIOPSY OF BLADDER Right 12/28/2021    Procedure: CYSTOSCOPY, WITH BLADDER BIOPSY, WITH FULGURATION IF INDICATED;  Surgeon: Medardo Garcia MD;  Location: [facility];  Service: Urology;  Laterality: Right;    CYSTOTOMY, WITH FULGURATION AND RADIOACTIVE MATERIAL INSERTION N/A 12/3/2024    Procedure: CYSTOTOMY, WITH FULGURATION AND RADIOACTIVE MATERIAL INSERTION;  Surgeon: Medardo Garcia MD;  Location: [facility];  Service: Urology;  Laterality: N/A;    ESOPHAGOGASTRODUODENOSCOPY N/A 10/25/2021    Procedure: Small Jason Enteroscopy (SBE);  Surgeon: Isabelle Griffin MD;  Location: [facility];  Service: Endoscopy;  Laterality: N/A;    ESOPHAGOGASTRODUODENOSCOPY N/A 12/13/2021    Procedure: EGD (ESOPHAGOGASTRODUODENOSCOPY);  Surgeon: Troy Polanco MD;  Location: [facility] ENDO;  Service: Endoscopy;  Laterality: N/A;    ESOPHAGOGASTRODUODENOSCOPY N/A 4/5/2024    Procedure: EGD (ESOPHAGOGASTRODUODENOSCOPY);  Surgeon: Myrtle Salcedo,  MD;  Location: [facility] ENDO;  Service: Endoscopy;  Laterality: N/A;    ESOPHAGOGASTRODUODENOSCOPY N/A 4/22/2024    Procedure: EGD (ESOPHAGOGASTRODUODENOSCOPY);  Surgeon: Myrtle Salcedo MD;  Location: [facility] ENDO;  Service: Endoscopy;  Laterality: N/A;  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    EYE SURGERY      INTRALUMINAL GASTROINTESTINAL TRACT IMAGING VIA CAPSULE N/A 12/6/2021    Procedure: IMAGING PROCEDURE, GI TRACT, INTRALUMINAL, VIA CAPSULE;  Surgeon: Larry Jones RN;  Location: [facility] ENDO;  Service: Endoscopy;  Laterality: N/A;    INTRALUMINAL GASTROINTESTINAL TRACT IMAGING VIA CAPSULE N/A 4/18/2024    Procedure: IMAGING PROCEDURE, GI TRACT, INTRALUMINAL, VIA CAPSULE;  Surgeon: Larry Jones RN;  Location: [facility];  Service: Endoscopy;  Laterality: N/A;    OCCLUSION OF LEFT ATRIAL APPENDAGE N/A 11/14/2024    Procedure: Left atrial appendage occlusion;  Surgeon: Aroldo Ortiz MD;  Location: [facility] EP LAB;  Service: Cardiology;  Laterality: N/A;  afib, watchman, BSCI, venkatesh, anes, MB, 3prep    SMALL BOWEL ENTEROSCOPY N/A 9/26/2024    Procedure: ENTEROSCOPY--prximal DBE;  Surgeon: Eduardo Ruff MD;  Location: [facility];  Service: Endoscopy;  Laterality: N/A;    TRANSESOPHAGEAL ECHOCARDIOGRAPHY N/A 11/14/2024    Procedure: ECHOCARDIOGRAM, TRANSESOPHAGEAL;  Surgeon: Aroldo Martinez MD;  Location: [facility] EP LAB;  Service: Cardiology;  Laterality: N/A;    TRANSESOPHAGEAL ECHOCARDIOGRAPHY N/A 12/30/2024    Procedure: ECHOCARDIOGRAM, TRANSESOPHAGEAL;  Surgeon: Carlos Flower MD;  Location: [facility] CATH LAB;  Service: Cardiology;  Laterality: N/A;    TRANSURETHRAL RESECTION OF BLADDER TUMOR BY BIPOLAR CAUTERY N/A 7/23/2019    Procedure: TURBT, USING BIPOLAR CAUTERY;  Surgeon: Ritesh Marques MD;  Location: [facility];  Service: Urology;  Laterality: N/A;    TRANSURETHRAL RESECTION OF PROSTATE N/A 5/14/2024    Procedure: TURP (TRANSURETHRAL RESECTION OF PROSTATE);  Surgeon: Medardo Garcia MD;   Location: [facility] OR;  Service: Urology;  Laterality: N/A;    TURBT (TRANSURETHRAL RESECTION OF BLADDER TUMOR) N/A 12/3/2024    Procedure: TURBT (TRANSURETHRAL RESECTION OF BLADDER TUMOR);  Surgeon: Medardo Garcia MD;  Location: [facility] OR;  Service: Urology;  Laterality: N/A;         Family History:  Family History   Problem Relation Name Age of Onset    Heart disease Father      Hypertension Father      Heart disease Brother         Social History:  Social History     Tobacco Use    Smoking status: Former     Current packs/day: 0.00     Average packs/day: 2.5 packs/day for 20.0 years (50.0 ttl pk-yrs)     Types: Cigarettes     Start date:      Quit date:      Years since quittin.0     Passive exposure: Never    Smokeless tobacco: Never   Substance and Sexual Activity    Alcohol use: Yes     Alcohol/week: 7.0 standard drinks of alcohol     Types: 7 Cans of beer per week     Comment: occasionally: hold 72 hrs    Drug use: No    Sexual activity: Not Currently        Review of Systems     Review of Systems   Constitutional:         (-) generalized pain   Gastrointestinal:  Negative for nausea.   Genitourinary:         (+) urinary retention of drainage bag   All other systems reviewed and are negative.       Physical Exam     Initial Vitals [25 1631]   BP Pulse Resp Temp SpO2   (!) 189/86 (!) 55 16 98.1 °F (36.7 °C) 96 %      MAP       --          Physical Exam  Nursing Notes and Vital Signs Reviewed.  Constitutional: Patient is in no acute distress. Well-developed and well-nourished.  Head: Atraumatic. Normocephalic.  Eyes: PERRL. EOM intact. Conjunctivae are not pale. No scleral icterus.  ENT: Mucous membranes are moist. Oropharynx is clear and symmetric.    Neck: Supple. Full ROM. No lymphadenopathy.  Cardiovascular: Regular rate. Regular rhythm. No murmurs, rubs, or gallops. Distal pulses are 2+ and symmetric.  Pulmonary/Chest: No respiratory distress. Clear to auscultation bilaterally. No  wheezing or rales.  Abdominal: Soft and non-distended.  There is no tenderness.  No rebound, guarding, or rigidity. Good bowel sounds.  Genitourinary: No CVA tenderness. Right ureter drainage bag.  Musculoskeletal: Moves all extremities. No obvious deformities. No edema. No calf tenderness.  Skin: Warm and dry.  Neurological:  Alert, awake, and appropriate.  Normal speech.  No acute focal neurological deficits are appreciated.  Psychiatric: Normal affect. Good eye contact. Appropriate in content.     ED Course   Procedures  ED Vital Signs:  Vitals:    01/20/25 1631 01/20/25 1801 01/20/25 1946   BP: (!) 189/86 (!) 190/76 (!) 202/94   Pulse: (!) 55 65    Resp: 16 16    Temp: 98.1 °F (36.7 °C)     TempSrc: Oral     SpO2: 96% 97%    Weight: 94.4 kg (208 lb 1.8 oz)         Abnormal Lab Results:  Labs Reviewed   CBC W/ AUTO DIFFERENTIAL - Abnormal       Result Value    WBC 5.23      RBC 4.33 (*)     Hemoglobin 11.3 (*)     Hematocrit 35.9 (*)     MCV 83      MCH 26.1 (*)     MCHC 31.5 (*)     RDW 17.4 (*)     Platelets 284      MPV 10.5      Immature Granulocytes 0.2      Gran # (ANC) 3.7      Immature Grans (Abs) 0.01      Lymph # 0.9 (*)     Mono # 0.6      Eos # 0.1      Baso # 0.04      nRBC 0      Gran % 70.0      Lymph % 16.8 (*)     Mono % 10.5      Eosinophil % 1.7      Basophil % 0.8      Differential Method Automated     COMPREHENSIVE METABOLIC PANEL - Abnormal    Sodium 142      Potassium 4.4      Chloride 105      CO2 25      Glucose 109      BUN 18      Creatinine 1.1      Calcium 9.5      Total Protein 7.3      Albumin 3.1 (*)     Total Bilirubin 0.4      Alkaline Phosphatase 77      AST 24      ALT 24      eGFR >60      Anion Gap 12     PROTIME-INR    Prothrombin Time 11.4      INR 1.0     APTT    aPTT 27.9          All Lab Results:  Results for orders placed or performed during the hospital encounter of 01/20/25   CBC auto differential    Collection Time: 01/20/25  4:58 PM   Result Value Ref Range    WBC  5.23 3.90 - 12.70 K/uL    RBC 4.33 (L) 4.60 - 6.20 M/uL    Hemoglobin 11.3 (L) 14.0 - 18.0 g/dL    Hematocrit 35.9 (L) 40.0 - 54.0 %    MCV 83 82 - 98 fL    MCH 26.1 (L) 27.0 - 31.0 pg    MCHC 31.5 (L) 32.0 - 36.0 g/dL    RDW 17.4 (H) 11.5 - 14.5 %    Platelets 284 150 - 450 K/uL    MPV 10.5 9.2 - 12.9 fL    Immature Granulocytes 0.2 0.0 - 0.5 %    Gran # (ANC) 3.7 1.8 - 7.7 K/uL    Immature Grans (Abs) 0.01 0.00 - 0.04 K/uL    Lymph # 0.9 (L) 1.0 - 4.8 K/uL    Mono # 0.6 0.3 - 1.0 K/uL    Eos # 0.1 0.0 - 0.5 K/uL    Baso # 0.04 0.00 - 0.20 K/uL    nRBC 0 0 /100 WBC    Gran % 70.0 38.0 - 73.0 %    Lymph % 16.8 (L) 18.0 - 48.0 %    Mono % 10.5 4.0 - 15.0 %    Eosinophil % 1.7 0.0 - 8.0 %    Basophil % 0.8 0.0 - 1.9 %    Differential Method Automated    Comprehensive metabolic panel    Collection Time: 01/20/25  4:58 PM   Result Value Ref Range    Sodium 142 136 - 145 mmol/L    Potassium 4.4 3.5 - 5.1 mmol/L    Chloride 105 95 - 110 mmol/L    CO2 25 23 - 29 mmol/L    Glucose 109 70 - 110 mg/dL    BUN 18 8 - 23 mg/dL    Creatinine 1.1 0.5 - 1.4 mg/dL    Calcium 9.5 8.7 - 10.5 mg/dL    Total Protein 7.3 6.0 - 8.4 g/dL    Albumin 3.1 (L) 3.5 - 5.2 g/dL    Total Bilirubin 0.4 0.1 - 1.0 mg/dL    Alkaline Phosphatase 77 40 - 150 U/L    AST 24 10 - 40 U/L    ALT 24 10 - 44 U/L    eGFR >60 >60 mL/min/1.73 m^2    Anion Gap 12 8 - 16 mmol/L   Protime-INR    Collection Time: 01/20/25  6:09 PM   Result Value Ref Range    Prothrombin Time 11.4 9.0 - 12.5 sec    INR 1.0 0.8 - 1.2   APTT    Collection Time: 01/20/25  6:09 PM   Result Value Ref Range    aPTT 27.9 21.0 - 32.0 sec        Imaging Results:  Imaging Results              CT Abdomen Pelvis  Without Contrast (Final result)  Result time 01/20/25 19:38:42      Final result by Reji Wynne MD (01/20/25 19:38:42)                   Impression:      Right external renal drainage catheter and  right ureteral stent identified.  Mild Sheron ureteral fat stranding.  Remaining  findings as above    All CT scans   are performed using dose optimization techniques including the following: automated exposure control; adjustment of the mA and/or kV; use of iterative reconstruction technique.  Dose modulation was employed for ALARA by means of: Automated exposure control; adjustment of the mA and/or kV according to patient size (this includes techniques or standardized protocols for targeted exams where dose is matched to indication/reason for exam; i.e. extremities or head); and/or use of iterative reconstructive technique.      Electronically signed by: Reji Wynne  Date:    01/20/2025  Time:    19:38               Narrative:    EXAMINATION:  CT ABDOMEN PELVIS WITHOUT CONTRAST    CLINICAL HISTORY:  Abdominal pain, post-op;    TECHNIQUE:  Low dose axial images, sagittal and coronal reformations were obtained from the lung bases to the pubic symphysis.    COMPARISON:  None    FINDINGS:  Right external nephrostomy catheter with pigtail in the right renal pelvis.  Right urinary stent with distal pigtail in the bladder.  Colonic diverticulosis.  Suggestion of emphysematous changes in the lung bases.  Punctate pulmonary micro nodularity.  Left posterior pleural calcification.  A. Spleen pancreas adrenal glands are grossly unremarkable.  Status post cholecystectomy.  No hydronephrosis on the left.  Hyperdense cyst of the left kidney noted.  Mild fat stranding adjacent to the right ureter.  Small fat containing umbilical hernia.  Bladder is distended.  Prostate gland is enlarged.  No bowel obstruction.  Spleen is not enlarged.  No adrenal masses.  Focal calcification in the gastric fundus of uncertain origin.  Linear calcification or coiling in the right hepatic dome.  No pancreatic mass.    atherosclerotic changes.     Degenerative joint of the spine.                                         The Emergency Provider reviewed the vital signs and test results, which are outlined above.     ED Discussion        8:00 PM: Dr. Chang transfers care of patient to Dr. Lainez pending CT consultation.     8:49 PM: Reassessed pt at this time. Discussed with pt all pertinent ED information and results. Discussed pt dx and plan of tx. Gave pt all f/u and return to the ED instructions. All questions and concerns were addressed at this time. Pt expresses understanding of information and instructions, and is comfortable with plan to discharge. Pt is stable for discharge.    I discussed with patient and/or family/caretaker that evaluation in the ED does not suggest any emergent or life threatening medical conditions requiring immediate intervention beyond what was provided in the ED, and I believe patient is safe for discharge.  Regardless, an unremarkable evaluation in the ED does not preclude the development or presence of a serious of life threatening condition. As such, patient was instructed to return immediately for any worsening or change in current symptoms.         Medical Decision Making  Amount and/or Complexity of Data Reviewed  Labs: ordered. Decision-making details documented in ED Course.  Radiology: ordered. Decision-making details documented in ED Course.    Risk  Prescription drug management.                ED Medication(s):  Medications   cloNIDine tablet 0.2 mg (0.2 mg Oral Given 1/20/25 1946)       Discharge Medication List as of 1/20/2025  8:47 PM           Follow-up Information       Medardo Garcia MD In 3 days.    Specialty: Urology  Why: As scheduled  Contact information:  [id number]  [place] [id number] [phone]                                 Scribe Attestation:   Scribe #1: I performed the above scribed service and the documentation accurately describes the services I performed. I attest to the accuracy of the note.     Attending:   Physician Attestation Statement for Scribe #1: Do MONACO Thuytien Wendy, MD, personally performed the services described in this documentation, as scribed by  Thanh Ramirez, in my presence, and it is both accurate and complete.       Scribe Attestation:   Scribe #2: I performed the above scribed service and the documentation accurately describes the services I performed. I attest to the accuracy of the note.    Attending Attestation:           Physician Attestation for Scribe:    Physician Attestation Statement for Scribe #2: I, Nicola Lainez MD, reviewed documentation, as scribed by Gus Albarado in my presence, and it is both accurate and complete. I also acknowledge and confirm the content of the note done by Scribe #1.           Clinical Impression       ICD-10-CM ICD-9-CM   1. Nephrostomy present  Z93.6 V44.6   2. Primary hypertension  I10 401.9   3. Ureteral stent present  Z96.0 V45.89       Disposition:   Disposition: Discharged  Condition: Stable        Nicola Lainez MD  01/21/25 0516

## 2025-01-22 ENCOUNTER — PATIENT MESSAGE (OUTPATIENT)
Dept: PODIATRY | Facility: CLINIC | Age: 85
End: 2025-01-22
Payer: MEDICARE

## 2025-01-22 DIAGNOSIS — Z85.51 HISTORY OF BLADDER CANCER: Primary | ICD-10-CM

## 2025-01-24 ENCOUNTER — HOSPITAL ENCOUNTER (OUTPATIENT)
Dept: RADIATION THERAPY | Facility: HOSPITAL | Age: 85
Discharge: HOME | End: 2025-01-24
Attending: INTERNAL MEDICINE
Payer: MEDICARE

## 2025-01-24 ENCOUNTER — TELEPHONE (OUTPATIENT)
Dept: RADIOLOGY | Facility: HOSPITAL | Age: 85
End: 2025-01-24
Payer: MEDICARE

## 2025-01-24 ENCOUNTER — TELEPHONE (OUTPATIENT)
Dept: HEMATOLOGY/ONCOLOGY | Facility: CLINIC | Age: 85
End: 2025-01-24
Payer: MEDICARE

## 2025-01-24 ENCOUNTER — HOSPITAL ENCOUNTER (OUTPATIENT)
Dept: RADIOLOGY | Facility: HOSPITAL | Age: 85
Discharge: HOME | End: 2025-01-24
Attending: INTERNAL MEDICINE
Payer: MEDICARE

## 2025-01-24 ENCOUNTER — OFFICE VISIT (OUTPATIENT)
Dept: UROLOGY | Facility: CLINIC | Age: 85
End: 2025-01-24
Payer: MEDICARE

## 2025-01-24 VITALS — DIASTOLIC BLOOD PRESSURE: 75 MMHG | SYSTOLIC BLOOD PRESSURE: 149 MMHG | HEART RATE: 63 BPM

## 2025-01-24 DIAGNOSIS — N20.0 NEPHROLITHIASIS: ICD-10-CM

## 2025-01-24 DIAGNOSIS — R31.0 GROSS HEMATURIA: ICD-10-CM

## 2025-01-24 DIAGNOSIS — Z85.51 HISTORY OF BLADDER CANCER: Primary | ICD-10-CM

## 2025-01-24 DIAGNOSIS — N30.01 ACUTE CYSTITIS WITH HEMATURIA: ICD-10-CM

## 2025-01-24 DIAGNOSIS — R35.0 BENIGN PROSTATIC HYPERPLASIA WITH URINARY FREQUENCY: ICD-10-CM

## 2025-01-24 DIAGNOSIS — N40.1 BENIGN PROSTATIC HYPERPLASIA WITH URINARY FREQUENCY: ICD-10-CM

## 2025-01-24 PROCEDURE — 1159F MED LIST DOCD IN RCRD: CPT | Mod: CPTII,S$GLB,, | Performed by: UROLOGY

## 2025-01-24 PROCEDURE — 3077F SYST BP >= 140 MM HG: CPT | Mod: CPTII,S$GLB,, | Performed by: UROLOGY

## 2025-01-24 PROCEDURE — 77014 HC CT GUIDANCE RADIATION THERAPY FLDS PLACEMENT: CPT | Mod: TC | Performed by: SPECIALIST

## 2025-01-24 PROCEDURE — 77263 THER RADIOLOGY TX PLNG CPLX: CPT | Mod: ,,, | Performed by: SPECIALIST

## 2025-01-24 PROCEDURE — 3288F FALL RISK ASSESSMENT DOCD: CPT | Mod: CPTII,S$GLB,, | Performed by: UROLOGY

## 2025-01-24 PROCEDURE — 99024 POSTOP FOLLOW-UP VISIT: CPT | Mod: S$GLB,,, | Performed by: UROLOGY

## 2025-01-24 PROCEDURE — 77334 RADIATION TREATMENT AID(S): CPT | Mod: 26,,, | Performed by: SPECIALIST

## 2025-01-24 PROCEDURE — 1126F AMNT PAIN NOTED NONE PRSNT: CPT | Mod: CPTII,S$GLB,, | Performed by: UROLOGY

## 2025-01-24 PROCEDURE — 3072F LOW RISK FOR RETINOPATHY: CPT | Mod: CPTII,S$GLB,, | Performed by: UROLOGY

## 2025-01-24 PROCEDURE — 77290 THER RAD SIMULAJ FIELD CPLX: CPT | Mod: TC | Performed by: SPECIALIST

## 2025-01-24 PROCEDURE — 99999 PR PBB SHADOW E&M-EST. PATIENT-LVL III: CPT | Mod: PBBFAC,,, | Performed by: UROLOGY

## 2025-01-24 PROCEDURE — 1157F ADVNC CARE PLAN IN RCRD: CPT | Mod: CPTII,S$GLB,, | Performed by: UROLOGY

## 2025-01-24 PROCEDURE — 1101F PT FALLS ASSESS-DOCD LE1/YR: CPT | Mod: CPTII,S$GLB,, | Performed by: UROLOGY

## 2025-01-24 PROCEDURE — 77334 RADIATION TREATMENT AID(S): CPT | Mod: TC | Performed by: SPECIALIST

## 2025-01-24 PROCEDURE — 3078F DIAST BP <80 MM HG: CPT | Mod: CPTII,S$GLB,, | Performed by: UROLOGY

## 2025-01-24 NOTE — TELEPHONE ENCOUNTER
Interventional Radiology  Scheduled 10:30AM neph tube removal for 1/31/25 w/patient's son, Hector.  Instructed that pt. needs to arrive by 9:00AM to hospital off [address], he must have a ride (Hector will bring pt.) and NPO after midnight the night before procedure.  Explained that pt. can take morning medications the day of the procedure with a small sip of water.  Pt. does not take other blood thinners, NSAIDS, fish oil, or GLP-1/GIP agonists.  Hector verbalized understanding of all instructions.

## 2025-01-24 NOTE — TELEPHONE ENCOUNTER
Attempted to contact pt for post-op call with no answer. Voicemail left to reach out at [phone] with any questions or concerns.

## 2025-01-24 NOTE — PROGRESS NOTES
Chief Complaint:   Encounter Diagnoses   Name Primary?    History of bladder cancer Yes    Gross hematuria     Benign prostatic hyperplasia with urinary frequency     Nephrolithiasis     Acute cystitis with hematuria        HPI:   1/24/25- patient is status post nephrostomy and stent placement, doing well with clear urine, no voiding complaints.  Due for CT simulation for radiation oncology, will also be starting chemotherapy soon.  Otherwise no complaints today.    12/22/21- patient comes in today to establish care my clinic, recent gross hematuria and reported to see Alexandra.  CT urogram demonstrates bladder stones, urinary retention and a distal right ureteral stone.  Patient has had a Pandey catheter would like this removed today, no more gross hematuria, he is also on antibiotics and tamsulosin.  Patient otherwise was voiding well with no complaints prior to this incident, history of TURBT with subsequent induction BCG, no recurrences.      Allergies:  Patient has no known allergies.    Medications:  has a current medication list which includes the following prescription(s): amiodarone, amlodipine, atorvastatin, donepezil, ergocalciferol, famotidine, ferrous sulfate, hydrochlorothiazide, metoprolol succinate, multivitamin, nitrofurantoin (macrocrystal-monohydrate), oxycodone-acetaminophen, pantoprazole, phenazopyridine, rivaroxaban, and tamsulosin, and the following Facility-Administered Medications: lactated ringers.    Review of Systems:  General: No fever, chills, fatigability, or weight loss.  Skin: No rashes, itching, or changes in color or texture of skin.  Chest: Denies RECINOS, cyanosis, wheezing, cough, and sputum production.  Abdomen: Appetite fine. No weight loss. Denies diarrhea, abdominal pain, hematemesis, or blood in stool.  Musculoskeletal: No joint stiffness or swelling. Denies back pain.  : As above.  All other review of systems negative.    PMH:   has a past medical history of Anemia, Anticoagulant  long-term use, Atrial fibrillation, Bladder cancer, Bladder tumor, CKD (chronic kidney disease), stage III, COPD (chronic obstructive pulmonary disease), Encounter for blood transfusion, Hematuria, History of 2019 novel coronavirus disease (COVID-19), Hypercholesteremia, and Hypertension.    PSH:   has a past surgical history that includes Cholecystectomy; Cystoscopy; Cardioversion; bladder tumor removal; Eye surgery; cataract removal with IOL implants (Bilateral); Cardiac electrophysiology mapping and ablation; Transurethral resection of bladder tumor by bipolar cautery (N/A, 7/23/2019); Esophagogastroduodenoscopy (N/A, 10/25/2021); Intraluminal gastrointestinal tract imaging via capsule (N/A, 12/6/2021); Esophagogastroduodenoscopy (N/A, 12/13/2021); Cystoscopy with biopsy of bladder (Right, 12/28/2021); and Cystoscopic litholapaxy (N/A, 12/28/2021).    FamHx: family history includes Heart disease in his brother and father.    SocHx:  reports that he quit smoking about 35 years ago. His smoking use included cigarettes. He has a 50.00 pack-year smoking history. He has never used smokeless tobacco. He reports current alcohol use of about 8.0 standard drinks of alcohol per week. He reports that he does not use drugs.      Physical Exam:  General: A&Ox3, no apparent distress, no deformities  Neck: No masses, normal thyroid  Lungs: normal inspiration, no use of accessory muscles  Heart: normal pulse, no arrhythmias  Abdomen: Soft, NT, ND, no masses, no hernias, no hepatosplenomegaly  Lymphatic: Neck and groin nodes negative  : 11/24- Test desc filipe, no abnormalities of epididymus. Normal penile and scrotal skin. Meatus normal.     Labs/Studies:    ml 6/24  Cystoscopy tumors along the lateral wall left side, debris, TUR defect 11/24  Cytology suspicious 11/24  Cytology negative 4/24  CT without contrast right stent and nephrostomy, no left hydro 1/25  CORDELL right hydro 12/24  CT stone protocol asymetry to right  bladder wall with hydro 11/24  CORDELL moderate right-sided hydronephrosis 4/24  CT urogram bladder distention with bilateral hydronephrosis, bladder stone 4/24  CT stone protocol no nephrolithiasis 1/22  CT urogram 0.5 cm distal right ureteral stone, bladder stones, BPH 12/21  Creatinine 1.1 1/25  PSA 2.1 10/24    Impression/Plan:      1. History of bladder cancer-  right PCN and stent by IR  1/17/25,  pT1 high turbt with mitomycin  12/3/24,  BCG 3/22, High pTa, but suspicious for T1 cystoscopy with bladder biopsy, vesical litholapaxy, could not assess the right ureter  12/28/21.  TURBT 5-6 years ago at the outside facility with induction BCG.    Patient has high-grade T1 disease, no muscle present.  Has elected to not pursue cystectomy, but concomitant chemoradiation.  He is scheduled for CT simulation today to begin radiation here within the next couple of weeks, will also begin chemotherapy.  Nephrostomy and stent were placed by Interventional Radiology, they are bringing him back for possible nephrostomy removal.  I will see him in 2 months to discuss stent exchange.    2. Nephrolithiasis- no recurrent stones.    3. BPH-  TURP, vesicolithalopaxy  5/14/24    Patient states that he is voiding well off all medical management.    4. Gross hematuria- no evidence of recurrence, call with any issues prior to the next appointment.    5. Erectile dysfunction- given Viagra 100 mg but currently not an issue.

## 2025-01-27 ENCOUNTER — INFUSION (OUTPATIENT)
Dept: INFUSION THERAPY | Facility: HOSPITAL | Age: 85
End: 2025-01-27
Attending: NURSE PRACTITIONER
Payer: MEDICARE

## 2025-01-27 VITALS
RESPIRATION RATE: 18 BRPM | OXYGEN SATURATION: 98 % | DIASTOLIC BLOOD PRESSURE: 80 MMHG | TEMPERATURE: 98 F | HEART RATE: 69 BPM | SYSTOLIC BLOOD PRESSURE: 150 MMHG

## 2025-01-27 DIAGNOSIS — D50.0 IRON DEFICIENCY ANEMIA SECONDARY TO BLOOD LOSS (CHRONIC): Primary | ICD-10-CM

## 2025-01-27 PROCEDURE — 96375 TX/PRO/DX INJ NEW DRUG ADDON: CPT

## 2025-01-27 PROCEDURE — 63600175 PHARM REV CODE 636 W HCPCS: Mod: JZ,TB | Performed by: NURSE PRACTITIONER

## 2025-01-27 PROCEDURE — 96374 THER/PROPH/DIAG INJ IV PUSH: CPT

## 2025-01-27 RX ORDER — EPINEPHRINE 0.3 MG/.3ML
0.3 INJECTION SUBCUTANEOUS ONCE AS NEEDED
Status: CANCELLED | OUTPATIENT
Start: 2025-02-03

## 2025-01-27 RX ORDER — SODIUM CHLORIDE 0.9 % (FLUSH) 0.9 %
10 SYRINGE (ML) INJECTION
Status: DISCONTINUED | OUTPATIENT
Start: 2025-01-27 | End: 2025-01-27 | Stop reason: HOSPADM

## 2025-01-27 RX ORDER — DIPHENHYDRAMINE HYDROCHLORIDE 50 MG/ML
50 INJECTION INTRAMUSCULAR; INTRAVENOUS ONCE AS NEEDED
Status: CANCELLED | OUTPATIENT
Start: 2025-02-03

## 2025-01-27 RX ORDER — METHYLPREDNISOLONE SOD SUCC 125 MG
40 VIAL (EA) INJECTION
Status: CANCELLED
Start: 2025-02-03

## 2025-01-27 RX ORDER — METHYLPREDNISOLONE SOD SUCC 125 MG
40 VIAL (EA) INJECTION
Status: COMPLETED | OUTPATIENT
Start: 2025-01-27 | End: 2025-01-27

## 2025-01-27 RX ORDER — SODIUM CHLORIDE 0.9 % (FLUSH) 0.9 %
10 SYRINGE (ML) INJECTION
Status: CANCELLED | OUTPATIENT
Start: 2025-02-03

## 2025-01-27 RX ADMIN — IRON SUCROSE 200 MG: 20 INJECTION, SOLUTION INTRAVENOUS at 09:01

## 2025-01-27 RX ADMIN — METHYLPREDNISOLONE SODIUM SUCCINATE 40 MG: 125 INJECTION, POWDER, FOR SOLUTION INTRAMUSCULAR; INTRAVENOUS at 09:01

## 2025-01-27 NOTE — PLAN OF CARE
Problem: Anemia  Goal: Anemia Symptom Improvement  Outcome: Progressing     Problem: Adult Inpatient Plan of Care  Goal: Plan of Care Review  Outcome: Progressing  Flowsheets (Taken 1/27/2025 1422)  Plan of Care Reviewed With: patient  Goal: Patient-Specific Goal (Individualized)  Outcome: Progressing  Flowsheets (Taken 1/27/2025 1422)  Individualized Care Needs: Reclined position with snack and drink provided  Anxieties, Fears or Concerns: none  Patient/Family-Specific Goals (Include Timeframe): will tolerate infusion treatment  Goal: Absence of Hospital-Acquired Illness or Injury  Outcome: Progressing  Goal: Optimal Comfort and Wellbeing  Outcome: Progressing  Intervention: Provide Person-Centered Care  Flowsheets (Taken 1/27/2025 1422)  Trust Relationship/Rapport:   care explained   questions encouraged   choices provided   reassurance provided   emotional support provided   thoughts/feelings acknowledged   empathic listening provided   questions answered

## 2025-01-28 ENCOUNTER — CLINICAL SUPPORT (OUTPATIENT)
Dept: AUDIOLOGY | Facility: CLINIC | Age: 85
End: 2025-01-28
Payer: MEDICARE

## 2025-01-28 ENCOUNTER — CLINICAL SUPPORT (OUTPATIENT)
Dept: HEMATOLOGY/ONCOLOGY | Facility: CLINIC | Age: 85
End: 2025-01-28
Payer: MEDICARE

## 2025-01-28 ENCOUNTER — OFFICE VISIT (OUTPATIENT)
Dept: PODIATRY | Facility: CLINIC | Age: 85
End: 2025-01-28
Payer: MEDICARE

## 2025-01-28 DIAGNOSIS — H61.23 BILATERAL IMPACTED CERUMEN: Primary | ICD-10-CM

## 2025-01-28 DIAGNOSIS — Z51.11 CHEMOTHERAPY MANAGEMENT, ENCOUNTER FOR: ICD-10-CM

## 2025-01-28 DIAGNOSIS — L97.512 ULCER OF RIGHT SECOND TOE WITH FAT LAYER EXPOSED: Primary | ICD-10-CM

## 2025-01-28 DIAGNOSIS — E11.42 TYPE 2 DIABETES MELLITUS WITH PERIPHERAL NEUROPATHY: ICD-10-CM

## 2025-01-28 DIAGNOSIS — Z51.11 CHEMOTHERAPY MANAGEMENT, ENCOUNTER FOR: Primary | ICD-10-CM

## 2025-01-28 DIAGNOSIS — Z79.01 ANTICOAGULATED: ICD-10-CM

## 2025-01-28 DIAGNOSIS — C67.9 UROTHELIAL CARCINOMA OF BLADDER: Primary | ICD-10-CM

## 2025-01-28 PROCEDURE — 99999 PR PBB SHADOW E&M-EST. PATIENT-LVL II: CPT | Mod: PBBFAC,,,

## 2025-01-28 PROCEDURE — 11042 DBRDMT SUBQ TIS 1ST 20SQCM/<: CPT | Mod: S$GLB,,, | Performed by: PODIATRIST

## 2025-01-28 PROCEDURE — 99499 UNLISTED E&M SERVICE: CPT | Mod: 25,S$GLB,, | Performed by: PODIATRIST

## 2025-01-28 PROCEDURE — 99999 PR PBB SHADOW E&M-EST. PATIENT-LVL III: CPT | Mod: PBBFAC,,, | Performed by: PODIATRIST

## 2025-01-28 NOTE — PROGRESS NOTES
Met with patient , his son and daughter in law in person_ to discuss upcoming systemic therapy initiation. Discussed patient diagnosis including staging information. Also discussed that therapy regimen prescribed involves the following drugs :  Cisplatin, Zyprexa, Decadron, compazine __, and timeline of therapy administration. Went over what to expect on first day of treatment, including what to bring with you, visitor policy, and infusion suite guidelines. Also discussed supportive and shared services available to patient, including social work, financial counseling, oncology nutrition, and oncology psychology.      Messaged provider to place chemo care companion orders.   Pt interested in program, son and daughter in law will help him.      Pt to have audiogram today     Covered with patient potential side effects and symptom management. Reviewed supportive medications that will be given before, during, and after treatment. Also stressed that other side effects are possible outside of those listed. Spent additional time on signs of infection, infection prevention, and proper nutrition/hydration. Especially hydration .     Education provided to patient via (___ chemotherapy education binder___). Also provided contact information for clinic and information related to [facility] communication. Discussed communication process for after-hours needs and some common scenarios in which patient should call provider for guidance vs. immediately report to the emergency room.     Finally, patient was given my contact information and role of oncology navigator was discussed. Encouraged patient to call with any questions, concerns, or needs. Patient verbalized understanding of all above information.

## 2025-01-28 NOTE — PROGRESS NOTES
Subjective:       Patient ID: Jose Miguel Alvarez Jr. is a 84 y.o. male.    Chief Complaint: Follow-up (Right foot 2nd toe: c/o denies pain, pt is nondiabetic, pt states using betadine )    HPI: Jose Miguel Alvarez Jr. presents to the office today with a small wound to the distal aspect of the right 2nd toe.  States that this recently reoccurred without incident.  He continues on Xarelto followed by cardiology and Dr. Flower.  Starting treatment for bladder cancer with chemotherapy and radiation.     Review of patient's allergies indicates:   Allergen Reactions    Feraheme [ferumoxytol] Anaphylaxis       Past Medical History:   Diagnosis Date    Anemia     Anticoagulant long-term use     Atrial fibrillation     AVM (arteriovenous malformation) of small bowel, acquired with hemorrhage 2024    EGD 24: multiple small gastric AVMs in the antrim and fundus. Treated with APC.   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 limited to breakdown of skin 2023    Stage 3 chronic kidney disease 10/06/2017       Family History   Problem Relation Name Age of Onset    Heart disease Father      Hypertension Father      Heart disease Brother         Social History     Socioeconomic History    Marital status:    Tobacco Use    Smoking status: Former     Current packs/day: 0.00     Average packs/day: 2.5 packs/day for 20.0 years (50.0 ttl pk-yrs)     Types: Cigarettes     Start date:      Quit date:      Years since quittin.1     Passive exposure: Never     Smokeless tobacco: Never   Substance and Sexual Activity    Alcohol use: Yes     Alcohol/week: 7.0 standard drinks of alcohol     Types: 7 Cans of beer per week     Comment: occasionally: hold 72 hrs    Drug use: No    Sexual activity: Not Currently     Social Drivers of Health     Financial Resource Strain: Low Risk  (1/13/2025)    Overall Financial Resource Strain (CARDIA)     Difficulty of Paying Living Expenses: Not hard at all   Food Insecurity: No Food Insecurity (1/13/2025)    Hunger Vital Sign     Worried About Running Out of Food in the Last Year: Never true     Ran Out of Food in the Last Year: Never true   Transportation Needs: No Transportation Needs (8/23/2024)    TRANSPORTATION NEEDS     Transportation : No   Physical Activity: Insufficiently Active (1/13/2025)    Exercise Vital Sign     Days of Exercise per Week: 2 days     Minutes of Exercise per Session: 30 min   Stress: No Stress Concern Present (1/13/2025)    [employer] - Occupational Stress Questionnaire     Feeling of Stress : Not at all   Housing Stability: Low Risk  (1/13/2025)    Housing Stability Vital Sign     Unable to Pay for Housing in the Last Year: No     Homeless in the Last Year: No       Past Surgical History:   Procedure Laterality Date    bladder tumor removal      CARDIAC ELECTROPHYSIOLOGY MAPPING AND ABLATION      CARDIOVERSION      several    CATARACT EXTRACTION Bilateral     cataract removal with IOL implants Bilateral     CHOLECYSTECTOMY      CYSTOSCOPIC LITHOLAPAXY N/A 12/28/2021    Procedure: CYSTOLITHOLAPAXY;  Surgeon: Medardo Garcia MD;  Location: [facility] OR;  Service: Urology;  Laterality: N/A;    CYSTOSCOPIC LITHOLAPAXY N/A 5/14/2024    Procedure: CYSTOLITHOLAPAXY;  Surgeon: Medardo Garcia MD;  Location: [facility] OR;  Service: Urology;  Laterality: N/A;    CYSTOSCOPY      CYSTOSCOPY WITH BIOPSY OF BLADDER Right 12/28/2021    Procedure: CYSTOSCOPY, WITH BLADDER BIOPSY, WITH FULGURATION  IF INDICATED;  Surgeon: Medardo Garcia MD;  Location: [facility] OR;  Service: Urology;  Laterality: Right;    CYSTOTOMY, WITH FULGURATION AND RADIOACTIVE MATERIAL INSERTION N/A 12/3/2024    Procedure: CYSTOTOMY, WITH FULGURATION AND RADIOACTIVE MATERIAL INSERTION;  Surgeon: Medardo Garcia MD;  Location: [facility] OR;  Service: Urology;  Laterality: N/A;    ESOPHAGOGASTRODUODENOSCOPY N/A 10/25/2021    Procedure: Small Jason Enteroscopy (SBE);  Surgeon: Isabelle Griffin MD;  Location: [facility] ENDO;  Service: Endoscopy;  Laterality: N/A;    ESOPHAGOGASTRODUODENOSCOPY N/A 12/13/2021    Procedure: EGD (ESOPHAGOGASTRODUODENOSCOPY);  Surgeon: Troy Polanco MD;  Location: [facility] ENDO;  Service: Endoscopy;  Laterality: N/A;    ESOPHAGOGASTRODUODENOSCOPY N/A 4/5/2024    Procedure: EGD (ESOPHAGOGASTRODUODENOSCOPY);  Surgeon: Myrtle Salcedo MD;  Location: [facility];  Service: Endoscopy;  Laterality: N/A;    ESOPHAGOGASTRODUODENOSCOPY N/A 4/22/2024    Procedure: EGD (ESOPHAGOGASTRODUODENOSCOPY);  Surgeon: Myrtle Salcedo MD;  Location: [facility];  Service: Endoscopy;  Laterality: N/A;  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    EYE SURGERY      INTRALUMINAL GASTROINTESTINAL TRACT IMAGING VIA CAPSULE N/A 12/6/2021    Procedure: IMAGING PROCEDURE, GI TRACT, INTRALUMINAL, VIA CAPSULE;  Surgeon: Larry Jones RN;  Location: [facility] ENDO;  Service: Endoscopy;  Laterality: N/A;    INTRALUMINAL GASTROINTESTINAL TRACT IMAGING VIA CAPSULE N/A 4/18/2024    Procedure: IMAGING PROCEDURE, GI TRACT, INTRALUMINAL, VIA CAPSULE;  Surgeon: Larry Jones RN;  Location: [facility] ENDO;  Service: Endoscopy;  Laterality: N/A;    OCCLUSION OF LEFT ATRIAL APPENDAGE N/A 11/14/2024    Procedure: Left atrial appendage occlusion;  Surgeon: Aroldo Ortiz MD;  Location: [facility] EP LAB;  Service: Cardiology;  Laterality: N/A;  afib, watchman, BSCI, venkatesh, anes, MB, 3prep    SMALL BOWEL ENTEROSCOPY N/A 9/26/2024    Procedure:  ENTEROSCOPY--prximal DBE;  Surgeon: Eduardo Ruff MD;  Location: [facility] ENDO;  Service: Endoscopy;  Laterality: N/A;    TRANSESOPHAGEAL ECHOCARDIOGRAPHY N/A 11/14/2024    Procedure: ECHOCARDIOGRAM, TRANSESOPHAGEAL;  Surgeon: Aroldo Martinez MD;  Location: [facility] EP LAB;  Service: Cardiology;  Laterality: N/A;    TRANSESOPHAGEAL ECHOCARDIOGRAPHY N/A 12/30/2024    Procedure: ECHOCARDIOGRAM, TRANSESOPHAGEAL;  Surgeon: Carlos Flower MD;  Location: [facility] CATH LAB;  Service: Cardiology;  Laterality: N/A;    TRANSURETHRAL RESECTION OF BLADDER TUMOR BY BIPOLAR CAUTERY N/A 7/23/2019    Procedure: TURBT, USING BIPOLAR CAUTERY;  Surgeon: Ritesh Marques MD;  Location: [facility] OR;  Service: Urology;  Laterality: N/A;    TRANSURETHRAL RESECTION OF PROSTATE N/A 5/14/2024    Procedure: TURP (TRANSURETHRAL RESECTION OF PROSTATE);  Surgeon: Medardo Garcia MD;  Location: [facility] OR;  Service: Urology;  Laterality: N/A;    TURBT (TRANSURETHRAL RESECTION OF BLADDER TUMOR) N/A 12/3/2024    Procedure: TURBT (TRANSURETHRAL RESECTION OF BLADDER TUMOR);  Surgeon: Medardo Garcia MD;  Location: [facility] OR;  Service: Urology;  Laterality: N/A;     Review of Systems     Objective:   There were no vitals taken for this visit.    CT Abdomen Pelvis  Without Contrast  Narrative: EXAMINATION:  CT ABDOMEN PELVIS WITHOUT CONTRAST    CLINICAL HISTORY:  Abdominal pain, post-op;    TECHNIQUE:  Low dose axial images, sagittal and coronal reformations were obtained from the lung bases to the pubic symphysis.    COMPARISON:  None    FINDINGS:  Right external nephrostomy catheter with pigtail in the right renal pelvis.  Right urinary stent with distal pigtail in the bladder.  Colonic diverticulosis.  Suggestion of emphysematous changes in the lung bases.  Punctate pulmonary micro nodularity.  Left posterior pleural calcification.  A. Spleen pancreas adrenal glands are grossly unremarkable.  Status post cholecystectomy.  No hydronephrosis on the left.   Hyperdense cyst of the left kidney noted.  Mild fat stranding adjacent to the right ureter.  Small fat containing umbilical hernia.  Bladder is distended.  Prostate gland is enlarged.  No bowel obstruction.  Spleen is not enlarged.  No adrenal masses.  Focal calcification in the gastric fundus of uncertain origin.  Linear calcification or coiling in the right hepatic dome.  No pancreatic mass.    atherosclerotic changes.     Degenerative joint of the spine.  Impression: Right external renal drainage catheter and  right ureteral stent identified.  Mild Sheron ureteral fat stranding.  Remaining findings as above    All CT scans   are performed using dose optimization techniques including the following: automated exposure control; adjustment of the mA and/or kV; use of iterative reconstruction technique.  Dose modulation was employed for ALARA by means of: Automated exposure control; adjustment of the mA and/or kV according to patient size (this includes techniques or standardized protocols for targeted exams where dose is matched to indication/reason for exam; i.e. extremities or head); and/or use of iterative reconstructive technique.    Electronically signed by: Reji Wynne  Date:    01/20/2025  Time:    19:38     Physical Exam   LOWER EXTREMITY PHYSICAL EXAMINATION    Vascular:  The right dorsalis pedis pulse 2/4 and the right posterior tibial pulse 2/4.   Capillary refill is intact.  Pedal hair growth intact    Musculoskeletal: Manual Muscle Testing is 5/5 with dorsiflexion, plantar flexion, abduction, and adduction.   There is normal range of motion in the forefoot, hindfoot, and Ankle joint.      Dermatological:  Skin is supple, moist, intact.  No evidence of discoloration the distal aspect the right 2nd toe.  Overlying hyperkeratosis noted.  Small wound noted underlying.  Post debridement, the wound measures 0.5 x 0.4 cm. No probe bone.  No erythema.  Wound depth 0.05 cm.          Assessment:     1. Ulcer of  right second toe with fat layer exposed    2. Type 2 diabetes mellitus with peripheral neuropathy    3. Anticoagulated        Plan:     Ulcer of right second toe with fat layer exposed    Type 2 diabetes mellitus with peripheral neuropathy    Anticoagulated        Thorough discussion is had with the patient today, concerning the diagnosis, its etiology, and the treatment algorithm at present.    The wound was surgically debrided after adequate prep with alcohol and/or betadine paint. Excisional wound debridement was performed using sharp #10/#15 blade/rounded scalpel and tissue nipper, with removal of all non-viable skin and soft tissues; necrotic skin/tissue formation. The woundbase/wound bed was also debrided to encourage bleeding as to promote/stimulate healing. Debridement was excisional and included epidermal, dermal and subcutaneous tissues. Post debridement measurements are as above. Hemostasis was achieved. Patient tolerated procedure well and without complications. Local woundcare with antibiotic cream and dry dressings and bandage thereafter.     Home health to continue with dressings.   Dressing change instructions.    Thorough discussion is had with the patient concerning the diagnosis, its etiology, and the treatment algorithm at present. Shoe inspection. Foot Education. Patient reminded of the importance of good nutrition and blood sugar control to help prevent podiatric complications of diabetes. Patient instructed on proper foot hygeine. We discussed wearing proper and supportive shoe gear, daily foot inspections, never walking barefooted or sock footed, never putting sharp instruments to feet which can cause major complications associated with infection, ulcers, lacerations.    Follow-up in 1-2 weeks      Future Appointments   Date Time Provider Department Center   1/28/2025  3:00 PM Makayla Bowser CCC-A [facility]   1/29/2025 12:00 PM [facility] PET CT1 [facility] PET CT [facility]   1/31/2025 10:30  AM [facility] IR1 [facility] RAD IR Brandin Mceknna   1/31/2025 12:50 PM [facility] SAME DAY LAB [facility] LAB DS [facility]   1/31/2025  1:40 PM Fabián Westbrook MD CC HEM ONC [facility]   2/3/2025  9:00 AM CHAIR 07 [facility] INFSN [facility]   2/5/2025  8:00 AM CHAIR 13 [facility] INFSN [facility]   2/10/2025  9:00 AM CHAIR 04 [facility] INFSN [facility]   2/17/2025  9:00 AM CHAIR 01 [facility] INFSN [facility]   2/24/2025  9:00 AM CHAIR 03 [facility] INFSN [facility]   2/28/2025 10:20 AM Toshia Valladares MD [facility] IM [facility]   3/5/2025  7:40 AM Carlos Flower MD [facility] CARDIO [facility]   3/12/2025  1:00 PM Aroldo Ortiz MD [facility] ARR [facility]   3/24/2025 11:15 AM Medardo Garcia MD [facility] UROLOGY [facility]   3/26/2025 10:20 AM LABORATORY, [facility] LAB [facility]   3/31/2025  9:20 AM Gordo Reyes MD [facility] HEM ONC [facility]

## 2025-01-28 NOTE — PROGRESS NOTES
Jose Miguel Alvarez  was seen 01/28/2025 for an audiological evaluation.  Patient is here for baseline audiogram prior to cisplatin therapy; diagnosis of bladder cancer.  He reports bilateral gradual hearing loss with the right ear is the poorer hearing ear. He denies tinnitus or dizziness.  He reports a history of noise exposure.      Otoscopy reveals bilateral cerumen impaction. Ear cleaning scheduled for Thursday this week in ENT and audiogram on Friday.

## 2025-01-29 ENCOUNTER — HOSPITAL ENCOUNTER (OUTPATIENT)
Dept: RADIOLOGY | Facility: HOSPITAL | Age: 85
Discharge: HOME | End: 2025-01-29
Attending: HOSPITALIST
Payer: MEDICARE

## 2025-01-29 DIAGNOSIS — C67.9 MALIGNANT NEOPLASM OF URINARY BLADDER, UNSPECIFIED SITE: ICD-10-CM

## 2025-01-29 PROCEDURE — 77301 RADIOTHERAPY DOSE PLAN IMRT: CPT | Mod: TC | Performed by: SPECIALIST

## 2025-01-29 PROCEDURE — 78815 PET IMAGE W/CT SKULL-THIGH: CPT | Mod: TC

## 2025-01-29 PROCEDURE — 78815 PET IMAGE W/CT SKULL-THIGH: CPT | Mod: 26,PI,, | Performed by: STUDENT IN AN ORGANIZED HEALTH CARE EDUCATION/TRAINING PROGRAM

## 2025-01-29 PROCEDURE — 77301 RADIOTHERAPY DOSE PLAN IMRT: CPT | Mod: 26,,, | Performed by: SPECIALIST

## 2025-01-29 PROCEDURE — A9552 F18 FDG: HCPCS | Performed by: HOSPITALIST

## 2025-01-29 RX ORDER — FLUDEOXYGLUCOSE F18 500 MCI/ML
11.16 INJECTION INTRAVENOUS
Status: COMPLETED | OUTPATIENT
Start: 2025-01-29 | End: 2025-01-29

## 2025-01-29 RX ADMIN — FLUDEOXYGLUCOSE F-18 11.16 MILLICURIE: 500 INJECTION INTRAVENOUS at 12:01

## 2025-01-30 ENCOUNTER — OFFICE VISIT (OUTPATIENT)
Dept: OTOLARYNGOLOGY | Facility: CLINIC | Age: 85
End: 2025-01-30
Payer: MEDICARE

## 2025-01-30 ENCOUNTER — TELEPHONE (OUTPATIENT)
Dept: RADIOLOGY | Facility: HOSPITAL | Age: 85
End: 2025-01-30
Payer: MEDICARE

## 2025-01-30 DIAGNOSIS — H61.23 BILATERAL IMPACTED CERUMEN: Primary | ICD-10-CM

## 2025-01-30 PROCEDURE — 77300 RADIATION THERAPY DOSE PLAN: CPT | Mod: 26,,, | Performed by: SPECIALIST

## 2025-01-30 PROCEDURE — 99499 UNLISTED E&M SERVICE: CPT | Mod: 25,S$GLB,, | Performed by: PHYSICIAN ASSISTANT

## 2025-01-30 PROCEDURE — 77338 DESIGN MLC DEVICE FOR IMRT: CPT | Mod: TC | Performed by: SPECIALIST

## 2025-01-30 PROCEDURE — 69210 REMOVE IMPACTED EAR WAX UNI: CPT | Mod: S$GLB,,, | Performed by: PHYSICIAN ASSISTANT

## 2025-01-30 PROCEDURE — 99999 PR PBB SHADOW E&M-EST. PATIENT-LVL II: CPT | Mod: PBBFAC,,, | Performed by: PHYSICIAN ASSISTANT

## 2025-01-30 PROCEDURE — 77338 DESIGN MLC DEVICE FOR IMRT: CPT | Mod: 26,,, | Performed by: SPECIALIST

## 2025-01-30 PROCEDURE — 77300 RADIATION THERAPY DOSE PLAN: CPT | Mod: TC | Performed by: SPECIALIST

## 2025-01-30 NOTE — TELEPHONE ENCOUNTER
Interventional Radiology  Spoke to pt.'s son, Hector, and confirmed the appointment tomorrow, 1/31/25, at 10:30AM.  Pt. is to arrive by 9:15AM to the hospital ([address]/ [address]) off [address] in [place]. Hector verbalized understanding of all instructions.

## 2025-01-30 NOTE — PROGRESS NOTES
Subjective:   Cerumen impactions     Patient ID: Jose Miguel Alvarez Jr. is a 84 y.o. male.    Chief Complaint:  Excessive ear wax     Jose Miguel Alvarez Jr. is a 84 y.o. male here to see me today for evaluation of a wax impaction in bilateral ears.  He has complaints of hearing loss in the affected ears, but denies pain or drainage.  This has been an issue in the past.  Here recently for baseline audiogram before starting cisplatin and he was noted to have cerumen impactions AU.   The patient has been using an ear drop to soften the wax for the past 3 days.  Denies tinnitus.  No previous otologic surgery.    HPI  Review of Systems   HENT: Positive for hearing loss. Negative for ear discharge, ear pain and tinnitus.        Objective:     Physical Exam   HENT:   Right Ear: External ear and ear canal normal. Decreased hearing is noted.   Left Ear: External ear and ear canal normal. Decreased hearing is noted.   Bilateral complete cerumen impactions, removal described below       Procedure Note    CHIEF COMPLAINT:  Cerumen Impaction    Description:  The patient was seated in an exam chair.  An ear speculum was placed in the right EAC and was examined under the microscope.  Suction, alligator forceps and/or loop curettes were used to remove a large cerumen impaction.  The tympanic membrane was visualized and was normal in appearance.  The procedure was repeated on the left side in a similar fashion.  The TM was intact and normal on this side as well.  The patient tolerated the procedure well.           Assessment:     1. Bilateral impacted cerumen        Plan:     1.  Cerumen impaction:  Removed today without difficulty.  I would recommend the use of a wax softening drop, either over the counter Debrox or mineral oil, on a weekly basis.  I also instructed the patient to avoid Qtips.  RTC as needed for ear cleaning.

## 2025-01-31 ENCOUNTER — HOSPITAL ENCOUNTER (OUTPATIENT)
Dept: RADIOLOGY | Facility: HOSPITAL | Age: 85
Discharge: HOME | End: 2025-01-31
Attending: UROLOGY
Payer: MEDICARE

## 2025-01-31 ENCOUNTER — CLINICAL SUPPORT (OUTPATIENT)
Dept: AUDIOLOGY | Facility: CLINIC | Age: 85
End: 2025-01-31
Payer: MEDICARE

## 2025-01-31 ENCOUNTER — OFFICE VISIT (OUTPATIENT)
Dept: HEMATOLOGY/ONCOLOGY | Facility: CLINIC | Age: 85
End: 2025-01-31
Payer: MEDICARE

## 2025-01-31 VITALS
TEMPERATURE: 98 F | BODY MASS INDEX: 27.56 KG/M2 | HEIGHT: 72 IN | OXYGEN SATURATION: 99 % | HEART RATE: 70 BPM | WEIGHT: 203.5 LBS | DIASTOLIC BLOOD PRESSURE: 72 MMHG | SYSTOLIC BLOOD PRESSURE: 136 MMHG

## 2025-01-31 DIAGNOSIS — Z79.899 ENCOUNTER FOR MONITORING OTOTOXIC DRUG THERAPY: ICD-10-CM

## 2025-01-31 DIAGNOSIS — Z51.81 ENCOUNTER FOR MONITORING OTOTOXIC DRUG THERAPY: ICD-10-CM

## 2025-01-31 DIAGNOSIS — E11.69 DM TYPE 2 WITH DIABETIC DYSLIPIDEMIA: ICD-10-CM

## 2025-01-31 DIAGNOSIS — H90.3 SENSORINEURAL HEARING LOSS, BILATERAL: Primary | ICD-10-CM

## 2025-01-31 DIAGNOSIS — E78.5 DM TYPE 2 WITH DIABETIC DYSLIPIDEMIA: ICD-10-CM

## 2025-01-31 DIAGNOSIS — I10 PRIMARY HYPERTENSION: ICD-10-CM

## 2025-01-31 DIAGNOSIS — Z85.51 HISTORY OF BLADDER CANCER: ICD-10-CM

## 2025-01-31 DIAGNOSIS — F02.80 DEMENTIA ASSOCIATED WITH OTHER UNDERLYING DISEASE, WITHOUT BEHAVIORAL DISTURBANCE, PSYCHOTIC DISTURBANCE, MOOD DISTURBANCE, OR ANXIETY, UNSPECIFIED DEMENTIA SEVERITY: ICD-10-CM

## 2025-01-31 DIAGNOSIS — C67.9 UROTHELIAL CARCINOMA OF BLADDER: Primary | ICD-10-CM

## 2025-01-31 PROCEDURE — 92567 TYMPANOMETRY: CPT | Mod: S$GLB,,, | Performed by: AUDIOLOGIST-HEARING AID FITTER

## 2025-01-31 PROCEDURE — 50431 NJX PX NFROSGRM &/URTRGRM: CPT | Mod: 59,RT | Performed by: RADIOLOGY

## 2025-01-31 PROCEDURE — 50389 REMOVE RENAL TUBE W/FLUORO: CPT | Mod: RT,,, | Performed by: RADIOLOGY

## 2025-01-31 PROCEDURE — 3075F SYST BP GE 130 - 139MM HG: CPT | Mod: CPTII,S$GLB,, | Performed by: HOSPITALIST

## 2025-01-31 PROCEDURE — G2211 COMPLEX E/M VISIT ADD ON: HCPCS | Mod: S$GLB,,, | Performed by: HOSPITALIST

## 2025-01-31 PROCEDURE — 1157F ADVNC CARE PLAN IN RCRD: CPT | Mod: CPTII,S$GLB,, | Performed by: HOSPITALIST

## 2025-01-31 PROCEDURE — 1126F AMNT PAIN NOTED NONE PRSNT: CPT | Mod: CPTII,S$GLB,, | Performed by: HOSPITALIST

## 2025-01-31 PROCEDURE — 3288F FALL RISK ASSESSMENT DOCD: CPT | Mod: CPTII,S$GLB,, | Performed by: HOSPITALIST

## 2025-01-31 PROCEDURE — 92557 COMPREHENSIVE HEARING TEST: CPT | Mod: S$GLB,,, | Performed by: AUDIOLOGIST-HEARING AID FITTER

## 2025-01-31 PROCEDURE — 99215 OFFICE O/P EST HI 40 MIN: CPT | Mod: S$GLB,,, | Performed by: HOSPITALIST

## 2025-01-31 PROCEDURE — 3078F DIAST BP <80 MM HG: CPT | Mod: CPTII,S$GLB,, | Performed by: HOSPITALIST

## 2025-01-31 PROCEDURE — 1159F MED LIST DOCD IN RCRD: CPT | Mod: CPTII,S$GLB,, | Performed by: HOSPITALIST

## 2025-01-31 PROCEDURE — 1101F PT FALLS ASSESS-DOCD LE1/YR: CPT | Mod: CPTII,S$GLB,, | Performed by: HOSPITALIST

## 2025-01-31 PROCEDURE — 99999 PR PBB SHADOW E&M-EST. PATIENT-LVL III: CPT | Mod: PBBFAC,,, | Performed by: HOSPITALIST

## 2025-01-31 PROCEDURE — 50389 REMOVE RENAL TUBE W/FLUORO: CPT | Mod: RT | Performed by: RADIOLOGY

## 2025-01-31 PROCEDURE — 3072F LOW RISK FOR RETINOPATHY: CPT | Mod: CPTII,S$GLB,, | Performed by: HOSPITALIST

## 2025-01-31 PROCEDURE — C1769 GUIDE WIRE: HCPCS

## 2025-01-31 RX ORDER — OLANZAPINE 5 MG/1
TABLET ORAL
Qty: 24 TABLET | Refills: 0 | Status: SHIPPED | OUTPATIENT
Start: 2025-01-31

## 2025-01-31 RX ORDER — DEXAMETHASONE 4 MG/1
8 TABLET ORAL DAILY
Qty: 30 TABLET | Refills: 0 | Status: SHIPPED | OUTPATIENT
Start: 2025-01-31

## 2025-01-31 RX ORDER — PROCHLORPERAZINE MALEATE 5 MG
5 TABLET ORAL EVERY 6 HOURS PRN
Qty: 20 TABLET | Refills: 5 | Status: SHIPPED | OUTPATIENT
Start: 2025-01-31

## 2025-01-31 NOTE — ASSESSMENT & PLAN NOTE
Continues to do quite well with relatively minimal symptom burden. Plan to start definitive intent cisplatin based RT next week. Will st art chemo on Wednesday; RT start is Thursday. Will then adjust schedule so he gets his chemo every Friday with clinic visit. He also continues on IV iron treatments on Monday.  - Start cisplatin next Wednesday  - RT to start next Thursday.  - Then switch to Friday infusiosn to line up with MD or CECY clinic appts

## 2025-01-31 NOTE — PROCEDURES
Radiology Post-Procedure Note    Pre Op Diagnosis: ureteral stricture  Post Op Diagnosis: Same    Procedure: nephrostogram and removal    Procedure performed by: Dr Hector Arriaga  Specimen Removed: NO  Estimated Blood Loss: Minimal    Findings:   No complications.  Nephrostogram confirmed position and function of the ureteral stent.  Neph tube removed over the wire.      Patient tolerated procedure well.    Hector Arriaga MD  Staff Radiologist  Department of Radiology  Pager: [phone]

## 2025-01-31 NOTE — PROGRESS NOTES
Referring provider: Fabián Westbrook M.D.     Jose Miguel Alvarez Jr. was seen 01/31/2025 for an audiological evaluation.  Patient is here for baseline audiogram prior to cisplatin therapy; diagnosis of urothelial carcinoma of bladder.  He endorses hearing loss, reporting equally sided hearing. He denies tinnitus or dizziness.  No history of otologic surgery. He has history of excessive noise exposure, including occupational industrial noise without consistent use of hearing protection devices.       Results reveal a mild-to-severe sensorineural hearing loss 250-8000 Hz for the right ear, and  mild-to-severe sensorineural hearing loss 250-8000 Hz for the left ear.   Speech Reception Thresholds were  20 dBHL for the right ear and 20 dBHL for the left ear.   Word recognition scores were good for the right ear and good for the left ear.    Tympanograms were Type A for the right ear and Type A for the left ear.       Patient was counseled on the above findings and recommendations.  Advised to notify his healthcare providers of any perceived changes in hearing.      Recommendations:  1. Audiologic monitoring per ototoxicity protocol, during and following completion of therapy. The recommended protocol is within 48 hours prior to administration of a single high dose (>100 mg/square meter) infusion; or prior to initiation and mid-therapy and post-cessation of therapy.    2. Loud noise precaution, including avoidance when possible and/or hearing protection during chemotherapy administration and following; discussed with patient loud noise exposure can exacerbate the ototoxicity of cisplatin.  3. Hearing aids, binaural.

## 2025-01-31 NOTE — PROGRESS NOTES
MEDICAL ONCOLOGY - NEW PATIENT VISIT    Best Contact Phone Number(s): There are no phone numbers on file.     Cancer/Stage/TNM:    Cancer Staging   Urothelial carcinoma of bladder  Staging form: Urinary Bladder, AJCC 7th Edition  - Clinical: Stage II (T2, N0, M0) - Signed by Fabián Westbrook MD on 1/17/2025       Reason for visit: MIBC    HPI: Jose Miguel Alvarez Jr. is a 84 y.o. 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 Has long standing history of localized bladder cancer treated with serial TURBT since at least 2016. 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 He was treated with serial TURBT and induction BCG. More recently found to have recurrent disease with associtaed right sided hydronephrosis. Overall concerning for locally advanced disease. He presents to medical oncology clinic prior to starting weekly cisplatin based CRT.    History has been obtained by chart review and discussion with the patient.    Interval Events:    Has weekly venofer and weekly cisplatin scheduled to start next Monday and Wednesday, resepctively. Had PCN removed today, exchanged for ureteral stent. Urinated before getting to clinic without pain or hematuria. Has mild to modest urgency but no incontinence. Reports normal bowel movements about once per day. No fevers or chills. No CP, SOB or cough. Appetite is good. Remains ambulatory and independent. Overall patient feels good. No new concerns today.       Oncology History   Urothelial carcinoma of bladder   5/2014 Procedure    05/14/24 TURP    1. Prostate, chips, transurethral resection:  Prostate with stromal and glandular hyperplasia.    Negative for malignancy.    2. Bladder, stones, removal:  Stones (gross examination only)        4/2016 Procedure    04/27/16  BLADDER TUMOR, TUR:   - HIGH GRADE UROTHELIAL CARCINOMA INVASIVE INTO DEEP MUSCLE.      6/21/2016 Initial Diagnosis    Urothelial carcinoma of bladder     6/2016 Procedure    06/21/16  BLADDER, TUR:   -  NO TUMOR SEEN.    - FLORID ACUTE AND CHRONIC ULCERATIVE CYSTITIS WITH POLYPOID    GRANULATION TISSUE FORMATION.      7/2019 Procedure    07/23/19  BLADDER, TUR:   - MINUTE DETACHED FRAGMENT OF HIGH-GRADE CARCINOMA.   - SPECIMEN MOSTLY BLADDER MUCOSA AND WALL TO INCLUDE DEEP MUSCLE    NEGATIVE FOR TUMOR.      12/2021 Procedure    12/28/2021: Cystoscopy  Suspicious mucosa lateral to the right ureteral orifice, biopsied and fulgurated to completion, multiple bladder stones, inability to access the right ureter.       URINARY BLADDER TUMOR, BIOPSIES:   -  High-grade papillary urothelial carcinoma with foci suspicious for, but   not diagnostic of, superficial lamina propria invasion (see comment).   -  No muscularis propria is present for evaluation.      3/2022 Procedure    03/2022  BCG     4/2024 Imaging Significant Findings    04/03/2024 CTU  Impression:   Moderate bladder distension with mild to moderate bilateral hydronephrosis thought to reflect reflux disease.  No definite ureteral stone.  Bladder stone is noted.  Mild thickening of the posterior bladder wall without discrete mass.  Delayed contrast excretion seen within the kidneys.     11/2024 Imaging Significant Findings    11/18/24 CT Renal Stone  Impression:  - Focal thickening seen along the posterior bladder wall asymmetric to the right which produces moderate right-sided hydronephrosis and hydroureter. Findings concerning for bladder mass.  Recommend cystoscopy to exclude bladder mass.  - All CT scans at this facility use dose modulation, iterative reconstruction, and/or weight based dosing when appropriate to reduce radiation dose to as low as reasonable achievable.        11/2024 Procedure       11/21/24: Flexible cystoscopy: Tumors along the left lateral wall, significant debris, TUR defect     Bladder Cytology  BLADDER WASH, CYTOLOGY:   Suspicious for high grade urothelial carcinoma.    Red blood cells present   Sparsely cellular specimen       12/3/2024 Procedure    12/03/24 Cyttoscopy with TURBT  Bladder was examined, tumor was identified proximally 3 cm along the right lateral wall involving the site of the ureteral orifice.  Could not identify the right ureteral orifice, left ureteral orifices was normal in size, shape, caliber and location.  Cystoscope was removed, 24 Samoan resectoscope was then inserted into the urethra and bladder.  Dissection was continued until the tumor was completely dissected from the bladder wall.  I then fulgurated the edges and the base of the tumor.  Still no evidence of the right ureteral orifice, previously were unable to see it at his past surgery.    12/3/24  BLADDER TUMOR, TRANSURETHRAL RESECTION:   Papillary urothelial carcinoma with focal invasion of lamina propria, high-grade.   Muscularis propria tissue is not present for evaluation.       12/23/2024 Imaging Significant Findings    12/23/24 US Abdomen  Impression:  - Known right-sided bladder mass at the orifice of the abnormal ureteral insertion.  Right-sided hydronephrosis present.  - There is an 8 mm calcification in the bladder.     1/17/2025 Cancer Staged    Staging form: Urinary Bladder, AJCC 7th Edition  - Clinical: Stage II (T2, N0, M0)     1/29/2025 Imaging Significant Findings    FDG PET CT 1/29/25  Impression:   1. Subtle asymmetric thickening of the right wall the bladder near the surgical site.  Otherwise, no definitive evidence of metastatic disease.  2. Nephrostomy in the right kidney.  3. Nodular opacities in the bilateral lungs, these findings are favored to be related to infectious/inflammatory etiology.  Consider short interval follow-up with CT (3 months).     2/1/2025 -  Chemotherapy    Treatment Summary   Plan Name: OP Bladder CISplatin QW + Radiation  Treatment Goal: Curative  Status: Active  Start Date: 2/1/2025 (Planned)  End Date: 3/8/2025 (Planned)  Provider: Fabián Westbrook MD  Chemotherapy: CISplatin (Platinol) 35 mg/m2 = 77 mg in  0.9% NaCl 327 mL chemo infusion, 35 mg/m2 = 77 mg (original dose ), Intravenous, Clinic/HOD 1 time, 0 of 1 cycle  Dose modification: 35 mg/m2 (Cycle 1, Reason: MD Discretion, Comment: bladder cancer. NCCN template [id number])      Chemotherapy    Treatment Summary   Plan Name: OP Bladder CISplatin QW + Radiation  Treatment Goal: Curative  Status: Active  Start Date: 1/31/2025 (Planned)  End Date: 3/7/2025 (Planned)  Provider: Fabián Westbrook MD  Chemotherapy: CISplatin (Platinol) 35 mg/m2 = 77 mg in 0.9% NaCl 327 mL chemo infusion, 35 mg/m2 = 77 mg (original dose ), Intravenous, Clinic/HOD 1 time, 0 of 1 cycle  Dose modification: 35 mg/m2 (Cycle 1, Reason: MD Discretion, Comment: bladder cancer. NCCN template [id number])            Past Medical History:   Diagnosis Date    Anemia     Anticoagulant long-term use     Atrial fibrillation     AVM (arteriovenous malformation) of small bowel, 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 Treated with APC.   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 breakdown of skin 01/26/2023    Stage 3 chronic kidney disease 10/06/2017        Past Surgical History:   Procedure Laterality Date    bladder tumor removal      CARDIAC ELECTROPHYSIOLOGY MAPPING AND ABLATION      CARDIOVERSION      several    CATARACT EXTRACTION Bilateral     cataract removal with IOL implants Bilateral     CHOLECYSTECTOMY      CYSTOSCOPIC LITHOLAPAXY N/A 12/28/2021    Procedure: CYSTOLITHOLAPAXY;  Surgeon:  Medardo Garcia MD;  Location: [facility];  Service: Urology;  Laterality: N/A;    CYSTOSCOPIC LITHOLAPAXY N/A 5/14/2024    Procedure: CYSTOLITHOLAPAXY;  Surgeon: Medardo Garcia MD;  Location: [facility];  Service: Urology;  Laterality: N/A;    CYSTOSCOPY      CYSTOSCOPY WITH BIOPSY OF BLADDER Right 12/28/2021    Procedure: CYSTOSCOPY, WITH BLADDER BIOPSY, WITH FULGURATION IF INDICATED;  Surgeon: Medardo Garcia MD;  Location: [facility];  Service: Urology;  Laterality: Right;    CYSTOTOMY, WITH FULGURATION AND RADIOACTIVE MATERIAL INSERTION N/A 12/3/2024    Procedure: CYSTOTOMY, WITH FULGURATION AND RADIOACTIVE MATERIAL INSERTION;  Surgeon: Medardo Garcia MD;  Location: [facility];  Service: Urology;  Laterality: N/A;    ESOPHAGOGASTRODUODENOSCOPY N/A 10/25/2021    Procedure: Small Jason Enteroscopy (SBE);  Surgeon: Isabelle Griffin MD;  Location: [facility];  Service: Endoscopy;  Laterality: N/A;    ESOPHAGOGASTRODUODENOSCOPY N/A 12/13/2021    Procedure: EGD (ESOPHAGOGASTRODUODENOSCOPY);  Surgeon: Troy Polanco MD;  Location: [facility];  Service: Endoscopy;  Laterality: N/A;    ESOPHAGOGASTRODUODENOSCOPY N/A 4/5/2024    Procedure: EGD (ESOPHAGOGASTRODUODENOSCOPY);  Surgeon: Myrtle Salcedo MD;  Location: [facility];  Service: Endoscopy;  Laterality: N/A;    ESOPHAGOGASTRODUODENOSCOPY N/A 4/22/2024    Procedure: EGD (ESOPHAGOGASTRODUODENOSCOPY);  Surgeon: Myrtle Salcedo MD;  Location: [facility];  Service: Endoscopy;  Laterality: N/A;  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    EYE SURGERY      INTRALUMINAL GASTROINTESTINAL TRACT IMAGING VIA CAPSULE N/A 12/6/2021    Procedure: IMAGING PROCEDURE, GI TRACT, INTRALUMINAL, VIA CAPSULE;  Surgeon: Larry Jones RN;  Location: [facility];  Service: Endoscopy;  Laterality: N/A;    INTRALUMINAL GASTROINTESTINAL TRACT IMAGING VIA CAPSULE N/A 4/18/2024    Procedure: IMAGING PROCEDURE, GI TRACT, INTRALUMINAL, VIA CAPSULE;  Surgeon: Robert  Larry RN;  Location: [facility] ENDO;  Service: Endoscopy;  Laterality: N/A;    OCCLUSION OF LEFT ATRIAL APPENDAGE N/A 2024    Procedure: Left atrial appendage occlusion;  Surgeon: Aroldo Ortiz MD;  Location: [facility] EP LAB;  Service: Cardiology;  Laterality: N/A;  afib, watchman, BSCI, venkatesh, anes, MB, 3prep    SMALL BOWEL ENTEROSCOPY N/A 2024    Procedure: ENTEROSCOPY--prximal DBE;  Surgeon: Eduardo Ruff MD;  Location: [facility] ENDO;  Service: Endoscopy;  Laterality: N/A;    TRANSESOPHAGEAL ECHOCARDIOGRAPHY N/A 2024    Procedure: ECHOCARDIOGRAM, TRANSESOPHAGEAL;  Surgeon: Aroldo Martinez MD;  Location: [facility] EP LAB;  Service: Cardiology;  Laterality: N/A;    TRANSESOPHAGEAL ECHOCARDIOGRAPHY N/A 2024    Procedure: ECHOCARDIOGRAM, TRANSESOPHAGEAL;  Surgeon: Carlos Flower MD;  Location: [facility] CATH LAB;  Service: Cardiology;  Laterality: N/A;    TRANSURETHRAL RESECTION OF BLADDER TUMOR BY BIPOLAR CAUTERY N/A 2019    Procedure: TURBT, USING BIPOLAR CAUTERY;  Surgeon: Ritesh Marques MD;  Location: [facility] OR;  Service: Urology;  Laterality: N/A;    TRANSURETHRAL RESECTION OF PROSTATE N/A 2024    Procedure: TURP (TRANSURETHRAL RESECTION OF PROSTATE);  Surgeon: Medardo Garcia MD;  Location: [facility] OR;  Service: Urology;  Laterality: N/A;    TURBT (TRANSURETHRAL RESECTION OF BLADDER TUMOR) N/A 12/3/2024    Procedure: TURBT (TRANSURETHRAL RESECTION OF BLADDER TUMOR);  Surgeon: Medardo Garcia MD;  Location: [facility] OR;  Service: Urology;  Laterality: N/A;        Family History   Problem Relation Name Age of Onset    Heart disease Father      Hypertension Father      Heart disease Brother          Social History     Tobacco Use    Smoking status: Former     Current packs/day: 0.00     Average packs/day: 2.5 packs/day for 20.0 years (50.0 ttl pk-yrs)     Types: Cigarettes     Start date:      Quit date:      Years since quittin.1     Passive exposure: Never    Smokeless  tobacco: Never   Substance Use Topics    Alcohol use: Yes     Alcohol/week: 7.0 standard drinks of alcohol     Types: 7 Cans of beer per week     Comment: occasionally: hold 72 hrs        I have reviewed and updated the patient's past medical, surgical, family and social histories.    Review of patient's allergies indicates:   Allergen Reactions    Feraheme [ferumoxytol] Anaphylaxis        Current Outpatient Medications   Medication Sig Dispense Refill    amiodarone (PACERONE) 200 MG Tab Take 1 tablet (200 mg total) by mouth once daily. 30 tablet 11    amLODIPine (NORVASC) 2.5 MG tablet Take 1 tablet (2.5 mg total) by mouth once daily. 90 tablet 2    aspirin (ECOTRIN) 81 MG EC tablet Take 1 tablet (81 mg total) by mouth once daily. 90 tablet 3    atorvastatin (LIPITOR) 20 MG tablet Take 1 tablet (20 mg total) by mouth every evening. 90 tablet 1    carvediloL (COREG) 6.25 MG tablet Take 1 tablet (6.25 mg total) by mouth 2 (two) times daily with meals. 180 tablet 3    cholecalciferol, vitamin D3, (VITAMIN D3 ORAL) Take 1 tablet by mouth every other day.      diltiaZEM (CARDIZEM CD) 360 MG 24 hr capsule       donepeziL (ARICEPT) 5 MG tablet Take 1 tablet (5 mg total) by mouth once daily. 90 tablet 1    FEROSUL 325 mg (65 mg iron) Tab tablet Take by mouth.      hydrALAZINE (APRESOLINE) 50 MG tablet Take 1 tablet (50 mg total) by mouth every 8 (eight) hours. 270 tablet 2    metoprolol tartrate (LOPRESSOR) 50 MG tablet       multivitamin (THERAGRAN) per tablet Take 1 tablet by mouth once daily.      pantoprazole (PROTONIX) 20 MG tablet Take 2 tablets (40 mg total) by mouth once daily. 30 tablet 0    dexAMETHasone (DECADRON) 4 MG Tab Take 2 tablets (8 mg total) by mouth once daily. With food on days 2-4, 9-11, 16-18, 23-25, 30-32, 37-39 30 tablet 0    OLANZapine (ZYPREXA) 5 MG tablet Take 1 tablet by mouth nightly on days 1-4, 8-11, 15-18, 22-25, 29-32, 36-40 24 tablet 0    prochlorperazine (COMPAZINE) 5 MG tablet Take 1  tablet (5 mg total) by mouth every 6 (six) hours as needed for Nausea. 20 tablet 5     No current facility-administered medications for this visit.     Facility-Administered Medications Ordered in Other Visits   Medication Dose Route Frequency Provider Last Rate Last Admin    iohexoL (OMNIPAQUE 300) injection 17 mL  17 mL Intra-Catheter ONCE PRN Medardo Garcia MD        lactated ringers infusion   Intravenous Continuous Denisse Conteh MD   Stopped at 12/28/21 1241        Physical Exam:   /72   Pulse 70   Temp 97.5 °F (36.4 °C) (Temporal)   Ht 6' (1.829 m)   Wt 92.3 kg (203 lb 7.8 oz)   SpO2 99%   BMI 27.60 kg/m²      ECOG Performance status: 1            Physical Exam  Constitutional:       General: He is not in acute distress.     Appearance: Normal appearance.   HENT:      Head: Normocephalic.   Eyes:      General: No scleral icterus.     Extraocular Movements: Extraocular movements intact.      Conjunctiva/sclera: Conjunctivae normal.   Cardiovascular:      Rate and Rhythm: Normal rate.      Heart sounds: No murmur heard.  Pulmonary:      Effort: Pulmonary effort is normal. No respiratory distress.   Abdominal:      General: There is no distension.      Palpations: Abdomen is soft.   Skin:     General: Skin is warm and dry.   Neurological:      Mental Status: He is alert and oriented to person, place, and time.      Motor: No weakness.   Psychiatric:         Mood and Affect: Mood normal.         Behavior: Behavior normal.         Thought Content: Thought content normal.           Labs:   Recent Results (from the past 48 hours)   CBC Oncology    Collection Time: 01/31/25 11:23 AM   Result Value Ref Range    WBC 7.86 3.90 - 12.70 K/uL    RBC 4.41 (L) 4.60 - 6.20 M/uL    Hemoglobin 11.6 (L) 14.0 - 18.0 g/dL    Hematocrit 37.3 (L) 40.0 - 54.0 %    MCV 85 82 - 98 fL    MCH 26.3 (L) 27.0 - 31.0 pg    MCHC 31.1 (L) 32.0 - 36.0 g/dL    RDW 18.3 (H) 11.5 - 14.5 %    Platelets 202 150 - 450 K/uL     MPV 10.3 9.2 - 12.9 fL    Gran # (ANC) 5.8 1.8 - 7.7 K/uL    Immature Grans (Abs) 0.04 0.00 - 0.04 K/uL   Comprehensive Metabolic Panel    Collection Time: 01/31/25 11:23 AM   Result Value Ref Range    Sodium 142 136 - 145 mmol/L    Potassium 4.6 3.5 - 5.1 mmol/L    Chloride 104 95 - 110 mmol/L    CO2 31 (H) 23 - 29 mmol/L    Glucose 110 70 - 110 mg/dL    BUN 18 8 - 23 mg/dL    Creatinine 1.2 0.5 - 1.4 mg/dL    Calcium 9.4 8.7 - 10.5 mg/dL    Total Protein 6.8 6.0 - 8.4 g/dL    Albumin 2.9 (L) 3.5 - 5.2 g/dL    Total Bilirubin 0.6 0.1 - 1.0 mg/dL    Alkaline Phosphatase 67 40 - 150 U/L    AST 22 10 - 40 U/L    ALT 23 10 - 44 U/L    eGFR 59 (A) >60 mL/min/1.73 m^2    Anion Gap 7 (L) 8 - 16 mmol/L          Imaging:         I have personally reviewed the above imaging    Path:   Reviewed pathology as documented in oncology history      Assessment and Plan:   1. Urothelial carcinoma of bladder  Overview:  Locally advanced urothelial carcionoma of the bladder. Has long standing history of localized bladder cancer treated with serial TURBT since at least 2016. 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 He was treated with serial TURBT and induction BCG. More recently found to have recurrent disease with associtaed right sided hydronephrosis. Overall concerning for locally advanced disease      Assessment & Plan:  Continues to do quite well with relatively minimal symptom burden. Plan to start definitive intent cisplatin based RT next week. Will st art chemo on Wednesday; RT start is Thursday. Will then adjust schedule so he gets his chemo every Friday with clinic visit. He also continues on IV iron treatments on Monday.  - Start cisplatin next Wednesday  - RT to start next Thursday.  - Then switch to Friday infusiosn to line up with MD or [facility] clinic appts    Orders:  -     Physician communication order; Standing  -     Comprehensive audiogram; Standing  -     OLANZapine (ZYPREXA) 5 MG tablet; Take 1 tablet by mouth nightly on  days 1-4, 8-11, 15-18, 22-25, 29-32, 36-40  Dispense: 24 tablet; Refill: 0  -     dexAMETHasone (DECADRON) 4 MG Tab; Take 2 tablets (8 mg total) by mouth once daily. With food on days 2-4, 9-11, 16-18, 23-25, 30-32, 37-39  Dispense: 30 tablet; Refill: 0  -     prochlorperazine (COMPAZINE) 5 MG tablet; Take 1 tablet (5 mg total) by mouth every 6 (six) hours as needed for Nausea.  Dispense: 20 tablet; Refill: 5    2. DM type 2 with diabetic dyslipidemia  Overview:  He has T2DM; A1c very well managed with diet alone.      3. Primary hypertension  Overview:  Home medications include amldoipine, carvedilol, diltiazem, and hydralazine      4. Dementia associated with other underlying disease, without behavioral disturbance, psychotic disturbance, mood disturbance, or anxiety, unspecified dementia severity  Overview:  Mild. Home medicaitons include donepezil.                   Follow up:   Route Chart for Scheduling    Med Onc Chart Routing      Follow up with physician . 2/21/25   Follow up with [facility] . 2/7/25; 2/14/25   Infusion scheduling note   Please change cisplatin treatments from Wednsedays to Fridays starting 2/12/25   Injection scheduling note    Labs CBC and CMP   Scheduling:  Preferred lab:  Lab interval:  Weekly labs with clinic visit/treatment   Imaging    Pharmacy appointment    Other referrals                  Treatment Plan Information   OP Bladder CISplatin QW + Radiation Fabián Westbrook MD   Associated diagnosis: Urothelial carcinoma of bladder Stage II T2, N0, M0 noted on 6/21/2016   Line of treatment: Induction  Treatment Goal: Curative     Upcoming Treatment Dates - OP Bladder CISplatin QW + Radiation    2/1/2025       Chemotherapy       CISplatin (Platinol) 35 mg/m2 = 77 mg in 0.9% NaCl 327 mL chemo infusion       Pre-Hydration       0.9% NaCl 500 mL with magnesium sulfate 1 g, potassium chloride 10 mEq infusion       Post-Hydration       Physician communication order       Antiemetics        fosaprepitant 150 mg in 0.9% NaCl 150 mL IVPB       palonosetron 0.25mg/dexAMETHasone 12mg in NS IVPB 0.25 mg 50 mL  2/8/2025       Chemotherapy       CISplatin (Platinol) 35 mg/m2 = 77 mg in 0.9% NaCl 327 mL chemo infusion       Pre-Hydration       0.9% NaCl 500 mL with magnesium sulfate 1 g, potassium chloride 10 mEq infusion       Post-Hydration       Physician communication order       Antiemetics       fosaprepitant 150 mg in 0.9% NaCl 150 mL IVPB       palonosetron 0.25mg/dexAMETHasone 12mg in NS IVPB 0.25 mg 50 mL  2/15/2025       Chemotherapy       CISplatin (Platinol) 35 mg/m2 = 77 mg in 0.9% NaCl 327 mL chemo infusion       Pre-Hydration       0.9% NaCl 500 mL with magnesium sulfate 1 g, potassium chloride 10 mEq infusion       Post-Hydration       Physician communication order       Antiemetics       fosaprepitant 150 mg in 0.9% NaCl 150 mL IVPB       palonosetron 0.25mg/dexAMETHasone 12mg in NS IVPB 0.25 mg 50 mL  2/22/2025       Chemotherapy       CISplatin (Platinol) 35 mg/m2 = 77 mg in 0.9% NaCl 327 mL chemo infusion       Pre-Hydration       0.9% NaCl 500 mL with magnesium sulfate 1 g, potassium chloride 10 mEq infusion       Post-Hydration       Physician communication order       Antiemetics       fosaprepitant 150 mg in 0.9% NaCl 150 mL IVPB       palonosetron 0.25mg/dexAMETHasone 12mg in NS IVPB 0.25 mg 50 mL    Therapy Plan Information  VENOFER (IRON SUCROSE) QW for Iron deficiency anemia secondary to blood loss (chronic), noted on 5/29/2021  Anaphylaxis/Hypersensitivity  EPINEPHrine (EPIPEN) 0.3 mg/0.3 mL pen injection 0.3 mg  0.3 mg, Intramuscular, PRN  diphenhydrAMINE injection 50 mg  50 mg, Intravenous, PRN  hydrocortisone sodium succinate injection 100 mg  100 mg, Intravenous, PRN  Flushes  0.9% NaCl 250 mL flush bag  Intravenous, 1 time a week  sodium chloride 0.9% flush 10 mL  10 mL, Intravenous, 1 time a week  5. Medications  iron sucrose injection 200 mg  200 mg, Intravenous, Every  visit  methylPREDNISolone sodium succinate injection 40 mg  40 mg, Intravenous, 1 time a week      No therapy plan of the specified type found.    No therapy plan of the specified type found.      The above information has been reviewed with the patient and all questions have been answered to their apparent satisfaction.  They understand that they can call the clinic with any questions.    Fabián Westbrook MD  Hematology/Oncology  [facility]

## 2025-01-31 NOTE — DISCHARGE SUMMARY
Radiology Discharge Summary      Hospital Course: No complications    Admit Date: 1/31/2025  Discharge Date: 01/31/2025     Instructions Given to Patient: Yes  Diet: regular diet and Resume prior diet  Activity: activity as tolerated and no driving for today    Description of Condition on Discharge: Stable  Vital Signs (Most Recent):      Discharge Disposition: Home    Discharge Diagnosis: ureteral stricture     Follow-up: brandin Arriaga MD  Staff Radiologist  Department of Radiology  Pager: [phone]

## 2025-01-31 NOTE — NURSING
Nephrostogram to check nephroureteral stent patency performed and right nephrostomy tube removed. Pt tolerated well. VSS and NADN. Pt has no complaints of pain or N/V. Dsg to right back CDI. Pt ambulated to be taken home by family in personal vehicle.

## 2025-02-03 ENCOUNTER — INFUSION (OUTPATIENT)
Dept: INFUSION THERAPY | Facility: HOSPITAL | Age: 85
End: 2025-02-03
Attending: NURSE PRACTITIONER
Payer: MEDICARE

## 2025-02-03 ENCOUNTER — HOSPITAL ENCOUNTER (OUTPATIENT)
Dept: RADIATION THERAPY | Facility: HOSPITAL | Age: 85
Discharge: HOME | End: 2025-02-03
Attending: SPECIALIST
Payer: MEDICARE

## 2025-02-03 VITALS
TEMPERATURE: 98 F | DIASTOLIC BLOOD PRESSURE: 67 MMHG | OXYGEN SATURATION: 97 % | HEART RATE: 58 BPM | SYSTOLIC BLOOD PRESSURE: 117 MMHG | RESPIRATION RATE: 16 BRPM

## 2025-02-03 DIAGNOSIS — D50.0 IRON DEFICIENCY ANEMIA SECONDARY TO BLOOD LOSS (CHRONIC): Primary | ICD-10-CM

## 2025-02-03 PROCEDURE — 63600175 PHARM REV CODE 636 W HCPCS: Mod: JZ,TB | Performed by: NURSE PRACTITIONER

## 2025-02-03 PROCEDURE — 96375 TX/PRO/DX INJ NEW DRUG ADDON: CPT

## 2025-02-03 PROCEDURE — 96374 THER/PROPH/DIAG INJ IV PUSH: CPT

## 2025-02-03 RX ORDER — DIPHENHYDRAMINE HYDROCHLORIDE 50 MG/ML
50 INJECTION INTRAMUSCULAR; INTRAVENOUS ONCE AS NEEDED
Status: CANCELLED | OUTPATIENT
Start: 2025-02-10

## 2025-02-03 RX ORDER — METHYLPREDNISOLONE SOD SUCC 125 MG
40 VIAL (EA) INJECTION
Status: COMPLETED | OUTPATIENT
Start: 2025-02-03 | End: 2025-02-03

## 2025-02-03 RX ORDER — SODIUM CHLORIDE 0.9 % (FLUSH) 0.9 %
10 SYRINGE (ML) INJECTION
Status: CANCELLED | OUTPATIENT
Start: 2025-02-10

## 2025-02-03 RX ORDER — SODIUM CHLORIDE 0.9 % (FLUSH) 0.9 %
10 SYRINGE (ML) INJECTION
Status: DISCONTINUED | OUTPATIENT
Start: 2025-02-03 | End: 2025-02-03 | Stop reason: HOSPADM

## 2025-02-03 RX ORDER — EPINEPHRINE 0.3 MG/.3ML
0.3 INJECTION SUBCUTANEOUS ONCE AS NEEDED
Status: CANCELLED | OUTPATIENT
Start: 2025-02-10

## 2025-02-03 RX ORDER — METHYLPREDNISOLONE SOD SUCC 125 MG
40 VIAL (EA) INJECTION
Status: CANCELLED
Start: 2025-02-10

## 2025-02-03 RX ADMIN — METHYLPREDNISOLONE SODIUM SUCCINATE 40 MG: 125 INJECTION, POWDER, FOR SOLUTION INTRAMUSCULAR; INTRAVENOUS at 09:02

## 2025-02-03 RX ADMIN — IRON SUCROSE 200 MG: 20 INJECTION, SOLUTION INTRAVENOUS at 09:02

## 2025-02-03 NOTE — DISCHARGE INSTRUCTIONS
.[facility]  [address] [facility]  [address]  [phone] phone     [phone] fax  Hours of Operation: Monday- Friday 8:00am- 5:00pm  After hours phone  [phone]  Hematology / Oncology Physicians on call    Dr. Eric Mccollum           Nurse Practitioners:     Melanie Mai, GENARO Chisholm, KAYDEN Latham, GENARO Medeiros, GENARO Walton, NP    Please don't hesitate to call if you have any concerns.      FALL PREVENTION   Falls often occur due to slipping, tripping or losing your balance. Here are ways to reduce your risk of falling again.   Was there anything that caused your fall that can be fixed, removed or replaced?   Make your home safe by keeping walkways clear of objects you may trip over.   Use non-slip pads under rugs.   Do not walk in poorly lit areas.   Do not stand on chairs or wobbly ladders.   Use caution when reaching overhead or looking upward. This position can cause a loss of balance.   Be sure your shoes fit properly, have non-slip bottoms and are in good condition.   Be cautious when going up and down stairs, curbs, and when walking on uneven sidewalks.   If your balance is poor, consider using a cane or walker.   If your fall was related to alcohol use, stop or limit alcohol intake.   If your fall was related to use of sleeping medicines, talk to your doctor about this. You may need to reduce your dosage at bedtime if you awaken during the night to go to the bathroom.   To reduce the need for nighttime bathroom trips:   Avoid drinking fluids for several hours before going to bed   Empty your bladder before going to bed   Men can keep a urinal at the bedside   © 9294-9930 [employer], [address] [place] [address], [place] [address]. All rights reserved. This information is not intended as a substitute for professional medical care. Always follow your healthcare  professional's instructions.    WAYS TO HELP PREVENT INFECTION        WASH YOUR HANDS OFTEN DURING THE DAY, ESPECIALLY BEFORE YOU EAT, AFTER USING THE BATHROOM, AND AFTER TOUCHING ANIMALS    STAY AWAY FROM PEOPLE WHO HAVE ILLNESSES YOU CAN CATCH; SUCH AS COLDS, FLU, CHICKEN POX    TRY TO AVOID CROWDS    STAY AWAY FROM CHILDREN WHO RECENTLY HAVE RECEIVED LIVE VIRUS VACCINES    MAINTAIN GOOD MOUTH CARE    DO NOT SQUEEZE OR SCRATCH PIMPLES    CLEAN CUTS & SCRAPES RIGHT AWAY AND DAILY UNTIL HEALED WITH WARM WATER, SOAP & AN ANTISEPTIC    AVOID CONTACT WITH LITTER BOXES, BIRD CAGES, & FISH TANKS    AVOID STANDING WATER, IE., BIRD BATHS, FLOWER POTS/VASES, OR HUMIDIFIERS    WEAR GLOVES WHEN GARDENING OR CLEANING UP AFTER OTHERS, ESPECIALLY BABIES & SMALL CHILDREN    DO NOT EAT RAW FISH, SEAFOOD, MEAT, OR EGGS

## 2025-02-03 NOTE — PLAN OF CARE
Problem: Adult Inpatient Plan of Care  Goal: Plan of Care Review  Outcome: Progressing  Flowsheets (Taken 2/3/2025 0956)  Plan of Care Reviewed With: patient  Goal: Patient-Specific Goal (Individualized)  Outcome: Progressing  Flowsheets (Taken 2/3/2025 0956)  Individualized Care Needs: Reclined, pillow, and orange juice given.  Anxieties, Fears or Concerns: No concerns with Iron infusion but was a little worried about his upcoming Chemo. Discussed with patient of what to expect  Patient/Family-Specific Goals (Include Timeframe): Tolerated IVP venofer today  Goal: Optimal Comfort and Wellbeing  Outcome: Progressing  Intervention: Monitor Pain and Promote Comfort  Flowsheets (Taken 2/3/2025 0956)  Pain Management Interventions:   quiet environment facilitated   pillow support provided  Intervention: Provide Person-Centered Care  Flowsheets (Taken 2/3/2025 0956)  Trust Relationship/Rapport:   care explained   reassurance provided   choices provided   thoughts/feelings acknowledged   emotional support provided   empathic listening provided   questions answered   questions encouraged     Problem: Anemia  Goal: Anemia Symptom Improvement  Outcome: Progressing  Intervention: Monitor and Manage Anemia  Flowsheets (Taken 2/3/2025 0956)  Safety Promotion/Fall Prevention:   in recliner, wheels locked   instructed to call staff for mobility   lighting adjusted   medications reviewed  Fatigue Management: frequent rest breaks encouraged

## 2025-02-03 NOTE — NURSING
"Patient stated he is starting chemo this week and feeling apprehensive about it, slightly nervous. Encouraged questions, answered all his questions. Showed patient the binder with an example of a port, he had stated his veins are not the best and was worried about that. Also, gave patient a print out of the drug he will be receiving and encouraged for him to stay hydrated before and after chemo infusion. Patient expressed his appreciation of the review and what to expect. He stated "When we had the teaching, I was kind of out of it because we just got the diagnosis. My son & daughter was there and got all the information". RTC 2/5/25  "

## 2025-02-04 PROBLEM — T45.1X5A IMMUNODEFICIENCY DUE TO CHEMOTHERAPY: Status: ACTIVE | Noted: 2025-02-04

## 2025-02-04 PROBLEM — D84.821 IMMUNODEFICIENCY DUE TO CHEMOTHERAPY: Status: ACTIVE | Noted: 2025-02-04

## 2025-02-04 PROBLEM — Z79.899 IMMUNODEFICIENCY DUE TO CHEMOTHERAPY: Status: ACTIVE | Noted: 2025-02-04

## 2025-02-04 PROBLEM — Z79.69 IMMUNODEFICIENCY DUE TO CHEMOTHERAPY: Status: ACTIVE | Noted: 2025-02-04

## 2025-02-04 NOTE — PROGRESS NOTES
Subjective:       Patient ID: Jose Miguel Alvarez Jr. is a 84 y.o. male.    Chief Complaint: Bladder Cancer and Chemotherapy    Primary Oncologist/Hematologist: Dr. Westbrook    HPI: Mr. Alvarez is an 84 year old male who is following up for his locally advanced Stage II (T2, N0, M0) urothelial carcinoma of the bladder. He is here to start cycle 1 day 1 of cisplatin weekly + radiation.   He is also getting venofer weekly for iron def anemia.   He continues to follow with urology for PCN and ureteral stent.   Cancer Hx: Has long standing history of localized bladder cancer treated with serial TURBT since at least . 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 He was treated with serial TURBT and induction BCG. More recently found to have recurrent disease with associtaed right sided hydronephrosis.     Today: He states he has been well. He is starting radiation tomorrow 12:30pm. Prefers treatments on Friday and iron on . He denies any fevers, illnesses, bleeding, n/v/d/c. He states he is staying hydrated and his appetite is good.     Social History     Socioeconomic History    Marital status:    Tobacco Use    Smoking status: Former     Current packs/day: 0.00     Average packs/day: 2.5 packs/day for 20.0 years (50.0 ttl pk-yrs)     Types: Cigarettes     Start date:      Quit date:      Years since quittin.1     Passive exposure: Never    Smokeless tobacco: Never   Substance and Sexual Activity    Alcohol use: Yes     Alcohol/week: 7.0 standard drinks of alcohol     Types: 7 Cans of beer per week     Comment: occasionally: hold 72 hrs    Drug use: No    Sexual activity: Not Currently     Social Drivers of Health     Financial Resource Strain: Low Risk  (2025)    Overall Financial Resource Strain (CARDIA)     Difficulty of Paying Living Expenses: Not hard at all   Food Insecurity: No Food Insecurity (2025)    Hunger Vital Sign     Worried About Running Out of Food in the Last Year: Never  true     Ran Out of Food in the Last Year: Never true   Transportation Needs: No Transportation Needs (8/23/2024)    TRANSPORTATION NEEDS     Transportation : No   Physical Activity: Insufficiently Active (1/13/2025)    Exercise Vital Sign     Days of Exercise per Week: 2 days     Minutes of Exercise per Session: 30 min   Stress: No Stress Concern Present (1/13/2025)    [employer] - Occupational Stress Questionnaire     Feeling of Stress : Not at all   Housing Stability: Low Risk  (1/13/2025)    Housing Stability Vital Sign     Unable to Pay for Housing in the Last Year: No     Homeless in the Last Year: No       Past Medical History:   Diagnosis Date    Anemia     Anticoagulant long-term use     Atrial fibrillation     AVM (arteriovenous malformation) of small bowel, 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 Treated with APC.   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 breakdown of skin 01/26/2023    Stage 3 chronic kidney disease 10/06/2017       Family History   Problem Relation Name Age of Onset    Heart disease Father      Hypertension Father      Heart disease Brother         Past Surgical History:   Procedure Laterality Date    bladder tumor removal      CARDIAC ELECTROPHYSIOLOGY MAPPING AND ABLATION      CARDIOVERSION      several    CATARACT EXTRACTION Bilateral     cataract removal with IOL implants Bilateral     CHOLECYSTECTOMY      CYSTOSCOPIC LITHOLAPAXY N/A  12/28/2021    Procedure: CYSTOLITHOLAPAXY;  Surgeon: Medardo Garcia MD;  Location: [facility] OR;  Service: Urology;  Laterality: N/A;    CYSTOSCOPIC LITHOLAPAXY N/A 5/14/2024    Procedure: CYSTOLITHOLAPAXY;  Surgeon: Medardo Garcia MD;  Location: [facility] OR;  Service: Urology;  Laterality: N/A;    CYSTOSCOPY      CYSTOSCOPY WITH BIOPSY OF BLADDER Right 12/28/2021    Procedure: CYSTOSCOPY, WITH BLADDER BIOPSY, WITH FULGURATION IF INDICATED;  Surgeon: Medardo Garcia MD;  Location: [facility] OR;  Service: Urology;  Laterality: Right;    CYSTOTOMY, WITH FULGURATION AND RADIOACTIVE MATERIAL INSERTION N/A 12/3/2024    Procedure: CYSTOTOMY, WITH FULGURATION AND RADIOACTIVE MATERIAL INSERTION;  Surgeon: Medardo Garcia MD;  Location: [facility] OR;  Service: Urology;  Laterality: N/A;    ESOPHAGOGASTRODUODENOSCOPY N/A 10/25/2021    Procedure: Small Jason Enteroscopy (SBE);  Surgeon: Isabelle Griffin MD;  Location: [facility];  Service: Endoscopy;  Laterality: N/A;    ESOPHAGOGASTRODUODENOSCOPY N/A 12/13/2021    Procedure: EGD (ESOPHAGOGASTRODUODENOSCOPY);  Surgeon: Troy Polanco MD;  Location: [facility];  Service: Endoscopy;  Laterality: N/A;    ESOPHAGOGASTRODUODENOSCOPY N/A 4/5/2024    Procedure: EGD (ESOPHAGOGASTRODUODENOSCOPY);  Surgeon: Myrtle Salcedo MD;  Location: [facility];  Service: Endoscopy;  Laterality: N/A;    ESOPHAGOGASTRODUODENOSCOPY N/A 4/22/2024    Procedure: EGD (ESOPHAGOGASTRODUODENOSCOPY);  Surgeon: Myrtle Salcedo MD;  Location: [facility];  Service: Endoscopy;  Laterality: N/A;  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    EYE SURGERY      INTRALUMINAL GASTROINTESTINAL TRACT IMAGING VIA CAPSULE N/A 12/6/2021    Procedure: IMAGING PROCEDURE, GI TRACT, INTRALUMINAL, VIA CAPSULE;  Surgeon: Larry Jones RN;  Location: [facility] ENDO;  Service: Endoscopy;  Laterality: N/A;    INTRALUMINAL GASTROINTESTINAL TRACT IMAGING VIA CAPSULE N/A 4/18/2024    Procedure: IMAGING PROCEDURE, GI  TRACT, INTRALUMINAL, VIA CAPSULE;  Surgeon: Larry Jones RN;  Location: [facility] ENDO;  Service: Endoscopy;  Laterality: N/A;    OCCLUSION OF LEFT ATRIAL APPENDAGE N/A 11/14/2024    Procedure: Left atrial appendage occlusion;  Surgeon: Aroldo Ortiz MD;  Location: [facility] EP LAB;  Service: Cardiology;  Laterality: N/A;  afib, watchman, BSCI, venkatesh, anes, MB, 3prep    SMALL BOWEL ENTEROSCOPY N/A 9/26/2024    Procedure: ENTEROSCOPY--prximal DBE;  Surgeon: Eduardo Ruff MD;  Location: [facility] ENDO;  Service: Endoscopy;  Laterality: N/A;    TRANSESOPHAGEAL ECHOCARDIOGRAPHY N/A 11/14/2024    Procedure: ECHOCARDIOGRAM, TRANSESOPHAGEAL;  Surgeon: Aroldo Martinez MD;  Location: [facility] EP LAB;  Service: Cardiology;  Laterality: N/A;    TRANSESOPHAGEAL ECHOCARDIOGRAPHY N/A 12/30/2024    Procedure: ECHOCARDIOGRAM, TRANSESOPHAGEAL;  Surgeon: Carlos Flower MD;  Location: [facility] CATH LAB;  Service: Cardiology;  Laterality: N/A;    TRANSURETHRAL RESECTION OF BLADDER TUMOR BY BIPOLAR CAUTERY N/A 7/23/2019    Procedure: TURBT, USING BIPOLAR CAUTERY;  Surgeon: Ritesh Marques MD;  Location: [facility];  Service: Urology;  Laterality: N/A;    TRANSURETHRAL RESECTION OF PROSTATE N/A 5/14/2024    Procedure: TURP (TRANSURETHRAL RESECTION OF PROSTATE);  Surgeon: Medardo Garcia MD;  Location: [facility] OR;  Service: Urology;  Laterality: N/A;    TURBT (TRANSURETHRAL RESECTION OF BLADDER TUMOR) N/A 12/3/2024    Procedure: TURBT (TRANSURETHRAL RESECTION OF BLADDER TUMOR);  Surgeon: Medardo Garcia MD;  Location: [facility] OR;  Service: Urology;  Laterality: N/A;       Review of Systems   Constitutional:  Negative for activity change, appetite change, chills, diaphoresis, fatigue, fever and unexpected weight change.   HENT:  Negative for congestion, nosebleeds and rhinorrhea.    Respiratory:  Negative for cough and shortness of breath.    Cardiovascular:  Negative for chest pain and leg swelling.   Gastrointestinal:  Negative for abdominal  pain, anal bleeding, blood in stool, constipation, diarrhea, nausea and vomiting.   Genitourinary:  Negative for hematuria.         Medication List with Changes/Refills   Current Medications    AMIODARONE (PACERONE) 200 MG TAB    Take 1 tablet (200 mg total) by mouth once daily.    AMLODIPINE (NORVASC) 2.5 MG TABLET    Take 1 tablet (2.5 mg total) by mouth once daily.    ASPIRIN (ECOTRIN) 81 MG EC TABLET    Take 1 tablet (81 mg total) by mouth once daily.    ATORVASTATIN (LIPITOR) 20 MG TABLET    Take 1 tablet (20 mg total) by mouth every evening.    CARVEDILOL (COREG) 6.25 MG TABLET    Take 1 tablet (6.25 mg total) by mouth 2 (two) times daily with meals.    CHOLECALCIFEROL, VITAMIN D3, (VITAMIN D3 ORAL)    Take 1 tablet by mouth every other day.    DEXAMETHASONE (DECADRON) 4 MG TAB    Take 2 tablets (8 mg total) by mouth once daily. With food on days 2-4, 9-11, 16-18, 23-25, 30-32, 37-39    DILTIAZEM (CARDIZEM CD) 360 MG 24 HR CAPSULE        DONEPEZIL (ARICEPT) 5 MG TABLET    Take 1 tablet (5 mg total) by mouth once daily.    FEROSUL 325 MG (65 MG IRON) TAB TABLET    Take by mouth.    HYDRALAZINE (APRESOLINE) 50 MG TABLET    Take 1 tablet (50 mg total) by mouth every 8 (eight) hours.    METOPROLOL TARTRATE (LOPRESSOR) 50 MG TABLET        MULTIVITAMIN (THERAGRAN) PER TABLET    Take 1 tablet by mouth once daily.    OLANZAPINE (ZYPREXA) 5 MG TABLET    Take 1 tablet by mouth nightly on days 1-4, 8-11, 15-18, 22-25, 29-32, 36-40    PANTOPRAZOLE (PROTONIX) 20 MG TABLET    Take 2 tablets (40 mg total) by mouth once daily.    PROCHLORPERAZINE (COMPAZINE) 5 MG TABLET    Take 1 tablet (5 mg total) by mouth every 6 (six) hours as needed for Nausea.     Objective:     Vitals:    02/05/25 0758   BP: (!) 167/89   Pulse: 69   Temp: 97.2 °F (36.2 °C)       Physical Exam  Vitals reviewed.   Constitutional:       General: He is not in acute distress.     Appearance: He is not ill-appearing, toxic-appearing or diaphoretic.   HENT:       Head: Normocephalic and atraumatic.   Cardiovascular:      Rate and Rhythm: Normal rate.   Musculoskeletal:      Right lower leg: No edema.      Left lower leg: No edema.   Skin:     General: Skin is warm.      Coloration: Skin is not jaundiced or pale.      Findings: No bruising, erythema, lesion or rash.   Neurological:      Mental Status: He is alert.   Psychiatric:         Mood and Affect: Mood normal.         Behavior: Behavior normal.         Thought Content: Thought content normal.          Labs/Results:  Lab Results   Component Value Date    WBC 7.86 01/31/2025    RBC 4.41 (L) 01/31/2025    HGB 11.6 (L) 01/31/2025    HCT 37.3 (L) 01/31/2025    MCV 85 01/31/2025    MCH 26.3 (L) 01/31/2025    MCHC 31.1 (L) 01/31/2025    RDW 18.3 (H) 01/31/2025     01/31/2025    MPV 10.3 01/31/2025    GRAN 5.8 01/31/2025    LYMPH 0.9 (L) 01/20/2025    LYMPH 16.8 (L) 01/20/2025    MONO 0.6 01/20/2025    MONO 10.5 01/20/2025    EOS 0.1 01/20/2025    BASO 0.04 01/20/2025    EOSINOPHIL 1.7 01/20/2025    BASOPHIL 0.8 01/20/2025     CMP  Sodium   Date Value Ref Range Status   01/31/2025 142 136 - 145 mmol/L Final     Potassium   Date Value Ref Range Status   01/31/2025 4.6 3.5 - 5.1 mmol/L Final     Chloride   Date Value Ref Range Status   01/31/2025 104 95 - 110 mmol/L Final     CO2   Date Value Ref Range Status   01/31/2025 31 (H) 23 - 29 mmol/L Final     Glucose   Date Value Ref Range Status   01/31/2025 110 70 - 110 mg/dL Final     BUN   Date Value Ref Range Status   01/31/2025 18 8 - 23 mg/dL Final     Creatinine   Date Value Ref Range Status   01/31/2025 1.2 0.5 - 1.4 mg/dL Final     Calcium   Date Value Ref Range Status   01/31/2025 9.4 8.7 - 10.5 mg/dL Final     Total Protein   Date Value Ref Range Status   01/31/2025 6.8 6.0 - 8.4 g/dL Final     Albumin   Date Value Ref Range Status   01/31/2025 2.9 (L) 3.5 - 5.2 g/dL Final     Total Bilirubin   Date Value Ref Range Status   01/31/2025 0.6 0.1 - 1.0 mg/dL Final      Comment:     For infants and newborns, interpretation of results should be based  on gestational age, weight and in agreement with clinical  observations.    Premature Infant recommended reference ranges:  Up to 24 hours.............<8.0 mg/dL  Up to 48 hours............<12.0 mg/dL  3-5 days..................<15.0 mg/dL  6-29 days.................<15.0 mg/dL       Alkaline Phosphatase   Date Value Ref Range Status   01/31/2025 67 40 - 150 U/L Final     AST ([place])   Date Value Ref Range Status   04/24/2016 29 17 - 59 U/L Final     AST   Date Value Ref Range Status   01/31/2025 22 10 - 40 U/L Final     ALT   Date Value Ref Range Status   01/31/2025 23 10 - 44 U/L Final     Anion Gap   Date Value Ref Range Status   01/31/2025 7 (L) 8 - 16 mmol/L Final     eGFR   Date Value Ref Range Status   01/31/2025 59 (A) >60 mL/min/1.73 m^2 Final     Pet scan 1/29/25  Impression:  1. Subtle asymmetric thickening of the right wall the bladder near the surgical site.  Otherwise, no definitive evidence of metastatic disease.  2. Nephrostomy in the right kidney.  3. Nodular opacities in the bilateral lungs, these findings are favored to be related to infectious/inflammatory etiology.  Consider short interval follow-up with CT (3 months).    Ct abd pelvis 1/20/25  Impression:  Right external renal drainage catheter and  right ureteral stent identified.  Mild Sheron ureteral fat stranding.  Remaining findings as above    Assessment:     Problem List Items Addressed This Visit       Anemia due to gastrointestinal blood loss    Urothelial carcinoma of bladder - Primary    Immunodeficiency due to chemotherapy     Plan:     Urothelial carcinoma of bladder, Immunodeficiency due to chemotherapy  --Locally advanced urothelial carcionoma of the bladder. Has long standing history of localized bladder cancer treated with serial TURBT since at least 2016. 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 He was treated with serial TURBT and  induction BCG. More recently found to have recurrent disease with associtaed right sided hydronephrosis. Overall concerning for locally advanced disease   --continue with cycle 1 day 1 of cisplatin weekly + radiation  --continue to follow with urology for PCN and ureteral stent.   --ANC:5.8, plts:202 crcl:49.4 ml/min    Anemia  --continue with venofer weekly   --hgb: 11.6    Follow-Up: 1 week (Friday) with primary oncologist for next cycle with cbc cmp prior -standing orders in. Already scheduled.     Carmen Onofre PA-C  Hematology Oncology    Route Chart for Scheduling    Med Onc Chart Routing      Follow up with physician . 1 weeek friday with cbc cmp prior for next cycle   Follow up with [facility]    Infusion scheduling note   1 week for next cycle (friday)   Injection scheduling note    Labs CMP and CBC   Scheduling:  Preferred lab:  Lab interval:  standing orders in   Imaging    Pharmacy appointment    Other referrals                  Treatment Plan Information   OP Bladder CISplatin QW + Radiation Fabián Westbrook MD   Associated diagnosis: Urothelial carcinoma of bladder Stage II T2, N0, M0 noted on 6/21/2016   Line of treatment: Induction  Treatment Goal: Curative     Upcoming Treatment Dates - OP Bladder CISplatin QW + Radiation    2/1/2025       Chemotherapy       CISplatin (Platinol) 35 mg/m2 = 77 mg in 0.9% NaCl 327 mL chemo infusion       Pre-Hydration       0.9% NaCl 500 mL with magnesium sulfate 1 g, potassium chloride 10 mEq infusion       Post-Hydration       Physician communication order       Antiemetics       fosaprepitant 150 mg in 0.9% NaCl 150 mL IVPB       palonosetron 0.25mg/dexAMETHasone 12mg in NS IVPB 0.25 mg 50 mL  2/8/2025       Chemotherapy       CISplatin (Platinol) 35 mg/m2 = 77 mg in 0.9% NaCl 327 mL chemo infusion       Pre-Hydration       0.9% NaCl 500 mL with magnesium sulfate 1 g, potassium chloride 10 mEq infusion       Post-Hydration       Physician communication order        Antiemetics       fosaprepitant 150 mg in 0.9% NaCl 150 mL IVPB       palonosetron 0.25mg/dexAMETHasone 12mg in NS IVPB 0.25 mg 50 mL  2/15/2025       Chemotherapy       CISplatin (Platinol) 35 mg/m2 = 77 mg in 0.9% NaCl 327 mL chemo infusion       Pre-Hydration       0.9% NaCl 500 mL with magnesium sulfate 1 g, potassium chloride 10 mEq infusion       Post-Hydration       Physician communication order       Antiemetics       fosaprepitant 150 mg in 0.9% NaCl 150 mL IVPB       palonosetron 0.25mg/dexAMETHasone 12mg in NS IVPB 0.25 mg 50 mL  2/22/2025       Chemotherapy       CISplatin (Platinol) 35 mg/m2 = 77 mg in 0.9% NaCl 327 mL chemo infusion       Pre-Hydration       0.9% NaCl 500 mL with magnesium sulfate 1 g, potassium chloride 10 mEq infusion       Post-Hydration       Physician communication order       Antiemetics       fosaprepitant 150 mg in 0.9% NaCl 150 mL IVPB       palonosetron 0.25mg/dexAMETHasone 12mg in NS IVPB 0.25 mg 50 mL    Therapy Plan Information  VENOFER (IRON SUCROSE) QW for Iron deficiency anemia secondary to blood loss (chronic), noted on 5/29/2021  Anaphylaxis/Hypersensitivity  EPINEPHrine (EPIPEN) 0.3 mg/0.3 mL pen injection 0.3 mg  0.3 mg, Intramuscular, PRN  diphenhydrAMINE injection 50 mg  50 mg, Intravenous, PRN  hydrocortisone sodium succinate injection 100 mg  100 mg, Intravenous, PRN  Flushes  0.9% NaCl 250 mL flush bag  Intravenous, 1 time a week  sodium chloride 0.9% flush 10 mL  10 mL, Intravenous, 1 time a week  5. Medications  iron sucrose injection 200 mg  200 mg, Intravenous, Every visit  methylPREDNISolone sodium succinate injection 40 mg  40 mg, Intravenous, 1 time a week      No therapy plan of the specified type found.    No therapy plan of the specified type found.

## 2025-02-05 ENCOUNTER — INFUSION (OUTPATIENT)
Dept: INFUSION THERAPY | Facility: HOSPITAL | Age: 85
End: 2025-02-05
Attending: EMERGENCY MEDICINE
Payer: MEDICARE

## 2025-02-05 ENCOUNTER — OFFICE VISIT (OUTPATIENT)
Dept: HEMATOLOGY/ONCOLOGY | Facility: CLINIC | Age: 85
End: 2025-02-05
Payer: MEDICARE

## 2025-02-05 ENCOUNTER — TELEPHONE (OUTPATIENT)
Dept: NUTRITION | Facility: CLINIC | Age: 85
End: 2025-02-05
Payer: MEDICARE

## 2025-02-05 ENCOUNTER — PATIENT MESSAGE (OUTPATIENT)
Dept: ADMINISTRATIVE | Facility: OTHER | Age: 85
End: 2025-02-05
Payer: MEDICARE

## 2025-02-05 VITALS
HEART RATE: 58 BPM | DIASTOLIC BLOOD PRESSURE: 71 MMHG | RESPIRATION RATE: 18 BRPM | WEIGHT: 204.69 LBS | SYSTOLIC BLOOD PRESSURE: 128 MMHG | OXYGEN SATURATION: 94 % | TEMPERATURE: 97 F | HEIGHT: 72 IN | BODY MASS INDEX: 27.73 KG/M2

## 2025-02-05 VITALS
HEART RATE: 69 BPM | BODY MASS INDEX: 27.73 KG/M2 | HEIGHT: 72 IN | TEMPERATURE: 97 F | OXYGEN SATURATION: 95 % | SYSTOLIC BLOOD PRESSURE: 167 MMHG | WEIGHT: 204.69 LBS | DIASTOLIC BLOOD PRESSURE: 89 MMHG

## 2025-02-05 DIAGNOSIS — D50.0 ANEMIA DUE TO GASTROINTESTINAL BLOOD LOSS: ICD-10-CM

## 2025-02-05 DIAGNOSIS — Z79.899 IMMUNODEFICIENCY DUE TO CHEMOTHERAPY: ICD-10-CM

## 2025-02-05 DIAGNOSIS — C67.9 UROTHELIAL CARCINOMA OF BLADDER: Primary | ICD-10-CM

## 2025-02-05 DIAGNOSIS — T45.1X5A IMMUNODEFICIENCY DUE TO CHEMOTHERAPY: ICD-10-CM

## 2025-02-05 DIAGNOSIS — D84.821 IMMUNODEFICIENCY DUE TO CHEMOTHERAPY: ICD-10-CM

## 2025-02-05 PROCEDURE — 3077F SYST BP >= 140 MM HG: CPT | Mod: CPTII,S$GLB,,

## 2025-02-05 PROCEDURE — 1159F MED LIST DOCD IN RCRD: CPT | Mod: CPTII,S$GLB,,

## 2025-02-05 PROCEDURE — 96367 TX/PROPH/DG ADDL SEQ IV INF: CPT

## 2025-02-05 PROCEDURE — 3079F DIAST BP 80-89 MM HG: CPT | Mod: CPTII,S$GLB,,

## 2025-02-05 PROCEDURE — 3288F FALL RISK ASSESSMENT DOCD: CPT | Mod: CPTII,S$GLB,,

## 2025-02-05 PROCEDURE — 96413 CHEMO IV INFUSION 1 HR: CPT

## 2025-02-05 PROCEDURE — 1157F ADVNC CARE PLAN IN RCRD: CPT | Mod: CPTII,S$GLB,,

## 2025-02-05 PROCEDURE — 99999 PR PBB SHADOW E&M-EST. PATIENT-LVL IV: CPT | Mod: PBBFAC,,,

## 2025-02-05 PROCEDURE — 99215 OFFICE O/P EST HI 40 MIN: CPT | Mod: S$GLB,,,

## 2025-02-05 PROCEDURE — 63600175 PHARM REV CODE 636 W HCPCS: Performed by: INTERNAL MEDICINE

## 2025-02-05 PROCEDURE — 1101F PT FALLS ASSESS-DOCD LE1/YR: CPT | Mod: CPTII,S$GLB,,

## 2025-02-05 PROCEDURE — 1126F AMNT PAIN NOTED NONE PRSNT: CPT | Mod: CPTII,S$GLB,,

## 2025-02-05 PROCEDURE — 3072F LOW RISK FOR RETINOPATHY: CPT | Mod: CPTII,S$GLB,,

## 2025-02-05 PROCEDURE — 25000003 PHARM REV CODE 250: Performed by: INTERNAL MEDICINE

## 2025-02-05 RX ORDER — DIPHENHYDRAMINE HYDROCHLORIDE 50 MG/ML
50 INJECTION INTRAMUSCULAR; INTRAVENOUS ONCE AS NEEDED
Status: CANCELLED | OUTPATIENT
Start: 2025-02-21

## 2025-02-05 RX ORDER — PROCHLORPERAZINE EDISYLATE 5 MG/ML
5 INJECTION INTRAMUSCULAR; INTRAVENOUS ONCE AS NEEDED
Status: CANCELLED | OUTPATIENT
Start: 2025-02-14

## 2025-02-05 RX ORDER — SODIUM CHLORIDE 0.9 % (FLUSH) 0.9 %
10 SYRINGE (ML) INJECTION
Status: CANCELLED | OUTPATIENT
Start: 2025-02-05

## 2025-02-05 RX ORDER — DIPHENHYDRAMINE HYDROCHLORIDE 50 MG/ML
50 INJECTION INTRAMUSCULAR; INTRAVENOUS ONCE AS NEEDED
OUTPATIENT
Start: 2025-03-07

## 2025-02-05 RX ORDER — HEPARIN 100 UNIT/ML
500 SYRINGE INTRAVENOUS
Status: CANCELLED | OUTPATIENT
Start: 2025-02-14

## 2025-02-05 RX ORDER — SODIUM CHLORIDE 0.9 % (FLUSH) 0.9 %
10 SYRINGE (ML) INJECTION
Status: DISCONTINUED | OUTPATIENT
Start: 2025-02-05 | End: 2025-02-05 | Stop reason: HOSPADM

## 2025-02-05 RX ORDER — PROCHLORPERAZINE EDISYLATE 5 MG/ML
5 INJECTION INTRAMUSCULAR; INTRAVENOUS ONCE AS NEEDED
Status: CANCELLED | OUTPATIENT
Start: 2025-02-21

## 2025-02-05 RX ORDER — SODIUM CHLORIDE 0.9 % (FLUSH) 0.9 %
10 SYRINGE (ML) INJECTION
OUTPATIENT
Start: 2025-03-07

## 2025-02-05 RX ORDER — HEPARIN 100 UNIT/ML
500 SYRINGE INTRAVENOUS
OUTPATIENT
Start: 2025-03-14

## 2025-02-05 RX ORDER — PROCHLORPERAZINE EDISYLATE 5 MG/ML
5 INJECTION INTRAMUSCULAR; INTRAVENOUS ONCE AS NEEDED
Status: DISCONTINUED | OUTPATIENT
Start: 2025-02-05 | End: 2025-02-05 | Stop reason: HOSPADM

## 2025-02-05 RX ORDER — DIPHENHYDRAMINE HYDROCHLORIDE 50 MG/ML
50 INJECTION INTRAMUSCULAR; INTRAVENOUS ONCE AS NEEDED
Status: CANCELLED | OUTPATIENT
Start: 2025-02-05

## 2025-02-05 RX ORDER — EPINEPHRINE 0.3 MG/.3ML
0.3 INJECTION SUBCUTANEOUS ONCE AS NEEDED
Status: CANCELLED | OUTPATIENT
Start: 2025-02-21

## 2025-02-05 RX ORDER — EPINEPHRINE 0.3 MG/.3ML
0.3 INJECTION SUBCUTANEOUS ONCE AS NEEDED
OUTPATIENT
Start: 2025-03-14

## 2025-02-05 RX ORDER — DIPHENHYDRAMINE HYDROCHLORIDE 50 MG/ML
50 INJECTION INTRAMUSCULAR; INTRAVENOUS ONCE AS NEEDED
Status: CANCELLED | OUTPATIENT
Start: 2025-02-28

## 2025-02-05 RX ORDER — DIPHENHYDRAMINE HYDROCHLORIDE 50 MG/ML
50 INJECTION INTRAMUSCULAR; INTRAVENOUS ONCE AS NEEDED
Status: CANCELLED | OUTPATIENT
Start: 2025-02-14

## 2025-02-05 RX ORDER — DIPHENHYDRAMINE HYDROCHLORIDE 50 MG/ML
50 INJECTION INTRAMUSCULAR; INTRAVENOUS ONCE AS NEEDED
OUTPATIENT
Start: 2025-03-14

## 2025-02-05 RX ORDER — EPINEPHRINE 0.3 MG/.3ML
0.3 INJECTION SUBCUTANEOUS ONCE AS NEEDED
OUTPATIENT
Start: 2025-03-07

## 2025-02-05 RX ORDER — HEPARIN 100 UNIT/ML
500 SYRINGE INTRAVENOUS
Status: CANCELLED | OUTPATIENT
Start: 2025-02-28

## 2025-02-05 RX ORDER — PROCHLORPERAZINE EDISYLATE 5 MG/ML
5 INJECTION INTRAMUSCULAR; INTRAVENOUS ONCE AS NEEDED
Status: CANCELLED | OUTPATIENT
Start: 2025-02-28

## 2025-02-05 RX ORDER — HEPARIN 100 UNIT/ML
500 SYRINGE INTRAVENOUS
Status: CANCELLED | OUTPATIENT
Start: 2025-02-05

## 2025-02-05 RX ORDER — EPINEPHRINE 0.3 MG/.3ML
0.3 INJECTION SUBCUTANEOUS ONCE AS NEEDED
Status: CANCELLED | OUTPATIENT
Start: 2025-02-28

## 2025-02-05 RX ORDER — SODIUM CHLORIDE 0.9 % (FLUSH) 0.9 %
10 SYRINGE (ML) INJECTION
Status: CANCELLED | OUTPATIENT
Start: 2025-02-21

## 2025-02-05 RX ORDER — PROCHLORPERAZINE EDISYLATE 5 MG/ML
5 INJECTION INTRAMUSCULAR; INTRAVENOUS ONCE AS NEEDED
Status: CANCELLED | OUTPATIENT
Start: 2025-02-05

## 2025-02-05 RX ORDER — HEPARIN 100 UNIT/ML
500 SYRINGE INTRAVENOUS
Status: CANCELLED | OUTPATIENT
Start: 2025-02-21

## 2025-02-05 RX ORDER — EPINEPHRINE 0.3 MG/.3ML
0.3 INJECTION SUBCUTANEOUS ONCE AS NEEDED
Status: DISCONTINUED | OUTPATIENT
Start: 2025-02-05 | End: 2025-02-05 | Stop reason: HOSPADM

## 2025-02-05 RX ORDER — SODIUM CHLORIDE 0.9 % (FLUSH) 0.9 %
10 SYRINGE (ML) INJECTION
OUTPATIENT
Start: 2025-03-14

## 2025-02-05 RX ORDER — SODIUM CHLORIDE 0.9 % (FLUSH) 0.9 %
10 SYRINGE (ML) INJECTION
Status: CANCELLED | OUTPATIENT
Start: 2025-02-14

## 2025-02-05 RX ORDER — EPINEPHRINE 0.3 MG/.3ML
0.3 INJECTION SUBCUTANEOUS ONCE AS NEEDED
Status: CANCELLED | OUTPATIENT
Start: 2025-02-05

## 2025-02-05 RX ORDER — EPINEPHRINE 0.3 MG/.3ML
0.3 INJECTION SUBCUTANEOUS ONCE AS NEEDED
Status: CANCELLED | OUTPATIENT
Start: 2025-02-14

## 2025-02-05 RX ORDER — PROCHLORPERAZINE EDISYLATE 5 MG/ML
5 INJECTION INTRAMUSCULAR; INTRAVENOUS ONCE AS NEEDED
OUTPATIENT
Start: 2025-03-14

## 2025-02-05 RX ORDER — SODIUM CHLORIDE 0.9 % (FLUSH) 0.9 %
10 SYRINGE (ML) INJECTION
Status: CANCELLED | OUTPATIENT
Start: 2025-02-28

## 2025-02-05 RX ORDER — HEPARIN 100 UNIT/ML
500 SYRINGE INTRAVENOUS
OUTPATIENT
Start: 2025-03-07

## 2025-02-05 RX ORDER — DIPHENHYDRAMINE HYDROCHLORIDE 50 MG/ML
50 INJECTION INTRAMUSCULAR; INTRAVENOUS ONCE AS NEEDED
Status: DISCONTINUED | OUTPATIENT
Start: 2025-02-05 | End: 2025-02-05 | Stop reason: HOSPADM

## 2025-02-05 RX ORDER — PROCHLORPERAZINE EDISYLATE 5 MG/ML
5 INJECTION INTRAMUSCULAR; INTRAVENOUS ONCE AS NEEDED
OUTPATIENT
Start: 2025-03-07

## 2025-02-05 RX ADMIN — SODIUM CHLORIDE: 9 INJECTION, SOLUTION INTRAVENOUS at 09:02

## 2025-02-05 RX ADMIN — FOSAPREPITANT DIMEGLUMINE 150 MG: 150 INJECTION, POWDER, LYOPHILIZED, FOR SOLUTION INTRAVENOUS at 10:02

## 2025-02-05 RX ADMIN — SODIUM CHLORIDE 0.25 MG: 9 INJECTION, SOLUTION INTRAVENOUS at 10:02

## 2025-02-05 RX ADMIN — POTASSIUM CHLORIDE 500 ML/HR: 2 INJECTION, SOLUTION, CONCENTRATE INTRAVENOUS at 09:02

## 2025-02-05 RX ADMIN — CISPLATIN 58 MG: 1 INJECTION, SOLUTION INTRAVENOUS at 11:02

## 2025-02-05 NOTE — PLAN OF CARE
Discussed plan of care with pt. Addressed any and ongoing concerns. Pt denies   Problem: Adult Inpatient Plan of Care  Goal: Plan of Care Review  Outcome: Progressing  Goal: Patient-Specific Goal (Individualized)  Outcome: Progressing  Flowsheets (Taken 2/5/2025 0907)  Individualized Care Needs: Reclined position with warm blanket and pillows x 2 , orange juice provided  Anxieties, Fears or Concerns: This is day 1 of Chemo but he is in good spirits  Patient/Family-Specific Goals (Include Timeframe): Will tolerate his chemo infusion today with no adverse affects  Goal: Absence of Hospital-Acquired Illness or Injury  Outcome: Progressing  Intervention: Identify and Manage Fall Risk  Flowsheets (Taken 2/5/2025 0907)  Safety Promotion/Fall Prevention: in recliner, wheels locked  Intervention: Prevent Infection  Flowsheets (Taken 2/5/2025 0907)  Infection Prevention:   equipment surfaces disinfected   hand hygiene promoted   personal protective equipment utilized  Goal: Optimal Comfort and Wellbeing  Outcome: Progressing  Intervention: Provide Person-Centered Care  Flowsheets (Taken 2/5/2025 0907)  Trust Relationship/Rapport:   empathic listening provided   questions answered   questions encouraged

## 2025-02-05 NOTE — DISCHARGE INSTRUCTIONS
[facility]  [address] [facility]  [address] [address]  [phone] phone     [phone] fax  Hours of Operation: Monday- Friday 8:00am- 5:00pm  After hours phone  [phone]  Hematology / Oncology Physicians on call      KATE France Dr., NP Phaon Dunbar, NP Khelsea Conley, FNP    Please call with any concerns regarding your appointment today.

## 2025-02-05 NOTE — TELEPHONE ENCOUNTER
Introduced myself to new oncology patient over the phone. Gave my phone number along with guidance on when to contact me about an appointment, for concerns like significant loss of appetite and weight loss.   Signature: Ne Moreno, MPH, RD, LDN

## 2025-02-06 ENCOUNTER — PATIENT MESSAGE (OUTPATIENT)
Dept: ADMINISTRATIVE | Facility: OTHER | Age: 85
End: 2025-02-06
Payer: MEDICARE

## 2025-02-06 ENCOUNTER — DOCUMENTATION ONLY (OUTPATIENT)
Dept: RADIATION ONCOLOGY | Facility: CLINIC | Age: 85
End: 2025-02-06
Payer: MEDICARE

## 2025-02-06 PROCEDURE — 77427 RADIATION TX MANAGEMENT X5: CPT | Mod: ,,, | Performed by: SPECIALIST

## 2025-02-06 PROCEDURE — 77386 HC IMRT, COMPLEX: CPT | Performed by: SPECIALIST

## 2025-02-06 PROCEDURE — 77014 PR  CT GUIDANCE PLACEMENT RAD THERAPY FIELDS: CPT | Mod: 26,,, | Performed by: SPECIALIST

## 2025-02-06 NOTE — PLAN OF CARE
Day 1 outpatient xrt to the bladder. Pelvis handout and verbal instructions given. Skin care and side effects reviewed. Contact info provided. Patient verbalized understanding.

## 2025-02-07 ENCOUNTER — PATIENT MESSAGE (OUTPATIENT)
Dept: ADMINISTRATIVE | Facility: OTHER | Age: 85
End: 2025-02-07
Payer: MEDICARE

## 2025-02-07 PROCEDURE — 77386 HC IMRT, COMPLEX: CPT | Performed by: SPECIALIST

## 2025-02-07 PROCEDURE — 77014 PR  CT GUIDANCE PLACEMENT RAD THERAPY FIELDS: CPT | Mod: 26,,, | Performed by: SPECIALIST

## 2025-02-09 ENCOUNTER — NURSE TRIAGE (OUTPATIENT)
Dept: ADMINISTRATIVE | Facility: CLINIC | Age: 85
End: 2025-02-09
Payer: MEDICARE

## 2025-02-10 ENCOUNTER — OFFICE VISIT (OUTPATIENT)
Dept: PODIATRY | Facility: CLINIC | Age: 85
End: 2025-02-10
Payer: MEDICARE

## 2025-02-10 ENCOUNTER — INFUSION (OUTPATIENT)
Dept: INFUSION THERAPY | Facility: HOSPITAL | Age: 85
End: 2025-02-10
Attending: NURSE PRACTITIONER
Payer: MEDICARE

## 2025-02-10 ENCOUNTER — PATIENT MESSAGE (OUTPATIENT)
Dept: ADMINISTRATIVE | Facility: OTHER | Age: 85
End: 2025-02-10
Payer: MEDICARE

## 2025-02-10 ENCOUNTER — HOSPITAL ENCOUNTER (OUTPATIENT)
Dept: RADIOLOGY | Facility: HOSPITAL | Age: 85
Discharge: HOME | End: 2025-02-10
Attending: PODIATRIST
Payer: MEDICARE

## 2025-02-10 ENCOUNTER — DOCUMENTATION ONLY (OUTPATIENT)
Dept: RADIATION ONCOLOGY | Facility: CLINIC | Age: 85
End: 2025-02-10
Payer: MEDICARE

## 2025-02-10 VITALS
OXYGEN SATURATION: 95 % | HEART RATE: 67 BPM | TEMPERATURE: 97 F | SYSTOLIC BLOOD PRESSURE: 117 MMHG | RESPIRATION RATE: 16 BRPM | DIASTOLIC BLOOD PRESSURE: 54 MMHG

## 2025-02-10 DIAGNOSIS — E11.42 TYPE 2 DIABETES MELLITUS WITH PERIPHERAL NEUROPATHY: ICD-10-CM

## 2025-02-10 DIAGNOSIS — E08.621 DIABETIC ULCER OF TOE OF RIGHT FOOT ASSOCIATED WITH DIABETES MELLITUS DUE TO UNDERLYING CONDITION, WITH BONE INVOLVEMENT WITHOUT EVIDENCE OF NECROSIS: Primary | ICD-10-CM

## 2025-02-10 DIAGNOSIS — Z79.01 ANTICOAGULATED: ICD-10-CM

## 2025-02-10 DIAGNOSIS — L97.516 DIABETIC ULCER OF TOE OF RIGHT FOOT ASSOCIATED WITH DIABETES MELLITUS DUE TO UNDERLYING CONDITION, WITH BONE INVOLVEMENT WITHOUT EVIDENCE OF NECROSIS: Primary | ICD-10-CM

## 2025-02-10 DIAGNOSIS — D50.0 IRON DEFICIENCY ANEMIA SECONDARY TO BLOOD LOSS (CHRONIC): Primary | ICD-10-CM

## 2025-02-10 DIAGNOSIS — N18.30 STAGE 3 CHRONIC KIDNEY DISEASE, UNSPECIFIED WHETHER STAGE 3A OR 3B CKD: ICD-10-CM

## 2025-02-10 DIAGNOSIS — Z91.89 INCREASED INFECTION RISK: ICD-10-CM

## 2025-02-10 DIAGNOSIS — L97.516 DIABETIC ULCER OF TOE OF RIGHT FOOT ASSOCIATED WITH DIABETES MELLITUS DUE TO UNDERLYING CONDITION, WITH BONE INVOLVEMENT WITHOUT EVIDENCE OF NECROSIS: ICD-10-CM

## 2025-02-10 DIAGNOSIS — E08.621 DIABETIC ULCER OF TOE OF RIGHT FOOT ASSOCIATED WITH DIABETES MELLITUS DUE TO UNDERLYING CONDITION, WITH BONE INVOLVEMENT WITHOUT EVIDENCE OF NECROSIS: ICD-10-CM

## 2025-02-10 PROCEDURE — 73630 X-RAY EXAM OF FOOT: CPT | Mod: 26,RT,, | Performed by: STUDENT IN AN ORGANIZED HEALTH CARE EDUCATION/TRAINING PROGRAM

## 2025-02-10 PROCEDURE — 63600175 PHARM REV CODE 636 W HCPCS: Performed by: NURSE PRACTITIONER

## 2025-02-10 PROCEDURE — 87077 CULTURE AEROBIC IDENTIFY: CPT | Performed by: PODIATRIST

## 2025-02-10 PROCEDURE — 73630 X-RAY EXAM OF FOOT: CPT | Mod: TC,RT

## 2025-02-10 PROCEDURE — 77386 HC IMRT, COMPLEX: CPT | Performed by: SPECIALIST

## 2025-02-10 PROCEDURE — 96375 TX/PRO/DX INJ NEW DRUG ADDON: CPT

## 2025-02-10 PROCEDURE — 99999 PR PBB SHADOW E&M-EST. PATIENT-LVL III: CPT | Mod: PBBFAC,,, | Performed by: PODIATRIST

## 2025-02-10 PROCEDURE — 77014 PR  CT GUIDANCE PLACEMENT RAD THERAPY FIELDS: CPT | Mod: 26,,, | Performed by: SPECIALIST

## 2025-02-10 PROCEDURE — 87075 CULTR BACTERIA EXCEPT BLOOD: CPT | Performed by: PODIATRIST

## 2025-02-10 PROCEDURE — 87070 CULTURE OTHR SPECIMN AEROBIC: CPT | Performed by: PODIATRIST

## 2025-02-10 PROCEDURE — 96374 THER/PROPH/DIAG INJ IV PUSH: CPT

## 2025-02-10 PROCEDURE — 87186 SC STD MICRODIL/AGAR DIL: CPT | Mod: 59 | Performed by: PODIATRIST

## 2025-02-10 RX ORDER — DIPHENHYDRAMINE HYDROCHLORIDE 50 MG/ML
50 INJECTION INTRAMUSCULAR; INTRAVENOUS ONCE AS NEEDED
OUTPATIENT
Start: 2025-02-17

## 2025-02-10 RX ORDER — EPINEPHRINE 0.3 MG/.3ML
0.3 INJECTION SUBCUTANEOUS ONCE AS NEEDED
OUTPATIENT
Start: 2025-02-17

## 2025-02-10 RX ORDER — METHYLPREDNISOLONE SOD SUCC 125 MG
40 VIAL (EA) INJECTION
Status: COMPLETED | OUTPATIENT
Start: 2025-02-10 | End: 2025-02-10

## 2025-02-10 RX ORDER — SODIUM CHLORIDE 0.9 % (FLUSH) 0.9 %
10 SYRINGE (ML) INJECTION
OUTPATIENT
Start: 2025-02-17

## 2025-02-10 RX ORDER — SODIUM CHLORIDE 0.9 % (FLUSH) 0.9 %
10 SYRINGE (ML) INJECTION
Status: DISCONTINUED | OUTPATIENT
Start: 2025-02-10 | End: 2025-02-10 | Stop reason: HOSPADM

## 2025-02-10 RX ORDER — METHYLPREDNISOLONE SOD SUCC 125 MG
40 VIAL (EA) INJECTION
Start: 2025-02-17

## 2025-02-10 RX ADMIN — IRON SUCROSE 200 MG: 20 INJECTION, SOLUTION INTRAVENOUS at 09:02

## 2025-02-10 RX ADMIN — METHYLPREDNISOLONE SODIUM SUCCINATE 40 MG: 125 INJECTION, POWDER, FOR SOLUTION INTRAMUSCULAR; INTRAVENOUS at 09:02

## 2025-02-10 NOTE — TELEPHONE ENCOUNTER
Daughter is calling for her father, and said pt had a fall about 30 minutes ago and has a cut on his arm, got the bleeding to stop. Denies that pt hit his head. Pt has been feeling dizzy before the fall, daughter said he tripped on the edge of some furniture. Pt is undergoing Chemo right now and has several Doctor apnts tomorrow. Pt is able to answer all the questions that are being asked. Dispo- See PCP within 3 days. Daughter states she will let her father's doctor know about his fall when he goes to his apnts tomorrow morning. Advised to call back with any further concerns.        Reason for Disposition   MILD weakness (i.e., does not interfere with ability to work, go to school, normal activities)  (Exception: Mild weakness is a chronic symptom.)    Additional Information   Negative: Major injury from dangerous force (e.g., fall > 10 feet or 3 meters)   Negative: [1] Major bleeding (e.g., actively dripping or spurting) AND [2] can't be stopped   Negative: Shock suspected (e.g., cold/pale/clammy skin, too weak to stand)   Negative: Difficult to awaken or acting confused (e.g., disoriented, slurred speech)   Negative: [1] SEVERE weakness (i.e., unable to walk or barely able to walk, requires support) AND [2] new-onset or getting worse   Negative: [1] Can't stand (bear weight) or walk AND [2] new-onset after fall   Negative: Sounds like a life-threatening emergency to the triager   Negative: [1] Muscle pain AND [2] dark (cola colored) or red-colored urine   Negative: [1] Unable to get up until help (e.g., caregiver, family, friend) arrived AND [2] on the ground 1 hour or more   Negative: Injury (or injuries) that need emergency care   Negative: Sounds like a serious injury to the triager   Negative: Patient sounds very sick or weak to the triager   Negative: [1] MODERATE weakness (i.e., interferes with work, school, normal activities) AND [2] new-onset or getting worse   Negative: [1] Fever > 101 F (38.3 C) AND [2]  age > 60 years   Negative: [1] Fever > 100 F (37.8 C) AND [2] bedridden (e.g., CVA, chronic illness, recovering from surgery)   Negative: [1] Fever > 100 F (37.8 C) AND [2] diabetes mellitus or weak immune system (e.g., HIV positive, cancer chemo, splenectomy, organ transplant, chronic steroids)   Negative: [1] Caller has URGENT question AND [2] triager unable to answer question   Negative: [1] No prior tetanus shots (or is not fully vaccinated) AND [2] any wound (e.g., cut or scrape)   Negative: [1] HIV positive or severe immunodeficiency (severely weak immune system) AND [2] DIRTY cut or scrape   Negative: Suspicious history for the fall   Negative: [1] Caller has NON-URGENT question AND [2] triager unable to answer question    Protocols used: Falls and [id number]

## 2025-02-10 NOTE — PROGRESS NOTES
Subjective:       Patient ID: Jose Miguel Alvarez Jr. is a 84 y.o. male.    Chief Complaint: Foot Ulcer (Right 2nd toe: c/o denies pain, pt is nondiabetic, pt states the nurse didn't come to wrap toe and had to do it himself)    HPI: Jose Miguel Alvarez Jr. presents to the office today with a small wound to the distal aspect of the right 2nd toe.  States that this recently reoccurred without incident.  He continues on Xarelto followed by cardiology and Dr. Flower.  Starting treatment for bladder cancer with chemotherapy and radiation.     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 Treated with APC.   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 breakdown of skin 01/26/2023    Stage 3 chronic kidney disease 10/06/2017       Family History   Problem Relation Name Age of Onset    Heart disease Father      Hypertension Father      Heart disease Brother         Social History     Socioeconomic History    Marital status:    Tobacco Use    Smoking status: Former     Current packs/day: 0.00     Average packs/day: 2.5 packs/day for 20.0 years (50.0 ttl pk-yrs)     Types: Cigarettes     Start date: 1967     Quit date: 1987     Years since  quittin.1     Passive exposure: Never    Smokeless tobacco: Never   Substance and Sexual Activity    Alcohol use: Yes     Alcohol/week: 7.0 standard drinks of alcohol     Types: 7 Cans of beer per week     Comment: occasionally: hold 72 hrs    Drug use: No    Sexual activity: Not Currently     Social Drivers of Health     Financial Resource Strain: Low Risk  (2025)    Overall Financial Resource Strain (CARDIA)     Difficulty of Paying Living Expenses: Not hard at all   Food Insecurity: No Food Insecurity (2025)    Hunger Vital Sign     Worried About Running Out of Food in the Last Year: Never true     Ran Out of Food in the Last Year: Never true   Transportation Needs: No Transportation Needs (2024)    TRANSPORTATION NEEDS     Transportation : No   Physical Activity: Insufficiently Active (2025)    Exercise Vital Sign     Days of Exercise per Week: 2 days     Minutes of Exercise per Session: 30 min   Stress: No Stress Concern Present (2025)    [employer] [place] [employer] - Occupational Stress Questionnaire     Feeling of Stress : Not at all   Housing Stability: Low Risk  (2025)    Housing Stability Vital Sign     Unable to Pay for Housing in the Last Year: No     Homeless in the Last Year: No       Past Surgical History:   Procedure Laterality Date    bladder tumor removal      CARDIAC ELECTROPHYSIOLOGY MAPPING AND ABLATION      CARDIOVERSION      several    CATARACT EXTRACTION Bilateral     cataract removal with IOL implants Bilateral     CHOLECYSTECTOMY      CYSTOSCOPIC LITHOLAPAXY N/A 2021    Procedure: CYSTOLITHOLAPAXY;  Surgeon: Medardo Garcia MD;  Location: [facility] OR;  Service: Urology;  Laterality: N/A;    CYSTOSCOPIC LITHOLAPAXY N/A 2024    Procedure: CYSTOLITHOLAPAXY;  Surgeon: Medardo Garcia MD;  Location: [facility] OR;  Service: Urology;  Laterality: N/A;    CYSTOSCOPY      CYSTOSCOPY WITH BIOPSY OF BLADDER Right 2021    Procedure:  CYSTOSCOPY, WITH BLADDER BIOPSY, WITH FULGURATION IF INDICATED;  Surgeon: Medardo Garcia MD;  Location: [facility] OR;  Service: Urology;  Laterality: Right;    CYSTOTOMY, WITH FULGURATION AND RADIOACTIVE MATERIAL INSERTION N/A 12/3/2024    Procedure: CYSTOTOMY, WITH FULGURATION AND RADIOACTIVE MATERIAL INSERTION;  Surgeon: Medardo Garcia MD;  Location: [facility] OR;  Service: Urology;  Laterality: N/A;    ESOPHAGOGASTRODUODENOSCOPY N/A 10/25/2021    Procedure: Small Jason Enteroscopy (SBE);  Surgeon: Isabelle Griffin MD;  Location: [facility] ENDO;  Service: Endoscopy;  Laterality: N/A;    ESOPHAGOGASTRODUODENOSCOPY N/A 12/13/2021    Procedure: EGD (ESOPHAGOGASTRODUODENOSCOPY);  Surgeon: Troy Polanco MD;  Location: [facility] ENDO;  Service: Endoscopy;  Laterality: N/A;    ESOPHAGOGASTRODUODENOSCOPY N/A 4/5/2024    Procedure: EGD (ESOPHAGOGASTRODUODENOSCOPY);  Surgeon: Myrtle Salcedo MD;  Location: [facility];  Service: Endoscopy;  Laterality: N/A;    ESOPHAGOGASTRODUODENOSCOPY N/A 4/22/2024    Procedure: EGD (ESOPHAGOGASTRODUODENOSCOPY);  Surgeon: Myrtle Salcedo MD;  Location: [facility];  Service: Endoscopy;  Laterality: N/A;  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    EYE SURGERY      INTRALUMINAL GASTROINTESTINAL TRACT IMAGING VIA CAPSULE N/A 12/6/2021    Procedure: IMAGING PROCEDURE, GI TRACT, INTRALUMINAL, VIA CAPSULE;  Surgeon: Larry Jones RN;  Location: [facility] ENDO;  Service: Endoscopy;  Laterality: N/A;    INTRALUMINAL GASTROINTESTINAL TRACT IMAGING VIA CAPSULE N/A 4/18/2024    Procedure: IMAGING PROCEDURE, GI TRACT, INTRALUMINAL, VIA CAPSULE;  Surgeon: Larry Jones RN;  Location: [facility] ENDO;  Service: Endoscopy;  Laterality: N/A;    OCCLUSION OF LEFT ATRIAL APPENDAGE N/A 11/14/2024    Procedure: Left atrial appendage occlusion;  Surgeon: Aroldo Ortiz MD;  Location: [facility] EP LAB;  Service: Cardiology;  Laterality: N/A;  afib, watchman, BSCI, venkatesh, anes, MB, 3prep    SMALL BOWEL  ENTEROSCOPY N/A 9/26/2024    Procedure: ENTEROSCOPY--prximal DBE;  Surgeon: Eduardo Ruff MD;  Location: [facility] ENDO;  Service: Endoscopy;  Laterality: N/A;    TRANSESOPHAGEAL ECHOCARDIOGRAPHY N/A 11/14/2024    Procedure: ECHOCARDIOGRAM, TRANSESOPHAGEAL;  Surgeon: Aroldo Martinez MD;  Location: [facility] EP LAB;  Service: Cardiology;  Laterality: N/A;    TRANSESOPHAGEAL ECHOCARDIOGRAPHY N/A 12/30/2024    Procedure: ECHOCARDIOGRAM, TRANSESOPHAGEAL;  Surgeon: Carlos Flower MD;  Location: [facility] CATH LAB;  Service: Cardiology;  Laterality: N/A;    TRANSURETHRAL RESECTION OF BLADDER TUMOR BY BIPOLAR CAUTERY N/A 7/23/2019    Procedure: TURBT, USING BIPOLAR CAUTERY;  Surgeon: Ritesh Marques MD;  Location: [facility] OR;  Service: Urology;  Laterality: N/A;    TRANSURETHRAL RESECTION OF PROSTATE N/A 5/14/2024    Procedure: TURP (TRANSURETHRAL RESECTION OF PROSTATE);  Surgeon: Medardo Garcia MD;  Location: [facility] OR;  Service: Urology;  Laterality: N/A;    TURBT (TRANSURETHRAL RESECTION OF BLADDER TUMOR) N/A 12/3/2024    Procedure: TURBT (TRANSURETHRAL RESECTION OF BLADDER TUMOR);  Surgeon: Medardo Garcia MD;  Location: [facility] OR;  Service: Urology;  Laterality: N/A;     Review of Systems     Objective:   There were no vitals taken for this visit.    IR Nephrostomy Tube Change  Narrative: EXAMINATION:  IR NEPHROSTOMY TUBE CHANGE    CLINICAL HISTORY:  Personal history of malignant neoplasm of bladder    COMPARISON:  01/31/2025    TECHNIQUE:  Operators: Hector Arriaga M.D.    Preoperative diagnosis: Ureteral obstruction    Postoperative diagnosis: Same    Sedation: None    Medications: None    Contrast: Approximately 20 mL Omnipaque 300 (none IV)    Fluoroscopy time: 1.3 minutes. Thirty-two MGy, 664 images    FINDINGS:  Procedure:    A pre-procedure time-out was performed in the presence of the attending physician and all operators. The attending physician was present for the entire procedure.    Existing nephrostomy tube  was prepped under sterile conditions.  10 cc of diluted contrast was injected through the existing right-sided nephrostomy tube which demonstrated patency of the tube which was within the renal pelvis in good position.  Right-sided ureteral stent appears appropriately position within the ureter and across the stricture into the bladder.  Contrast is seen extending into the bladder.  The nephrostomy tube was cut and removed over wire without difficulty.    Estimated blood loss: Minimal.    Images were permanently stored on PACS.  Impression: Right-sided nephrostogram demonstrates patency of right-sided ureteral stent which appears in good position.    Plan: Follow-up with urology    Electronically signed by: Hector Arriaga MD  Date:    01/31/2025  Time:    15:29     Physical Exam   LOWER EXTREMITY PHYSICAL EXAMINATION    Vascular:  The right dorsalis pedis pulse 2/4 and the right posterior tibial pulse 2/4.   Capillary refill is intact.  Pedal hair growth intact    Musculoskeletal: Manual Muscle Testing is 5/5 with dorsiflexion, plantar flexion, abduction, and adduction.   There is normal range of motion in the forefoot, hindfoot, and Ankle joint.      Dermatological:  Skin is supple, moist, intact.  No evidence of discoloration the distal aspect the right 2nd toe.  Overlying hyperkeratosis noted.  Small wound noted underlying.  Post debridement, the wound measures 0.4 x 0.4 cm.  The wound now probes to bone to the distal phalanx.  The wound depth is 0.5 cm.  There is no signs of abscess.  Serous drainage.    Assessment:     1. 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 Type 2 diabetes mellitus with peripheral neuropathy    3. Stage 3 chronic kidney disease, unspecified whether stage 3a or 3b CKD    4. Anticoagulated    5. Increased infection risk        Plan:     Diabetic ulcer of toe of right foot  associated with diabetes mellitus due to underlying condition, with bone involvement without evidence of necrosis  -     X-Ray Foot Complete Right; Future; Expected date: 02/10/2025  -     MRI Foot Toes W WO Contrast Right; Future; Expected date: 02/10/2025  -     CULTURE, AEROBIC  (SPECIFY SOURCE)  -     CULTURE, ANAEROBE    Type 2 diabetes mellitus with peripheral neuropathy  -     X-Ray Foot Complete Right; Future; Expected date: 02/10/2025    Stage 3 chronic kidney disease, unspecified whether stage 3a or 3b CKD  -     MRI Foot Toes W WO Contrast Right; Future; Expected date: 02/10/2025    Anticoagulated    Increased infection risk        Thorough discussion is had with the patient today, concerning the diagnosis, its etiology, and the treatment algorithm at present.    X-rays today  MRI scheduled due to concerns for bone infection.    Does have history of a watchman device.   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    Home health nurse has missed the last few dressing change appointments.  We will need were consistent monitoring.     Culture taken today of wound    Thorough discussion is had with the patient concerning the diagnosis, its etiology, and the treatment algorithm at present. Shoe inspection. Foot Education. Patient reminded of the importance of good nutrition and blood sugar control to help prevent podiatric complications of diabetes. Patient instructed on proper foot hygeine. We discussed wearing proper and supportive shoe gear, daily foot inspections, never walking barefooted or sock footed, never putting sharp instruments to feet which can cause major complications associated with infection, ulcers, lacerations.    Follow-up in 1  Update sent to Marcus/Onc and Matt    Future Appointments   Date Time Provider Department Center   2/10/2025  1:30 PM ON  XR1- [facility] [place]   2/10/2025  4:30 PM LABORATORY, [facility] LAB [place]   2/14/2025  7:30 AM RHYS MURILLO CC LAB [facility] LAB DS [facility]   2/14/2025  8:30 AM Kira Lynch NP [facility] HEM ONC [facility]   2/14/2025  9:00 AM CHAIR 12 [facility] INFSN [facility]   2/17/2025  9:00 AM CHAIR 01 [facility] INFSN [facility]   2/18/2025 11:00 AM Xiao Bowman, KAYA [facility] POD [facility]   2/21/2025  7:00 AM RHYS MURILLO CC LAB [facility] LAB DS [facility]   2/21/2025  8:00 AM Davonte Medeiros FNP [facility] HEM ONC [facility]   2/21/2025  9:00 AM CHAIR 09 [facility] INFSN [facility]   2/24/2025  9:00 AM CHAIR 03 [facility] INFSN [facility]   2/26/2025  5:00 PM [facility] MRI1 [facility] MRI [place]   2/28/2025  8:00 AM Fabián Westbrook MD [facility] HEM ONC [facility]   2/28/2025  9:00 AM CHAIR 08 [facility] INFSN [facility]   3/7/2025  9:00 AM oTshia Valladares MD [facility] IM [facility]   3/12/2025  1:00 PM Aroldo Ortiz MD [facility] ARR [facility]   3/21/2025  9:00 AM Carlos Flower MD [facility] CARDIO [facility]   3/24/2025 11:15 AM Medardo Garcia MD [facility] UROLOGY [facility]   3/26/2025 10:20 AM LABORATORY, ELI DOMINGUEZ [facility] LAB [facility]   3/31/2025  9:20 AM Gordo Reyes MD [facility] HEM ONC [facility]

## 2025-02-11 ENCOUNTER — PATIENT MESSAGE (OUTPATIENT)
Dept: ADMINISTRATIVE | Facility: OTHER | Age: 85
End: 2025-02-11
Payer: MEDICARE

## 2025-02-11 ENCOUNTER — TELEPHONE (OUTPATIENT)
Dept: NUTRITION | Facility: CLINIC | Age: 85
End: 2025-02-11
Payer: MEDICARE

## 2025-02-11 PROCEDURE — 77014 PR  CT GUIDANCE PLACEMENT RAD THERAPY FIELDS: CPT | Mod: 26,,, | Performed by: SPECIALIST

## 2025-02-11 PROCEDURE — 77386 HC IMRT, COMPLEX: CPT | Performed by: SPECIALIST

## 2025-02-11 NOTE — TELEPHONE ENCOUNTER
Spoke with patient's niece about Mr. Gillespie's weight loss. She wanted to know if Ensure was available. Shared SW phone number and asked if interested in [facility] program. She will ask her uncle.   Signature: Ne Moreno, MPH, RD, LDN

## 2025-02-11 NOTE — PLAN OF CARE
Day 3 outpatient xrt to the bladder. He is tolerating therapy without change or complaint. Will continue to monitor.

## 2025-02-12 ENCOUNTER — TELEPHONE (OUTPATIENT)
Dept: INFECTIOUS DISEASES | Facility: CLINIC | Age: 85
End: 2025-02-12
Payer: MEDICARE

## 2025-02-12 PROCEDURE — 77386 HC IMRT, COMPLEX: CPT | Performed by: SPECIALIST

## 2025-02-12 PROCEDURE — 77336 RADIATION PHYSICS CONSULT: CPT | Performed by: SPECIALIST

## 2025-02-12 PROCEDURE — 77014 PR  CT GUIDANCE PLACEMENT RAD THERAPY FIELDS: CPT | Mod: 26,,, | Performed by: SPECIALIST

## 2025-02-12 NOTE — TELEPHONE ENCOUNTER
Pt offered appt with Dr. Bowie on 2/13/25, pt declined, stating that he already has an appt on that day. Offered next available on 2/17/25 with Dr. Bowie, pt declined stating he has an infusion on that day. Pt scheduled for NP appt with Dr. Villarreal on 2/18/25 per Dr. Bowman. Pt advised that appt is before scheduled appt with Dr. Bowman. Pt verbalized understanding and agreed to appt date, time, and location.

## 2025-02-13 ENCOUNTER — PATIENT MESSAGE (OUTPATIENT)
Dept: ADMINISTRATIVE | Facility: OTHER | Age: 85
End: 2025-02-13
Payer: MEDICARE

## 2025-02-13 DIAGNOSIS — E11.69 DM TYPE 2 WITH DIABETIC DYSLIPIDEMIA: ICD-10-CM

## 2025-02-13 DIAGNOSIS — E78.5 DM TYPE 2 WITH DIABETIC DYSLIPIDEMIA: ICD-10-CM

## 2025-02-13 LAB
BACTERIA SPEC AEROBE CULT: ABNORMAL
BACTERIA SPEC ANAEROBE CULT: NORMAL

## 2025-02-13 PROCEDURE — 77386 HC IMRT, COMPLEX: CPT | Performed by: SPECIALIST

## 2025-02-13 PROCEDURE — 77014 PR  CT GUIDANCE PLACEMENT RAD THERAPY FIELDS: CPT | Mod: 26,,, | Performed by: SPECIALIST

## 2025-02-13 PROCEDURE — 77427 RADIATION TX MANAGEMENT X5: CPT | Mod: ,,, | Performed by: SPECIALIST

## 2025-02-14 ENCOUNTER — OFFICE VISIT (OUTPATIENT)
Dept: HEMATOLOGY/ONCOLOGY | Facility: CLINIC | Age: 85
End: 2025-02-14
Payer: MEDICARE

## 2025-02-14 ENCOUNTER — LAB VISIT (OUTPATIENT)
Dept: LAB | Facility: HOSPITAL | Age: 85
End: 2025-02-14
Attending: HOSPITALIST
Payer: MEDICARE

## 2025-02-14 ENCOUNTER — PATIENT MESSAGE (OUTPATIENT)
Dept: ADMINISTRATIVE | Facility: OTHER | Age: 85
End: 2025-02-14
Payer: MEDICARE

## 2025-02-14 ENCOUNTER — INFUSION (OUTPATIENT)
Dept: INFUSION THERAPY | Facility: HOSPITAL | Age: 85
End: 2025-02-14
Attending: NURSE PRACTITIONER
Payer: MEDICARE

## 2025-02-14 VITALS
RESPIRATION RATE: 17 BRPM | SYSTOLIC BLOOD PRESSURE: 141 MMHG | OXYGEN SATURATION: 96 % | DIASTOLIC BLOOD PRESSURE: 65 MMHG | TEMPERATURE: 98 F | HEART RATE: 57 BPM

## 2025-02-14 VITALS
DIASTOLIC BLOOD PRESSURE: 74 MMHG | HEART RATE: 76 BPM | HEIGHT: 72 IN | WEIGHT: 196.88 LBS | SYSTOLIC BLOOD PRESSURE: 123 MMHG | TEMPERATURE: 98 F | BODY MASS INDEX: 26.67 KG/M2

## 2025-02-14 DIAGNOSIS — C67.9 UROTHELIAL CARCINOMA OF BLADDER: Primary | ICD-10-CM

## 2025-02-14 DIAGNOSIS — C67.9 MALIGNANT NEOPLASM OF URINARY BLADDER, UNSPECIFIED SITE: ICD-10-CM

## 2025-02-14 DIAGNOSIS — D50.0 IRON DEFICIENCY ANEMIA SECONDARY TO BLOOD LOSS (CHRONIC): ICD-10-CM

## 2025-02-14 LAB
ALBUMIN SERPL BCP-MCNC: 2.8 G/DL (ref 3.5–5.2)
ALP SERPL-CCNC: 75 U/L (ref 40–150)
ALT SERPL W/O P-5'-P-CCNC: 35 U/L (ref 10–44)
ANION GAP SERPL CALC-SCNC: 10 MMOL/L (ref 8–16)
AST SERPL-CCNC: 22 U/L (ref 10–40)
BILIRUB SERPL-MCNC: 0.7 MG/DL (ref 0.1–1)
BUN SERPL-MCNC: 26 MG/DL (ref 8–23)
CALCIUM SERPL-MCNC: 8.9 MG/DL (ref 8.7–10.5)
CHLORIDE SERPL-SCNC: 101 MMOL/L (ref 95–110)
CO2 SERPL-SCNC: 26 MMOL/L (ref 23–29)
CREAT SERPL-MCNC: 1.4 MG/DL (ref 0.5–1.4)
ERYTHROCYTE [DISTWIDTH] IN BLOOD BY AUTOMATED COUNT: 18.8 % (ref 11.5–14.5)
EST. GFR  (NO RACE VARIABLE): 49 ML/MIN/1.73 M^2
GLUCOSE SERPL-MCNC: 226 MG/DL (ref 70–110)
HCT VFR BLD AUTO: 35.7 % (ref 40–54)
HGB BLD-MCNC: 11.2 G/DL (ref 14–18)
IMM GRANULOCYTES # BLD AUTO: 0.05 K/UL (ref 0–0.04)
MCH RBC QN AUTO: 26.7 PG (ref 27–31)
MCHC RBC AUTO-ENTMCNC: 31.4 G/DL (ref 32–36)
MCV RBC AUTO: 85 FL (ref 82–98)
NEUTROPHILS # BLD AUTO: 6.1 K/UL (ref 1.8–7.7)
PLATELET # BLD AUTO: 180 K/UL (ref 150–450)
PMV BLD AUTO: 11 FL (ref 9.2–12.9)
POTASSIUM SERPL-SCNC: 4.4 MMOL/L (ref 3.5–5.1)
PROT SERPL-MCNC: 6.4 G/DL (ref 6–8.4)
RBC # BLD AUTO: 4.19 M/UL (ref 4.6–6.2)
SODIUM SERPL-SCNC: 137 MMOL/L (ref 136–145)
WBC # BLD AUTO: 7.3 K/UL (ref 3.9–12.7)

## 2025-02-14 PROCEDURE — 25000003 PHARM REV CODE 250

## 2025-02-14 PROCEDURE — 80053 COMPREHEN METABOLIC PANEL: CPT | Performed by: HOSPITALIST

## 2025-02-14 PROCEDURE — 77386 HC IMRT, COMPLEX: CPT | Performed by: SPECIALIST

## 2025-02-14 PROCEDURE — 63600175 PHARM REV CODE 636 W HCPCS

## 2025-02-14 PROCEDURE — 25000003 PHARM REV CODE 250: Performed by: INTERNAL MEDICINE

## 2025-02-14 PROCEDURE — 96367 TX/PROPH/DG ADDL SEQ IV INF: CPT

## 2025-02-14 PROCEDURE — 77014 PR  CT GUIDANCE PLACEMENT RAD THERAPY FIELDS: CPT | Mod: 26,,, | Performed by: SPECIALIST

## 2025-02-14 PROCEDURE — 63600175 PHARM REV CODE 636 W HCPCS: Performed by: INTERNAL MEDICINE

## 2025-02-14 PROCEDURE — 99999 PR PBB SHADOW E&M-EST. PATIENT-LVL IV: CPT | Mod: PBBFAC,,,

## 2025-02-14 PROCEDURE — 96413 CHEMO IV INFUSION 1 HR: CPT

## 2025-02-14 PROCEDURE — 36415 COLL VENOUS BLD VENIPUNCTURE: CPT | Performed by: HOSPITALIST

## 2025-02-14 PROCEDURE — 85027 COMPLETE CBC AUTOMATED: CPT | Performed by: HOSPITALIST

## 2025-02-14 RX ORDER — ATORVASTATIN CALCIUM 20 MG/1
20 TABLET, FILM COATED ORAL NIGHTLY
Qty: 90 TABLET | Refills: 1 | Status: SHIPPED | OUTPATIENT
Start: 2025-02-14

## 2025-02-14 RX ADMIN — CISPLATIN 39 MG: 1 INJECTION, SOLUTION INTRAVENOUS at 11:02

## 2025-02-14 RX ADMIN — DEXAMETHASONE SODIUM PHOSPHATE 0.25 MG: 4 INJECTION, SOLUTION INTRA-ARTICULAR; INTRALESIONAL; INTRAMUSCULAR; INTRAVENOUS; SOFT TISSUE at 11:02

## 2025-02-14 RX ADMIN — FOSAPREPITANT DIMEGLUMINE 150 MG: 150 INJECTION, POWDER, LYOPHILIZED, FOR SOLUTION INTRAVENOUS at 10:02

## 2025-02-14 RX ADMIN — POTASSIUM CHLORIDE 500 ML/HR: 2 INJECTION, SOLUTION, CONCENTRATE INTRAVENOUS at 09:02

## 2025-02-14 NOTE — TELEPHONE ENCOUNTER
No care due was identified.  [facility] Embedded Care Due Messages. Reference number: [id number].   2/13/2025 9:18:38 PM CST

## 2025-02-14 NOTE — PROGRESS NOTES
"Subjective:     Patient ID:?Jose Miguel Alvarez Jr. is a 84 y.o. male.?? MR#: [id number]   ?   PRIMARY ONCOLOGIST: Dr. Westbrook  ?   CHIEF COMPLAINT: lab review/assessment for chemo/bladder ca?????   ?   ONCOLOGIC DIAGNOSIS:  Cancer Staging   Urothelial carcinoma of bladder  Staging form: Urinary Bladder, AJCC 7th Edition  - Clinical: Stage II (T2, N0, M0) - Signed by Fabián Westbrook MD on 1/17/2025     ?   CURRENT TREATMENT: OP Bladder CISplatin QW + Radiation       HPI   is a pleasant 84 y.o. 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 Gill, for lab review and assessment prior to cisplatin. Per review of the medical record, he has long standing history of localized bladder cancer treated with serial TURBT since at least 2016. 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 He was treated with serial TURBT and induction BCG. More recently found to have recurrent disease with associtaed right sided hydronephrosis. Pt states he hash been feeling "yucky" with nausea, but his appetite remains good. We did discuss working on improving hydration. Denies v/d/c, fever, chills, sob, cough, abnormal bleeding.           Oncology History   Urothelial carcinoma of bladder   5/2014 Procedure    05/14/24 TURP    1. Prostate, chips, transurethral resection:  Prostate with stromal and glandular hyperplasia.    Negative for malignancy.    2. Bladder, stones, removal:  Stones (gross examination only)        4/2016 Procedure    04/27/16  BLADDER TUMOR, TUR:   - HIGH GRADE UROTHELIAL CARCINOMA INVASIVE INTO DEEP MUSCLE.      6/21/2016 Initial Diagnosis    Urothelial carcinoma of bladder     6/2016 Procedure    06/21/16  BLADDER, TUR:   - NO TUMOR SEEN.    - FLORID ACUTE AND CHRONIC ULCERATIVE CYSTITIS WITH POLYPOID    GRANULATION TISSUE FORMATION.      7/2019 Procedure    07/23/19  BLADDER, TUR:   - MINUTE DETACHED FRAGMENT OF HIGH-GRADE CARCINOMA.   - SPECIMEN MOSTLY " BLADDER MUCOSA AND WALL TO INCLUDE DEEP MUSCLE    NEGATIVE FOR TUMOR.      12/2021 Procedure    12/28/2021: Cystoscopy  Suspicious mucosa lateral to the right ureteral orifice, biopsied and fulgurated to completion, multiple bladder stones, inability to access the right ureter.       URINARY BLADDER TUMOR, BIOPSIES:   -  High-grade papillary urothelial carcinoma with foci suspicious for, but   not diagnostic of, superficial lamina propria invasion (see comment).   -  No muscularis propria is present for evaluation.      3/2022 Procedure    03/2022  BCG     4/2024 Imaging Significant Findings    04/03/2024 CTU  Impression:   Moderate bladder distension with mild to moderate bilateral hydronephrosis thought to reflect reflux disease.  No definite ureteral stone.  Bladder stone is noted.  Mild thickening of the posterior bladder wall without discrete mass.  Delayed contrast excretion seen within the kidneys.     11/2024 Imaging Significant Findings    11/18/24 CT Renal Stone  Impression:  - Focal thickening seen along the posterior bladder wall asymmetric to the right which produces moderate right-sided hydronephrosis and hydroureter. Findings concerning for bladder mass.  Recommend cystoscopy to exclude bladder mass.  - All CT scans at this facility use dose modulation, iterative reconstruction, and/or weight based dosing when appropriate to reduce radiation dose to as low as reasonable achievable.        11/2024 Procedure       11/21/24: Flexible cystoscopy: Tumors along the left lateral wall, significant debris, TUR defect     Bladder Cytology  BLADDER WASH, CYTOLOGY:   Suspicious for high grade urothelial carcinoma.    Red blood cells present   Sparsely cellular specimen      12/3/2024 Procedure    12/03/24 Cyttoscopy with TURBT  Bladder was examined, tumor was identified proximally 3 cm along the right lateral wall involving the site of the ureteral orifice.  Could not identify the right ureteral orifice, left  ureteral orifices was normal in size, shape, caliber and location.  Cystoscope was removed, 24 [place]i resectoscope was then inserted into the urethra and bladder.  Dissection was continued until the tumor was completely dissected from the bladder wall.  I then fulgurated the edges and the base of the tumor.  Still no evidence of the right ureteral orifice, previously were unable to see it at his past surgery.    12/3/24  BLADDER TUMOR, TRANSURETHRAL RESECTION:   Papillary urothelial carcinoma with focal invasion of lamina propria, high-grade.   Muscularis propria tissue is not present for evaluation.       12/23/2024 Imaging Significant Findings    12/23/24 US Abdomen  Impression:  - Known right-sided bladder mass at the orifice of the abnormal ureteral insertion.  Right-sided hydronephrosis present.  - There is an 8 mm calcification in the bladder.     1/17/2025 Cancer Staged    Staging form: Urinary Bladder, AJCC 7th Edition  - Clinical: Stage II (T2, N0, M0)     1/29/2025 Imaging Significant Findings    FDG PET CT 1/29/25  Impression:   1. Subtle asymmetric thickening of the right wall the bladder near the surgical site.  Otherwise, no definitive evidence of metastatic disease.  2. Nephrostomy in the right kidney.  3. Nodular opacities in the bilateral lungs, these findings are favored to be related to infectious/inflammatory etiology.  Consider short interval follow-up with CT (3 months).     2/5/2025 -  Chemotherapy    Treatment Summary   Plan Name: OP Bladder CISplatin QW + Radiation  Treatment Goal: Curative  Status: Active  Start Date: 2/5/2025  End Date: 3/14/2025 (Planned)  Provider: Fabián Westbrook MD  Chemotherapy: CISplatin (Platinol) 26.25 mg/m2 = 58 mg in 0.9% NaCl 623 mL chemo infusion, 26.25 mg/m2 = 58 mg (75 % of original dose 35 mg/m2), Intravenous, Clinic/HOD 1 time, 1 of 1 cycle  Dose modification: 35 mg/m2 (original dose 35 mg/m2, Cycle 1, Reason: MD Discretion, Comment: bladder cancer. NCCN  template [id number]), 26.25 mg/m2 (75 % of original dose 35 mg/m2, Cycle 1, Reason: Other (see comments), Comment: crcl 49.4ml/min curative intent. fine with 75% dose reduction), 17.5 mg/m2 (50 % of original dose 35 mg/m2, Cycle 1, Reason: Dose Not Tolerated, Comment: crcl 40)  Administration: 58 mg (2025)      Chemotherapy    Treatment Summary   Plan Name: OP Bladder CISplatin QW + Radiation  Treatment Goal: Curative  Status: Active  Start Date: 2025 (Planned)  End Date: 3/7/2025 (Planned)  Provider: Fabián Westbrook MD  Chemotherapy: CISplatin (Platinol) 35 mg/m2 = 77 mg in 0.9% NaCl 327 mL chemo infusion, 35 mg/m2 = 77 mg (original dose ), Intravenous, Clinic/HOD 1 time, 0 of 1 cycle  Dose modification: 35 mg/m2 (Cycle 1, Reason: MD Discretion, Comment: bladder cancer. NCCN template [id number])          Social History     Socioeconomic History    Marital status:    Tobacco Use    Smoking status: Former     Current packs/day: 0.00     Average packs/day: 2.5 packs/day for 20.0 years (50.0 ttl pk-yrs)     Types: Cigarettes     Start date:      Quit date:      Years since quittin.1     Passive exposure: Never    Smokeless tobacco: Never   Substance and Sexual Activity    Alcohol use: Yes     Alcohol/week: 7.0 standard drinks of alcohol     Types: 7 Cans of beer per week     Comment: occasionally: hold 72 hrs    Drug use: No    Sexual activity: Not Currently     Social Drivers of Health     Financial Resource Strain: Low Risk  (2025)    Overall Financial Resource Strain (CARDIA)     Difficulty of Paying Living Expenses: Not hard at all   Food Insecurity: No Food Insecurity (2025)    Hunger Vital Sign     Worried About Running Out of Food in the Last Year: Never true     Ran Out of Food in the Last Year: Never true   Transportation Needs: No Transportation Needs (2024)    TRANSPORTATION NEEDS     Transportation : No   Physical Activity: Insufficiently Active (2025)     Exercise Vital Sign     Days of Exercise per Week: 2 days     Minutes of Exercise per Session: 30 min   Stress: No Stress Concern Present (1/13/2025)    [place] [employer] - Occupational Stress Questionnaire     Feeling of Stress : Not at all   Housing Stability: Unknown (1/13/2025)    Housing Stability Vital Sign     Unable to Pay for Housing in the Last Year: No     Homeless in the Last Year: No      Family History   Problem Relation Name Age of Onset    Heart disease Father      Hypertension Father      Heart disease Brother        Past Surgical History:   Procedure Laterality Date    bladder tumor removal      CARDIAC ELECTROPHYSIOLOGY MAPPING AND ABLATION      CARDIOVERSION      several    CATARACT EXTRACTION Bilateral     cataract removal with IOL implants Bilateral     CHOLECYSTECTOMY      CYSTOSCOPIC LITHOLAPAXY N/A 12/28/2021    Procedure: CYSTOLITHOLAPAXY;  Surgeon: Medardo Garcia MD;  Location: [facility] OR;  Service: Urology;  Laterality: N/A;    CYSTOSCOPIC LITHOLAPAXY N/A 5/14/2024    Procedure: CYSTOLITHOLAPAXY;  Surgeon: Medardo Garcia MD;  Location: [facility] OR;  Service: Urology;  Laterality: N/A;    CYSTOSCOPY      CYSTOSCOPY WITH BIOPSY OF BLADDER Right 12/28/2021    Procedure: CYSTOSCOPY, WITH BLADDER BIOPSY, WITH FULGURATION IF INDICATED;  Surgeon: Medardo Garcia MD;  Location: [facility] OR;  Service: Urology;  Laterality: Right;    CYSTOTOMY, WITH FULGURATION AND RADIOACTIVE MATERIAL INSERTION N/A 12/3/2024    Procedure: CYSTOTOMY, WITH FULGURATION AND RADIOACTIVE MATERIAL INSERTION;  Surgeon: Medardo Garcia MD;  Location: [facility] OR;  Service: Urology;  Laterality: N/A;    ESOPHAGOGASTRODUODENOSCOPY N/A 10/25/2021    Procedure: Small Jason Enteroscopy (SBE);  Surgeon: Isabelle Griffin MD;  Location: [facility];  Service: Endoscopy;  Laterality: N/A;    ESOPHAGOGASTRODUODENOSCOPY N/A 12/13/2021    Procedure: EGD (ESOPHAGOGASTRODUODENOSCOPY);   Surgeon: Troy Polanco MD;  Location: [facility];  Service: Endoscopy;  Laterality: N/A;    ESOPHAGOGASTRODUODENOSCOPY N/A 4/5/2024    Procedure: EGD (ESOPHAGOGASTRODUODENOSCOPY);  Surgeon: Myrtle Salcedo MD;  Location: [facility] ENDO;  Service: Endoscopy;  Laterality: N/A;    ESOPHAGOGASTRODUODENOSCOPY N/A 4/22/2024    Procedure: EGD (ESOPHAGOGASTRODUODENOSCOPY);  Surgeon: Myrtle Salcedo MD;  Location: [facility] ENDO;  Service: Endoscopy;  Laterality: N/A;  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    EYE SURGERY      INTRALUMINAL GASTROINTESTINAL TRACT IMAGING VIA CAPSULE N/A 12/6/2021    Procedure: IMAGING PROCEDURE, GI TRACT, INTRALUMINAL, VIA CAPSULE;  Surgeon: Larry Jones, RN;  Location: [facility] ENDO;  Service: Endoscopy;  Laterality: N/A;    INTRALUMINAL GASTROINTESTINAL TRACT IMAGING VIA CAPSULE N/A 4/18/2024    Procedure: IMAGING PROCEDURE, GI TRACT, INTRALUMINAL, VIA CAPSULE;  Surgeon: Larry Jones, RN;  Location: [facility];  Service: Endoscopy;  Laterality: N/A;    OCCLUSION OF LEFT ATRIAL APPENDAGE N/A 11/14/2024    Procedure: Left atrial appendage occlusion;  Surgeon: Aroldo Ortiz MD;  Location: [facility] EP LAB;  Service: Cardiology;  Laterality: N/A;  afib, watchman, BSCI, venkatesh, anes, MB, 3prep    SMALL BOWEL ENTEROSCOPY N/A 9/26/2024    Procedure: ENTEROSCOPY--prximal DBE;  Surgeon: Eduardo Ruff MD;  Location: [facility] ENDO;  Service: Endoscopy;  Laterality: N/A;    TRANSESOPHAGEAL ECHOCARDIOGRAPHY N/A 11/14/2024    Procedure: ECHOCARDIOGRAM, TRANSESOPHAGEAL;  Surgeon: Aroldo Martinez MD;  Location: [facility] EP LAB;  Service: Cardiology;  Laterality: N/A;    TRANSESOPHAGEAL ECHOCARDIOGRAPHY N/A 12/30/2024    Procedure: ECHOCARDIOGRAM, TRANSESOPHAGEAL;  Surgeon: Carlos Flower MD;  Location: [facility] CATH LAB;  Service: Cardiology;  Laterality: N/A;    TRANSURETHRAL RESECTION OF BLADDER TUMOR BY BIPOLAR CAUTERY N/A 7/23/2019    Procedure: TURBT, USING BIPOLAR CAUTERY;  Surgeon: Ritesh  NOLBERTO Marques MD;  Location: [facility] OR;  Service: Urology;  Laterality: N/A;    TRANSURETHRAL RESECTION OF PROSTATE N/A 5/14/2024    Procedure: TURP (TRANSURETHRAL RESECTION OF PROSTATE);  Surgeon: Medardo Garcia MD;  Location: [facility] OR;  Service: Urology;  Laterality: N/A;    TURBT (TRANSURETHRAL RESECTION OF BLADDER TUMOR) N/A 12/3/2024    Procedure: TURBT (TRANSURETHRAL RESECTION OF BLADDER TUMOR);  Surgeon: Medardo Garcia MD;  Location: [facility] OR;  Service: Urology;  Laterality: N/A;        Review of Systems   Constitutional:  Negative for activity change, appetite change, chills, diaphoresis, fatigue, fever and unexpected weight change.   HENT:  Negative for congestion, nosebleeds and rhinorrhea.    Respiratory:  Negative for cough and shortness of breath.    Cardiovascular:  Negative for chest pain and leg swelling.   Gastrointestinal:  Negative for abdominal pain, anal bleeding, blood in stool, constipation, diarrhea, nausea and vomiting.   Genitourinary:  Negative for hematuria.       ?   A comprehensive 14-point review of systems was reviewed with patient and was negative other than as specified above.   ?     Objective:      Physical Exam  Vitals reviewed.   Constitutional:       General: He is not in acute distress.     Appearance: He is not ill-appearing, toxic-appearing or diaphoretic.   HENT:      Head: Normocephalic and atraumatic.   Cardiovascular:      Rate and Rhythm: Normal rate.   Musculoskeletal:      Right lower leg: No edema.      Left lower leg: No edema.   Skin:     General: Skin is warm.      Coloration: Skin is not jaundiced or pale.      Findings: No bruising, erythema, lesion or rash.   Neurological:      Mental Status: He is alert.   Psychiatric:         Mood and Affect: Mood normal.         Behavior: Behavior normal.         Thought Content: Thought content normal.         ?   Vitals:    02/14/25 0827   BP: 123/74   Pulse: 76   Temp: 98.2 °F (36.8 °C)      ?       ?    Laboratory:  ?   Lab Visit on 02/14/2025   Component Date Value Ref Range Status    WBC 02/14/2025 7.30  3.90 - 12.70 K/uL Final    RBC 02/14/2025 4.19 (L)  4.60 - 6.20 M/uL Final    Hemoglobin 02/14/2025 11.2 (L)  14.0 - 18.0 g/dL Final    Hematocrit 02/14/2025 35.7 (L)  40.0 - 54.0 % Final    MCV 02/14/2025 85  82 - 98 fL Final    MCH 02/14/2025 26.7 (L)  27.0 - 31.0 pg Final    MCHC 02/14/2025 31.4 (L)  32.0 - 36.0 g/dL Final    RDW 02/14/2025 18.8 (H)  11.5 - 14.5 % Final    Platelets 02/14/2025 180  150 - 450 K/uL Final    MPV 02/14/2025 11.0  9.2 - 12.9 fL Final    Gran # (ANC) 02/14/2025 6.1  1.8 - 7.7 K/uL Final    Immature Grans (Abs) 02/14/2025 0.05 (H)  0.00 - 0.04 K/uL Final    Sodium 02/14/2025 137  136 - 145 mmol/L Final    Potassium 02/14/2025 4.4  3.5 - 5.1 mmol/L Final    Chloride 02/14/2025 101  95 - 110 mmol/L Final    CO2 02/14/2025 26  23 - 29 mmol/L Final    Glucose 02/14/2025 226 (H)  70 - 110 mg/dL Final    BUN 02/14/2025 26 (H)  8 - 23 mg/dL Final    Creatinine 02/14/2025 1.4  0.5 - 1.4 mg/dL Final    Calcium 02/14/2025 8.9  8.7 - 10.5 mg/dL Final    Total Protein 02/14/2025 6.4  6.0 - 8.4 g/dL Final    Albumin 02/14/2025 2.8 (L)  3.5 - 5.2 g/dL Final    Total Bilirubin 02/14/2025 0.7  0.1 - 1.0 mg/dL Final    Alkaline Phosphatase 02/14/2025 75  40 - 150 U/L Final    AST 02/14/2025 22  10 - 40 U/L Final    ALT 02/14/2025 35  10 - 44 U/L Final    eGFR 02/14/2025 49 (A)  >60 mL/min/1.73 m^2 Final    Anion Gap 02/14/2025 10  8 - 16 mmol/L Final      ?   Imaging:    Results for orders placed or performed during the hospital encounter of 01/29/25 (from the past 2160 hours)   NM PET CT FDG Skull Base to Mid Thigh    Impression    1. Subtle asymmetric thickening of the right wall the bladder near the surgical site.  Otherwise, no definitive evidence of metastatic disease.  2. Nephrostomy in the right kidney.  3. Nodular opacities in the bilateral lungs, these findings  are favored to be related to infectious/inflammatory etiology.  Consider short interval follow-up with CT (3 months).      Electronically signed by: Primo Orozco  Date:    01/29/2025  Time:    14:57        Results for orders placed or performed during the hospital encounter of 01/20/25 (from the past 2160 hours)   CT Abdomen Pelvis  Without Contrast    Narrative    EXAMINATION:  CT ABDOMEN PELVIS WITHOUT CONTRAST    CLINICAL HISTORY:  Abdominal pain, post-op;    TECHNIQUE:  Low dose axial images, sagittal and coronal reformations were obtained from the lung bases to the pubic symphysis.    COMPARISON:  None    FINDINGS:  Right external nephrostomy catheter with pigtail in the right renal pelvis.  Right urinary stent with distal pigtail in the bladder.  Colonic diverticulosis.  Suggestion of emphysematous changes in the lung bases.  Punctate pulmonary micro nodularity.  Left posterior pleural calcification.  A. Spleen pancreas adrenal glands are grossly unremarkable.  Status post cholecystectomy.  No hydronephrosis on the left.  Hyperdense cyst of the left kidney noted.  Mild fat stranding adjacent to the right ureter.  Small fat containing umbilical hernia.  Bladder is distended.  Prostate gland is enlarged.  No bowel obstruction.  Spleen is not enlarged.  No adrenal masses.  Focal calcification in the gastric fundus of uncertain origin.  Linear calcification or coiling in the right hepatic dome.  No pancreatic mass.    atherosclerotic changes.     Degenerative joint of the spine.      Impression    Right external renal drainage catheter and  right ureteral stent identified.  Mild Sheron ureteral fat stranding.  Remaining findings as above    All CT scans   are performed using dose optimization techniques including the following: automated exposure control; adjustment of the mA and/or kV; use of iterative reconstruction technique.  Dose modulation was employed for ALARA by means of: Automated exposure control;  adjustment of the mA and/or kV according to patient size (this includes techniques or standardized protocols for targeted exams where dose is matched to indication/reason for exam; i.e. extremities or head); and/or use of iterative reconstructive technique.      Electronically signed by: Reji Wynne  Date:    01/20/2025  Time:    19:38   Results for orders placed or performed during the hospital encounter of 11/18/24 (from the past 2160 hours)   CT Renal Stone Study ABD Pelvis WO    Narrative    EXAMINATION:  CT RENAL STONE STUDY ABD PELVIS WO    CLINICAL HISTORY:  Flank pain, kidney stone suspected;    TECHNIQUE:  Low dose axial images, sagittal and coronal reformations were obtained from the lung bases to the pubic symphysis.  Oral contrast was not administered.    COMPARISON:  None    FINDINGS:  Heart: Normal size. No effusion.    Lung Bases: Clear.    Liver: Normal size and attenuation. No focal lesions.  Dystrophic calcifications along the right hepatic dome.    Gallbladder: Post cholecystectomy    Bile Ducts: No dilatation.    Pancreas: No obvious mass. No peripancreatic fat stranding.    Spleen: Normal.    Adrenals: Normal.    Kidneys/Ureters: Mild cortical thinning.  Nonspecific perinephric stranding.  13 mm hyperdense cyst lower pole left kidney.  Dystrophic calcification lower pole left kidney.  No mass, hydroureteronephrosis, or nephroureterolithiasis.    Bladder: Mild nonspecific wall thickening.  Focal thickening seen along the posterior bladder wall slightly asymmetric to the right which produces moderate right-sided hydronephrosis and hydroureter.  Findings concerning for bladder mass.  Recommend cystoscopy to exclude bladder mass.    Reproductive organs: Prostate gland is mildly enlarged measuring approximately 5.5 x 4.4 cm.    GI Tract/Mesentery: No evidence of bowel obstruction or inflammation.    Peritoneal Space: No ascites or free air.    Retroperitoneum: Shotty adenopathy.  Shotty inguinal  nodes.    Abdominal wall: Small hiatal hernia.    Vasculature: No aneurysm.    Bones: No acute fracture.  Moderate degenerative change.  No suspicious lytic or sclerotic lesions.      Impression    Focal thickening seen along the posterior bladder wall asymmetric to the right which produces moderate right-sided hydronephrosis and hydroureter. Findings concerning for bladder mass.  Recommend cystoscopy to exclude bladder mass.    All CT scans at this facility use dose modulation, iterative reconstruction, and/or weight based dosing when appropriate to reduce radiation dose to as low as reasonable achievable.      Electronically signed by: Hector Arriaga MD  Date:    11/18/2024  Time:    07:47        ?   Assessment/Plan:     Problem List Items Addressed This Visit       Urothelial carcinoma of bladder - Primary      Cancer Staging   Urothelial carcinoma of bladder  Staging form: Urinary Bladder, AJCC 7th Edition  - Clinical: Stage II (T2, N0, M0) - Signed by Fabián Westbrook MD on 1/17/2025    C1D1 Cisplatin + XRT 02/05/24    Labs reviewed  Ok to proceed with cisplatin today   Case discussed with pharmacist--dose reduced for CrCl  --will reevaluate with next cycle    F/u in one week with labs prior for cisplatin         Iron deficiency anemia secondary to blood loss (chronic)     Continue IV iron therapy                   Med Onc Chart Routing      Follow up with physician    Follow up with [facility] . Scheduled   Infusion scheduling note    Injection scheduling note    Labs    Imaging    Pharmacy appointment    Other referrals               [facility] [place]  Hematology/Oncology

## 2025-02-14 NOTE — ASSESSMENT & PLAN NOTE
Cancer Staging   Urothelial carcinoma of bladder  Staging form: Urinary Bladder, AJCC 7th Edition  - Clinical: Stage II (T2, N0, M0) - Signed by Fabián Westbrook MD on 1/17/2025    C1D1 Cisplatin + XRT 02/05/24    Labs reviewed  Ok to proceed with cisplatin today   Case discussed with pharmacist--dose reduced for CrCl  --will reevaluate with next cycle    F/u in one week with labs prior for cisplatin

## 2025-02-14 NOTE — ANESTHESIA POSTPROCEDURE EVALUATION
Anesthesia Post Evaluation    Patient: Jose Miguel Alvarez Jr.    Procedure(s) Performed: Procedure(s) (LRB):  ECHOCARDIOGRAM, TRANSESOPHAGEAL (N/A)    Final Anesthesia Type: MAC      Patient location: [facility].  Patient participation: Yes- Able to Participate  Level of consciousness: awake and alert  Post-procedure vital signs: reviewed and stable  Pain management: adequate  Airway patency: patent    PONV status at discharge: No PONV  Anesthetic complications: no      Cardiovascular status: hemodynamically stable  Respiratory status: unassisted, room air and spontaneous ventilation  Hydration status: euvolemic  Follow-up not needed.              Vitals Value Taken Time   /87 12/30/24 1315   Temp  02/14/25 0816   Pulse 43 12/30/24 1315   Resp 20 12/30/24 1315   SpO2 99 % 12/30/24 1315         No case tracking events are documented in the log.      Pain/Johanna Score: No data recorded

## 2025-02-17 ENCOUNTER — DOCUMENTATION ONLY (OUTPATIENT)
Dept: RADIATION ONCOLOGY | Facility: CLINIC | Age: 85
End: 2025-02-17
Payer: MEDICARE

## 2025-02-17 ENCOUNTER — INFUSION (OUTPATIENT)
Dept: INFUSION THERAPY | Facility: HOSPITAL | Age: 85
End: 2025-02-17
Attending: NURSE PRACTITIONER
Payer: MEDICARE

## 2025-02-17 VITALS
HEART RATE: 56 BPM | TEMPERATURE: 97 F | DIASTOLIC BLOOD PRESSURE: 76 MMHG | OXYGEN SATURATION: 97 % | RESPIRATION RATE: 16 BRPM | SYSTOLIC BLOOD PRESSURE: 130 MMHG

## 2025-02-17 DIAGNOSIS — D50.0 IRON DEFICIENCY ANEMIA SECONDARY TO BLOOD LOSS (CHRONIC): Primary | ICD-10-CM

## 2025-02-17 PROCEDURE — 96374 THER/PROPH/DIAG INJ IV PUSH: CPT

## 2025-02-17 PROCEDURE — 96375 TX/PRO/DX INJ NEW DRUG ADDON: CPT

## 2025-02-17 PROCEDURE — 77014 PR  CT GUIDANCE PLACEMENT RAD THERAPY FIELDS: CPT | Mod: 26,,, | Performed by: SPECIALIST

## 2025-02-17 PROCEDURE — 63600175 PHARM REV CODE 636 W HCPCS: Performed by: NURSE PRACTITIONER

## 2025-02-17 PROCEDURE — 77386 HC IMRT, COMPLEX: CPT | Performed by: SPECIALIST

## 2025-02-17 RX ORDER — METHYLPREDNISOLONE SOD SUCC 125 MG
40 VIAL (EA) INJECTION
Start: 2025-02-24

## 2025-02-17 RX ORDER — EPINEPHRINE 0.3 MG/.3ML
0.3 INJECTION SUBCUTANEOUS ONCE AS NEEDED
OUTPATIENT
Start: 2025-02-24

## 2025-02-17 RX ORDER — DIPHENHYDRAMINE HYDROCHLORIDE 50 MG/ML
50 INJECTION INTRAMUSCULAR; INTRAVENOUS ONCE AS NEEDED
Status: DISCONTINUED | OUTPATIENT
Start: 2025-02-17 | End: 2025-02-17 | Stop reason: HOSPADM

## 2025-02-17 RX ORDER — SODIUM CHLORIDE 0.9 % (FLUSH) 0.9 %
10 SYRINGE (ML) INJECTION
OUTPATIENT
Start: 2025-02-24

## 2025-02-17 RX ORDER — DIPHENHYDRAMINE HYDROCHLORIDE 50 MG/ML
50 INJECTION INTRAMUSCULAR; INTRAVENOUS ONCE AS NEEDED
OUTPATIENT
Start: 2025-02-24

## 2025-02-17 RX ORDER — EPINEPHRINE 0.3 MG/.3ML
0.3 INJECTION SUBCUTANEOUS ONCE AS NEEDED
Status: DISCONTINUED | OUTPATIENT
Start: 2025-02-17 | End: 2025-02-17 | Stop reason: HOSPADM

## 2025-02-17 RX ORDER — METHYLPREDNISOLONE SOD SUCC 125 MG
40 VIAL (EA) INJECTION
Status: COMPLETED | OUTPATIENT
Start: 2025-02-17 | End: 2025-02-17

## 2025-02-17 RX ADMIN — METHYLPREDNISOLONE SODIUM SUCCINATE 40 MG: 125 INJECTION, POWDER, FOR SOLUTION INTRAMUSCULAR; INTRAVENOUS at 09:02

## 2025-02-17 RX ADMIN — IRON SUCROSE 200 MG: 20 INJECTION, SOLUTION INTRAVENOUS at 09:02

## 2025-02-17 NOTE — PROGRESS NOTES
MEDICAL ONCOLOGY - ESTABLISHED PATIENT VISIT    Best Contact Phone Number(s): [phone] (home)      Cancer/Stage/TNM:    Cancer Staging   Urothelial carcinoma of bladder  Staging form: Urinary Bladder, AJCC 7th Edition  - Clinical: Stage II (T2, N0, M0) - Signed by Fabián Westbrook MD on 1/17/2025       Reason for visit: MIBC    Treatment:   02/05/25   Cisplatin    HPI: Jose Miguel Alvarez Jr. is a 84 y.o. 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 Has long standing history of localized bladder cancer treated with serial TURBT since at least 2016. 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 He was treated with serial TURBT and induction BCG. More recently found to have recurrent disease with associtaed right sided hydronephrosis. Overall concerning for locally advanced disease. He presents to medical oncology clinic prior to starting weekly cisplatin based CRT.    History has been obtained by chart review and discussion with the patient.    Interval Events:    Started CRT 2/5/25 plans to finish next week. Tolerating treatment well. Does have some ongonig urinary symptoms - notes increased urinary frequency with smaller volume hematuria and mild dysuria. No fevers or chills. No back or flank pain. Reports normal bowel movements. No CP, SOB or cough.     Following with podiatry and ID. Has osteomyelitis of the distal tip of the right second toe. Using IV daptomycin and zosyn through April 1. Plan for partial amputation of foot after recovery.      Oncology History   Urothelial carcinoma of bladder   5/2014 Procedure    05/14/24 TURP    1. Prostate, chips, transurethral resection:  Prostate with stromal and glandular hyperplasia.    Negative for malignancy.    2. Bladder, stones, removal:  Stones (gross examination only)        4/2016 Procedure    04/27/16  BLADDER TUMOR, TUR:   - HIGH GRADE UROTHELIAL CARCINOMA INVASIVE INTO DEEP MUSCLE.      6/21/2016 Initial Diagnosis    Urothelial carcinoma of  bladder     6/2016 Procedure    06/21/16  BLADDER, TUR:   - NO TUMOR SEEN.    - FLORID ACUTE AND CHRONIC ULCERATIVE CYSTITIS WITH POLYPOID    GRANULATION TISSUE FORMATION.      7/2019 Procedure    07/23/19  BLADDER, TUR:   - MINUTE DETACHED FRAGMENT OF HIGH-GRADE CARCINOMA.   - SPECIMEN MOSTLY BLADDER MUCOSA AND WALL TO INCLUDE DEEP MUSCLE    NEGATIVE FOR TUMOR.      12/2021 Procedure    12/28/2021: Cystoscopy  Suspicious mucosa lateral to the right ureteral orifice, biopsied and fulgurated to completion, multiple bladder stones, inability to access the right ureter.       URINARY BLADDER TUMOR, BIOPSIES:   -  High-grade papillary urothelial carcinoma with foci suspicious for, but   not diagnostic of, superficial lamina propria invasion (see comment).   -  No muscularis propria is present for evaluation.      3/2022 Procedure    03/2022  BCG     4/2024 Imaging Significant Findings    04/03/2024 CTU  Impression:   Moderate bladder distension with mild to moderate bilateral hydronephrosis thought to reflect reflux disease.  No definite ureteral stone.  Bladder stone is noted.  Mild thickening of the posterior bladder wall without discrete mass.  Delayed contrast excretion seen within the kidneys.     11/2024 Imaging Significant Findings    11/18/24 CT Renal Stone  Impression:  - Focal thickening seen along the posterior bladder wall asymmetric to the right which produces moderate right-sided hydronephrosis and hydroureter. Findings concerning for bladder mass.  Recommend cystoscopy to exclude bladder mass.  - All CT scans at this facility use dose modulation, iterative reconstruction, and/or weight based dosing when appropriate to reduce radiation dose to as low as reasonable achievable.        11/2024 Procedure       11/21/24: Flexible cystoscopy: Tumors along the left lateral wall, significant debris, TUR defect     Bladder Cytology  BLADDER WASH, CYTOLOGY:   Suspicious for high grade urothelial carcinoma.    Red  blood cells present   Sparsely cellular specimen      12/3/2024 Procedure    12/03/24 Cyttoscopy with TURBT  Bladder was examined, tumor was identified proximally 3 cm along the right lateral wall involving the site of the ureteral orifice.  Could not identify the right ureteral orifice, left ureteral orifices was normal in size, shape, caliber and location.  Cystoscope was removed, 24 Tajik resectoscope was then inserted into the urethra and bladder.  Dissection was continued until the tumor was completely dissected from the bladder wall.  I then fulgurated the edges and the base of the tumor.  Still no evidence of the right ureteral orifice, previously were unable to see it at his past surgery.    12/3/24  BLADDER TUMOR, TRANSURETHRAL RESECTION:   Papillary urothelial carcinoma with focal invasion of lamina propria, high-grade.   Muscularis propria tissue is not present for evaluation.       12/23/2024 Imaging Significant Findings    12/23/24 US Abdomen  Impression:  - Known right-sided bladder mass at the orifice of the abnormal ureteral insertion.  Right-sided hydronephrosis present.  - There is an 8 mm calcification in the bladder.     1/17/2025 Cancer Staged    Staging form: Urinary Bladder, AJCC 7th Edition  - Clinical: Stage II (T2, N0, M0)     1/29/2025 Imaging Significant Findings    FDG PET CT 1/29/25  Impression:   1. Subtle asymmetric thickening of the right wall the bladder near the surgical site.  Otherwise, no definitive evidence of metastatic disease.  2. Nephrostomy in the right kidney.  3. Nodular opacities in the bilateral lungs, these findings are favored to be related to infectious/inflammatory etiology.  Consider short interval follow-up with CT (3 months).     2/5/2025 -  Chemotherapy    Treatment Summary   Plan Name: OP Bladder CISplatin QW + Radiation  Treatment Goal: Curative  Status: Active  Start Date: 2/5/2025  End Date: 3/14/2025 (Planned)  Provider: Fabián Westbrook,  MD  Chemotherapy: CISplatin (Platinol) 26.25 mg/m2 = 58 mg in 0.9% NaCl 623 mL chemo infusion, 26.25 mg/m2 = 58 mg (75 % of original dose 35 mg/m2), Intravenous, Clinic/HOD 1 time, 1 of 1 cycle  Dose modification: 35 mg/m2 (original dose 35 mg/m2, Cycle 1, Reason: MD Discretion, Comment: bladder cancer. NCCN template BLA42), 26.25 mg/m2 (75 % of original dose 35 mg/m2, Cycle 1, Reason: Other (see comments), Comment: crcl 49.4ml/min curative intent. fine with 75% dose reduction), 17.5 mg/m2 (50 % of original dose 35 mg/m2, Cycle 1, Reason: Dose Not Tolerated, Comment: crcl 40), 17.5 mg/m2 (50 % of original dose 35 mg/m2, Cycle 1, Reason: Dose Not Tolerated, Comment: crcl 42)  Administration: 39 mg (2/14/2025), 39 mg (2/21/2025), 58 mg (2/5/2025)      Chemotherapy    Treatment Summary   Plan Name: OP Bladder CISplatin QW + Radiation  Treatment Goal: Curative  Status: Active  Start Date: 1/31/2025 (Planned)  End Date: 3/7/2025 (Planned)  Provider: Fabián Westbrook MD  Chemotherapy: CISplatin (Platinol) 35 mg/m2 = 77 mg in 0.9% NaCl 327 mL chemo infusion, 35 mg/m2 = 77 mg (original dose ), Intravenous, Clinic/HOD 1 time, 0 of 1 cycle  Dose modification: 35 mg/m2 (Cycle 1, Reason: MD Discretion, Comment: bladder cancer. NCCN template [id number])            Past Medical History:   Diagnosis Date    Anemia     Anticoagulant long-term use     Atrial fibrillation     AVM (arteriovenous malformation) of small bowel, 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 Treated with APC.   VCE 4/18/24: positive VCE  SBE 4/22/24: small bowel AVM s/p APC and tattoo at most distal part reached      Bladder cancer     Bladder tumor     CKD (chronic kidney disease), stage III     COPD (chronic obstructive pulmonary disease)     pt denies    Encounter for blood transfusion     Hematuria     History of 2019 novel coronavirus disease (COVID-19)     Hypercholesteremia     Hypertension      Iron deficiency anemia 10/06/2017    Non-pressure chronic ulcer of other part of right foot limited to breakdown of skin 01/26/2023    Stage 3 chronic kidney disease 10/06/2017        Past Surgical History:   Procedure Laterality Date    bladder tumor removal      CARDIAC ELECTROPHYSIOLOGY MAPPING AND ABLATION      CARDIOVERSION      several    CATARACT EXTRACTION Bilateral     cataract removal with IOL implants Bilateral     CHOLECYSTECTOMY      CYSTOSCOPIC LITHOLAPAXY N/A 12/28/2021    Procedure: CYSTOLITHOLAPAXY;  Surgeon: Medardo Garcia MD;  Location: [facility] OR;  Service: Urology;  Laterality: N/A;    CYSTOSCOPIC LITHOLAPAXY N/A 5/14/2024    Procedure: CYSTOLITHOLAPAXY;  Surgeon: Medardo Garcia MD;  Location: [facility] OR;  Service: Urology;  Laterality: N/A;    CYSTOSCOPY      CYSTOSCOPY WITH BIOPSY OF BLADDER Right 12/28/2021    Procedure: CYSTOSCOPY, WITH BLADDER BIOPSY, WITH FULGURATION IF INDICATED;  Surgeon: Medardo Garcia MD;  Location: [facility] OR;  Service: Urology;  Laterality: Right;    CYSTOTOMY, WITH FULGURATION AND RADIOACTIVE MATERIAL INSERTION N/A 12/3/2024    Procedure: CYSTOTOMY, WITH FULGURATION AND RADIOACTIVE MATERIAL INSERTION;  Surgeon: Medardo Garcia MD;  Location: [facility] OR;  Service: Urology;  Laterality: N/A;    ESOPHAGOGASTRODUODENOSCOPY N/A 10/25/2021    Procedure: Small Jason Enteroscopy (SBE);  Surgeon: Isabelle Griffin MD;  Location: [facility];  Service: Endoscopy;  Laterality: N/A;    ESOPHAGOGASTRODUODENOSCOPY N/A 12/13/2021    Procedure: EGD (ESOPHAGOGASTRODUODENOSCOPY);  Surgeon: Troy Polanco MD;  Location: [facility] ENDO;  Service: Endoscopy;  Laterality: N/A;    ESOPHAGOGASTRODUODENOSCOPY N/A 4/5/2024    Procedure: EGD (ESOPHAGOGASTRODUODENOSCOPY);  Surgeon: Myrtle Salcedo MD;  Location: [facility] ENDO;  Service: Endoscopy;  Laterality: N/A;    ESOPHAGOGASTRODUODENOSCOPY N/A 4/22/2024    Procedure: EGD (ESOPHAGOGASTRODUODENOSCOPY);  Surgeon: Myrtle Salcedo  MD SHANIA;  Location: [facility] ENDO;  Service: Endoscopy;  Laterality: N/A;  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    EYE SURGERY      INTRALUMINAL GASTROINTESTINAL TRACT IMAGING VIA CAPSULE N/A 12/6/2021    Procedure: IMAGING PROCEDURE, GI TRACT, INTRALUMINAL, VIA CAPSULE;  Surgeon: Larry Jones, RN;  Location: [facility] ENDO;  Service: Endoscopy;  Laterality: N/A;    INTRALUMINAL GASTROINTESTINAL TRACT IMAGING VIA CAPSULE N/A 4/18/2024    Procedure: IMAGING PROCEDURE, GI TRACT, INTRALUMINAL, VIA CAPSULE;  Surgeon: Larry Jones, RN;  Location: [facility] ENDO;  Service: Endoscopy;  Laterality: N/A;    OCCLUSION OF LEFT ATRIAL APPENDAGE N/A 11/14/2024    Procedure: Left atrial appendage occlusion;  Surgeon: Aroldo Ortiz MD;  Location: [facility] EP LAB;  Service: Cardiology;  Laterality: N/A;  afib, watchman, BSCI, venkatesh, anes, MB, 3prep    SMALL BOWEL ENTEROSCOPY N/A 9/26/2024    Procedure: ENTEROSCOPY--prximal DBE;  Surgeon: Eduardo Ruff MD;  Location: [facility] ENDO;  Service: Endoscopy;  Laterality: N/A;    TRANSESOPHAGEAL ECHOCARDIOGRAPHY N/A 11/14/2024    Procedure: ECHOCARDIOGRAM, TRANSESOPHAGEAL;  Surgeon: Aroldo Martinez MD;  Location: [facility] EP LAB;  Service: Cardiology;  Laterality: N/A;    TRANSESOPHAGEAL ECHOCARDIOGRAPHY N/A 12/30/2024    Procedure: ECHOCARDIOGRAM, TRANSESOPHAGEAL;  Surgeon: Carlos Flower MD;  Location: [facility] CATH LAB;  Service: Cardiology;  Laterality: N/A;    TRANSURETHRAL RESECTION OF BLADDER TUMOR BY BIPOLAR CAUTERY N/A 7/23/2019    Procedure: TURBT, USING BIPOLAR CAUTERY;  Surgeon: Ritesh Marques MD;  Location: [facility] OR;  Service: Urology;  Laterality: N/A;    TRANSURETHRAL RESECTION OF PROSTATE N/A 5/14/2024    Procedure: TURP (TRANSURETHRAL RESECTION OF PROSTATE);  Surgeon: Medardo Garcia MD;  Location: [facility] OR;  Service: Urology;  Laterality: N/A;    TURBT (TRANSURETHRAL RESECTION OF BLADDER TUMOR) N/A 12/3/2024    Procedure: TURBT (TRANSURETHRAL RESECTION OF BLADDER TUMOR);   Surgeon: Medardo Garcia MD;  Location: [facility];  Service: Urology;  Laterality: N/A;        Family History   Problem Relation Name Age of Onset    Heart disease Father      Hypertension Father      Heart disease Brother          Social History     Tobacco Use    Smoking status: Former     Current packs/day: 0.00     Average packs/day: 2.5 packs/day for 20.0 years (50.0 ttl pk-yrs)     Types: Cigarettes     Start date:      Quit date:      Years since quittin.1     Passive exposure: Never    Smokeless tobacco: Never   Substance Use Topics    Alcohol use: Yes     Alcohol/week: 7.0 standard drinks of alcohol     Types: 7 Cans of beer per week     Comment: occasionally: hold 72 hrs        I have reviewed and updated the patient's past medical, surgical, family and social histories.    Review of patient's allergies indicates:   Allergen Reactions    Feraheme [ferumoxytol] Anaphylaxis        Current Outpatient Medications   Medication Sig Dispense Refill    amiodarone (PACERONE) 200 MG Tab Take 1 tablet (200 mg total) by mouth once daily. 30 tablet 11    amLODIPine (NORVASC) 2.5 MG tablet Take 1 tablet (2.5 mg total) by mouth once daily. 90 tablet 2    aspirin (ECOTRIN) 81 MG EC tablet Take 1 tablet (81 mg total) by mouth once daily. 90 tablet 3    atorvastatin (LIPITOR) 20 MG tablet TAKE 1 TABLET(20 MG) BY MOUTH EVERY EVENING 90 tablet 1    carvediloL (COREG) 6.25 MG tablet Take 1 tablet (6.25 mg total) by mouth 2 (two) times daily with meals. 180 tablet 3    cholecalciferol, vitamin D3, (VITAMIN D3 ORAL) Take 1 tablet by mouth every other day.      dexAMETHasone (DECADRON) 4 MG Tab Take 2 tablets (8 mg total) by mouth once daily. With food on days 2-4, 9-11, 16-18, 23-25, 30-32, 37-39 30 tablet 0    diltiaZEM (CARDIZEM CD) 360 MG 24 hr capsule       donepeziL (ARICEPT) 5 MG tablet Take 1 tablet (5 mg total) by mouth once daily. 90 tablet 1    FEROSUL 325 mg (65 mg iron) Tab tablet Take by mouth.       hydrALAZINE (APRESOLINE) 50 MG tablet Take 1 tablet (50 mg total) by mouth every 8 (eight) hours. 270 tablet 2    multivitamin (THERAGRAN) per tablet Take 1 tablet by mouth once daily.      OLANZapine (ZYPREXA) 5 MG tablet Take 1 tablet by mouth nightly on days 1-4, 8-11, 15-18, 22-25, 29-32, 36-40 24 tablet 0    prochlorperazine (COMPAZINE) 5 MG tablet Take 1 tablet (5 mg total) by mouth every 6 (six) hours as needed for Nausea. 20 tablet 5    pantoprazole (PROTONIX) 20 MG tablet Take 2 tablets (40 mg total) by mouth once daily. 30 tablet 0     No current facility-administered medications for this visit.     Facility-Administered Medications Ordered in Other Visits   Medication Dose Route Frequency Provider Last Rate Last Admin    lactated ringers infusion   Intravenous Continuous Denisse Conteh MD   Stopped at 12/28/21 1241        Physical Exam:   /81   Pulse 70   Temp 98.4 °F (36.9 °C) (Temporal)   Ht 6' (1.829 m)   Wt 88.9 kg (195 lb 15.8 oz)   SpO2 96%   BMI 26.58 kg/m²      ECOG Performance status: 1            Physical Exam  Constitutional:       General: He is not in acute distress.     Appearance: Normal appearance.   HENT:      Head: Normocephalic.   Eyes:      General: No scleral icterus.     Extraocular Movements: Extraocular movements intact.      Conjunctiva/sclera: Conjunctivae normal.   Cardiovascular:      Rate and Rhythm: Normal rate.      Heart sounds: No murmur heard.  Pulmonary:      Effort: Pulmonary effort is normal. No respiratory distress.   Abdominal:      General: There is no distension.      Palpations: Abdomen is soft.   Skin:     General: Skin is warm and dry.   Neurological:      Mental Status: He is alert and oriented to person, place, and time.      Motor: No weakness.   Psychiatric:         Mood and Affect: Mood normal.         Behavior: Behavior normal.         Thought Content: Thought content normal.           Labs:   Recent Results (from the past 48 hours)   CBC  Oncology    Collection Time: 02/28/25  7:38 AM   Result Value Ref Range    WBC 4.69 3.90 - 12.70 K/uL    RBC 4.10 (L) 4.60 - 6.20 M/uL    Hemoglobin 11.1 (L) 14.0 - 18.0 g/dL    Hematocrit 34.8 (L) 40.0 - 54.0 %    MCV 85 82 - 98 fL    MCH 27.1 27.0 - 31.0 pg    MCHC 31.9 (L) 32.0 - 36.0 g/dL    RDW 20.6 (H) 11.5 - 14.5 %    Platelets 121 (L) 150 - 450 K/uL    MPV 10.3 9.2 - 12.9 fL    Gran # (ANC) 3.6 1.8 - 7.7 K/uL    Immature Grans (Abs) 0.06 (H) 0.00 - 0.04 K/uL   Comprehensive Metabolic Panel    Collection Time: 02/28/25  7:38 AM   Result Value Ref Range    Sodium 140 136 - 145 mmol/L    Potassium 4.0 3.5 - 5.1 mmol/L    Chloride 106 95 - 110 mmol/L    CO2 26 23 - 29 mmol/L    Glucose 169 (H) 70 - 110 mg/dL    BUN 21 8 - 23 mg/dL    Creatinine 1.4 0.5 - 1.4 mg/dL    Calcium 9.3 8.7 - 10.5 mg/dL    Total Protein 6.2 6.0 - 8.4 g/dL    Albumin 2.9 (L) 3.5 - 5.2 g/dL    Total Bilirubin 0.4 0.1 - 1.0 mg/dL    Alkaline Phosphatase 81 40 - 150 U/L    AST 22 10 - 40 U/L    ALT 50 (H) 10 - 44 U/L    eGFR 49 (A) >60 mL/min/1.73 m^2    Anion Gap 8 8 - 16 mmol/L   Magnesium    Collection Time: 02/28/25  7:38 AM   Result Value Ref Range    Magnesium 1.6 1.6 - 2.6 mg/dL            Imaging:         I have personally reviewed the above imaging    Path:   Reviewed pathology as documented in oncology history      Assessment and Plan:   1. Urothelial carcinoma of bladder  Overview:  Locally advanced urothelial carcionoma of the bladder. Has long standing history of localized bladder cancer treated with serial TURBT since at least 2016. 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 He was treated with serial TURBT and induction BCG. More recently found to have recurrent disease with associtaed right sided hydronephrosis.     Assessment & Plan:  Tolerating CRT quite well. Has some modets hematuria and dyuria. Will check UA/UCX. Otherwise proceed with last weekly cisplatin today. [id number] RT middle of next week.  - Last cisplatin today  -  Finishing RT  - Has FU in 4 weeks with urology for local surveillance  - Plan CT imaging ~3 months  - FU with me 4 weeks      2. Primary hypertension  Overview:  Home medications include amldoipine, carvedilol, diltiazem, and hydralazine      3. DM type 2 with diabetic dyslipidemia  Overview:  He has T2DM; A1c very well managed with diet alone.      4. Immunodeficiency due to chemotherapy    5. 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 Hematuria, unspecified type  -     Urinalysis, Reflex to Urine Culture Urine, Clean Catch    7. Dementia associated with other underlying disease, without behavioral disturbance, psychotic disturbance, mood disturbance, or anxiety, unspecified dementia severity  Overview:  Mild. Home medicaitons include donepezil.      8. Iron deficiency anemia secondary to blood loss (chronic)  Assessment & Plan:  - Finished IV iron course  - Will recheck iron levels in 1 month    Orders:  -     Iron and TIBC; Future; Expected date: 02/28/2025  -     Ferritin; Future; Expected date: 02/28/2025                   Follow up:   Route Chart for Scheduling    Med Onc Chart Routing      Follow up with physician 4 weeks. Pietro 4 weeks; doesn't need Reyes and me follow up, can cancel Reyes   Follow up with [facility]    Infusion scheduling note    Injection scheduling note    Labs CBC, iron and TIBC, ferritin and CMP   Scheduling:  Preferred lab:  Lab interval:     Imaging    Pharmacy appointment    Other referrals                  Treatment Plan Information   OP Bladder CISplatin QW + Radiation Fabián Westbrook MD   Associated diagnosis: Urothelial carcinoma of bladder Stage II T2, N0, M0 noted on 6/21/2016   Line of treatment: Induction  Treatment Goal: Curative     Upcoming Treatment Dates - OP Bladder CISplatin QW +  Radiation    2/28/2025       Chemotherapy       CISplatin (Platinol) 17.5 mg/m2 = 39 mg in 0.9% NaCl 289 mL chemo infusion       Pre-Hydration       0.9% NaCl 500 mL with magnesium sulfate 1 g, potassium chloride 10 mEq infusion       Post-Hydration       Physician communication order       Antiemetics       fosaprepitant 150 mg in 0.9% NaCl 150 mL IVPB       palonosetron 0.25mg/dexAMETHasone 12mg in NS IVPB 0.25 mg 50 mL  3/7/2025       Chemotherapy       CISplatin (Platinol) 17.5 mg/m2 = 39 mg in 0.9% NaCl 289 mL chemo infusion       Pre-Hydration       0.9% NaCl 500 mL with magnesium sulfate 1 g, potassium chloride 10 mEq infusion       Post-Hydration       Physician communication order       Antiemetics       fosaprepitant 150 mg in 0.9% NaCl 150 mL IVPB       palonosetron (ALOXI) 0.25 mg with Dexamethasone (DECADRON) 12 mg in NS 50 mL IVPB  3/14/2025       Chemotherapy       CISplatin (Platinol) 17.5 mg/m2 = 39 mg in 0.9% NaCl 289 mL chemo infusion       Pre-Hydration       0.9% NaCl 500 mL with magnesium sulfate 1 g, potassium chloride 10 mEq infusion       Post-Hydration       Physician communication order       Antiemetics       fosaprepitant 150 mg in 0.9% NaCl 150 mL IVPB       palonosetron (ALOXI) 0.25 mg with Dexamethasone (DECADRON) 12 mg in NS 50 mL IVPB    Therapy Plan Information  VENOFER (IRON SUCROSE) QW for Iron deficiency anemia secondary to blood loss (chronic), noted on 5/29/2021  Anaphylaxis/Hypersensitivity  EPINEPHrine (EPIPEN) 0.3 mg/0.3 mL pen injection 0.3 mg  0.3 mg, Intramuscular, PRN  diphenhydrAMINE injection 50 mg  50 mg, Intravenous, PRN  hydrocortisone sodium succinate injection 100 mg  100 mg, Intravenous, PRN  Flushes  0.9% NaCl 250 mL flush bag  Intravenous, 1 time a week  sodium chloride 0.9% flush 10 mL  10 mL, Intravenous, 1 time a week      No therapy plan of the specified type found.    No therapy plan of the specified type found.      The above information has been  reviewed with the patient and all questions have been answered to their apparent satisfaction.  They understand that they can call the clinic with any questions.    Fabián Westbrook MD  Hematology/Oncology  [facility]

## 2025-02-17 NOTE — PLAN OF CARE
Day 8 of outpatient xrt to the bladder. He reports subtle tolerable increased urinary urgency and frequency without other baseline change. Will continue to monitor.

## 2025-02-17 NOTE — DISCHARGE INSTRUCTIONS
>[facility]  [address] [facility]  [address]  [phone] phone     [phone] fax  Hours of Operation: Monday- Friday 8:00am- 5:00pm  After hours phone  [phone]  Hematology / Oncology Physicians on call    Dr. Eric Mccollum           Nurse Practitioners:     Melanie Mai, GENARO Chisholm, KAYDEN Latham, GENARO Medeiros, GENARO Walton, NP    Please don't hesitate to call if you have any concerns.      FALL PREVENTION   Falls often occur due to slipping, tripping or losing your balance. Here are ways to reduce your risk of falling again.   Was there anything that caused your fall that can be fixed, removed or replaced?   Make your home safe by keeping walkways clear of objects you may trip over.   Use non-slip pads under rugs.   Do not walk in poorly lit areas.   Do not stand on chairs or wobbly ladders.   Use caution when reaching overhead or looking upward. This position can cause a loss of balance.   Be sure your shoes fit properly, have non-slip bottoms and are in good condition.   Be cautious when going up and down stairs, curbs, and when walking on uneven sidewalks.   If your balance is poor, consider using a cane or walker.   If your fall was related to alcohol use, stop or limit alcohol intake.   If your fall was related to use of sleeping medicines, talk to your doctor about this. You may need to reduce your dosage at bedtime if you awaken during the night to go to the bathroom.   To reduce the need for nighttime bathroom trips:   Avoid drinking fluids for several hours before going to bed   Empty your bladder before going to bed   Men can keep a urinal at the bedside   © 1605-6468 [employer], [address] [place] [address], [place] [address]. All rights reserved. This information is not intended as a substitute for professional medical care. Always follow your healthcare  professional's instructions.    WAYS TO HELP PREVENT INFECTION        WASH YOUR HANDS OFTEN DURING THE DAY, ESPECIALLY BEFORE YOU EAT, AFTER USING THE BATHROOM, AND AFTER TOUCHING ANIMALS    STAY AWAY FROM PEOPLE WHO HAVE ILLNESSES YOU CAN CATCH; SUCH AS COLDS, FLU, CHICKEN POX    TRY TO AVOID CROWDS    STAY AWAY FROM CHILDREN WHO RECENTLY HAVE RECEIVED LIVE VIRUS VACCINES    MAINTAIN GOOD MOUTH CARE    DO NOT SQUEEZE OR SCRATCH PIMPLES    CLEAN CUTS & SCRAPES RIGHT AWAY AND DAILY UNTIL HEALED WITH WARM WATER, SOAP & AN ANTISEPTIC    AVOID CONTACT WITH LITTER BOXES, BIRD CAGES, & FISH TANKS    AVOID STANDING WATER, IE., BIRD BATHS, FLOWER POTS/VASES, OR HUMIDIFIERS    WEAR GLOVES WHEN GARDENING OR CLEANING UP AFTER OTHERS, ESPECIALLY BABIES & SMALL CHILDREN    DO NOT EAT RAW FISH, SEAFOOD, MEAT, OR EGGS

## 2025-02-17 NOTE — PLAN OF CARE
Problem: Adult Inpatient Plan of Care  Goal: Plan of Care Review  Outcome: Progressing  Flowsheets (Taken 2/17/2025 1548)  Plan of Care Reviewed With: patient  Goal: Patient-Specific Goal (Individualized)  Outcome: Progressing  Flowsheets (Taken 2/17/2025 1548)  Individualized Care Needs: reclined, warm blanklet and pillow provided as well as an orange juice  Anxieties, Fears or Concerns: No concerns expressed today  Patient/Family-Specific Goals (Include Timeframe): Tolerated iron infusion today  Goal: Optimal Comfort and Wellbeing  Outcome: Progressing  Intervention: Monitor Pain and Promote Comfort  Flowsheets (Taken 2/17/2025 1548)  Pain Management Interventions:   warm blanket provided   quiet environment facilitated   pillow support provided  Intervention: Provide Person-Centered Care  Flowsheets (Taken 2/17/2025 1548)  Trust Relationship/Rapport:   care explained   reassurance provided   choices provided   thoughts/feelings acknowledged   emotional support provided   empathic listening provided   questions answered   questions encouraged     Problem: Anemia  Goal: Anemia Symptom Improvement  Outcome: Progressing  Intervention: Monitor and Manage Anemia  Flowsheets (Taken 2/17/2025 1548)  Safety Promotion/Fall Prevention:   in recliner, wheels locked   instructed to call staff for mobility   lighting adjusted   medications reviewed  Fatigue Management: frequent rest breaks encouraged

## 2025-02-18 ENCOUNTER — OFFICE VISIT (OUTPATIENT)
Dept: INFECTIOUS DISEASES | Facility: CLINIC | Age: 85
End: 2025-02-18
Payer: MEDICARE

## 2025-02-18 ENCOUNTER — PATIENT MESSAGE (OUTPATIENT)
Dept: ADMINISTRATIVE | Facility: OTHER | Age: 85
End: 2025-02-18
Payer: MEDICARE

## 2025-02-18 ENCOUNTER — OFFICE VISIT (OUTPATIENT)
Dept: PODIATRY | Facility: CLINIC | Age: 85
End: 2025-02-18
Payer: MEDICARE

## 2025-02-18 VITALS
HEART RATE: 55 BPM | TEMPERATURE: 97 F | SYSTOLIC BLOOD PRESSURE: 136 MMHG | WEIGHT: 196.88 LBS | HEIGHT: 72 IN | BODY MASS INDEX: 26.67 KG/M2 | OXYGEN SATURATION: 95 % | DIASTOLIC BLOOD PRESSURE: 72 MMHG

## 2025-02-18 DIAGNOSIS — E11.42 TYPE 2 DIABETES MELLITUS WITH PERIPHERAL NEUROPATHY: ICD-10-CM

## 2025-02-18 DIAGNOSIS — E11.9 TYPE 2 DIABETES MELLITUS WITHOUT COMPLICATION, WITHOUT LONG-TERM CURRENT USE OF INSULIN: ICD-10-CM

## 2025-02-18 DIAGNOSIS — C67.9 UROTHELIAL CARCINOMA OF BLADDER: Primary | ICD-10-CM

## 2025-02-18 DIAGNOSIS — L08.9 DIABETIC FOOT INFECTION: ICD-10-CM

## 2025-02-18 DIAGNOSIS — E11.628 DIABETIC FOOT INFECTION: ICD-10-CM

## 2025-02-18 DIAGNOSIS — E11.69 DM TYPE 2 WITH DIABETIC DYSLIPIDEMIA: ICD-10-CM

## 2025-02-18 DIAGNOSIS — C67.9 UROTHELIAL CARCINOMA OF BLADDER: ICD-10-CM

## 2025-02-18 DIAGNOSIS — L97.516 DIABETIC ULCER OF TOE OF RIGHT FOOT ASSOCIATED WITH DIABETES MELLITUS DUE TO UNDERLYING CONDITION, WITH BONE INVOLVEMENT WITHOUT EVIDENCE OF NECROSIS: Primary | ICD-10-CM

## 2025-02-18 DIAGNOSIS — I48.0 PAROXYSMAL ATRIAL FIBRILLATION: ICD-10-CM

## 2025-02-18 DIAGNOSIS — L03.115 CELLULITIS OF RIGHT FOOT: ICD-10-CM

## 2025-02-18 DIAGNOSIS — Z91.89 INCREASED INFECTION RISK: ICD-10-CM

## 2025-02-18 DIAGNOSIS — Z79.01 ANTICOAGULATED: ICD-10-CM

## 2025-02-18 DIAGNOSIS — E78.5 DM TYPE 2 WITH DIABETIC DYSLIPIDEMIA: ICD-10-CM

## 2025-02-18 DIAGNOSIS — E08.621 DIABETIC ULCER OF TOE OF RIGHT FOOT ASSOCIATED WITH DIABETES MELLITUS DUE TO UNDERLYING CONDITION, WITH BONE INVOLVEMENT WITHOUT EVIDENCE OF NECROSIS: Primary | ICD-10-CM

## 2025-02-18 PROCEDURE — 77386 HC IMRT, COMPLEX: CPT | Performed by: SPECIALIST

## 2025-02-18 PROCEDURE — 77014 PR  CT GUIDANCE PLACEMENT RAD THERAPY FIELDS: CPT | Mod: 26,,, | Performed by: SPECIALIST

## 2025-02-18 NOTE — PROGRESS NOTES
Subjective:       Patient ID: Jose Miguel Alvarez Jr. is a 84 y.o. male.    Chief Complaint: Wound Infection     HPI  84 year old with PMH as listed below including bladder cancer and anemia who was referred for right foot infection/osteomyelitis .  His daughter in law is in the room-     Labs and imaging test -  Foot imaging -  No evidence of acute fracture or dislocation.  Soft tissue swelling around the distal phalanges of the 2nd digit.  Multi articular IP joint space narrowing.  Moderate midfoot hypertrophy.  Calcaneal spurring.  Osseous demineralization.   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 Treated with APC.   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 breakdown of skin 01/26/2023    Stage 3 chronic kidney disease 10/06/2017     Past Surgical History:   Procedure Laterality Date    bladder tumor removal      CARDIAC ELECTROPHYSIOLOGY MAPPING AND ABLATION      CARDIOVERSION      several    CATARACT EXTRACTION Bilateral     cataract removal with IOL implants Bilateral     CHOLECYSTECTOMY      CYSTOSCOPIC LITHOLAPAXY  N/A 12/28/2021    Procedure: CYSTOLITHOLAPAXY;  Surgeon: Medardo Garcia MD;  Location: [facility] OR;  Service: Urology;  Laterality: N/A;    CYSTOSCOPIC LITHOLAPAXY N/A 5/14/2024    Procedure: CYSTOLITHOLAPAXY;  Surgeon: Medardo Garcia MD;  Location: [facility] OR;  Service: Urology;  Laterality: N/A;    CYSTOSCOPY      CYSTOSCOPY WITH BIOPSY OF BLADDER Right 12/28/2021    Procedure: CYSTOSCOPY, WITH BLADDER BIOPSY, WITH FULGURATION IF INDICATED;  Surgeon: Medardo Garcia MD;  Location: [facility] OR;  Service: Urology;  Laterality: Right;    CYSTOTOMY, WITH FULGURATION AND RADIOACTIVE MATERIAL INSERTION N/A 12/3/2024    Procedure: CYSTOTOMY, WITH FULGURATION AND RADIOACTIVE MATERIAL INSERTION;  Surgeon: Medardo Garcia MD;  Location: [facility] OR;  Service: Urology;  Laterality: N/A;    ESOPHAGOGASTRODUODENOSCOPY N/A 10/25/2021    Procedure: Small Jason Enteroscopy (SBE);  Surgeon: Isabelle Griffin MD;  Location: [facility];  Service: Endoscopy;  Laterality: N/A;    ESOPHAGOGASTRODUODENOSCOPY N/A 12/13/2021    Procedure: EGD (ESOPHAGOGASTRODUODENOSCOPY);  Surgeon: Troy Polanco MD;  Location: [facility];  Service: Endoscopy;  Laterality: N/A;    ESOPHAGOGASTRODUODENOSCOPY N/A 4/5/2024    Procedure: EGD (ESOPHAGOGASTRODUODENOSCOPY);  Surgeon: Myrtle Salcedo MD;  Location: [facility];  Service: Endoscopy;  Laterality: N/A;    ESOPHAGOGASTRODUODENOSCOPY N/A 4/22/2024    Procedure: EGD (ESOPHAGOGASTRODUODENOSCOPY);  Surgeon: Myrtle Salcedo MD;  Location: [facility];  Service: Endoscopy;  Laterality: N/A;  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    EYE SURGERY      INTRALUMINAL GASTROINTESTINAL TRACT IMAGING VIA CAPSULE N/A 12/6/2021    Procedure: IMAGING PROCEDURE, GI TRACT, INTRALUMINAL, VIA CAPSULE;  Surgeon: Larry Jones RN;  Location: [facility] ENDO;  Service: Endoscopy;  Laterality: N/A;    INTRALUMINAL GASTROINTESTINAL TRACT IMAGING VIA CAPSULE N/A 4/18/2024    Procedure: IMAGING PROCEDURE,  GI TRACT, INTRALUMINAL, VIA CAPSULE;  Surgeon: Larry Jones RN;  Location: [facility] ENDO;  Service: Endoscopy;  Laterality: N/A;    OCCLUSION OF LEFT ATRIAL APPENDAGE N/A 11/14/2024    Procedure: Left atrial appendage occlusion;  Surgeon: Aroldo Ortiz MD;  Location: [facility] EP LAB;  Service: Cardiology;  Laterality: N/A;  afib, watchman, BSCI, venkatesh, anes, MB, 3prep    SMALL BOWEL ENTEROSCOPY N/A 9/26/2024    Procedure: ENTEROSCOPY--prximal DBE;  Surgeon: Eduardo Ruff MD;  Location: [facility] ENDO;  Service: Endoscopy;  Laterality: N/A;    TRANSESOPHAGEAL ECHOCARDIOGRAPHY N/A 11/14/2024    Procedure: ECHOCARDIOGRAM, TRANSESOPHAGEAL;  Surgeon: Aroldo Martinez MD;  Location: [facility] EP LAB;  Service: Cardiology;  Laterality: N/A;    TRANSESOPHAGEAL ECHOCARDIOGRAPHY N/A 12/30/2024    Procedure: ECHOCARDIOGRAM, TRANSESOPHAGEAL;  Surgeon: Carlos Flower MD;  Location: [facility] CATH LAB;  Service: Cardiology;  Laterality: N/A;    TRANSURETHRAL RESECTION OF BLADDER TUMOR BY BIPOLAR CAUTERY N/A 7/23/2019    Procedure: TURBT, USING BIPOLAR CAUTERY;  Surgeon: Ritesh Marques MD;  Location: [facility];  Service: Urology;  Laterality: N/A;    TRANSURETHRAL RESECTION OF PROSTATE N/A 5/14/2024    Procedure: TURP (TRANSURETHRAL RESECTION OF PROSTATE);  Surgeon: Medardo Garcia MD;  Location: [facility] OR;  Service: Urology;  Laterality: N/A;    TURBT (TRANSURETHRAL RESECTION OF BLADDER TUMOR) N/A 12/3/2024    Procedure: TURBT (TRANSURETHRAL RESECTION OF BLADDER TUMOR);  Surgeon: Medardo Garcia MD;  Location: [facility] OR;  Service: Urology;  Laterality: N/A;     Family History   Problem Relation Name Age of Onset    Heart disease Father      Hypertension Father      Heart disease Brother       Social History[1]  Review of Systems   Constitutional:  Negative for activity change, appetite change, chills, diaphoresis, fatigue and fever.   Respiratory:  Negative for apnea and chest tightness.    Neurological:  Negative for dizziness,  facial asymmetry and light-headedness.       Objective:      Physical Exam  Vitals and nursing note reviewed.   Eyes:      Pupils: Pupils are equal, round, and reactive to light.   Cardiovascular:      Rate and Rhythm: Normal rate.   Pulmonary:      Effort: Pulmonary effort is normal.   Abdominal:      General: Abdomen is flat.   Musculoskeletal:         General: Normal range of motion.      Cervical back: Normal range of motion.   Skin:     General: Skin is warm.      Coloration: Skin is not jaundiced.   Neurological:      General: No focal deficit present.      Mental Status: He is alert.         Assessment:       1. Urothelial carcinoma of bladder        2. Type 2 diabetes mellitus without complication, without long-term current use of insulin        3. Paroxysmal atrial fibrillation        4. DM type 2 with diabetic dyslipidemia        5. Diabetic foot infection  FL PICC Line Placement w/o Port w/ Img > 6 Y/O          Plan:     1. Urothelial carcinoma of bladder  Overview:  Locally advanced urothelial carcionoma of the bladder. Has long standing history of localized bladder cancer treated with serial TURBT since at least 2016. 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 He was treated with serial TURBT and induction BCG. More recently found to have recurrent disease with associtaed right sided hydronephrosis. Overall concerning for locally advanced disease      Assessment & Plan:  Follow oncology       2. Type 2 diabetes mellitus without complication, without long-term current use of insulin  Assessment & Plan:  Insulin regime as per primary team      3. Paroxysmal atrial fibrillation  Assessment & Plan:  Follow cardiology       4. DM type 2 with diabetic dyslipidemia  Overview:  He has T2DM; A1c very well managed with diet alone.    Assessment & Plan:  Follow PCP      5. Diabetic foot infection  Assessment & Plan:  Will insert PICC, will do MRI of the foot   He is also on Amiodarone and Levaquin can interact with the  medication    Cultures-     METHICILLIN RESISTANT STAPHYLOCOCCUS AUREUS  Many     Aerobic Bacterial Culture  Abnormal   KLEBSIELLA OXYTOCA  Few    Aerobic Bacterial Culture  Abnormal   PSEUDOMONAS AERUGINOSA  Rare   Outpatient Antibiotic Therapy Plan:     Please send referral to    1) Infection:  Diabetic foot infection     2) Discharge Antibiotics:     Intravenous antibiotics:  IV Daptomycin-700 mg daily  IV Zosyn 4.5 gram every 8 hours     3) Therapy Duration:  6 weeks     Estimated end date of IV antibiotics: 25     4) Outpatient Weekly Labs:     Order the following labs to be drawn on :   CBC  CMP   CPK (when on Daptomycin)  ESR  CRP  -Can pull picc line at end of treatment unless instructed otherwise .   Please send all labs to [facility] .      Orders:  -     FL PICC Line Placement w/o Port w/ Img > 4 Y/O; Future; Expected date: 2025               [1]   Social History  Socioeconomic History    Marital status:    Tobacco Use    Smoking status: Former     Current packs/day: 0.00     Average packs/day: 2.5 packs/day for 20.0 years (50.0 ttl pk-yrs)     Types: Cigarettes     Start date:      Quit date:      Years since quittin.1     Passive exposure: Never    Smokeless tobacco: Never   Substance and Sexual Activity    Alcohol use: Yes     Alcohol/week: 7.0 standard drinks of alcohol     Types: 7 Cans of beer per week     Comment: occasionally: hold 72 hrs    Drug use: No    Sexual activity: Not Currently     Social Drivers of Health     Financial Resource Strain: Low Risk  (2025)    Overall Financial Resource Strain (CARDIA)     Difficulty of Paying Living Expenses: Not hard at all   Food Insecurity: No Food Insecurity (2025)    Hunger Vital Sign     Worried About Running Out of Food in the Last Year: Never true     Ran Out of Food in the Last Year: Never true   Transportation Needs: No Transportation Needs (2024)    TRANSPORTATION NEEDS     Transportation : No    Physical Activity: Insufficiently Active (1/13/2025)    Exercise Vital Sign     Days of Exercise per Week: 2 days     Minutes of Exercise per Session: 30 min   Stress: No Stress Concern Present (1/13/2025)    [employer] [place] [employer] - Occupational Stress Questionnaire     Feeling of Stress : Not at all   Housing Stability: Unknown (1/13/2025)    Housing Stability Vital Sign     Unable to Pay for Housing in the Last Year: No     Homeless in the Last Year: No

## 2025-02-18 NOTE — ASSESSMENT & PLAN NOTE
Will insert PICC, will do MRI of the foot   He is also on Amiodarone and Levaquin can interact with the medication    Cultures-     METHICILLIN RESISTANT STAPHYLOCOCCUS AUREUS  Many     Aerobic Bacterial Culture  Abnormal   KLEBSIELLA OXYTOCA  Few    Aerobic Bacterial Culture  Abnormal   PSEUDOMONAS AERUGINOSA  Rare   Outpatient Antibiotic Therapy Plan:     Please send referral to    1) Infection:  Diabetic foot infection     2) Discharge Antibiotics:     Intravenous antibiotics:  IV Daptomycin-700 mg daily  IV Zosyn 4.5 gram every 8 hours     3) Therapy Duration:  6 weeks     Estimated end date of IV antibiotics: 04/01/25     4) Outpatient Weekly Labs:     Order the following labs to be drawn on Mondays:   CBC  CMP   CPK (when on Daptomycin)  ESR  CRP  -Can pull picc line at end of treatment unless instructed otherwise .   [facility]

## 2025-02-18 NOTE — PROGRESS NOTES
Subjective:       Patient ID: Jose Miguel Alvarez Jr. is a 84 y.o. male.    Chief Complaint: Diabetic Foot Ulcer (Right DFU: C/o pain rate 3/10, pt is nondiabetic, redness at present)    HPI: Jose Miguel Alvarez Jr. presents to the office today with a small wound to the distal aspect of the right 2nd toe.  Was seen by infectious disease today due to recent cultures and cellulitis.  Plans is to place a PICC line tomorrow with 6 weeks of IV antibiotics.  He also continues to be on treatment for bladder cancer with chemotherapy and radiation.  Presents to clinic today with daughter-in-law present who is able to maintain his schedule for appointments.      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 Treated with APC.   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 breakdown of skin 01/26/2023    Stage 3 chronic kidney disease 10/06/2017       Family History   Problem Relation Name Age of Onset    Heart disease Father      Hypertension Father      Heart disease Brother         Social History     Socioeconomic History    Marital status:    Tobacco Use    Smoking status: Former     Current packs/day: 0.00     Average packs/day: 2.5 packs/day for  20.0 years (50.0 ttl pk-yrs)     Types: Cigarettes     Start date:      Quit date:      Years since quittin.1     Passive exposure: Never    Smokeless tobacco: Never   Substance and Sexual Activity    Alcohol use: Yes     Alcohol/week: 7.0 standard drinks of alcohol     Types: 7 Cans of beer per week     Comment: occasionally: hold 72 hrs    Drug use: No    Sexual activity: Not Currently     Social Drivers of Health     Financial Resource Strain: Low Risk  (2025)    Overall Financial Resource Strain (CARDIA)     Difficulty of Paying Living Expenses: Not hard at all   Food Insecurity: No Food Insecurity (2025)    Hunger Vital Sign     Worried About Running Out of Food in the Last Year: Never true     Ran Out of Food in the Last Year: Never true   Transportation Needs: No Transportation Needs (2024)    TRANSPORTATION NEEDS     Transportation : No   Physical Activity: Insufficiently Active (2025)    Exercise Vital Sign     Days of Exercise per Week: 2 days     Minutes of Exercise per Session: 30 min   Stress: No Stress Concern Present (2025)    [employer] [place] [employer] - Occupational Stress Questionnaire     Feeling of Stress : Not at all   Housing Stability: Unknown (2025)    Housing Stability Vital Sign     Unable to Pay for Housing in the Last Year: No     Homeless in the Last Year: No       Past Surgical History:   Procedure Laterality Date    bladder tumor removal      CARDIAC ELECTROPHYSIOLOGY MAPPING AND ABLATION      CARDIOVERSION      several    CATARACT EXTRACTION Bilateral     cataract removal with IOL implants Bilateral     CHOLECYSTECTOMY      CYSTOSCOPIC LITHOLAPAXY N/A 2021    Procedure: CYSTOLITHOLAPAXY;  Surgeon: Medardo Garcia MD;  Location: [facility] OR;  Service: Urology;  Laterality: N/A;    CYSTOSCOPIC LITHOLAPAXY N/A 2024    Procedure: CYSTOLITHOLAPAXY;  Surgeon: Medardo Garcia MD;  Location: [facility] OR;  Service: Urology;   Laterality: N/A;    CYSTOSCOPY      CYSTOSCOPY WITH BIOPSY OF BLADDER Right 12/28/2021    Procedure: CYSTOSCOPY, WITH BLADDER BIOPSY, WITH FULGURATION IF INDICATED;  Surgeon: Medardo Garcia MD;  Location: [facility] OR;  Service: Urology;  Laterality: Right;    CYSTOTOMY, WITH FULGURATION AND RADIOACTIVE MATERIAL INSERTION N/A 12/3/2024    Procedure: CYSTOTOMY, WITH FULGURATION AND RADIOACTIVE MATERIAL INSERTION;  Surgeon: Medardo Garcia MD;  Location: [facility] OR;  Service: Urology;  Laterality: N/A;    ESOPHAGOGASTRODUODENOSCOPY N/A 10/25/2021    Procedure: Small Jason Enteroscopy (SBE);  Surgeon: Isabelle Griffin MD;  Location: [facility] ENDO;  Service: Endoscopy;  Laterality: N/A;    ESOPHAGOGASTRODUODENOSCOPY N/A 12/13/2021    Procedure: EGD (ESOPHAGOGASTRODUODENOSCOPY);  Surgeon: Troy Polanco MD;  Location: [facility];  Service: Endoscopy;  Laterality: N/A;    ESOPHAGOGASTRODUODENOSCOPY N/A 4/5/2024    Procedure: EGD (ESOPHAGOGASTRODUODENOSCOPY);  Surgeon: Myrtle Salcedo MD;  Location: [facility];  Service: Endoscopy;  Laterality: N/A;    ESOPHAGOGASTRODUODENOSCOPY N/A 4/22/2024    Procedure: EGD (ESOPHAGOGASTRODUODENOSCOPY);  Surgeon: Myrtle Salcedo MD;  Location: [facility] ENDO;  Service: Endoscopy;  Laterality: N/A;  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    EYE SURGERY      INTRALUMINAL GASTROINTESTINAL TRACT IMAGING VIA CAPSULE N/A 12/6/2021    Procedure: IMAGING PROCEDURE, GI TRACT, INTRALUMINAL, VIA CAPSULE;  Surgeon: Larry Jones RN;  Location: [facility] ENDO;  Service: Endoscopy;  Laterality: N/A;    INTRALUMINAL GASTROINTESTINAL TRACT IMAGING VIA CAPSULE N/A 4/18/2024    Procedure: IMAGING PROCEDURE, GI TRACT, INTRALUMINAL, VIA CAPSULE;  Surgeon: Larry Jones RN;  Location: [facility] ENDO;  Service: Endoscopy;  Laterality: N/A;    OCCLUSION OF LEFT ATRIAL APPENDAGE N/A 11/14/2024    Procedure: Left atrial appendage occlusion;  Surgeon: Aroldo Ortiz MD;  Location: [facility];   Service: Cardiology;  Laterality: N/A;  afib, watchman, BSCI, venkatesh, anes, MB, 3prep    SMALL BOWEL ENTEROSCOPY N/A 9/26/2024    Procedure: ENTEROSCOPY--prximal DBE;  Surgeon: Eduardo Ruff MD;  Location: [facility] ENDO;  Service: Endoscopy;  Laterality: N/A;    TRANSESOPHAGEAL ECHOCARDIOGRAPHY N/A 11/14/2024    Procedure: ECHOCARDIOGRAM, TRANSESOPHAGEAL;  Surgeon: Aroldo Martinez MD;  Location: [facility] EP LAB;  Service: Cardiology;  Laterality: N/A;    TRANSESOPHAGEAL ECHOCARDIOGRAPHY N/A 12/30/2024    Procedure: ECHOCARDIOGRAM, TRANSESOPHAGEAL;  Surgeon: Carlos Flower MD;  Location: [facility] CATH LAB;  Service: Cardiology;  Laterality: N/A;    TRANSURETHRAL RESECTION OF BLADDER TUMOR BY BIPOLAR CAUTERY N/A 7/23/2019    Procedure: TURBT, USING BIPOLAR CAUTERY;  Surgeon: Ritesh Marques MD;  Location: [facility] OR;  Service: Urology;  Laterality: N/A;    TRANSURETHRAL RESECTION OF PROSTATE N/A 5/14/2024    Procedure: TURP (TRANSURETHRAL RESECTION OF PROSTATE);  Surgeon: Medardo Garcia MD;  Location: [facility] OR;  Service: Urology;  Laterality: N/A;    TURBT (TRANSURETHRAL RESECTION OF BLADDER TUMOR) N/A 12/3/2024    Procedure: TURBT (TRANSURETHRAL RESECTION OF BLADDER TUMOR);  Surgeon: Medardo Garcia MD;  Location: [facility] OR;  Service: Urology;  Laterality: N/A;     Review of Systems     Objective:   There were no vitals taken for this visit.    X-Ray Foot Complete Right  Narrative: EXAMINATION:  XR FOOT COMPLETE 3 VIEW RIGHT    CLINICAL HISTORY:  Diabetes mellitus due to underlying condition with foot ulcer    TECHNIQUE:  XR FOOT COMPLETE 3 VIEW RIGHT    COMPARISON:  None.    FINDINGS:  No evidence of acute fracture or dislocation.  Soft tissue swelling around the distal phalanges of the 2nd digit.  Multi articular IP joint space narrowing.  Moderate midfoot hypertrophy.  Calcaneal spurring.  Osseous demineralization.  Impression: As above.    Electronically signed by: Primo  Ernesto  Date:    02/10/2025  Time:    11:42     Physical Exam   LOWER EXTREMITY PHYSICAL EXAMINATION    Vascular:  The right dorsalis pedis pulse 2/4 and the right posterior tibial pulse 2/4.   Capillary refill is intact.  Pedal hair growth intact    Musculoskeletal: Manual Muscle Testing is 5/5 with dorsiflexion, plantar flexion, abduction, and adduction.   There is normal range of motion in the forefoot, hindfoot, and Ankle joint.      Dermatological:  Skin is supple, moist, intact.  No evidence of discoloration the distal aspect the right 2nd toe.  Overlying hyperkeratosis noted.  Small wound noted underlying.  Post debridement, the wound measures 0.2 x 0.4 cm.  Wound does probe to bone The wound depth is 0.5 cm.  There is no signs of abscess.  Serous drainage.  Cellulitis present to the dorsal aspect of the foot and 2nd digit          Assessment:     1. 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 Cellulitis of right foot    3. Type 2 diabetes mellitus with peripheral neuropathy    4. Anticoagulated    5. Increased infection risk    6. 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    MRI scheduled.  Attempting to move up MRI based on scheduling. Does have history of a watchman device which limits the location that this may be completed    Appreciate Infectious Disease to assist with recent culture antibiotics.  Cultures growing MRSA, Klebsiella, and rare  Pseudomonas.   Plan to start him on daptomycin and Zosyn Q 8 via PICC. (currently on amiodarone making Levaquin a less desirable medication for cardiac issues).     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  Did speak to heme/Onc and rad/Onc in regards of the worsening toe cellulitis with the wound.  Would like to continue with his chemotherapy/radiation and treat this conservatively.    Home health to continue with dressings.     Thorough discussion is had with the patient concerning the diagnosis, its etiology, and the treatment algorithm at present. Shoe inspection. Foot Education. Patient reminded of the importance of good nutrition and blood sugar control to help prevent podiatric complications of diabetes. Patient instructed on proper foot hygeine. We discussed wearing proper and supportive shoe gear, daily foot inspections, never walking barefooted or sock footed, never putting sharp instruments to feet which can cause major complications associated with infection, ulcers, lacerations.    Follow-up in 1    Future Appointments   Date Time Provider Department Center   2/19/2025 10:00 AM [facility] [id number] [facility] XRAY [place]   2/21/2025  7:00 AM RHYS MURILLO CC LAB [facility] LAB DS [facility]   2/21/2025  8:00 AM Davonte Medeiros FNP [facility] HEM ONC [facility]   2/21/2025  9:00 AM CHAIR 09 [facility] INFSN [facility]   2/24/2025  9:00 AM CHAIR 03 [facility] BR[facility] INFSN [facility]   2/26/2025  5:00 PM [facility] MRI1 [facility] MRI [place]   2/27/2025 11:45 AM Xiao Bowman DPM [facility] POD [facility]   2/28/2025  7:00 AM RHYS MURILLO CC LAB [facility] LAB DS [facility]   2/28/2025  8:00 AM Fabián Westbrook MD [facility] HEM ONC [facility]   2/28/2025  9:00 AM CHAIR 13 [facility] INFSN [facility]   3/7/2025  9:00 AM Toshia Valladares MD [facility]   3/12/2025  1:00 PM Aroldo Ortiz MD [facility] ARR [facility]   3/21/2025  9:00 AM Carlos Flower MD [facility] CARDIO [facility]    3/24/2025 11:15 AM Medardo Garcia MD [facility] UROLOGY [facility]   3/26/2025 10:20 AM LABORATORY, [facility] LAB [facility]   3/31/2025  9:20 AM Gordo Reyes MD [facility] HEM ONC [facility]   3/31/2025 11:30 AM Martin Villarreal MD, FID [facility] INFDIS  [facility]

## 2025-02-18 NOTE — H&P (VIEW-ONLY)
Subjective:       Patient ID: Jose Miguel Alvarez Jr. is a 84 y.o. male.    Chief Complaint: Diabetic Foot Ulcer (Right DFU: C/o pain rate 3/10, pt is nondiabetic, redness at present)    HPI: Jose Miguel Alvarez Jr. presents to the office today with a small wound to the distal aspect of the right 2nd toe.  Was seen by infectious disease today due to recent cultures and cellulitis.  Plans is to place a PICC line tomorrow with 6 weeks of IV antibiotics.  He also continues to be on treatment for bladder cancer with chemotherapy and radiation.  Presents to clinic today with daughter-in-law present who is able to maintain his schedule for appointments.      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 Treated with APC.   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 breakdown of skin 01/26/2023    Stage 3 chronic kidney disease 10/06/2017       Family History   Problem Relation Name Age of Onset    Heart disease Father      Hypertension Father      Heart disease Brother         Social History     Socioeconomic History    Marital status:    Tobacco Use    Smoking status: Former     Current packs/day: 0.00     Average packs/day: 2.5 packs/day for  20.0 years (50.0 ttl pk-yrs)     Types: Cigarettes     Start date:      Quit date:      Years since quittin.1     Passive exposure: Never    Smokeless tobacco: Never   Substance and Sexual Activity    Alcohol use: Yes     Alcohol/week: 7.0 standard drinks of alcohol     Types: 7 Cans of beer per week     Comment: occasionally: hold 72 hrs    Drug use: No    Sexual activity: Not Currently     Social Drivers of Health     Financial Resource Strain: Low Risk  (2025)    Overall Financial Resource Strain (CARDIA)     Difficulty of Paying Living Expenses: Not hard at all   Food Insecurity: No Food Insecurity (2025)    Hunger Vital Sign     Worried About Running Out of Food in the Last Year: Never true     Ran Out of Food in the Last Year: Never true   Transportation Needs: No Transportation Needs (2024)    TRANSPORTATION NEEDS     Transportation : No   Physical Activity: Insufficiently Active (2025)    Exercise Vital Sign     Days of Exercise per Week: 2 days     Minutes of Exercise per Session: 30 min   Stress: No Stress Concern Present (2025)    [employer] - Occupational Stress Questionnaire     Feeling of Stress : Not at all   Housing Stability: Unknown (2025)    Housing Stability Vital Sign     Unable to Pay for Housing in the Last Year: No     Homeless in the Last Year: No       Past Surgical History:   Procedure Laterality Date    bladder tumor removal      CARDIAC ELECTROPHYSIOLOGY MAPPING AND ABLATION      CARDIOVERSION      several    CATARACT EXTRACTION Bilateral     cataract removal with IOL implants Bilateral     CHOLECYSTECTOMY      CYSTOSCOPIC LITHOLAPAXY N/A 2021    Procedure: CYSTOLITHOLAPAXY;  Surgeon: Medardo Garcia MD;  Location: [facility] OR;  Service: Urology;  Laterality: N/A;    CYSTOSCOPIC LITHOLAPAXY N/A 2024    Procedure: CYSTOLITHOLAPAXY;  Surgeon: Medardo Garcia MD;  Location: [facility] OR;  Service: Urology;   Laterality: N/A;    CYSTOSCOPY      CYSTOSCOPY WITH BIOPSY OF BLADDER Right 12/28/2021    Procedure: CYSTOSCOPY, WITH BLADDER BIOPSY, WITH FULGURATION IF INDICATED;  Surgeon: Medardo Garcia MD;  Location: [facility] OR;  Service: Urology;  Laterality: Right;    CYSTOTOMY, WITH FULGURATION AND RADIOACTIVE MATERIAL INSERTION N/A 12/3/2024    Procedure: CYSTOTOMY, WITH FULGURATION AND RADIOACTIVE MATERIAL INSERTION;  Surgeon: Medardo Garcia MD;  Location: [facility] OR;  Service: Urology;  Laterality: N/A;    ESOPHAGOGASTRODUODENOSCOPY N/A 10/25/2021    Procedure: Small Jason Enteroscopy (SBE);  Surgeon: Isabelle Griffin MD;  Location: [facility] ENDO;  Service: Endoscopy;  Laterality: N/A;    ESOPHAGOGASTRODUODENOSCOPY N/A 12/13/2021    Procedure: EGD (ESOPHAGOGASTRODUODENOSCOPY);  Surgeon: Troy Polanco MD;  Location: [facility];  Service: Endoscopy;  Laterality: N/A;    ESOPHAGOGASTRODUODENOSCOPY N/A 4/5/2024    Procedure: EGD (ESOPHAGOGASTRODUODENOSCOPY);  Surgeon: Myrtle Salcedo MD;  Location: [facility];  Service: Endoscopy;  Laterality: N/A;    ESOPHAGOGASTRODUODENOSCOPY N/A 4/22/2024    Procedure: EGD (ESOPHAGOGASTRODUODENOSCOPY);  Surgeon: Myrtle Salcedo MD;  Location: [facility] ENDO;  Service: Endoscopy;  Laterality: N/A;  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    EYE SURGERY      INTRALUMINAL GASTROINTESTINAL TRACT IMAGING VIA CAPSULE N/A 12/6/2021    Procedure: IMAGING PROCEDURE, GI TRACT, INTRALUMINAL, VIA CAPSULE;  Surgeon: Larry Jones RN;  Location: [facility] ENDO;  Service: Endoscopy;  Laterality: N/A;    INTRALUMINAL GASTROINTESTINAL TRACT IMAGING VIA CAPSULE N/A 4/18/2024    Procedure: IMAGING PROCEDURE, GI TRACT, INTRALUMINAL, VIA CAPSULE;  Surgeon: Larry Jones RN;  Location: [facility] ENDO;  Service: Endoscopy;  Laterality: N/A;    OCCLUSION OF LEFT ATRIAL APPENDAGE N/A 11/14/2024    Procedure: Left atrial appendage occlusion;  Surgeon: Aroldo Ortiz MD;  Location: [facility];   Service: Cardiology;  Laterality: N/A;  afib, watchman, BSCI, venkatesh, anes, MB, 3prep    SMALL BOWEL ENTEROSCOPY N/A 9/26/2024    Procedure: ENTEROSCOPY--prximal DBE;  Surgeon: Eduardo Ruff MD;  Location: [facility] ENDO;  Service: Endoscopy;  Laterality: N/A;    TRANSESOPHAGEAL ECHOCARDIOGRAPHY N/A 11/14/2024    Procedure: ECHOCARDIOGRAM, TRANSESOPHAGEAL;  Surgeon: Aroldo Martinez MD;  Location: [facility] EP LAB;  Service: Cardiology;  Laterality: N/A;    TRANSESOPHAGEAL ECHOCARDIOGRAPHY N/A 12/30/2024    Procedure: ECHOCARDIOGRAM, TRANSESOPHAGEAL;  Surgeon: Carlos Flower MD;  Location: [facility] CATH LAB;  Service: Cardiology;  Laterality: N/A;    TRANSURETHRAL RESECTION OF BLADDER TUMOR BY BIPOLAR CAUTERY N/A 7/23/2019    Procedure: TURBT, USING BIPOLAR CAUTERY;  Surgeon: Ritesh Marques MD;  Location: [facility] OR;  Service: Urology;  Laterality: N/A;    TRANSURETHRAL RESECTION OF PROSTATE N/A 5/14/2024    Procedure: TURP (TRANSURETHRAL RESECTION OF PROSTATE);  Surgeon: Medardo Garcia MD;  Location: [facility] OR;  Service: Urology;  Laterality: N/A;    TURBT (TRANSURETHRAL RESECTION OF BLADDER TUMOR) N/A 12/3/2024    Procedure: TURBT (TRANSURETHRAL RESECTION OF BLADDER TUMOR);  Surgeon: Medardo Garcia MD;  Location: [facility] OR;  Service: Urology;  Laterality: N/A;     Review of Systems     Objective:   There were no vitals taken for this visit.    X-Ray Foot Complete Right  Narrative: EXAMINATION:  XR FOOT COMPLETE 3 VIEW RIGHT    CLINICAL HISTORY:  Diabetes mellitus due to underlying condition with foot ulcer    TECHNIQUE:  XR FOOT COMPLETE 3 VIEW RIGHT    COMPARISON:  None.    FINDINGS:  No evidence of acute fracture or dislocation.  Soft tissue swelling around the distal phalanges of the 2nd digit.  Multi articular IP joint space narrowing.  Moderate midfoot hypertrophy.  Calcaneal spurring.  Osseous demineralization.  Impression: As above.    Electronically signed by: Primo  Ernesto  Date:    02/10/2025  Time:    11:42     Physical Exam   LOWER EXTREMITY PHYSICAL EXAMINATION    Vascular:  The right dorsalis pedis pulse 2/4 and the right posterior tibial pulse 2/4.   Capillary refill is intact.  Pedal hair growth intact    Musculoskeletal: Manual Muscle Testing is 5/5 with dorsiflexion, plantar flexion, abduction, and adduction.   There is normal range of motion in the forefoot, hindfoot, and Ankle joint.      Dermatological:  Skin is supple, moist, intact.  No evidence of discoloration the distal aspect the right 2nd toe.  Overlying hyperkeratosis noted.  Small wound noted underlying.  Post debridement, the wound measures 0.2 x 0.4 cm.  Wound does probe to bone The wound depth is 0.5 cm.  There is no signs of abscess.  Serous drainage.  Cellulitis present to the dorsal aspect of the foot and 2nd digit          Assessment:     1. 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 Cellulitis of right foot    3. Type 2 diabetes mellitus with peripheral neuropathy    4. Anticoagulated    5. Increased infection risk    6. 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    MRI scheduled.  Attempting to move up MRI based on scheduling. Does have history of a watchman device which limits the location that this may be completed    Appreciate Infectious Disease to assist with recent culture antibiotics.  Cultures growing MRSA, Klebsiella, and rare  Pseudomonas.   Plan to start him on daptomycin and Zosyn Q 8 via PICC. (currently on amiodarone making Levaquin a less desirable medication for cardiac issues).     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  Did speak to heme/Onc and rad/Onc in regards of the worsening toe cellulitis with the wound.  Would like to continue with his chemotherapy/radiation and treat this conservatively.    Home health to continue with dressings.     Thorough discussion is had with the patient concerning the diagnosis, its etiology, and the treatment algorithm at present. Shoe inspection. Foot Education. Patient reminded of the importance of good nutrition and blood sugar control to help prevent podiatric complications of diabetes. Patient instructed on proper foot hygeine. We discussed wearing proper and supportive shoe gear, daily foot inspections, never walking barefooted or sock footed, never putting sharp instruments to feet which can cause major complications associated with infection, ulcers, lacerations.    Follow-up in 1    Future Appointments   Date Time Provider Department Center   2/19/2025 10:00 AM [id number]   2/21/2025  7:00 AM RHYS MURILLO CC LAB [facility] LAB DS [facility]   2/21/2025  8:00 AM Davonte Medeiros FNP [facility] HEM ONC [facility]   2/21/2025  9:00 AM CHAIR 09 [facility] BRCH INFSN [facility]   2/24/2025  9:00 AM CHAIR 03 [facility] BRCH INFSN [facility]   2/26/2025  5:00 PM [facility]   2/27/2025 11:45 AM Xiao Bowman DPM [facility] POD [facility]   2/28/2025  7:00 AM RHYS MURILLO CC LAB BR[facility] LAB DS [facility]   2/28/2025  8:00 AM Fabián Westbrook MD [facility] HEM ONC [facility]   2/28/2025  9:00 AM CHAIR 13 CH BRCH INFSN [facility]   3/7/2025  9:00 AM Toshia Valladares MD [facility]   3/12/2025  1:00 PM Aroldo Ortiz MD [facility] ARR [facility]   3/21/2025  9:00 AM Carlos Flower MD [facility] CARDIO [facility]    3/24/2025 11:15 AM Medardo Garcia MD [facility] UROLOGY [facility]   3/26/2025 10:20 AM LABORATORY, [facility] LAB [facility]   3/31/2025  9:20 AM Gordo Reyes MD [facility] HEM ONC [facility]   3/31/2025 11:30 AM Martin Villarreal MD, FID [facility] INFDIS  Medical [facility]

## 2025-02-19 ENCOUNTER — HOSPITAL ENCOUNTER (OUTPATIENT)
Dept: RADIOLOGY | Facility: HOSPITAL | Age: 85
Discharge: HOME | End: 2025-02-19
Attending: INTERNAL MEDICINE
Payer: MEDICARE

## 2025-02-19 DIAGNOSIS — L08.9 DIABETIC FOOT INFECTION: ICD-10-CM

## 2025-02-19 DIAGNOSIS — E11.628 DIABETIC FOOT INFECTION: ICD-10-CM

## 2025-02-19 PROCEDURE — 36573 INSJ PICC RS&I 5 YR+: CPT

## 2025-02-19 PROCEDURE — 77014 PR  CT GUIDANCE PLACEMENT RAD THERAPY FIELDS: CPT | Mod: 26,,, | Performed by: SPECIALIST

## 2025-02-19 PROCEDURE — 77386 HC IMRT, COMPLEX: CPT | Performed by: SPECIALIST

## 2025-02-19 PROCEDURE — 77336 RADIATION PHYSICS CONSULT: CPT | Performed by: SPECIALIST

## 2025-02-19 NOTE — DISCHARGE SUMMARY
[facility] (Hospital)  Discharge Note  Short Stay    FL PICC Line Placement w/o Port w/ Img > 3 Y/O      OUTCOME: Patient tolerated treatment/procedure well without complication and is now ready for discharge.    DISPOSITION: Home or Self Care    FINAL DIAGNOSIS:  <principal problem not specified>    FOLLOWUP: In clinic    DISCHARGE INSTRUCTIONS:  No discharge procedures on file.     TIME SPENT ON DISCHARGE: 15 minutes    Pre Op Diagnosis: infection     Post Op Diagnosis: same     Procedure:  PICC line     Procedure performed by: Corina BUCKLEY, Sheri LOVE     Written Informed Consent Obtained: Yes     Specimen Removed:  no    Estimated Blood Loss:  minimal     Findings: no     The patient tolerated the procedure well and there were no complications.      Disposition:  F/U in clinic or with ordering physician    Discharge Instructions:  Keep PICC clean and dry.    Sterile technique was performed in the RUE, lidocaine was used as a local anesthetic.  A 4 Vietnamese DLPP 37 cm was inserted into the brachial v and into the SVC/Right atrium junction.  Pt tolerated the procedure well without immediate complications.  Please see radiologist report for details. F/u with PCP and/or ordering physician. PICC is ready to use

## 2025-02-20 PROCEDURE — 77427 RADIATION TX MANAGEMENT X5: CPT | Mod: ,,, | Performed by: SPECIALIST

## 2025-02-20 PROCEDURE — 77386 HC IMRT, COMPLEX: CPT | Performed by: SPECIALIST

## 2025-02-20 PROCEDURE — 77014 PR  CT GUIDANCE PLACEMENT RAD THERAPY FIELDS: CPT | Mod: 26,,, | Performed by: SPECIALIST

## 2025-02-20 NOTE — ASSESSMENT & PLAN NOTE
Initially Stage II (T2, N0, M0) s/p TURBT x 2, induction BCG. Currently on weekly Cisplatin + XRT.

## 2025-02-20 NOTE — PROGRESS NOTES
Subjective:       Patient ID: Jose Miguel Alvarez Jr. is a 84 y.o. male.    Chief Complaint:   1. Urothelial carcinoma of bladder  Stage II (T2, N0, M0)         Current Treatment:  OP Bladder CISplatin QW + Radiation     Treatment History:  S/p TURBT in 2016        S/p TURBT        Induction BCG    HPI: This is an 84 year old  male with COPD, stage 3 CKD, COPD, afib, hypercholesterolemia, small bowel AVM, and iron deficiency anemia who is seen in Hem/Onc for locally advanced Stage II (T2, N0, M0) urothelial carcinoma of the bladder. He has a long standing history of bladder cancer  treated with serial TURBT since at least 2016. 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 He was treated with serial TURBT and induction BCG. More recently found to have recurrent disease with associtaed right sided hydronephrosis.     He is currently on cisplatin weekly + XRT.     His primary Hematologist/Oncologist is  Dr. Westbrook .    Interval History: Patient presents for follow up on weekly Cisplatin; He is scheduled to receive C1D15 today. He presents with family and has no complaints today. He denies n/v/d/c, tinnitus, and taste changes. He reports a good appetite and normal Bms. WBC, plts, ANC WNL. Anemia stable. CMP with stable CKD. LFTs slightly elevated; will continue to monitor. Will proceed with treatment today and have him follow up in 1 week prior to treatment. He continues daily XRT.     Reviewed labs with patient:   CBC:   Recent Labs   Lab 02/21/25  0705   WBC 5.81   RBC 3.95 L   Hemoglobin 10.6 L   Hematocrit 33.8 L   Platelets 153   MCV 86   MCH 26.8 L   MCHC 31.4 L     CMP:  Recent Labs   Lab 02/21/25  0705   Glucose 219 H   Calcium 9.0   Albumin 2.7 L   Total Protein 6.1   Sodium 140   Potassium 4.4   CO2 26   Chloride 105   BUN 29 H   Creatinine 1.4   Alkaline Phosphatase 73   ALT 69 H   AST 53 H   Total Bilirubin 0.6     Social History[1]  Past Medical History:   Diagnosis Date    Anemia     Anticoagulant  long-term use     Atrial fibrillation     AVM (arteriovenous malformation) of small bowel, 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 Treated with APC.   VCE 4/18/24: positive VCE  SBE 4/22/24: small bowel AVM s/p APC and tattoo at most distal part reached      Bladder cancer     Bladder tumor     CKD (chronic kidney disease), stage III     COPD (chronic obstructive pulmonary disease)     pt denies    Encounter for blood transfusion     Hematuria     History of 2019 novel coronavirus disease (COVID-19)     Hypercholesteremia     Hypertension     Iron deficiency anemia 10/06/2017    Non-pressure chronic ulcer of other part of right foot limited to breakdown of skin 01/26/2023    Stage 3 chronic kidney disease 10/06/2017     Family History   Problem Relation Name Age of Onset    Heart disease Father      Hypertension Father      Heart disease Brother       Past Surgical History:   Procedure Laterality Date    bladder tumor removal      CARDIAC ELECTROPHYSIOLOGY MAPPING AND ABLATION      CARDIOVERSION      several    CATARACT EXTRACTION Bilateral     cataract removal with IOL implants Bilateral     CHOLECYSTECTOMY      CYSTOSCOPIC LITHOLAPAXY N/A 12/28/2021    Procedure: CYSTOLITHOLAPAXY;  Surgeon: Medardo Garcia MD;  Location: [facility] OR;  Service: Urology;  Laterality: N/A;    CYSTOSCOPIC LITHOLAPAXY N/A 5/14/2024    Procedure: CYSTOLITHOLAPAXY;  Surgeon: Medardo Garcia MD;  Location: [facility] OR;  Service: Urology;  Laterality: N/A;    CYSTOSCOPY      CYSTOSCOPY WITH BIOPSY OF BLADDER Right 12/28/2021    Procedure: CYSTOSCOPY, WITH BLADDER BIOPSY, WITH FULGURATION IF INDICATED;  Surgeon: Medardo Garcia MD;  Location: [facility] OR;  Service: Urology;  Laterality: Right;    CYSTOTOMY, WITH FULGURATION AND RADIOACTIVE MATERIAL INSERTION N/A 12/3/2024    Procedure: CYSTOTOMY, WITH FULGURATION AND RADIOACTIVE MATERIAL  INSERTION;  Surgeon: Medardo Garcia MD;  Location: [facility] OR;  Service: Urology;  Laterality: N/A;    ESOPHAGOGASTRODUODENOSCOPY N/A 10/25/2021    Procedure: Small Jason Enteroscopy (SBE);  Surgeon: Isabelle Griffin MD;  Location: [facility] ENDO;  Service: Endoscopy;  Laterality: N/A;    ESOPHAGOGASTRODUODENOSCOPY N/A 12/13/2021    Procedure: EGD (ESOPHAGOGASTRODUODENOSCOPY);  Surgeon: Troy Polanco MD;  Location: [facility] ENDO;  Service: Endoscopy;  Laterality: N/A;    ESOPHAGOGASTRODUODENOSCOPY N/A 4/5/2024    Procedure: EGD (ESOPHAGOGASTRODUODENOSCOPY);  Surgeon: Myrtle Salcedo MD;  Location: [facility] ENDO;  Service: Endoscopy;  Laterality: N/A;    ESOPHAGOGASTRODUODENOSCOPY N/A 4/22/2024    Procedure: EGD (ESOPHAGOGASTRODUODENOSCOPY);  Surgeon: Myrtle Salcedo MD;  Location: [facility] ENDO;  Service: Endoscopy;  Laterality: N/A;  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    EYE SURGERY      INTRALUMINAL GASTROINTESTINAL TRACT IMAGING VIA CAPSULE N/A 12/6/2021    Procedure: IMAGING PROCEDURE, GI TRACT, INTRALUMINAL, VIA CAPSULE;  Surgeon: Larry Jones RN;  Location: [facility] ENDO;  Service: Endoscopy;  Laterality: N/A;    INTRALUMINAL GASTROINTESTINAL TRACT IMAGING VIA CAPSULE N/A 4/18/2024    Procedure: IMAGING PROCEDURE, GI TRACT, INTRALUMINAL, VIA CAPSULE;  Surgeon: Larry Jones RN;  Location: [facility] ENDO;  Service: Endoscopy;  Laterality: N/A;    OCCLUSION OF LEFT ATRIAL APPENDAGE N/A 11/14/2024    Procedure: Left atrial appendage occlusion;  Surgeon: Aroldo Ortiz MD;  Location: [facility] EP LAB;  Service: Cardiology;  Laterality: N/A;  afib, watchman, BSCI, venkatesh, anes, MB, 3prep    SMALL BOWEL ENTEROSCOPY N/A 9/26/2024    Procedure: ENTEROSCOPY--prximal DBE;  Surgeon: Eduardo Ruff MD;  Location: [facility] ENDO;  Service: Endoscopy;  Laterality: N/A;    TRANSESOPHAGEAL ECHOCARDIOGRAPHY N/A 11/14/2024    Procedure: ECHOCARDIOGRAM, TRANSESOPHAGEAL;  Surgeon: Aroldo Martinez MD;  Location:  [facility] EP LAB;  Service: Cardiology;  Laterality: N/A;    TRANSESOPHAGEAL ECHOCARDIOGRAPHY N/A 12/30/2024    Procedure: ECHOCARDIOGRAM, TRANSESOPHAGEAL;  Surgeon: Carlos Flower MD;  Location: [facility] CATH LAB;  Service: Cardiology;  Laterality: N/A;    TRANSURETHRAL RESECTION OF BLADDER TUMOR BY BIPOLAR CAUTERY N/A 7/23/2019    Procedure: TURBT, USING BIPOLAR CAUTERY;  Surgeon: Ritesh Marques MD;  Location: [facility] OR;  Service: Urology;  Laterality: N/A;    TRANSURETHRAL RESECTION OF PROSTATE N/A 5/14/2024    Procedure: TURP (TRANSURETHRAL RESECTION OF PROSTATE);  Surgeon: Medardo Garcia MD;  Location: [facility] OR;  Service: Urology;  Laterality: N/A;    TURBT (TRANSURETHRAL RESECTION OF BLADDER TUMOR) N/A 12/3/2024    Procedure: TURBT (TRANSURETHRAL RESECTION OF BLADDER TUMOR);  Surgeon: Medardo Garcia MD;  Location: [facility] OR;  Service: Urology;  Laterality: N/A;     Review of Systems   Constitutional:  Negative for appetite change and fatigue.   HENT:  Negative for mouth sores, rhinorrhea and sore throat.    Eyes: Negative.    Respiratory: Negative.     Cardiovascular: Negative.    Gastrointestinal:  Negative for constipation, diarrhea, nausea and vomiting.   Endocrine: Negative.    Genitourinary: Negative.    Musculoskeletal: Negative.    Integumentary:  Negative.   Allergic/Immunologic: Negative.    Neurological:  Negative for weakness and numbness.   Hematological: Negative.    Psychiatric/Behavioral: Negative.         Medication List with Changes/Refills   Current Medications    AMIODARONE (PACERONE) 200 MG TAB    Take 1 tablet (200 mg total) by mouth once daily.    AMLODIPINE (NORVASC) 2.5 MG TABLET    Take 1 tablet (2.5 mg total) by mouth once daily.    ASPIRIN (ECOTRIN) 81 MG EC TABLET    Take 1 tablet (81 mg total) by mouth once daily.    ATORVASTATIN (LIPITOR) 20 MG TABLET    TAKE 1 TABLET(20 MG) BY MOUTH EVERY EVENING    CARVEDILOL (COREG) 6.25 MG TABLET    Take 1 tablet (6.25 mg total) by mouth 2  (two) times daily with meals.    CHOLECALCIFEROL, VITAMIN D3, (VITAMIN D3 ORAL)    Take 1 tablet by mouth every other day.    DEXAMETHASONE (DECADRON) 4 MG TAB    Take 2 tablets (8 mg total) by mouth once daily. With food on days 2-4, 9-11, 16-18, 23-25, 30-32, 37-39    DILTIAZEM (CARDIZEM CD) 360 MG 24 HR CAPSULE        DONEPEZIL (ARICEPT) 5 MG TABLET    Take 1 tablet (5 mg total) by mouth once daily.    FEROSUL 325 MG (65 MG IRON) TAB TABLET    Take by mouth.    HYDRALAZINE (APRESOLINE) 50 MG TABLET    Take 1 tablet (50 mg total) by mouth every 8 (eight) hours.    METOPROLOL TARTRATE (LOPRESSOR) 50 MG TABLET        MULTIVITAMIN (THERAGRAN) PER TABLET    Take 1 tablet by mouth once daily.    OLANZAPINE (ZYPREXA) 5 MG TABLET    Take 1 tablet by mouth nightly on days 1-4, 8-11, 15-18, 22-25, 29-32, 36-40    PANTOPRAZOLE (PROTONIX) 20 MG TABLET    Take 2 tablets (40 mg total) by mouth once daily.    PROCHLORPERAZINE (COMPAZINE) 5 MG TABLET    Take 1 tablet (5 mg total) by mouth every 6 (six) hours as needed for Nausea.     Objective:     Vitals:    02/21/25 0819   BP: 107/65   Pulse: 69   Temp: 98.8 °F (37.1 °C)     Physical Exam  Vitals reviewed.   Constitutional:       Appearance: Normal appearance.   HENT:      Head: Normocephalic.      Mouth/Throat:      Comments:     Eyes:      Extraocular Movements: Extraocular movements intact.      Pupils: Pupils are equal, round, and reactive to light.   Cardiovascular:      Rate and Rhythm: Normal rate and regular rhythm.      Heart sounds: Normal heart sounds.   Pulmonary:      Effort: Pulmonary effort is normal.      Breath sounds: Normal breath sounds.   Abdominal:      General: Bowel sounds are normal.      Palpations: Abdomen is soft.      Comments: rounded     Genitourinary:     Comments: deferred    Musculoskeletal:         General: Normal range of motion.      Cervical back: Normal range of motion and neck supple.   Skin:     General: Skin is warm and dry.    Neurological:      Mental Status: He is alert and oriented to person, place, and time.   Psychiatric:         Behavior: Behavior normal.         Thought Content: Thought content normal.        (1) Restricted in physically strenuous activity, ambulatory and able to do work of light nature  Assessment:     Problem List Items Addressed This Visit          Oncology    Urothelial carcinoma of bladder - Primary    Initially Stage II (T2, N0, M0) s/p TURBT x 2, induction BCG. Currently on weekly Cisplatin + XRT.           Plan:     Urothelial carcinoma of bladder    Labs reviewed.   Ok to proceed with C1D15 of Cisplatin today.  Follow up in 1 week with  Mg, CBC, and Comprehensive Metabolic Panel prior to C1D22.  Patient to follow up with Dr. Westbrook in 2 weeks.     Route Chart for Scheduling    Med Onc Chart Routing      Follow up with physician 2 weeks. Dr. Westbrook   Follow up with [facility] 1 week.   Infusion scheduling note   in 1 week for C1D22 Cisplatin   Injection scheduling note    Labs CBC, CMP and magnesium   Scheduling:  Preferred lab:  Lab interval:  in 1 week   Imaging None      Pharmacy appointment No pharmacy appointment needed      Other referrals       No additional referrals needed         I will review assessment/plan with collaborating physician.      OZIEL Hernandez           [1]  Social History  Socioeconomic History    Marital status:    Tobacco Use    Smoking status: Former     Current packs/day: 0.00     Average packs/day: 2.5 packs/day for 20.0 years (50.0 ttl pk-yrs)     Types: Cigarettes     Start date:      Quit date:      Years since quittin.1     Passive exposure: Never    Smokeless tobacco: Never   Substance and Sexual Activity    Alcohol use: Yes     Alcohol/week: 7.0 standard drinks of alcohol     Types: 7 Cans of beer per week     Comment: occasionally: hold 72 hrs    Drug use: No    Sexual activity: Not Currently     Social Drivers of Health     Financial  Resource Strain: Low Risk  (1/13/2025)    Overall Financial Resource Strain (CARDIA)     Difficulty of Paying Living Expenses: Not hard at all   Food Insecurity: No Food Insecurity (1/13/2025)    Hunger Vital Sign     Worried About Running Out of Food in the Last Year: Never true     Ran Out of Food in the Last Year: Never true   Transportation Needs: No Transportation Needs (8/23/2024)    TRANSPORTATION NEEDS     Transportation : No   Physical Activity: Insufficiently Active (1/13/2025)    Exercise Vital Sign     Days of Exercise per Week: 2 days     Minutes of Exercise per Session: 30 min   Stress: No Stress Concern Present (1/13/2025)    [employer] [place] [employer] - Occupational Stress Questionnaire     Feeling of Stress : Not at all   Housing Stability: Unknown (1/13/2025)    Housing Stability Vital Sign     Unable to Pay for Housing in the Last Year: No     Homeless in the Last Year: No

## 2025-02-21 ENCOUNTER — LAB VISIT (OUTPATIENT)
Dept: LAB | Facility: HOSPITAL | Age: 85
End: 2025-02-21
Attending: HOSPITALIST
Payer: MEDICARE

## 2025-02-21 ENCOUNTER — INFUSION (OUTPATIENT)
Dept: INFUSION THERAPY | Facility: HOSPITAL | Age: 85
End: 2025-02-21
Attending: NURSE PRACTITIONER
Payer: MEDICARE

## 2025-02-21 ENCOUNTER — OFFICE VISIT (OUTPATIENT)
Dept: HEMATOLOGY/ONCOLOGY | Facility: CLINIC | Age: 85
End: 2025-02-21
Payer: MEDICARE

## 2025-02-21 VITALS
RESPIRATION RATE: 17 BRPM | SYSTOLIC BLOOD PRESSURE: 123 MMHG | WEIGHT: 196.13 LBS | BODY MASS INDEX: 29.05 KG/M2 | DIASTOLIC BLOOD PRESSURE: 75 MMHG | OXYGEN SATURATION: 97 % | HEIGHT: 69 IN | HEART RATE: 54 BPM | TEMPERATURE: 97 F

## 2025-02-21 VITALS
BODY MASS INDEX: 29.05 KG/M2 | OXYGEN SATURATION: 97 % | TEMPERATURE: 99 F | HEIGHT: 69 IN | HEART RATE: 69 BPM | SYSTOLIC BLOOD PRESSURE: 107 MMHG | WEIGHT: 196.13 LBS | DIASTOLIC BLOOD PRESSURE: 65 MMHG

## 2025-02-21 DIAGNOSIS — C67.9 UROTHELIAL CARCINOMA OF BLADDER: ICD-10-CM

## 2025-02-21 DIAGNOSIS — C67.9 UROTHELIAL CARCINOMA OF BLADDER: Primary | ICD-10-CM

## 2025-02-21 DIAGNOSIS — C67.9 MALIGNANT NEOPLASM OF URINARY BLADDER, UNSPECIFIED SITE: ICD-10-CM

## 2025-02-21 DIAGNOSIS — D84.821 IMMUNODEFICIENCY DUE TO CHEMOTHERAPY: ICD-10-CM

## 2025-02-21 DIAGNOSIS — T45.1X5A IMMUNODEFICIENCY DUE TO CHEMOTHERAPY: ICD-10-CM

## 2025-02-21 DIAGNOSIS — Z79.899 IMMUNODEFICIENCY DUE TO CHEMOTHERAPY: ICD-10-CM

## 2025-02-21 LAB
ALBUMIN SERPL BCP-MCNC: 2.7 G/DL (ref 3.5–5.2)
ALP SERPL-CCNC: 73 U/L (ref 40–150)
ALT SERPL W/O P-5'-P-CCNC: 69 U/L (ref 10–44)
ANION GAP SERPL CALC-SCNC: 9 MMOL/L (ref 8–16)
AST SERPL-CCNC: 53 U/L (ref 10–40)
BILIRUB SERPL-MCNC: 0.6 MG/DL (ref 0.1–1)
BUN SERPL-MCNC: 29 MG/DL (ref 8–23)
CALCIUM SERPL-MCNC: 9 MG/DL (ref 8.7–10.5)
CHLORIDE SERPL-SCNC: 105 MMOL/L (ref 95–110)
CO2 SERPL-SCNC: 26 MMOL/L (ref 23–29)
CREAT SERPL-MCNC: 1.4 MG/DL (ref 0.5–1.4)
ERYTHROCYTE [DISTWIDTH] IN BLOOD BY AUTOMATED COUNT: 19.3 % (ref 11.5–14.5)
EST. GFR  (NO RACE VARIABLE): 49 ML/MIN/1.73 M^2
GLUCOSE SERPL-MCNC: 219 MG/DL (ref 70–110)
HCT VFR BLD AUTO: 33.8 % (ref 40–54)
HGB BLD-MCNC: 10.6 G/DL (ref 14–18)
IMM GRANULOCYTES # BLD AUTO: 0.04 K/UL (ref 0–0.04)
MAGNESIUM SERPL-MCNC: 1.7 MG/DL (ref 1.6–2.6)
MCH RBC QN AUTO: 26.8 PG (ref 27–31)
MCHC RBC AUTO-ENTMCNC: 31.4 G/DL (ref 32–36)
MCV RBC AUTO: 86 FL (ref 82–98)
NEUTROPHILS # BLD AUTO: 4.7 K/UL (ref 1.8–7.7)
PLATELET # BLD AUTO: 153 K/UL (ref 150–450)
PMV BLD AUTO: 9.9 FL (ref 9.2–12.9)
POTASSIUM SERPL-SCNC: 4.4 MMOL/L (ref 3.5–5.1)
PROT SERPL-MCNC: 6.1 G/DL (ref 6–8.4)
RBC # BLD AUTO: 3.95 M/UL (ref 4.6–6.2)
SODIUM SERPL-SCNC: 140 MMOL/L (ref 136–145)
WBC # BLD AUTO: 5.81 K/UL (ref 3.9–12.7)

## 2025-02-21 PROCEDURE — 83735 ASSAY OF MAGNESIUM: CPT

## 2025-02-21 PROCEDURE — 85027 COMPLETE CBC AUTOMATED: CPT | Performed by: HOSPITALIST

## 2025-02-21 PROCEDURE — 36415 COLL VENOUS BLD VENIPUNCTURE: CPT | Performed by: HOSPITALIST

## 2025-02-21 PROCEDURE — 77386 HC IMRT, COMPLEX: CPT | Performed by: SPECIALIST

## 2025-02-21 PROCEDURE — 77014 PR  CT GUIDANCE PLACEMENT RAD THERAPY FIELDS: CPT | Mod: 26,,, | Performed by: SPECIALIST

## 2025-02-21 PROCEDURE — 96413 CHEMO IV INFUSION 1 HR: CPT

## 2025-02-21 PROCEDURE — 25000003 PHARM REV CODE 250: Performed by: NURSE PRACTITIONER

## 2025-02-21 PROCEDURE — 25000003 PHARM REV CODE 250: Performed by: INTERNAL MEDICINE

## 2025-02-21 PROCEDURE — 63600175 PHARM REV CODE 636 W HCPCS: Performed by: NURSE PRACTITIONER

## 2025-02-21 PROCEDURE — 63600175 PHARM REV CODE 636 W HCPCS: Performed by: INTERNAL MEDICINE

## 2025-02-21 PROCEDURE — 96367 TX/PROPH/DG ADDL SEQ IV INF: CPT

## 2025-02-21 PROCEDURE — 80053 COMPREHEN METABOLIC PANEL: CPT | Performed by: HOSPITALIST

## 2025-02-21 RX ORDER — HEPARIN 100 UNIT/ML
500 SYRINGE INTRAVENOUS
Status: DISCONTINUED | OUTPATIENT
Start: 2025-02-21 | End: 2025-02-21 | Stop reason: HOSPADM

## 2025-02-21 RX ADMIN — HEPARIN SODIUM (PORCINE) LOCK FLUSH IV SOLN 100 UNIT/ML 500 UNITS: 100 SOLUTION at 12:02

## 2025-02-21 RX ADMIN — MAGNESIUM SULFATE HEPTAHYDRATE 500 ML/HR: 500 INJECTION, SOLUTION INTRAMUSCULAR; INTRAVENOUS at 09:02

## 2025-02-21 RX ADMIN — FOSAPREPITANT DIMEGLUMINE 150 MG: 150 INJECTION, POWDER, LYOPHILIZED, FOR SOLUTION INTRAVENOUS at 10:02

## 2025-02-21 RX ADMIN — CISPLATIN 39 MG: 1 INJECTION, SOLUTION INTRAVENOUS at 11:02

## 2025-02-21 RX ADMIN — SODIUM CHLORIDE 0.25 MG: 9 INJECTION, SOLUTION INTRAVENOUS at 10:02

## 2025-02-21 NOTE — NURSING
Pt tolerated Cisplatin well. No adverse reaction noted. Pt education reinforced on chemo regimen, side effects, what to expect, and when to call his provider. Pt verbalized understanding. I reviewed pt calendar w/ pt and understanding verbalized. Upon discharge, Right upper arm PICC flushed w/ NS and heparin per protocol.

## 2025-02-21 NOTE — PLAN OF CARE
Problem: Adult Inpatient Plan of Care  Goal: Plan of Care Review  Outcome: Progressing  Flowsheets (Taken 2/21/2025 0916)  Plan of Care Reviewed With:   patient   child  Goal: Patient-Specific Goal (Individualized)  Outcome: Progressing  Flowsheets (Taken 2/21/2025 0916)  Individualized Care Needs: recline with blanket and pillow, snack and drink  Anxieties, Fears or Concerns: none today  Goal: Absence of Hospital-Acquired Illness or Injury  Outcome: Progressing  Intervention: Prevent Infection  Flowsheets (Taken 2/21/2025 0916)  Infection Prevention:   equipment surfaces disinfected   hand hygiene promoted   personal protective equipment utilized   rest/sleep promoted  Goal: Optimal Comfort and Wellbeing  Outcome: Progressing  Intervention: Provide Person-Centered Care  Flowsheets (Taken 2/21/2025 0916)  Trust Relationship/Rapport:   care explained   thoughts/feelings acknowledged   choices provided   emotional support provided   empathic listening provided   questions answered   questions encouraged   reassurance provided     Problem: Chemotherapy Effects  Goal: Safety Maintained  Outcome: Progressing  Intervention: Promote Safe Chemotherapy Delivery  Flowsheets (Taken 2/21/2025 0916)  Infection Prevention:   equipment surfaces disinfected   hand hygiene promoted   personal protective equipment utilized   rest/sleep promoted  Chemotherapy Environmental Safety:   chemotherapy waste containers in room   protective environment maintained   meticulous hand hygiene promoted   patient environment monitored for safety   personal protective equipment utilized  Goal: Absence of Infection  Outcome: Progressing  Intervention: Prevent Infection and Maximize Resistance  Flowsheets (Taken 2/21/2025 0916)  Infection Prevention:   equipment surfaces disinfected   hand hygiene promoted   personal protective equipment utilized   rest/sleep promoted     Problem: Fall Injury Risk  Goal: Absence of Fall and Fall-Related  Injury  Outcome: Progressing  Intervention: Promote Injury-Free Environment  Flowsheets (Taken 2/21/2025 6937)  Safety Promotion/Fall Prevention:   assistive device/personal item within reach   patient expresses understanding of fall risk and prevention   in recliner, wheels locked   instructed to call staff for mobility   medications reviewed

## 2025-02-24 ENCOUNTER — PATIENT MESSAGE (OUTPATIENT)
Dept: ADMINISTRATIVE | Facility: OTHER | Age: 85
End: 2025-02-24
Payer: MEDICARE

## 2025-02-24 ENCOUNTER — INFUSION (OUTPATIENT)
Dept: INFUSION THERAPY | Facility: HOSPITAL | Age: 85
End: 2025-02-24
Attending: NURSE PRACTITIONER
Payer: MEDICARE

## 2025-02-24 ENCOUNTER — DOCUMENTATION ONLY (OUTPATIENT)
Dept: RADIATION ONCOLOGY | Facility: CLINIC | Age: 85
End: 2025-02-24
Payer: MEDICARE

## 2025-02-24 VITALS
BODY MASS INDEX: 29.78 KG/M2 | OXYGEN SATURATION: 98 % | HEART RATE: 50 BPM | HEIGHT: 69 IN | RESPIRATION RATE: 18 BRPM | WEIGHT: 201.06 LBS | DIASTOLIC BLOOD PRESSURE: 75 MMHG | SYSTOLIC BLOOD PRESSURE: 137 MMHG | TEMPERATURE: 97 F

## 2025-02-24 DIAGNOSIS — D50.0 IRON DEFICIENCY ANEMIA SECONDARY TO BLOOD LOSS (CHRONIC): Primary | ICD-10-CM

## 2025-02-24 PROCEDURE — 77014 PR  CT GUIDANCE PLACEMENT RAD THERAPY FIELDS: CPT | Mod: 26,,, | Performed by: SPECIALIST

## 2025-02-24 PROCEDURE — 96374 THER/PROPH/DIAG INJ IV PUSH: CPT

## 2025-02-24 PROCEDURE — 63600175 PHARM REV CODE 636 W HCPCS: Mod: JZ,TB | Performed by: NURSE PRACTITIONER

## 2025-02-24 PROCEDURE — 77386 HC IMRT, COMPLEX: CPT | Performed by: SPECIALIST

## 2025-02-24 PROCEDURE — 96375 TX/PRO/DX INJ NEW DRUG ADDON: CPT

## 2025-02-24 RX ORDER — SODIUM CHLORIDE 0.9 % (FLUSH) 0.9 %
10 SYRINGE (ML) INJECTION
OUTPATIENT
Start: 2025-03-03

## 2025-02-24 RX ORDER — EPINEPHRINE 0.3 MG/.3ML
0.3 INJECTION SUBCUTANEOUS ONCE AS NEEDED
OUTPATIENT
Start: 2025-03-03

## 2025-02-24 RX ORDER — DIPHENHYDRAMINE HYDROCHLORIDE 50 MG/ML
50 INJECTION INTRAMUSCULAR; INTRAVENOUS ONCE AS NEEDED
OUTPATIENT
Start: 2025-03-03

## 2025-02-24 RX ORDER — METHYLPREDNISOLONE SOD SUCC 125 MG
40 VIAL (EA) INJECTION
Status: CANCELLED
Start: 2025-03-03

## 2025-02-24 RX ORDER — SODIUM CHLORIDE 0.9 % (FLUSH) 0.9 %
10 SYRINGE (ML) INJECTION
Status: DISCONTINUED | OUTPATIENT
Start: 2025-02-24 | End: 2025-02-24 | Stop reason: HOSPADM

## 2025-02-24 RX ORDER — METHYLPREDNISOLONE SOD SUCC 125 MG
40 VIAL (EA) INJECTION
Status: COMPLETED | OUTPATIENT
Start: 2025-02-24 | End: 2025-02-24

## 2025-02-24 RX ADMIN — IRON SUCROSE 200 MG: 20 INJECTION, SOLUTION INTRAVENOUS at 09:02

## 2025-02-24 RX ADMIN — METHYLPREDNISOLONE SODIUM SUCCINATE 40 MG: 125 INJECTION, POWDER, FOR SOLUTION INTRAMUSCULAR; INTRAVENOUS at 09:02

## 2025-02-24 NOTE — DISCHARGE INSTRUCTIONS
[facility]  [address] [facility]  [address]  [phone] phone     [phone] fax  Hours of Operation: Monday- Friday 8:00am- 5:00pm  After hours phone  [phone]  Hematology / Oncology Physicians on call      KATE France Dr., NP Phaon Dunbar, NP Khelsea Conley, FNP    Please call with any concerns regarding your appointment today.

## 2025-02-24 NOTE — PLAN OF CARE
Problem: Adult Inpatient Plan of Care  Goal: Plan of Care Review  Outcome: Progressing  Flowsheets (Taken 2/24/2025 0925)  Plan of Care Reviewed With:   patient   family  Goal: Patient-Specific Goal (Individualized)  Outcome: Progressing  Flowsheets (Taken 2/24/2025 0925)  Individualized Care Needs: pt likes feet up, warm blanket and son in law at bedside  Anxieties, Fears or Concerns: pt states he has diarrhea due to medications and being treated for wound on right toe  Patient/Family-Specific Goals (Include Timeframe): pt to tolerate venofer infusion with no reactions     Problem: Anemia  Goal: Anemia Symptom Improvement  Outcome: Progressing

## 2025-02-25 PROCEDURE — 77014 PR  CT GUIDANCE PLACEMENT RAD THERAPY FIELDS: CPT | Mod: 26,,, | Performed by: SPECIALIST

## 2025-02-25 PROCEDURE — 77386 HC IMRT, COMPLEX: CPT | Performed by: SPECIALIST

## 2025-02-26 ENCOUNTER — HOSPITAL ENCOUNTER (OUTPATIENT)
Dept: RADIOLOGY | Facility: HOSPITAL | Age: 85
Discharge: HOME | End: 2025-02-26
Attending: PODIATRIST
Payer: MEDICARE

## 2025-02-26 DIAGNOSIS — N18.30 STAGE 3 CHRONIC KIDNEY DISEASE, UNSPECIFIED WHETHER STAGE 3A OR 3B CKD: ICD-10-CM

## 2025-02-26 DIAGNOSIS — E08.621 DIABETIC ULCER OF TOE OF RIGHT FOOT ASSOCIATED WITH DIABETES MELLITUS DUE TO UNDERLYING CONDITION, WITH BONE INVOLVEMENT WITHOUT EVIDENCE OF NECROSIS: ICD-10-CM

## 2025-02-26 DIAGNOSIS — L97.516 DIABETIC ULCER OF TOE OF RIGHT FOOT ASSOCIATED WITH DIABETES MELLITUS DUE TO UNDERLYING CONDITION, WITH BONE INVOLVEMENT WITHOUT EVIDENCE OF NECROSIS: ICD-10-CM

## 2025-02-26 PROCEDURE — 73720 MRI LWR EXTREMITY W/O&W/DYE: CPT | Mod: 26,RT,, | Performed by: RADIOLOGY

## 2025-02-26 PROCEDURE — 25500020 PHARM REV CODE 255: Performed by: PODIATRIST

## 2025-02-26 PROCEDURE — 77386 HC IMRT, COMPLEX: CPT | Performed by: SPECIALIST

## 2025-02-26 PROCEDURE — 77336 RADIATION PHYSICS CONSULT: CPT | Performed by: SPECIALIST

## 2025-02-26 PROCEDURE — 73720 MRI LWR EXTREMITY W/O&W/DYE: CPT | Mod: TC,RT

## 2025-02-26 PROCEDURE — A9585 GADOBUTROL INJECTION: HCPCS | Performed by: PODIATRIST

## 2025-02-26 PROCEDURE — 77014 PR  CT GUIDANCE PLACEMENT RAD THERAPY FIELDS: CPT | Mod: 26,,, | Performed by: SPECIALIST

## 2025-02-26 RX ORDER — GADOBUTROL 604.72 MG/ML
10 INJECTION INTRAVENOUS
Status: COMPLETED | OUTPATIENT
Start: 2025-02-26 | End: 2025-02-26

## 2025-02-26 RX ADMIN — GADOBUTROL 9 ML: 604.72 INJECTION INTRAVENOUS at 06:02

## 2025-02-27 ENCOUNTER — OFFICE VISIT (OUTPATIENT)
Dept: PODIATRY | Facility: CLINIC | Age: 85
End: 2025-02-27
Payer: MEDICARE

## 2025-02-27 DIAGNOSIS — C67.9 UROTHELIAL CARCINOMA OF BLADDER: ICD-10-CM

## 2025-02-27 DIAGNOSIS — E11.42 TYPE 2 DIABETES MELLITUS WITH PERIPHERAL NEUROPATHY: ICD-10-CM

## 2025-02-27 DIAGNOSIS — Z79.01 ANTICOAGULATED: ICD-10-CM

## 2025-02-27 DIAGNOSIS — E08.621 DIABETIC ULCER OF TOE OF RIGHT FOOT ASSOCIATED WITH DIABETES MELLITUS DUE TO UNDERLYING CONDITION, WITH BONE INVOLVEMENT WITHOUT EVIDENCE OF NECROSIS: ICD-10-CM

## 2025-02-27 DIAGNOSIS — Z91.89 INCREASED INFECTION RISK: ICD-10-CM

## 2025-02-27 DIAGNOSIS — L97.516 DIABETIC ULCER OF TOE OF RIGHT FOOT ASSOCIATED WITH DIABETES MELLITUS DUE TO UNDERLYING CONDITION, WITH BONE INVOLVEMENT WITHOUT EVIDENCE OF NECROSIS: ICD-10-CM

## 2025-02-27 DIAGNOSIS — M86.9 OSTEOMYELITIS OF SECOND TOE OF RIGHT FOOT: Primary | ICD-10-CM

## 2025-02-27 PROCEDURE — 77386 HC IMRT, COMPLEX: CPT | Performed by: SPECIALIST

## 2025-02-27 PROCEDURE — 77014 PR  CT GUIDANCE PLACEMENT RAD THERAPY FIELDS: CPT | Mod: 26,,, | Performed by: SPECIALIST

## 2025-02-27 PROCEDURE — 99999 PR PBB SHADOW E&M-EST. PATIENT-LVL III: CPT | Mod: PBBFAC,,, | Performed by: PODIATRIST

## 2025-02-27 NOTE — PROGRESS NOTES
\  Subjective:       Patient ID: Jose Miguel Alvarez Jr. is a 84 y.o. male.    Chief Complaint: Diabetic Foot Ulcer (Right toe: c/o denies pain, pt is diabetic, pt is doing the IV Medication )    HPI: Jose Miguel Alvarez Jr. presents to the office today with a small wound to the distal aspect of the right 2nd toe.  Continues to follow with Infectious Disease due to osteomyelitis of the 2nd digit.  Continues to be treated with chemotherapy and radiation for bladder cancer. Presents to clinic today with daughter-in-law present who is able to maintain his schedule for appointments.      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 Treated with APC.   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 breakdown of skin 01/26/2023    Stage 3 chronic kidney disease 10/06/2017       Family History   Problem Relation Name Age of Onset    Heart disease Father      Hypertension Father      Heart disease Brother         Social History     Socioeconomic History    Marital status:    Tobacco Use    Smoking status: Former     Current packs/day: 0.00     Average packs/day: 2.5 packs/day for 20.0 years (50.0 ttl pk-yrs)     Types: Cigarettes     Start date: 1967      Quit date:      Years since quittin.1     Passive exposure: Never    Smokeless tobacco: Never   Substance and Sexual Activity    Alcohol use: Yes     Alcohol/week: 7.0 standard drinks of alcohol     Types: 7 Cans of beer per week     Comment: occasionally: hold 72 hrs    Drug use: No    Sexual activity: Not Currently     Social Drivers of Health     Financial Resource Strain: Low Risk  (2025)    Overall Financial Resource Strain (CARDIA)     Difficulty of Paying Living Expenses: Not hard at all   Food Insecurity: No Food Insecurity (2025)    Hunger Vital Sign     Worried About Running Out of Food in the Last Year: Never true     Ran Out of Food in the Last Year: Never true   Transportation Needs: No Transportation Needs (2024)    TRANSPORTATION NEEDS     Transportation : No   Physical Activity: Insufficiently Active (2025)    Exercise Vital Sign     Days of Exercise per Week: 2 days     Minutes of Exercise per Session: 30 min   Stress: No Stress Concern Present (2025)    [employer] [place] [employer] - Occupational Stress Questionnaire     Feeling of Stress : Not at all   Housing Stability: Unknown (2025)    Housing Stability Vital Sign     Unable to Pay for Housing in the Last Year: No     Homeless in the Last Year: No       Past Surgical History:   Procedure Laterality Date    bladder tumor removal      CARDIAC ELECTROPHYSIOLOGY MAPPING AND ABLATION      CARDIOVERSION      several    CATARACT EXTRACTION Bilateral     cataract removal with IOL implants Bilateral     CHOLECYSTECTOMY      CYSTOSCOPIC LITHOLAPAXY N/A 2021    Procedure: CYSTOLITHOLAPAXY;  Surgeon: Medardo Garcia MD;  Location: [facility] OR;  Service: Urology;  Laterality: N/A;    CYSTOSCOPIC LITHOLAPAXY N/A 2024    Procedure: CYSTOLITHOLAPAXY;  Surgeon: Medardo Garcia MD;  Location: [facility] OR;  Service: Urology;  Laterality: N/A;    CYSTOSCOPY      CYSTOSCOPY WITH BIOPSY OF BLADDER  Right 12/28/2021    Procedure: CYSTOSCOPY, WITH BLADDER BIOPSY, WITH FULGURATION IF INDICATED;  Surgeon: Medardo Garcia MD;  Location: [facility] OR;  Service: Urology;  Laterality: Right;    CYSTOTOMY, WITH FULGURATION AND RADIOACTIVE MATERIAL INSERTION N/A 12/3/2024    Procedure: CYSTOTOMY, WITH FULGURATION AND RADIOACTIVE MATERIAL INSERTION;  Surgeon: Medardo Garcia MD;  Location: [facility] OR;  Service: Urology;  Laterality: N/A;    ESOPHAGOGASTRODUODENOSCOPY N/A 10/25/2021    Procedure: Small Jason Enteroscopy (SBE);  Surgeon: Isabelle Griffin MD;  Location: [facility] ENDO;  Service: Endoscopy;  Laterality: N/A;    ESOPHAGOGASTRODUODENOSCOPY N/A 12/13/2021    Procedure: EGD (ESOPHAGOGASTRODUODENOSCOPY);  Surgeon: Troy Polanco MD;  Location: [facility] ENDO;  Service: Endoscopy;  Laterality: N/A;    ESOPHAGOGASTRODUODENOSCOPY N/A 4/5/2024    Procedure: EGD (ESOPHAGOGASTRODUODENOSCOPY);  Surgeon: Myrtle Salcedo MD;  Location: [facility];  Service: Endoscopy;  Laterality: N/A;    ESOPHAGOGASTRODUODENOSCOPY N/A 4/22/2024    Procedure: EGD (ESOPHAGOGASTRODUODENOSCOPY);  Surgeon: Myrtle Salcedo MD;  Location: [facility] ENDO;  Service: Endoscopy;  Laterality: N/A;  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    EYE SURGERY      INTRALUMINAL GASTROINTESTINAL TRACT IMAGING VIA CAPSULE N/A 12/6/2021    Procedure: IMAGING PROCEDURE, GI TRACT, INTRALUMINAL, VIA CAPSULE;  Surgeon: Larry Jones RN;  Location: [facility] ENDO;  Service: Endoscopy;  Laterality: N/A;    INTRALUMINAL GASTROINTESTINAL TRACT IMAGING VIA CAPSULE N/A 4/18/2024    Procedure: IMAGING PROCEDURE, GI TRACT, INTRALUMINAL, VIA CAPSULE;  Surgeon: Larry Jones RN;  Location: [facility] ENDO;  Service: Endoscopy;  Laterality: N/A;    OCCLUSION OF LEFT ATRIAL APPENDAGE N/A 11/14/2024    Procedure: Left atrial appendage occlusion;  Surgeon: Aroldo Ortiz MD;  Location: [facility] EP LAB;  Service: Cardiology;  Laterality: N/A;  afib, watchman, BSCI, venkatesh, anes,  MB, 3prep    SMALL BOWEL ENTEROSCOPY N/A 9/26/2024    Procedure: ENTEROSCOPY--prximal DBE;  Surgeon: Eduardo Ruff MD;  Location: [facility] ENDO;  Service: Endoscopy;  Laterality: N/A;    TRANSESOPHAGEAL ECHOCARDIOGRAPHY N/A 11/14/2024    Procedure: ECHOCARDIOGRAM, TRANSESOPHAGEAL;  Surgeon: Aroldo Martinez MD;  Location: [facility] EP LAB;  Service: Cardiology;  Laterality: N/A;    TRANSESOPHAGEAL ECHOCARDIOGRAPHY N/A 12/30/2024    Procedure: ECHOCARDIOGRAM, TRANSESOPHAGEAL;  Surgeon: Carlos Flower MD;  Location: [facility] CATH LAB;  Service: Cardiology;  Laterality: N/A;    TRANSURETHRAL RESECTION OF BLADDER TUMOR BY BIPOLAR CAUTERY N/A 7/23/2019    Procedure: TURBT, USING BIPOLAR CAUTERY;  Surgeon: Ritesh Marques MD;  Location: [facility] OR;  Service: Urology;  Laterality: N/A;    TRANSURETHRAL RESECTION OF PROSTATE N/A 5/14/2024    Procedure: TURP (TRANSURETHRAL RESECTION OF PROSTATE);  Surgeon: Medardo Garcia MD;  Location: [facility] OR;  Service: Urology;  Laterality: N/A;    TURBT (TRANSURETHRAL RESECTION OF BLADDER TUMOR) N/A 12/3/2024    Procedure: TURBT (TRANSURETHRAL RESECTION OF BLADDER TUMOR);  Surgeon: Medardo Garcia MD;  Location: [facility] OR;  Service: Urology;  Laterality: N/A;     Review of Systems     Objective:   There were no vitals taken for this visit.    MRI Foot Toes W WO Contrast Right  Narrative: EXAM: MRI FOOT TOES W WO CONTRAST RIGHT    CLINICAL HISTORY:  Foot swelling.  Diabetes.  Possible osteomyelitis.  Soft tissue ulceration.    TECHNIQUE: Standard multiplanar pulse sequences obtained without and with IV contrast.    COMPARISON: None.    FINDINGS:    Small, focal soft tissue ulceration of the distal second toe.  Underlying severe soft tissue edema and soft tissue swelling.  Fluid tracks from the wound to surround the distal phalanx.  Severe bone marrow edema throughout the distal phalanx with early bony erosive change of the patella.  Findings consistent with osteomyelitis.  Proximal phalanx  is normal with normal marrow.  Middle phalanx congenitally absent.  Remaining toes are normal with normal marrow.    Scattered degenerative changes with severe osteoarthritis of the first through fourth TMT joints.  Low-grade reactive marrow edema punctate subchondral cysts.  Scattered osteophytes.  Severe osteoarthritis of the navicular-cuneiform joint.  Moderate to severe first MTP chondral thinning.  Remaining joint spaces the forefoot demonstrate low-grade scattered degenerative change.    Partial tearing flexor tendon second toe.  Remaining tendons of the foot are intact.    Plantar fascia normal.  Severe fatty atrophy of the musculature of the foot with mild dorsal and and plantar subcutaneous soft tissue edema.  Impression: Osteomyelitis distal phalanx second toe with overlying distal soft tissue wound.    Advanced scattered degenerative changes.    Finalized on: 2/27/2025 8:54 AM By:  Guido Lagos MD  [facility]# [id number]      2025-02-27 08:57:[id number]     [facility]     Physical Exam   LOWER EXTREMITY PHYSICAL EXAMINATION    Vascular:  The right dorsalis pedis pulse 2/4 and the right posterior tibial pulse 2/4.   Capillary refill is intact.  Pedal hair growth intact    Musculoskeletal: Manual Muscle Testing is 5/5 with dorsiflexion, plantar flexion, abduction, and adduction.   There is normal range of motion in the forefoot, hindfoot, and Ankle joint.      Dermatological:  Skin is supple, moist, intact.  No evidence of discoloration the distal aspect the right 2nd toe.  No significant hyperkeratosis noted.  Wound measures 0.5 cm x 0.1 cm.  Wound depth 0.4 cm.  Significant improvement in cellulitis .  No signs of underlying abscess.       Assessment:     1. Osteomyelitis of second toe of right foot    2. Diabetic ulcer of toe of right foot associated with diabetes mellitus due to underlying condition, with bone involvement without evidence of necrosis    3. Type 2 diabetes mellitus with peripheral neuropathy    4.  Anticoagulated    5. Increased infection risk    6. Urothelial carcinoma of bladder        Plan:     Osteomyelitis of second toe of right foot    Diabetic ulcer of toe of right foot associated with diabetes mellitus due to underlying condition, with bone involvement without evidence of necrosis    Type 2 diabetes mellitus with peripheral neuropathy    Anticoagulated    Increased infection risk    Urothelial carcinoma of bladder        Thorough discussion is had with the patient today, concerning the diagnosis, its etiology, and the treatment algorithm at present.    MRI confirming osteomyelitis to the 2nd digit.  Patient will require surgical amputation of the distal phalanx likely involving a partial toe amputation once he is cleared per Hematology/Oncology.  This will remove the infected bone and may also decrease length of time needed for IV antibiotics.     Discuss planning for surgical partial 2nd toe amputation.  Will wait for approval per Hematology/Oncology to ensure appropriate timing of procedure is done.  Likely can be done under a local versus light MAC anesthesia.  Will defer to Anesthesiology at time of procedure    Home health to continue with dressings.     Thorough discussion is had with the patient concerning the diagnosis, its etiology, and the treatment algorithm at present. Shoe inspection. Foot Education. Patient reminded of the importance of good nutrition and blood sugar control to help prevent podiatric complications of diabetes. Patient instructed on proper foot hygeine. We discussed wearing proper and supportive shoe gear, daily foot inspections, never walking barefooted or sock footed, never putting sharp instruments to feet which can cause major complications associated with infection, ulcers, lacerations.    Follow-up in 1 week    Future Appointments   Date Time Provider Department Center   2/28/2025  7:00 AM RHYS MURILLO CC LAB [facility] LAB DS [facility]   2/28/2025  8:00 AM Fabián Westbrook MD  [facility] HEM ONC [facility]   2/28/2025  9:00 AM CHAIR 13 [facility] INFSN [facility]   3/7/2025  9:00 AM Toshia Valladares MD [facility]   3/12/2025  1:00 PM Aroldo Ortiz MD [facility] ARR  [facility]   3/21/2025  9:00 AM Carlos Flower MD [facility] CARDIO  [facility]   3/24/2025 11:15 AM Medardo Garcia MD [facility] UROLOGY [facility]   3/26/2025 10:20 AM LABORATORY, [facility] LAB [facility]   3/31/2025  9:20 AM Gordo Reyes MD [facility] HEM ONC [facility]   3/31/2025 11:30 AM Martin Villarreal MD, [facility] ON INFDIS  Medical C

## 2025-02-28 ENCOUNTER — OFFICE VISIT (OUTPATIENT)
Dept: HEMATOLOGY/ONCOLOGY | Facility: CLINIC | Age: 85
End: 2025-02-28
Payer: MEDICARE

## 2025-02-28 ENCOUNTER — LAB VISIT (OUTPATIENT)
Dept: LAB | Facility: HOSPITAL | Age: 85
End: 2025-02-28
Attending: HOSPITALIST
Payer: MEDICARE

## 2025-02-28 ENCOUNTER — INFUSION (OUTPATIENT)
Dept: INFUSION THERAPY | Facility: HOSPITAL | Age: 85
End: 2025-02-28
Attending: NURSE PRACTITIONER
Payer: MEDICARE

## 2025-02-28 VITALS
OXYGEN SATURATION: 98 % | SYSTOLIC BLOOD PRESSURE: 132 MMHG | RESPIRATION RATE: 18 BRPM | TEMPERATURE: 97 F | DIASTOLIC BLOOD PRESSURE: 73 MMHG | HEART RATE: 57 BPM

## 2025-02-28 VITALS
WEIGHT: 196 LBS | TEMPERATURE: 98 F | SYSTOLIC BLOOD PRESSURE: 139 MMHG | BODY MASS INDEX: 26.55 KG/M2 | DIASTOLIC BLOOD PRESSURE: 81 MMHG | HEART RATE: 70 BPM | OXYGEN SATURATION: 96 % | HEIGHT: 72 IN

## 2025-02-28 DIAGNOSIS — C67.9 UROTHELIAL CARCINOMA OF BLADDER: Primary | ICD-10-CM

## 2025-02-28 DIAGNOSIS — R31.9 HEMATURIA, UNSPECIFIED TYPE: Primary | ICD-10-CM

## 2025-02-28 DIAGNOSIS — D84.821 IMMUNODEFICIENCY DUE TO CHEMOTHERAPY: ICD-10-CM

## 2025-02-28 DIAGNOSIS — M86.471 CHRONIC OSTEOMYELITIS OF RIGHT FOOT WITH DRAINING SINUS: ICD-10-CM

## 2025-02-28 DIAGNOSIS — R31.9 HEMATURIA, UNSPECIFIED TYPE: ICD-10-CM

## 2025-02-28 DIAGNOSIS — T45.1X5A IMMUNODEFICIENCY DUE TO CHEMOTHERAPY: ICD-10-CM

## 2025-02-28 DIAGNOSIS — I10 PRIMARY HYPERTENSION: ICD-10-CM

## 2025-02-28 DIAGNOSIS — C67.9 MALIGNANT NEOPLASM OF URINARY BLADDER, UNSPECIFIED SITE: ICD-10-CM

## 2025-02-28 DIAGNOSIS — C67.9 UROTHELIAL CARCINOMA OF BLADDER: ICD-10-CM

## 2025-02-28 DIAGNOSIS — Z79.899 IMMUNODEFICIENCY DUE TO CHEMOTHERAPY: ICD-10-CM

## 2025-02-28 DIAGNOSIS — E78.5 DM TYPE 2 WITH DIABETIC DYSLIPIDEMIA: ICD-10-CM

## 2025-02-28 DIAGNOSIS — E11.69 DM TYPE 2 WITH DIABETIC DYSLIPIDEMIA: ICD-10-CM

## 2025-02-28 DIAGNOSIS — F02.80 DEMENTIA ASSOCIATED WITH OTHER UNDERLYING DISEASE, WITHOUT BEHAVIORAL DISTURBANCE, PSYCHOTIC DISTURBANCE, MOOD DISTURBANCE, OR ANXIETY, UNSPECIFIED DEMENTIA SEVERITY: ICD-10-CM

## 2025-02-28 DIAGNOSIS — D50.0 IRON DEFICIENCY ANEMIA SECONDARY TO BLOOD LOSS (CHRONIC): ICD-10-CM

## 2025-02-28 LAB
ALBUMIN SERPL BCP-MCNC: 2.9 G/DL (ref 3.5–5.2)
ALP SERPL-CCNC: 81 U/L (ref 40–150)
ALT SERPL W/O P-5'-P-CCNC: 50 U/L (ref 10–44)
ANION GAP SERPL CALC-SCNC: 8 MMOL/L (ref 8–16)
AST SERPL-CCNC: 22 U/L (ref 10–40)
BILIRUB SERPL-MCNC: 0.4 MG/DL (ref 0.1–1)
BUN SERPL-MCNC: 21 MG/DL (ref 8–23)
CALCIUM SERPL-MCNC: 9.3 MG/DL (ref 8.7–10.5)
CHLORIDE SERPL-SCNC: 106 MMOL/L (ref 95–110)
CO2 SERPL-SCNC: 26 MMOL/L (ref 23–29)
CREAT SERPL-MCNC: 1.4 MG/DL (ref 0.5–1.4)
ERYTHROCYTE [DISTWIDTH] IN BLOOD BY AUTOMATED COUNT: 20.6 % (ref 11.5–14.5)
EST. GFR  (NO RACE VARIABLE): 49 ML/MIN/1.73 M^2
GLUCOSE SERPL-MCNC: 169 MG/DL (ref 70–110)
HCT VFR BLD AUTO: 34.8 % (ref 40–54)
HGB BLD-MCNC: 11.1 G/DL (ref 14–18)
IMM GRANULOCYTES # BLD AUTO: 0.06 K/UL (ref 0–0.04)
MAGNESIUM SERPL-MCNC: 1.6 MG/DL (ref 1.6–2.6)
MCH RBC QN AUTO: 27.1 PG (ref 27–31)
MCHC RBC AUTO-ENTMCNC: 31.9 G/DL (ref 32–36)
MCV RBC AUTO: 85 FL (ref 82–98)
NEUTROPHILS # BLD AUTO: 3.6 K/UL (ref 1.8–7.7)
PLATELET # BLD AUTO: 121 K/UL (ref 150–450)
PMV BLD AUTO: 10.3 FL (ref 9.2–12.9)
POTASSIUM SERPL-SCNC: 4 MMOL/L (ref 3.5–5.1)
PROT SERPL-MCNC: 6.2 G/DL (ref 6–8.4)
RBC # BLD AUTO: 4.1 M/UL (ref 4.6–6.2)
SODIUM SERPL-SCNC: 140 MMOL/L (ref 136–145)
WBC # BLD AUTO: 4.69 K/UL (ref 3.9–12.7)

## 2025-02-28 PROCEDURE — 85027 COMPLETE CBC AUTOMATED: CPT | Performed by: HOSPITALIST

## 2025-02-28 PROCEDURE — 36415 COLL VENOUS BLD VENIPUNCTURE: CPT | Performed by: HOSPITALIST

## 2025-02-28 PROCEDURE — 77386 HC IMRT, COMPLEX: CPT | Performed by: SPECIALIST

## 2025-02-28 PROCEDURE — 80053 COMPREHEN METABOLIC PANEL: CPT | Performed by: HOSPITALIST

## 2025-02-28 PROCEDURE — 96367 TX/PROPH/DG ADDL SEQ IV INF: CPT

## 2025-02-28 PROCEDURE — 25000003 PHARM REV CODE 250: Performed by: INTERNAL MEDICINE

## 2025-02-28 PROCEDURE — 99999 PR PBB SHADOW E&M-EST. PATIENT-LVL III: CPT | Mod: PBBFAC,,, | Performed by: HOSPITALIST

## 2025-02-28 PROCEDURE — 87086 URINE CULTURE/COLONY COUNT: CPT | Performed by: HOSPITALIST

## 2025-02-28 PROCEDURE — 63600175 PHARM REV CODE 636 W HCPCS: Performed by: INTERNAL MEDICINE

## 2025-02-28 PROCEDURE — 96413 CHEMO IV INFUSION 1 HR: CPT

## 2025-02-28 PROCEDURE — 25000003 PHARM REV CODE 250: Performed by: HOSPITALIST

## 2025-02-28 PROCEDURE — 63600175 PHARM REV CODE 636 W HCPCS: Performed by: HOSPITALIST

## 2025-02-28 PROCEDURE — 96368 THER/DIAG CONCURRENT INF: CPT

## 2025-02-28 PROCEDURE — 77014 PR  CT GUIDANCE PLACEMENT RAD THERAPY FIELDS: CPT | Mod: 26,,, | Performed by: SPECIALIST

## 2025-02-28 PROCEDURE — 83735 ASSAY OF MAGNESIUM: CPT

## 2025-02-28 PROCEDURE — 81001 URINALYSIS AUTO W/SCOPE: CPT | Performed by: HOSPITALIST

## 2025-02-28 RX ORDER — DIPHENHYDRAMINE HYDROCHLORIDE 50 MG/ML
50 INJECTION INTRAMUSCULAR; INTRAVENOUS ONCE AS NEEDED
Status: DISCONTINUED | OUTPATIENT
Start: 2025-02-28 | End: 2025-02-28 | Stop reason: HOSPADM

## 2025-02-28 RX ORDER — EPINEPHRINE 0.3 MG/.3ML
0.3 INJECTION SUBCUTANEOUS ONCE AS NEEDED
Status: DISCONTINUED | OUTPATIENT
Start: 2025-02-28 | End: 2025-02-28 | Stop reason: HOSPADM

## 2025-02-28 RX ORDER — HEPARIN 100 UNIT/ML
500 SYRINGE INTRAVENOUS
Status: DISCONTINUED | OUTPATIENT
Start: 2025-02-28 | End: 2025-02-28 | Stop reason: HOSPADM

## 2025-02-28 RX ORDER — SODIUM CHLORIDE 0.9 % (FLUSH) 0.9 %
10 SYRINGE (ML) INJECTION
Status: DISCONTINUED | OUTPATIENT
Start: 2025-02-28 | End: 2025-02-28 | Stop reason: HOSPADM

## 2025-02-28 RX ORDER — PROCHLORPERAZINE EDISYLATE 5 MG/ML
5 INJECTION INTRAMUSCULAR; INTRAVENOUS ONCE AS NEEDED
Status: DISCONTINUED | OUTPATIENT
Start: 2025-02-28 | End: 2025-02-28 | Stop reason: HOSPADM

## 2025-02-28 RX ADMIN — MAGNESIUM SULFATE HEPTAHYDRATE 500 ML/HR: 500 INJECTION, SOLUTION INTRAMUSCULAR; INTRAVENOUS at 09:02

## 2025-02-28 RX ADMIN — CISPLATIN 39 MG: 1 INJECTION, SOLUTION INTRAVENOUS at 11:02

## 2025-02-28 RX ADMIN — SODIUM CHLORIDE 0.25 MG: 9 INJECTION, SOLUTION INTRAVENOUS at 10:02

## 2025-02-28 RX ADMIN — FOSAPREPITANT DIMEGLUMINE 150 MG: 150 INJECTION, POWDER, LYOPHILIZED, FOR SOLUTION INTRAVENOUS at 10:02

## 2025-02-28 RX ADMIN — HEPARIN SODIUM (PORCINE) LOCK FLUSH IV SOLN 100 UNIT/ML 500 UNITS: 100 SOLUTION at 12:02

## 2025-02-28 NOTE — ASSESSMENT & PLAN NOTE
Tolerating CRT quite well. Has some modets hematuria and dyuria. Will check UA/UCX. Otherwise proceed with last weekly cisplatin today. [id number] RT middle of next week.  - Last cisplatin today  - Finishing RT  - Has FU in 4 weeks with urology for local surveillance  - Plan CT imaging ~3 months  - FU with me 4 weeks

## 2025-02-28 NOTE — PLAN OF CARE
Problem: Adult Inpatient Plan of Care  Goal: Plan of Care Review  2/28/2025 0940 by Rosalee Boo RN  Outcome: Progressing  Flowsheets (Taken 2/28/2025 0940)  Plan of Care Reviewed With:   patient   family  2/28/2025 0939 by Rosalee Boo RN  Outcome: Progressing  Goal: Patient-Specific Goal (Individualized)  2/28/2025 0940 by Rosalee Boo RN  Outcome: Progressing  Flowsheets (Taken 2/28/2025 0940)  Individualized Care Needs: feet elevated pillow warm blanket and snack provided  Anxieties, Fears or Concerns: denies  2/28/2025 0939 by Rosalee Boo RN  Outcome: Progressing  Goal: Absence of Hospital-Acquired Illness or Injury  2/28/2025 0940 by Rosalee Boo RN  Outcome: Progressing  2/28/2025 0939 by Rosalee Boo RN  Outcome: Progressing  Intervention: Prevent Infection  Flowsheets (Taken 2/28/2025 0940)  Infection Prevention:   environmental surveillance performed   equipment surfaces disinfected   hand hygiene promoted   personal protective equipment utilized  Goal: Optimal Comfort and Wellbeing  2/28/2025 0940 by Rsoalee Boo RN  Outcome: Progressing  2/28/2025 0939 by Rosalee Boo RN  Outcome: Progressing  Intervention: Provide Person-Centered Care  Flowsheets (Taken 2/28/2025 0940)  Trust Relationship/Rapport:   care explained   choices provided   emotional support provided   empathic listening provided   questions answered   questions encouraged   reassurance provided   thoughts/feelings acknowledged     Problem: Chemotherapy Effects  Goal: Safety Maintained  2/28/2025 0940 by Rosalee Boo RN  Outcome: Progressing  2/28/2025 0939 by Rosalee Boo RN  Outcome: Progressing  Intervention: Promote Safe Chemotherapy Delivery  2/28/2025 0940 by Rosalee Boo RN  Flowsheets (Taken 2/28/2025 0940)  Infection Prevention:   environmental surveillance performed   equipment surfaces disinfected   hand hygiene  promoted   personal protective equipment utilized  Chemotherapy Environmental Safety:   chemotherapy waste containers in room   meticulous hand hygiene promoted   patient environment monitored for safety   personal protective equipment utilized  2/28/2025 0939 by Rosalee Boo RN  Flowsheets (Taken 2/28/2025 0939)  Infection Prevention:   environmental surveillance performed   equipment surfaces disinfected   hand hygiene promoted   personal protective equipment utilized  Chemotherapy Environmental Safety:   chemotherapy waste containers in room   meticulous hand hygiene promoted   patient environment monitored for safety   personal protective equipment utilized  Goal: Absence of Infection  2/28/2025 0940 by Rosalee Boo RN  Outcome: Progressing  2/28/2025 0939 by Rosalee Boo RN  Outcome: Progressing  Intervention: Prevent Infection and Maximize Resistance  2/28/2025 0940 by Rosalee Boo RN  Flowsheets (Taken 2/28/2025 0940)  Infection Prevention:   environmental surveillance performed   equipment surfaces disinfected   hand hygiene promoted   personal protective equipment utilized  2/28/2025 0939 by Rosalee Boo RN  Flowsheets (Taken 2/28/2025 0939)  Infection Prevention:   environmental surveillance performed   equipment surfaces disinfected   hand hygiene promoted   personal protective equipment utilized  Goal: Absence of Hematuria  Outcome: Progressing

## 2025-02-28 NOTE — ASSESSMENT & PLAN NOTE
- Following with podiatyr and ID  - On long term abx with dapto and zosyn  - Ultimate plan for partial amputation after recovery from CRT

## 2025-03-01 LAB
BACTERIA #/AREA URNS AUTO: ABNORMAL /HPF
BILIRUB UR QL STRIP: NEGATIVE
CLARITY UR REFRACT.AUTO: CLEAR
COLOR UR AUTO: YELLOW
GLUCOSE UR QL STRIP: NEGATIVE
HGB UR QL STRIP: ABNORMAL
HYALINE CASTS UR QL AUTO: 0 /LPF
KETONES UR QL STRIP: NEGATIVE
LEUKOCYTE ESTERASE UR QL STRIP: ABNORMAL
MICROSCOPIC COMMENT: ABNORMAL
NITRITE UR QL STRIP: NEGATIVE
PH UR STRIP: 6 [PH] (ref 5–8)
PROT UR QL STRIP: ABNORMAL
RBC #/AREA URNS AUTO: >100 /HPF (ref 0–4)
SP GR UR STRIP: 1.01 (ref 1–1.03)
URN SPEC COLLECT METH UR: ABNORMAL
WBC #/AREA URNS AUTO: >100 /HPF (ref 0–5)
WBC CLUMPS UR QL AUTO: ABNORMAL

## 2025-03-03 ENCOUNTER — LAB VISIT (OUTPATIENT)
Dept: LAB | Facility: HOSPITAL | Age: 85
End: 2025-03-03
Attending: INTERNAL MEDICINE
Payer: MEDICARE

## 2025-03-03 ENCOUNTER — HOSPITAL ENCOUNTER (OUTPATIENT)
Dept: RADIATION THERAPY | Facility: HOSPITAL | Age: 85
Discharge: HOME | End: 2025-03-03
Attending: SPECIALIST
Payer: MEDICARE

## 2025-03-03 ENCOUNTER — DOCUMENTATION ONLY (OUTPATIENT)
Dept: RADIATION ONCOLOGY | Facility: CLINIC | Age: 85
End: 2025-03-03
Payer: MEDICARE

## 2025-03-03 DIAGNOSIS — M86.9 DIABETIC FOOT ULCER WITH OSTEOMYELITIS: ICD-10-CM

## 2025-03-03 DIAGNOSIS — I48.0 PAROXYSMAL ATRIAL FIBRILLATION: ICD-10-CM

## 2025-03-03 DIAGNOSIS — L97.509 DIABETIC FOOT ULCER WITH OSTEOMYELITIS: ICD-10-CM

## 2025-03-03 DIAGNOSIS — D50.0 IRON DEFICIENCY ANEMIA SECONDARY TO BLOOD LOSS (CHRONIC): ICD-10-CM

## 2025-03-03 DIAGNOSIS — E11.621 DIABETIC FOOT ULCER WITH OSTEOMYELITIS: ICD-10-CM

## 2025-03-03 DIAGNOSIS — E11.69 DIABETIC FOOT ULCER WITH OSTEOMYELITIS: ICD-10-CM

## 2025-03-03 DIAGNOSIS — L97.512 RIGHT FOOT ULCER, WITH FAT LAYER EXPOSED: Primary | ICD-10-CM

## 2025-03-03 DIAGNOSIS — L97.512 RIGHT FOOT ULCER, WITH FAT LAYER EXPOSED: ICD-10-CM

## 2025-03-03 LAB
ALBUMIN SERPL BCP-MCNC: 3 G/DL (ref 3.5–5.2)
ALP SERPL-CCNC: 66 U/L (ref 40–150)
ALT SERPL W/O P-5'-P-CCNC: 39 U/L (ref 10–44)
ANION GAP SERPL CALC-SCNC: 10 MMOL/L (ref 8–16)
AST SERPL-CCNC: 36 U/L (ref 10–40)
BASOPHILS # BLD AUTO: 0.03 K/UL (ref 0–0.2)
BASOPHILS NFR BLD: 0.6 % (ref 0–1.9)
BILIRUB SERPL-MCNC: 0.5 MG/DL (ref 0.1–1)
BUN SERPL-MCNC: 29 MG/DL (ref 8–23)
CALCIUM SERPL-MCNC: 9 MG/DL (ref 8.7–10.5)
CHLORIDE SERPL-SCNC: 105 MMOL/L (ref 95–110)
CK SERPL-CCNC: 39 U/L (ref 20–200)
CO2 SERPL-SCNC: 24 MMOL/L (ref 23–29)
CREAT SERPL-MCNC: 1.5 MG/DL (ref 0.5–1.4)
CRP SERPL-MCNC: 2.1 MG/L (ref 0–8.2)
DIFFERENTIAL METHOD BLD: ABNORMAL
EOSINOPHIL # BLD AUTO: 0 K/UL (ref 0–0.5)
EOSINOPHIL NFR BLD: 0.2 % (ref 0–8)
ERYTHROCYTE [DISTWIDTH] IN BLOOD BY AUTOMATED COUNT: 22.3 % (ref 11.5–14.5)
ERYTHROCYTE [SEDIMENTATION RATE] IN BLOOD BY PHOTOMETRIC METHOD: 21 MM/HR (ref 0–23)
EST. GFR  (NO RACE VARIABLE): 45.3 ML/MIN/1.73 M^2
GLUCOSE SERPL-MCNC: 93 MG/DL (ref 70–110)
HCT VFR BLD AUTO: 31.1 % (ref 40–54)
HGB BLD-MCNC: 10.3 G/DL (ref 14–18)
IMM GRANULOCYTES # BLD AUTO: 0.11 K/UL (ref 0–0.04)
IMM GRANULOCYTES NFR BLD AUTO: 2.3 % (ref 0–0.5)
LYMPHOCYTES # BLD AUTO: 0.4 K/UL (ref 1–4.8)
LYMPHOCYTES NFR BLD: 9.1 % (ref 18–48)
MCH RBC QN AUTO: 27.5 PG (ref 27–31)
MCHC RBC AUTO-ENTMCNC: 33.1 G/DL (ref 32–36)
MCV RBC AUTO: 83 FL (ref 82–98)
MONOCYTES # BLD AUTO: 0.6 K/UL (ref 0.3–1)
MONOCYTES NFR BLD: 11.9 % (ref 4–15)
NEUTROPHILS # BLD AUTO: 3.6 K/UL (ref 1.8–7.7)
NEUTROPHILS NFR BLD: 75.9 % (ref 38–73)
NRBC BLD-RTO: 0 /100 WBC
PLATELET # BLD AUTO: 105 K/UL (ref 150–450)
PMV BLD AUTO: 11.4 FL (ref 9.2–12.9)
POTASSIUM SERPL-SCNC: 4.5 MMOL/L (ref 3.5–5.1)
PROT SERPL-MCNC: 6.2 G/DL (ref 6–8.4)
RBC # BLD AUTO: 3.74 M/UL (ref 4.6–6.2)
SODIUM SERPL-SCNC: 139 MMOL/L (ref 136–145)
WBC # BLD AUTO: 4.72 K/UL (ref 3.9–12.7)

## 2025-03-03 PROCEDURE — 85652 RBC SED RATE AUTOMATED: CPT | Performed by: INTERNAL MEDICINE

## 2025-03-03 PROCEDURE — 80053 COMPREHEN METABOLIC PANEL: CPT | Performed by: INTERNAL MEDICINE

## 2025-03-03 PROCEDURE — 86140 C-REACTIVE PROTEIN: CPT | Performed by: INTERNAL MEDICINE

## 2025-03-03 PROCEDURE — 82550 ASSAY OF CK (CPK): CPT | Performed by: INTERNAL MEDICINE

## 2025-03-03 PROCEDURE — 85025 COMPLETE CBC W/AUTO DIFF WBC: CPT | Performed by: INTERNAL MEDICINE

## 2025-03-03 NOTE — PLAN OF CARE
Day 18 outpatient xrt to the bladder. Tolerating therapy well with no changes or complaints. Will update next status check.

## 2025-03-06 ENCOUNTER — DOCUMENTATION ONLY (OUTPATIENT)
Dept: RADIATION ONCOLOGY | Facility: CLINIC | Age: 85
End: 2025-03-06
Payer: MEDICARE

## 2025-03-07 ENCOUNTER — TELEPHONE (OUTPATIENT)
Dept: FAMILY MEDICINE | Facility: CLINIC | Age: 85
End: 2025-03-07

## 2025-03-07 ENCOUNTER — OFFICE VISIT (OUTPATIENT)
Dept: FAMILY MEDICINE | Facility: CLINIC | Age: 85
End: 2025-03-07
Attending: FAMILY MEDICINE
Payer: MEDICARE

## 2025-03-07 VITALS
SYSTOLIC BLOOD PRESSURE: 122 MMHG | HEIGHT: 72 IN | BODY MASS INDEX: 26.14 KG/M2 | HEART RATE: 84 BPM | DIASTOLIC BLOOD PRESSURE: 72 MMHG | TEMPERATURE: 98 F | WEIGHT: 193 LBS | OXYGEN SATURATION: 94 %

## 2025-03-07 DIAGNOSIS — E11.69 DM TYPE 2 WITH DIABETIC DYSLIPIDEMIA: Primary | ICD-10-CM

## 2025-03-07 DIAGNOSIS — I50.42 CHRONIC COMBINED SYSTOLIC (CONGESTIVE) AND DIASTOLIC (CONGESTIVE) HEART FAILURE: ICD-10-CM

## 2025-03-07 DIAGNOSIS — J44.9 CHRONIC OBSTRUCTIVE PULMONARY DISEASE, UNSPECIFIED COPD TYPE: ICD-10-CM

## 2025-03-07 DIAGNOSIS — F02.80 DEMENTIA ASSOCIATED WITH OTHER UNDERLYING DISEASE, WITHOUT BEHAVIORAL DISTURBANCE, PSYCHOTIC DISTURBANCE, MOOD DISTURBANCE, OR ANXIETY, UNSPECIFIED DEMENTIA SEVERITY: Primary | ICD-10-CM

## 2025-03-07 DIAGNOSIS — E78.5 DM TYPE 2 WITH DIABETIC DYSLIPIDEMIA: Primary | ICD-10-CM

## 2025-03-07 DIAGNOSIS — C67.9 UROTHELIAL CARCINOMA OF BLADDER: ICD-10-CM

## 2025-03-07 DIAGNOSIS — F03.90 DEMENTIA, UNSPECIFIED DEMENTIA SEVERITY, UNSPECIFIED DEMENTIA TYPE, UNSPECIFIED WHETHER BEHAVIORAL, PSYCHOTIC, OR MOOD DISTURBANCE OR ANXIETY: ICD-10-CM

## 2025-03-07 PROBLEM — E11.628 DIABETIC FOOT INFECTION: Status: RESOLVED | Noted: 2025-02-18 | Resolved: 2025-03-07

## 2025-03-07 PROBLEM — N30.01 ACUTE CYSTITIS WITH HEMATURIA: Status: RESOLVED | Noted: 2022-04-20 | Resolved: 2025-03-07

## 2025-03-07 PROBLEM — R31.0 GROSS HEMATURIA: Status: RESOLVED | Noted: 2021-12-22 | Resolved: 2025-03-07

## 2025-03-07 PROBLEM — L08.9 DIABETIC FOOT INFECTION: Status: RESOLVED | Noted: 2025-02-18 | Resolved: 2025-03-07

## 2025-03-07 LAB
ALBUMIN/CREAT UR: 2049 UG/MG (ref 0–30)
CREAT UR-MCNC: 96 MG/DL (ref 23–375)
MICROALBUMIN UR DL<=1MG/L-MCNC: 1967 UG/ML

## 2025-03-07 PROCEDURE — 82043 UR ALBUMIN QUANTITATIVE: CPT | Performed by: FAMILY MEDICINE

## 2025-03-07 PROCEDURE — 99999 PR PBB SHADOW E&M-EST. PATIENT-LVL IV: CPT | Mod: PBBFAC,,, | Performed by: FAMILY MEDICINE

## 2025-03-07 NOTE — PROGRESS NOTES
Subjective:       Patient ID: Jose Miguel Alvarez Jr. is a 84 y.o. male.    Chief Complaint: No chief complaint on file.    84 y old male with t2 dm , dementia, combined CHF, bladder ca , Copd here for f.u . Doing well. He finished radiotherapy for Bladder ca yesterday . Not monitoring glucose . His memory loss has worsen . He has been forgetting recent conversations. Son who accompanies him , is unsure  about  who diagnosed him with dementia. He was started on Aricept about 4 y ago . Avoids Na in excess. No sob , LYLE, no orthopnea .       Review of Systems   Constitutional: Negative.    HENT: Negative.     Eyes: Negative.    Respiratory: Negative.     Cardiovascular: Negative.    Gastrointestinal: Negative.    Genitourinary: Negative.    Musculoskeletal: Negative.    Skin: Negative.    Hematological: Negative.        Objective:      Physical Exam  Constitutional:       General: He is not in acute distress.     Appearance: He is well-developed. He is not diaphoretic.   HENT:      Head: Normocephalic and atraumatic.      Right Ear: External ear normal.      Left Ear: External ear normal.      Nose: Nose normal.      Mouth/Throat:      Pharynx: No oropharyngeal exudate.   Eyes:      General: No scleral icterus.        Right eye: No discharge.         Left eye: No discharge.      Conjunctiva/sclera: Conjunctivae normal.      Pupils: Pupils are equal, round, and reactive to light.   Neck:      Thyroid: No thyromegaly.      Vascular: No JVD.      Trachea: No tracheal deviation.   Cardiovascular:      Rate and Rhythm: Normal rate and regular rhythm.      Heart sounds: Normal heart sounds. No murmur heard.     No friction rub. No gallop.   Pulmonary:      Effort: Pulmonary effort is normal. No respiratory distress.      Breath sounds: Normal breath sounds. No stridor. No wheezing or rales.   Chest:      Chest wall: No tenderness.   Abdominal:      General: Bowel sounds are normal. There is no distension.      Palpations:  Abdomen is soft.      Tenderness: There is no abdominal tenderness. There is no guarding or rebound.   Musculoskeletal:         General: No tenderness. Normal range of motion.      Cervical back: Normal range of motion and neck supple.   Lymphadenopathy:      Cervical: No cervical adenopathy.   Skin:     General: Skin is warm and dry.      Coloration: Skin is not pale.      Findings: No erythema or rash.   Neurological:      Mental Status: He is alert and oriented to person, place, and time.      Cranial Nerves: No cranial nerve deficit.      Motor: No abnormal muscle tone.      Coordination: Coordination normal.      Deep Tendon Reflexes: Reflexes are normal and symmetric. Reflexes normal.   Psychiatric:         Behavior: Behavior normal.         Thought Content: Thought content normal.         Judgment: Judgment normal.         Assessment:       1. DM type 2 with diabetic dyslipidemia    2. Dementia, unspecified dementia severity, unspecified dementia type, unspecified whether behavioral, psychotic, or mood disturbance or anxiety    3. Chronic combined systolic (congestive) and diastolic (congestive) heart failure    4. Chronic obstructive pulmonary disease, unspecified COPD type    5. Urothelial carcinoma of bladder        Plan:   1.- Controlled. Cont med   2.- See Neurology   3.-Controlled. Avoid NA and fluids in excess.    4.- Controlled. Cont meds  5.- f.u with urology .

## 2025-03-07 NOTE — TELEPHONE ENCOUNTER
Pt's son requesting an order for Hospice for patient.   [facility] fax # [phone].  They will also need last 3 visits.

## 2025-03-08 DIAGNOSIS — C67.9 UROTHELIAL CARCINOMA OF BLADDER: ICD-10-CM

## 2025-03-10 ENCOUNTER — PATIENT MESSAGE (OUTPATIENT)
Dept: CARDIOLOGY | Facility: CLINIC | Age: 85
End: 2025-03-10
Payer: MEDICARE

## 2025-03-10 ENCOUNTER — LAB VISIT (OUTPATIENT)
Dept: LAB | Facility: HOSPITAL | Age: 85
End: 2025-03-10
Attending: INTERNAL MEDICINE
Payer: MEDICARE

## 2025-03-10 ENCOUNTER — TELEPHONE (OUTPATIENT)
Dept: PODIATRY | Facility: CLINIC | Age: 85
End: 2025-03-10
Payer: MEDICARE

## 2025-03-10 ENCOUNTER — EXTERNAL HOME HEALTH (OUTPATIENT)
Dept: HOME HEALTH SERVICES | Facility: HOSPITAL | Age: 85
End: 2025-03-10
Payer: MEDICARE

## 2025-03-10 ENCOUNTER — RESULTS FOLLOW-UP (OUTPATIENT)
Dept: FAMILY MEDICINE | Facility: CLINIC | Age: 85
End: 2025-03-10

## 2025-03-10 DIAGNOSIS — S00.82XA FACE, NECK, AND SCALP EXCEPT EYE, BLISTER, INFECTED: Primary | ICD-10-CM

## 2025-03-10 DIAGNOSIS — L08.9 FACE, NECK, AND SCALP EXCEPT EYE, BLISTER, INFECTED: Primary | ICD-10-CM

## 2025-03-10 DIAGNOSIS — S00.02XA FACE, NECK, AND SCALP EXCEPT EYE, BLISTER, INFECTED: Primary | ICD-10-CM

## 2025-03-10 DIAGNOSIS — S10.92XA FACE, NECK, AND SCALP EXCEPT EYE, BLISTER, INFECTED: Primary | ICD-10-CM

## 2025-03-10 DIAGNOSIS — E11.628 BULLOSIS DIABETICORUM: ICD-10-CM

## 2025-03-10 LAB
ALBUMIN SERPL BCP-MCNC: 2.9 G/DL (ref 3.5–5.2)
ALP SERPL-CCNC: 75 U/L (ref 40–150)
ALT SERPL W/O P-5'-P-CCNC: 29 U/L (ref 10–44)
ANION GAP SERPL CALC-SCNC: 9 MMOL/L (ref 8–16)
AST SERPL-CCNC: 26 U/L (ref 10–40)
BASOPHILS # BLD AUTO: 0.02 K/UL (ref 0–0.2)
BASOPHILS NFR BLD: 0.6 % (ref 0–1.9)
BILIRUB SERPL-MCNC: 0.4 MG/DL (ref 0.1–1)
BUN SERPL-MCNC: 23 MG/DL (ref 8–23)
CALCIUM SERPL-MCNC: 9 MG/DL (ref 8.7–10.5)
CHLORIDE SERPL-SCNC: 106 MMOL/L (ref 95–110)
CK SERPL-CCNC: 44 U/L (ref 20–200)
CO2 SERPL-SCNC: 22 MMOL/L (ref 23–29)
CREAT SERPL-MCNC: 1.6 MG/DL (ref 0.5–1.4)
CRP SERPL-MCNC: 4.8 MG/L (ref 0–8.2)
DIFFERENTIAL METHOD BLD: ABNORMAL
EOSINOPHIL # BLD AUTO: 0 K/UL (ref 0–0.5)
EOSINOPHIL NFR BLD: 0.6 % (ref 0–8)
ERYTHROCYTE [DISTWIDTH] IN BLOOD BY AUTOMATED COUNT: 23 % (ref 11.5–14.5)
ERYTHROCYTE [SEDIMENTATION RATE] IN BLOOD BY PHOTOMETRIC METHOD: 28 MM/HR (ref 0–23)
EST. GFR  (NO RACE VARIABLE): 42 ML/MIN/1.73 M^2
GLUCOSE SERPL-MCNC: 230 MG/DL (ref 70–110)
HCT VFR BLD AUTO: 31.2 % (ref 40–54)
HGB BLD-MCNC: 10.3 G/DL (ref 14–18)
IMM GRANULOCYTES # BLD AUTO: 0.02 K/UL (ref 0–0.04)
IMM GRANULOCYTES NFR BLD AUTO: 0.6 % (ref 0–0.5)
LYMPHOCYTES # BLD AUTO: 0.3 K/UL (ref 1–4.8)
LYMPHOCYTES NFR BLD: 9.7 % (ref 18–48)
MCH RBC QN AUTO: 28.5 PG (ref 27–31)
MCHC RBC AUTO-ENTMCNC: 33 G/DL (ref 32–36)
MCV RBC AUTO: 86 FL (ref 82–98)
MONOCYTES # BLD AUTO: 0.5 K/UL (ref 0.3–1)
MONOCYTES NFR BLD: 15.2 % (ref 4–15)
NEUTROPHILS # BLD AUTO: 2.3 K/UL (ref 1.8–7.7)
NEUTROPHILS NFR BLD: 73.3 % (ref 38–73)
NRBC BLD-RTO: 0 /100 WBC
PLATELET # BLD AUTO: 83 K/UL (ref 150–450)
PMV BLD AUTO: 12 FL (ref 9.2–12.9)
POTASSIUM SERPL-SCNC: 3.9 MMOL/L (ref 3.5–5.1)
PROT SERPL-MCNC: 6 G/DL (ref 6–8.4)
RBC # BLD AUTO: 3.61 M/UL (ref 4.6–6.2)
SODIUM SERPL-SCNC: 137 MMOL/L (ref 136–145)
WBC # BLD AUTO: 3.09 K/UL (ref 3.9–12.7)

## 2025-03-10 PROCEDURE — 86140 C-REACTIVE PROTEIN: CPT | Performed by: INTERNAL MEDICINE

## 2025-03-10 PROCEDURE — 85652 RBC SED RATE AUTOMATED: CPT | Performed by: INTERNAL MEDICINE

## 2025-03-10 PROCEDURE — 82550 ASSAY OF CK (CPK): CPT | Performed by: INTERNAL MEDICINE

## 2025-03-10 PROCEDURE — 80053 COMPREHEN METABOLIC PANEL: CPT | Performed by: INTERNAL MEDICINE

## 2025-03-10 PROCEDURE — 85025 COMPLETE CBC W/AUTO DIFF WBC: CPT | Performed by: INTERNAL MEDICINE

## 2025-03-10 RX ORDER — OLANZAPINE 5 MG/1
TABLET ORAL
Qty: 24 TABLET | Refills: 0 | Status: SHIPPED | OUTPATIENT
Start: 2025-03-10

## 2025-03-10 NOTE — TELEPHONE ENCOUNTER
Carolyn with [facility] was given a verbal for Palliative Care since Dr. Barrera does not think pt meets criteria for Hospice. She will come back tomorrow to have her sign order.

## 2025-03-12 ENCOUNTER — HOSPITAL ENCOUNTER (OUTPATIENT)
Dept: CARDIOLOGY | Facility: HOSPITAL | Age: 85
Discharge: HOME | End: 2025-03-12
Attending: INTERNAL MEDICINE
Payer: MEDICARE

## 2025-03-12 ENCOUNTER — OFFICE VISIT (OUTPATIENT)
Dept: CARDIOLOGY | Facility: CLINIC | Age: 85
End: 2025-03-12
Payer: MEDICARE

## 2025-03-12 VITALS
OXYGEN SATURATION: 96 % | BODY MASS INDEX: 25.86 KG/M2 | WEIGHT: 190.94 LBS | HEIGHT: 72 IN | HEART RATE: 79 BPM | RESPIRATION RATE: 16 BRPM | DIASTOLIC BLOOD PRESSURE: 82 MMHG | SYSTOLIC BLOOD PRESSURE: 155 MMHG

## 2025-03-12 DIAGNOSIS — I48.0 PAROXYSMAL ATRIAL FIBRILLATION: Primary | ICD-10-CM

## 2025-03-12 DIAGNOSIS — I48.0 PAROXYSMAL ATRIAL FIBRILLATION: ICD-10-CM

## 2025-03-12 DIAGNOSIS — Z95.818 PRESENCE OF WATCHMAN LEFT ATRIAL APPENDAGE CLOSURE DEVICE: ICD-10-CM

## 2025-03-12 LAB
OHS QRS DURATION: 78 MS
OHS QTC CALCULATION: 416 MS

## 2025-03-12 PROCEDURE — 93010 ELECTROCARDIOGRAM REPORT: CPT | Mod: ,,, | Performed by: INTERNAL MEDICINE

## 2025-03-12 PROCEDURE — 3288F FALL RISK ASSESSMENT DOCD: CPT | Mod: CPTII,S$GLB,, | Performed by: INTERNAL MEDICINE

## 2025-03-12 PROCEDURE — 1157F ADVNC CARE PLAN IN RCRD: CPT | Mod: CPTII,S$GLB,, | Performed by: INTERNAL MEDICINE

## 2025-03-12 PROCEDURE — 1101F PT FALLS ASSESS-DOCD LE1/YR: CPT | Mod: CPTII,S$GLB,, | Performed by: INTERNAL MEDICINE

## 2025-03-12 PROCEDURE — 93005 ELECTROCARDIOGRAM TRACING: CPT

## 2025-03-12 PROCEDURE — 3072F LOW RISK FOR RETINOPATHY: CPT | Mod: CPTII,S$GLB,, | Performed by: INTERNAL MEDICINE

## 2025-03-12 PROCEDURE — 3077F SYST BP >= 140 MM HG: CPT | Mod: CPTII,S$GLB,, | Performed by: INTERNAL MEDICINE

## 2025-03-12 PROCEDURE — 1159F MED LIST DOCD IN RCRD: CPT | Mod: CPTII,S$GLB,, | Performed by: INTERNAL MEDICINE

## 2025-03-12 PROCEDURE — 99214 OFFICE O/P EST MOD 30 MIN: CPT | Mod: S$GLB,,, | Performed by: INTERNAL MEDICINE

## 2025-03-12 PROCEDURE — 3079F DIAST BP 80-89 MM HG: CPT | Mod: CPTII,S$GLB,, | Performed by: INTERNAL MEDICINE

## 2025-03-12 PROCEDURE — 99999 PR PBB SHADOW E&M-EST. PATIENT-LVL III: CPT | Mod: PBBFAC,,, | Performed by: INTERNAL MEDICINE

## 2025-03-12 NOTE — PROGRESS NOTES
Subjective   Patient ID:  Jose Miguel Alvarez Jr. is a 84 y.o. male who presents for follow-up of Atrial Fibrillation      83 yoM here for arrhythmia assessment and LAAO consideration.     9/24: He has history of AF s/p ablation at [facility] 2017. He has has recurrent AF/RVR as well as severe anemia due to GI bleeding Spring/summer 2024 on xarelto. He converted to NSR and his OAC was switched to eliquis 2.5 bid. No subsequent bleeding events.     Interval history: LAAO 11/14/24, no leak on FIOR 12/30/24. Currently on aspirin 81 mg qd    CHADSVA 4  HASBLED 3    My interpretation of the ECG is:  NSR nl FL, QRS, QTc    Past Medical History:  No date: Anemia  No date: Anticoagulant long-term use  No date: Atrial fibrillation  04/05/2024: AVM (arteriovenous malformation) of small bowel, acquired   with hemorrhage      Comment:  EGD 4/5/24: multiple small gastric AVMs in the antrim                and fundus. Treated with APC.   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   limited to breakdown of skin  10/06/2017: Stage 3 chronic kidney disease    Past Surgical History:  No date: bladder tumor removal  No date: CARDIAC ELECTROPHYSIOLOGY MAPPING AND ABLATION  No date: CARDIOVERSION      Comment:  several  No date: CATARACT EXTRACTION; Bilateral  No date: cataract removal with IOL implants; Bilateral  No date: CHOLECYSTECTOMY  12/28/2021: CYSTOSCOPIC LITHOLAPAXY; N/A      Comment:   Procedure: CYSTOLITHOLAPAXY;  Surgeon: Medardo Garcia MD;  Location: [facility] [place];  Service: Urology;                 Laterality: N/A;  5/14/2024: CYSTOSCOPIC LITHOLAPAXY; N/A      Comment:  Procedure: CYSTOLITHOLAPAXY;  Surgeon: Medardo Garcia MD;  Location: [facility] [place] OR;  Service: Urology;                 Laterality: N/A;  No date: CYSTOSCOPY  12/28/2021: CYSTOSCOPY WITH BIOPSY OF BLADDER; Right      Comment:  Procedure: CYSTOSCOPY, WITH BLADDER BIOPSY, WITH                FULGURATION IF INDICATED;  Surgeon: Medardo Garcia MD;  Location: [facility] [place] OR;  Service: Urology;  Laterality:                Right;  12/3/2024: CYSTOTOMY, WITH FULGURATION AND RADIOACTIVE MATERIAL   INSERTION; N/A      Comment:  Procedure: CYSTOTOMY, WITH FULGURATION AND RADIOACTIVE                MATERIAL INSERTION;  Surgeon: Medardo Garcia MD;                 Location: [facility] [place] OR;  Service: Urology;  Laterality: N/A;  10/25/2021: ESOPHAGOGASTRODUODENOSCOPY; N/A      Comment:  Procedure: Small Jason Enteroscopy (SBE);  Surgeon:                Isabelle Griffin MD;  Location: [facility];  Service:                Endoscopy;  Laterality: N/A;  12/13/2021: ESOPHAGOGASTRODUODENOSCOPY; N/A      Comment:  Procedure: EGD (ESOPHAGOGASTRODUODENOSCOPY);  Surgeon:                Troy Polanco MD;  Location: [facility];  Service:                Endoscopy;  Laterality: N/A;  4/5/2024: ESOPHAGOGASTRODUODENOSCOPY; N/A      Comment:  Procedure: EGD (ESOPHAGOGASTRODUODENOSCOPY);  Surgeon:                Myrtle Salcedo MD;  Location: [facility];  Service:                Endoscopy;  Laterality: N/A;  4/22/2024: ESOPHAGOGASTRODUODENOSCOPY; N/A      Comment:  Procedure: EGD (ESOPHAGOGASTRODUODENOSCOPY);  Surgeon:                Myrtle Salcedo MD;  Location: [facility];  Service:                Endoscopy;  Laterality: N/A;  Push enteroscopy for                positive capsule - bleeding AVM  No date:  EYE SURGERY  12/6/2021: INTRALUMINAL GASTROINTESTINAL TRACT IMAGING VIA CAPSULE; N/  A      Comment:  Procedure: IMAGING PROCEDURE, GI TRACT, INTRALUMINAL,                VIA CAPSULE;  Surgeon: Larry Jones RN;  Location: [facility]                ENDO;  Service: Endoscopy;  Laterality: N/A;  4/18/2024: INTRALUMINAL GASTROINTESTINAL TRACT IMAGING VIA CAPSULE; N/  A      Comment:  Procedure: IMAGING PROCEDURE, GI TRACT, INTRALUMINAL,                VIA CAPSULE;  Surgeon: Larry Jones RN;  Location: [facility]                ENDO;  Service: Endoscopy;  Laterality: N/A;  11/14/2024: OCCLUSION OF LEFT ATRIAL APPENDAGE; N/A      Comment:  Procedure: Left atrial appendage occlusion;  Surgeon:                Aroldo Ortiz MD;  Location: [facility] EP LAB;                 Service: Cardiology;  Laterality: N/A;  afib, watchman,                BSCI, venkatesh, anes, MB, 3prep  9/26/2024: SMALL BOWEL ENTEROSCOPY; N/A      Comment:  Procedure: ENTEROSCOPY--prximal DBE;  Surgeon: Eduardo Ruff MD;  Location: [facility] ENDO;  Service: Endoscopy;               Laterality: N/A;  11/14/2024: TRANSESOPHAGEAL ECHOCARDIOGRAPHY; N/A      Comment:  Procedure: ECHOCARDIOGRAM, TRANSESOPHAGEAL;  Surgeon:                Aroldo Martinez MD;  Location: [facility] EP LAB;  Service:                Cardiology;  Laterality: N/A;  12/30/2024: TRANSESOPHAGEAL ECHOCARDIOGRAPHY; N/A      Comment:  Procedure: ECHOCARDIOGRAM, TRANSESOPHAGEAL;  Surgeon:                Carlos Flower MD;  Location: [facility] CATH LAB;  Service:                Cardiology;  Laterality: N/A;  7/23/2019: TRANSURETHRAL RESECTION OF BLADDER TUMOR BY BIPOLAR   CAUTERY; N/A      Comment:  Procedure: TURBT, USING BIPOLAR CAUTERY;  Surgeon: Ritesh Marques MD;  Location: [facility] OR;  Service: Urology;                 Laterality: N/A;  5/14/2024: TRANSURETHRAL RESECTION OF PROSTATE; N/A      Comment:  Procedure: TURP (TRANSURETHRAL RESECTION OF PROSTATE);                 Surgeon:  Medardo Garcia MD;  Location: [facility] OR;                 Service: Urology;  Laterality: N/A;  12/3/2024: TURBT (TRANSURETHRAL RESECTION OF BLADDER TUMOR); N/A      Comment:  Procedure: TURBT (TRANSURETHRAL RESECTION OF BLADDER                TUMOR);  Surgeon: Medardo Garcia MD;  Location:                [facility] OR;  Service: Urology;  Laterality: N/A;    Social History    Socioeconomic History      Marital status:     Tobacco Use      Smoking status: Former        Packs/day: 0.00        Years: 2.5 packs/day for 20.0 years (50.0 ttl pk-yrs)        Types: Cigarettes        Start date:         Quit date:         Years since quittin.2        Passive exposure: Never      Smokeless tobacco: Never    Substance and Sexual Activity      Alcohol use: Yes        Alcohol/week: 7.0 standard drinks of alcohol        Types: 7 Cans of beer per week        Comment: occasionally: hold 72 hrs      Drug use: No      Sexual activity: Not Currently    Social Drivers of Health  Financial Resource Strain: Low Risk  (2025)      Overall Financial Resource Strain (CARDIA)          Difficulty of Paying Living Expenses: Not hard at all  Food Insecurity: No Food Insecurity (2025)      Hunger Vital Sign          Worried About Running Out of Food in the Last Year: Never true          Ran Out of Food in the Last Year: Never true  Transportation Needs: No Transportation Needs (2024)      TRANSPORTATION NEEDS          Transportation : No  Physical Activity: Insufficiently Active (2025)      Exercise Vital Sign          Days of Exercise per Week: 2 days          Minutes of Exercise per Session: 30 min  Stress: No Stress Concern Present (2025)      [employer] [place] [employer] - Occupational Stress Questionnaire          Feeling of Stress : Not at all  Housing Stability: Unknown (2025)      Housing Stability Vital Sign          Unable to Pay for Housing in the Last Year: No           Homeless in the Last Year: No    Review of patient's family history indicates:  Problem: Heart disease      Relation: Father          Name:               Age of Onset: (Not Specified)  Problem: Hypertension      Relation: Father          Name:               Age of Onset: (Not Specified)  Problem: Heart disease      Relation: Brother          Name:               Age of Onset: (Not Specified)      Current Outpatient Medications:  amiodarone (PACERONE) 200 MG Tab, Take 1 tablet (200 mg total) by mouth once daily., Disp: 30 tablet, Rfl: 11  amLODIPine (NORVASC) 2.5 MG tablet, Take 1 tablet (2.5 mg total) by mouth once daily., Disp: 90 tablet, Rfl: 2  aspirin (ECOTRIN) 81 MG EC tablet, Take 1 tablet (81 mg total) by mouth once daily., Disp: 90 tablet, Rfl: 3  atorvastatin (LIPITOR) 20 MG tablet, TAKE 1 TABLET(20 MG) BY MOUTH EVERY EVENING, Disp: 90 tablet, Rfl: 1  carvediloL (COREG) 6.25 MG tablet, Take 1 tablet (6.25 mg total) by mouth 2 (two) times daily with meals., Disp: 180 tablet, Rfl: 3  cholecalciferol, vitamin D3, (VITAMIN D3 ORAL), Take 1 tablet by mouth every other day., Disp: , Rfl:   diltiaZEM (CARDIZEM CD) 360 MG 24 hr capsule, , Disp: , Rfl:   donepeziL (ARICEPT) 5 MG tablet, Take 1 tablet (5 mg total) by mouth once daily., Disp: 90 tablet, Rfl: 1  FEROSUL 325 mg (65 mg iron) Tab tablet, Take by mouth., Disp: , Rfl:   hydrALAZINE (APRESOLINE) 50 MG tablet, Take 1 tablet (50 mg total) by mouth every 8 (eight) hours., Disp: 270 tablet, Rfl: 2  multivitamin (THERAGRAN) per tablet, Take 1 tablet by mouth once daily., Disp: , Rfl:   OLANZapine (ZYPREXA) 5 MG tablet, TAKE 1 TABLET BY MOUTH NIGHTLY DAYS 1-4, 8-11, 15-18, 22-25, 29-32, 36-40, Disp: 24 tablet, Rfl: 0  prochlorperazine (COMPAZINE) 5 MG tablet, Take 1 tablet (5 mg total) by mouth every 6 (six) hours as needed for Nausea., Disp: 20 tablet, Rfl: 5    No current facility-administered medications for this visit.  Facility-Administered Medications Ordered  in Other Visits:  lactated ringers infusion, , Intravenous, Continuous, Denisse Conteh MD, Stopped at 12/28/21 1241            Review of Systems   Constitutional: Negative.   HENT: Negative.     Eyes: Negative.    Cardiovascular:  Negative for chest pain, dyspnea on exertion, leg swelling, near-syncope, palpitations and syncope.   Respiratory: Negative.  Negative for shortness of breath.    Endocrine: Negative.    Hematologic/Lymphatic: Negative.    Skin: Negative.    Musculoskeletal: Negative.    Gastrointestinal: Negative.    Genitourinary: Negative.    Neurological: Negative.  Negative for dizziness and light-headedness.   Psychiatric/Behavioral: Negative.     Allergic/Immunologic: Negative.           Objective     Physical Exam  Vitals reviewed.   Constitutional:       General: He is not in acute distress.     Appearance: He is well-developed.   HENT:      Head: Normocephalic and atraumatic.   Eyes:      Pupils: Pupils are equal, round, and reactive to light.   Neck:      Thyroid: No thyromegaly.      Vascular: No JVD.   Cardiovascular:      Rate and Rhythm: Normal rate and regular rhythm.      Chest Wall: PMI is not displaced.      Heart sounds: Normal heart sounds, S1 normal and S2 normal. No murmur heard.     No friction rub. No gallop.   Pulmonary:      Effort: Pulmonary effort is normal. No respiratory distress.      Breath sounds: Normal breath sounds. No wheezing or rales.   Abdominal:      General: Bowel sounds are normal. There is no distension.      Palpations: Abdomen is soft.      Tenderness: There is no abdominal tenderness. There is no guarding or rebound.   Musculoskeletal:         General: No tenderness. Normal range of motion.      Cervical back: Normal range of motion.   Skin:     General: Skin is warm and dry.      Findings: No erythema or rash.   Neurological:      Mental Status: He is alert and oriented to person, place, and time.      Cranial Nerves: No cranial nerve deficit.    Psychiatric:         Behavior: Behavior normal.         Thought Content: Thought content normal.         Judgment: Judgment normal.            Assessment and Plan     1. Paroxysmal atrial fibrillation    2. Presence of Watchman left atrial appendage closure device        Plan:  84 yoM here for Watchman follow up. No leak on follow up FIOR, on aspirin alone. No changes to therapy. No EP follow up needed unless symptoms recur. RTC as needed

## 2025-03-14 ENCOUNTER — TELEPHONE (OUTPATIENT)
Dept: INFECTIOUS DISEASES | Facility: CLINIC | Age: 85
End: 2025-03-14
Payer: MEDICARE

## 2025-03-14 NOTE — TELEPHONE ENCOUNTER
----- Message from Lupe sent at 3/14/2025  3:15 PM CDT -----  Contact: Maria Elena/[facility]  Maria Elena is calling in regards to the pt wound is heeled and would you like to continue the antibiotic and discontinue picc line.please call Maria Elena back at [phone]

## 2025-03-14 NOTE — TELEPHONE ENCOUNTER
Advised that patient current EOC is 4/1/25. Patient is due for recert on Monday. Routed to Dr. Villarreal to advise.

## 2025-03-17 ENCOUNTER — LAB VISIT (OUTPATIENT)
Dept: LAB | Facility: HOSPITAL | Age: 85
End: 2025-03-17
Attending: INTERNAL MEDICINE
Payer: MEDICARE

## 2025-03-17 DIAGNOSIS — E11.621 DIABETIC FOOT ULCER WITH OSTEOMYELITIS: Primary | ICD-10-CM

## 2025-03-17 DIAGNOSIS — L97.509 DIABETIC FOOT ULCER WITH OSTEOMYELITIS: Primary | ICD-10-CM

## 2025-03-17 DIAGNOSIS — L97.512 RIGHT FOOT ULCER, WITH FAT LAYER EXPOSED: ICD-10-CM

## 2025-03-17 DIAGNOSIS — E11.69 DIABETIC FOOT ULCER WITH OSTEOMYELITIS: Primary | ICD-10-CM

## 2025-03-17 DIAGNOSIS — M86.9 DIABETIC FOOT ULCER WITH OSTEOMYELITIS: Primary | ICD-10-CM

## 2025-03-17 LAB
BASOPHILS # BLD AUTO: 0.04 K/UL (ref 0–0.2)
BASOPHILS NFR BLD: 1 % (ref 0–1.9)
DIFFERENTIAL METHOD BLD: ABNORMAL
EOSINOPHIL # BLD AUTO: 0.1 K/UL (ref 0–0.5)
EOSINOPHIL NFR BLD: 1.5 % (ref 0–8)
ERYTHROCYTE [DISTWIDTH] IN BLOOD BY AUTOMATED COUNT: 23.6 % (ref 11.5–14.5)
HCT VFR BLD AUTO: 35.2 % (ref 40–54)
HGB BLD-MCNC: 11.2 G/DL (ref 14–18)
IMM GRANULOCYTES # BLD AUTO: 0.01 K/UL (ref 0–0.04)
IMM GRANULOCYTES NFR BLD AUTO: 0.2 % (ref 0–0.5)
LYMPHOCYTES # BLD AUTO: 0.5 K/UL (ref 1–4.8)
LYMPHOCYTES NFR BLD: 12.7 % (ref 18–48)
MCH RBC QN AUTO: 28.7 PG (ref 27–31)
MCHC RBC AUTO-ENTMCNC: 31.8 G/DL (ref 32–36)
MCV RBC AUTO: 90 FL (ref 82–98)
MONOCYTES # BLD AUTO: 0.6 K/UL (ref 0.3–1)
MONOCYTES NFR BLD: 14 % (ref 4–15)
NEUTROPHILS # BLD AUTO: 2.8 K/UL (ref 1.8–7.7)
NEUTROPHILS NFR BLD: 70.6 % (ref 38–73)
NRBC BLD-RTO: 0 /100 WBC
PLATELET # BLD AUTO: 140 K/UL (ref 150–450)
PMV BLD AUTO: 11.4 FL (ref 9.2–12.9)
RBC # BLD AUTO: 3.9 M/UL (ref 4.6–6.2)
WBC # BLD AUTO: 4.01 K/UL (ref 3.9–12.7)

## 2025-03-17 PROCEDURE — 85652 RBC SED RATE AUTOMATED: CPT | Performed by: INTERNAL MEDICINE

## 2025-03-17 PROCEDURE — 36415 COLL VENOUS BLD VENIPUNCTURE: CPT | Mod: PN | Performed by: INTERNAL MEDICINE

## 2025-03-17 PROCEDURE — 82550 ASSAY OF CK (CPK): CPT | Performed by: INTERNAL MEDICINE

## 2025-03-17 PROCEDURE — 86140 C-REACTIVE PROTEIN: CPT | Performed by: INTERNAL MEDICINE

## 2025-03-17 PROCEDURE — 85025 COMPLETE CBC W/AUTO DIFF WBC: CPT | Performed by: INTERNAL MEDICINE

## 2025-03-17 PROCEDURE — 80053 COMPREHEN METABOLIC PANEL: CPT | Performed by: INTERNAL MEDICINE

## 2025-03-18 LAB
ALBUMIN SERPL BCP-MCNC: 3.3 G/DL (ref 3.5–5.2)
ALP SERPL-CCNC: 81 U/L (ref 40–150)
ALT SERPL W/O P-5'-P-CCNC: 28 U/L (ref 10–44)
ANION GAP SERPL CALC-SCNC: 15 MMOL/L (ref 8–16)
AST SERPL-CCNC: 33 U/L (ref 10–40)
BILIRUB SERPL-MCNC: 0.5 MG/DL (ref 0.1–1)
BUN SERPL-MCNC: 30 MG/DL (ref 8–23)
CALCIUM SERPL-MCNC: 9.6 MG/DL (ref 8.7–10.5)
CHLORIDE SERPL-SCNC: 107 MMOL/L (ref 95–110)
CK SERPL-CCNC: 74 U/L (ref 20–200)
CO2 SERPL-SCNC: 18 MMOL/L (ref 23–29)
CREAT SERPL-MCNC: 1.7 MG/DL (ref 0.5–1.4)
CRP SERPL-MCNC: 9.6 MG/L (ref 0–8.2)
ERYTHROCYTE [SEDIMENTATION RATE] IN BLOOD BY PHOTOMETRIC METHOD: 32 MM/HR (ref 0–23)
EST. GFR  (NO RACE VARIABLE): 39 ML/MIN/1.73 M^2
GLUCOSE SERPL-MCNC: 157 MG/DL (ref 70–110)
POTASSIUM SERPL-SCNC: 4.3 MMOL/L (ref 3.5–5.1)
PROT SERPL-MCNC: 6.7 G/DL (ref 6–8.4)
SODIUM SERPL-SCNC: 140 MMOL/L (ref 136–145)

## 2025-03-19 PROCEDURE — G0179 MD RECERTIFICATION HHA PT: HCPCS | Mod: ,,, | Performed by: PODIATRIST

## 2025-03-24 ENCOUNTER — OFFICE VISIT (OUTPATIENT)
Dept: UROLOGY | Facility: CLINIC | Age: 85
End: 2025-03-24
Payer: MEDICARE

## 2025-03-24 ENCOUNTER — LAB VISIT (OUTPATIENT)
Dept: LAB | Facility: HOSPITAL | Age: 85
End: 2025-03-24
Attending: NURSE PRACTITIONER
Payer: MEDICARE

## 2025-03-24 VITALS — HEIGHT: 72 IN | BODY MASS INDEX: 25.86 KG/M2 | WEIGHT: 190.94 LBS

## 2025-03-24 DIAGNOSIS — Z85.51 HISTORY OF BLADDER CANCER: Primary | ICD-10-CM

## 2025-03-24 DIAGNOSIS — D84.821 IMMUNODEFICIENCY DUE TO CHEMOTHERAPY: ICD-10-CM

## 2025-03-24 DIAGNOSIS — D69.6 THROMBOCYTOPENIA: ICD-10-CM

## 2025-03-24 DIAGNOSIS — R31.0 GROSS HEMATURIA: ICD-10-CM

## 2025-03-24 DIAGNOSIS — N40.1 BENIGN PROSTATIC HYPERPLASIA WITH URINARY FREQUENCY: ICD-10-CM

## 2025-03-24 DIAGNOSIS — D50.9 IRON DEFICIENCY ANEMIA, UNSPECIFIED IRON DEFICIENCY ANEMIA TYPE: ICD-10-CM

## 2025-03-24 DIAGNOSIS — N30.01 ACUTE CYSTITIS WITH HEMATURIA: ICD-10-CM

## 2025-03-24 DIAGNOSIS — E78.5 DM TYPE 2 WITH DIABETIC DYSLIPIDEMIA: ICD-10-CM

## 2025-03-24 DIAGNOSIS — K31.811 GASTROINTESTINAL HEMORRHAGE ASSOCIATED WITH ANGIODYSPLASIA OF STOMACH AND DUODENUM: ICD-10-CM

## 2025-03-24 DIAGNOSIS — N18.9 CHRONIC KIDNEY DISEASE, UNSPECIFIED CKD STAGE: ICD-10-CM

## 2025-03-24 DIAGNOSIS — R16.1 SPLENOMEGALY: ICD-10-CM

## 2025-03-24 DIAGNOSIS — T45.1X5A IMMUNODEFICIENCY DUE TO CHEMOTHERAPY: ICD-10-CM

## 2025-03-24 DIAGNOSIS — Z79.899 IMMUNODEFICIENCY DUE TO CHEMOTHERAPY: ICD-10-CM

## 2025-03-24 DIAGNOSIS — R35.0 BENIGN PROSTATIC HYPERPLASIA WITH URINARY FREQUENCY: ICD-10-CM

## 2025-03-24 DIAGNOSIS — C67.9 UROTHELIAL CARCINOMA OF BLADDER: ICD-10-CM

## 2025-03-24 DIAGNOSIS — N20.0 NEPHROLITHIASIS: ICD-10-CM

## 2025-03-24 DIAGNOSIS — E11.69 DM TYPE 2 WITH DIABETIC DYSLIPIDEMIA: ICD-10-CM

## 2025-03-24 LAB
ABSOLUTE EOSINOPHIL (OHS): 0.09 K/UL
ABSOLUTE MONOCYTE (OHS): 0.64 K/UL (ref 0.3–1)
ABSOLUTE NEUTROPHIL COUNT (OHS): 3.44 K/UL (ref 1.8–7.7)
ALBUMIN SERPL BCP-MCNC: 3.2 G/DL (ref 3.5–5.2)
ALP SERPL-CCNC: 75 UNIT/L (ref 40–150)
ALT SERPL W/O P-5'-P-CCNC: 27 UNIT/L (ref 10–44)
ANION GAP (OHS): 12 MMOL/L (ref 8–16)
ANISOCYTOSIS BLD QL SMEAR: SLIGHT
AST SERPL-CCNC: 24 UNIT/L (ref 11–45)
BASOPHILS # BLD AUTO: 0.04 K/UL
BASOPHILS NFR BLD AUTO: 0.8 %
BILIRUB SERPL-MCNC: 0.5 MG/DL (ref 0.1–1)
BUN SERPL-MCNC: 29 MG/DL (ref 8–23)
CALCIUM SERPL-MCNC: 9.7 MG/DL (ref 8.7–10.5)
CHLORIDE SERPL-SCNC: 106 MMOL/L (ref 95–110)
CO2 SERPL-SCNC: 26 MMOL/L (ref 23–29)
CREAT SERPL-MCNC: 1.7 MG/DL (ref 0.5–1.4)
ERYTHROCYTE [DISTWIDTH] IN BLOOD BY AUTOMATED COUNT: 22.3 % (ref 11.5–14.5)
FERRITIN SERPL-MCNC: 576.9 NG/ML (ref 20–300)
GFR SERPLBLD CREATININE-BSD FMLA CKD-EPI: 39 ML/MIN/1.73/M2
GLUCOSE SERPL-MCNC: 129 MG/DL (ref 70–110)
HCT VFR BLD AUTO: 34.4 % (ref 40–54)
HGB BLD-MCNC: 11.2 GM/DL (ref 14–18)
IMM GRANULOCYTES # BLD AUTO: 0.04 K/UL (ref 0–0.04)
IMM GRANULOCYTES NFR BLD AUTO: 0.8 % (ref 0–0.5)
IRON SATN MFR SERPL: 36 % (ref 20–50)
IRON SERPL-MCNC: 94 UG/DL (ref 45–160)
LYMPHOCYTES # BLD AUTO: 0.74 K/UL (ref 1–4.8)
MAGNESIUM SERPL-MCNC: 1.9 MG/DL (ref 1.6–2.6)
MCH RBC QN AUTO: 29.8 PG (ref 27–50)
MCHC RBC AUTO-ENTMCNC: 32.6 G/DL (ref 32–36)
MCV RBC AUTO: 92 FL (ref 82–98)
NUCLEATED RBC (/100WBC) (OHS): 0 /100 WBC
OVALOCYTES BLD QL SMEAR: NORMAL
PLATELET # BLD AUTO: 144 K/UL (ref 150–450)
PMV BLD AUTO: 10.2 FL (ref 9.2–12.9)
POIKILOCYTOSIS BLD QL SMEAR: SLIGHT
POTASSIUM SERPL-SCNC: 4.8 MMOL/L (ref 3.5–5.1)
PROT SERPL-MCNC: 6.2 GM/DL (ref 6–8.4)
RBC # BLD AUTO: 3.76 M/UL (ref 4.6–6.2)
RELATIVE EOSINOPHIL (OHS): 1.8 %
RELATIVE LYMPHOCYTE (OHS): 14.8 % (ref 18–48)
RELATIVE MONOCYTE (OHS): 12.8 % (ref 4–15)
RELATIVE NEUTROPHIL (OHS): 69 % (ref 38–73)
SODIUM SERPL-SCNC: 144 MMOL/L (ref 136–145)
TARGETS BLD QL SMEAR: NORMAL
TIBC SERPL-MCNC: 262 UG/DL (ref 250–450)
TRANSFERRIN SERPL-MCNC: 177 MG/DL (ref 200–375)
WBC # BLD AUTO: 4.99 K/UL (ref 3.9–12.7)

## 2025-03-24 PROCEDURE — 82040 ASSAY OF SERUM ALBUMIN: CPT

## 2025-03-24 PROCEDURE — 3072F LOW RISK FOR RETINOPATHY: CPT | Mod: CPTII,S$GLB,, | Performed by: UROLOGY

## 2025-03-24 PROCEDURE — 99999 PR PBB SHADOW E&M-EST. PATIENT-LVL IV: CPT | Mod: PBBFAC,,, | Performed by: UROLOGY

## 2025-03-24 PROCEDURE — 83540 ASSAY OF IRON: CPT

## 2025-03-24 PROCEDURE — 1126F AMNT PAIN NOTED NONE PRSNT: CPT | Mod: CPTII,S$GLB,, | Performed by: UROLOGY

## 2025-03-24 PROCEDURE — 82728 ASSAY OF FERRITIN: CPT

## 2025-03-24 PROCEDURE — 1159F MED LIST DOCD IN RCRD: CPT | Mod: CPTII,S$GLB,, | Performed by: UROLOGY

## 2025-03-24 PROCEDURE — 83036 HEMOGLOBIN GLYCOSYLATED A1C: CPT

## 2025-03-24 PROCEDURE — 36415 COLL VENOUS BLD VENIPUNCTURE: CPT

## 2025-03-24 PROCEDURE — 85025 COMPLETE CBC W/AUTO DIFF WBC: CPT

## 2025-03-24 PROCEDURE — 83735 ASSAY OF MAGNESIUM: CPT

## 2025-03-24 PROCEDURE — 1157F ADVNC CARE PLAN IN RCRD: CPT | Mod: CPTII,S$GLB,, | Performed by: UROLOGY

## 2025-03-24 PROCEDURE — 99214 OFFICE O/P EST MOD 30 MIN: CPT | Mod: S$GLB,,, | Performed by: UROLOGY

## 2025-03-24 RX ORDER — CEFAZOLIN SODIUM 2 G/50ML
2 SOLUTION INTRAVENOUS
OUTPATIENT
Start: 2025-03-24

## 2025-03-24 NOTE — PROGRESS NOTES
Chief Complaint:   Encounter Diagnoses   Name Primary?    History of bladder cancer Yes    Gross hematuria     Benign prostatic hyperplasia with urinary frequency     Nephrolithiasis     Acute cystitis with hematuria        HPI:   3/24/25- patient has now completed chemoradiation for his bladder cancer, still on antibiotic therapy for a foot infection through podiatry.  No evidence of difficulty voiding, no dysuria or hematuria.    12/22/21- patient comes in today to establish care my clinic, recent gross hematuria and reported to see Alexandra.  CT urogram demonstrates bladder stones, urinary retention and a distal right ureteral stone.  Patient has had a Pandey catheter would like this removed today, no more gross hematuria, he is also on antibiotics and tamsulosin.  Patient otherwise was voiding well with no complaints prior to this incident, history of TURBT with subsequent induction BCG, no recurrences.      Allergies:  Patient has no known allergies.    Medications:  has a current medication list which includes the following prescription(s): amiodarone, amlodipine, atorvastatin, donepezil, ergocalciferol, famotidine, ferrous sulfate, hydrochlorothiazide, metoprolol succinate, multivitamin, nitrofurantoin (macrocrystal-monohydrate), oxycodone-acetaminophen, pantoprazole, phenazopyridine, rivaroxaban, and tamsulosin, and the following Facility-Administered Medications: lactated ringers.    Review of Systems:  General: No fever, chills, fatigability, or weight loss.  Skin: No rashes, itching, or changes in color or texture of skin.  Chest: Denies RECINOS, cyanosis, wheezing, cough, and sputum production.  Abdomen: Appetite fine. No weight loss. Denies diarrhea, abdominal pain, hematemesis, or blood in stool.  Musculoskeletal: No joint stiffness or swelling. Denies back pain.  : As above.  All other review of systems negative.    PMH:   has a past medical history of Anemia, Anticoagulant long-term use, Atrial  fibrillation, Bladder cancer, Bladder tumor, CKD (chronic kidney disease), stage III, COPD (chronic obstructive pulmonary disease), Encounter for blood transfusion, Hematuria, History of 2019 novel coronavirus disease (COVID-19), Hypercholesteremia, and Hypertension.    PSH:   has a past surgical history that includes Cholecystectomy; Cystoscopy; Cardioversion; bladder tumor removal; Eye surgery; cataract removal with IOL implants (Bilateral); Cardiac electrophysiology mapping and ablation; Transurethral resection of bladder tumor by bipolar cautery (N/A, 7/23/2019); Esophagogastroduodenoscopy (N/A, 10/25/2021); Intraluminal gastrointestinal tract imaging via capsule (N/A, 12/6/2021); Esophagogastroduodenoscopy (N/A, 12/13/2021); Cystoscopy with biopsy of bladder (Right, 12/28/2021); and Cystoscopic litholapaxy (N/A, 12/28/2021).    FamHx: family history includes Heart disease in his brother and father.    SocHx:  reports that he quit smoking about 35 years ago. His smoking use included cigarettes. He has a 50.00 pack-year smoking history. He has never used smokeless tobacco. He reports current alcohol use of about 8.0 standard drinks of alcohol per week. He reports that he does not use drugs.      Physical Exam:  General: A&Ox3, no apparent distress, no deformities  Neck: No masses, normal thyroid  Lungs: normal inspiration, no use of accessory muscles  Heart: normal pulse, no arrhythmias  Abdomen: Soft, NT, ND, no masses, no hernias, no hepatosplenomegaly  Lymphatic: Neck and groin nodes negative  : 11/24- Test desc filipe, no abnormalities of epididymus. Normal penile and scrotal skin. Meatus normal.     Labs/Studies:    ml 6/24  Cystoscopy tumors along the lateral wall left side, debris, TUR defect 11/24  Cytology suspicious 11/24  Cytology negative 4/24  CT without contrast right stent and nephrostomy, no left hydro 1/25  CORDELL right hydro 12/24  CT stone protocol asymetry to right bladder wall with hydro  11/24  CORDELL moderate right-sided hydronephrosis 4/24  CT urogram bladder distention with bilateral hydronephrosis, bladder stone 4/24  CT stone protocol no nephrolithiasis 1/22  CT urogram 0.5 cm distal right ureteral stone, bladder stones, BPH 12/21  Creatinine 1.7 3/25  Creatinine 1.1 1/25  PSA 2.1 10/24    Impression/Plan:      1. History of bladder cancer-  cisplatin complete 2/25,  XRT  3/25,  right PCN removal, stent left in place 1/31/25, right PCN and stent by IR  1/17/25,  pT1 high turbt with mitomycin  12/3/24,  BCG 3/22, High pTa, but suspicious for T1 cystoscopy with bladder biopsy, vesical litholapaxy, could not assess the right ureter  12/28/21.  TURBT 5-6 years ago at the outside facility with induction BCG.    Patient has high-grade T1 disease, no muscle present.  Did not want to pursue a cystectomy, but has now completed chemoradiation.  Nephrostomy was removed by Interventional Radiology, he is now due for possible stent exchange versus removal.  Therefore will take him back for cystoscopy, possible ureteroscopy, possible TURBT, retrograde and possible stent exchange versus removal.  Please see below in regards to our discussion today.  Following surgery he will be scheduled for routine cystoscopy as per surveillance protocol, in addition if no disease found in April during surgery, will at least recommend CT urogram at 6 months.  Recommend evaluation by Nephrology due to chronic renal insufficiency.    2. Nephrolithiasis- no recurrent stones.    3. BPH-  TURP, vesicolithalopaxy  5/14/24    Patient states that he is voiding well off all medical management.    4. Gross hematuria- no evidence of recurrence, call with any issues prior to the next appointment.    5. Erectile dysfunction- Viagra 100 mg but currently not an issue.    Patient understands the risks, benefits and alternatives of the above-stated procedure.  These include but not limited to damage to the surrounding structures including the  urethra, ureters and bladder.  Risk of perforation requiring open procedures, Pandey catheterization at the conclusion of the procedure.  Risk of need for further surgeries.  Risk of pain, hematuria, infections.  Risk of heart attack, stroke, death, DVT and PE.  Patient understanding of all the above he has elected to pursue.

## 2025-03-25 ENCOUNTER — LAB REQUISITION (OUTPATIENT)
Dept: LAB | Facility: HOSPITAL | Age: 85
End: 2025-03-25
Payer: MEDICARE

## 2025-03-25 DIAGNOSIS — L97.512 NON-PRESSURE CHRONIC ULCER OF OTHER PART OF RIGHT FOOT WITH FAT LAYER EXPOSED: ICD-10-CM

## 2025-03-25 DIAGNOSIS — E11.621 TYPE 2 DIABETES MELLITUS WITH FOOT ULCER (CODE): ICD-10-CM

## 2025-03-25 LAB
EAG (OHS): 123 MG/DL (ref 68–131)
HBA1C MFR BLD: 5.9 % (ref 4–5.6)

## 2025-03-25 PROCEDURE — 85652 RBC SED RATE AUTOMATED: CPT | Mod: PO | Performed by: INTERNAL MEDICINE

## 2025-03-25 PROCEDURE — 82550 ASSAY OF CK (CPK): CPT | Mod: PO | Performed by: INTERNAL MEDICINE

## 2025-03-25 PROCEDURE — 86140 C-REACTIVE PROTEIN: CPT | Mod: PO | Performed by: INTERNAL MEDICINE

## 2025-03-25 PROCEDURE — 85025 COMPLETE CBC W/AUTO DIFF WBC: CPT | Mod: PO | Performed by: INTERNAL MEDICINE

## 2025-03-25 PROCEDURE — 82565 ASSAY OF CREATININE: CPT | Mod: PO | Performed by: INTERNAL MEDICINE

## 2025-03-26 LAB
ABSOLUTE EOSINOPHIL (OHS): 0.06 K/UL
ABSOLUTE MONOCYTE (OHS): 0.4 K/UL (ref 0.3–1)
ABSOLUTE NEUTROPHIL COUNT (OHS): 2 K/UL (ref 1.8–7.7)
ALBUMIN SERPL BCP-MCNC: 3.3 G/DL (ref 3.5–5.2)
ALP SERPL-CCNC: 76 UNIT/L (ref 40–150)
ALT SERPL W/O P-5'-P-CCNC: 29 UNIT/L (ref 10–44)
ANION GAP (OHS): 12 MMOL/L (ref 8–16)
AST SERPL-CCNC: 32 UNIT/L (ref 11–45)
BASOPHILS # BLD AUTO: 0.04 K/UL
BASOPHILS NFR BLD AUTO: 1.3 %
BILIRUB SERPL-MCNC: 0.4 MG/DL (ref 0.1–1)
BUN SERPL-MCNC: 23 MG/DL (ref 8–23)
CALCIUM SERPL-MCNC: 9.4 MG/DL (ref 8.7–10.5)
CHLORIDE SERPL-SCNC: 106 MMOL/L (ref 95–110)
CK SERPL-CCNC: 123 U/L (ref 20–200)
CO2 SERPL-SCNC: 23 MMOL/L (ref 23–29)
CREAT SERPL-MCNC: 1.4 MG/DL (ref 0.5–1.4)
CRP SERPL-MCNC: 3.2 MG/L
ERYTHROCYTE [DISTWIDTH] IN BLOOD BY AUTOMATED COUNT: 22.5 % (ref 11.5–14.5)
ERYTHROCYTE [SEDIMENTATION RATE] IN BLOOD BY PHOTOMETRIC METHOD: 33 MM/HR
GFR SERPLBLD CREATININE-BSD FMLA CKD-EPI: 49 ML/MIN/1.73/M2
GLUCOSE SERPL-MCNC: 141 MG/DL (ref 70–110)
HCT VFR BLD AUTO: 34.7 % (ref 40–54)
HGB BLD-MCNC: 11.4 GM/DL (ref 14–18)
IMM GRANULOCYTES # BLD AUTO: 0.04 K/UL (ref 0–0.04)
IMM GRANULOCYTES NFR BLD AUTO: 1.3 % (ref 0–0.5)
LYMPHOCYTES # BLD AUTO: 0.63 K/UL (ref 1–4.8)
MCH RBC QN AUTO: 29.2 PG (ref 27–50)
MCHC RBC AUTO-ENTMCNC: 32.9 G/DL (ref 32–36)
MCV RBC AUTO: 89 FL (ref 82–98)
NUCLEATED RBC (/100WBC) (OHS): 0 /100 WBC
PLATELET # BLD AUTO: 127 K/UL (ref 150–450)
PMV BLD AUTO: 10.8 FL (ref 9.2–12.9)
POTASSIUM SERPL-SCNC: 4.2 MMOL/L (ref 3.5–5.1)
PROT SERPL-MCNC: 6.5 GM/DL (ref 6–8.4)
RBC # BLD AUTO: 3.91 M/UL (ref 4.6–6.2)
RELATIVE EOSINOPHIL (OHS): 1.9 %
RELATIVE LYMPHOCYTE (OHS): 19.9 % (ref 18–48)
RELATIVE MONOCYTE (OHS): 12.6 % (ref 4–15)
RELATIVE NEUTROPHIL (OHS): 63 % (ref 38–73)
SODIUM SERPL-SCNC: 141 MMOL/L (ref 136–145)
WBC # BLD AUTO: 3.17 K/UL (ref 3.9–12.7)

## 2025-03-31 ENCOUNTER — OFFICE VISIT (OUTPATIENT)
Dept: INFECTIOUS DISEASES | Facility: CLINIC | Age: 85
End: 2025-03-31
Payer: MEDICARE

## 2025-03-31 ENCOUNTER — TELEPHONE (OUTPATIENT)
Dept: INFECTIOUS DISEASES | Facility: CLINIC | Age: 85
End: 2025-03-31

## 2025-03-31 VITALS
OXYGEN SATURATION: 98 % | HEART RATE: 62 BPM | WEIGHT: 190.94 LBS | TEMPERATURE: 98 F | DIASTOLIC BLOOD PRESSURE: 82 MMHG | HEIGHT: 72 IN | SYSTOLIC BLOOD PRESSURE: 133 MMHG | BODY MASS INDEX: 25.86 KG/M2

## 2025-03-31 DIAGNOSIS — L08.9 DIABETIC FOOT INFECTION: ICD-10-CM

## 2025-03-31 DIAGNOSIS — I48.0 PAROXYSMAL ATRIAL FIBRILLATION: ICD-10-CM

## 2025-03-31 DIAGNOSIS — M86.471 CHRONIC OSTEOMYELITIS OF RIGHT FOOT WITH DRAINING SINUS: Primary | ICD-10-CM

## 2025-03-31 DIAGNOSIS — I10 PRIMARY HYPERTENSION: Primary | ICD-10-CM

## 2025-03-31 DIAGNOSIS — E11.628 DIABETIC FOOT INFECTION: ICD-10-CM

## 2025-03-31 DIAGNOSIS — M86.471 CHRONIC OSTEOMYELITIS OF RIGHT FOOT WITH DRAINING SINUS: ICD-10-CM

## 2025-03-31 PROCEDURE — 1101F PT FALLS ASSESS-DOCD LE1/YR: CPT | Mod: CPTII,S$GLB,, | Performed by: INTERNAL MEDICINE

## 2025-03-31 PROCEDURE — 1157F ADVNC CARE PLAN IN RCRD: CPT | Mod: CPTII,S$GLB,, | Performed by: INTERNAL MEDICINE

## 2025-03-31 PROCEDURE — 1159F MED LIST DOCD IN RCRD: CPT | Mod: CPTII,S$GLB,, | Performed by: INTERNAL MEDICINE

## 2025-03-31 PROCEDURE — 3288F FALL RISK ASSESSMENT DOCD: CPT | Mod: CPTII,S$GLB,, | Performed by: INTERNAL MEDICINE

## 2025-03-31 PROCEDURE — 99999 PR PBB SHADOW E&M-EST. PATIENT-LVL III: CPT | Mod: PBBFAC,,, | Performed by: INTERNAL MEDICINE

## 2025-03-31 PROCEDURE — 3072F LOW RISK FOR RETINOPATHY: CPT | Mod: CPTII,S$GLB,, | Performed by: INTERNAL MEDICINE

## 2025-03-31 PROCEDURE — 99214 OFFICE O/P EST MOD 30 MIN: CPT | Mod: S$GLB,,, | Performed by: INTERNAL MEDICINE

## 2025-03-31 RX ORDER — HYDRALAZINE HYDROCHLORIDE 50 MG/1
50 TABLET, FILM COATED ORAL EVERY 8 HOURS
Qty: 270 TABLET | Refills: 3 | Status: SHIPPED | OUTPATIENT
Start: 2025-03-31

## 2025-03-31 NOTE — TELEPHONE ENCOUNTER
----- Message from Nurse Torres sent at 3/31/2025 12:49 PM CDT -----  Send line removal orders to [facility]

## 2025-03-31 NOTE — ASSESSMENT & PLAN NOTE
Component      Latest Ref Rng 3/3/2025 3/10/2025 3/17/2025 3/25/2025   CRP      <=8.2 mg/L 2.1  4.8  9.6 (H)  3.2     Cultures-     METHICILLIN RESISTANT STAPHYLOCOCCUS AUREUS  Many      Aerobic Bacterial Culture  Abnormal   KLEBSIELLA OXYTOCA  Few    Aerobic Bacterial Culture  Abnormal   PSEUDOMONAS AERUGINOSA  Rare   Outpatient Antibiotic Therapy Plan:     Please send referral to    1) Infection:  Diabetic foot infection     2) Discharge Antibiotics:     Intravenous antibiotics:  IV Daptomycin-700 mg daily  IV Zosyn 4.5 gram every 8 hours     3) Therapy Duration:  6 weeks     Estimated end date of IV antibiotics: 04/01/25       Will plan to stop PICC line after 04/01/25

## 2025-03-31 NOTE — PROGRESS NOTES
Subjective     Patient ID: Jose Miguel Alvarez Jr. is a 84 y.o. male.    Chief Complaint: Follow-up    HPI  Last ID note-  02/18/2025  84 year old with PMH as listed below including bladder cancer and anemia who was referred for right foot infection/osteomyelitis .  His daughter in law is in the room-     Labs and imaging test -  Foot imaging -  No evidence of acute fracture or dislocation.  Soft tissue swelling around the distal phalanges of the 2nd digit.  Multi articular IP joint space narrowing.  Moderate midfoot hypertrophy.  Calcaneal spurring.  Osseous demineralization.   Labs and Imaging test-        METHICILLIN RESISTANT STAPHYLOCOCCUS AUREUS  Many      Aerobic Bacterial Culture  Abnormal   KLEBSIELLA OXYTOCA  Few    Aerobic Bacterial Culture  Abnormal   PSEUDOMONAS AERUGINOSA  Rare       03/31/25  The last ID note-  1) Infection:  Diabetic foot infection     2) Discharge Antibiotics:     Intravenous antibiotics:  IV Daptomycin-700 mg daily  IV Zosyn 4.5 gram every 8 hours     3) Therapy Duration:  6 weeks     Estimated end date of IV antibiotics: 04/01/25    Component      Latest Ref Rng 3/3/2025 3/10/2025 3/17/2025 3/25/2025   Sed Rate      <=23 mm/hr 21  28 (H)  32 (H)  33 (H)       Component      Latest Ref Rng 3/3/2025 3/10/2025 3/17/2025 3/25/2025   CRP      <=8.2 mg/L 2.1  4.8  9.6 (H)  3.2       h    Review of Systems   Constitutional:  Negative for activity change, appetite change and chills.   Allergic/Immunologic: Negative for environmental allergies and food allergies.   Neurological:  Negative for dizziness and coordination difficulties.          Objective     Physical Exam  Vitals and nursing note reviewed.   HENT:      Nose: Nose normal.   Cardiovascular:      Rate and Rhythm: Normal rate.   Pulmonary:      Effort: Pulmonary effort is normal.   Musculoskeletal:      Cervical back: Normal range of motion.      Comments: See pics   Skin:     General: Skin is warm.   Neurological:      Mental  Status: He is alert.                  Assessment and Plan     1. Primary hypertension  Overview:  Home medications include amldoipine, carvedilol, diltiazem, and hydralazine    Assessment & Plan:  , closely monitor BP,follow PCP      2. 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  Assessment & Plan:  Component      Latest Ref Rng 3/3/2025 3/10/2025 3/17/2025 3/25/2025   CRP      <=8.2 mg/L 2.1  4.8  9.6 (H)  3.2     Cultures-     METHICILLIN RESISTANT STAPHYLOCOCCUS AUREUS  Many      Aerobic Bacterial Culture  Abnormal   KLEBSIELLA OXYTOCA  Few    Aerobic Bacterial Culture  Abnormal   PSEUDOMONAS AERUGINOSA  Rare   Outpatient Antibiotic Therapy Plan:     Please send referral to    1) Infection:  Diabetic foot infection     2) Discharge Antibiotics:     Intravenous antibiotics:  IV Daptomycin-700 mg daily  IV Zosyn 4.5 gram every 8 hours     3) Therapy Duration:  6 weeks     Estimated end date of IV antibiotics: 04/01/25       Will plan to stop PICC line after 04/01/25        3. Paroxysmal atrial fibrillation  Assessment & Plan:  Follow cardiology ,on amiodarone          .

## 2025-04-04 ENCOUNTER — OFFICE VISIT (OUTPATIENT)
Dept: RADIATION ONCOLOGY | Facility: CLINIC | Age: 85
End: 2025-04-04
Payer: MEDICARE

## 2025-04-04 VITALS
SYSTOLIC BLOOD PRESSURE: 117 MMHG | RESPIRATION RATE: 18 BRPM | BODY MASS INDEX: 26.43 KG/M2 | OXYGEN SATURATION: 99 % | HEART RATE: 68 BPM | HEIGHT: 72 IN | WEIGHT: 195.13 LBS | TEMPERATURE: 98 F | DIASTOLIC BLOOD PRESSURE: 72 MMHG

## 2025-04-04 DIAGNOSIS — Z85.51 HISTORY OF BLADDER CANCER: Primary | ICD-10-CM

## 2025-04-04 PROCEDURE — 99999 PR PBB SHADOW E&M-EST. PATIENT-LVL IV: CPT | Mod: PBBFAC,,, | Performed by: SPECIALIST

## 2025-04-04 NOTE — PROGRESS NOTES
[facility] / [facility] - Radiation Oncology Follow Up Note    HISTORY OF PRESENT ILLNESS:   84-year-old man with longstanding superficial bladder cancer, since presenting to Dr. Garcia with gross hematuria and positive biopsy 07/23/2019.  Per his note on 12/27/2024:     1. History of bladder cancer-  pT1 high turbt with mitomycin  12/3/24,  BCG 3/22, High pTa, but suspicious for T1 cystoscopy with bladder biopsy, vesical litholapaxy, could not assess the right ureter  12/28/21.  TURBT 5-6 years ago at the outside facility with induction BCG.     Patient now has high-grade T1 disease, no muscle present.  Given the fact that he has right ureteral obstruction with no evidence of UO on cystoscopic evaluation, this is highly suspicious for T2 disease.  We have discussed radical cystectomy, at this time due to other comorbidities and his age, he would rather not pursue.  Therefore will refer to Radiation Oncology and Medical Oncology for possible chemoradiation therapy.  In addition due to the obstructed right ureter will proceed with percutaneous nephrostomy with hopefully antegrade stent placement by Interventional Radiology.  Patient is currently asymptomatic in regards to the hydronephrosis, no significant change per imaging.  Creatinine has been relatively stable up to this point.  Will see him back following these consultations, call with any issues in the meantime.     2. Nephrolithiasis- no recurrent stones.     3. BPH-  TURP, vesicolithalopaxy  5/14/24     Patient states that he is voiding well off all medical management.     4. Gross hematuria- intermittent but no evidence today.  Most likely due to the tumors, only on baby aspirin.     5. Erectile dysfunction- given Viagra 100 mg but currently not an issue.     He is also followed by hematology with the following history through 01/13/2025, and plans to transfer all Heme-Onc care to Dr. Hilton in Oncology where he will be seen for  initial evaluation later this morning     9/25/2024 Presents today to discuss treatment recommendations due to anaphalactic reaction with first dose of Feraheme on 9/9/2024.  Upon initiation of IV iron he started to have severe back pain so infusions was stopped and saline was infusing.  Symptoms resolved and infusions was restarted at a slower rate.  Patient began to lose vision and had a syncopal episode with an extreme drop in blood pressure.  He was give steroid injection and BP began to rise and regained consciousness.  He was transported from facility to hospital via ambulance for evaluation.  Feraheme has been listed as an allergy in his chart.  Review of past iron infusions show that he has received Injectafer infusions in the past without issue in 2021 and he also tells me that he received IV iron after blood transfusion many years ago in the hospital and tolerated fine.  Currently taking ferrous sulfate 325 mg PO every other day and is tolerating well.  He is accompanied by his son. He will be having a special GI procedure tomorrow Upper DBE on 9/26/2024 with dr. Ruff in [place] to assess for and treat angiectasias in the small intestines.  He continues to take Protonix 40 mg po daily.  He denies and known abnormal bleeding and is asymptomatic. VCE in 4/2024 with active small bowel bleeding seen.     10/31/2024 Presents today to evaluate response of oral iron.  Has been taking ferrous sulfate 325 mg PO daily since last visit wtihout complaints.  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  Denies any current known GI bleeding.  Hgb slowly improving - continues to have low ferritin.  Has brain fogginess.  Is accompanied by his son.  Has decided that he would like to get the iron that he received previously that he was able to tolerate.      I1/13/2025 Received venofer 200 mg IV x 4 doses and presents today to evaluate response.  Has been found with recurrent bladder cancer  and has initial evaluation with oncology and radiology upcoming. Hgb continues to trend upward.  Denies any known abnormal bleeding.  Continued stable thrombocytopenia with known borderline splenomegaly.  Saturated iron of 6%.  Had watchman procedure done.  No longer on anticoagulation.   I have reviewed all of the patient's relevant lab work available in the medical record and have utilized this in my evaluation and management recommendations today.      Abdominopelvic CT on renal stone protocol on 11/18/2024 described focal thickening along the posterior bladder wall, asymmetric to the right, producing moderate right-sided hydronephrosis and hydroureter.          Retroperitoneal ultrasound on 12/23/2024 shows right-sided hydronephrosis consistent with known right-sided bladder mass at the UVJ.  Normal left side     Baseline  REVIEW OF SYSTEMS: He takes Flomax and describes good urinary function without hematuria. He has no pelvic or other pain or any awareness of his cancer or hydroureter. He remains active without limitation. He has no other focal complaints       Treatment Summary: He was treated to the whole bladder to 55 Gy in 20 fractions last on 03/06/2025, with weekly cisplatin          INTERVAL HISTORY:  He returns for routine short interval follow up.  He had tolerated t therapy well, reporting modest dose appropriate LUTS requiring no additional medication.  He presented on nightly Flomax in his uncertain if he has continued to take that, as his son prepares his weekly pills for him, but reports overwhelming but incomplete resolution of LUTS with a occasional small volume hematuria, improving, occasional tolerable dysuria, improving, and ongoing but improving urgency without urge incontinence.  He has nocturia 2 or 3 times per evening.  He denies weak stream, hesitancy, daytime frequency less than 2 hours, or incontinence.  He has no new bone pain or GI complaints.      PHYSICAL EXAMINATION:  Vitals:     04/04/25 1353   BP: 117/72   Pulse: 68   Resp: 18   Temp: 97.5 °F (36.4 °C)   SpO2: 99%   Weight: 88.5 kg (195 lb 1.7 oz)   Height: 6' (1.829 m)      General:  A&O x4, NAD  Lymphatics:  no cervical/sclav LAD  Lungs:  CTAB  Heart:  RRR  Abdomen:  NTND +BS      ASSESSMENT:  He is recovering appropriately and remains clinically ELISA    PLAN:  Follow up in 4 months.    He follows up with Dr. Westbrook on 04/11/2025 and also maintains follow up with Dr. Garcia

## 2025-04-09 ENCOUNTER — OFFICE VISIT (OUTPATIENT)
Dept: PODIATRY | Facility: CLINIC | Age: 85
End: 2025-04-09
Payer: MEDICARE

## 2025-04-09 DIAGNOSIS — E08.621 DIABETIC ULCER OF TOE OF RIGHT FOOT ASSOCIATED WITH DIABETES MELLITUS DUE TO UNDERLYING CONDITION, WITH BONE INVOLVEMENT WITHOUT EVIDENCE OF NECROSIS: Primary | ICD-10-CM

## 2025-04-09 DIAGNOSIS — Z79.01 ANTICOAGULATED: ICD-10-CM

## 2025-04-09 DIAGNOSIS — Z91.89 INCREASED INFECTION RISK: ICD-10-CM

## 2025-04-09 DIAGNOSIS — M86.9 OSTEOMYELITIS OF SECOND TOE OF RIGHT FOOT: ICD-10-CM

## 2025-04-09 DIAGNOSIS — E11.42 TYPE 2 DIABETES MELLITUS WITH PERIPHERAL NEUROPATHY: ICD-10-CM

## 2025-04-09 DIAGNOSIS — L97.516 DIABETIC ULCER OF TOE OF RIGHT FOOT ASSOCIATED WITH DIABETES MELLITUS DUE TO UNDERLYING CONDITION, WITH BONE INVOLVEMENT WITHOUT EVIDENCE OF NECROSIS: Primary | ICD-10-CM

## 2025-04-09 PROCEDURE — 99999 PR PBB SHADOW E&M-EST. PATIENT-LVL III: CPT | Mod: PBBFAC,,, | Performed by: PODIATRIST

## 2025-04-09 NOTE — PROGRESS NOTES
Subjective:       Patient ID: Jose Miguel Alvarez Jr. is a 84 y.o. male.    Chief Complaint: Wound Check (Patient present to check wound of right second toe. No dressing, no drainage. Denies pain at present. )    HPI: Jose Miguel Alvarez Jr. presents to the office today with a small wound to the distal aspect of the right 2nd toe.  Continues to follow with Infectious Disease due to osteomyelitis of the 2nd digit.  Recently completed treatment of chemotherapy and radiation for bladder cancer.  States no pain.  No current dressings.      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 Treated with APC.   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 breakdown of skin 01/26/2023    Stage 3 chronic kidney disease 10/06/2017       Family History   Problem Relation Name Age of Onset    Heart disease Father      Hypertension Father      Heart disease Brother         Social History     Socioeconomic History    Marital status:    Tobacco Use    Smoking status: Former     Current packs/day: 0.00     Average packs/day: 2.5 packs/day for 20.0 years (50.0 ttl pk-yrs)     Types: Cigarettes     Start date: 1967     Quit date: 1987     Years since quitting:  38.2     Passive exposure: Never    Smokeless tobacco: Never   Substance and Sexual Activity    Alcohol use: Yes     Alcohol/week: 7.0 standard drinks of alcohol     Types: 7 Cans of beer per week     Comment: occasionally: hold 72 hrs    Drug use: No    Sexual activity: Not Currently     Social Drivers of Health     Financial Resource Strain: Low Risk  (1/13/2025)    Overall Financial Resource Strain (CARDIA)     Difficulty of Paying Living Expenses: Not hard at all   Food Insecurity: No Food Insecurity (1/13/2025)    Hunger Vital Sign     Worried About Running Out of Food in the Last Year: Never true     Ran Out of Food in the Last Year: Never true   Transportation Needs: No Transportation Needs (8/23/2024)    TRANSPORTATION NEEDS     Transportation : No   Physical Activity: Insufficiently Active (1/13/2025)    Exercise Vital Sign     Days of Exercise per Week: 2 days     Minutes of Exercise per Session: 30 min   Stress: No Stress Concern Present (1/13/2025)    [employer] - Occupational Stress Questionnaire     Feeling of Stress : Not at all   Housing Stability: Unknown (1/13/2025)    Housing Stability Vital Sign     Unable to Pay for Housing in the Last Year: No     Homeless in the Last Year: No       Past Surgical History:   Procedure Laterality Date    bladder tumor removal      CARDIAC ELECTROPHYSIOLOGY MAPPING AND ABLATION      CARDIOVERSION      several    CATARACT EXTRACTION Bilateral     cataract removal with IOL implants Bilateral     CHOLECYSTECTOMY      CYSTOSCOPIC LITHOLAPAXY N/A 12/28/2021    Procedure: CYSTOLITHOLAPAXY;  Surgeon: Medardo Garcia MD;  Location: [facility] OR;  Service: Urology;  Laterality: N/A;    CYSTOSCOPIC LITHOLAPAXY N/A 5/14/2024    Procedure: CYSTOLITHOLAPAXY;  Surgeon: Medardo Garcia MD;  Location: [facility] OR;  Service: Urology;  Laterality: N/A;    CYSTOSCOPY      CYSTOSCOPY WITH BIOPSY OF BLADDER Right 12/28/2021    Procedure: CYSTOSCOPY,  WITH BLADDER BIOPSY, WITH FULGURATION IF INDICATED;  Surgeon: Medardo Garcia MD;  Location: [facility] OR;  Service: Urology;  Laterality: Right;    CYSTOTOMY, WITH FULGURATION AND RADIOACTIVE MATERIAL INSERTION N/A 12/3/2024    Procedure: CYSTOTOMY, WITH FULGURATION AND RADIOACTIVE MATERIAL INSERTION;  Surgeon: Medardo Garcia MD;  Location: [facility] OR;  Service: Urology;  Laterality: N/A;    ESOPHAGOGASTRODUODENOSCOPY N/A 10/25/2021    Procedure: Small Jason Enteroscopy (SBE);  Surgeon: Isabelle Griffin MD;  Location: [facility] ENDO;  Service: Endoscopy;  Laterality: N/A;    ESOPHAGOGASTRODUODENOSCOPY N/A 12/13/2021    Procedure: EGD (ESOPHAGOGASTRODUODENOSCOPY);  Surgeon: Troy Polanco MD;  Location: [facility] ENDO;  Service: Endoscopy;  Laterality: N/A;    ESOPHAGOGASTRODUODENOSCOPY N/A 4/5/2024    Procedure: EGD (ESOPHAGOGASTRODUODENOSCOPY);  Surgeon: Myrtle Salcedo MD;  Location: [facility];  Service: Endoscopy;  Laterality: N/A;    ESOPHAGOGASTRODUODENOSCOPY N/A 4/22/2024    Procedure: EGD (ESOPHAGOGASTRODUODENOSCOPY);  Surgeon: Myrtle Salcedo MD;  Location: [facility];  Service: Endoscopy;  Laterality: N/A;  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    EYE SURGERY      INTRALUMINAL GASTROINTESTINAL TRACT IMAGING VIA CAPSULE N/A 12/6/2021    Procedure: IMAGING PROCEDURE, GI TRACT, INTRALUMINAL, VIA CAPSULE;  Surgeon: Larry Jones RN;  Location: [facility] ENDO;  Service: Endoscopy;  Laterality: N/A;    INTRALUMINAL GASTROINTESTINAL TRACT IMAGING VIA CAPSULE N/A 4/18/2024    Procedure: IMAGING PROCEDURE, GI TRACT, INTRALUMINAL, VIA CAPSULE;  Surgeon: Larry Jones RN;  Location: [facility] ENDO;  Service: Endoscopy;  Laterality: N/A;    OCCLUSION OF LEFT ATRIAL APPENDAGE N/A 11/14/2024    Procedure: Left atrial appendage occlusion;  Surgeon: Aroldo Ortiz MD;  Location: [facility] EP LAB;  Service: Cardiology;  Laterality: N/A;  afib, watchman, BSCI, venkatesh, anes, MB, 3prep    SMALL BOWEL ENTEROSCOPY N/A  9/26/2024    Procedure: ENTEROSCOPY--prximal DBE;  Surgeon: Eduardo Ruff MD;  Location: [facility] ENDO;  Service: Endoscopy;  Laterality: N/A;    TRANSESOPHAGEAL ECHOCARDIOGRAPHY N/A 11/14/2024    Procedure: ECHOCARDIOGRAM, TRANSESOPHAGEAL;  Surgeon: Aroldo Martinez MD;  Location: [facility] EP LAB;  Service: Cardiology;  Laterality: N/A;    TRANSESOPHAGEAL ECHOCARDIOGRAPHY N/A 12/30/2024    Procedure: ECHOCARDIOGRAM, TRANSESOPHAGEAL;  Surgeon: Carlos Flower MD;  Location: [facility] CATH LAB;  Service: Cardiology;  Laterality: N/A;    TRANSURETHRAL RESECTION OF BLADDER TUMOR BY BIPOLAR CAUTERY N/A 7/23/2019    Procedure: TURBT, USING BIPOLAR CAUTERY;  Surgeon: Ritesh Marques MD;  Location: [facility] OR;  Service: Urology;  Laterality: N/A;    TRANSURETHRAL RESECTION OF PROSTATE N/A 5/14/2024    Procedure: TURP (TRANSURETHRAL RESECTION OF PROSTATE);  Surgeon: Medardo Garcia MD;  Location: [facility] OR;  Service: Urology;  Laterality: N/A;    TURBT (TRANSURETHRAL RESECTION OF BLADDER TUMOR) N/A 12/3/2024    Procedure: TURBT (TRANSURETHRAL RESECTION OF BLADDER TUMOR);  Surgeon: Medardo Garcia MD;  Location: [facility] OR;  Service: Urology;  Laterality: N/A;     Review of Systems     Objective:   There were no vitals taken for this visit.    MRI Foot Toes W WO Contrast Right  Narrative: EXAM: MRI FOOT TOES W WO CONTRAST RIGHT    CLINICAL HISTORY:  Foot swelling.  Diabetes.  Possible osteomyelitis.  Soft tissue ulceration.    TECHNIQUE: Standard multiplanar pulse sequences obtained without and with IV contrast.    COMPARISON: None.    FINDINGS:    Small, focal soft tissue ulceration of the distal second toe.  Underlying severe soft tissue edema and soft tissue swelling.  Fluid tracks from the wound to surround the distal phalanx.  Severe bone marrow edema throughout the distal phalanx with early bony erosive change of the patella.  Findings consistent with osteomyelitis.  Proximal phalanx is normal with normal marrow.  Middle  phalanx congenitally absent.  Remaining toes are normal with normal marrow.    Scattered degenerative changes with severe osteoarthritis of the first through fourth TMT joints.  Low-grade reactive marrow edema punctate subchondral cysts.  Scattered osteophytes.  Severe osteoarthritis of the navicular-cuneiform joint.  Moderate to severe first MTP chondral thinning.  Remaining joint spaces the forefoot demonstrate low-grade scattered degenerative change.    Partial tearing flexor tendon second toe.  Remaining tendons of the foot are intact.    Plantar fascia normal.  Severe fatty atrophy of the musculature of the foot with mild dorsal and and plantar subcutaneous soft tissue edema.  Impression: Osteomyelitis distal phalanx second toe with overlying distal soft tissue wound.    Advanced scattered degenerative changes.    Finalized on: 2/27/2025 8:54 AM By:  Guido Lagos MD  [facility]# [id number]      2025-02-27 08:57:[id number]     [facility]     Physical Exam   LOWER EXTREMITY PHYSICAL EXAMINATION    Vascular:  The right dorsalis pedis pulse 2/4 and the right posterior tibial pulse 2/4.   Capillary refill is intact.  Pedal hair growth intact    Musculoskeletal: Manual Muscle Testing is 5/5 with dorsiflexion, plantar flexion, abduction, and adduction.   There is normal range of motion in the forefoot, hindfoot, and Ankle joint.      Dermatological:  Skin is supple, moist, intact.  No evidence of discoloration the distal aspect the right 2nd toe.  No significant hyperkeratosis noted.  Wound measures 0.4 cm x 0.1 cm.  Wound depth 0.2 cm.  No probe to bone.  No cellulitis.  No signs of underlying abscess.         Assessment:     1. 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 Type 2 diabetes mellitus with peripheral neuropathy    4. Anticoagulated    5. 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    MRI confirming osteomyelitis to the 2nd digit.  Discuss potential for partial toe amputation if not completely healed.  This will remove the infected bone and may also decrease length of time needed for IV antibiotics.    Continues have ulcer to the distal aspect of the toe    Home health to continue with dressings.     Thorough discussion is had with the patient concerning the diagnosis, its etiology, and the treatment algorithm at present. Shoe inspection. Foot Education. Patient reminded of the importance of good nutrition and blood sugar control to help prevent podiatric complications of diabetes. Patient instructed on proper foot hygeine. We discussed wearing proper and supportive shoe gear, daily foot inspections, never walking barefooted or sock footed, never putting sharp instruments to feet which can cause major complications associated with infection, ulcers, lacerations.    Follow-up in 2 week    Future Appointments   Date Time Provider Department Center   4/11/2025  1:30 PM RHYS MURILLO [facility] LAB [facility] LAB DS [facility]   4/11/2025  2:20 PM Fabián Westbrook MD [facility] HEM ONC [facility]   4/23/2025  8:45 AM Xiao Bowman DPM [facility] POD [facility]   5/30/2025 10:30 AM Justyn Rangel MD [facility] NEPHRO [facility]   6/23/2025 10:00 AM Mis Beckham NP [facility] NEURO [facility]   6/30/2025 10:30 AM Martin Villarreal MD, YENIFER [facility] INFDIS  Medical C   7/2/2025  3:20 PM Carlos Flower MD [facility] CARDIO [facility]   8/5/2025  2:00 PM  Frederick Singh MD [facility] RAD ONC [facility]

## 2025-04-10 ENCOUNTER — DOCUMENT SCAN (OUTPATIENT)
Dept: HOME HEALTH SERVICES | Facility: HOSPITAL | Age: 85
End: 2025-04-10
Payer: MEDICARE

## 2025-04-10 NOTE — PROGRESS NOTES
MEDICAL ONCOLOGY - ESTABLISHED PATIENT VISIT    Best Contact Phone Number(s): [phone] (home)      Cancer/Stage/TNM:    Cancer Staging   Urothelial carcinoma of bladder  Staging form: Urinary Bladder, AJCC 7th Edition  - Clinical: Stage II (T2, N0, M0) - Signed by Fabián Westbrook MD on 1/17/2025       Reason for visit: MIBC    Treatment:   02/05/25   Cisplatin  02/06/25 - 03/06/25  RT    HPI: Jose Miguel Alvarez Jr. is a 84 y.o. 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 Has long standing history of localized bladder cancer treated with serial TURBT since at least 2016. 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 He was treated with serial TURBT and induction BCG. More recently found to have recurrent disease with associtaed right sided hydronephrosis. Overall concerning for locally advanced disease. He underwent concurrent cisplatin based CRT 02.05/25 - 03/06/25.    History has been obtained by chart review and discussion with the patient.    Interval Events:    Has some ongoing urinary frequency and modest urgency. No urinary incontinence. No dysuria. Has lower volume hematuria about every other urination. No signficant bowel issues; reports normal bowel movements most days. Energy is good - has been doing moderate intensity yard work. Hoping to resume golfing next week. Has appt with Dr. Garcia 4/29/25      Oncology History   Urothelial carcinoma of bladder   5/2014 Procedure    05/14/24 TURP    1. Prostate, chips, transurethral resection:  Prostate with stromal and glandular hyperplasia.    Negative for malignancy.    2. Bladder, stones, removal:  Stones (gross examination only)        4/2016 Procedure    04/27/16  BLADDER TUMOR, TUR:   - HIGH GRADE UROTHELIAL CARCINOMA INVASIVE INTO DEEP MUSCLE.      6/21/2016 Initial Diagnosis    Urothelial carcinoma of bladder     6/2016 Procedure    06/21/16  BLADDER, TUR:   - NO TUMOR SEEN.    - FLORID ACUTE AND CHRONIC ULCERATIVE CYSTITIS WITH  POLYPOID    GRANULATION TISSUE FORMATION.      7/2019 Procedure    07/23/19  BLADDER, TUR:   - MINUTE DETACHED FRAGMENT OF HIGH-GRADE CARCINOMA.   - SPECIMEN MOSTLY BLADDER MUCOSA AND WALL TO INCLUDE DEEP MUSCLE    NEGATIVE FOR TUMOR.      12/2021 Procedure    12/28/2021: Cystoscopy  Suspicious mucosa lateral to the right ureteral orifice, biopsied and fulgurated to completion, multiple bladder stones, inability to access the right ureter.       URINARY BLADDER TUMOR, BIOPSIES:   -  High-grade papillary urothelial carcinoma with foci suspicious for, but   not diagnostic of, superficial lamina propria invasion (see comment).   -  No muscularis propria is present for evaluation.      3/2022 Procedure    03/2022  BCG     4/2024 Imaging Significant Findings    04/03/2024 CTU  Impression:   Moderate bladder distension with mild to moderate bilateral hydronephrosis thought to reflect reflux disease.  No definite ureteral stone.  Bladder stone is noted.  Mild thickening of the posterior bladder wall without discrete mass.  Delayed contrast excretion seen within the kidneys.     11/2024 Imaging Significant Findings    11/18/24 CT Renal Stone  Impression:  - Focal thickening seen along the posterior bladder wall asymmetric to the right which produces moderate right-sided hydronephrosis and hydroureter. Findings concerning for bladder mass.  Recommend cystoscopy to exclude bladder mass.  - All CT scans at this facility use dose modulation, iterative reconstruction, and/or weight based dosing when appropriate to reduce radiation dose to as low as reasonable achievable.        11/2024 Procedure       11/21/24: Flexible cystoscopy: Tumors along the left lateral wall, significant debris, TUR defect     Bladder Cytology  BLADDER WASH, CYTOLOGY:   Suspicious for high grade urothelial carcinoma.    Red blood cells present   Sparsely cellular specimen      12/3/2024 Procedure    12/03/24 Cyttoscopy with TURBT  Bladder was examined,  tumor was identified proximally 3 cm along the right lateral wall involving the site of the ureteral orifice.  Could not identify the right ureteral orifice, left ureteral orifices was normal in size, shape, caliber and location.  Cystoscope was removed, 24 Setswana resectoscope was then inserted into the urethra and bladder.  Dissection was continued until the tumor was completely dissected from the bladder wall.  I then fulgurated the edges and the base of the tumor.  Still no evidence of the right ureteral orifice, previously were unable to see it at his past surgery.    12/3/24  BLADDER TUMOR, TRANSURETHRAL RESECTION:   Papillary urothelial carcinoma with focal invasion of lamina propria, high-grade.   Muscularis propria tissue is not present for evaluation.       12/23/2024 Imaging Significant Findings    12/23/24 US Abdomen  Impression:  - Known right-sided bladder mass at the orifice of the abnormal ureteral insertion.  Right-sided hydronephrosis present.  - There is an 8 mm calcification in the bladder.     1/17/2025 Cancer Staged    Staging form: Urinary Bladder, AJCC 7th Edition  - Clinical: Stage II (T2, N0, M0)     1/29/2025 Imaging Significant Findings    FDG PET CT 1/29/25  Impression:   1. Subtle asymmetric thickening of the right wall the bladder near the surgical site.  Otherwise, no definitive evidence of metastatic disease.  2. Nephrostomy in the right kidney.  3. Nodular opacities in the bilateral lungs, these findings are favored to be related to infectious/inflammatory etiology.  Consider short interval follow-up with CT (3 months).     2/5/2025 -  Chemotherapy    Treatment Summary   Plan Name: OP Bladder CISplatin QW + Radiation  Treatment Goal: Curative  Status: Active  Start Date: 2/5/2025  End Date: 3/14/2025 (Planned)  Provider: Fabián Westbrook MD  Chemotherapy: CISplatin (Platinol) 26.25 mg/m2 = 58 mg in 0.9% NaCl 623 mL chemo infusion, 26.25 mg/m2 = 58 mg (75 % of original dose 35  mg/m2), Intravenous, Clinic/HOD 1 time, 1 of 1 cycle  Dose modification: 35 mg/m2 (original dose 35 mg/m2, Cycle 1, Reason: MD Discretion, Comment: bladder cancer. NCCN template BLA42), 26.25 mg/m2 (75 % of original dose 35 mg/m2, Cycle 1, Reason: Other (see comments), Comment: crcl 49.4ml/min curative intent. fine with 75% dose reduction), 17.5 mg/m2 (50 % of original dose 35 mg/m2, Cycle 1, Reason: Dose Not Tolerated, Comment: crcl 40), 17.5 mg/m2 (50 % of original dose 35 mg/m2, Cycle 1, Reason: Dose Not Tolerated, Comment: crcl 42)  Administration: 39 mg (2/14/2025), 39 mg (2/21/2025), 39 mg (2/28/2025), 58 mg (2/5/2025)      Chemotherapy    Treatment Summary   Plan Name: OP Bladder CISplatin QW + Radiation  Treatment Goal: Curative  Status: Active  Start Date: 1/31/2025 (Planned)  End Date: 3/7/2025 (Planned)  Provider: Fabián Westbrook MD  Chemotherapy: CISplatin (Platinol) 35 mg/m2 = 77 mg in 0.9% NaCl 327 mL chemo infusion, 35 mg/m2 = 77 mg (original dose ), Intravenous, Clinic/HOD 1 time, 0 of 1 cycle  Dose modification: 35 mg/m2 (Cycle 1, Reason: MD Discretion, Comment: bladder cancer. NCCN template [id number])        Radiation Therapy    Dr. 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 Treated with APC.   VCE 4/18/24: positive VCE  SBE 4/22/24: small bowel AVM s/p APC and tattoo at most distal part reached      Bladder cancer     Bladder tumor     CKD (chronic kidney disease), stage III     COPD (chronic obstructive pulmonary disease)     pt denies    Encounter for blood transfusion     Hematuria     History of 2019 novel coronavirus disease (COVID-19)     Hypercholesteremia     Hypertension     Iron deficiency anemia 10/06/2017     Non-pressure chronic ulcer of other part of right foot limited to breakdown of skin 01/26/2023    Stage 3 chronic kidney disease 10/06/2017        Past Surgical History:   Procedure Laterality Date    bladder tumor removal      CARDIAC ELECTROPHYSIOLOGY MAPPING AND ABLATION      CARDIOVERSION      several    CATARACT EXTRACTION Bilateral     cataract removal with IOL implants Bilateral     CHOLECYSTECTOMY      CYSTOSCOPIC LITHOLAPAXY N/A 12/28/2021    Procedure: CYSTOLITHOLAPAXY;  Surgeon: Medardo Garcia MD;  Location: [facility] OR;  Service: Urology;  Laterality: N/A;    CYSTOSCOPIC LITHOLAPAXY N/A 5/14/2024    Procedure: CYSTOLITHOLAPAXY;  Surgeon: Medardo Garcia MD;  Location: [facility] OR;  Service: Urology;  Laterality: N/A;    CYSTOSCOPY      CYSTOSCOPY WITH BIOPSY OF BLADDER Right 12/28/2021    Procedure: CYSTOSCOPY, WITH BLADDER BIOPSY, WITH FULGURATION IF INDICATED;  Surgeon: Medardo Garcia MD;  Location: [facility];  Service: Urology;  Laterality: Right;    CYSTOTOMY, WITH FULGURATION AND RADIOACTIVE MATERIAL INSERTION N/A 12/3/2024    Procedure: CYSTOTOMY, WITH FULGURATION AND RADIOACTIVE MATERIAL INSERTION;  Surgeon: Medardo Garcia MD;  Location: [facility] OR;  Service: Urology;  Laterality: N/A;    ESOPHAGOGASTRODUODENOSCOPY N/A 10/25/2021    Procedure: Small Jason Enteroscopy (SBE);  Surgeon: Isabelle Griffin MD;  Location: [facility];  Service: Endoscopy;  Laterality: N/A;    ESOPHAGOGASTRODUODENOSCOPY N/A 12/13/2021    Procedure: EGD (ESOPHAGOGASTRODUODENOSCOPY);  Surgeon: Troy Polanco MD;  Location: [facility];  Service: Endoscopy;  Laterality: N/A;    ESOPHAGOGASTRODUODENOSCOPY N/A 4/5/2024    Procedure: EGD (ESOPHAGOGASTRODUODENOSCOPY);  Surgeon: Myrtle Salcedo MD;  Location: [facility];  Service: Endoscopy;  Laterality: N/A;    ESOPHAGOGASTRODUODENOSCOPY N/A 4/22/2024    Procedure: EGD (ESOPHAGOGASTRODUODENOSCOPY);  Surgeon: Myrtle Salcedo MD;  Location: [facility];   Service: Endoscopy;  Laterality: N/A;  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    EYE SURGERY      INTRALUMINAL GASTROINTESTINAL TRACT IMAGING VIA CAPSULE N/A 12/6/2021    Procedure: IMAGING PROCEDURE, GI TRACT, INTRALUMINAL, VIA CAPSULE;  Surgeon: Larry Jones RN;  Location: [facility] ENDO;  Service: Endoscopy;  Laterality: N/A;    INTRALUMINAL GASTROINTESTINAL TRACT IMAGING VIA CAPSULE N/A 4/18/2024    Procedure: IMAGING PROCEDURE, GI TRACT, INTRALUMINAL, VIA CAPSULE;  Surgeon: Larry Jones RN;  Location: [facility] ENDO;  Service: Endoscopy;  Laterality: N/A;    OCCLUSION OF LEFT ATRIAL APPENDAGE N/A 11/14/2024    Procedure: Left atrial appendage occlusion;  Surgeon: Aroldo Ortiz MD;  Location: [facility] EP LAB;  Service: Cardiology;  Laterality: N/A;  afib, watchman, BSCI, venkatesh, anes, MB, 3prep    SMALL BOWEL ENTEROSCOPY N/A 9/26/2024    Procedure: ENTEROSCOPY--prximal DBE;  Surgeon: Eduardo Ruff MD;  Location: [facility] ENDO;  Service: Endoscopy;  Laterality: N/A;    TRANSESOPHAGEAL ECHOCARDIOGRAPHY N/A 11/14/2024    Procedure: ECHOCARDIOGRAM, TRANSESOPHAGEAL;  Surgeon: Aroldo Martinez MD;  Location: [facility] EP LAB;  Service: Cardiology;  Laterality: N/A;    TRANSESOPHAGEAL ECHOCARDIOGRAPHY N/A 12/30/2024    Procedure: ECHOCARDIOGRAM, TRANSESOPHAGEAL;  Surgeon: Carlos Flower MD;  Location: [facility] CATH LAB;  Service: Cardiology;  Laterality: N/A;    TRANSURETHRAL RESECTION OF BLADDER TUMOR BY BIPOLAR CAUTERY N/A 7/23/2019    Procedure: TURBT, USING BIPOLAR CAUTERY;  Surgeon: Ritesh Marques MD;  Location: [facility] OR;  Service: Urology;  Laterality: N/A;    TRANSURETHRAL RESECTION OF PROSTATE N/A 5/14/2024    Procedure: TURP (TRANSURETHRAL RESECTION OF PROSTATE);  Surgeon: Medardo Garcia MD;  Location: [facility] OR;  Service: Urology;  Laterality: N/A;    TURBT (TRANSURETHRAL RESECTION OF BLADDER TUMOR) N/A 12/3/2024    Procedure: TURBT (TRANSURETHRAL RESECTION OF BLADDER TUMOR);  Surgeon: Medardo Garcia  MD;  Location: [facility] OR;  Service: Urology;  Laterality: N/A;        Family History   Problem Relation Name Age of Onset    Heart disease Father      Hypertension Father      Heart disease Brother          Social History     Tobacco Use    Smoking status: Former     Current packs/day: 0.00     Average packs/day: 2.5 packs/day for 20.0 years (50.0 ttl pk-yrs)     Types: Cigarettes     Start date:      Quit date:      Years since quittin.3     Passive exposure: Never    Smokeless tobacco: Never   Substance Use Topics    Alcohol use: Yes     Alcohol/week: 7.0 standard drinks of alcohol     Types: 7 Cans of beer per week     Comment: occasionally: hold 72 hrs        I have reviewed and updated the patient's past medical, surgical, family and social histories.    Review of patient's allergies indicates:   Allergen Reactions    Feraheme [ferumoxytol] Anaphylaxis        Current Outpatient Medications   Medication Sig Dispense Refill    amiodarone (PACERONE) 200 MG Tab Take 1 tablet (200 mg total) by mouth once daily. 30 tablet 11    amLODIPine (NORVASC) 2.5 MG tablet Take 1 tablet (2.5 mg total) by mouth once daily. 90 tablet 2    aspirin (ECOTRIN) 81 MG EC tablet Take 1 tablet (81 mg total) by mouth once daily. 90 tablet 3    atorvastatin (LIPITOR) 20 MG tablet TAKE 1 TABLET(20 MG) BY MOUTH EVERY EVENING 90 tablet 1    carvediloL (COREG) 6.25 MG tablet Take 1 tablet (6.25 mg total) by mouth 2 (two) times daily with meals. 180 tablet 3    cholecalciferol, vitamin D3, (VITAMIN D3 ORAL) Take 1 tablet by mouth every other day.      donepeziL (ARICEPT) 5 MG tablet Take 1 tablet (5 mg total) by mouth once daily. 90 tablet 1    FEROSUL 325 mg (65 mg iron) Tab tablet Take by mouth.      hydrALAZINE (APRESOLINE) 50 MG tablet TAKE 1 TABLET EVERY 8 HOURS 270 tablet 3    multivitamin (THERAGRAN) per tablet Take 1 tablet by mouth once daily.       No current facility-administered medications for this visit.      Facility-Administered Medications Ordered in Other Visits   Medication Dose Route Frequency Provider Last Rate Last Admin    lactated ringers infusion   Intravenous Continuous Denisse Conteh MD   Stopped at 12/28/21 1241        Physical Exam:   BP (!) 152/70 (BP Location: Left arm, Patient Position: Sitting)   Pulse (!) 58   Temp 97.4 °F (36.3 °C) (Temporal)   Ht 6' (1.829 m)   Wt 89 kg (196 lb 5.1 oz)   SpO2 96%   BMI 26.63 kg/m²      ECOG Performance status: 1            Physical Exam  Constitutional:       General: He is not in acute distress.     Appearance: Normal appearance.   HENT:      Head: Normocephalic.   Eyes:      General: No scleral icterus.     Extraocular Movements: Extraocular movements intact.      Conjunctiva/sclera: Conjunctivae normal.   Cardiovascular:      Rate and Rhythm: Normal rate.      Heart sounds: No murmur heard.  Pulmonary:      Effort: Pulmonary effort is normal. No respiratory distress.   Abdominal:      General: There is no distension.      Palpations: Abdomen is soft.   Skin:     General: Skin is warm and dry.   Neurological:      Mental Status: He is alert and oriented to person, place, and time.      Motor: No weakness.   Psychiatric:         Mood and Affect: Mood normal.         Behavior: Behavior normal.         Thought Content: Thought content normal.           Labs:   Recent Results (from the past 48 hours)   CBC Oncology    Collection Time: 04/11/25 12:53 PM   Result Value Ref Range    WBC 4.09 3.90 - 12.70 K/uL    RBC 3.66 (L) 4.60 - 6.20 M/uL    HGB 11.1 (L) 14.0 - 18.0 gm/dL    HCT 33.9 (L) 40.0 - 54.0 %    MCV 93 82 - 98 fL    MCH 30.3 27.0 - 31.0 pg    MCHC 32.7 32.0 - 36.0 g/dL    RDW 20.0 (H) 11.5 - 14.5 %    Platelet Count 142 (L) 150 - 450 K/uL    MPV 10.7 9.2 - 12.9 fL    Neut # 2.79 1.8 - 7.7 K/uL    Imm Grans # 0.02 0.00 - 0.04 K/uL   Comprehensive Metabolic Panel    Collection Time: 04/11/25 12:53 PM   Result Value Ref Range    Sodium 139 136 -  145 mmol/L    Potassium 4.5 3.5 - 5.1 mmol/L    Chloride 108 95 - 110 mmol/L    CO2 23 23 - 29 mmol/L    Glucose 155 (H) 70 - 110 mg/dL    BUN 31 (H) 8 - 23 mg/dL    Creatinine 1.7 (H) 0.5 - 1.4 mg/dL    Calcium 9.2 8.7 - 10.5 mg/dL    Protein Total 6.9 6.0 - 8.4 gm/dL    Albumin 3.4 (L) 3.5 - 5.2 g/dL    Bilirubin Total 0.5 0.1 - 1.0 mg/dL    ALP 72 40 - 150 unit/L    AST 23 11 - 45 unit/L    ALT 20 10 - 44 unit/L    Anion Gap 8 8 - 16 mmol/L    eGFR 39 (L) >60 mL/min/1.73/m2   Ferritin    Collection Time: 04/11/25 12:53 PM   Result Value Ref Range    Ferritin 360.9 (H) 20.0 - 300.0 ng/mL   Platelet Review    Collection Time: 04/11/25 12:53 PM   Result Value Ref Range    Platelet Estimate Appears Normal                Imaging:         I have personally reviewed the above imaging    Path:   Reviewed pathology as documented in oncology history      Assessment and Plan:   1. Urothelial carcinoma of bladder  Overview:  Locally advanced urothelial carcionoma of the bladder. Has long standing history of localized bladder cancer treated with serial TURBT since at least 2016. 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 He was treated with serial TURBT and induction BCG. More recently found to have recurrent disease with associtaed right sided hydronephrosis.  Overall concerning for locally advanced disease. He underwent concurrent cisplatin based CRT 02.05/25 - 03/06/25.    Assessment & Plan:  Recovered well from CRT with only modest hematuria and urgency. Has cystoscopy schedule 4/29/25. Tenattive plan to alteranate cycsto and CT torso q6 months.  - Local surveillance per urology; cysto scheduled 4/29/25  - FU with me with CT torso 07/2025    Orders:  -     CT Chest Abdomen Pelvis With IV Contrast (XPD) Routine Oral Contrast; Future; Expected date: 04/11/2025                     Follow up:   Route Chart for Scheduling    Med Onc Chart Routing      Follow up with physician . July 2025   Follow up with [facility]    Infusion scheduling  note    Injection scheduling note    Labs CBC and CMP   Scheduling:  Preferred lab:  Lab interval:     Imaging CT chest abdomen pelvis      Pharmacy appointment    Other referrals                    Therapy Plan Information  VENOFER (IRON SUCROSE) QW for Iron deficiency anemia secondary to blood loss (chronic), noted on 5/29/2021  Anaphylaxis/Hypersensitivity  EPINEPHrine (EPIPEN) 0.3 mg/0.3 mL pen injection 0.3 mg  0.3 mg, Intramuscular, PRN  diphenhydrAMINE injection 50 mg  50 mg, Intravenous, PRN  hydrocortisone sodium succinate injection 100 mg  100 mg, Intravenous, PRN  Flushes  0.9% NaCl 250 mL flush bag  Intravenous, 1 time a week  sodium chloride 0.9% flush 10 mL  10 mL, Intravenous, 1 time a week      No therapy plan of the specified type found.    No therapy plan of the specified type found.      The above information has been reviewed with the patient and all questions have been answered to their apparent satisfaction.  They understand that they can call the clinic with any questions.    Fabián Westbrook MD  Hematology/Oncology  [facility]

## 2025-04-11 ENCOUNTER — OFFICE VISIT (OUTPATIENT)
Dept: HEMATOLOGY/ONCOLOGY | Facility: CLINIC | Age: 85
End: 2025-04-11
Payer: MEDICARE

## 2025-04-11 ENCOUNTER — LAB VISIT (OUTPATIENT)
Dept: LAB | Facility: HOSPITAL | Age: 85
End: 2025-04-11
Attending: HOSPITALIST
Payer: MEDICARE

## 2025-04-11 VITALS
HEART RATE: 58 BPM | BODY MASS INDEX: 26.59 KG/M2 | HEIGHT: 72 IN | WEIGHT: 196.31 LBS | DIASTOLIC BLOOD PRESSURE: 70 MMHG | SYSTOLIC BLOOD PRESSURE: 152 MMHG | TEMPERATURE: 97 F | OXYGEN SATURATION: 96 %

## 2025-04-11 DIAGNOSIS — D50.0 IRON DEFICIENCY ANEMIA SECONDARY TO BLOOD LOSS (CHRONIC): ICD-10-CM

## 2025-04-11 DIAGNOSIS — C67.9 MALIGNANT NEOPLASM OF URINARY BLADDER, UNSPECIFIED SITE: ICD-10-CM

## 2025-04-11 DIAGNOSIS — C67.9 UROTHELIAL CARCINOMA OF BLADDER: Primary | ICD-10-CM

## 2025-04-11 LAB
ABSOLUTE NEUTROPHIL COUNT (OHS): 2.79 K/UL (ref 1.8–7.7)
ALBUMIN SERPL BCP-MCNC: 3.4 G/DL (ref 3.5–5.2)
ALP SERPL-CCNC: 72 UNIT/L (ref 40–150)
ALT SERPL W/O P-5'-P-CCNC: 20 UNIT/L (ref 10–44)
ANION GAP (OHS): 8 MMOL/L (ref 8–16)
AST SERPL-CCNC: 23 UNIT/L (ref 11–45)
BILIRUB SERPL-MCNC: 0.5 MG/DL (ref 0.1–1)
BUN SERPL-MCNC: 31 MG/DL (ref 8–23)
CALCIUM SERPL-MCNC: 9.2 MG/DL (ref 8.7–10.5)
CHLORIDE SERPL-SCNC: 108 MMOL/L (ref 95–110)
CO2 SERPL-SCNC: 23 MMOL/L (ref 23–29)
CREAT SERPL-MCNC: 1.7 MG/DL (ref 0.5–1.4)
ERYTHROCYTE [DISTWIDTH] IN BLOOD BY AUTOMATED COUNT: 20 % (ref 11.5–14.5)
FERRITIN SERPL-MCNC: 360.9 NG/ML (ref 20–300)
GFR SERPLBLD CREATININE-BSD FMLA CKD-EPI: 39 ML/MIN/1.73/M2
GLUCOSE SERPL-MCNC: 155 MG/DL (ref 70–110)
HCT VFR BLD AUTO: 33.9 % (ref 40–54)
HGB BLD-MCNC: 11.1 GM/DL (ref 14–18)
IMM GRANULOCYTES # BLD AUTO: 0.02 K/UL (ref 0–0.04)
IRON SATN MFR SERPL: 31 % (ref 20–50)
IRON SERPL-MCNC: 84 UG/DL (ref 45–160)
MCH RBC QN AUTO: 30.3 PG (ref 27–31)
MCHC RBC AUTO-ENTMCNC: 32.7 G/DL (ref 32–36)
MCV RBC AUTO: 93 FL (ref 82–98)
PLATELET # BLD AUTO: 142 K/UL (ref 150–450)
PLATELET BLD QL SMEAR: NORMAL
PMV BLD AUTO: 10.7 FL (ref 9.2–12.9)
POTASSIUM SERPL-SCNC: 4.5 MMOL/L (ref 3.5–5.1)
PROT SERPL-MCNC: 6.9 GM/DL (ref 6–8.4)
RBC # BLD AUTO: 3.66 M/UL (ref 4.6–6.2)
SODIUM SERPL-SCNC: 139 MMOL/L (ref 136–145)
TIBC SERPL-MCNC: 269 UG/DL (ref 250–450)
TRANSFERRIN SERPL-MCNC: 182 MG/DL (ref 200–375)
WBC # BLD AUTO: 4.09 K/UL (ref 3.9–12.7)

## 2025-04-11 PROCEDURE — 36415 COLL VENOUS BLD VENIPUNCTURE: CPT

## 2025-04-11 PROCEDURE — 85027 COMPLETE CBC AUTOMATED: CPT

## 2025-04-11 PROCEDURE — 99999 PR PBB SHADOW E&M-EST. PATIENT-LVL III: CPT | Mod: PBBFAC,,, | Performed by: HOSPITALIST

## 2025-04-11 PROCEDURE — 84466 ASSAY OF TRANSFERRIN: CPT

## 2025-04-11 PROCEDURE — 82728 ASSAY OF FERRITIN: CPT

## 2025-04-11 PROCEDURE — 80053 COMPREHEN METABOLIC PANEL: CPT

## 2025-04-11 NOTE — ASSESSMENT & PLAN NOTE
Recovered well from CRT with only modest hematuria and urgency. Has cystoscopy schedule 4/29/25. Tenattive plan to alteranate cycsto and CT torso q6 months.  - Local surveillance per urology; cysto scheduled 4/29/25  - FU with me with CT torso 07/2025

## 2025-04-23 ENCOUNTER — OFFICE VISIT (OUTPATIENT)
Dept: PODIATRY | Facility: CLINIC | Age: 85
End: 2025-04-23
Payer: MEDICARE

## 2025-04-23 DIAGNOSIS — L97.511 DIABETIC ULCER OF TOE OF RIGHT FOOT ASSOCIATED WITH DIABETES MELLITUS DUE TO UNDERLYING CONDITION, LIMITED TO BREAKDOWN OF SKIN: Primary | ICD-10-CM

## 2025-04-23 DIAGNOSIS — E08.621 DIABETIC ULCER OF TOE OF RIGHT FOOT ASSOCIATED WITH DIABETES MELLITUS DUE TO UNDERLYING CONDITION, LIMITED TO BREAKDOWN OF SKIN: Primary | ICD-10-CM

## 2025-04-23 DIAGNOSIS — M86.9 OSTEOMYELITIS OF SECOND TOE OF RIGHT FOOT: ICD-10-CM

## 2025-04-23 DIAGNOSIS — E11.42 TYPE 2 DIABETES MELLITUS WITH PERIPHERAL NEUROPATHY: ICD-10-CM

## 2025-04-23 DIAGNOSIS — Z79.01 ANTICOAGULATED: ICD-10-CM

## 2025-04-23 PROCEDURE — 99999 PR PBB SHADOW E&M-EST. PATIENT-LVL III: CPT | Mod: PBBFAC,,, | Performed by: PODIATRIST

## 2025-04-23 RX ORDER — MUPIROCIN 20 MG/G
OINTMENT TOPICAL 2 TIMES DAILY
Qty: 30 G | Refills: 1 | Status: SHIPPED | OUTPATIENT
Start: 2025-04-23

## 2025-04-23 NOTE — PROGRESS NOTES
Subjective:       Patient ID: Jose Miguel Alvarez Jr. is a 84 y.o. male.    Chief Complaint: Diabetic Foot Ulcer (Patient continues with DFU to right second toe. He denies pain at present and has dressings changed per [facility]. )    HPI: Jose Miguel Alvarez Jr. presents to the office today with a small wound to the distal aspect of the right 2nd toe.  Continues to follow with Infectious Disease due to osteomyelitis of the 2nd digit.  Recently completed treatment of chemotherapy and radiation for bladder cancer.  States no pain.  Previously having Home Health assist with dressing changes.    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 Treated with APC.   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 breakdown of skin 01/26/2023    Stage 3 chronic kidney disease 10/06/2017       Family History   Problem Relation Name Age of Onset    Heart disease Father      Hypertension Father      Heart disease Brother         Social History     Socioeconomic History    Marital status:    Tobacco Use    Smoking status: Former     Current packs/day: 0.00     Average packs/day: 2.5 packs/day for 20.0 years (50.0 ttl pk-yrs)     Types: Cigarettes     Start  date:      Quit date:      Years since quittin.3     Passive exposure: Never    Smokeless tobacco: Never   Substance and Sexual Activity    Alcohol use: Yes     Alcohol/week: 7.0 standard drinks of alcohol     Types: 7 Cans of beer per week     Comment: occasionally: hold 72 hrs    Drug use: No    Sexual activity: Not Currently     Social Drivers of Health     Financial Resource Strain: Low Risk  (2025)    Overall Financial Resource Strain (CARDIA)     Difficulty of Paying Living Expenses: Not hard at all   Food Insecurity: No Food Insecurity (2025)    Hunger Vital Sign     Worried About Running Out of Food in the Last Year: Never true     Ran Out of Food in the Last Year: Never true   Transportation Needs: No Transportation Needs (2024)    TRANSPORTATION NEEDS     Transportation : No   Physical Activity: Insufficiently Active (2025)    Exercise Vital Sign     Days of Exercise per Week: 2 days     Minutes of Exercise per Session: 30 min   Stress: No Stress Concern Present (2025)    [employer] - Occupational Stress Questionnaire     Feeling of Stress : Not at all   Housing Stability: Unknown (2025)    Housing Stability Vital Sign     Unable to Pay for Housing in the Last Year: No     Homeless in the Last Year: No       Past Surgical History:   Procedure Laterality Date    bladder tumor removal      CARDIAC ELECTROPHYSIOLOGY MAPPING AND ABLATION      CARDIOVERSION      several    CATARACT EXTRACTION Bilateral     cataract removal with IOL implants Bilateral     CHOLECYSTECTOMY      CYSTOSCOPIC LITHOLAPAXY N/A 2021    Procedure: CYSTOLITHOLAPAXY;  Surgeon: Medardo Garcia MD;  Location: [facility] OR;  Service: Urology;  Laterality: N/A;    CYSTOSCOPIC LITHOLAPAXY N/A 2024    Procedure: CYSTOLITHOLAPAXY;  Surgeon: Medardo Garcia MD;  Location: [facility] OR;  Service: Urology;  Laterality: N/A;    CYSTOSCOPY      CYSTOSCOPY WITH BIOPSY  OF BLADDER Right 12/28/2021    Procedure: CYSTOSCOPY, WITH BLADDER BIOPSY, WITH FULGURATION IF INDICATED;  Surgeon: Medardo Garcia MD;  Location: [facility] OR;  Service: Urology;  Laterality: Right;    CYSTOTOMY, WITH FULGURATION AND RADIOACTIVE MATERIAL INSERTION N/A 12/3/2024    Procedure: CYSTOTOMY, WITH FULGURATION AND RADIOACTIVE MATERIAL INSERTION;  Surgeon: Medardo Garcia MD;  Location: [facility] OR;  Service: Urology;  Laterality: N/A;    ESOPHAGOGASTRODUODENOSCOPY N/A 10/25/2021    Procedure: Small Jason Enteroscopy (SBE);  Surgeon: Isabelle Griffin MD;  Location: [facility] ENDO;  Service: Endoscopy;  Laterality: N/A;    ESOPHAGOGASTRODUODENOSCOPY N/A 12/13/2021    Procedure: EGD (ESOPHAGOGASTRODUODENOSCOPY);  Surgeon: Troy Polanco MD;  Location: [facility] ENDO;  Service: Endoscopy;  Laterality: N/A;    ESOPHAGOGASTRODUODENOSCOPY N/A 4/5/2024    Procedure: EGD (ESOPHAGOGASTRODUODENOSCOPY);  Surgeon: Myrtle Salcedo MD;  Location: [facility] ENDO;  Service: Endoscopy;  Laterality: N/A;    ESOPHAGOGASTRODUODENOSCOPY N/A 4/22/2024    Procedure: EGD (ESOPHAGOGASTRODUODENOSCOPY);  Surgeon: Myrtle Salcedo MD;  Location: [facility] ENDO;  Service: Endoscopy;  Laterality: N/A;  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    EYE SURGERY      INTRALUMINAL GASTROINTESTINAL TRACT IMAGING VIA CAPSULE N/A 12/6/2021    Procedure: IMAGING PROCEDURE, GI TRACT, INTRALUMINAL, VIA CAPSULE;  Surgeon: Larry Jones RN;  Location: [facility] ENDO;  Service: Endoscopy;  Laterality: N/A;    INTRALUMINAL GASTROINTESTINAL TRACT IMAGING VIA CAPSULE N/A 4/18/2024    Procedure: IMAGING PROCEDURE, GI TRACT, INTRALUMINAL, VIA CAPSULE;  Surgeon: Larry Jones RN;  Location: [facility] ENDO;  Service: Endoscopy;  Laterality: N/A;    OCCLUSION OF LEFT ATRIAL APPENDAGE N/A 11/14/2024    Procedure: Left atrial appendage occlusion;  Surgeon: Aroldo Ortiz MD;  Location: [facility] EP LAB;  Service: Cardiology;  Laterality: N/A;  kimberley brown BSCI,  venkatesh, MILO sandoval, 3prep    SMALL BOWEL ENTEROSCOPY N/A 9/26/2024    Procedure: ENTEROSCOPY--prximal DBE;  Surgeon: Eduardo Ruff MD;  Location: [facility] ENDO;  Service: Endoscopy;  Laterality: N/A;    TRANSESOPHAGEAL ECHOCARDIOGRAPHY N/A 11/14/2024    Procedure: ECHOCARDIOGRAM, TRANSESOPHAGEAL;  Surgeon: Aroldo Martinez MD;  Location: [facility] EP LAB;  Service: Cardiology;  Laterality: N/A;    TRANSESOPHAGEAL ECHOCARDIOGRAPHY N/A 12/30/2024    Procedure: ECHOCARDIOGRAM, TRANSESOPHAGEAL;  Surgeon: Carlos Flower MD;  Location: [facility] CATH LAB;  Service: Cardiology;  Laterality: N/A;    TRANSURETHRAL RESECTION OF BLADDER TUMOR BY BIPOLAR CAUTERY N/A 7/23/2019    Procedure: TURBT, USING BIPOLAR CAUTERY;  Surgeon: Ritesh Marques MD;  Location: [facility] OR;  Service: Urology;  Laterality: N/A;    TRANSURETHRAL RESECTION OF PROSTATE N/A 5/14/2024    Procedure: TURP (TRANSURETHRAL RESECTION OF PROSTATE);  Surgeon: Medardo Garcia MD;  Location: [facility] OR;  Service: Urology;  Laterality: N/A;    TURBT (TRANSURETHRAL RESECTION OF BLADDER TUMOR) N/A 12/3/2024    Procedure: TURBT (TRANSURETHRAL RESECTION OF BLADDER TUMOR);  Surgeon: Medardo Garcia MD;  Location: [facility] OR;  Service: Urology;  Laterality: N/A;     Review of Systems     Objective:   There were no vitals taken for this visit.    MRI Foot Toes W WO Contrast Right  Narrative: EXAM: MRI FOOT TOES W WO CONTRAST RIGHT    CLINICAL HISTORY:  Foot swelling.  Diabetes.  Possible osteomyelitis.  Soft tissue ulceration.    TECHNIQUE: Standard multiplanar pulse sequences obtained without and with IV contrast.    COMPARISON: None.    FINDINGS:    Small, focal soft tissue ulceration of the distal second toe.  Underlying severe soft tissue edema and soft tissue swelling.  Fluid tracks from the wound to surround the distal phalanx.  Severe bone marrow edema throughout the distal phalanx with early bony erosive change of the patella.  Findings consistent with osteomyelitis.   Proximal phalanx is normal with normal marrow.  Middle phalanx congenitally absent.  Remaining toes are normal with normal marrow.    Scattered degenerative changes with severe osteoarthritis of the first through fourth TMT joints.  Low-grade reactive marrow edema punctate subchondral cysts.  Scattered osteophytes.  Severe osteoarthritis of the navicular-cuneiform joint.  Moderate to severe first MTP chondral thinning.  Remaining joint spaces the forefoot demonstrate low-grade scattered degenerative change.    Partial tearing flexor tendon second toe.  Remaining tendons of the foot are intact.    Plantar fascia normal.  Severe fatty atrophy of the musculature of the foot with mild dorsal and and plantar subcutaneous soft tissue edema.  Impression: Osteomyelitis distal phalanx second toe with overlying distal soft tissue wound.    Advanced scattered degenerative changes.    Finalized on: 2/27/2025 8:54 AM By:  Guido Lagos MD  [facility]# [id number]      2025-02-27 08:57:[id number]     [facility]     Physical Exam   LOWER EXTREMITY PHYSICAL EXAMINATION    Vascular:  The right dorsalis pedis pulse 2/4 and the right posterior tibial pulse 2/4.   Capillary refill is intact.  Pedal hair growth intact    Musculoskeletal: Manual Muscle Testing is 5/5 with dorsiflexion, plantar flexion, abduction, and adduction.   There is normal range of motion in the forefoot, hindfoot, and Ankle joint.      Dermatological:  Skin is supple, moist, intact.  No evidence of discoloration the distal aspect the right 2nd toe.  No significant hyperkeratosis noted.  Wound measures 0.2 cm x 0.1 cm.  Wound depth 0.05cm.  No probe to bone.  No cellulitis.  No signs of underlying abscess.    Assessment:     1. Diabetic ulcer of toe of right foot associated with diabetes mellitus due to underlying condition, limited to breakdown of skin    2. 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 Type 2 diabetes mellitus with peripheral neuropathy    4.  Anticoagulated          Plan:     Diabetic ulcer of toe of right foot associated with diabetes mellitus due to underlying condition, limited to breakdown of skin  -     SUBSEQUENT HOME HEALTH ORDERS    Osteomyelitis of second toe of right foot    Type 2 diabetes mellitus with peripheral neuropathy    Anticoagulated    Other orders  -     mupirocin (BACTROBAN) 2 % ointment; Apply topically 2 (two) times daily.  Dispense: 30 g; Refill: 1          Thorough discussion is had with the patient today, concerning the diagnosis, its etiology, and the treatment algorithm at present.    MRI confirming osteomyelitis to the 2nd digit.  Discuss potential for partial toe amputation if not completely healed.  This will remove the infected bone and may also decrease length of time needed for IV antibiotics.    Continues have ulcer to the distal aspect of the toe but this continues to improve quite well.  Will hold home health for now as patient feels capable of performing daily dressing changes with antibiotic cream covering with a Band-Aid.      We will continue to watch for signs and symptoms associated with infection.  Discuss that if this does regressed, he may ultimately result in amputation.    Thorough discussion is had with the patient concerning the diagnosis, its etiology, and the treatment algorithm at present. Shoe inspection. Foot Education. Patient reminded of the importance of good nutrition and blood sugar control to help prevent podiatric complications of diabetes. Patient instructed on proper foot hygeine. We discussed wearing proper and supportive shoe gear, daily foot inspections, never walking barefooted or sock footed, never putting sharp instruments to feet which can cause major complications associated with infection, ulcers, lacerations.    Follow-up in 2-3 week    Future Appointments   Date Time Provider Department Center   5/14/2025  9:00 AM Xiao Bowman DPM [facility] POD [facility]   5/30/2025 10:30 AM  Justyn Rangel MD [facility] NEPHRO [facility]   6/23/2025 10:00 AM Mis Beckham NP [facility] NEURO [facility]   6/30/2025 10:30 AM Martin Villarreal MD, FID [facility] INFDIS [facility]   7/2/2025  3:20 PM Carlos Flower MD [facility] CARDIO [facility]   7/9/2025 11:30 AM [facility] CT1 LIMIT 500 LBS [facility] CT SCAN [place]   7/11/2025  1:40 PM [place] CC LAB [facility] LAB DS [facility]   7/11/2025  2:40 PM Fabián Westbrook MD [facility] HEM ONC [facility]   8/5/2025  2:00 PM Frederick Singh MD [facility] RAD ONC [facility]

## 2025-04-23 NOTE — PLAN OF CARE
Dr. Garcia messaged for instructions regarding when to hold ASA. Instructed to stop taking after today's dose 4/23/25. Pt will get further instructions as to when to resume after surgery.

## 2025-04-23 NOTE — PRE-PROCEDURE INSTRUCTIONS
Pre op instructions reviewed with Jose Miguel Abarca ([facility]) over telephone, verbalized understanding.    Procedure Date: 4/29/25  Arrival Time:  TBD; We will call you after 2pm the day before your procedure with your arrival time.    Address:   [facility] (Off [facility], 2nd Building on the left)  [address] [place] [address] [place]. [address]  >>>Please enter through front entrance Lobby of 1st floor to Registration desk<<<        !!!INSTRUCTIONS IMPORTANT!!!  NO FOOD or tobacco products after midnight the night before surgery! You may have clear liquids up to 3 hrs before your arrival to the Hospital  Clear liquids include Gatorade, water, soda, black coffee or tea (no milk or creamer), and clear juices.  Clear liquids do NOT include anything with pulp or food particles (Chicken broth, ice cream, yogurt, Jello, etc.)    >>>MEDICATION INSTRUCTIONS<<<: Morning of Surgery, take small sip of water with ONLY these medications:  Amiodarone  Amlodipine  Carvedilol  Hydralazine    *Blood Thinners: Stop taking Aspirin per Physician Instructions! Call your Surgeon office to inquire about any questions regarding your blood thinner medication.    *ADHD Medication: Stop taking ADHD/ADD medications 48 hrs prior to surgery, as this can affect the anesthesia used. Bariatric surgeries must HOLD 7 Days prior to surgery!    *Diabetic/ Prediabetic Patients: !!!If you take diabetic or weight loss medication, Do NOT take morning of surgery unless instructed by Doctor!!!  Metformin to be stopped 24 hrs prior to surgery.   Long Acting Insulin Instructions: HOLD the night before surgery unless instructed differently by Provider!  Ozempic/ Mounjaro/ Wegovy/ Trulicity/ Semaglutide injections or weight loss medication to be stopped 7 days prior to surgery.    !!!STOP ALL Aspirins, NSAIDS, WEIGHT LOSS INJECTIONS/PILLS, Herbal supplements, & Vitamins 7 DAYS BEFORE SURGERY!!!    ____  Avoid Alcoholic beverages 3 days prior  to surgery, as it can thin the blood.  ____  NO Acrylic/fake nails or nail polish worn day of surgery (specifically hand/arm & foot surgeries).  ____  NO powder, lotions, deodorants, oils or cream on body.  ____  Remove all jewelry & piercings & foreign objects before arrival & leave at home.  ____  Remove Dentures, Hearing Aids & Contact Lens prior to surgery.  ____  Bring photo ID and insurance information to hospital (Leave Valuables at Home).  ____  If going home the same day, arrange for a ride home. You will not be able to drive for 24 hrs if Anesthesia was used.   ____  Females (ages 11-60): may need to give a urine sample the morning of surgery; please see Pre op Nurse prior to using the restroom.  ____  Males: Stop ED medications (Viagra, Cialis) 24 hrs prior to surgery.  ____  Wear clean, loose fitting clothing to allow for dressings/ bandages.      Bathing Instructions:    -Shower with anti-bacterial Soap (Hibiclens or Dial) the night before surgery and the morning of.   -Do not use Hibiclens on your face or genitals.   -Apply clean clothes after shower.  -Do not shave your face or body 2 days prior to surgery unless instructed otherwise by your Surgeon.  [facility] Visitor/Ride Policy:  Only 2 adults allowed in pre op/recovery area during your procedure. You MUST HAVE A RIDE HOME from a responsible adult that you know and trust. Medical Transport, Uber or Lyft can ONLY be used if patient has a responsible adult to accompany them during ride home.       *Signs and symptoms of Infection Before or After Surgery:               !!!If you experience any fever, chills, nausea/ vomiting, foul odor/ excessive drainage from surgical site, flu-like symptoms, new wounds or cuts, PLEASE CALL THE SURGEON OFFICE at [phone] or SEND MESSAGE THROUGH [employer] PORTAL!!!     *If you are running late the morning of surgery, please call the Hospital Surgery Dept @ [phone].     *Billing  questions:  [phone]  [phone]     Thank you,  -[facility] Pre Admit Dept.  [phone] or [phone]  M-F 7:30 am-4:00 pm (Closed Major Holidays)

## 2025-04-28 NOTE — PRE-PROCEDURE INSTRUCTIONS
Called and spoke with Hector (son)  about the following:     Please arrive to [facility] ([facility]) at 0730 on 4/29/25 for your scheduled procedure.  Address: [address] [place] [address] [place]. [address] (2nd Building on left, 1st Floor Lobby)    !!!NO FOOD after midnight! You may have clear liquids up to 3 hrs before your arrival to the Hospital!!!  Clear liquids include Gatorade, water, soda, black coffee or tea (no milk or creamer), and clear juices.  Clear liquids do NOT include anything with pulp or food particles (Chicken broth, ice cream, yogurt, Jello, etc.)    Thank you,  -[facility] Pre Admit Dept.  Mon-Fri 7:30 am - 4 pm [phone]

## 2025-04-29 ENCOUNTER — ANESTHESIA (OUTPATIENT)
Dept: SURGERY | Facility: HOSPITAL | Age: 85
End: 2025-04-29
Payer: MEDICARE

## 2025-04-29 ENCOUNTER — HOSPITAL ENCOUNTER (OUTPATIENT)
Facility: HOSPITAL | Age: 85
Discharge: HOME | End: 2025-04-29
Attending: UROLOGY | Admitting: UROLOGY
Payer: MEDICARE

## 2025-04-29 ENCOUNTER — ANESTHESIA EVENT (OUTPATIENT)
Dept: SURGERY | Facility: HOSPITAL | Age: 85
End: 2025-04-29
Payer: MEDICARE

## 2025-04-29 ENCOUNTER — TELEPHONE (OUTPATIENT)
Dept: UROLOGY | Facility: CLINIC | Age: 85
End: 2025-04-29
Payer: MEDICARE

## 2025-04-29 VITALS
WEIGHT: 194 LBS | OXYGEN SATURATION: 95 % | RESPIRATION RATE: 21 BRPM | DIASTOLIC BLOOD PRESSURE: 64 MMHG | HEART RATE: 51 BPM | HEIGHT: 72 IN | BODY MASS INDEX: 26.28 KG/M2 | TEMPERATURE: 98 F | SYSTOLIC BLOOD PRESSURE: 132 MMHG

## 2025-04-29 DIAGNOSIS — Z85.51 HISTORY OF BLADDER CANCER: Primary | ICD-10-CM

## 2025-04-29 PROCEDURE — 37000009 HC ANESTHESIA EA ADD 15 MINS: Performed by: UROLOGY

## 2025-04-29 PROCEDURE — 37000008 HC ANESTHESIA 1ST 15 MINUTES: Performed by: UROLOGY

## 2025-04-29 PROCEDURE — 71000033 HC RECOVERY, INTIAL HOUR: Performed by: UROLOGY

## 2025-04-29 PROCEDURE — 63600175 PHARM REV CODE 636 W HCPCS: Performed by: UROLOGY

## 2025-04-29 PROCEDURE — 36000706: Performed by: UROLOGY

## 2025-04-29 PROCEDURE — C1769 GUIDE WIRE: HCPCS | Performed by: UROLOGY

## 2025-04-29 PROCEDURE — C1758 CATHETER, URETERAL: HCPCS | Performed by: UROLOGY

## 2025-04-29 PROCEDURE — 63600175 PHARM REV CODE 636 W HCPCS: Performed by: FAMILY MEDICINE

## 2025-04-29 PROCEDURE — 25500020 PHARM REV CODE 255: Performed by: UROLOGY

## 2025-04-29 PROCEDURE — 71000015 HC POSTOP RECOV 1ST HR: Performed by: UROLOGY

## 2025-04-29 PROCEDURE — 36000707: Performed by: UROLOGY

## 2025-04-29 PROCEDURE — C2617 STENT, NON-COR, TEM W/O DEL: HCPCS | Performed by: UROLOGY

## 2025-04-29 PROCEDURE — 52332 CYSTOSCOPY AND TREATMENT: CPT | Mod: RT,,, | Performed by: UROLOGY

## 2025-04-29 PROCEDURE — 74420 UROGRAPHY RTRGR +-KUB: CPT | Mod: 26,,, | Performed by: UROLOGY

## 2025-04-29 DEVICE — STENT URETERAL UNIV 7FR 28CM: Type: IMPLANTABLE DEVICE | Site: URETER | Status: FUNCTIONAL

## 2025-04-29 RX ORDER — ONDANSETRON HYDROCHLORIDE 2 MG/ML
4 INJECTION, SOLUTION INTRAVENOUS DAILY PRN
Status: DISCONTINUED | OUTPATIENT
Start: 2025-04-29 | End: 2025-04-29 | Stop reason: HOSPADM

## 2025-04-29 RX ORDER — HYDROMORPHONE HYDROCHLORIDE 2 MG/ML
0.2 INJECTION, SOLUTION INTRAMUSCULAR; INTRAVENOUS; SUBCUTANEOUS EVERY 5 MIN PRN
Status: DISCONTINUED | OUTPATIENT
Start: 2025-04-29 | End: 2025-04-29 | Stop reason: HOSPADM

## 2025-04-29 RX ORDER — HYOSCYAMINE SULFATE 0.12 MG/1
0.25 TABLET SUBLINGUAL EVERY 4 HOURS PRN
Qty: 40 TABLET | Refills: 0 | Status: SHIPPED | OUTPATIENT
Start: 2025-04-29

## 2025-04-29 RX ORDER — KETOROLAC TROMETHAMINE 30 MG/ML
15 INJECTION, SOLUTION INTRAMUSCULAR; INTRAVENOUS EVERY 8 HOURS PRN
Status: DISCONTINUED | OUTPATIENT
Start: 2025-04-29 | End: 2025-04-29 | Stop reason: HOSPADM

## 2025-04-29 RX ORDER — PROPOFOL 10 MG/ML
VIAL (ML) INTRAVENOUS
Status: DISCONTINUED | OUTPATIENT
Start: 2025-04-29 | End: 2025-04-29

## 2025-04-29 RX ORDER — OXYCODONE AND ACETAMINOPHEN 5; 325 MG/1; MG/1
1 TABLET ORAL
Status: DISCONTINUED | OUTPATIENT
Start: 2025-04-29 | End: 2025-04-29 | Stop reason: HOSPADM

## 2025-04-29 RX ORDER — PHENAZOPYRIDINE HYDROCHLORIDE 200 MG/1
200 TABLET, FILM COATED ORAL 3 TIMES DAILY PRN
Qty: 15 TABLET | Refills: 0 | Status: SHIPPED | OUTPATIENT
Start: 2025-04-29

## 2025-04-29 RX ORDER — CEFAZOLIN 2 G/1
2 INJECTION, POWDER, FOR SOLUTION INTRAMUSCULAR; INTRAVENOUS
Status: COMPLETED | OUTPATIENT
Start: 2025-04-29 | End: 2025-04-29

## 2025-04-29 RX ORDER — OXYCODONE AND ACETAMINOPHEN 5; 325 MG/1; MG/1
1 TABLET ORAL EVERY 4 HOURS PRN
Qty: 10 TABLET | Refills: 0 | Status: SHIPPED | OUTPATIENT
Start: 2025-04-29

## 2025-04-29 RX ORDER — CEFDINIR 300 MG/1
300 CAPSULE ORAL 2 TIMES DAILY
Qty: 20 CAPSULE | Refills: 0 | Status: SHIPPED | OUTPATIENT
Start: 2025-04-29 | End: 2025-05-09

## 2025-04-29 RX ORDER — LIDOCAINE HYDROCHLORIDE 20 MG/ML
INJECTION, SOLUTION EPIDURAL; INFILTRATION; INTRACAUDAL; PERINEURAL
Status: DISCONTINUED | OUTPATIENT
Start: 2025-04-29 | End: 2025-04-29

## 2025-04-29 RX ADMIN — PROPOFOL 50 MG: 10 INJECTION, EMULSION INTRAVENOUS at 08:04

## 2025-04-29 RX ADMIN — LIDOCAINE HYDROCHLORIDE 50 MG: 20 INJECTION, SOLUTION EPIDURAL; INFILTRATION; INTRACAUDAL; PERINEURAL at 08:04

## 2025-04-29 RX ADMIN — SODIUM CHLORIDE, SODIUM LACTATE, POTASSIUM CHLORIDE, AND CALCIUM CHLORIDE: .6; .31; .03; .02 INJECTION, SOLUTION INTRAVENOUS at 08:04

## 2025-04-29 RX ADMIN — CEFAZOLIN 2 G: 2 INJECTION, POWDER, FOR SOLUTION INTRAMUSCULAR; INTRAVENOUS at 08:04

## 2025-04-29 NOTE — OP NOTE
Date of Procedure:  04/29/2025    Pre-operative Diagnosis:   Right malignant ureteral obstruction    Post-operative Diagnosis:   Right malignant ureteral obstruction    Surgeon:  Medardo Garcia MD    Assistants: None    Specimen: None    Anesthesia:  Mac anesthesia    Blood loss:  Minimal    Findings:  7 Macedonian by 28 cm right stent in good position, retrograde normal, no contrast passing to the bladder.    Procedures:  1. Cysto with placement of right ureteral stent  2. Right retrograde pyelogram  3. Fluoro less than one hour    Procedure in Detail:  Patient was brought to the operative suite and placed under general anesthesia.  After being positioned in dorsal lithotomy position, patient was sterilely prepped and draped.  Twenty-one Macedonian sheath cystoscope was placed into a normal urethra.  Prostatic urethra demonstrated lateral lobe coaptation.  Bilateral ureteral orifices were normal in size, shape, caliber and location.  The remainder of the bladder was otherwise unremarkable, no evidence of intravesical lesions or other abnormalities.  No evidence of recurrent tumors identified on cystoscopic evaluation.  Stent was grasped from the right side, brought through the urethral meatus and a wire was inserted, stent was removed.  Over the wire using Seldinger technique we placed a Garfield catheter, wire was removed.  Retrograde was performed demonstrating no evidence dilatation or other abnormalities, otherwise unremarkable right retrograde nephrogram.  Wire was reinserted the Garfield catheter was removed.  We then placed the patient in reverse Trendelenburg, allowing gravity drainage, after appropriate time there was no evidence of contrast passing within the bladder either fluoroscopically or cystoscopically.  This point we then placed a 7 Macedonian by 28 cm double-J ureteral stent over the wire using Seldinger technique.  Stent had a good coil in renal pelvis in the bladder.  Bladder was drained cystoscopically.   Patient was taken the PACU in stable condition.  He will see me back in 3 months for routine surveillance cystoscopy, which point we will again attempt stent removal versus exchange.  He will 1st be seen in the office to be scheduled in the operating room.    Complications: None.

## 2025-04-29 NOTE — ANESTHESIA PREPROCEDURE EVALUATION
04/29/2025  Jose Miguel Alvarez Jr. is a 84 y.o., male.    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 Treated with APC.   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 breakdown of skin 01/26/2023    Stage 3 chronic kidney disease 10/06/2017     Past Surgical History:   Procedure Laterality Date    bladder tumor removal      CARDIAC ELECTROPHYSIOLOGY MAPPING AND ABLATION      CARDIOVERSION      several    CATARACT EXTRACTION Bilateral     cataract removal with IOL implants Bilateral     CHOLECYSTECTOMY      CYSTOSCOPIC LITHOLAPAXY N/A 12/28/2021    Procedure: CYSTOLITHOLAPAXY;  Surgeon: Medardo Garcia MD;  Location: [facility] OR;  Service: Urology;  Laterality: N/A;    CYSTOSCOPIC LITHOLAPAXY N/A 5/14/2024    Procedure: CYSTOLITHOLAPAXY;  Surgeon: Medardo Garcia MD;  Location: [facility] OR;  Service: Urology;  Laterality: N/A;    CYSTOSCOPY      CYSTOSCOPY WITH BIOPSY OF BLADDER Right 12/28/2021    Procedure: CYSTOSCOPY, WITH BLADDER BIOPSY, WITH FULGURATION IF INDICATED;  Surgeon: Medardo Garcia MD;  Location: [facility] OR;  Service: Urology;  Laterality: Right;    CYSTOTOMY, WITH FULGURATION AND RADIOACTIVE MATERIAL INSERTION N/A 12/3/2024     Procedure: CYSTOTOMY, WITH FULGURATION AND RADIOACTIVE MATERIAL INSERTION;  Surgeon: Medardo Garcia MD;  Location: [facility] OR;  Service: Urology;  Laterality: N/A;    ESOPHAGOGASTRODUODENOSCOPY N/A 10/25/2021    Procedure: Small Jason Enteroscopy (SBE);  Surgeon: Isabelle Griffin MD;  Location: [facility] ENDO;  Service: Endoscopy;  Laterality: N/A;    ESOPHAGOGASTRODUODENOSCOPY N/A 12/13/2021    Procedure: EGD (ESOPHAGOGASTRODUODENOSCOPY);  Surgeon: Troy Polanco MD;  Location: [facility];  Service: Endoscopy;  Laterality: N/A;    ESOPHAGOGASTRODUODENOSCOPY N/A 4/5/2024    Procedure: EGD (ESOPHAGOGASTRODUODENOSCOPY);  Surgeon: Myrtle Salcedo MD;  Location: [facility];  Service: Endoscopy;  Laterality: N/A;    ESOPHAGOGASTRODUODENOSCOPY N/A 4/22/2024    Procedure: EGD (ESOPHAGOGASTRODUODENOSCOPY);  Surgeon: Myrtle Salcedo MD;  Location: [facility];  Service: Endoscopy;  Laterality: N/A;  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    EYE SURGERY      INTRALUMINAL GASTROINTESTINAL TRACT IMAGING VIA CAPSULE N/A 12/6/2021    Procedure: IMAGING PROCEDURE, GI TRACT, INTRALUMINAL, VIA CAPSULE;  Surgeon: Larry Jones RN;  Location: [facility];  Service: Endoscopy;  Laterality: N/A;    INTRALUMINAL GASTROINTESTINAL TRACT IMAGING VIA CAPSULE N/A 4/18/2024    Procedure: IMAGING PROCEDURE, GI TRACT, INTRALUMINAL, VIA CAPSULE;  Surgeon: Larry Jones RN;  Location: [facility] ENDO;  Service: Endoscopy;  Laterality: N/A;    OCCLUSION OF LEFT ATRIAL APPENDAGE N/A 11/14/2024    Procedure: Left atrial appendage occlusion;  Surgeon: Aroldo Ortiz MD;  Location: [facility] EP LAB;  Service: Cardiology;  Laterality: N/A;  afib, watchman, BSCI, venkatesh, anes, MB, 3prep    SMALL BOWEL ENTEROSCOPY N/A 9/26/2024    Procedure: ENTEROSCOPY--prximal DBE;  Surgeon: Eduardo Ruff MD;  Location: [facility];  Service: Endoscopy;  Laterality: N/A;    TRANSESOPHAGEAL ECHOCARDIOGRAPHY N/A 11/14/2024    Procedure: ECHOCARDIOGRAM,  TRANSESOPHAGEAL;  Surgeon: Aroldo Martinez MD;  Location: [facility] EP LAB;  Service: Cardiology;  Laterality: N/A;    TRANSESOPHAGEAL ECHOCARDIOGRAPHY N/A 12/30/2024    Procedure: ECHOCARDIOGRAM, TRANSESOPHAGEAL;  Surgeon: Carlos Flower MD;  Location: [facility] CATH LAB;  Service: Cardiology;  Laterality: N/A;    TRANSURETHRAL RESECTION OF BLADDER TUMOR BY BIPOLAR CAUTERY N/A 7/23/2019    Procedure: TURBT, USING BIPOLAR CAUTERY;  Surgeon: Ritesh Marques MD;  Location: [facility] OR;  Service: Urology;  Laterality: N/A;    TRANSURETHRAL RESECTION OF PROSTATE N/A 5/14/2024    Procedure: TURP (TRANSURETHRAL RESECTION OF PROSTATE);  Surgeon: Medardo Garcia MD;  Location: [facility] OR;  Service: Urology;  Laterality: N/A;    TURBT (TRANSURETHRAL RESECTION OF BLADDER TUMOR) N/A 12/3/2024    Procedure: TURBT (TRANSURETHRAL RESECTION OF BLADDER TUMOR);  Surgeon: Medardo Garcia MD;  Location: [facility] OR;  Service: Urology;  Laterality: N/A;         Pre-op Assessment    I have reviewed the Patient Summary Reports.    I have reviewed the NPO Status.   I have reviewed the Medications.     Review of Systems  Anesthesia Hx:  No problems with previous Anesthesia                Social:  Former Smoker       Hematology/Oncology:       -- Anemia:                                  Cardiovascular:     Hypertension    Dysrhythmias      PVD hyperlipidemia   ECG has been reviewed. Presence of Watchman left atrial appendage closure device                           Pulmonary:   COPD                     Renal/:  Chronic Renal Disease, CKD   History of bladder cancer              Hepatic/GI:  Hepatic/GI Normal                    Neurological:  Neurology Normal                                      Endocrine:  Diabetes               Physical Exam  General: Well nourished    Airway:  Mallampati: II   Mouth Opening: Normal  TM Distance: Normal  Neck ROM: Normal ROM    Dental:  Intact        Anesthesia Plan  Type of Anesthesia, risks & benefits  discussed:    Anesthesia Type: Gen ETT, Gen Supraglottic Airway, MAC  Intra-op Monitoring Plan: Standard ASA Monitors  Post Op Pain Control Plan: multimodal analgesia  Induction:  IV  Airway Plan: , Post-Induction  Informed Consent: Informed consent signed with the Patient and all parties understand the risks and agree with anesthesia plan.  All questions answered.   ASA Score: 3    Ready For Surgery From Anesthesia Perspective.     .      Chemistry        Component Value Date/Time     04/11/2025 1253     03/17/2025 1450    K 4.5 04/11/2025 1253    K 4.3 03/17/2025 1450     04/11/2025 1253     03/17/2025 1450    CO2 23 04/11/2025 1253    CO2 18 (L) 03/17/2025 1450    BUN 31 (H) 04/11/2025 1253    CREATININE 1.7 (H) 04/11/2025 1253     (H) 03/17/2025 1450        Component Value Date/Time    CALCIUM 9.2 04/11/2025 1253    CALCIUM 9.6 03/17/2025 1450    ALKPHOS 72 04/11/2025 1253    ALKPHOS 81 03/17/2025 1450    AST 23 04/11/2025 1253    AST 33 03/17/2025 1450    AST 29 04/24/2016 1145    ALT 20 04/11/2025 1253    ALT 28 03/17/2025 1450    BILITOT 0.5 04/11/2025 1253    BILITOT 0.5 03/17/2025 1450    ESTGFRAFRICA >60 12/08/2021 2007    EGFRNONAA 56 (A) 12/08/2021 2007        Lab Results   Component Value Date    WBC 4.09 04/11/2025    HGB 11.1 (L) 04/11/2025    HCT 33.9 (L) 04/11/2025    MCV 93 04/11/2025     (L) 04/11/2025       Normal sinus rhythm   Minimal voltage criteria for LVH, may be normal variant ( R in aVL )   Borderline Abnormal ECG   No previous ECGs available   Confirmed by Erendira Person ([id number]) on 3/12/2025 4:26:14 PM     Results for orders placed during the hospital encounter of 04/01/24    Echo Saline Bubble? No    Interpretation Summary    Left Ventricle: The left ventricle is normal in size. Mildly increased wall thickness. There is concentric remodeling. There is normal systolic function with a visually estimated ejection fraction of 60 - 65%. Unable to assess  diastolic function due to atrial fibrillation.    Right Ventricle: Normal right ventricular cavity size. Wall thickness is normal. Right ventricle wall motion  is normal. Systolic function is normal.    Left Atrium: Left atrium is moderately dilated.    Tricuspid Valve: There is mild regurgitation.    IVC/SVC: Intermediate venous pressure at 8 mmHg.

## 2025-04-29 NOTE — TELEPHONE ENCOUNTER
----- Message from Medardo Garcia MD sent at 4/29/2025  9:10 AM CDT -----  Three months with me with a KUB and renal ultrasound

## 2025-04-29 NOTE — DISCHARGE SUMMARY
[facility] - Surgery (Hospital)  Discharge Note  Short Stay    Procedure(s) (LRB):  CYSTOSCOPY, WITH RETROGRADE PYELOGRAM AND URETERAL STENT INSERTION (Right)  CYSTOSCOPY, WITH URETERAL STENT REMOVAL (Right)      OUTCOME: Patient tolerated treatment/procedure well without complication and is now ready for discharge.    DISPOSITION: Home or Self Care    FINAL DIAGNOSIS:  bladder cancer    FOLLOWUP: In clinic    DISCHARGE INSTRUCTIONS:    Discharge Procedure Orders   X-Ray Abdomen AP 1 View   Standing Status: Future Standing Exp. Date: 04/29/26     Order Specific Question Answer Comments   May the Radiologist modify the order per protocol to meet the clinical needs of the patient? Yes      US Retroperitoneal Complete   Standing Status: Future Standing Exp. Date: 04/29/26     Order Specific Question Answer Comments   May the Radiologist modify the order per protocol to meet the clinical needs of the patient? Yes      ACCEPT - Ambulatory referral/consult to Interventional Cardiology   Standing Status: Future   Referral Priority: Routine Referral Type: Consultation   Referral Reason: Specialty Services Required   Requested Specialty: Interventional Cardiology   Number of Visits Requested: 1     Diet Adult Regular     Notify your health care provider if you experience any of the following:  severe uncontrolled pain     Notify your health care provider if you experience any of the following:  persistent nausea and vomiting or diarrhea     Notify your health care provider if you experience any of the following:  temperature >100.4     Activity as tolerated        TIME SPENT ON DISCHARGE: 5 minutes

## 2025-04-29 NOTE — ANESTHESIA POSTPROCEDURE EVALUATION
Anesthesia Post Evaluation    Patient: Jose Miguel Alvraez Jr.    Procedure(s) Performed: Procedure(s) (LRB):  CYSTOSCOPY, WITH RETROGRADE PYELOGRAM AND URETERAL STENT INSERTION (Right)  CYSTOSCOPY, WITH URETERAL STENT REMOVAL (Right)    Final Anesthesia Type: MAC      Patient location during evaluation: PACU  Patient participation: Yes- Able to Participate  Level of consciousness: awake and alert  Post-procedure vital signs: reviewed and stable  Airway patency: patent      Anesthetic complications: no      Cardiovascular status: blood pressure returned to baseline  Respiratory status: unassisted and spontaneous ventilation  Hydration status: euvolemic  Follow-up not needed.              Vitals Value Taken Time   /60 04/29/25 09:55   Temp 36.7 °C (98 °F) 04/29/25 09:30   Pulse 55 04/29/25 09:57   Resp 13 04/29/25 09:56   SpO2 93 % 04/29/25 09:57   Vitals shown include unfiled device data.      Event Time   Out of Recovery 09:59:06         Pain/Johanna Score: Johanna Score: 10 (4/29/2025  9:30 AM)

## 2025-04-29 NOTE — TRANSFER OF CARE
Anesthesia Transfer of Care Note    Patient: Jose Miguel Alvarez Jr.    Procedure(s) Performed: Procedure(s) (LRB):  CYSTOSCOPY, WITH RETROGRADE PYELOGRAM AND URETERAL STENT INSERTION (Right)  CYSTOSCOPY, WITH URETERAL STENT REMOVAL (Right)    Patient location: PACU    Anesthesia Type: MAC    Transport from OR: Transported from OR on room air with adequate spontaneous ventilation    Post pain: adequate analgesia    Post assessment: no apparent anesthetic complications    Post vital signs: stable    Level of consciousness: awake and responds to stimulation    Nausea/Vomiting: no nausea/vomiting    Complications: none    Transfer of care protocol was followed      Last vitals: Visit Vitals  BP (!) 178/85 (BP Location: Right arm, Patient Position: Sitting)   Pulse (!) 53   Temp 36.3 °C (97.3 °F) (Temporal)   Resp 18   Ht 6' (1.829 m)   Wt 88 kg (194 lb 0.1 oz)   SpO2 97%   BMI 26.31 kg/m²

## 2025-05-01 ENCOUNTER — PATIENT MESSAGE (OUTPATIENT)
Dept: UROLOGY | Facility: CLINIC | Age: 85
End: 2025-05-01
Payer: MEDICARE

## 2025-05-12 ENCOUNTER — PATIENT MESSAGE (OUTPATIENT)
Dept: HEMATOLOGY/ONCOLOGY | Facility: CLINIC | Age: 85
End: 2025-05-12
Payer: MEDICARE

## 2025-05-12 DIAGNOSIS — C67.9 MALIGNANT NEOPLASM OF URINARY BLADDER, UNSPECIFIED SITE: Primary | ICD-10-CM

## 2025-05-12 NOTE — TELEPHONE ENCOUNTER
We can get cbc, cmp, iron studies and then I can see him after.  OK for virtual if he is ok with it.    Thank you,   Turner Walton - OZIEL -ALISHA  Benign Hematology

## 2025-05-14 ENCOUNTER — OFFICE VISIT (OUTPATIENT)
Dept: PODIATRY | Facility: CLINIC | Age: 85
End: 2025-05-14
Payer: MEDICARE

## 2025-05-14 ENCOUNTER — LAB VISIT (OUTPATIENT)
Dept: LAB | Facility: HOSPITAL | Age: 85
End: 2025-05-14
Attending: NURSE PRACTITIONER
Payer: MEDICARE

## 2025-05-14 DIAGNOSIS — Z79.01 ANTICOAGULATED: ICD-10-CM

## 2025-05-14 DIAGNOSIS — L60.3 ONYCHODYSTROPHY: ICD-10-CM

## 2025-05-14 DIAGNOSIS — C67.9 MALIGNANT NEOPLASM OF URINARY BLADDER, UNSPECIFIED SITE: ICD-10-CM

## 2025-05-14 DIAGNOSIS — E11.42 TYPE 2 DIABETES MELLITUS WITH PERIPHERAL NEUROPATHY: Primary | ICD-10-CM

## 2025-05-14 LAB
ABSOLUTE EOSINOPHIL (OHS): 0.06 K/UL
ABSOLUTE MONOCYTE (OHS): 0.58 K/UL (ref 0.3–1)
ABSOLUTE NEUTROPHIL COUNT (OHS): 3.6 K/UL (ref 1.8–7.7)
ANION GAP (OHS): 9 MMOL/L (ref 8–16)
BASOPHILS # BLD AUTO: 0.03 K/UL
BASOPHILS NFR BLD AUTO: 0.6 %
BUN SERPL-MCNC: 26 MG/DL (ref 8–23)
CALCIUM SERPL-MCNC: 9.1 MG/DL (ref 8.7–10.5)
CHLORIDE SERPL-SCNC: 106 MMOL/L (ref 95–110)
CO2 SERPL-SCNC: 27 MMOL/L (ref 23–29)
CREAT SERPL-MCNC: 1.1 MG/DL (ref 0.5–1.4)
ERYTHROCYTE [DISTWIDTH] IN BLOOD BY AUTOMATED COUNT: 15.5 % (ref 11.5–14.5)
FERRITIN SERPL-MCNC: 266 NG/ML (ref 20–300)
GFR SERPLBLD CREATININE-BSD FMLA CKD-EPI: >60 ML/MIN/1.73/M2
GLUCOSE SERPL-MCNC: 168 MG/DL (ref 70–110)
HCT VFR BLD AUTO: 33.7 % (ref 40–54)
HGB BLD-MCNC: 10.9 GM/DL (ref 14–18)
IMM GRANULOCYTES # BLD AUTO: 0.04 K/UL (ref 0–0.04)
IMM GRANULOCYTES NFR BLD AUTO: 0.8 % (ref 0–0.5)
IRON SATN MFR SERPL: 11 % (ref 20–50)
IRON SERPL-MCNC: 29 UG/DL (ref 45–160)
LYMPHOCYTES # BLD AUTO: 0.82 K/UL (ref 1–4.8)
MCH RBC QN AUTO: 30.5 PG (ref 27–31)
MCHC RBC AUTO-ENTMCNC: 32.3 G/DL (ref 32–36)
MCV RBC AUTO: 94 FL (ref 82–98)
NUCLEATED RBC (/100WBC) (OHS): 0 /100 WBC
PLATELET # BLD AUTO: 164 K/UL (ref 150–450)
PMV BLD AUTO: 10.7 FL (ref 9.2–12.9)
POTASSIUM SERPL-SCNC: 4.1 MMOL/L (ref 3.5–5.1)
RBC # BLD AUTO: 3.57 M/UL (ref 4.6–6.2)
RELATIVE EOSINOPHIL (OHS): 1.2 %
RELATIVE LYMPHOCYTE (OHS): 16 % (ref 18–48)
RELATIVE MONOCYTE (OHS): 11.3 % (ref 4–15)
RELATIVE NEUTROPHIL (OHS): 70.1 % (ref 38–73)
SODIUM SERPL-SCNC: 142 MMOL/L (ref 136–145)
TIBC SERPL-MCNC: 258 UG/DL (ref 250–450)
TRANSFERRIN SERPL-MCNC: 174 MG/DL (ref 200–375)
WBC # BLD AUTO: 5.13 K/UL (ref 3.9–12.7)

## 2025-05-14 PROCEDURE — 36415 COLL VENOUS BLD VENIPUNCTURE: CPT | Mod: PO

## 2025-05-14 PROCEDURE — 82728 ASSAY OF FERRITIN: CPT

## 2025-05-14 PROCEDURE — 82435 ASSAY OF BLOOD CHLORIDE: CPT

## 2025-05-14 PROCEDURE — 85025 COMPLETE CBC W/AUTO DIFF WBC: CPT

## 2025-05-14 PROCEDURE — 99999 PR PBB SHADOW E&M-EST. PATIENT-LVL III: CPT | Mod: PBBFAC,,, | Performed by: PODIATRIST

## 2025-05-14 PROCEDURE — 83540 ASSAY OF IRON: CPT

## 2025-05-14 NOTE — PROGRESS NOTES
Subjective:       Patient ID: Jose Miguel Alvarez Jr. is a 84 y.o. male.    Chief Complaint: Diabetic Foot Ulcer (Right second toe DFU check. Patient denies pain at present and continues using mupirocin at home without difficulty. )      HPI: Patient presents to the office today with his son present to follow-up on ulceration to the right 2nd toe.  Previously had diagnosis of osteomyelitis to the distal phalanx and underwent chemotherapy/radiation for bladder cancer.  He has done well in his currently completed chemotherapy and radiation.  It appears that wound has ultimately healed well without issue.  Does have a past medical history of diabetes mellitus with neuropathy.  Patient does follow with Primary Care and/or Endocrinology for management of Diabetes Mellitus. This patient's PMD is Toshia Valladares MD. This patient last saw his/her primary care provider on 03/07/2025.     Hemoglobin A1C   Date Value Ref Range Status   10/09/2024 5.5 4.0 - 5.6 % Final     Comment:     ADA Screening Guidelines:  5.7-6.4%  Consistent with prediabetes  >or=6.5%  Consistent with diabetes    High levels of fetal hemoglobin interfere with the HbA1C  assay. Heterozygous hemoglobin variants (HbS, HgC, etc)do  not significantly interfere with this assay.   However, presence of multiple variants may affect accuracy.     04/09/2024 6.2 (H) 4.0 - 5.6 % Final     Comment:     ADA Screening Guidelines:  5.7-6.4%  Consistent with prediabetes  >or=6.5%  Consistent with diabetes    High levels of fetal hemoglobin interfere with the HbA1C  assay. Heterozygous hemoglobin variants (HbS, HgC, etc)do  not significantly interfere with this assay.   However, presence of multiple variants may affect accuracy.     01/26/2023 6.2 (H) 4.0 - 5.6 % Final     Comment:     ADA Screening Guidelines:  5.7-6.4%  Consistent with prediabetes  >or=6.5%  Consistent with diabetes    High levels of fetal hemoglobin interfere with the HbA1C  assay.  Heterozygous hemoglobin variants (HbS, HgC, etc)do  not significantly interfere with this assay.   However, presence of multiple variants may affect accuracy.       Hemoglobin A1c   Date Value Ref Range Status   03/24/2025 5.9 (H) 4.0 - 5.6 % Final     Comment:     ADA Screening Guidelines:  5.7-6.4%  Consistent with prediabetes  >=6.5%  Consistent with diabetes    High levels of fetal hemoglobin interfere with the HbA1C  assay. Heterozygous hemoglobin variants (HbS, HgC, etc)do  not significantly interfere with this assay.   However, presence of multiple variants may affect accuracy.   .     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 Treated with APC.   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 breakdown of skin 01/26/2023    Stage 3 chronic kidney disease 10/06/2017       Family History   Problem Relation Name Age of Onset    Heart disease Father      Hypertension Father      Heart disease Brother         Social History[1]    Past Surgical History:   Procedure Laterality Date    bladder tumor removal      CARDIAC ELECTROPHYSIOLOGY MAPPING AND ABLATION      CARDIOVERSION      several    CATARACT EXTRACTION Bilateral     cataract removal with IOL  implants Bilateral     CHOLECYSTECTOMY      CYSTOSCOPIC LITHOLAPAXY N/A 12/28/2021    Procedure: CYSTOLITHOLAPAXY;  Surgeon: Medardo Garcia MD;  Location: [facility] OR;  Service: Urology;  Laterality: N/A;    CYSTOSCOPIC LITHOLAPAXY N/A 5/14/2024    Procedure: CYSTOLITHOLAPAXY;  Surgeon: Medardo Garcia MD;  Location: [facility] OR;  Service: Urology;  Laterality: N/A;    CYSTOSCOPY      CYSTOSCOPY W/ URETERAL STENT REMOVAL Right 4/29/2025    Procedure: CYSTOSCOPY, WITH URETERAL STENT REMOVAL;  Surgeon: Medardo Garcia MD;  Location: [facility] OR;  Service: Urology;  Laterality: Right;    CYSTOSCOPY WITH BIOPSY OF BLADDER Right 12/28/2021    Procedure: CYSTOSCOPY, WITH BLADDER BIOPSY, WITH FULGURATION IF INDICATED;  Surgeon: Medardo Garcia MD;  Location: [facility] OR;  Service: Urology;  Laterality: Right;    CYSTOSCOPY WITH URETEROSCOPY, RETROGRADE PYELOGRAPHY, AND INSERTION OF STENT Right 4/29/2025    Procedure: CYSTOSCOPY, WITH RETROGRADE PYELOGRAM AND URETERAL STENT INSERTION;  Surgeon: Medardo Garcia MD;  Location: [facility] OR;  Service: Urology;  Laterality: Right;    CYSTOTOMY, WITH FULGURATION AND RADIOACTIVE MATERIAL INSERTION N/A 12/3/2024    Procedure: CYSTOTOMY, WITH FULGURATION AND RADIOACTIVE MATERIAL INSERTION;  Surgeon: Medardo Garcia MD;  Location: [facility];  Service: Urology;  Laterality: N/A;    ESOPHAGOGASTRODUODENOSCOPY N/A 10/25/2021    Procedure: Small Jason Enteroscopy (SBE);  Surgeon: Isabelle Griffin MD;  Location: [facility];  Service: Endoscopy;  Laterality: N/A;    ESOPHAGOGASTRODUODENOSCOPY N/A 12/13/2021    Procedure: EGD (ESOPHAGOGASTRODUODENOSCOPY);  Surgeon: Troy Polanco MD;  Location: [facility] ENDO;  Service: Endoscopy;  Laterality: N/A;    ESOPHAGOGASTRODUODENOSCOPY N/A 4/5/2024    Procedure: EGD (ESOPHAGOGASTRODUODENOSCOPY);  Surgeon: Myrtle Salcedo MD;  Location: [facility];  Service: Endoscopy;  Laterality: N/A;    ESOPHAGOGASTRODUODENOSCOPY N/A 4/22/2024     Procedure: EGD (ESOPHAGOGASTRODUODENOSCOPY);  Surgeon: Myrtle Salcedo MD;  Location: [facility] ENDO;  Service: Endoscopy;  Laterality: N/A;  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    EYE SURGERY      INTRALUMINAL GASTROINTESTINAL TRACT IMAGING VIA CAPSULE N/A 12/6/2021    Procedure: IMAGING PROCEDURE, GI TRACT, INTRALUMINAL, VIA CAPSULE;  Surgeon: Larry Jones, RN;  Location: [facility] ENDO;  Service: Endoscopy;  Laterality: N/A;    INTRALUMINAL GASTROINTESTINAL TRACT IMAGING VIA CAPSULE N/A 4/18/2024    Procedure: IMAGING PROCEDURE, GI TRACT, INTRALUMINAL, VIA CAPSULE;  Surgeon: Larry Jones, RN;  Location: [facility] ENDO;  Service: Endoscopy;  Laterality: N/A;    OCCLUSION OF LEFT ATRIAL APPENDAGE N/A 11/14/2024    Procedure: Left atrial appendage occlusion;  Surgeon: Aroldo Ortiz MD;  Location: [facility] EP LAB;  Service: Cardiology;  Laterality: N/A;  afib, watchman, BSCI, venkatesh, anes, MB, 3prep    SMALL BOWEL ENTEROSCOPY N/A 9/26/2024    Procedure: ENTEROSCOPY--prximal DBE;  Surgeon: Eduardo Ruff MD;  Location: [facility] ENDO;  Service: Endoscopy;  Laterality: N/A;    TRANSESOPHAGEAL ECHOCARDIOGRAPHY N/A 11/14/2024    Procedure: ECHOCARDIOGRAM, TRANSESOPHAGEAL;  Surgeon: Aroldo Martinez MD;  Location: [facility] EP LAB;  Service: Cardiology;  Laterality: N/A;    TRANSESOPHAGEAL ECHOCARDIOGRAPHY N/A 12/30/2024    Procedure: ECHOCARDIOGRAM, TRANSESOPHAGEAL;  Surgeon: Carlos Flower MD;  Location: [facility] CATH LAB;  Service: Cardiology;  Laterality: N/A;    TRANSURETHRAL RESECTION OF BLADDER TUMOR BY BIPOLAR CAUTERY N/A 7/23/2019    Procedure: TURBT, USING BIPOLAR CAUTERY;  Surgeon: Ritesh Marques MD;  Location: [facility] OR;  Service: Urology;  Laterality: N/A;    TRANSURETHRAL RESECTION OF PROSTATE N/A 5/14/2024    Procedure: TURP (TRANSURETHRAL RESECTION OF PROSTATE);  Surgeon: Medardo Garcia MD;  Location: [facility] OR;  Service: Urology;  Laterality: N/A;    TURBT (TRANSURETHRAL RESECTION OF BLADDER TUMOR) N/A  12/3/2024    Procedure: TURBT (TRANSURETHRAL RESECTION OF BLADDER TUMOR);  Surgeon: Medardo Garcia MD;  Location: [facility];  Service: Urology;  Laterality: N/A;       Review of Systems       Objective:   There were no vitals taken for this visit.    Physical Exam  LOWER EXTREMITY PHYSICAL EXAMINATION    VASCULAR:  The right dorsalis pedis pulse 2/4 and the right posterior tibial pulse 1/4.  The left dorsalis pedis pulse 2/4 and posterior tibial pulse on the left is 1/4.  Capillary refill is intact.  Pedal hair growth decreased.  Telangiectasias and varicosities bilaterally     NEUROLOGY: Protective sensation is not intact to the left and right plantar surfaces of the foot and digits, as the patient has no sensation/detection at greater than 4 distinct points of contact with 5.07 [place] Amanda monofilament. Sensation to light touch is intact on the left and right foot. Proprioception is intact, bilateral. Sensation to pin prick is reduced to absent. Vibratory sensation is diminished.    DERMATOLOGY:  Skin is supple, moist, intact.  Previous ulceration to the 2nd digit on the right foot has subsequently healed without open wounds.  No evidence of erythema.  The R1, 2, 5 and left L1,2, 5 are thickened, discolored dystrophic.      ORTHOPEDIC: Manual Muscle Testing is 5/5 in all planes on the left and right, without pains, with and without resistance. Gait pattern is non-antalgic.    Assessment:     1. Type 2 diabetes mellitus with peripheral neuropathy    2. Anticoagulated    3. Onychodystrophy        Plan:     Type 2 diabetes mellitus with peripheral neuropathy    Anticoagulated    Onychodystrophy        Thorough discussion is had with the patient this afternoon, concerning the diagnosis, its etiology, and the treatment algorithm at present.  Greater than 50% of this visit spent on counseling and coordination of care. Greater than 15 minutes of a 20 minute appointment spent on education about the diabetic  foot, neuropathy, and prevention of limb loss.  Shoe inspection. Diabetic Foot Education. Patient reminded of the importance of good nutrition and blood sugar control to help prevent podiatric complications of diabetes. Patient instructed on proper foot hygeine. We discussed wearing proper and supportive shoe gear, daily foot inspections, never walking barefooted or sock footed, never putting sharp instruments to feet which can cause major complications associated with infection, ulcers, lacerations.      Dystrophic nail plates, as outlined above (R#1,2,5  ; L#1,2,5 ), are sharply debrided with double action nail nipper, and/or with the assistance of a mechanical rotary hedy, with removal of all offending nail and nail border(s), for reduction of pains. Nails are reduced in terms of length, width and girth with removal of subungual debris to facilitate pain free weight bearing and ambulation. The elongated nails as outlined in the objective portion of this note, were trimmed to appropriate length, with a double action nail nipper, for alleviation/reduction of pains as well. Follow up in approx. 3-4 months.    Wound has subsequently healed without issues.  We did discuss that with underlying diagnosis of osteomyelitis, this may reactivate cause issues in the future.  Currently, as the wound is healed and there is no acute signs of infection, recommend that we continue to watch and monitor the toe at this point.        Future Appointments   Date Time Provider Department Center   6/12/2025  3:00 PM Justyn Rangel MD ON NEPHRO  [facility]   6/23/2025 10:00 AM Mis Bekcham NP ON NEURO  [facility]   6/30/2025 10:30 AM Martin Villarreal MD, YENIFER ON INFDIS  [facility]   7/2/2025  3:20 PM Carlos Flower MD [facility] CARDIO [facility]   7/9/2025 11:30 AM [facility] CT1 LIMIT 500 LBS [facility] CT SCAN [place]   7/11/2025  1:40 PM RHYS MURILLO CC LAB [facility]   7/11/2025  2:40 PM Fabián Westbrook MD [facility] HEM ONC [facility]    2025  8:15 AM ONLH XR1-DR ONLH XRAY [facility]   2025  8:40 AM ONLH US2 ONLH ULSOUND [facility]   2025  9:45 AM Medardo Garcia MD [facility] UROLOGY [facility]   2025  2:00 PM Frederick Singh MD [facility] RAD ONC [facility]   2025  3:15 PM Leo Winn OD ONLC OPHTHAL [facility]   2025  9:30 AM Xiao Bowman DPM ONLC POD [facility]           [1]   Social History  Socioeconomic History    Marital status:    Tobacco Use    Smoking status: Former     Current packs/day: 0.00     Average packs/day: 2.5 packs/day for 20.0 years (50.0 ttl pk-yrs)     Types: Cigarettes     Start date:      Quit date:      Years since quittin.3     Passive exposure: Never    Smokeless tobacco: Never   Substance and Sexual Activity    Alcohol use: Yes     Alcohol/week: 7.0 standard drinks of alcohol     Types: 7 Cans of beer per week     Comment: occasionally: hold 72 hrs    Drug use: No    Sexual activity: Not Currently     Social Drivers of Health     Financial Resource Strain: Low Risk  (2025)    Overall Financial Resource Strain (CARDIA)     Difficulty of Paying Living Expenses: Not hard at all   Food Insecurity: No Food Insecurity (2025)    Hunger Vital Sign     Worried About Running Out of Food in the Last Year: Never true     Ran Out of Food in the Last Year: Never true   Transportation Needs: No Transportation Needs (2024)    TRANSPORTATION NEEDS     Transportation : No   Physical Activity: Insufficiently Active (2025)    Exercise Vital Sign     Days of Exercise per Week: 2 days     Minutes of Exercise per Session: 30 min   Stress: No Stress Concern Present (2025)    [employer] [place] [employer] - Occupational Stress Questionnaire     Feeling of Stress : Not at all   Housing Stability: Unknown (2025)    Housing Stability Vital Sign     Unable to Pay for Housing in the Last Year: No     Homeless in the Last Year: No

## 2025-05-22 ENCOUNTER — EXTERNAL HOME HEALTH (OUTPATIENT)
Dept: HOME HEALTH SERVICES | Facility: HOSPITAL | Age: 85
End: 2025-05-22
Payer: MEDICARE

## 2025-05-22 RX ORDER — CARVEDILOL 6.25 MG/1
6.25 TABLET ORAL 2 TIMES DAILY WITH MEALS
Qty: 180 TABLET | Refills: 3 | Status: SHIPPED | OUTPATIENT
Start: 2025-05-22

## 2025-05-23 ENCOUNTER — DOCUMENT SCAN (OUTPATIENT)
Dept: HOME HEALTH SERVICES | Facility: HOSPITAL | Age: 85
End: 2025-05-23
Payer: MEDICARE

## 2025-05-23 ENCOUNTER — OFFICE VISIT (OUTPATIENT)
Dept: HEMATOLOGY/ONCOLOGY | Facility: CLINIC | Age: 85
End: 2025-05-23
Payer: MEDICARE

## 2025-05-23 DIAGNOSIS — D50.9 IRON DEFICIENCY ANEMIA, UNSPECIFIED IRON DEFICIENCY ANEMIA TYPE: ICD-10-CM

## 2025-05-23 DIAGNOSIS — Z85.51 HISTORY OF BLADDER CANCER: ICD-10-CM

## 2025-05-23 DIAGNOSIS — C67.9 UROTHELIAL CARCINOMA OF BLADDER: Primary | ICD-10-CM

## 2025-05-23 NOTE — PROGRESS NOTES
The patient location is: [place]  The chief complaint leading to consultation is: fatigue    Visit type: audiovisual    Face to Face time with patient: 15  30 minutes of total time spent on the encounter, which includes face to face time and non-face to face time preparing to see the patient (eg, review of tests), Obtaining and/or reviewing separately obtained history, Documenting clinical information in the electronic or other health record, Independently interpreting results (not separately reported) and communicating results to the patient/family/caregiver, or Care coordination (not separately reported).     Each patient to whom he or she provides medical services by telemedicine is:  (1) informed of the relationship between the physician and patient and the respective role of any other health care provider with respect to management of the patient; and (2) notified that he or she may decline to receive medical services by telemedicine and may withdraw from such care at any time.    HPI:   85 yo male presents to the clinic as a new patient for further eval and recommendations - previously followed in the clinic for anemia with h/o bladder cancer in 2019 s/p resection. Has a h/o AVMs with previous gi bleed.  Has received multiple IV iron infusions as well as multiple blood transfusions in the past. Stopped taking iron due to currently being treated for UTI with omnicief.     Is currently taking Eliquis 2.5 mg PO bid.  Was on Xarelto previously for treatment of A. fib     Since 2016 has on/off low platelets now persistently low.  Hgb currently 7.9 mcv 128.  Lowest count noted 99 K.     Previously followed in the clinic by Dr. King.  Today is the first time I am evaluating/assessing the patient.       Patient is accompanied by his daughter-in-law.      Interval history:  9/25/2024 Presents today to discuss treatment recommendations due to anaphalactic reaction with first dose of Feraheme on 9/9/2024.  Upon  initiation of IV iron he started to have severe back pain so infusions was stopped and saline was infusing.  Symptoms resolved and infusions was restarted at a slower rate.  Patient began to lose vision and had a syncopal episode with an extreme drop in blood pressure.  He was give steroid injection and BP began to rise and regained consciousness.  He was transported from facility to hospital via ambulance for evaluation.  Feraheme has been listed as an allergy in his chart.  Review of past iron infusions show that he has received Injectafer infusions in the past without issue in 2021 and he also tells me that he received IV iron after blood transfusion many years ago in the hospital and tolerated fine.  Currently taking ferrous sulfate 325 mg PO every other day and is tolerating well.  He is accompanied by his son. He will be having a special GI procedure tomorrow Upper DBE on 9/26/2024 with dr. Ruff in [place] to assess for and treat angiectasias in the small intestines.  He continues to take Protonix 40 mg po daily.  He denies and known abnormal bleeding and is asymptomatic. VCE in 4/2024 with active small bowel bleeding seen.     Interval History:  10/31/2024 Presents today to evaluate response of oral iron.  Has been taking ferrous sulfate 325 mg PO daily since last visit wtihout complaints.  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  Denies any current known GI bleeding.  Hgb slowly improving - continues to have low ferritin.  Has brain fogginess.  Is accompanied by his son.  Has decided that he would like to get the iron that he received previously that he was able to tolerate.      Interval History:  12/13/2025 Received venofer 200 mg IV x 4 doses and presents today to evaluate response.  Has been found with recurrent bladder cancer and has initial evaluation with oncology and radiology upcoming. Hgb continues to trend upward.  Denies any known abnormal bleeding.   Continued stable thrombocytopenia with known borderline splenomegaly.  Saturated iron of 6%.  Had watchman procedure done.  No longer on anticoagulation.     Interval history:  5/23/2025 was diagnosed with recurrent bladder and cancer   Urothelial carcinoma of bladder  Staging form: Urinary Bladder, AJCC 7th Edition  - Clinical: Stage II (T2, N0, M0) - Signed by Fabián Westbrook MD on 1/17/2025  Treatment:   02/05/25                                  Cisplatin  02/06/25 - 03/06/25                 RT    Presents today to review labs due to having recurrent iron deficiency anemia and requiring iron infusions.  He also has B12 deficiency and is taking B12 5000 mcg SL daily.  Presents today to review results of labs showing iron deficiency anemia.  Has completed treatment for recurrent bladder cancer.  He denies any known abnormal bleeding.      Med Onc Chart Routing      Follow up with physician    Follow up with [facility] . F/u 5 weeks after last dose of IV iron with labs prior - VV after   Infusion scheduling note   venofer 200 mg IV push  x 5 weekly doses- on Fridays in the morning after 8 am at [facility]   Injection scheduling note n/a   Labs   Scheduling:  Preferred lab: [facility]  Lab interval:  cbc, bmp, iron studies   Imaging   N/a   Pharmacy appointment No pharmacy appointment needed      Other referrals No nutrition appointment needed -        No additional referrals needed  n/a      Continue B12 supplement daily. Repleat iron deficiency with IV iron.  Will f/u 5 weeks after last dose iron with labs prior to evaluate response.

## 2025-06-05 ENCOUNTER — DOCUMENT SCAN (OUTPATIENT)
Dept: HOME HEALTH SERVICES | Facility: HOSPITAL | Age: 85
End: 2025-06-05
Payer: MEDICARE

## 2025-06-05 ENCOUNTER — TELEPHONE (OUTPATIENT)
Dept: INFUSION THERAPY | Facility: HOSPITAL | Age: 85
End: 2025-06-05
Payer: MEDICARE

## 2025-06-11 ENCOUNTER — LAB VISIT (OUTPATIENT)
Dept: LAB | Facility: HOSPITAL | Age: 85
End: 2025-06-11
Attending: INTERNAL MEDICINE
Payer: MEDICARE

## 2025-06-11 DIAGNOSIS — N17.9 AKI (ACUTE KIDNEY INJURY): Primary | ICD-10-CM

## 2025-06-11 DIAGNOSIS — N17.9 AKI (ACUTE KIDNEY INJURY): ICD-10-CM

## 2025-06-11 LAB
ALBUMIN SERPL BCP-MCNC: 3.4 G/DL (ref 3.5–5.2)
ANION GAP (OHS): 6 MMOL/L (ref 8–16)
BACTERIA #/AREA URNS AUTO: ABNORMAL /HPF
BILIRUB UR QL STRIP.AUTO: NEGATIVE
BUN SERPL-MCNC: 24 MG/DL (ref 8–23)
CALCIUM SERPL-MCNC: 9.1 MG/DL (ref 8.7–10.5)
CHLORIDE SERPL-SCNC: 109 MMOL/L (ref 95–110)
CLARITY UR: ABNORMAL
CO2 SERPL-SCNC: 29 MMOL/L (ref 23–29)
COLOR UR AUTO: ABNORMAL
CREAT SERPL-MCNC: 1.3 MG/DL (ref 0.5–1.4)
CREAT UR-MCNC: 98 MG/DL (ref 23–375)
GFR SERPLBLD CREATININE-BSD FMLA CKD-EPI: 54 ML/MIN/1.73/M2
GLUCOSE SERPL-MCNC: 75 MG/DL (ref 70–110)
GLUCOSE UR QL STRIP: NEGATIVE
HGB UR QL STRIP: ABNORMAL
HYALINE CASTS UR QL AUTO: 0 /LPF (ref 0–1)
KETONES UR QL STRIP: NEGATIVE
LEUKOCYTE ESTERASE UR QL STRIP: ABNORMAL
MICROSCOPIC COMMENT: ABNORMAL
NITRITE UR QL STRIP: NEGATIVE
PH UR STRIP: 6 [PH]
PHOSPHATE SERPL-MCNC: 3.7 MG/DL (ref 2.7–4.5)
POTASSIUM SERPL-SCNC: 4.9 MMOL/L (ref 3.5–5.1)
PROT UR QL STRIP: ABNORMAL
PROT UR-MCNC: 289 MG/DL
PROT/CREAT UR: 2.95 MG/G{CREAT}
PTH-INTACT SERPL-MCNC: 34.6 PG/ML (ref 9–77)
RBC #/AREA URNS AUTO: >100 /HPF (ref 0–4)
SODIUM SERPL-SCNC: 144 MMOL/L (ref 136–145)
SP GR UR STRIP: 1.02
UROBILINOGEN UR STRIP-ACNC: NEGATIVE EU/DL
WBC #/AREA URNS AUTO: >100 /HPF (ref 0–5)
WBC CLUMPS UR QL AUTO: ABNORMAL

## 2025-06-11 PROCEDURE — 80069 RENAL FUNCTION PANEL: CPT

## 2025-06-11 PROCEDURE — 81003 URINALYSIS AUTO W/O SCOPE: CPT

## 2025-06-11 PROCEDURE — 83970 ASSAY OF PARATHORMONE: CPT

## 2025-06-11 PROCEDURE — 84156 ASSAY OF PROTEIN URINE: CPT

## 2025-06-11 PROCEDURE — 36415 COLL VENOUS BLD VENIPUNCTURE: CPT | Mod: PO

## 2025-06-12 ENCOUNTER — OFFICE VISIT (OUTPATIENT)
Dept: NEPHROLOGY | Facility: CLINIC | Age: 85
End: 2025-06-12
Payer: MEDICARE

## 2025-06-12 ENCOUNTER — INFUSION (OUTPATIENT)
Dept: INFUSION THERAPY | Facility: HOSPITAL | Age: 85
End: 2025-06-12
Attending: HOSPITALIST
Payer: MEDICARE

## 2025-06-12 VITALS
DIASTOLIC BLOOD PRESSURE: 60 MMHG | HEART RATE: 60 BPM | BODY MASS INDEX: 26.96 KG/M2 | WEIGHT: 199.06 LBS | SYSTOLIC BLOOD PRESSURE: 142 MMHG | HEIGHT: 72 IN

## 2025-06-12 VITALS
TEMPERATURE: 98 F | DIASTOLIC BLOOD PRESSURE: 70 MMHG | HEART RATE: 52 BPM | RESPIRATION RATE: 16 BRPM | SYSTOLIC BLOOD PRESSURE: 125 MMHG | OXYGEN SATURATION: 97 %

## 2025-06-12 DIAGNOSIS — I10 PRIMARY HYPERTENSION: ICD-10-CM

## 2025-06-12 DIAGNOSIS — R80.1 PERSISTENT PROTEINURIA: ICD-10-CM

## 2025-06-12 DIAGNOSIS — D50.0 IRON DEFICIENCY ANEMIA SECONDARY TO BLOOD LOSS (CHRONIC): Primary | ICD-10-CM

## 2025-06-12 DIAGNOSIS — N18.31 STAGE 3A CHRONIC KIDNEY DISEASE: Primary | ICD-10-CM

## 2025-06-12 DIAGNOSIS — N18.9 CHRONIC KIDNEY DISEASE, UNSPECIFIED CKD STAGE: ICD-10-CM

## 2025-06-12 PROCEDURE — 99999 PR PBB SHADOW E&M-EST. PATIENT-LVL IV: CPT | Mod: PBBFAC,,, | Performed by: INTERNAL MEDICINE

## 2025-06-12 PROCEDURE — 96374 THER/PROPH/DIAG INJ IV PUSH: CPT

## 2025-06-12 PROCEDURE — 63600175 PHARM REV CODE 636 W HCPCS: Performed by: NURSE PRACTITIONER

## 2025-06-12 PROCEDURE — 96375 TX/PRO/DX INJ NEW DRUG ADDON: CPT

## 2025-06-12 RX ORDER — EPINEPHRINE 0.3 MG/.3ML
0.3 INJECTION SUBCUTANEOUS ONCE AS NEEDED
OUTPATIENT
Start: 2025-06-19

## 2025-06-12 RX ORDER — EPINEPHRINE 0.3 MG/.3ML
0.3 INJECTION SUBCUTANEOUS ONCE AS NEEDED
Status: DISCONTINUED | OUTPATIENT
Start: 2025-06-12 | End: 2025-06-12 | Stop reason: HOSPADM

## 2025-06-12 RX ORDER — METHYLPREDNISOLONE SOD SUCC 125 MG
40 VIAL (EA) INJECTION
Status: COMPLETED | OUTPATIENT
Start: 2025-06-12 | End: 2025-06-12

## 2025-06-12 RX ORDER — SODIUM CHLORIDE 0.9 % (FLUSH) 0.9 %
10 SYRINGE (ML) INJECTION
OUTPATIENT
Start: 2025-06-19

## 2025-06-12 RX ORDER — DIPHENHYDRAMINE HYDROCHLORIDE 50 MG/ML
50 INJECTION, SOLUTION INTRAMUSCULAR; INTRAVENOUS ONCE AS NEEDED
OUTPATIENT
Start: 2025-06-19

## 2025-06-12 RX ORDER — METHYLPREDNISOLONE SOD SUCC 125 MG
40 VIAL (EA) INJECTION
Start: 2025-06-19 | End: 2025-06-19

## 2025-06-12 RX ADMIN — METHYLPREDNISOLONE SODIUM SUCCINATE 40 MG: 125 INJECTION, POWDER, FOR SOLUTION INTRAMUSCULAR; INTRAVENOUS at 02:06

## 2025-06-12 RX ADMIN — IRON SUCROSE 200 MG: 20 INJECTION, SOLUTION INTRAVENOUS at 02:06

## 2025-06-12 NOTE — NURSING
Patient has nephrology appt at 2:00 pm in [place] [address]. Patient waited 10 minutes after completion of venofer IVP and discharged to make next appt.     Instructed patient and son on signs/symptoms of reaction. Patient/son verbalized understanding. No distress noted upon discharge. Patient to return next Friday for second dose of venofer.

## 2025-06-12 NOTE — DISCHARGE INSTRUCTIONS
.[facility]  [address] [address]  [address]  [phone] phone     [phone] fax  Hours of Operation: Monday- Friday 8:00am- 5:00pm  After hours phone  [phone]  Hematology / Oncology Physicians on call    Dr. Eric Acharya        Nurse Practitioners:    Rahel Walton, GENARO Chisholm, GENARO Lynch, GENARO Medeiros, GENARO Onofre, PA      Please don't hesitate to call if you have any concerns.

## 2025-06-12 NOTE — PLAN OF CARE
Problem: Adult Inpatient Plan of Care  Goal: Plan of Care Review  Outcome: Progressing  Flowsheets (Taken 6/12/2025 1432)  Plan of Care Reviewed With: patient  Goal: Patient-Specific Goal (Individualized)  Outcome: Progressing  Flowsheets (Taken 6/12/2025 1432)  Individualized Care Needs: patient likes feet elevated and warm blanket  Anxieties, Fears or Concerns: Patient reports feeling weak and tired today  Patient/Family-Specific Goals (Include Timeframe): patient tolerated treatment well with no adverse reactions.     Problem: Anemia  Goal: Anemia Symptom Improvement  Outcome: Progressing

## 2025-06-12 NOTE — PROGRESS NOTES
Jose Miguel Alvarez Jr. is a 84 y.o. male      HPI:    Patient was noted to have an increase in creatinine on routine laboratory studies.  Nephrology has been consulted for evaluation.  In the clinic today he is accompanied by his son and daughter-in-law.  He is doing well and has no specific complaints today.  He has an extensive past medical history.  He was recently treated for bladder cancer undergoing surgical revision as well as chemotherapy and radiation.  He has had multiple genitourinary exam is and instrumentations.  His creatinine was noted to increase to around 1.7 several months ago while he was undergoing treatment.  Fortunately over the past several months his creatinine has now returned to his usual baseline between about 1.1 and 1.3.  His urinalysis continues to show some microscopic hematuria and proteinuria.  He does continue to have a stent in place.    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    PAST SURGICAL HISTORY:  He  has a past surgical history that includes Cholecystectomy; Cystoscopy; Cardioversion; bladder tumor removal; Eye surgery; cataract removal with IOL implants (Bilateral); Cardiac electrophysiology mapping and ablation; Transurethral resection of bladder tumor by bipolar cautery (N/A, 7/23/2019); Esophagogastroduodenoscopy (N/A, 10/25/2021); Intraluminal gastrointestinal tract imaging via  capsule (N/A, 12/6/2021); Esophagogastroduodenoscopy (N/A, 12/13/2021); Cystoscopy with biopsy of bladder (Right, 12/28/2021); Cystoscopic litholapaxy (N/A, 12/28/2021); Cataract extraction (Bilateral); Esophagogastroduodenoscopy (N/A, 4/5/2024); Intraluminal gastrointestinal tract imaging via capsule (N/A, 4/18/2024); Esophagogastroduodenoscopy (N/A, 4/22/2024); Transurethral resection of prostate (N/A, 5/14/2024); Cystoscopic litholapaxy (N/A, 5/14/2024); Small bowel enteroscopy (N/A, 9/26/2024); Occlusion of left atrial appendage (N/A, 11/14/2024); Transesophageal echocardiography (N/A, 11/14/2024); turbt (transurethral resection of bladder tumor) (N/A, 12/3/2024); cystotomy, with fulguration and radioactive material insertion (N/A, 12/3/2024); Transesophageal echocardiography (N/A, 12/30/2024); Cystoscopy with ureteroscopy, retrograde pyelography, and insertion of stent (Right, 4/29/2025); and Cystoscopy w/ ureteral stent removal (Right, 4/29/2025).    SOCIAL HISTORY:  He  reports that he quit smoking about 38 years ago. His smoking use included cigarettes. He started smoking about 58 years ago. He has a 50 pack-year smoking history. He has never been exposed to tobacco smoke. He has never used smokeless tobacco. He reports current alcohol use of about 7.0 standard drinks of alcohol per week. He reports that he does not use drugs.      FAMILY MEDICAL HISTORY:  His family history includes Heart disease in his brother and father; Hypertension in his father.    Review of patient's allergies indicates:   Allergen Reactions    Feraheme [ferumoxytol] Anaphylaxis           Prior to Admission medications    Medication Sig Start Date End Date Taking? Authorizing Provider   amiodarone (PACERONE) 200 MG Tab Take 1 tablet (200 mg total) by mouth once daily. 1/7/25 1/7/26 Yes Carlos Flower MD   amLODIPine (NORVASC) 2.5 MG tablet Take 1 tablet (2.5 mg total) by mouth once daily. 12/26/24  Yes Carlos Flower MD   aspirin (ECOTRIN)  81 MG EC tablet Take 1 tablet (81 mg total) by mouth once daily. 11/14/24 11/14/25 Yes Sherry Patel MD   atorvastatin (LIPITOR) 20 MG tablet TAKE 1 TABLET(20 MG) BY MOUTH EVERY EVENING 2/14/25  Yes Toshia Valladares MD   carvediloL (COREG) 6.25 MG tablet TAKE 1 TABLET TWICE A DAY WITH MEALS 5/22/25  Yes Carlos Flower MD   cholecalciferol, vitamin D3, (VITAMIN D3 ORAL) Take 1 tablet by mouth every other day.   Yes Provider, Historical   donepeziL (ARICEPT) 5 MG tablet Take 1 tablet (5 mg total) by mouth once daily.  Patient taking differently: Take 5 mg by mouth every evening. 12/26/24  Yes Toshia Valladares MD   FEROSUL 325 mg (65 mg iron) Tab tablet Take by mouth. 1/7/25  Yes Provider, Historical   hydrALAZINE (APRESOLINE) 50 MG tablet TAKE 1 TABLET EVERY 8 HOURS 3/31/25  Yes Carlos Flower MD   hyoscyamine 0.125 mg Subl Place 2 tablets (0.25 mg total) under the tongue every 4 (four) hours as needed (spasms). 4/29/25  Yes Medardo Garcia MD   multivitamin (THERAGRAN) per tablet Take 1 tablet by mouth once daily.   Yes Provider, Historical   mupirocin (BACTROBAN) 2 % ointment Apply topically 2 (two) times daily. 4/23/25  Yes Xiao Bowman, KAYA   oxyCODONE-acetaminophen (PERCOCET) 5-325 mg per tablet Take 1 tablet by mouth every 4 (four) hours as needed for Pain. 4/29/25  Yes Medardo Garcia MD   phenazopyridine (PYRIDIUM) 200 MG tablet Take 1 tablet (200 mg total) by mouth 3 (three) times daily as needed (burning). 4/29/25  Yes Medardo Garcia MD     Sodium   Date Value Ref Range Status   06/11/2025 144 136 - 145 mmol/L Final   05/14/2025 142 136 - 145 mmol/L Final   04/11/2025 139 136 - 145 mmol/L Final   03/17/2025 140 136 - 145 mmol/L Final   03/10/2025 137 136 - 145 mmol/L Final   03/03/2025 139 136 - 145 mmol/L Final     Potassium   Date Value Ref Range Status   06/11/2025 4.9 3.5 - 5.1 mmol/L Final   05/14/2025 4.1 3.5 - 5.1 mmol/L Final   04/11/2025 4.5 3.5 -  5.1 mmol/L Final   03/17/2025 4.3 3.5 - 5.1 mmol/L Final   03/10/2025 3.9 3.5 - 5.1 mmol/L Final   03/03/2025 4.5 3.5 - 5.1 mmol/L Final     Chloride   Date Value Ref Range Status   06/11/2025 109 95 - 110 mmol/L Final   05/14/2025 106 95 - 110 mmol/L Final   04/11/2025 108 95 - 110 mmol/L Final   03/17/2025 107 95 - 110 mmol/L Final   03/10/2025 106 95 - 110 mmol/L Final   03/03/2025 105 95 - 110 mmol/L Final     CO2   Date Value Ref Range Status   06/11/2025 29 23 - 29 mmol/L Final   05/14/2025 27 23 - 29 mmol/L Final   04/11/2025 23 23 - 29 mmol/L Final   03/17/2025 18 (L) 23 - 29 mmol/L Final   03/10/2025 22 (L) 23 - 29 mmol/L Final   03/03/2025 24 23 - 29 mmol/L Final     Glucose   Date Value Ref Range Status   06/11/2025 75 70 - 110 mg/dL Final   05/14/2025 168 (H) 70 - 110 mg/dL Final   04/11/2025 155 (H) 70 - 110 mg/dL Final   03/17/2025 157 (H) 70 - 110 mg/dL Final   03/10/2025 230 (H) 70 - 110 mg/dL Final   03/03/2025 93 70 - 110 mg/dL Final     BUN   Date Value Ref Range Status   06/11/2025 24 (H) 8 - 23 mg/dL Final   05/14/2025 26 (H) 8 - 23 mg/dL Final   04/11/2025 31 (H) 8 - 23 mg/dL Final     Creatinine   Date Value Ref Range Status   06/11/2025 1.3 0.5 - 1.4 mg/dL Final   05/14/2025 1.1 0.5 - 1.4 mg/dL Final   04/11/2025 1.7 (H) 0.5 - 1.4 mg/dL Final     Calcium   Date Value Ref Range Status   06/11/2025 9.1 8.7 - 10.5 mg/dL Final   05/14/2025 9.1 8.7 - 10.5 mg/dL Final   04/11/2025 9.2 8.7 - 10.5 mg/dL Final   03/17/2025 9.6 8.7 - 10.5 mg/dL Final   03/10/2025 9.0 8.7 - 10.5 mg/dL Final   03/03/2025 9.0 8.7 - 10.5 mg/dL Final     Protein Total   Date Value Ref Range Status   04/11/2025 6.9 6.0 - 8.4 gm/dL Final   03/25/2025 6.5 6.0 - 8.4 gm/dL Final   03/24/2025 6.2 6.0 - 8.4 gm/dL Final     Total Protein   Date Value Ref Range Status   03/17/2025 6.7 6.0 - 8.4 g/dL Final   03/10/2025 6.0 6.0 - 8.4 g/dL Final   03/03/2025 6.2 6.0 - 8.4 g/dL Final     Albumin   Date Value Ref Range Status    06/11/2025 3.4 (L) 3.5 - 5.2 g/dL Final   04/11/2025 3.4 (L) 3.5 - 5.2 g/dL Final   03/25/2025 3.3 (L) 3.5 - 5.2 g/dL Final   03/17/2025 3.3 (L) 3.5 - 5.2 g/dL Final   03/10/2025 2.9 (L) 3.5 - 5.2 g/dL Final   03/03/2025 3.0 (L) 3.5 - 5.2 g/dL Final     Total Bilirubin   Date Value Ref Range Status   03/17/2025 0.5 0.1 - 1.0 mg/dL Final     Comment:     For infants and newborns, interpretation of results should be based  on gestational age, weight and in agreement with clinical  observations.    Premature Infant recommended reference ranges:  Up to 24 hours.............<8.0 mg/dL  Up to 48 hours............<12.0 mg/dL  3-5 days..................<15.0 mg/dL  6-29 days.................<15.0 mg/dL     03/10/2025 0.4 0.1 - 1.0 mg/dL Final     Comment:     For infants and newborns, interpretation of results should be based  on gestational age, weight and in agreement with clinical  observations.    Premature Infant recommended reference ranges:  Up to 24 hours.............<8.0 mg/dL  Up to 48 hours............<12.0 mg/dL  3-5 days..................<15.0 mg/dL  6-29 days.................<15.0 mg/dL     03/03/2025 0.5 0.1 - 1.0 mg/dL Final     Comment:     For infants and newborns, interpretation of results should be based  on gestational age, weight and in agreement with clinical  observations.    Premature Infant recommended reference ranges:  Up to 24 hours.............<8.0 mg/dL  Up to 48 hours............<12.0 mg/dL  3-5 days..................<15.0 mg/dL  6-29 days.................<15.0 mg/dL       Bilirubin Total   Date Value Ref Range Status   04/11/2025 0.5 0.1 - 1.0 mg/dL Final     Comment:     For infants and newborns, interpretation of results should be based   on gestational age, weight and in agreement with clinical   observations.    Premature Infant recommended reference ranges:   0-24 hours:  <8.0 mg/dL   24-48 hours: <12.0 mg/dL   3-5 days:    <15.0 mg/dL   6-29 days:   <15.0 mg/dL   03/25/2025 0.4 0.1 -  1.0 mg/dL Final     Comment:     For infants and newborns, interpretation of results should be based   on gestational age, weight and in agreement with clinical   observations.    Premature Infant recommended reference ranges:   0-24 hours:  <8.0 mg/dL   24-48 hours: <12.0 mg/dL   3-5 days:    <15.0 mg/dL   6-29 days:   <15.0 mg/dL   03/24/2025 0.5 0.1 - 1.0 mg/dL Final     Comment:     For infants and newborns, interpretation of results should be based   on gestational age, weight and in agreement with clinical   observations.    Premature Infant recommended reference ranges:   0-24 hours:  <8.0 mg/dL   24-48 hours: <12.0 mg/dL   3-5 days:    <15.0 mg/dL   6-29 days:   <15.0 mg/dL     Alkaline Phosphatase   Date Value Ref Range Status   03/17/2025 81 40 - 150 U/L Final   03/10/2025 75 40 - 150 U/L Final   03/03/2025 66 40 - 150 U/L Final     ALP   Date Value Ref Range Status   04/11/2025 72 40 - 150 unit/L Final   03/25/2025 76 40 - 150 unit/L Final   03/24/2025 75 40 - 150 unit/L Final     AST ([place])   Date Value Ref Range Status   04/24/2016 29 17 - 59 U/L Final     AST   Date Value Ref Range Status   04/11/2025 23 11 - 45 unit/L Final   03/25/2025 32 11 - 45 unit/L Final   03/24/2025 24 11 - 45 unit/L Final   03/17/2025 33 10 - 40 U/L Final   03/10/2025 26 10 - 40 U/L Final   03/03/2025 36 10 - 40 U/L Final     ALT   Date Value Ref Range Status   04/11/2025 20 10 - 44 unit/L Final   03/25/2025 29 10 - 44 unit/L Final   03/24/2025 27 10 - 44 unit/L Final   03/17/2025 28 10 - 44 U/L Final   03/10/2025 29 10 - 44 U/L Final   03/03/2025 39 10 - 44 U/L Final     Anion Gap   Date Value Ref Range Status   06/11/2025 6 (L) 8 - 16 mmol/L Final   05/14/2025 9 8 - 16 mmol/L Final   04/11/2025 8 8 - 16 mmol/L Final     eGFR if    Date Value Ref Range Status   12/08/2021 >60 >60 mL/min/1.73 m^2 Final   11/05/2021 >60 >60 mL/min/1.73 m^2 Final   10/25/2021 >60 >60 mL/min/1.73 m^2 Final     eGFR if  non    Date Value Ref Range Status   12/08/2021 56 (A) >60 mL/min/1.73 m^2 Final     Comment:     Calculation used to obtain the estimated glomerular filtration  rate (eGFR) is the CKD-EPI equation.      11/05/2021 56 (A) >60 mL/min/1.73 m^2 Final     Comment:     Calculation used to obtain the estimated glomerular filtration  rate (eGFR) is the CKD-EPI equation.      10/25/2021 56 (A) >60 mL/min/1.73 m^2 Final     Comment:     Calculation used to obtain the estimated glomerular filtration  rate (eGFR) is the CKD-EPI equation.        RBC, UA   Date Value Ref Range Status   06/11/2025 >100 (H) 0 - 4 /HPF Final   02/28/2025 >100 (H) 0 - 4 /hpf Final   11/18/2024 >100 (H) 0 - 4 /hpf Final   09/30/2024 6 (H) 0 - 4 /hpf Final     WBC, UA   Date Value Ref Range Status   06/11/2025 >100 (H) 0 - 5 /HPF Final   02/28/2025 >100 (H) 0 - 5 /hpf Final   11/18/2024 >100 (H) 0 - 5 /hpf Final   09/30/2024 20 (H) 0 - 5 /hpf Final     Bacteria, UA   Date Value Ref Range Status   06/11/2025 Rare None, Rare, Occasional /HPF Final     Bacteria   Date Value Ref Range Status   02/28/2025 Rare None-Occ /hpf Final   11/18/2024 None None-Occ /hpf Final   09/30/2024 Occasional None-Occ /hpf Final     Hyaline Casts, UA   Date Value Ref Range Status   06/11/2025 0 0 - 1 /LPF Final   02/28/2025 0 0-1/lpf /lpf Final   11/18/2024 0 0-1/lpf /lpf Final   08/23/2024 0 0-1/lpf /lpf Final     Microscopic Comment   Date Value Ref Range Status   06/11/2025   Final     Comment:     Other formed elements not mentioned in the report are not present in the microscopic examination.   02/28/2025 SEE COMMENT  Final     Comment:     Other formed elements not mentioned in the report are not   present in the microscopic examination.      11/18/2024 SEE COMMENT  Final     Comment:     Other formed elements not mentioned in the report are not   present in the microscopic examination.        Urine Protein   Date Value Ref Range Status   06/11/2025 289  (H) <=15 mg/dL Final     Urine Creatinine   Date Value Ref Range Status   06/11/2025 98.0 23.0 - 375.0 mg/dL Final     Creatinine, Urine   Date Value Ref Range Status   03/07/2025 96.0 23.0 - 375.0 mg/dL Final   10/23/2024 151.0 23.0 - 375.0 mg/dL Final   10/05/2017 74.8 23.0 - 375.0 mg/dL Final     Comment:     The random urine reference ranges provided were established   for 24 hour urine collections.  No reference ranges exist for  random urine specimens.  Correlate clinically.       Urine Protein/Creatinine Ratio   Date Value Ref Range Status   06/11/2025 2.95 (H) <=0.20 Final         REVIEW OF SYSTEMS:  Patient has no fever, fatigue, visual changes, chest pain, edema, cough, dyspnea, nausea, vomiting, constipation, diarrhea, arthralgias, pruritis, dizziness, weakness, depression, confusion.        PHYSICAL EXAM:   height is 6' (1.829 m) and weight is 90.3 kg (199 lb 1.2 oz). His blood pressure is 142/60 (abnormal) and his pulse is 60.   Gen: WDWN male in no apparent distress  Psych: Normal mood and affect  Skin: No rashes or ulcers  Eyes: Normal conjunctiva and lids, PERRLA  ENT: Normal hearing with no oropharyngeal lesions  Neck: No JVD  Chest: Clear with no rales, rhonchi, wheezing with normal effort  CV: Regular with no murmurs, gallops or rubs  Abd: Soft, nontender, no distension, positive bowel sounds  Ext: No cyanosis, clubbing or edema          IMPRESSION AND RECOMMENDATIONS:    1. CKD 3A: His baseline creatinine has running now between about 1.1 and 1.3.  He has late stage 2 early stage IIIA CKD.  His urinalysis at this time is difficult to interpret given his history of bladder cancer and multiple instrumentations with a ureteral stent in place.  His UAs continued to show persistent microscopic hematuria pyuria and proteinuria.  This finding is not from a primary renal or glomerular cause.  This is generated from lower genitourinary tract.    2. Hypertension: Blood pressure is well controlled on  current regimen.  Will continue to monitor.    3. Proteinuria: As per above he has persistent proteinuria associated also with microscopic hematuria and pyuria.  This is due to his stent and lower genitourinary tract source.    Follow-up labs: Renal function panel, PC ratio, intact PTH.

## 2025-06-20 ENCOUNTER — INFUSION (OUTPATIENT)
Dept: INFUSION THERAPY | Facility: HOSPITAL | Age: 85
End: 2025-06-20
Attending: HOSPITALIST
Payer: MEDICARE

## 2025-06-20 VITALS
HEART RATE: 61 BPM | DIASTOLIC BLOOD PRESSURE: 73 MMHG | SYSTOLIC BLOOD PRESSURE: 148 MMHG | OXYGEN SATURATION: 97 % | RESPIRATION RATE: 18 BRPM | TEMPERATURE: 97 F

## 2025-06-20 DIAGNOSIS — D50.0 IRON DEFICIENCY ANEMIA SECONDARY TO BLOOD LOSS (CHRONIC): Primary | ICD-10-CM

## 2025-06-20 PROCEDURE — 96374 THER/PROPH/DIAG INJ IV PUSH: CPT

## 2025-06-20 PROCEDURE — 96375 TX/PRO/DX INJ NEW DRUG ADDON: CPT

## 2025-06-20 PROCEDURE — 63600175 PHARM REV CODE 636 W HCPCS: Mod: JZ,TB | Performed by: NURSE PRACTITIONER

## 2025-06-20 RX ORDER — METHYLPREDNISOLONE SOD SUCC 125 MG
40 VIAL (EA) INJECTION
Status: COMPLETED | OUTPATIENT
Start: 2025-06-20 | End: 2025-06-20

## 2025-06-20 RX ORDER — SODIUM CHLORIDE 0.9 % (FLUSH) 0.9 %
10 SYRINGE (ML) INJECTION
Status: DISCONTINUED | OUTPATIENT
Start: 2025-06-20 | End: 2025-06-20 | Stop reason: HOSPADM

## 2025-06-20 RX ORDER — EPINEPHRINE 0.3 MG/.3ML
0.3 INJECTION SUBCUTANEOUS ONCE AS NEEDED
Status: DISCONTINUED | OUTPATIENT
Start: 2025-06-20 | End: 2025-06-20 | Stop reason: HOSPADM

## 2025-06-20 RX ORDER — DIPHENHYDRAMINE HYDROCHLORIDE 50 MG/ML
50 INJECTION, SOLUTION INTRAMUSCULAR; INTRAVENOUS ONCE AS NEEDED
OUTPATIENT
Start: 2025-06-26

## 2025-06-20 RX ORDER — DIPHENHYDRAMINE HYDROCHLORIDE 50 MG/ML
50 INJECTION, SOLUTION INTRAMUSCULAR; INTRAVENOUS ONCE AS NEEDED
Status: DISCONTINUED | OUTPATIENT
Start: 2025-06-20 | End: 2025-06-20 | Stop reason: HOSPADM

## 2025-06-20 RX ORDER — EPINEPHRINE 0.3 MG/.3ML
0.3 INJECTION SUBCUTANEOUS ONCE AS NEEDED
OUTPATIENT
Start: 2025-06-26

## 2025-06-20 RX ORDER — METHYLPREDNISOLONE SOD SUCC 125 MG
40 VIAL (EA) INJECTION
Start: 2025-06-26 | End: 2025-06-26

## 2025-06-20 RX ORDER — SODIUM CHLORIDE 0.9 % (FLUSH) 0.9 %
10 SYRINGE (ML) INJECTION
OUTPATIENT
Start: 2025-06-26

## 2025-06-20 RX ADMIN — METHYLPREDNISOLONE SODIUM SUCCINATE 40 MG: 125 INJECTION, POWDER, FOR SOLUTION INTRAMUSCULAR; INTRAVENOUS at 08:06

## 2025-06-20 RX ADMIN — IRON SUCROSE 200 MG: 20 INJECTION, SOLUTION INTRAVENOUS at 08:06

## 2025-06-23 ENCOUNTER — OFFICE VISIT (OUTPATIENT)
Dept: NEUROLOGY | Facility: CLINIC | Age: 85
End: 2025-06-23
Attending: FAMILY MEDICINE
Payer: MEDICARE

## 2025-06-23 ENCOUNTER — LAB VISIT (OUTPATIENT)
Dept: LAB | Facility: HOSPITAL | Age: 85
End: 2025-06-23
Attending: NURSE PRACTITIONER
Payer: MEDICARE

## 2025-06-23 VITALS
SYSTOLIC BLOOD PRESSURE: 133 MMHG | HEIGHT: 72 IN | HEART RATE: 66 BPM | DIASTOLIC BLOOD PRESSURE: 80 MMHG | RESPIRATION RATE: 16 BRPM | WEIGHT: 197.31 LBS | BODY MASS INDEX: 26.73 KG/M2

## 2025-06-23 DIAGNOSIS — E78.00 HYPERCHOLESTEREMIA: ICD-10-CM

## 2025-06-23 DIAGNOSIS — I10 PRIMARY HYPERTENSION: ICD-10-CM

## 2025-06-23 DIAGNOSIS — G30.9 MAJOR NEUROCOGNITIVE DISORDER DUE TO ALZHEIMER DISEASE, WITH BEHAVIORAL DISTURBANCE: Primary | ICD-10-CM

## 2025-06-23 DIAGNOSIS — N20.0 NEPHROLITHIASIS: ICD-10-CM

## 2025-06-23 DIAGNOSIS — Z85.51 HISTORY OF BLADDER CANCER: ICD-10-CM

## 2025-06-23 DIAGNOSIS — N40.1 BENIGN PROSTATIC HYPERPLASIA WITH URINARY FREQUENCY: ICD-10-CM

## 2025-06-23 DIAGNOSIS — R35.0 BENIGN PROSTATIC HYPERPLASIA WITH URINARY FREQUENCY: ICD-10-CM

## 2025-06-23 DIAGNOSIS — I50.42 CHRONIC COMBINED SYSTOLIC (CONGESTIVE) AND DIASTOLIC (CONGESTIVE) HEART FAILURE: ICD-10-CM

## 2025-06-23 DIAGNOSIS — F02.818 MAJOR NEUROCOGNITIVE DISORDER DUE TO ALZHEIMER DISEASE, WITH BEHAVIORAL DISTURBANCE: ICD-10-CM

## 2025-06-23 DIAGNOSIS — Z95.818 PRESENCE OF WATCHMAN LEFT ATRIAL APPENDAGE CLOSURE DEVICE: ICD-10-CM

## 2025-06-23 DIAGNOSIS — E11.9 TYPE 2 DIABETES MELLITUS WITHOUT COMPLICATION, WITHOUT LONG-TERM CURRENT USE OF INSULIN: ICD-10-CM

## 2025-06-23 DIAGNOSIS — G30.9 MAJOR NEUROCOGNITIVE DISORDER DUE TO ALZHEIMER DISEASE, WITH BEHAVIORAL DISTURBANCE: ICD-10-CM

## 2025-06-23 DIAGNOSIS — D50.0 IRON DEFICIENCY ANEMIA SECONDARY TO BLOOD LOSS (CHRONIC): ICD-10-CM

## 2025-06-23 DIAGNOSIS — J44.9 CHRONIC OBSTRUCTIVE PULMONARY DISEASE, UNSPECIFIED COPD TYPE: ICD-10-CM

## 2025-06-23 DIAGNOSIS — F32.2 AGITATED DEPRESSION: ICD-10-CM

## 2025-06-23 DIAGNOSIS — F02.818 MAJOR NEUROCOGNITIVE DISORDER DUE TO ALZHEIMER DISEASE, WITH BEHAVIORAL DISTURBANCE: Primary | ICD-10-CM

## 2025-06-23 DIAGNOSIS — E11.69 DM TYPE 2 WITH DIABETIC DYSLIPIDEMIA: ICD-10-CM

## 2025-06-23 DIAGNOSIS — C67.9 UROTHELIAL CARCINOMA OF BLADDER: ICD-10-CM

## 2025-06-23 DIAGNOSIS — I73.9 PVD (PERIPHERAL VASCULAR DISEASE): ICD-10-CM

## 2025-06-23 DIAGNOSIS — F03.90 DEMENTIA, UNSPECIFIED DEMENTIA SEVERITY, UNSPECIFIED DEMENTIA TYPE, UNSPECIFIED WHETHER BEHAVIORAL, PSYCHOTIC, OR MOOD DISTURBANCE OR ANXIETY: ICD-10-CM

## 2025-06-23 DIAGNOSIS — M86.471 CHRONIC OSTEOMYELITIS OF RIGHT FOOT WITH DRAINING SINUS: ICD-10-CM

## 2025-06-23 DIAGNOSIS — D50.0 ANEMIA DUE TO GASTROINTESTINAL BLOOD LOSS: ICD-10-CM

## 2025-06-23 DIAGNOSIS — G47.00 INSOMNIA, UNSPECIFIED TYPE: ICD-10-CM

## 2025-06-23 DIAGNOSIS — E78.5 DM TYPE 2 WITH DIABETIC DYSLIPIDEMIA: ICD-10-CM

## 2025-06-23 DIAGNOSIS — F02.80 DEMENTIA ASSOCIATED WITH OTHER UNDERLYING DISEASE, WITHOUT BEHAVIORAL DISTURBANCE, PSYCHOTIC DISTURBANCE, MOOD DISTURBANCE, OR ANXIETY, UNSPECIFIED DEMENTIA SEVERITY: ICD-10-CM

## 2025-06-23 DIAGNOSIS — R41.3 OTHER AMNESIA: ICD-10-CM

## 2025-06-23 DIAGNOSIS — I48.0 PAROXYSMAL ATRIAL FIBRILLATION: ICD-10-CM

## 2025-06-23 LAB
CREAT SERPL-MCNC: 1.2 MG/DL (ref 0.5–1.4)
GFR SERPLBLD CREATININE-BSD FMLA CKD-EPI: 59 ML/MIN/1.73/M2
T4 FREE SERPL-MCNC: 0.7 NG/DL (ref 0.71–1.51)
TSH SERPL-ACNC: 6.47 UIU/ML (ref 0.4–4)

## 2025-06-23 PROCEDURE — 84393 TAU PHOSPHORYLATED EA: CPT

## 2025-06-23 PROCEDURE — 82565 ASSAY OF CREATININE: CPT

## 2025-06-23 PROCEDURE — 99999 PR PBB SHADOW E&M-EST. PATIENT-LVL V: CPT | Mod: PBBFAC,,, | Performed by: NURSE PRACTITIONER

## 2025-06-23 PROCEDURE — 3075F SYST BP GE 130 - 139MM HG: CPT | Mod: CPTII,S$GLB,, | Performed by: NURSE PRACTITIONER

## 2025-06-23 PROCEDURE — 3079F DIAST BP 80-89 MM HG: CPT | Mod: CPTII,S$GLB,, | Performed by: NURSE PRACTITIONER

## 2025-06-23 PROCEDURE — 84439 ASSAY OF FREE THYROXINE: CPT

## 2025-06-23 PROCEDURE — 36415 COLL VENOUS BLD VENIPUNCTURE: CPT

## 2025-06-23 PROCEDURE — 1157F ADVNC CARE PLAN IN RCRD: CPT | Mod: CPTII,S$GLB,, | Performed by: NURSE PRACTITIONER

## 2025-06-23 PROCEDURE — 84443 ASSAY THYROID STIM HORMONE: CPT

## 2025-06-23 PROCEDURE — 1159F MED LIST DOCD IN RCRD: CPT | Mod: CPTII,S$GLB,, | Performed by: NURSE PRACTITIONER

## 2025-06-23 PROCEDURE — 1101F PT FALLS ASSESS-DOCD LE1/YR: CPT | Mod: CPTII,S$GLB,, | Performed by: NURSE PRACTITIONER

## 2025-06-23 PROCEDURE — 3288F FALL RISK ASSESSMENT DOCD: CPT | Mod: CPTII,S$GLB,, | Performed by: NURSE PRACTITIONER

## 2025-06-23 PROCEDURE — 99205 OFFICE O/P NEW HI 60 MIN: CPT | Mod: S$GLB,,, | Performed by: NURSE PRACTITIONER

## 2025-06-23 PROCEDURE — 1126F AMNT PAIN NOTED NONE PRSNT: CPT | Mod: CPTII,S$GLB,, | Performed by: NURSE PRACTITIONER

## 2025-06-23 PROCEDURE — 83520 IMMUNOASSAY QUANT NOS NONAB: CPT

## 2025-06-23 PROCEDURE — 3072F LOW RISK FOR RETINOPATHY: CPT | Mod: CPTII,S$GLB,, | Performed by: NURSE PRACTITIONER

## 2025-06-23 PROCEDURE — 1160F RVW MEDS BY RX/DR IN RCRD: CPT | Mod: CPTII,S$GLB,, | Performed by: NURSE PRACTITIONER

## 2025-06-23 PROCEDURE — 99417 PROLNG OP E/M EACH 15 MIN: CPT | Mod: S$GLB,,, | Performed by: NURSE PRACTITIONER

## 2025-06-23 RX ORDER — TRAZODONE HYDROCHLORIDE 50 MG/1
50 TABLET ORAL NIGHTLY
Qty: 30 TABLET | Refills: 11 | Status: SHIPPED | OUTPATIENT
Start: 2025-06-23 | End: 2026-06-23

## 2025-06-23 RX ORDER — MEMANTINE HYDROCHLORIDE 10 MG/1
10 TABLET ORAL 2 TIMES DAILY
Qty: 60 TABLET | Refills: 11 | Status: SHIPPED | OUTPATIENT
Start: 2025-06-23 | End: 2025-06-23 | Stop reason: SDUPTHER

## 2025-06-23 RX ORDER — MEMANTINE HYDROCHLORIDE 10 MG/1
10 TABLET ORAL 2 TIMES DAILY
Qty: 180 TABLET | Refills: 3 | Status: SHIPPED | OUTPATIENT
Start: 2025-06-23 | End: 2026-06-23

## 2025-06-23 NOTE — PATIENT INSTRUCTIONS
HERE ARE 10 WAYS TO REDUCE RISK OF DEMENTIA AND SLOW DOWN THE PROGRESSION OF DEMENTIA        1.Be physically active.    2. Avoid smoking and alcohol consumption.    3. Track your numbers. Keep your blood pressure, cholesterol, blood sugar and weight within recommended ranges.    4. Stay socially connected.    5. Make healthy food choices. Eat a well-balance and healthy diet that rich in cereals, fish, legumes, and vegetables.    6. Reduce stress.     7. Challenge your brain by trying something new, playing games, or learning a new language.    8. Take care of your hearing. Avoid being continuously exposed to loud sounds and wear a hearing aid if hearing does become a problem.    9. Lower risk of falls. Consider installing handrails on all stairs and grab bars in bathrooms.    10. Reduce your exposure to air pollution, such as exposure to exhaust in heavy traffic.         CAREGIVERS COUNSELING     Recommend reading the following books:     The 36-Hour Day and there are many online and printed resources to help caregivers as well.     Alzheimer's Through the Stages.     Dementia with G.R.A.C.E.            PREVENTION OF DELIRIUM       1. Good hydration and avoid electrolyte imbalance  2. Recognize and treat infections immediately especially UTI.  3. Bladder emptying and prevent constipation.   4. Provide stimulating activities and familiar objects  5. Use eyeglasses and hearing aids if needed.   6. Use simple and regular communication about people, current place, and time  7. Mobility and range-of-motion exercises  8. Reduce noise, lighting and avoid sleep interruptions  9. Non-narcotic pain management.  10.Nondrug treatment for sleep problems or anxiety  11. Avoid antihistamines.  12. Avoid narcotics.  13. Avoid benzodiazepines.            HELPFUL STRATEGIES FOR THE FAMILY AND CAREGIVERS        BEHAVIORAL MANAGEMENT STRATEGIES     Remember that you cannot reason with acute psychosis or confusion. Unless there is  "an immediate safety issue, there is no need to challenge or correct the person.  For example, if a pt is hallucinating, do not argue with the person that what they are seeing is not real. If they are expressing paranoia, empathize gently with their fear, and attempt to redirect them to a different activity.   If the person is particularly stuck on a topic or activity, as long as the activity is safe and is not worsening their agitation, you don't need to intervene.     Communicate calmly, simply, and concisely    Reassure the person that they are safe and you are here to help  Try not to express irritation or anger  Speak quietly and calmly, do not shout or threaten the person. Avoid provocation.   Establish verbal contact and provide orientation and reassurance.  Attempt to identify the patient's wants and feelings, listen to what they are saying, and reflect those wants and feelings back to the patient.  Dont use sarcasm as a weapon    Set clear limits on behavior in a calm voice ("You cannot hit the nurse.")  Offer choices and optimism ("Which toothpaste would you like to use today?")    Redirect the person to an alternative behavior ("Can you come help me with this?)    Avoid saying things like, "We already went over this!" "You can't keep doing this." "I already explained this to you"  Instead, simply nod calmly and acknowledge what the person is saying ("Yes, that makes sense."), and then request their help or company in a different behavior.   Having a mental list of activities the patient enjoys can be helpful with redirection. For example, do they enjoy going for walks? Eating a piece of candy?   If redirection becomes challenging, you can place objects that your family member enjoys strategically in the home in order to use for distraction. This is particularly useful if there are items that they often talk about or frequently ask you questions about; or if they are visually striking. Once you focus the " "person on this object, talk about it briefly until they are calm and then attempt to redirect to a new behavior.   Sometimes activities which are repetitive can be calming, particularly if there is a comforting sensory element. For example, pt may enjoy folding soft towels or laundry.    Limit overstimulation    Decrease distractions by turning off the TV, computer, any fluorescent lights that hum, etc.  Ask any casual visitors to leave--the fewer people the better  If the person is very agitated, avoid touching them, and respect their personal space  Sit down and ask the person to sit down also     PREVENTATIVE STRATEGIES     Try to help the patient develop a routine and stick to it  Address all immediate safety issues  Does the person still have access to the car and keys? Take the keys away, or disconnect the car battery.  Are they wandering at night? Install locks on the outside doors. A keypad lock works well. Alarms on bedroom doors can also be helpful.   Are they turning on the stove and risking a fire? If you cannot limit their access to the kitchen, you may need to unplug dangerous devices any time you are not in the room.   If the person is exhibiting poor judgment throughout the day, they likely need someone supervising them at all times.   Do they still have access to significant financial assets? Limit their access to accounts, and consult with a  if necessary.   Identify situations that seem to trigger agitation or a problematic behavior, and modify the person's environment to minimize or eliminate that trigger.   For example, is their behavior worse if they don't get a good night's sleep? Or if they don't eat or drink enough? If so, prioritize those activities and build behavior plans to support them.  Do they get upset about not being allowed to answer the phone when it rings? Put all of the phones in the house on "silent."   Do they become paranoid that certain family members are trying to " take advantage of them? Limit contact with the targeted family member as much as possible, and re-introduce them slowly after some time has passed.   Encourage independence in daily living whenever safely possible  Encourage collaborative decision-making regarding his care as well as daily activities  Encourage regular physical exercise  Address issues that may be impacting sleep   Ex: Urology consult for frequent nighttime urination, address chronic pain that may be interfering with sleep, moving to a different room if his roommate snores loudly, make sure the room is a comfortable temperature and he has adequate pillows and blankets  Ensure he gets out into the sunlight at least once per day to encourage regular circadian cycle  No caffeine, sugar, or large meals within two hours of bedtime   Make sure room at night is dark and quiet and minimize nighttime interruptions from staff unless medically necessary   Try to help pt go to sleep and wake up at the same time every day  Monitor closely for worsening psychiatric symptoms (insomnia, social withdrawal, deterioration of personal hygiene, hostility, confusing or nonsensical speech, paranoia, hallucinations) and intervene promptly. Assess for possible antecedents, modify environment appropriately.            SLEEP HYGIENE     Poor sleep has a negative effect on cognition. Several strategies have been shown to improve sleep:     Caffeine intake in the afternoon and evening, as well as stuffing oneself at supper, can decrease the quality of restful sleep throughout the night.   Bedtime and wake-up times should be consistent every night and morning so the body becomes used to a single routine, even on the weekends.  Engage in daily physical activity, but not 2-3 hours before bedtime.   No technology use (television, computer, iPad) 1-2 hours before bed.   Have a wind down routine (e.g., soft lights in the house, bath before bed, reduced fluid intake, songs, reading,  less noise) to promote sleep readiness.   Visit the www.sleepfoundation.org for more strategies.      COGNITIVE HYGIENE     Engage in regular exercise, which increases alertness and arousal and can improve attention and focus.  Consider lower impact exercises, such as yoga or light walking.  Get a good nights sleep, as this can enhance alertness and cognition.  Eat healthy foods and balanced meals. It is notable that research indicates certain nutrients may aid in brain function, such as B vitamins (especially B6, B12, and folic acid), antioxidants (such as vitamins C and E, and beta carotene), and Omega-3 fatty acids. Talk with your physician or nutritionist about whats right for you.   Keep your brain active. Find activities to stay mentally active, such as reading, games (cards, checkers), puzzles (crosswords, Sudoku, jig saw), crafts (models, woodworking), gardening, or participating in activities in the community.  Stay socially engaged. Continue staying active with your family and friends.      FUTURE PLANNING     The patient and caregivers should consider formal arrangements to allow a designated person to make medical and financial decisions for the pt, should he/she become unable to do so.  Options to consider include designating a healthcare proxy, medical and/or financial power of , and completing advanced directives for healthcare decisions and estate planning (e.g., finalizing a will).  If cost is prohibitive, [facility] (https://[employer].org/) provides free  for individuals with low income.       ADDITIONAL RESOURCES     [facility] (www.http://alzbr.org) have good local caregiver and patient resources.   Consider resources for support through the [place] Office of Elderly Affairs (http://goea.[place].gov/), [place] Chapter of the Alzheimers Association (www.alz.org/[place]/), the Family Caregiver [place]  (www.caregiver.org), and the American Psychological Association (http://www.apa.org/pi/about/publications/caregivers/consumers/index.aspxconsumers/index.aspx).  For More Information About Hallucinations, Delusions, and Paranoia in Alzheimer's [facility] Alzheimer's and related Dementias Education and Referral (ADEAR) [facility] 1-201.426.9491 (toll-free) adear@ning.nih.gov www.ning.nih.gov/alzheimers The National [place] on [facility] offers information and free print publications about Alzheimer's disease and related dementias for families, caregivers, and health professionals. ADEAR Center staff answer telephone, email, and written requests and make referrals to local and national resources

## 2025-06-23 NOTE — PROGRESS NOTES
Subjective:       Patient ID: Jose Miguel Alvarez Jr. is a 84 y.o. male.    Chief Complaint: Memory Loss          HPIThe patient is here for memory loss.     The patient is presenting with memory changes 2024-year history of memory loss.The patient is accompanied by son and daughter- in- law (DNL). Patient has had multiple treatment including chemotherapy and surgical procedures within this year however DNL report memory changes prior to those treatment plans. The main problems the patient has are related to progressive short term memory loss. Patient has some agitation during visit. For example, the patient would repeat the same question repeatedly. The patient excessively forgets where placed certain things. The patient also started forgetting names. The patient is driving and denies getting lost. The patient is not losing personal items and does not put them in odd places. No confusion around and inside the house. Patient sone is managing medications, and son and DNL manage appointments. Patient daughter prepare meals he uses microwave, and he is able to cook cornbread and breakfast items.  Patient denies trouble remembering the date and time. Patient is aware major holidays and political changes. The patient is independent in handling finances. The patient is still independent with ADLs. No agitation or aggression. No hallucinations or delusions. No seizures. Patient had targeted Chemo/Radiation February for Bladder cancer treatment.  No language problems. No problems handling tools. No history of strokes. Chronic  history of headaches when iron is low, taking IV iron infusion. No history of hypothyroidism. No history of alcoholism (young onset or old onset). No history of B12 deficiency. Patient has been grieving the loss of spouse last 3 weeks. He is able to talk with children when down.  No history of bipolar disorder. No history of Untreated Syphilis.  No history of HIV infection. No toxic exposures.  No  history of traumatic brain injury. No tremors or abnormal movements. No falls or instability. Hx of COPD. BPH has some urinary urgency. Patient has had sleep disruption since wife passing. Patient good appetite. No family history of dementia. Patient became upset and uncooperative during visit. Patient noted to be taking Aricept 5 mg po HS previously started by PCP last year. Discussed plan of care. Patient family would like to time to discuss plan of care with patient a later time.              Review of Systems   Unable to perform ROS: Dementia   Constitutional:  Positive for activity change.   Gastrointestinal: Negative.    Musculoskeletal:  Positive for arthralgias, back pain and myalgias.   Psychiatric/Behavioral:  Positive for agitation, behavioral problems, confusion, decreased concentration and dysphoric mood. The patient is nervous/anxious.                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 Treated with APC.   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 breakdown of skin 01/26/2023    Stage 3 chronic kidney disease 10/06/2017     Past Surgical History:   Procedure Laterality Date    bladder tumor removal      CARDIAC ELECTROPHYSIOLOGY MAPPING AND ABLATION      CARDIOVERSION      several    CATARACT EXTRACTION  Bilateral     cataract removal with IOL implants Bilateral     CHOLECYSTECTOMY      CYSTOSCOPIC LITHOLAPAXY N/A 12/28/2021    Procedure: CYSTOLITHOLAPAXY;  Surgeon: Medardo Garcia MD;  Location: [facility] OR;  Service: Urology;  Laterality: N/A;    CYSTOSCOPIC LITHOLAPAXY N/A 5/14/2024    Procedure: CYSTOLITHOLAPAXY;  Surgeon: Medardo Garcia MD;  Location: [facility] OR;  Service: Urology;  Laterality: N/A;    CYSTOSCOPY      CYSTOSCOPY W/ URETERAL STENT REMOVAL Right 4/29/2025    Procedure: CYSTOSCOPY, WITH URETERAL STENT REMOVAL;  Surgeon: Medardo Garcia MD;  Location: [facility] OR;  Service: Urology;  Laterality: Right;    CYSTOSCOPY WITH BIOPSY OF BLADDER Right 12/28/2021    Procedure: CYSTOSCOPY, WITH BLADDER BIOPSY, WITH FULGURATION IF INDICATED;  Surgeon: Medardo Garcia MD;  Location: [facility] OR;  Service: Urology;  Laterality: Right;    CYSTOSCOPY WITH URETEROSCOPY, RETROGRADE PYELOGRAPHY, AND INSERTION OF STENT Right 4/29/2025    Procedure: CYSTOSCOPY, WITH RETROGRADE PYELOGRAM AND URETERAL STENT INSERTION;  Surgeon: Medardo Garcia MD;  Location: [facility] OR;  Service: Urology;  Laterality: Right;    CYSTOTOMY, WITH FULGURATION AND RADIOACTIVE MATERIAL INSERTION N/A 12/3/2024    Procedure: CYSTOTOMY, WITH FULGURATION AND RADIOACTIVE MATERIAL INSERTION;  Surgeon: Medardo Garcia MD;  Location: [facility] OR;  Service: Urology;  Laterality: N/A;    ESOPHAGOGASTRODUODENOSCOPY N/A 10/25/2021    Procedure: Small Jason Enteroscopy (SBE);  Surgeon: Isabelle Griffin MD;  Location: [facility];  Service: Endoscopy;  Laterality: N/A;    ESOPHAGOGASTRODUODENOSCOPY N/A 12/13/2021    Procedure: EGD (ESOPHAGOGASTRODUODENOSCOPY);  Surgeon: Troy Polanco MD;  Location: [facility] ENDO;  Service: Endoscopy;  Laterality: N/A;    ESOPHAGOGASTRODUODENOSCOPY N/A 4/5/2024    Procedure: EGD (ESOPHAGOGASTRODUODENOSCOPY);  Surgeon: Myrtle Salcedo MD;  Location: [facility];  Service: Endoscopy;  Laterality: N/A;     ESOPHAGOGASTRODUODENOSCOPY N/A 4/22/2024    Procedure: EGD (ESOPHAGOGASTRODUODENOSCOPY);  Surgeon: Myrtle Salcedo MD;  Location: [facility] ENDO;  Service: Endoscopy;  Laterality: N/A;  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    EYE SURGERY      INTRALUMINAL GASTROINTESTINAL TRACT IMAGING VIA CAPSULE N/A 12/6/2021    Procedure: IMAGING PROCEDURE, GI TRACT, INTRALUMINAL, VIA CAPSULE;  Surgeon: Larry Jones, RN;  Location: [facility] ENDO;  Service: Endoscopy;  Laterality: N/A;    INTRALUMINAL GASTROINTESTINAL TRACT IMAGING VIA CAPSULE N/A 4/18/2024    Procedure: IMAGING PROCEDURE, GI TRACT, INTRALUMINAL, VIA CAPSULE;  Surgeon: Larry Jones, RN;  Location: [facility] ENDO;  Service: Endoscopy;  Laterality: N/A;    OCCLUSION OF LEFT ATRIAL APPENDAGE N/A 11/14/2024    Procedure: Left atrial appendage occlusion;  Surgeon: Aroldo Ortiz MD;  Location: [facility] EP LAB;  Service: Cardiology;  Laterality: N/A;  afib, watchman, BSCI, venkatesh, anes, MB, 3prep    SMALL BOWEL ENTEROSCOPY N/A 9/26/2024    Procedure: ENTEROSCOPY--prximal DBE;  Surgeon: Eduardo Ruff MD;  Location: [facility] ENDO;  Service: Endoscopy;  Laterality: N/A;    TRANSESOPHAGEAL ECHOCARDIOGRAPHY N/A 11/14/2024    Procedure: ECHOCARDIOGRAM, TRANSESOPHAGEAL;  Surgeon: Aroldo Martinez MD;  Location: [facility] EP LAB;  Service: Cardiology;  Laterality: N/A;    TRANSESOPHAGEAL ECHOCARDIOGRAPHY N/A 12/30/2024    Procedure: ECHOCARDIOGRAM, TRANSESOPHAGEAL;  Surgeon: Carlos Flower MD;  Location: [facility] CATH LAB;  Service: Cardiology;  Laterality: N/A;    TRANSURETHRAL RESECTION OF BLADDER TUMOR BY BIPOLAR CAUTERY N/A 7/23/2019    Procedure: TURBT, USING BIPOLAR CAUTERY;  Surgeon: Ritesh Marques MD;  Location: [facility];  Service: Urology;  Laterality: N/A;    TRANSURETHRAL RESECTION OF PROSTATE N/A 5/14/2024    Procedure: TURP (TRANSURETHRAL RESECTION OF PROSTATE);  Surgeon: Medardo Garcia MD;  Location: [facility] OR;  Service: Urology;  Laterality: N/A;    TURBT  (TRANSURETHRAL RESECTION OF BLADDER TUMOR) N/A 12/3/2024    Procedure: TURBT (TRANSURETHRAL RESECTION OF BLADDER TUMOR);  Surgeon: Medardo Garcia MD;  Location: [facility];  Service: Urology;  Laterality: N/A;     Social History[2]          Past/Current Medical/Surgical History, Past/Current Social History, Past/Current Family History and Past/Current Medications were reviewed in detail.        Objective:           VITAL SIGNS WERE REVIEWED      GENERAL APPEARANCE:     The patient looks upset, uncooperative, agitated comfortable.    BMI 26.76 KG     No signs of respiratory distress.    Normal breathing pattern.    No dysmorphic features    Normal eye contact.     GENERAL MEDICAL EXAM:    HEENT:  Head is atraumatic normocephalic.     No tender temporal arteries.    Neck and Axillae: No JVD. No visible lesions.    No carotid bruits. No thyromegaly. No lymphadenopathy.    Cardiopulmonary: No cyanosis. No tachypnea. Normal respiratory effort.    Gastrointestinal/Urogenital:  No jaundice. No stomas or lesions. No visible hernias. No catheters.     Abdomen is soft non-tender. No masses or organomegaly.    Skin, Hair and Nails: No pathognonomic skin rash. No neurofibromatosis. No visible lesions.No stigmata of autoimmune disease. No clubbing.    Skin is warm and moist. No palpable masses.    Limbs: No varicose veins. No visible swelling.    No palpable edema. Pulses are symmetric. Pedal pulses are palpable.      Muskoskeletal: No visible deformities.No visible lesions.    No spine tenderness. No signs of longstanding neuropathy. No dislocations or fractures.            Neurological Exam  Mental Status   Oriented to person, place and time. Recalls 3 of 3 objects immediately. At 3 minutes recalls 0 of 3 objects. Unable to copy figure. Clock drawing is abnormal. Speech is normal. Unable to repeat. Unable to perform serial calculations. Difficulty spelling words backwards. Fund of knowledge is abnormal.    Cranial  Nerves  CN II: Visual acuity is normal. Visual fields full to confrontation.  CN III, IV, VI: Extraocular movements intact bilaterally. Pupils equal round and reactive to light bilaterally.   Right pupil: 2. Reactive to accommodation.   Left pupil: 2. Reactive to accommodation.  CN V: Facial sensation is normal.  CN VII:  Left: Taste is normal.  CN XI:  Right: Sternocleidomastoid strength is normal. Trapezius strength is normal.  Left: Sternocleidomastoid strength is normal. Trapezius strength is normal.  CN XII:No tongue atrophyTongue without fasciculations    Motor  Normal muscle bulk throughout. Normal muscle tone. No abnormal involuntary movements.No right pronator drift and no left pronator drift.                                             Right                     Left  Deltoid                                   5                          5   Biceps                                   5                          5   Triceps                                  5                          5   Interossei                              5                          5  Glutei                                    5                          5   Iliopsoas                               5                          5   Quadriceps                           5                          5   Hamstring                             5                          5   Gastrocnemius                     5                           5   Anterior tibialis                      5                          5   Posterior tibialis                    5                          5   Peroneal                               5                          5    Sensory  MAEW .    Reflexes  AYANNA Uncooperative .    Coordination    AYANNA .    Gait  Casual gait is normal including stance, stride, and arm swing.        CORY COGNITIVE ASSESSMENT (MOCA) TOTAL SCORE       MODERATE DEMENTIA 10-19    SEVERE DEMENTIA <10    Education 2 years college     Barriers Low effort  uncooperative due to agitation     DATE 06-       TOTAL SCORE 12       VISUOSPATIAL EXECUTIVE (5) 1       NAMING (3) 3       ATTENTION (6) 3       LANGUAGE (3) 1       ABSTRACTION(2) 1       RECALL (5) 0       ORIENTATION (6) 3           Lab Results   Component Value Date    WBC 5.13 05/14/2025    HGB 10.9 (L) 05/14/2025    HCT 33.7 (L) 05/14/2025    MCV 94 05/14/2025     05/14/2025     Sodium   Date Value Ref Range Status   06/11/2025 144 136 - 145 mmol/L Final   03/17/2025 140 136 - 145 mmol/L Final     Potassium   Date Value Ref Range Status   06/11/2025 4.9 3.5 - 5.1 mmol/L Final   03/17/2025 4.3 3.5 - 5.1 mmol/L Final     Chloride   Date Value Ref Range Status   06/11/2025 109 95 - 110 mmol/L Final   03/17/2025 107 95 - 110 mmol/L Final     CO2   Date Value Ref Range Status   06/11/2025 29 23 - 29 mmol/L Final   03/17/2025 18 (L) 23 - 29 mmol/L Final     Glucose   Date Value Ref Range Status   06/11/2025 75 70 - 110 mg/dL Final   03/17/2025 157 (H) 70 - 110 mg/dL Final     BUN   Date Value Ref Range Status   06/11/2025 24 (H) 8 - 23 mg/dL Final     Creatinine   Date Value Ref Range Status   06/11/2025 1.3 0.5 - 1.4 mg/dL Final     Calcium   Date Value Ref Range Status   06/11/2025 9.1 8.7 - 10.5 mg/dL Final   03/17/2025 9.6 8.7 - 10.5 mg/dL Final     Protein Total   Date Value Ref Range Status   04/11/2025 6.9 6.0 - 8.4 gm/dL Final     Total Protein   Date Value Ref Range Status   03/17/2025 6.7 6.0 - 8.4 g/dL Final     Albumin   Date Value Ref Range Status   06/11/2025 3.4 (L) 3.5 - 5.2 g/dL Final   03/17/2025 3.3 (L) 3.5 - 5.2 g/dL Final     Total Bilirubin   Date Value Ref Range Status   03/17/2025 0.5 0.1 - 1.0 mg/dL Final     Comment:     For infants and newborns, interpretation of results should be based  on gestational age, weight and in agreement with clinical  observations.    Premature Infant recommended reference ranges:  Up to 24 hours.............<8.0 mg/dL  Up to 48  hours............<12.0 mg/dL  3-5 days..................<15.0 mg/dL  6-29 days.................<15.0 mg/dL       Bilirubin Total   Date Value Ref Range Status   04/11/2025 0.5 0.1 - 1.0 mg/dL Final     Comment:     For infants and newborns, interpretation of results should be based   on gestational age, weight and in agreement with clinical   observations.    Premature Infant recommended reference ranges:   0-24 hours:  <8.0 mg/dL   24-48 hours: <12.0 mg/dL   3-5 days:    <15.0 mg/dL   6-29 days:   <15.0 mg/dL     Alkaline Phosphatase   Date Value Ref Range Status   03/17/2025 81 40 - 150 U/L Final     ALP   Date Value Ref Range Status   04/11/2025 72 40 - 150 unit/L Final     AST ([place])   Date Value Ref Range Status   04/24/2016 29 17 - 59 U/L Final     AST   Date Value Ref Range Status   04/11/2025 23 11 - 45 unit/L Final   03/17/2025 33 10 - 40 U/L Final     ALT   Date Value Ref Range Status   04/11/2025 20 10 - 44 unit/L Final   03/17/2025 28 10 - 44 U/L Final     Anion Gap   Date Value Ref Range Status   06/11/2025 6 (L) 8 - 16 mmol/L Final     eGFR if    Date Value Ref Range Status   12/08/2021 >60 >60 mL/min/1.73 m^2 Final     eGFR if non    Date Value Ref Range Status   12/08/2021 56 (A) >60 mL/min/1.73 m^2 Final     Comment:     Calculation used to obtain the estimated glomerular filtration  rate (eGFR) is the CKD-EPI equation.        Lab Results   Component Value Date    IAPUPDLF22 510 10/23/2024     Lab Results   Component Value Date    TSH 3.937 04/01/2024    N4PHYWP 5.1 05/28/2021     RADIOLOGY EVALUATION:    08-    CT HEAD WO     Age-related atrophy. No acute findings   No results found in the last 24 hours.  No results found in the last 24 hours.      Reviewed the neuroimaging independently       Assessment:       1. 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 Dementia, unspecified dementia severity,  unspecified dementia type, unspecified whether behavioral, psychotic, or mood disturbance or anxiety    3. Paroxysmal atrial fibrillation    4. Primary hypertension    5. Hypercholesteremia    6. Chronic obstructive pulmonary disease, unspecified COPD type    7. History of bladder cancer    8. Benign prostatic hyperplasia with urinary frequency    9. Nephrolithiasis    10. DM type 2 with diabetic dyslipidemia    11. Chronic combined systolic (congestive) and diastolic (congestive) heart failure    12. Dementia associated with other underlying disease, without behavioral disturbance, psychotic disturbance, mood disturbance, or anxiety, unspecified dementia severity    13. PVD (peripheral vascular disease)    14. Type 2 diabetes mellitus without complication, without long-term current use of insulin    15. 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 Presence of Watchman left atrial appendage closure device    17. Anemia due to gastrointestinal blood loss    18. Iron deficiency anemia secondary to blood loss (chronic)    19. Urothelial carcinoma of bladder    20. Other amnesia    21. Insomnia, unspecified type    22. Agitated depression        Plan:           MAJOR NEUROCOGNITIVE DISORDER DUE TO PROBABLY ALZHEIMER DISEASE VS OTHER MEDICAL ETIOLOGIES/ FORGETFULNESS/ PAF/HTN/DM TYPE II/ COPD/HLD/ HX OF BLADDER CANCER HX OF CHEMOTHERAPY/S/P WATCHMAN LEFT ATRIAL INSOMNIA/ AGITATED DEPRESSION          EVALUATION     Brain MRI wwo     TSH-T4     Comprehensive Neuropsychological Testing     No driving.     Blood markers for AD (amyloid levels and tau levels like [id number] and [id number]) will likely to be useful.  (FDA approved Lumipulse G ?-amyloid Ratio). Functional imaging and CSF analyses have proven expensive, invasive and inconvenient.    Recommend optimization of MDD/TARAH/Grieving     Recommend for psych services      DISEASE-MODIFYING AGENTS     Explained to the patient and family that medications are  intended to slow down the progression (in the early stages of the disease) and not stop it or reverse the disease. The disease is relentless, progressive and so far, cannot be controlled. Progression includes Cognitive decline, Behavioral disturbances, and Motor decline as well.     I counseled the patient and family that the rate of progression is extremely variable, and the average (10 years) is an inaccurate measure.     CLASSES:    NMDA RECEPTOR ANTAGONISTS    Will start memantine/Namenda and titrate slowly to 10 mg BID. The side effects include dizziness, seizures and nightmares and discussed with patient and family. (Other formulations include Namenda XR)    CHOLINESTERASE INHIBITORS (CEI)    Currently on donepezil/Aricept 5 mg QHS.       SYMPTOMATIC MANAGEMENT-BEHAVIORAL SYMPTOMS AND NON-COGNITIVE SYMPTOMS       ANTIDEPRESSANTS CLASS MEDICATIONS    Recommend Trazodone 50 mg QHS to help with agigated and  insomnia. Instructed the family to watch for excessive sedation or paradoxical agitation. Other options includes Mirtazapine (Remeron).    Consider in future:     Sertraline (Zoloft) titration to 100 mg in the morning can mitigate anxiety symptoms.     Bupropion (Wellbutrin) small dose titration  mg in the morning helps with attention and concentration in addition to depression and cravings.          HOME CARE         Falling Down Precautions. Gait is affected by the disease as well.     Avoid driving and access to firearms     Incremental 24/Care     Help with finances and decision making.    Join support group.    Proofing the house and use labeling.    Avoid antihistamines and anticholinergics.    Avoid changing routine.    Use written reminders.    Avoid multitasking.    Healthy diet, exercise (physical and cognitive).    Good sleep hygiene.        HERE ARE 10 WAYS TO REDUCE RISK OF DEMENTIA AND SLOW DOWN THE PROGRESSION OF DEMENTIA        1.Be physically active.    2. Avoid smoking and alcohol  consumption.    3. Track your numbers. Keep your blood pressure, cholesterol, blood sugar and weight within recommended ranges.    4. Stay socially connected.    5. Make healthy food choices. Eat a well-balance and healthy diet that rich in cereals, fish, legumes, and vegetables.    6. Reduce stress.     7. Challenge your brain by trying something new, playing games, or learning a new language.    8. Take care of your hearing. Avoid being continuously exposed to loud sounds and wear a hearing aid if hearing does become a problem.    9. Lower risk of falls. Consider installing handrails on all stairs and grab bars in bathrooms.    10. Reduce your exposure to air pollution, such as exposure to exhaust in heavy traffic.         CAREGIVERS COUNSELING     Recommend reading the following books:     The 36-Hour Day and there are many online and printed resources to help caregivers as well.     Alzheimer's Through the Stages.     Dementia with G.R.A.C.E.            PREVENTION OF DELIRIUM       1. Good hydration and avoid electrolyte imbalance  2. Recognize and treat infections immediately especially UTI.  3. Bladder emptying and prevent constipation.   4. Provide stimulating activities and familiar objects  5. Use eyeglasses and hearing aids if needed.   6. Use simple and regular communication about people, current place, and time  7. Mobility and range-of-motion exercises  8. Reduce noise, lighting and avoid sleep interruptions  9. Non-narcotic pain management.  10.Nondrug treatment for sleep problems or anxiety  11. Avoid antihistamines.  12. Avoid narcotics.  13. Avoid benzodiazepines.            HELPFUL STRATEGIES FOR THE FAMILY AND CAREGIVERS        BEHAVIORAL MANAGEMENT STRATEGIES     Remember that you cannot reason with acute psychosis or confusion. Unless there is an immediate safety issue, there is no need to challenge or correct the person.  For example, if a pt is hallucinating, do not argue with the person that  "what they are seeing is not real. If they are expressing paranoia, empathize gently with their fear, and attempt to redirect them to a different activity.   If the person is particularly stuck on a topic or activity, as long as the activity is safe and is not worsening their agitation, you don't need to intervene.     Communicate calmly, simply, and concisely    Reassure the person that they are safe and you are here to help  Try not to express irritation or anger  Speak quietly and calmly, do not shout or threaten the person. Avoid provocation.   Establish verbal contact and provide orientation and reassurance.  Attempt to identify the patient's wants and feelings, listen to what they are saying, and reflect those wants and feelings back to the patient.  Dont use sarcasm as a weapon    Set clear limits on behavior in a calm voice ("You cannot hit the nurse.")  Offer choices and optimism ("Which toothpaste would you like to use today?")    Redirect the person to an alternative behavior ("Can you come help me with this?)    Avoid saying things like, "We already went over this!" "You can't keep doing this." "I already explained this to you"  Instead, simply nod calmly and acknowledge what the person is saying ("Yes, that makes sense."), and then request their help or company in a different behavior.   Having a mental list of activities the patient enjoys can be helpful with redirection. For example, do they enjoy going for walks? Eating a piece of candy?   If redirection becomes challenging, you can place objects that your family member enjoys strategically in the home in order to use for distraction. This is particularly useful if there are items that they often talk about or frequently ask you questions about; or if they are visually striking. Once you focus the person on this object, talk about it briefly until they are calm and then attempt to redirect to a new behavior.   Sometimes activities which are " "repetitive can be calming, particularly if there is a comforting sensory element. For example, pt may enjoy folding soft towels or laundry.    Limit overstimulation    Decrease distractions by turning off the TV, computer, any fluorescent lights that hum, etc.  Ask any casual visitors to leave--the fewer people the better  If the person is very agitated, avoid touching them, and respect their personal space  Sit down and ask the person to sit down also     PREVENTATIVE STRATEGIES     Try to help the patient develop a routine and stick to it  Address all immediate safety issues  Does the person still have access to the car and keys? Take the keys away, or disconnect the car battery.  Are they wandering at night? Install locks on the outside doors. A keypad lock works well. Alarms on bedroom doors can also be helpful.   Are they turning on the stove and risking a fire? If you cannot limit their access to the kitchen, you may need to unplug dangerous devices any time you are not in the room.   If the person is exhibiting poor judgment throughout the day, they likely need someone supervising them at all times.   Do they still have access to significant financial assets? Limit their access to accounts, and consult with a  if necessary.   Identify situations that seem to trigger agitation or a problematic behavior, and modify the person's environment to minimize or eliminate that trigger.   For example, is their behavior worse if they don't get a good night's sleep? Or if they don't eat or drink enough? If so, prioritize those activities and build behavior plans to support them.  Do they get upset about not being allowed to answer the phone when it rings? Put all of the phones in the house on "silent."   Do they become paranoid that certain family members are trying to take advantage of them? Limit contact with the targeted family member as much as possible, and re-introduce them slowly after some time has passed. "   Encourage independence in daily living whenever safely possible  Encourage collaborative decision-making regarding his care as well as daily activities  Encourage regular physical exercise  Address issues that may be impacting sleep   Ex: Urology consult for frequent nighttime urination, address chronic pain that may be interfering with sleep, moving to a different room if his roommate snores loudly, make sure the room is a comfortable temperature and he has adequate pillows and blankets  Ensure he gets out into the sunlight at least once per day to encourage regular circadian cycle  No caffeine, sugar, or large meals within two hours of bedtime   Make sure room at night is dark and quiet and minimize nighttime interruptions from staff unless medically necessary   Try to help pt go to sleep and wake up at the same time every day  Monitor closely for worsening psychiatric symptoms (insomnia, social withdrawal, deterioration of personal hygiene, hostility, confusing or nonsensical speech, paranoia, hallucinations) and intervene promptly. Assess for possible antecedents, modify environment appropriately.            SLEEP HYGIENE     Poor sleep has a negative effect on cognition. Several strategies have been shown to improve sleep:     Caffeine intake in the afternoon and evening, as well as stuffing oneself at supper, can decrease the quality of restful sleep throughout the night.   Bedtime and wake-up times should be consistent every night and morning so the body becomes used to a single routine, even on the weekends.  Engage in daily physical activity, but not 2-3 hours before bedtime.   No technology use (television, computer, iPad) 1-2 hours before bed.   Have a wind down routine (e.g., soft lights in the house, bath before bed, reduced fluid intake, songs, reading, less noise) to promote sleep readiness.   Visit the www.sleepfoundation.org for more strategies.      COGNITIVE HYGIENE     Engage in regular  exercise, which increases alertness and arousal and can improve attention and focus.  Consider lower impact exercises, such as yoga or light walking.  Get a good nights sleep, as this can enhance alertness and cognition.  Eat healthy foods and balanced meals. It is notable that research indicates certain nutrients may aid in brain function, such as B vitamins (especially B6, B12, and folic acid), antioxidants (such as vitamins C and E, and beta carotene), and Omega-3 fatty acids. Talk with your physician or nutritionist about whats right for you.   Keep your brain active. Find activities to stay mentally active, such as reading, games (cards, checkers), puzzles (crosswords, Sudoku, jig saw), crafts (models, woodworking), gardening, or participating in activities in the community.  Stay socially engaged. Continue staying active with your family and friends.      FUTURE PLANNING     The patient and caregivers should consider formal arrangements to allow a designated person to make medical and financial decisions for the pt, should he/she become unable to do so.  Options to consider include designating a healthcare proxy, medical and/or financial power of , and completing advanced directives for healthcare decisions and estate planning (e.g., finalizing a will).  If cost is prohibitive, [facility] Services (https://[employer]org/) provides free  for individuals with low income.       ADDITIONAL RESOURCES     Alzheimer's Services of the [facility] (www.http://alzbr.org) have good local caregiver and patient resources.   Consider resources for support through the Governors Office of Elderly Affairs (http://goea.[place].gov/), [place] Chapter of the Alzheimers Association (www.alz.org/[place]/), the Family Caregiver [place] (www.caregiver.org), and the American Psychological Association  (http://www.apa.org/pi/about/publications/caregivers/consumers/index.aspxconsumers/index.aspx).  For More Information About Hallucinations, Delusions, and Paranoia in Alzheimer's [facility] Alzheimer's and related Dementias Education and Referral (ADEAR) Center 1-733.799.4390 (toll-free) adear@ning.nih.gov www.ning.nih.gov/alzheimers The National [place] on Aging's ADEAR Center offers information and free print publications about Alzheimer's disease and related dementias for families, caregivers, and health professionals. ADEAR Center staff answer telephone, email, and written requests and make referrals to local and national resources  MEDICAL/SURGICAL COMORBIDITIES     All relevant medical comorbidities noted and managed by primary care physician and medical care team.          MISCELLANEOUS MEDICAL PROBLEMS       HEALTHY LIFESTYLE AND PREVENTATIVE CARE    Encouraged the patient to adhere to the age-appropriate health maintenance guidelines including screening tests and vaccinations.     Discussed the overall importance of healthy lifestyle, optimal weight, exercise, healthy diet, good sleep hygiene and avoiding drugs including smoking, alcohol and recreational drugs. The patient verbalized full understanding.       Advised the patient to follow COVID-19 prevention measures.       I spent 145 minutes more than 50 % spent  face to face with the patient    time spent in counseling and coordination of care including discussions etiology of diagnosis, pathogenesis of diagnosis, prognosis of diagnosis,, diagnostic results, impression and recommendations, diagnostic studies, management, risks and benefits of treatment, instructions of disease self-management, treatment instructions, follow up requirements, patient and family counseling/involvement in care compliance with treatment regimen. All of the patient's questions were answered during this discussion.       Miguel Beckham, MSN NP      Collaborating Provider: Kylah Chaparro  MD, FAAN Neurologist/Epileptologist         [1]   Current Outpatient Medications:     amiodarone (PACERONE) 200 MG Tab, Take 1 tablet (200 mg total) by mouth once daily., Disp: 30 tablet, Rfl: 11    amLODIPine (NORVASC) 2.5 MG tablet, Take 1 tablet (2.5 mg total) by mouth once daily., Disp: 90 tablet, Rfl: 2    aspirin (ECOTRIN) 81 MG EC tablet, Take 1 tablet (81 mg total) by mouth once daily., Disp: 90 tablet, Rfl: 3    atorvastatin (LIPITOR) 20 MG tablet, TAKE 1 TABLET(20 MG) BY MOUTH EVERY EVENING, Disp: 90 tablet, Rfl: 1    carvediloL (COREG) 6.25 MG tablet, TAKE 1 TABLET TWICE A DAY WITH MEALS, Disp: 180 tablet, Rfl: 3    cholecalciferol, vitamin D3, (VITAMIN D3 ORAL), Take 1 tablet by mouth every other day., Disp: , Rfl:     donepeziL (ARICEPT) 5 MG tablet, Take 1 tablet (5 mg total) by mouth once daily. (Patient taking differently: Take 5 mg by mouth every evening.), Disp: 90 tablet, Rfl: 1    FEROSUL 325 mg (65 mg iron) Tab tablet, Take by mouth., Disp: , Rfl:     hydrALAZINE (APRESOLINE) 50 MG tablet, TAKE 1 TABLET EVERY 8 HOURS, Disp: 270 tablet, Rfl: 3    hyoscyamine 0.125 mg Subl, Place 2 tablets (0.25 mg total) under the tongue every 4 (four) hours as needed (spasms)., Disp: 40 tablet, Rfl: 0    multivitamin (THERAGRAN) per tablet, Take 1 tablet by mouth once daily., Disp: , Rfl:     mupirocin (BACTROBAN) 2 % ointment, Apply topically 2 (two) times daily., Disp: 30 g, Rfl: 1    oxyCODONE-acetaminophen (PERCOCET) 5-325 mg per tablet, Take 1 tablet by mouth every 4 (four) hours as needed for Pain., Disp: 10 tablet, Rfl: 0    phenazopyridine (PYRIDIUM) 200 MG tablet, Take 1 tablet (200 mg total) by mouth 3 (three) times daily as needed (burning)., Disp: 15 tablet, Rfl: 0    memantine (NAMENDA) 10 MG Tab, Take 1 tablet (10 mg total) by mouth 2 (two) times daily., Disp: 180 tablet, Rfl: 3    traZODone (DESYREL) 50 MG tablet, Take 1 tablet (50 mg total) by mouth every evening., Disp: 30 tablet, Rfl: 11  No  current facility-administered medications for this visit.    Facility-Administered Medications Ordered in Other Visits:     lactated ringers infusion, , Intravenous, Continuous, Denisse Conteh MD, Stopped at 21 1241  [2]   Social History  Socioeconomic History    Marital status:    Tobacco Use    Smoking status: Former     Current packs/day: 0.00     Average packs/day: 2.5 packs/day for 20.0 years (50.0 ttl pk-yrs)     Types: Cigarettes     Start date:      Quit date:      Years since quittin.5     Passive exposure: Never    Smokeless tobacco: Never   Substance and Sexual Activity    Alcohol use: Yes     Alcohol/week: 7.0 standard drinks of alcohol     Types: 7 Cans of beer per week     Comment: occasionally: hold 72 hrs    Drug use: No    Sexual activity: Not Currently     Social Drivers of Health     Financial Resource Strain: Low Risk  (2025)    Overall Financial Resource Strain (CARDIA)     Difficulty of Paying Living Expenses: Not hard at all   Food Insecurity: No Food Insecurity (2025)    Hunger Vital Sign     Worried About Running Out of Food in the Last Year: Never true     Ran Out of Food in the Last Year: Never true   Transportation Needs: No Transportation Needs (2025)    PRAPARE - Transportation     Lack of Transportation (Medical): No     Lack of Transportation (Non-Medical): No   Physical Activity: Insufficiently Active (2025)    Exercise Vital Sign     Days of Exercise per Week: 2 days     Minutes of Exercise per Session: 30 min   Stress: Stress Concern Present (2025)    [employer] [place] [employer] - Occupational Stress Questionnaire     Feeling of Stress : Very much   Housing Stability: Low Risk  (2025)    Housing Stability Vital Sign     Unable to Pay for Housing in the Last Year: No     Number of Times Moved in the Last Year: 0     Homeless in the Last Year: No

## 2025-06-24 ENCOUNTER — PATIENT MESSAGE (OUTPATIENT)
Dept: PSYCHIATRY | Facility: CLINIC | Age: 85
End: 2025-06-24
Payer: MEDICARE

## 2025-06-25 ENCOUNTER — TELEPHONE (OUTPATIENT)
Dept: INFECTIOUS DISEASES | Facility: CLINIC | Age: 85
End: 2025-06-25
Payer: MEDICARE

## 2025-06-25 LAB
IMMUNOLOGIST REVIEW: ABNORMAL
M PHOSPHO-TAU 217: 0.84 PG/ML

## 2025-06-27 ENCOUNTER — INFUSION (OUTPATIENT)
Dept: INFUSION THERAPY | Facility: HOSPITAL | Age: 85
End: 2025-06-27
Attending: HOSPITALIST
Payer: MEDICARE

## 2025-06-27 VITALS
OXYGEN SATURATION: 97 % | TEMPERATURE: 97 F | RESPIRATION RATE: 18 BRPM | DIASTOLIC BLOOD PRESSURE: 67 MMHG | SYSTOLIC BLOOD PRESSURE: 152 MMHG | HEART RATE: 61 BPM

## 2025-06-27 DIAGNOSIS — D50.0 IRON DEFICIENCY ANEMIA SECONDARY TO BLOOD LOSS (CHRONIC): Primary | ICD-10-CM

## 2025-06-27 PROCEDURE — 96374 THER/PROPH/DIAG INJ IV PUSH: CPT

## 2025-06-27 PROCEDURE — 96375 TX/PRO/DX INJ NEW DRUG ADDON: CPT

## 2025-06-27 PROCEDURE — 63600175 PHARM REV CODE 636 W HCPCS: Performed by: NURSE PRACTITIONER

## 2025-06-27 RX ORDER — METHYLPREDNISOLONE SOD SUCC 125 MG
40 VIAL (EA) INJECTION
Status: COMPLETED | OUTPATIENT
Start: 2025-06-27 | End: 2025-06-27

## 2025-06-27 RX ORDER — EPINEPHRINE 0.3 MG/.3ML
0.3 INJECTION SUBCUTANEOUS ONCE AS NEEDED
OUTPATIENT
Start: 2025-07-04

## 2025-06-27 RX ORDER — SODIUM CHLORIDE 0.9 % (FLUSH) 0.9 %
10 SYRINGE (ML) INJECTION
Status: DISCONTINUED | OUTPATIENT
Start: 2025-06-27 | End: 2025-06-27 | Stop reason: HOSPADM

## 2025-06-27 RX ORDER — SODIUM CHLORIDE 0.9 % (FLUSH) 0.9 %
10 SYRINGE (ML) INJECTION
OUTPATIENT
Start: 2025-07-04

## 2025-06-27 RX ORDER — DIPHENHYDRAMINE HYDROCHLORIDE 50 MG/ML
50 INJECTION, SOLUTION INTRAMUSCULAR; INTRAVENOUS ONCE AS NEEDED
OUTPATIENT
Start: 2025-07-04

## 2025-06-27 RX ORDER — METHYLPREDNISOLONE SOD SUCC 125 MG
40 VIAL (EA) INJECTION
Start: 2025-07-04 | End: 2025-07-04

## 2025-06-27 RX ADMIN — METHYLPREDNISOLONE SODIUM SUCCINATE 40 MG: 125 INJECTION, POWDER, FOR SOLUTION INTRAMUSCULAR; INTRAVENOUS at 08:06

## 2025-06-27 RX ADMIN — IRON SUCROSE 200 MG: 20 INJECTION, SOLUTION INTRAVENOUS at 08:06

## 2025-06-27 NOTE — PLAN OF CARE
Problem: Adult Inpatient Plan of Care  Goal: Plan of Care Review  Outcome: Progressing  Flowsheets (Taken 6/27/2025 0854)  Plan of Care Reviewed With: patient  Goal: Patient-Specific Goal (Individualized)  Outcome: Progressing  Flowsheets (Taken 6/27/2025 0854)  Individualized Care Needs: feet up  Anxieties, Fears or Concerns: none  Patient/Family-Specific Goals (Include Timeframe): tolerate treatment  Goal: Optimal Comfort and Wellbeing  Outcome: Progressing  Intervention: Provide Person-Centered Care  Flowsheets (Taken 6/27/2025 0854)  Trust Relationship/Rapport:   care explained   reassurance provided   choices provided   thoughts/feelings acknowledged   emotional support provided   empathic listening provided   questions answered   questions encouraged

## 2025-06-27 NOTE — DISCHARGE INSTRUCTIONS
[facility]  [id number] [facility]  [address]  [phone] phone     [phone] fax  Hours of Operation: Monday- Friday 8:00am- 5:00pm  After hours phone  [phone]  Hematology / Oncology Physicians on call      KATE France Dr., NP Phaon Dunbar, NP Khelsea Conley, FNP    Please call with any concerns regarding your appointment today.

## 2025-06-30 ENCOUNTER — OFFICE VISIT (OUTPATIENT)
Dept: INFECTIOUS DISEASES | Facility: CLINIC | Age: 85
End: 2025-06-30
Payer: MEDICARE

## 2025-06-30 VITALS
TEMPERATURE: 98 F | OXYGEN SATURATION: 95 % | DIASTOLIC BLOOD PRESSURE: 80 MMHG | BODY MASS INDEX: 26.73 KG/M2 | WEIGHT: 197.31 LBS | HEIGHT: 72 IN | HEART RATE: 54 BPM | SYSTOLIC BLOOD PRESSURE: 161 MMHG

## 2025-06-30 DIAGNOSIS — M86.471 CHRONIC OSTEOMYELITIS OF RIGHT FOOT WITH DRAINING SINUS: ICD-10-CM

## 2025-06-30 DIAGNOSIS — E11.69 DM TYPE 2 WITH DIABETIC DYSLIPIDEMIA: Primary | ICD-10-CM

## 2025-06-30 DIAGNOSIS — E78.5 DM TYPE 2 WITH DIABETIC DYSLIPIDEMIA: Primary | ICD-10-CM

## 2025-06-30 PROCEDURE — 3072F LOW RISK FOR RETINOPATHY: CPT | Mod: CPTII,S$GLB,, | Performed by: INTERNAL MEDICINE

## 2025-06-30 PROCEDURE — 3077F SYST BP >= 140 MM HG: CPT | Mod: CPTII,S$GLB,, | Performed by: INTERNAL MEDICINE

## 2025-06-30 PROCEDURE — 3288F FALL RISK ASSESSMENT DOCD: CPT | Mod: CPTII,S$GLB,, | Performed by: INTERNAL MEDICINE

## 2025-06-30 PROCEDURE — 99214 OFFICE O/P EST MOD 30 MIN: CPT | Mod: S$GLB,,, | Performed by: INTERNAL MEDICINE

## 2025-06-30 PROCEDURE — 99999 PR PBB SHADOW E&M-EST. PATIENT-LVL IV: CPT | Mod: PBBFAC,,, | Performed by: INTERNAL MEDICINE

## 2025-06-30 PROCEDURE — 1159F MED LIST DOCD IN RCRD: CPT | Mod: CPTII,S$GLB,, | Performed by: INTERNAL MEDICINE

## 2025-06-30 PROCEDURE — 1157F ADVNC CARE PLAN IN RCRD: CPT | Mod: CPTII,S$GLB,, | Performed by: INTERNAL MEDICINE

## 2025-06-30 PROCEDURE — 3079F DIAST BP 80-89 MM HG: CPT | Mod: CPTII,S$GLB,, | Performed by: INTERNAL MEDICINE

## 2025-06-30 PROCEDURE — 1126F AMNT PAIN NOTED NONE PRSNT: CPT | Mod: CPTII,S$GLB,, | Performed by: INTERNAL MEDICINE

## 2025-06-30 PROCEDURE — 1101F PT FALLS ASSESS-DOCD LE1/YR: CPT | Mod: CPTII,S$GLB,, | Performed by: INTERNAL MEDICINE

## 2025-06-30 NOTE — ASSESSMENT & PLAN NOTE
Component      Latest Ref Rng 3/3/2025 3/10/2025 3/17/2025 3/25/2025   CRP      <=8.2 mg/L 2.1  4.8  9.6 (H)  3.2     Cultures-     METHICILLIN RESISTANT STAPHYLOCOCCUS AUREUS  Many      Aerobic Bacterial Culture  Abnormal   KLEBSIELLA OXYTOCA  Few    Aerobic Bacterial Culture  Abnormal   PSEUDOMONAS AERUGINOSA  Rare   Outpatient Antibiotic Therapy Plan:     Please send referral to    1) Infection:  Diabetic foot infection     2) Discharge Antibiotics:     Intravenous antibiotics:  IV Daptomycin-700 mg daily  IV Zosyn 4.5 gram every 8 hours     3) Therapy Duration:  6 weeks     Estimated end date of IV antibiotics: 04/01/25       Will plan to stop PICC line after 04/01/25 06/30- he reports interval improvement -see pics- no treatment

## 2025-06-30 NOTE — PROGRESS NOTES
Subjective     Patient ID: Jose Miguel Alvarez Jr. is a 84 y.o. male.    Chief Complaint: Follow-up    HPI  Last ID note-  02/18/2025  84 year old with PMH as listed below including bladder cancer and anemia who was referred for right foot infection/osteomyelitis .  His daughter in law is in the room-     Labs and imaging test -  Foot imaging -  No evidence of acute fracture or dislocation.  Soft tissue swelling around the distal phalanges of the 2nd digit.  Multi articular IP joint space narrowing.  Moderate midfoot hypertrophy.  Calcaneal spurring.  Osseous demineralization.   Labs and Imaging test-        METHICILLIN RESISTANT STAPHYLOCOCCUS AUREUS  Many      Aerobic Bacterial Culture  Abnormal   KLEBSIELLA OXYTOCA  Few    Aerobic Bacterial Culture  Abnormal   PSEUDOMONAS AERUGINOSA  Rare         03/31/25  The last ID note-  1) Infection:  Diabetic foot infection     2) Discharge Antibiotics:     Intravenous antibiotics:  IV Daptomycin-700 mg daily  IV Zosyn 4.5 gram every 8 hours     3) Therapy Duration:  6 weeks     Estimated end date of IV antibiotics: 04/01/25     Component      Latest Ref Rng 3/3/2025 3/10/2025 3/17/2025 3/25/2025   Sed Rate      <=23 mm/hr 21  28 (H)  32 (H)  33 (H)       Component      Latest Ref Rng 3/3/2025 3/10/2025 3/17/2025 3/25/2025   CRP      <=8.2 mg/L 2.1  4.8  9.6 (H)  3.2         -06/30-    He comes for follow up-he reports interval resolution.    Review of Systems   Constitutional:  Negative for activity change, appetite change and chills.   Respiratory:  Negative for apnea.    Neurological:  Negative for dizziness and coordination difficulties.          Objective     Physical Exam  Vitals and nursing note reviewed.   HENT:      Nose: Nose normal.   Cardiovascular:      Rate and Rhythm: Normal rate.   Pulmonary:      Effort: Pulmonary effort is normal.   Musculoskeletal:         General: Normal range of motion.      Cervical back: Normal range of motion.   Skin:     General:  Skin is warm.   Neurological:      Mental Status: He is alert.                  Assessment and Plan     1. DM type 2 with diabetic dyslipidemia  Overview:  He has T2DM; A1c very well managed with diet alone.    Assessment & Plan:  Follow PCP      2. 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  Assessment & Plan:  Component      Latest Ref Rng 3/3/2025 3/10/2025 3/17/2025 3/25/2025   CRP      <=8.2 mg/L 2.1  4.8  9.6 (H)  3.2     Cultures-     METHICILLIN RESISTANT STAPHYLOCOCCUS AUREUS  Many      Aerobic Bacterial Culture  Abnormal   KLEBSIELLA OXYTOCA  Few    Aerobic Bacterial Culture  Abnormal   PSEUDOMONAS AERUGINOSA  Rare   Outpatient Antibiotic Therapy Plan:     Please send referral to    1) Infection:  Diabetic foot infection     2) Discharge Antibiotics:     Intravenous antibiotics:  IV Daptomycin-700 mg daily  IV Zosyn 4.5 gram every 8 hours     3) Therapy Duration:  6 weeks     Estimated end date of IV antibiotics: 04/01/25       Will plan to stop PICC line after 04/01/25 06/30- he reports interval improvement -see pics- no treatment

## 2025-07-01 ENCOUNTER — PATIENT MESSAGE (OUTPATIENT)
Dept: CARDIOLOGY | Facility: CLINIC | Age: 85
End: 2025-07-01
Payer: MEDICARE

## 2025-07-02 ENCOUNTER — RESULTS FOLLOW-UP (OUTPATIENT)
Dept: NEUROLOGY | Facility: CLINIC | Age: 85
End: 2025-07-02

## 2025-07-02 LAB — LC PHOSPHO-TAU (181P): 2.3 PG/ML (ref 0–0.97)

## 2025-07-09 ENCOUNTER — HOSPITAL ENCOUNTER (OUTPATIENT)
Dept: RADIOLOGY | Facility: HOSPITAL | Age: 85
Discharge: HOME | End: 2025-07-09
Attending: HOSPITALIST
Payer: MEDICARE

## 2025-07-09 DIAGNOSIS — C67.9 UROTHELIAL CARCINOMA OF BLADDER: ICD-10-CM

## 2025-07-09 PROCEDURE — 25500020 PHARM REV CODE 255: Performed by: HOSPITALIST

## 2025-07-09 PROCEDURE — 71260 CT THORAX DX C+: CPT | Mod: TC

## 2025-07-09 PROCEDURE — 71260 CT THORAX DX C+: CPT | Mod: 26,,, | Performed by: RADIOLOGY

## 2025-07-09 PROCEDURE — 74177 CT ABD & PELVIS W/CONTRAST: CPT | Mod: 26,,, | Performed by: RADIOLOGY

## 2025-07-09 PROCEDURE — A9698 NON-RAD CONTRAST MATERIALNOC: HCPCS | Performed by: HOSPITALIST

## 2025-07-09 RX ADMIN — IOHEXOL 1000 ML: 12 SOLUTION ORAL at 11:07

## 2025-07-09 RX ADMIN — IOHEXOL 100 ML: 350 INJECTION, SOLUTION INTRAVENOUS at 11:07

## 2025-07-11 ENCOUNTER — OFFICE VISIT (OUTPATIENT)
Dept: HEMATOLOGY/ONCOLOGY | Facility: CLINIC | Age: 85
End: 2025-07-11
Payer: MEDICARE

## 2025-07-11 ENCOUNTER — LAB VISIT (OUTPATIENT)
Dept: LAB | Facility: HOSPITAL | Age: 85
End: 2025-07-11
Payer: MEDICARE

## 2025-07-11 ENCOUNTER — INFUSION (OUTPATIENT)
Dept: INFUSION THERAPY | Facility: HOSPITAL | Age: 85
End: 2025-07-11
Attending: HOSPITALIST
Payer: MEDICARE

## 2025-07-11 VITALS
WEIGHT: 198.5 LBS | BODY MASS INDEX: 26.89 KG/M2 | SYSTOLIC BLOOD PRESSURE: 168 MMHG | OXYGEN SATURATION: 96 % | DIASTOLIC BLOOD PRESSURE: 85 MMHG | HEART RATE: 58 BPM | TEMPERATURE: 97 F | HEIGHT: 72 IN

## 2025-07-11 VITALS
TEMPERATURE: 97 F | HEART RATE: 59 BPM | DIASTOLIC BLOOD PRESSURE: 81 MMHG | SYSTOLIC BLOOD PRESSURE: 189 MMHG | OXYGEN SATURATION: 97 % | RESPIRATION RATE: 18 BRPM

## 2025-07-11 DIAGNOSIS — C67.9 UROTHELIAL CARCINOMA OF BLADDER: ICD-10-CM

## 2025-07-11 DIAGNOSIS — D50.9 IRON DEFICIENCY ANEMIA, UNSPECIFIED IRON DEFICIENCY ANEMIA TYPE: ICD-10-CM

## 2025-07-11 DIAGNOSIS — I10 PRIMARY HYPERTENSION: ICD-10-CM

## 2025-07-11 DIAGNOSIS — D50.0 IRON DEFICIENCY ANEMIA SECONDARY TO BLOOD LOSS (CHRONIC): ICD-10-CM

## 2025-07-11 DIAGNOSIS — E11.69 DM TYPE 2 WITH DIABETIC DYSLIPIDEMIA: ICD-10-CM

## 2025-07-11 DIAGNOSIS — D50.0 IRON DEFICIENCY ANEMIA SECONDARY TO BLOOD LOSS (CHRONIC): Primary | ICD-10-CM

## 2025-07-11 DIAGNOSIS — E78.5 DM TYPE 2 WITH DIABETIC DYSLIPIDEMIA: ICD-10-CM

## 2025-07-11 DIAGNOSIS — F03.90 DEMENTIA, UNSPECIFIED DEMENTIA SEVERITY, UNSPECIFIED DEMENTIA TYPE, UNSPECIFIED WHETHER BEHAVIORAL, PSYCHOTIC, OR MOOD DISTURBANCE OR ANXIETY: ICD-10-CM

## 2025-07-11 DIAGNOSIS — C67.9 UROTHELIAL CARCINOMA OF BLADDER: Primary | ICD-10-CM

## 2025-07-11 DIAGNOSIS — Z85.51 HISTORY OF BLADDER CANCER: ICD-10-CM

## 2025-07-11 LAB
ABSOLUTE EOSINOPHIL (OHS): 0.05 K/UL
ABSOLUTE MONOCYTE (OHS): 0.65 K/UL (ref 0.3–1)
ABSOLUTE NEUTROPHIL COUNT (OHS): 4.01 K/UL (ref 1.8–7.7)
ANION GAP (OHS): 8 MMOL/L (ref 8–16)
BASOPHILS # BLD AUTO: 0.03 K/UL
BASOPHILS NFR BLD AUTO: 0.5 %
BUN SERPL-MCNC: 23 MG/DL (ref 8–23)
CALCIUM SERPL-MCNC: 9 MG/DL (ref 8.7–10.5)
CHLORIDE SERPL-SCNC: 107 MMOL/L (ref 95–110)
CO2 SERPL-SCNC: 26 MMOL/L (ref 23–29)
CREAT SERPL-MCNC: 1.1 MG/DL (ref 0.5–1.4)
ERYTHROCYTE [DISTWIDTH] IN BLOOD BY AUTOMATED COUNT: 15.2 % (ref 11.5–14.5)
FERRITIN SERPL-MCNC: 388 NG/ML (ref 20–300)
GFR SERPLBLD CREATININE-BSD FMLA CKD-EPI: >60 ML/MIN/1.73/M2
GLUCOSE SERPL-MCNC: 102 MG/DL (ref 70–110)
HCT VFR BLD AUTO: 34.8 % (ref 40–54)
HGB BLD-MCNC: 11.2 GM/DL (ref 14–18)
IMM GRANULOCYTES # BLD AUTO: 0.02 K/UL (ref 0–0.04)
IMM GRANULOCYTES NFR BLD AUTO: 0.4 % (ref 0–0.5)
IRON SATN MFR SERPL: 15 % (ref 20–50)
IRON SERPL-MCNC: 37 UG/DL (ref 45–160)
LYMPHOCYTES # BLD AUTO: 0.84 K/UL (ref 1–4.8)
MCH RBC QN AUTO: 29.4 PG (ref 27–31)
MCHC RBC AUTO-ENTMCNC: 32.2 G/DL (ref 32–36)
MCV RBC AUTO: 91 FL (ref 82–98)
NUCLEATED RBC (/100WBC) (OHS): 0 /100 WBC
PLATELET # BLD AUTO: 144 K/UL (ref 150–450)
PMV BLD AUTO: 10.8 FL (ref 9.2–12.9)
POTASSIUM SERPL-SCNC: 4 MMOL/L (ref 3.5–5.1)
RBC # BLD AUTO: 3.81 M/UL (ref 4.6–6.2)
RELATIVE EOSINOPHIL (OHS): 0.9 %
RELATIVE LYMPHOCYTE (OHS): 15 % (ref 18–48)
RELATIVE MONOCYTE (OHS): 11.6 % (ref 4–15)
RELATIVE NEUTROPHIL (OHS): 71.6 % (ref 38–73)
SODIUM SERPL-SCNC: 141 MMOL/L (ref 136–145)
TIBC SERPL-MCNC: 252 UG/DL (ref 250–450)
TRANSFERRIN SERPL-MCNC: 170 MG/DL (ref 200–375)
WBC # BLD AUTO: 5.6 K/UL (ref 3.9–12.7)

## 2025-07-11 PROCEDURE — 63600175 PHARM REV CODE 636 W HCPCS: Mod: JZ,TB | Performed by: NURSE PRACTITIONER

## 2025-07-11 PROCEDURE — 96375 TX/PRO/DX INJ NEW DRUG ADDON: CPT

## 2025-07-11 PROCEDURE — 36415 COLL VENOUS BLD VENIPUNCTURE: CPT

## 2025-07-11 PROCEDURE — 25000003 PHARM REV CODE 250: Performed by: NURSE PRACTITIONER

## 2025-07-11 PROCEDURE — A4216 STERILE WATER/SALINE, 10 ML: HCPCS | Performed by: NURSE PRACTITIONER

## 2025-07-11 PROCEDURE — 82310 ASSAY OF CALCIUM: CPT

## 2025-07-11 PROCEDURE — 96374 THER/PROPH/DIAG INJ IV PUSH: CPT

## 2025-07-11 PROCEDURE — 85025 COMPLETE CBC W/AUTO DIFF WBC: CPT

## 2025-07-11 PROCEDURE — 99999 PR PBB SHADOW E&M-EST. PATIENT-LVL III: CPT | Mod: PBBFAC,,, | Performed by: HOSPITALIST

## 2025-07-11 PROCEDURE — 82728 ASSAY OF FERRITIN: CPT

## 2025-07-11 PROCEDURE — 83540 ASSAY OF IRON: CPT

## 2025-07-11 RX ORDER — METHYLPREDNISOLONE SOD SUCC 125 MG
40 VIAL (EA) INJECTION
Start: 2025-07-18 | End: 2025-07-18

## 2025-07-11 RX ORDER — DIPHENHYDRAMINE HYDROCHLORIDE 50 MG/ML
50 INJECTION, SOLUTION INTRAMUSCULAR; INTRAVENOUS ONCE AS NEEDED
OUTPATIENT
Start: 2025-07-18

## 2025-07-11 RX ORDER — SODIUM CHLORIDE 0.9 % (FLUSH) 0.9 %
10 SYRINGE (ML) INJECTION
OUTPATIENT
Start: 2025-07-18

## 2025-07-11 RX ORDER — EPINEPHRINE 0.3 MG/.3ML
0.3 INJECTION SUBCUTANEOUS ONCE AS NEEDED
OUTPATIENT
Start: 2025-07-18

## 2025-07-11 RX ORDER — METHYLPREDNISOLONE SOD SUCC 125 MG
40 VIAL (EA) INJECTION
Status: COMPLETED | OUTPATIENT
Start: 2025-07-11 | End: 2025-07-11

## 2025-07-11 RX ORDER — SODIUM CHLORIDE 0.9 % (FLUSH) 0.9 %
10 SYRINGE (ML) INJECTION
Status: DISCONTINUED | OUTPATIENT
Start: 2025-07-11 | End: 2025-07-11 | Stop reason: HOSPADM

## 2025-07-11 RX ADMIN — METHYLPREDNISOLONE SODIUM SUCCINATE 40 MG: 125 INJECTION, POWDER, FOR SOLUTION INTRAMUSCULAR; INTRAVENOUS at 03:07

## 2025-07-11 RX ADMIN — Medication 10 ML: at 03:07

## 2025-07-11 RX ADMIN — IRON SUCROSE 200 MG: 20 INJECTION, SOLUTION INTRAVENOUS at 03:07

## 2025-07-11 NOTE — ASSESSMENT & PLAN NOTE
Currently ELISA following CRT. Has urology appt in August for local surveillance. Will follow up with him in 6 months for repeat CT torso

## 2025-07-11 NOTE — PROGRESS NOTES
MEDICAL ONCOLOGY - ESTABLISHED PATIENT VISIT    Best Contact Phone Number(s): [phone] (home)      Cancer/Stage/TNM:    Cancer Staging   Urothelial carcinoma of bladder  Staging form: Urinary Bladder, AJCC 7th Edition  - Clinical: Stage II (T2, N0, M0) - Signed by Fabián Westbrook MD on 1/17/2025       Reason for visit: MIBC    Treatment:   02/05/25   Cisplatin  02/06/25 - 03/06/25  RT    HPI: Jose Miguel Alvarez Jr. is a 84 y.o. 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 Has long standing history of localized bladder cancer treated with serial TURBT since at least 2016. 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 He was treated with serial TURBT and induction BCG. More recently found to have recurrent disease with associtaed right sided hydronephrosis. Overall concerning for locally advanced disease. He underwent concurrent cisplatin based CRT 02/05/25 - 03/06/25.    History has been obtained by chart review and discussion with the patient.    Interval Events:    Cysto 04/2925 - No recurrent tumor. Exchanged right sided ureteral stent. Has urology appt 08/01/25. CT torso 07/09/25 with ongoing right sided hydrnephrosis and perinephric stranding.     Following with ID and podiatry regarding his osteomyelitis. Also with IV iron infusions per hematology.    Wife recently passed away and he has been having some difficulty adjusting. Has intermittent hematuria a few times a month. No signficant dyuria. Appetite is good. No N/V/D. No CP, SOB or cough. No signficant neuropathy. Remains faiirly active and independent.       Physical Exam:   BP (!) 168/85 (BP Location: Left arm, Patient Position: Sitting)   Pulse (!) 58   Temp 97.1 °F (36.2 °C) (Temporal)   Ht 6' (1.829 m)   Wt 90.1 kg (198 lb 8.4 oz)   SpO2 96%   BMI 26.92 kg/m²      ECOG Performance status: 2            Physical Exam  Constitutional:       General: He is not in acute distress.     Appearance: Normal appearance.   HENT:      Head:  Normocephalic.   Eyes:      General: No scleral icterus.     Extraocular Movements: Extraocular movements intact.      Conjunctiva/sclera: Conjunctivae normal.   Cardiovascular:      Rate and Rhythm: Normal rate.      Heart sounds: No murmur heard.  Pulmonary:      Effort: Pulmonary effort is normal. No respiratory distress.   Abdominal:      General: There is no distension.      Palpations: Abdomen is soft.   Skin:     General: Skin is warm and dry.   Neurological:      Mental Status: He is alert and oriented to person, place, and time.      Motor: No weakness.   Psychiatric:         Mood and Affect: Mood normal.         Behavior: Behavior normal.         Thought Content: Thought content normal.           Labs:   Recent Results (from the past 48 hours)   Basic Metabolic Panel    Collection Time: 07/11/25  1:21 PM   Result Value Ref Range    Sodium 141 136 - 145 mmol/L    Potassium 4.0 3.5 - 5.1 mmol/L    Chloride 107 95 - 110 mmol/L    CO2 26 23 - 29 mmol/L    Glucose 102 70 - 110 mg/dL    BUN 23 8 - 23 mg/dL    Creatinine 1.1 0.5 - 1.4 mg/dL    Calcium 9.0 8.7 - 10.5 mg/dL    Anion Gap 8 8 - 16 mmol/L    eGFR >60 >60 mL/min/1.73/m2   CBC with Differential    Collection Time: 07/11/25  1:21 PM   Result Value Ref Range    WBC 5.60 3.90 - 12.70 K/uL    RBC 3.81 (L) 4.60 - 6.20 M/uL    HGB 11.2 (L) 14.0 - 18.0 gm/dL    HCT 34.8 (L) 40.0 - 54.0 %    MCV 91 82 - 98 fL    MCH 29.4 27.0 - 31.0 pg    MCHC 32.2 32.0 - 36.0 g/dL    RDW 15.2 (H) 11.5 - 14.5 %    Platelet Count 144 (L) 150 - 450 K/uL    MPV 10.8 9.2 - 12.9 fL    Nucleated RBC 0 <=0 /100 WBC    Neut % 71.6 38 - 73 %    Lymph % 15.0 (L) 18 - 48 %    Mono % 11.6 4 - 15 %    Eos % 0.9 <=8 %    Basophil % 0.5 <=1.9 %    Imm Grans % 0.4 0.0 - 0.5 %    Neut # 4.01 1.8 - 7.7 K/uL    Lymph # 0.84 (L) 1 - 4.8 K/uL    Mono # 0.65 0.3 - 1 K/uL    Eos # 0.05 <=0.5 K/uL    Baso # 0.03 <=0.2 K/uL    Imm Grans # 0.02 0.00 - 0.04 K/uL         Assessment and Plan:   1.  Urothelial carcinoma of bladder  Overview:  Locally advanced urothelial carcionoma of the bladder. Has long standing history of localized bladder cancer treated with serial TURBT since at least 2016. 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 He was treated with serial TURBT and induction BCG. More recently found to have recurrent disease with associtaed right sided hydronephrosis.  Overall concerning for locally advanced disease. He underwent concurrent cisplatin based CRT 02.05/25 - 03/06/25.    Assessment & Plan:  Currently ELISA following CRT. Has urology appt in August for local surveillance. Will follow up with him in 6 months for repeat CT torso      2. Primary hypertension  Overview:  Home medications include amldoipine, carvedilol, and hydralazine    Assessment & Plan:  - BP elevated today  - Will CTM      3. DM type 2 with diabetic dyslipidemia  Overview:  He has T2DM; A1c very well managed with diet alone.      4. Dementia, unspecified dementia severity, unspecified dementia type, unspecified whether behavioral, psychotic, or mood disturbance or anxiety  Overview:  Mild. 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   Follow up with [facility]    Infusion scheduling note    Injection scheduling note    Labs CBC and CMP   Scheduling:  Preferred lab:  Lab interval:     Imaging    Pharmacy appointment    Other referrals                  Therapy Plan Information  VENOFER (IRON SUCROSE) QW for Iron deficiency anemia secondary to blood loss (chronic), noted on 5/29/2021  Medications  iron sucrose injection 200 mg  200 mg, Intravenous, Every visit  methylPREDNISolone sodium succinate injection 40 mg  40 mg, Intravenous, Every visit  Anaphylaxis/Hypersensitivity  EPINEPHrine (EPIPEN) 0.3  mg/0.3 mL pen injection 0.3 mg  0.3 mg, Intramuscular, PRN  diphenhydrAMINE injection 50 mg  50 mg, Intravenous, PRN  hydrocortisone sodium succinate injection 100 mg  100 mg, Intravenous, PRN  Flushes  0.9% NaCl 250 mL flush bag  Intravenous, 1 time a week  sodium chloride 0.9% flush 10 mL  10 mL, Intravenous, 1 time a week      No therapy plan of the specified type found.    No therapy plan of the specified type found.      The above information has been reviewed with the patient and all questions have been answered to their apparent satisfaction.  They understand that they can call the clinic with any questions.    Fabián Westbrook MD  Hematology/Oncology  [facility]

## 2025-07-11 NOTE — PLAN OF CARE
Problem: Adult Inpatient Plan of Care  Goal: Plan of Care Review  Outcome: Progressing  Flowsheets (Taken 7/11/2025 1530)  Plan of Care Reviewed With: (son) patient  Goal: Patient-Specific Goal (Individualized)  Outcome: Progressing  Flowsheets (Taken 7/11/2025 1530)  Individualized Care Needs: warm blanket, apple juice  Anxieties, Fears or Concerns: none verbalized     Problem: Anemia  Goal: Anemia Symptom Improvement  Outcome: Progressing  Intervention: Monitor and Manage Anemia  Flowsheets (Taken 7/11/2025 1530)  Oral Nutrition Promotion: rest periods promoted  Safety Promotion/Fall Prevention:   room near unit station   instructed to call staff for mobility   in recliner, wheels locked  Fatigue Management: frequent rest breaks encouraged

## 2025-07-17 ENCOUNTER — HOSPITAL ENCOUNTER (OUTPATIENT)
Dept: RADIOLOGY | Facility: HOSPITAL | Age: 85
Discharge: HOME | End: 2025-07-17
Attending: NURSE PRACTITIONER
Payer: MEDICARE

## 2025-07-18 ENCOUNTER — INFUSION (OUTPATIENT)
Dept: INFUSION THERAPY | Facility: HOSPITAL | Age: 85
End: 2025-07-18
Attending: HOSPITALIST
Payer: MEDICARE

## 2025-07-18 VITALS
HEART RATE: 56 BPM | SYSTOLIC BLOOD PRESSURE: 146 MMHG | TEMPERATURE: 97 F | OXYGEN SATURATION: 97 % | RESPIRATION RATE: 18 BRPM | DIASTOLIC BLOOD PRESSURE: 69 MMHG

## 2025-07-18 DIAGNOSIS — D50.0 IRON DEFICIENCY ANEMIA SECONDARY TO BLOOD LOSS (CHRONIC): Primary | ICD-10-CM

## 2025-07-18 PROCEDURE — 96374 THER/PROPH/DIAG INJ IV PUSH: CPT

## 2025-07-18 PROCEDURE — 96375 TX/PRO/DX INJ NEW DRUG ADDON: CPT

## 2025-07-18 PROCEDURE — 63600175 PHARM REV CODE 636 W HCPCS: Performed by: NURSE PRACTITIONER

## 2025-07-18 RX ORDER — DIPHENHYDRAMINE HYDROCHLORIDE 50 MG/ML
50 INJECTION, SOLUTION INTRAMUSCULAR; INTRAVENOUS ONCE AS NEEDED
OUTPATIENT
Start: 2025-07-25

## 2025-07-18 RX ORDER — METHYLPREDNISOLONE SOD SUCC 125 MG
40 VIAL (EA) INJECTION
Status: COMPLETED | OUTPATIENT
Start: 2025-07-18 | End: 2025-07-18

## 2025-07-18 RX ORDER — EPINEPHRINE 0.3 MG/.3ML
0.3 INJECTION SUBCUTANEOUS ONCE AS NEEDED
OUTPATIENT
Start: 2025-07-25

## 2025-07-18 RX ORDER — SODIUM CHLORIDE 0.9 % (FLUSH) 0.9 %
10 SYRINGE (ML) INJECTION
Status: DISCONTINUED | OUTPATIENT
Start: 2025-07-18 | End: 2025-07-18 | Stop reason: HOSPADM

## 2025-07-18 RX ORDER — METHYLPREDNISOLONE SOD SUCC 125 MG
40 VIAL (EA) INJECTION
Status: CANCELLED
Start: 2025-07-25 | End: 2025-07-25

## 2025-07-18 RX ORDER — SODIUM CHLORIDE 0.9 % (FLUSH) 0.9 %
10 SYRINGE (ML) INJECTION
Status: CANCELLED | OUTPATIENT
Start: 2025-07-25

## 2025-07-18 RX ADMIN — METHYLPREDNISOLONE SODIUM SUCCINATE 40 MG: 125 INJECTION, POWDER, FOR SOLUTION INTRAMUSCULAR; INTRAVENOUS at 08:07

## 2025-07-18 RX ADMIN — IRON SUCROSE 200 MG: 20 INJECTION, SOLUTION INTRAVENOUS at 08:07

## 2025-07-18 NOTE — PLAN OF CARE
Problem: Adult Inpatient Plan of Care  Goal: Plan of Care Review  Outcome: Progressing  Goal: Patient-Specific Goal (Individualized)  Outcome: Progressing     Problem: Anemia  Goal: Anemia Symptom Improvement  Outcome: Progressing

## 2025-07-19 DIAGNOSIS — E78.5 DM TYPE 2 WITH DIABETIC DYSLIPIDEMIA: ICD-10-CM

## 2025-07-19 DIAGNOSIS — E11.69 DM TYPE 2 WITH DIABETIC DYSLIPIDEMIA: ICD-10-CM

## 2025-07-19 NOTE — TELEPHONE ENCOUNTER
Care Due:                  Date            Visit Type   Department     Provider  --------------------------------------------------------------------------------                                EP -                              PRIMARY      [facility]  Toshia STEIN  Last Visit: 03-      CARE ([facility])   [facility]       Jacquelyn  Next Visit: None Scheduled  None         None Found                                                            Last  Test          Frequency    Reason                     Performed    Due Date  --------------------------------------------------------------------------------    Lipid Panel.  12 months..  atorvastatin.............  09- 08-    [facility] Embedded Care Due Messages. Reference number: [id number].   7/19/2025 2:04:26 PM CDT

## 2025-07-20 RX ORDER — ATORVASTATIN CALCIUM 20 MG/1
20 TABLET, FILM COATED ORAL NIGHTLY
Qty: 90 TABLET | Refills: 0 | Status: SHIPPED | OUTPATIENT
Start: 2025-07-20

## 2025-07-20 NOTE — TELEPHONE ENCOUNTER
Provider Staff:  Action required for this patient    Requires labs      Please see care gap opportunities below in Care Due Message.    Thanks!  [facility]     Appointments      Date Provider   Last Visit   3/7/2025 Toshia Valladares MD   Next Visit   Visit date not found Toshia Valladares MD     Refill Decision Note   Jose Miguel Alvarez  is requesting a refill authorization.  Brief Assessment and Rationale for Refill:  Approve     Medication Therapy Plan:        Comments:     Note composed:10:18 AM 07/20/2025

## 2025-07-24 ENCOUNTER — PATIENT MESSAGE (OUTPATIENT)
Dept: ADMINISTRATIVE | Facility: HOSPITAL | Age: 85
End: 2025-07-24
Payer: MEDICARE

## 2025-07-24 DIAGNOSIS — C67.9 UROTHELIAL CARCINOMA OF BLADDER: ICD-10-CM

## 2025-07-24 DIAGNOSIS — D50.0 IRON DEFICIENCY ANEMIA SECONDARY TO BLOOD LOSS (CHRONIC): Primary | ICD-10-CM

## 2025-07-24 RX ORDER — SODIUM CHLORIDE 0.9 % (FLUSH) 0.9 %
10 SYRINGE (ML) INJECTION
OUTPATIENT
Start: 2025-07-24

## 2025-07-24 RX ORDER — EPINEPHRINE 0.3 MG/.3ML
0.3 INJECTION SUBCUTANEOUS ONCE AS NEEDED
OUTPATIENT
Start: 2025-07-24

## 2025-07-24 RX ORDER — HEPARIN 100 UNIT/ML
500 SYRINGE INTRAVENOUS
OUTPATIENT
Start: 2025-07-24

## 2025-08-01 ENCOUNTER — HOSPITAL ENCOUNTER (OUTPATIENT)
Dept: RADIOLOGY | Facility: HOSPITAL | Age: 85
Discharge: HOME | End: 2025-08-01
Attending: UROLOGY
Payer: MEDICARE

## 2025-08-01 ENCOUNTER — TELEPHONE (OUTPATIENT)
Dept: UROLOGY | Facility: CLINIC | Age: 85
End: 2025-08-01

## 2025-08-01 ENCOUNTER — OFFICE VISIT (OUTPATIENT)
Dept: UROLOGY | Facility: CLINIC | Age: 85
End: 2025-08-01
Payer: MEDICARE

## 2025-08-01 VITALS
SYSTOLIC BLOOD PRESSURE: 179 MMHG | DIASTOLIC BLOOD PRESSURE: 88 MMHG | HEIGHT: 72 IN | HEART RATE: 52 BPM | RESPIRATION RATE: 18 BRPM | WEIGHT: 204.56 LBS | BODY MASS INDEX: 27.71 KG/M2

## 2025-08-01 DIAGNOSIS — Z85.51 HISTORY OF BLADDER CANCER: Primary | ICD-10-CM

## 2025-08-01 DIAGNOSIS — Z01.818 PREOPERATIVE TESTING: Primary | ICD-10-CM

## 2025-08-01 DIAGNOSIS — N20.0 NEPHROLITHIASIS: ICD-10-CM

## 2025-08-01 DIAGNOSIS — N40.1 BENIGN PROSTATIC HYPERPLASIA WITH URINARY FREQUENCY: ICD-10-CM

## 2025-08-01 DIAGNOSIS — R35.0 BENIGN PROSTATIC HYPERPLASIA WITH URINARY FREQUENCY: ICD-10-CM

## 2025-08-01 DIAGNOSIS — R31.0 GROSS HEMATURIA: ICD-10-CM

## 2025-08-01 DIAGNOSIS — Z85.51 HISTORY OF BLADDER CANCER: ICD-10-CM

## 2025-08-01 DIAGNOSIS — Z01.818 PREOP TESTING: ICD-10-CM

## 2025-08-01 DIAGNOSIS — N30.01 ACUTE CYSTITIS WITH HEMATURIA: ICD-10-CM

## 2025-08-01 PROCEDURE — 76770 US EXAM ABDO BACK WALL COMP: CPT | Mod: TC

## 2025-08-01 PROCEDURE — 99999 PR PBB SHADOW E&M-EST. PATIENT-LVL IV: CPT | Mod: PBBFAC,,, | Performed by: UROLOGY

## 2025-08-01 PROCEDURE — 74018 RADEX ABDOMEN 1 VIEW: CPT | Mod: TC

## 2025-08-01 PROCEDURE — 74018 RADEX ABDOMEN 1 VIEW: CPT | Mod: 26,,, | Performed by: RADIOLOGY

## 2025-08-01 PROCEDURE — 71046 X-RAY EXAM CHEST 2 VIEWS: CPT | Mod: TC

## 2025-08-01 PROCEDURE — 71046 X-RAY EXAM CHEST 2 VIEWS: CPT | Mod: 26,,, | Performed by: RADIOLOGY

## 2025-08-01 RX ORDER — CEFAZOLIN SODIUM 2 G/50ML
2 SOLUTION INTRAVENOUS
OUTPATIENT
Start: 2025-08-01

## 2025-08-01 NOTE — PROGRESS NOTES
Chief Complaint:   Encounter Diagnoses   Name Primary?    History of bladder cancer Yes    Gross hematuria     Benign prostatic hyperplasia with urinary frequency     Nephrolithiasis     Acute cystitis with hematuria        HPI:   8/1/25- here today to schedule for stent exchange versus removal, no issues with voiding, stone disease, infection or hematuria.    12/22/21- patient comes in today to establish care my clinic, recent gross hematuria and reported to see Alexandra.  CT urogram demonstrates bladder stones, urinary retention and a distal right ureteral stone.  Patient has had a Pandey catheter would like this removed today, no more gross hematuria, he is also on antibiotics and tamsulosin.  Patient otherwise was voiding well with no complaints prior to this incident, history of TURBT with subsequent induction BCG, no recurrences.      Allergies:  Patient has no known allergies.    Medications:  has a current medication list which includes the following prescription(s): amiodarone, amlodipine, atorvastatin, donepezil, ergocalciferol, famotidine, ferrous sulfate, hydrochlorothiazide, metoprolol succinate, multivitamin, nitrofurantoin (macrocrystal-monohydrate), oxycodone-acetaminophen, pantoprazole, phenazopyridine, rivaroxaban, and tamsulosin, and the following Facility-Administered Medications: lactated ringers.    Review of Systems:  General: No fever, chills, fatigability, or weight loss.  Skin: No rashes, itching, or changes in color or texture of skin.  Chest: Denies RECINOS, cyanosis, wheezing, cough, and sputum production.  Abdomen: Appetite fine. No weight loss. Denies diarrhea, abdominal pain, hematemesis, or blood in stool.  Musculoskeletal: No joint stiffness or swelling. Denies back pain.  : As above.  All other review of systems negative.    PMH:   has a past medical history of Anemia, Anticoagulant long-term use, Atrial fibrillation, Bladder cancer, Bladder tumor, CKD (chronic kidney disease), stage III,  COPD (chronic obstructive pulmonary disease), Encounter for blood transfusion, Hematuria, History of 2019 novel coronavirus disease (COVID-19), Hypercholesteremia, and Hypertension.    PSH:   has a past surgical history that includes Cholecystectomy; Cystoscopy; Cardioversion; bladder tumor removal; Eye surgery; cataract removal with IOL implants (Bilateral); Cardiac electrophysiology mapping and ablation; Transurethral resection of bladder tumor by bipolar cautery (N/A, 7/23/2019); Esophagogastroduodenoscopy (N/A, 10/25/2021); Intraluminal gastrointestinal tract imaging via capsule (N/A, 12/6/2021); Esophagogastroduodenoscopy (N/A, 12/13/2021); Cystoscopy with biopsy of bladder (Right, 12/28/2021); and Cystoscopic litholapaxy (N/A, 12/28/2021).    FamHx: family history includes Heart disease in his brother and father.    SocHx:  reports that he quit smoking about 35 years ago. His smoking use included cigarettes. He has a 50.00 pack-year smoking history. He has never used smokeless tobacco. He reports current alcohol use of about 8.0 standard drinks of alcohol per week. He reports that he does not use drugs.      Physical Exam:  General: A&Ox3, no apparent distress, no deformities  Neck: No masses, normal thyroid  Lungs: normal inspiration, no use of accessory muscles  Heart: normal pulse, no arrhythmias  Abdomen: Soft, NT, ND, no masses, no hernias, no hepatosplenomegaly  Lymphatic: Neck and groin nodes negative  : 11/24- Test desc filipe, no abnormalities of epididymus. Normal penile and scrotal skin. Meatus normal.     Labs/Studies:    ml 6/24  Cystoscopy tumors along the lateral wall left side, debris, TUR defect 11/24  Cytology suspicious 11/24  Cytology negative 4/24  KUB/CORDELL right stent 7/25  CT without contrast right stent and nephrostomy, no left hydro 1/25  CORDELL right hydro 12/24  CT stone protocol asymetry to right bladder wall with hydro 11/24  CORDELL moderate right-sided hydronephrosis 4/24  CT  urogram bladder distention with bilateral hydronephrosis, bladder stone 4/24  CT stone protocol no nephrolithiasis 1/22  CT urogram 0.5 cm distal right ureteral stone, bladder stones, BPH 12/21  Creatinine 1.1 7/25  PSA 2.1 10/24    Impression/Plan:        1. History of bladder cancer-  right stent  4/29/25,  cisplatin complete 2/25,  XRT  3/25,  right PCN removal, stent left in place 1/31/25, right PCN and stent by IR  1/17/25,  pT1 high turbt with mitomycin  12/3/24,  BCG 3/22, High pTa, but suspicious for T1 cystoscopy with bladder biopsy, vesical litholapaxy, could not assess the right ureter  12/28/21.  TURBT 5-6 years ago at the outside facility with induction BCG.    Patient has high-grade T1 disease, no muscle present.  Did not want to pursue a cystectomy, but has now completed chemoradiation.  Nephrostomy was removed by Interventional Radiology, he is now due for possible stent exchange versus removal.  Therefore will take him back for cystoscopy, possible stent exchange versus removal, possible TURBT, and retrograde.  Please see below in regards to our discussion today.  Will most likely need a CT urogram 1st of next year.  Call with any complaints prior to his next appointment, we have had discussions today about the passing of his wife.    2. Nephrolithiasis- no recurrent stones.    3. BPH-  TURP, vesicolithalopaxy  5/14/24    Patient states that he is voiding well off all medical management.    4. Gross hematuria- no evidence of recurrence, call with any issues prior to the next appointment.    5. Erectile dysfunction- Viagra 100 mg but currently not an issue.    Patient understands the risks, benefits and alternatives of the above-stated procedure.  These include but not limited to damage to the surrounding structures including the urethra, ureters and bladder.  Risk of perforation requiring open procedures, Pandey catheterization at the conclusion of the procedure.  Risk of need for further surgeries.   Risk of pain, hematuria, infections.  Risk of heart attack, stroke, death, DVT and PE.  Patient understanding of all the above he has elected to pursue.

## 2025-08-01 NOTE — TELEPHONE ENCOUNTER
This is a mutual pt whom Dr. Garcia will be taking to surgery to do a stent change.  Will you write a note of surgery clearance/recommendation?  Thanks so much.

## 2025-08-01 NOTE — TELEPHONE ENCOUNTER
"Received the following message from Dr. Flower:    "Elevated periop risk of CV events for non-high risk procedure.  Ok to proceed the scheduled procedure without further cardiac study.  OK to hold ASA 5 days before the procedure and resume postop once hemodynamically stable".  Patient and son notified.  "

## 2025-08-02 ENCOUNTER — E-CONSULT (OUTPATIENT)
Dept: CARDIOLOGY | Facility: CLINIC | Age: 85
End: 2025-08-02
Payer: MEDICARE

## 2025-08-02 DIAGNOSIS — Z01.810 PREOP CARDIOVASCULAR EXAM: Primary | ICD-10-CM

## 2025-08-02 NOTE — CONSULTS
[facility] - Cardiology  Response for E-Consult     Patient Name: Jose Miguel Alvarez Jr.  MRN: [id number]  Primary Care Provider: Toshia Valladares MD   Requesting Provider: Aroldo Avendaño II, MD  E-Consult to General Cardiology  Consult performed by: Carlos Flower MD  Consult ordered by: Aroldo Avendaño II, MD      E consult for preop clearance of Cystoscopy with ureteral stent insertion surgery   The chart reviewed.  PMH copd PVD s/p watchman, PAF,   03/25 EKG reviewed by myself today reveals NSR LVH nonspecific STT change      Plan  Elevated periop risk of CV events for non-high risk procedure.  Ok to proceed the scheduled procedure without further cardiac study.  OK to hold ASA 5 days before the procedure and resume postop once hemodynamically stable      Total time of Consultation: 10 minute    I did not speak to the requesting provider verbally about this.     *This eConsult is based on the clinical data available to me and is furnished without benefit of a physical examination. The eConsult will need to be interpreted in light of any clinical issues or changes in patient status not available to me at the time of filing this eConsults. Significant changes in patient condition or level of acuity should result in immediate formal consultation and reevaluation. Please alert me if you have further questions.    Thank you for this eConsult referral.     Carlos Flower MD  [facility] - [facility]

## 2025-08-05 ENCOUNTER — OFFICE VISIT (OUTPATIENT)
Dept: RADIATION ONCOLOGY | Facility: CLINIC | Age: 85
End: 2025-08-05
Payer: MEDICARE

## 2025-08-05 ENCOUNTER — OFFICE VISIT (OUTPATIENT)
Dept: OPHTHALMOLOGY | Facility: CLINIC | Age: 85
End: 2025-08-05
Payer: MEDICARE

## 2025-08-05 VITALS
OXYGEN SATURATION: 99 % | HEIGHT: 72 IN | TEMPERATURE: 97 F | DIASTOLIC BLOOD PRESSURE: 80 MMHG | HEART RATE: 62 BPM | WEIGHT: 205 LBS | BODY MASS INDEX: 27.77 KG/M2 | RESPIRATION RATE: 19 BRPM | SYSTOLIC BLOOD PRESSURE: 154 MMHG

## 2025-08-05 DIAGNOSIS — H52.7 REFRACTIVE ERRORS: ICD-10-CM

## 2025-08-05 DIAGNOSIS — C61 ADENOCARCINOMA OF PROSTATE: Primary | ICD-10-CM

## 2025-08-05 DIAGNOSIS — H18.003 CORNEA VERTICILLATA OF BOTH EYES: ICD-10-CM

## 2025-08-05 DIAGNOSIS — E11.9 DIABETES MELLITUS TYPE 2 WITHOUT RETINOPATHY: ICD-10-CM

## 2025-08-05 DIAGNOSIS — H35.3131 NONEXUDATIVE AGE-RELATED MACULAR DEGENERATION, BILATERAL, EARLY DRY STAGE: ICD-10-CM

## 2025-08-05 DIAGNOSIS — Z96.1 PSEUDOPHAKIA OF BOTH EYES: ICD-10-CM

## 2025-08-05 DIAGNOSIS — H47.392 OPTIC DISC HEMORRHAGE, LEFT: Primary | ICD-10-CM

## 2025-08-05 PROCEDURE — 99999 PR PBB SHADOW E&M-EST. PATIENT-LVL III: CPT | Mod: PBBFAC,,, | Performed by: OPTOMETRIST

## 2025-08-05 PROCEDURE — 92014 COMPRE OPH EXAM EST PT 1/>: CPT | Mod: S$GLB,,, | Performed by: OPTOMETRIST

## 2025-08-05 PROCEDURE — 2023F DILAT RTA XM W/O RTNOPTHY: CPT | Mod: CPTII,S$GLB,, | Performed by: OPTOMETRIST

## 2025-08-05 PROCEDURE — 92134 CPTRZ OPH DX IMG PST SGM RTA: CPT | Mod: S$GLB,,, | Performed by: OPTOMETRIST

## 2025-08-05 PROCEDURE — 1159F MED LIST DOCD IN RCRD: CPT | Mod: CPTII,S$GLB,, | Performed by: OPTOMETRIST

## 2025-08-05 PROCEDURE — 1157F ADVNC CARE PLAN IN RCRD: CPT | Mod: CPTII,S$GLB,, | Performed by: OPTOMETRIST

## 2025-08-05 PROCEDURE — 99999 PR PBB SHADOW E&M-EST. PATIENT-LVL IV: CPT | Mod: PBBFAC,,, | Performed by: SPECIALIST

## 2025-08-05 NOTE — PROGRESS NOTES
SUBJECTIVE  Jose Miguel Alvarez Jr. is 84 y.o. male  Uncorrected distance visual acuity was 20/40 in the right eye and 20/400 in the left eye. Uncorrected near visual acuity was J3 in the right eye and J3 in the left eye.   Chief Complaint   Patient presents with    Diabetic Eye Exam     Lab Results       Component                Value               Date                       HGBA1C                   5.9 (H)             03/24/2025                    HPI     Diabetic Eye Exam     Additional comments: Lab Results       Component                Value               Date                       HGBA1C                   5.9 (H)             03/24/2025                     Comments    Patient states inner corner of the left eye got hit with a stick about a   month ago. Blurred afterward but no ocular pain/discomfort.  Using otc drop mornings.   Wear glasses but did not bring them in today.          Last edited by Netta Bautista MA on 8/5/2025  8:32 AM.         Assessment /Plan :  1. Optic disc hemorrhage, left  Recent blunt force trauma with stick to OS nasally.  DM and HTM may contribute to the ONH heme OS  Consult Dr Mccarthy for eval    2. Nonexudative age-related macular degeneration, bilateral, early dry stage  - Posterior Segment OCT Retina-Both eyes  Consult Dr Mccarthy for eval    3. Diabetes mellitus type 2 without retinopathy  No Background Diabetic Retinopathy  Strict BG control, f/u w/ PCP, and annual DFE  Stressed importance of DM control to preserve vision     4. Pseudophakia of both eyes  Well-centered, stable IOL OU. Monitor annually.     5. Cornea verticillata of both eyes  Unchanged  Still taking amiodarone    6. Refractive errors  Dispense Final Rx for glasses.  RTC with Dr Mccarthy for ONH and AMD eval  Discussed above and answered questions.

## 2025-08-05 NOTE — PROGRESS NOTES
[facility] / [facility] - Radiation Oncology Follow Up Note    HISTORY OF PRESENT ILLNESS: 84-year-old man with longstanding superficial bladder cancer, since presenting to Dr. Garcia with gross hematuria and positive biopsy 07/23/2019.  Per his note on 12/27/2024:     1. History of bladder cancer-  pT1 high turbt with mitomycin  12/3/24,  BCG 3/22, High pTa, but suspicious for T1 cystoscopy with bladder biopsy, vesical litholapaxy, could not assess the right ureter  12/28/21.  TURBT 5-6 years ago at the outside facility with induction BCG.     Patient now has high-grade T1 disease, no muscle present.  Given the fact that he has right ureteral obstruction with no evidence of UO on cystoscopic evaluation, this is highly suspicious for T2 disease.  We have discussed radical cystectomy, at this time due to other comorbidities and his age, he would rather not pursue.  Therefore will refer to Radiation Oncology and Medical Oncology for possible chemoradiation therapy.  In addition due to the obstructed right ureter will proceed with percutaneous nephrostomy with hopefully antegrade stent placement by Interventional Radiology.  Patient is currently asymptomatic in regards to the hydronephrosis, no significant change per imaging.  Creatinine has been relatively stable up to this point.  Will see him back following these consultations, call with any issues in the meantime.     2. Nephrolithiasis- no recurrent stones.     3. BPH-  TURP, vesicolithalopaxy  5/14/24     Patient states that he is voiding well off all medical management.     4. Gross hematuria- intermittent but no evidence today.  Most likely due to the tumors, only on baby aspirin.     5. Erectile dysfunction- given Viagra 100 mg but currently not an issue.     He is also followed by hematology with the following history through 01/13/2025, and plans to transfer all Heme-Onc care to Dr. Hilton in Oncology where he will be seen for  initial evaluation later this morning     9/25/2024 Presents today to discuss treatment recommendations due to anaphalactic reaction with first dose of Feraheme on 9/9/2024.  Upon initiation of IV iron he started to have severe back pain so infusions was stopped and saline was infusing.  Symptoms resolved and infusions was restarted at a slower rate.  Patient began to lose vision and had a syncopal episode with an extreme drop in blood pressure.  He was give steroid injection and BP began to rise and regained consciousness.  He was transported from facility to hospital via ambulance for evaluation.  Feraheme has been listed as an allergy in his chart.  Review of past iron infusions show that he has received Injectafer infusions in the past without issue in 2021 and he also tells me that he received IV iron after blood transfusion many years ago in the hospital and tolerated fine.  Currently taking ferrous sulfate 325 mg PO every other day and is tolerating well.  He is accompanied by his son. He will be having a special GI procedure tomorrow Upper DBE on 9/26/2024 with dr. Ruff in [place] to assess for and treat angiectasias in the small intestines.  He continues to take Protonix 40 mg po daily.  He denies and known abnormal bleeding and is asymptomatic. VCE in 4/2024 with active small bowel bleeding seen.     10/31/2024 Presents today to evaluate response of oral iron.  Has been taking ferrous sulfate 325 mg PO daily since last visit wtihout complaints.  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  Denies any current known GI bleeding.  Hgb slowly improving - continues to have low ferritin.  Has brain fogginess.  Is accompanied by his son.  Has decided that he would like to get the iron that he received previously that he was able to tolerate.      I1/13/2025 Received venofer 200 mg IV x 4 doses and presents today to evaluate response.  Has been found with recurrent bladder cancer  and has initial evaluation with oncology and radiology upcoming. Hgb continues to trend upward.  Denies any known abnormal bleeding.  Continued stable thrombocytopenia with known borderline splenomegaly.  Saturated iron of 6%.  Had watchman procedure done.  No longer on anticoagulation.   I have reviewed all of the patient's relevant lab work available in the medical record and have utilized this in my evaluation and management recommendations today.      Abdominopelvic CT on renal stone protocol on 11/18/2024 described focal thickening along the posterior bladder wall, asymmetric to the right, producing moderate right-sided hydronephrosis and hydroureter.          Retroperitoneal ultrasound on 12/23/2024 shows right-sided hydronephrosis consistent with known right-sided bladder mass at the UVJ.  Normal left side     Baseline  REVIEW OF SYSTEMS: He takes Flomax and describes good urinary function without hematuria. He has no pelvic or other pain or any awareness of his cancer or hydroureter. He remains active without limitation. He has no other focal complaints         Treatment Summary: He was treated to the whole bladder to 55 Gy in 20 fractions last on 03/06/2025, with weekly cisplatin              INTERVAL HISTORY 04/04/2025:  He returns for routine short interval follow up.  He had tolerated t therapy well, reporting modest dose appropriate LUTS requiring no additional medication.  He presented on nightly Flomax in his uncertain if he has continued to take that, as his son prepares his weekly pills for him, but reports overwhelming but incomplete resolution of LUTS with a occasional small volume hematuria, improving, occasional tolerable dysuria, improving, and ongoing but improving urgency without urge incontinence.  He has nocturia 2 or 3 times per evening.  He denies weak stream, hesitancy, daytime frequency less than 2 hours, or incontinence.  He has no new bone pain or GI complaints.            INTERVAL  HISTORY:  He returns for routine follow up.  He has no new complaints related to therapy are worrisome for local regional or metastatic recurrence.  He describes excellent urinary function on no medication including rare nocturia, denying weak stream, hesitancy, hematuria, dysuria, daytime urgency, daytime frequency less than 2 hours, or incontinence.  He has no new bone pain or GI complaints    Cystoscopy with Dr. Garcia on 04/29/2025 showed no evidence of malignancy    CT of the chest abdomen and pelvis on 07/09/2025 shows moderate right-sided perinephric stranding and periureteral stranding with mild hydronephrosis and hydroureter with moderate urothelial thickening and inflammatory changes surrounding the renal pelvis and along the right ureter concerning for UTI.  Right sided double-J stent is in place.  Persistent bladder wall thickening    He underwent cystoscopy with Dr. Garcia on 08/01/2025.  I do not have that report, but the patient reports negative findings      PHYSICAL EXAMINATION:  Vitals:    08/05/25 1007   BP: (!) 154/80   Pulse: 62   Resp: 19   Temp: 97.3 °F (36.3 °C)   SpO2: 99%   Weight: 93 kg (205 lb)   Height: 6' (1.829 m)      General:  A&O x4, NAD  HEENT:  PEERLA, CN II-XII intact, EOM intact,   Lymphatics:  no cervical/sclav LAD  Lungs:  CTAB  Heart:  RRR  Abdomen:  NTND +BS, no HSM      ASSESSMENT:  Clinically ELISA with excellent urinary function      PLAN:  He maintains regular follow up with Dr. Westbrook and Dr. Garcia where radiographic and cystoscopic follow up is obtained.  Follow up here in 1 year

## 2025-08-07 DIAGNOSIS — F02.80 DEMENTIA ASSOCIATED WITH OTHER UNDERLYING DISEASE, WITHOUT BEHAVIORAL DISTURBANCE, PSYCHOTIC DISTURBANCE, MOOD DISTURBANCE, OR ANXIETY, UNSPECIFIED DEMENTIA SEVERITY: ICD-10-CM

## 2025-08-08 RX ORDER — DONEPEZIL HYDROCHLORIDE 5 MG/1
5 TABLET, FILM COATED ORAL
Qty: 90 TABLET | Refills: 3 | Status: SHIPPED | OUTPATIENT
Start: 2025-08-08

## 2025-08-11 ENCOUNTER — TELEPHONE (OUTPATIENT)
Dept: INFUSION THERAPY | Facility: HOSPITAL | Age: 85
End: 2025-08-11
Payer: MEDICARE

## 2025-08-12 ENCOUNTER — OFFICE VISIT (OUTPATIENT)
Dept: INTERNAL MEDICINE | Facility: CLINIC | Age: 85
End: 2025-08-12
Payer: MEDICARE

## 2025-08-12 VITALS
SYSTOLIC BLOOD PRESSURE: 142 MMHG | OXYGEN SATURATION: 94 % | DIASTOLIC BLOOD PRESSURE: 80 MMHG | RESPIRATION RATE: 20 BRPM | HEART RATE: 57 BPM | TEMPERATURE: 98 F

## 2025-08-12 DIAGNOSIS — I73.9 PVD (PERIPHERAL VASCULAR DISEASE): ICD-10-CM

## 2025-08-12 DIAGNOSIS — E78.5 DM TYPE 2 WITH DIABETIC DYSLIPIDEMIA: ICD-10-CM

## 2025-08-12 DIAGNOSIS — Z85.51 HISTORY OF BLADDER CANCER: Primary | ICD-10-CM

## 2025-08-12 DIAGNOSIS — J44.9 CHRONIC OBSTRUCTIVE PULMONARY DISEASE, UNSPECIFIED COPD TYPE: ICD-10-CM

## 2025-08-12 DIAGNOSIS — I50.42 CHRONIC COMBINED SYSTOLIC (CONGESTIVE) AND DIASTOLIC (CONGESTIVE) HEART FAILURE: ICD-10-CM

## 2025-08-12 DIAGNOSIS — F02.818 MAJOR NEUROCOGNITIVE DISORDER DUE TO ALZHEIMER DISEASE, WITH BEHAVIORAL DISTURBANCE: ICD-10-CM

## 2025-08-12 DIAGNOSIS — Z01.818 PREOPERATIVE TESTING: ICD-10-CM

## 2025-08-12 DIAGNOSIS — D50.0 IRON DEFICIENCY ANEMIA SECONDARY TO BLOOD LOSS (CHRONIC): ICD-10-CM

## 2025-08-12 DIAGNOSIS — E11.69 DM TYPE 2 WITH DIABETIC DYSLIPIDEMIA: ICD-10-CM

## 2025-08-12 DIAGNOSIS — I48.0 PAROXYSMAL ATRIAL FIBRILLATION: ICD-10-CM

## 2025-08-12 DIAGNOSIS — G30.9 MAJOR NEUROCOGNITIVE DISORDER DUE TO ALZHEIMER DISEASE, WITH BEHAVIORAL DISTURBANCE: ICD-10-CM

## 2025-08-12 DIAGNOSIS — I10 PRIMARY HYPERTENSION: ICD-10-CM

## 2025-08-12 PROCEDURE — 99999 PR PBB SHADOW E&M-EST. PATIENT-LVL IV: CPT | Mod: PBBFAC,,,

## 2025-08-15 ENCOUNTER — INFUSION (OUTPATIENT)
Dept: INFUSION THERAPY | Facility: HOSPITAL | Age: 85
End: 2025-08-15
Attending: INTERNAL MEDICINE
Payer: MEDICARE

## 2025-08-15 VITALS
DIASTOLIC BLOOD PRESSURE: 81 MMHG | RESPIRATION RATE: 16 BRPM | TEMPERATURE: 97 F | SYSTOLIC BLOOD PRESSURE: 157 MMHG | HEART RATE: 54 BPM | OXYGEN SATURATION: 97 %

## 2025-08-15 DIAGNOSIS — D50.0 IRON DEFICIENCY ANEMIA SECONDARY TO BLOOD LOSS (CHRONIC): Primary | ICD-10-CM

## 2025-08-15 PROCEDURE — 96375 TX/PRO/DX INJ NEW DRUG ADDON: CPT

## 2025-08-15 PROCEDURE — 63600175 PHARM REV CODE 636 W HCPCS: Performed by: NURSE PRACTITIONER

## 2025-08-15 PROCEDURE — 96374 THER/PROPH/DIAG INJ IV PUSH: CPT

## 2025-08-15 RX ORDER — METHYLPREDNISOLONE SOD SUCC 125 MG
40 VIAL (EA) INJECTION
Status: COMPLETED | OUTPATIENT
Start: 2025-08-15 | End: 2025-08-15

## 2025-08-15 RX ORDER — HEPARIN 100 UNIT/ML
500 SYRINGE INTRAVENOUS
OUTPATIENT
Start: 2025-08-22

## 2025-08-15 RX ORDER — EPINEPHRINE 0.3 MG/.3ML
0.3 INJECTION SUBCUTANEOUS ONCE AS NEEDED
Status: DISCONTINUED | OUTPATIENT
Start: 2025-08-15 | End: 2025-08-15 | Stop reason: HOSPADM

## 2025-08-15 RX ORDER — EPINEPHRINE 0.3 MG/.3ML
0.3 INJECTION SUBCUTANEOUS ONCE AS NEEDED
OUTPATIENT
Start: 2025-08-22

## 2025-08-15 RX ORDER — SODIUM CHLORIDE 0.9 % (FLUSH) 0.9 %
10 SYRINGE (ML) INJECTION
Status: DISCONTINUED | OUTPATIENT
Start: 2025-08-15 | End: 2025-08-15 | Stop reason: HOSPADM

## 2025-08-15 RX ORDER — SODIUM CHLORIDE 0.9 % (FLUSH) 0.9 %
10 SYRINGE (ML) INJECTION
OUTPATIENT
Start: 2025-08-22

## 2025-08-15 RX ORDER — METHYLPREDNISOLONE SOD SUCC 125 MG
40 VIAL (EA) INJECTION DAILY
Start: 2025-08-22

## 2025-08-15 RX ADMIN — IRON SUCROSE 200 MG: 20 INJECTION, SOLUTION INTRAVENOUS at 01:08

## 2025-08-15 RX ADMIN — METHYLPREDNISOLONE SODIUM SUCCINATE 40 MG: 125 INJECTION, POWDER, FOR SOLUTION INTRAMUSCULAR; INTRAVENOUS at 01:08

## 2025-08-18 ENCOUNTER — ANESTHESIA EVENT (OUTPATIENT)
Dept: SURGERY | Facility: HOSPITAL | Age: 85
End: 2025-08-18
Payer: MEDICARE

## 2025-08-18 ENCOUNTER — OFFICE VISIT (OUTPATIENT)
Dept: NEUROLOGY | Facility: CLINIC | Age: 85
End: 2025-08-18
Payer: MEDICARE

## 2025-08-18 VITALS
SYSTOLIC BLOOD PRESSURE: 158 MMHG | RESPIRATION RATE: 16 BRPM | HEIGHT: 73 IN | WEIGHT: 203.5 LBS | DIASTOLIC BLOOD PRESSURE: 77 MMHG | BODY MASS INDEX: 26.97 KG/M2 | HEART RATE: 72 BPM

## 2025-08-18 DIAGNOSIS — M86.471 CHRONIC OSTEOMYELITIS OF RIGHT FOOT WITH DRAINING SINUS: ICD-10-CM

## 2025-08-18 DIAGNOSIS — F02.80 DEMENTIA ASSOCIATED WITH OTHER UNDERLYING DISEASE, WITHOUT BEHAVIORAL DISTURBANCE, PSYCHOTIC DISTURBANCE, MOOD DISTURBANCE, OR ANXIETY, UNSPECIFIED DEMENTIA SEVERITY: ICD-10-CM

## 2025-08-18 DIAGNOSIS — I50.42 CHRONIC COMBINED SYSTOLIC (CONGESTIVE) AND DIASTOLIC (CONGESTIVE) HEART FAILURE: ICD-10-CM

## 2025-08-18 DIAGNOSIS — Z95.818 PRESENCE OF WATCHMAN LEFT ATRIAL APPENDAGE CLOSURE DEVICE: ICD-10-CM

## 2025-08-18 DIAGNOSIS — J44.9 CHRONIC OBSTRUCTIVE PULMONARY DISEASE, UNSPECIFIED COPD TYPE: ICD-10-CM

## 2025-08-18 DIAGNOSIS — D61.818 PANCYTOPENIA: ICD-10-CM

## 2025-08-18 DIAGNOSIS — I70.543: ICD-10-CM

## 2025-08-18 DIAGNOSIS — E78.00 HYPERCHOLESTEREMIA: ICD-10-CM

## 2025-08-18 DIAGNOSIS — E11.69 DM TYPE 2 WITH DIABETIC DYSLIPIDEMIA: ICD-10-CM

## 2025-08-18 DIAGNOSIS — E78.5 DM TYPE 2 WITH DIABETIC DYSLIPIDEMIA: ICD-10-CM

## 2025-08-18 DIAGNOSIS — G89.29 CHRONIC NECK PAIN: ICD-10-CM

## 2025-08-18 DIAGNOSIS — F32.2 AGITATED DEPRESSION: ICD-10-CM

## 2025-08-18 DIAGNOSIS — I73.9 PVD (PERIPHERAL VASCULAR DISEASE): ICD-10-CM

## 2025-08-18 DIAGNOSIS — I10 PRIMARY HYPERTENSION: ICD-10-CM

## 2025-08-18 DIAGNOSIS — F03.90 DEMENTIA, UNSPECIFIED DEMENTIA SEVERITY, UNSPECIFIED DEMENTIA TYPE, UNSPECIFIED WHETHER BEHAVIORAL, PSYCHOTIC, OR MOOD DISTURBANCE OR ANXIETY: ICD-10-CM

## 2025-08-18 DIAGNOSIS — E11.9 TYPE 2 DIABETES MELLITUS WITHOUT COMPLICATION, WITHOUT LONG-TERM CURRENT USE OF INSULIN: ICD-10-CM

## 2025-08-18 DIAGNOSIS — G30.9 MAJOR NEUROCOGNITIVE DISORDER DUE TO ALZHEIMER DISEASE, WITH BEHAVIORAL DISTURBANCE: Primary | ICD-10-CM

## 2025-08-18 DIAGNOSIS — Z85.51 HISTORY OF BLADDER CANCER: ICD-10-CM

## 2025-08-18 DIAGNOSIS — R35.0 BENIGN PROSTATIC HYPERPLASIA WITH URINARY FREQUENCY: ICD-10-CM

## 2025-08-18 DIAGNOSIS — G47.00 INSOMNIA, UNSPECIFIED TYPE: ICD-10-CM

## 2025-08-18 DIAGNOSIS — N40.1 BENIGN PROSTATIC HYPERPLASIA WITH URINARY FREQUENCY: ICD-10-CM

## 2025-08-18 DIAGNOSIS — C67.9 UROTHELIAL CARCINOMA OF BLADDER: ICD-10-CM

## 2025-08-18 DIAGNOSIS — I48.0 PAROXYSMAL ATRIAL FIBRILLATION: ICD-10-CM

## 2025-08-18 DIAGNOSIS — I70.533: ICD-10-CM

## 2025-08-18 DIAGNOSIS — R41.3 OTHER AMNESIA: ICD-10-CM

## 2025-08-18 DIAGNOSIS — F02.818 MAJOR NEUROCOGNITIVE DISORDER DUE TO ALZHEIMER DISEASE, WITH BEHAVIORAL DISTURBANCE: Primary | ICD-10-CM

## 2025-08-18 DIAGNOSIS — K31.811 GASTROINTESTINAL HEMORRHAGE ASSOCIATED WITH ANGIODYSPLASIA OF STOMACH AND DUODENUM: ICD-10-CM

## 2025-08-18 DIAGNOSIS — D50.0 IRON DEFICIENCY ANEMIA SECONDARY TO BLOOD LOSS (CHRONIC): ICD-10-CM

## 2025-08-18 DIAGNOSIS — M54.2 CHRONIC NECK PAIN: ICD-10-CM

## 2025-08-18 DIAGNOSIS — D50.0 ANEMIA DUE TO GASTROINTESTINAL BLOOD LOSS: ICD-10-CM

## 2025-08-18 DIAGNOSIS — N20.0 NEPHROLITHIASIS: ICD-10-CM

## 2025-08-18 PROCEDURE — 99999 PR PBB SHADOW E&M-EST. PATIENT-LVL V: CPT | Mod: PBBFAC,,, | Performed by: NURSE PRACTITIONER

## 2025-08-18 RX ORDER — DICLOFENAC SODIUM 10 MG/G
2 GEL TOPICAL 3 TIMES DAILY
Qty: 50 G | Refills: 1 | Status: SHIPPED | OUTPATIENT
Start: 2025-08-18 | End: 2026-02-14

## 2025-08-19 ENCOUNTER — ANESTHESIA (OUTPATIENT)
Dept: SURGERY | Facility: HOSPITAL | Age: 85
End: 2025-08-19
Payer: MEDICARE

## 2025-08-19 ENCOUNTER — HOSPITAL ENCOUNTER (OUTPATIENT)
Facility: HOSPITAL | Age: 85
Discharge: HOME | End: 2025-08-19
Attending: UROLOGY | Admitting: UROLOGY
Payer: MEDICARE

## 2025-08-19 VITALS
OXYGEN SATURATION: 96 % | SYSTOLIC BLOOD PRESSURE: 141 MMHG | HEIGHT: 73 IN | RESPIRATION RATE: 12 BRPM | WEIGHT: 202.69 LBS | BODY MASS INDEX: 26.86 KG/M2 | TEMPERATURE: 97 F | DIASTOLIC BLOOD PRESSURE: 70 MMHG | HEART RATE: 55 BPM

## 2025-08-19 DIAGNOSIS — Z85.51 HISTORY OF BLADDER CANCER: ICD-10-CM

## 2025-08-19 PROCEDURE — 36000707: Performed by: UROLOGY

## 2025-08-19 PROCEDURE — 37000009 HC ANESTHESIA EA ADD 15 MINS: Performed by: UROLOGY

## 2025-08-19 PROCEDURE — 63600175 PHARM REV CODE 636 W HCPCS: Performed by: UROLOGY

## 2025-08-19 PROCEDURE — 71000033 HC RECOVERY, INTIAL HOUR: Performed by: UROLOGY

## 2025-08-19 PROCEDURE — C2617 STENT, NON-COR, TEM W/O DEL: HCPCS | Performed by: UROLOGY

## 2025-08-19 PROCEDURE — 52332 CYSTOSCOPY AND TREATMENT: CPT | Mod: RT,,, | Performed by: UROLOGY

## 2025-08-19 PROCEDURE — 36000706: Performed by: UROLOGY

## 2025-08-19 PROCEDURE — C1758 CATHETER, URETERAL: HCPCS | Performed by: UROLOGY

## 2025-08-19 PROCEDURE — 25500020 PHARM REV CODE 255: Performed by: UROLOGY

## 2025-08-19 PROCEDURE — 37000008 HC ANESTHESIA 1ST 15 MINUTES: Performed by: UROLOGY

## 2025-08-19 PROCEDURE — 71000015 HC POSTOP RECOV 1ST HR: Performed by: UROLOGY

## 2025-08-19 PROCEDURE — 63600175 PHARM REV CODE 636 W HCPCS: Performed by: NURSE ANESTHETIST, CERTIFIED REGISTERED

## 2025-08-19 PROCEDURE — C1769 GUIDE WIRE: HCPCS | Performed by: UROLOGY

## 2025-08-19 PROCEDURE — 74420 UROGRAPHY RTRGR +-KUB: CPT | Mod: 26,,, | Performed by: UROLOGY

## 2025-08-19 DEVICE — STENT URETERAL UNIV 7FR 28CM: Type: IMPLANTABLE DEVICE | Site: URETER | Status: FUNCTIONAL

## 2025-08-19 RX ORDER — OXYCODONE AND ACETAMINOPHEN 5; 325 MG/1; MG/1
1 TABLET ORAL EVERY 4 HOURS PRN
Qty: 10 TABLET | Refills: 0 | Status: SHIPPED | OUTPATIENT
Start: 2025-08-19

## 2025-08-19 RX ORDER — OXYCODONE AND ACETAMINOPHEN 5; 325 MG/1; MG/1
1 TABLET ORAL
Status: DISCONTINUED | OUTPATIENT
Start: 2025-08-19 | End: 2025-08-19 | Stop reason: HOSPADM

## 2025-08-19 RX ORDER — HYOSCYAMINE SULFATE 0.12 MG/1
0.25 TABLET SUBLINGUAL EVERY 4 HOURS PRN
Qty: 40 TABLET | Refills: 0 | Status: SHIPPED | OUTPATIENT
Start: 2025-08-19

## 2025-08-19 RX ORDER — CEFDINIR 300 MG/1
300 CAPSULE ORAL 2 TIMES DAILY
Qty: 10 CAPSULE | Refills: 0 | Status: SHIPPED | OUTPATIENT
Start: 2025-08-19 | End: 2025-08-24

## 2025-08-19 RX ORDER — LIDOCAINE HYDROCHLORIDE 20 MG/ML
INJECTION INTRAVENOUS
Status: DISCONTINUED | OUTPATIENT
Start: 2025-08-19 | End: 2025-08-19

## 2025-08-19 RX ORDER — ONDANSETRON HYDROCHLORIDE 2 MG/ML
4 INJECTION, SOLUTION INTRAVENOUS DAILY PRN
Status: DISCONTINUED | OUTPATIENT
Start: 2025-08-19 | End: 2025-08-19 | Stop reason: HOSPADM

## 2025-08-19 RX ORDER — ONDANSETRON HYDROCHLORIDE 2 MG/ML
INJECTION, SOLUTION INTRAVENOUS
Status: DISCONTINUED | OUTPATIENT
Start: 2025-08-19 | End: 2025-08-19

## 2025-08-19 RX ORDER — HYDROMORPHONE HYDROCHLORIDE 2 MG/ML
0.2 INJECTION, SOLUTION INTRAMUSCULAR; INTRAVENOUS; SUBCUTANEOUS EVERY 5 MIN PRN
Status: DISCONTINUED | OUTPATIENT
Start: 2025-08-19 | End: 2025-08-19 | Stop reason: HOSPADM

## 2025-08-19 RX ORDER — GLUCAGON 1 MG
1 KIT INJECTION
Status: DISCONTINUED | OUTPATIENT
Start: 2025-08-19 | End: 2025-08-19 | Stop reason: HOSPADM

## 2025-08-19 RX ORDER — CEFAZOLIN 2 G/1
2 INJECTION, POWDER, FOR SOLUTION INTRAMUSCULAR; INTRAVENOUS
Status: COMPLETED | OUTPATIENT
Start: 2025-08-19 | End: 2025-08-19

## 2025-08-19 RX ORDER — PHENAZOPYRIDINE HYDROCHLORIDE 200 MG/1
200 TABLET, FILM COATED ORAL 3 TIMES DAILY PRN
Qty: 15 TABLET | Refills: 0 | Status: SHIPPED | OUTPATIENT
Start: 2025-08-19

## 2025-08-19 RX ORDER — PROPOFOL 10 MG/ML
VIAL (ML) INTRAVENOUS
Status: DISCONTINUED | OUTPATIENT
Start: 2025-08-19 | End: 2025-08-19

## 2025-08-19 RX ADMIN — LIDOCAINE HYDROCHLORIDE 50 MG: 20 INJECTION INTRAVENOUS at 08:08

## 2025-08-19 RX ADMIN — SODIUM CHLORIDE, SODIUM LACTATE, POTASSIUM CHLORIDE, AND CALCIUM CHLORIDE: .6; .31; .03; .02 INJECTION, SOLUTION INTRAVENOUS at 08:08

## 2025-08-19 RX ADMIN — CEFAZOLIN 2 G: 2 INJECTION, POWDER, FOR SOLUTION INTRAMUSCULAR; INTRAVENOUS at 08:08

## 2025-08-19 RX ADMIN — PROPOFOL 100 MG: 10 INJECTION, EMULSION INTRAVENOUS at 08:08

## 2025-08-19 RX ADMIN — GLYCOPYRROLATE 0.2 MG: 0.2 INJECTION, SOLUTION INTRAMUSCULAR; INTRAVITREAL at 08:08

## 2025-08-19 RX ADMIN — ONDANSETRON 4 MG: 2 INJECTION INTRAMUSCULAR; INTRAVENOUS at 08:08

## 2025-08-20 ENCOUNTER — TELEPHONE (OUTPATIENT)
Dept: UROLOGY | Facility: CLINIC | Age: 85
End: 2025-08-20
Payer: MEDICARE

## 2025-08-22 ENCOUNTER — INFUSION (OUTPATIENT)
Dept: INFUSION THERAPY | Facility: HOSPITAL | Age: 85
End: 2025-08-22
Attending: NURSE PRACTITIONER
Payer: MEDICARE

## 2025-08-22 VITALS
DIASTOLIC BLOOD PRESSURE: 74 MMHG | RESPIRATION RATE: 16 BRPM | OXYGEN SATURATION: 96 % | TEMPERATURE: 98 F | HEART RATE: 65 BPM | SYSTOLIC BLOOD PRESSURE: 154 MMHG

## 2025-08-22 DIAGNOSIS — D50.0 IRON DEFICIENCY ANEMIA SECONDARY TO BLOOD LOSS (CHRONIC): Primary | ICD-10-CM

## 2025-08-22 PROCEDURE — A4216 STERILE WATER/SALINE, 10 ML: HCPCS | Performed by: NURSE PRACTITIONER

## 2025-08-22 PROCEDURE — 96374 THER/PROPH/DIAG INJ IV PUSH: CPT

## 2025-08-22 PROCEDURE — 63600175 PHARM REV CODE 636 W HCPCS: Mod: TB | Performed by: NURSE PRACTITIONER

## 2025-08-22 PROCEDURE — 96375 TX/PRO/DX INJ NEW DRUG ADDON: CPT

## 2025-08-22 PROCEDURE — 25000003 PHARM REV CODE 250: Performed by: NURSE PRACTITIONER

## 2025-08-22 RX ORDER — METHYLPREDNISOLONE SOD SUCC 125 MG
40 VIAL (EA) INJECTION DAILY
Start: 2025-08-22

## 2025-08-22 RX ORDER — HEPARIN 100 UNIT/ML
500 SYRINGE INTRAVENOUS
OUTPATIENT
Start: 2025-08-22

## 2025-08-22 RX ORDER — SODIUM CHLORIDE 0.9 % (FLUSH) 0.9 %
10 SYRINGE (ML) INJECTION
OUTPATIENT
Start: 2025-08-22

## 2025-08-22 RX ORDER — EPINEPHRINE 0.3 MG/.3ML
0.3 INJECTION SUBCUTANEOUS ONCE AS NEEDED
OUTPATIENT
Start: 2025-08-22 | End: 2037-01-18

## 2025-08-22 RX ORDER — METHYLPREDNISOLONE SOD SUCC 125 MG
40 VIAL (EA) INJECTION
Status: COMPLETED | OUTPATIENT
Start: 2025-08-22 | End: 2025-08-22

## 2025-08-22 RX ORDER — SODIUM CHLORIDE 0.9 % (FLUSH) 0.9 %
10 SYRINGE (ML) INJECTION
Status: DISCONTINUED | OUTPATIENT
Start: 2025-08-22 | End: 2025-08-22 | Stop reason: HOSPADM

## 2025-08-22 RX ADMIN — METHYLPREDNISOLONE SODIUM SUCCINATE 40 MG: 125 INJECTION, POWDER, FOR SOLUTION INTRAMUSCULAR; INTRAVENOUS at 09:08

## 2025-08-22 RX ADMIN — Medication 10 ML: at 09:08

## 2025-08-22 RX ADMIN — IRON SUCROSE 200 MG: 20 INJECTION, SOLUTION INTRAVENOUS at 09:08

## 2025-08-25 DIAGNOSIS — Z00.00 ENCOUNTER FOR MEDICARE ANNUAL WELLNESS EXAM: ICD-10-CM

## 2025-08-29 ENCOUNTER — INFUSION (OUTPATIENT)
Dept: INFUSION THERAPY | Facility: HOSPITAL | Age: 85
End: 2025-08-29
Attending: NURSE PRACTITIONER
Payer: MEDICARE

## 2025-08-29 ENCOUNTER — PATIENT MESSAGE (OUTPATIENT)
Dept: ADMINISTRATIVE | Facility: HOSPITAL | Age: 85
End: 2025-08-29
Payer: MEDICARE

## 2025-08-29 VITALS
TEMPERATURE: 97 F | SYSTOLIC BLOOD PRESSURE: 114 MMHG | OXYGEN SATURATION: 98 % | HEART RATE: 62 BPM | DIASTOLIC BLOOD PRESSURE: 68 MMHG

## 2025-08-29 DIAGNOSIS — D50.0 IRON DEFICIENCY ANEMIA SECONDARY TO BLOOD LOSS (CHRONIC): Primary | ICD-10-CM

## 2025-08-29 PROCEDURE — 96374 THER/PROPH/DIAG INJ IV PUSH: CPT

## 2025-08-29 PROCEDURE — 96375 TX/PRO/DX INJ NEW DRUG ADDON: CPT

## 2025-08-29 PROCEDURE — 63600175 PHARM REV CODE 636 W HCPCS: Mod: TB | Performed by: NURSE PRACTITIONER

## 2025-08-29 RX ORDER — EPINEPHRINE 0.3 MG/.3ML
0.3 INJECTION SUBCUTANEOUS ONCE AS NEEDED
Status: DISCONTINUED | OUTPATIENT
Start: 2025-08-29 | End: 2025-08-29 | Stop reason: HOSPADM

## 2025-08-29 RX ORDER — EPINEPHRINE 0.3 MG/.3ML
0.3 INJECTION SUBCUTANEOUS ONCE AS NEEDED
OUTPATIENT
Start: 2025-08-29 | End: 2037-01-25

## 2025-08-29 RX ORDER — HEPARIN 100 UNIT/ML
500 SYRINGE INTRAVENOUS
OUTPATIENT
Start: 2025-08-29

## 2025-08-29 RX ORDER — METHYLPREDNISOLONE SOD SUCC 125 MG
40 VIAL (EA) INJECTION DAILY
Start: 2025-08-29

## 2025-08-29 RX ORDER — SODIUM CHLORIDE 0.9 % (FLUSH) 0.9 %
10 SYRINGE (ML) INJECTION
OUTPATIENT
Start: 2025-08-29

## 2025-08-29 RX ORDER — METHYLPREDNISOLONE SOD SUCC 125 MG
40 VIAL (EA) INJECTION
Status: COMPLETED | OUTPATIENT
Start: 2025-08-29 | End: 2025-08-29

## 2025-08-29 RX ADMIN — IRON SUCROSE 200 MG: 20 INJECTION, SOLUTION INTRAVENOUS at 09:08

## 2025-08-29 RX ADMIN — METHYLPREDNISOLONE SODIUM SUCCINATE 40 MG: 125 INJECTION, POWDER, FOR SOLUTION INTRAMUSCULAR; INTRAVENOUS at 09:08

## 2025-09-05 ENCOUNTER — INFUSION (OUTPATIENT)
Dept: INFUSION THERAPY | Facility: HOSPITAL | Age: 85
End: 2025-09-05
Attending: INTERNAL MEDICINE
Payer: MEDICARE

## 2025-09-05 VITALS
HEART RATE: 51 BPM | DIASTOLIC BLOOD PRESSURE: 75 MMHG | RESPIRATION RATE: 18 BRPM | OXYGEN SATURATION: 98 % | SYSTOLIC BLOOD PRESSURE: 149 MMHG | TEMPERATURE: 97 F

## 2025-09-05 DIAGNOSIS — D50.0 IRON DEFICIENCY ANEMIA SECONDARY TO BLOOD LOSS (CHRONIC): Primary | ICD-10-CM

## 2025-09-05 PROCEDURE — A4216 STERILE WATER/SALINE, 10 ML: HCPCS | Performed by: NURSE PRACTITIONER

## 2025-09-05 PROCEDURE — 63600175 PHARM REV CODE 636 W HCPCS: Mod: TB | Performed by: NURSE PRACTITIONER

## 2025-09-05 PROCEDURE — 96374 THER/PROPH/DIAG INJ IV PUSH: CPT

## 2025-09-05 PROCEDURE — 25000003 PHARM REV CODE 250: Performed by: NURSE PRACTITIONER

## 2025-09-05 PROCEDURE — 96375 TX/PRO/DX INJ NEW DRUG ADDON: CPT

## 2025-09-05 RX ORDER — SODIUM CHLORIDE 0.9 % (FLUSH) 0.9 %
10 SYRINGE (ML) INJECTION
Status: DISCONTINUED | OUTPATIENT
Start: 2025-09-05 | End: 2025-09-05 | Stop reason: HOSPADM

## 2025-09-05 RX ORDER — SODIUM CHLORIDE 0.9 % (FLUSH) 0.9 %
10 SYRINGE (ML) INJECTION
OUTPATIENT
Start: 2025-09-05

## 2025-09-05 RX ORDER — HEPARIN 100 UNIT/ML
500 SYRINGE INTRAVENOUS
OUTPATIENT
Start: 2025-09-05

## 2025-09-05 RX ORDER — METHYLPREDNISOLONE SOD SUCC 125 MG
40 VIAL (EA) INJECTION
Status: COMPLETED | OUTPATIENT
Start: 2025-09-05 | End: 2025-09-05

## 2025-09-05 RX ORDER — EPINEPHRINE 0.3 MG/.3ML
0.3 INJECTION SUBCUTANEOUS ONCE AS NEEDED
OUTPATIENT
Start: 2025-09-05 | End: 2037-02-01

## 2025-09-05 RX ORDER — METHYLPREDNISOLONE SOD SUCC 125 MG
40 VIAL (EA) INJECTION DAILY
Start: 2025-09-05

## 2025-09-05 RX ADMIN — IRON SUCROSE 200 MG: 20 INJECTION, SOLUTION INTRAVENOUS at 10:09

## 2025-09-05 RX ADMIN — Medication 10 ML: at 10:09

## 2025-09-05 RX ADMIN — METHYLPREDNISOLONE SODIUM SUCCINATE 40 MG: 125 INJECTION, POWDER, FOR SOLUTION INTRAMUSCULAR; INTRAVENOUS at 10:09

## (undated) DEVICE — Device

## (undated) DEVICE — PACK EP DRAPE OMC

## (undated) DEVICE — CATH POLLACK OPEN-END FLEXI-TI

## (undated) DEVICE — TRAY SKIN SCRUB WET 4 COMPART

## (undated) DEVICE — CANISTER SUCTION JUMBO 12L

## (undated) DEVICE — SPONGE COTTON TRAY 4X4IN

## (undated) DEVICE — DRAPE T CYSTOSCOPY STERILE

## (undated) DEVICE — SKIN MARKER DEVON 160

## (undated) DEVICE — BOWL STERILE LARGE 32OZ

## (undated) DEVICE — DIALATOR COONS TAPER 12F 20CM

## (undated) DEVICE — SEE MEDLINE ITEM 154981

## (undated) DEVICE — TOWEL OR DISP STRL BLUE 4/PK

## (undated) DEVICE — BAG DRAIN ANTI REFLUX 4000ML

## (undated) DEVICE — ELECTRODE LOOP CUTTING BIPOLAR

## (undated) DEVICE — CANISTER OMNI-JUG SUCTION 16LT

## (undated) DEVICE — KIT PROBE COVER WITH GEL

## (undated) DEVICE — SYR LUER LOCK STERILE 10ML

## (undated) DEVICE — GOWN POLY REINF X-LONG XL

## (undated) DEVICE — GOWN SMARTGOWN LVL4 X-LONG XL

## (undated) DEVICE — COVER LIGHT HANDLE 80/CA

## (undated) DEVICE — CONTAINER SPECIMEN OR STER 4OZ

## (undated) DEVICE — SOLIDIFIER BOTTLE 3000CC

## (undated) DEVICE — SET IRRIGATION WARMING NORMAL

## (undated) DEVICE — SYR 50ML CATH TIP

## (undated) DEVICE — SOL NACL IRR 3000ML

## (undated) DEVICE — UROVIEW 2600/2800

## (undated) DEVICE — FIBER QUANTA OPT STD 1000UM

## (undated) DEVICE — SET CYSTO IRRIGATION UNIV SPIK

## (undated) DEVICE — GLOVE SURG BIOGEL LATEX SZ 7.5

## (undated) DEVICE — IRRIGATION SET Y-TYPE TUR/BLAD

## (undated) DEVICE — CATH 5FR OPEN END URETERAL

## (undated) DEVICE — BAG DRAIN URINARY CONT IRRG

## (undated) DEVICE — CATH ANGIO DIAG PIG 6FX110

## (undated) DEVICE — DEVICE PERCLOSE SUT CLSR 6FR

## (undated) DEVICE — JELLY LUBRICANT STERILE 4 OZ

## (undated) DEVICE — GUIDE WIRE MOTION .035 X 150CM

## (undated) DEVICE — SOL IRRI STRL WATER 1000ML

## (undated) DEVICE — ADAPTER TUOHY BORST 6FR

## (undated) DEVICE — INTRO SWARTZ SL2 8.5FR 63CM

## (undated) DEVICE — SOL IRR NACL .9% 3000ML

## (undated) DEVICE — KIT CUSTOM MANIFOLD

## (undated) DEVICE — GLOVE SIGNATURE ESSNTL LTX 7.5

## (undated) DEVICE — SET INTRO PERFRMR SHTH 16FR

## (undated) DEVICE — SHEATH HEMOSTASIS 8.5FR

## (undated) DEVICE — SOL WATER STRL IRR 1000ML

## (undated) DEVICE — SEE MEDLINE ITEM 157117

## (undated) DEVICE — SYR ONLY LUER LOCK 20CC

## (undated) DEVICE — GAUZE SPONGE 4X4 12PLY

## (undated) DEVICE — WIRE GUIDE 0.038OLD

## (undated) DEVICE — DRESSING TELFA STRL 4X3 LF

## (undated) DEVICE — NDL TRNSSPTL BRK-1 18GA 71CM

## (undated) DEVICE — GUIDEWIRE AMPLATZ XSTIFF 260CM

## (undated) DEVICE — DILATOR COONS TAPER 14FR

## (undated) DEVICE — PAD DEFIB CADENCE ADULT R2

## (undated) DEVICE — SUPPORT ULNA NERVE PROTECTOR

## (undated) DEVICE — SET IRR URLGY 2LINE UNIV SPIKE

## (undated) DEVICE — SEE MEDLINE ITEM 157185

## (undated) DEVICE — SYS WATCHMAN FXD CURVE DBL US